# Patient Record
Sex: MALE | Race: OTHER | HISPANIC OR LATINO | Employment: OTHER | URBAN - METROPOLITAN AREA
[De-identification: names, ages, dates, MRNs, and addresses within clinical notes are randomized per-mention and may not be internally consistent; named-entity substitution may affect disease eponyms.]

---

## 2017-01-05 ENCOUNTER — TRANSCRIBE ORDERS (OUTPATIENT)
Dept: LAB | Facility: CLINIC | Age: 67
End: 2017-01-05

## 2017-01-05 ENCOUNTER — APPOINTMENT (OUTPATIENT)
Dept: LAB | Facility: CLINIC | Age: 67
End: 2017-01-05
Payer: COMMERCIAL

## 2017-01-05 ENCOUNTER — GENERIC CONVERSION - ENCOUNTER (OUTPATIENT)
Dept: OTHER | Facility: OTHER | Age: 67
End: 2017-01-05

## 2017-01-05 DIAGNOSIS — E11.9 TYPE 2 DIABETES MELLITUS WITHOUT COMPLICATIONS (HCC): ICD-10-CM

## 2017-01-05 DIAGNOSIS — E66.01 MORBID (SEVERE) OBESITY DUE TO EXCESS CALORIES (HCC): ICD-10-CM

## 2017-01-05 DIAGNOSIS — Z98.84 BARIATRIC SURGERY STATUS: ICD-10-CM

## 2017-01-05 LAB
25(OH)D3 SERPL-MCNC: 28.7 NG/ML (ref 30–100)
ALBUMIN SERPL BCP-MCNC: 3.5 G/DL (ref 3.5–5)
ALP SERPL-CCNC: 73 U/L (ref 46–116)
ALT SERPL W P-5'-P-CCNC: 35 U/L (ref 12–78)
ANION GAP SERPL CALCULATED.3IONS-SCNC: 9 MMOL/L (ref 4–13)
AST SERPL W P-5'-P-CCNC: 20 U/L (ref 5–45)
BILIRUB SERPL-MCNC: 0.4 MG/DL (ref 0.2–1)
BUN SERPL-MCNC: 19 MG/DL (ref 5–25)
CALCIUM SERPL-MCNC: 8.9 MG/DL (ref 8.3–10.1)
CHLORIDE SERPL-SCNC: 107 MMOL/L (ref 100–108)
CHOLEST SERPL-MCNC: 190 MG/DL (ref 50–200)
CO2 SERPL-SCNC: 27 MMOL/L (ref 21–32)
CREAT SERPL-MCNC: 0.89 MG/DL (ref 0.6–1.3)
ERYTHROCYTE [DISTWIDTH] IN BLOOD BY AUTOMATED COUNT: 14.3 % (ref 11.6–15.1)
EST. AVERAGE GLUCOSE BLD GHB EST-MCNC: 146 MG/DL
GFR SERPL CREATININE-BSD FRML MDRD: >60 ML/MIN/1.73SQ M
GLUCOSE SERPL-MCNC: 121 MG/DL (ref 65–140)
HBA1C MFR BLD: 6.7 % (ref 4.2–6.3)
HCT VFR BLD AUTO: 44 % (ref 36.5–49.3)
HDLC SERPL-MCNC: 35 MG/DL (ref 40–60)
HGB BLD-MCNC: 14.2 G/DL (ref 12–17)
LDLC SERPL CALC-MCNC: 126 MG/DL (ref 0–100)
MCH RBC QN AUTO: 28.6 PG (ref 26.8–34.3)
MCHC RBC AUTO-ENTMCNC: 32.3 G/DL (ref 31.4–37.4)
MCV RBC AUTO: 89 FL (ref 82–98)
PLATELET # BLD AUTO: 154 THOUSANDS/UL (ref 149–390)
PMV BLD AUTO: 13.2 FL (ref 8.9–12.7)
POTASSIUM SERPL-SCNC: 3.9 MMOL/L (ref 3.5–5.3)
PROT SERPL-MCNC: 7.5 G/DL (ref 6.4–8.2)
RBC # BLD AUTO: 4.96 MILLION/UL (ref 3.88–5.62)
SODIUM SERPL-SCNC: 143 MMOL/L (ref 136–145)
TRIGL SERPL-MCNC: 146 MG/DL
VIT B12 SERPL-MCNC: 922 PG/ML (ref 100–900)
WBC # BLD AUTO: 6.48 THOUSAND/UL (ref 4.31–10.16)

## 2017-01-05 PROCEDURE — 80061 LIPID PANEL: CPT

## 2017-01-05 PROCEDURE — 36415 COLL VENOUS BLD VENIPUNCTURE: CPT

## 2017-01-05 PROCEDURE — 82306 VITAMIN D 25 HYDROXY: CPT

## 2017-01-05 PROCEDURE — 82607 VITAMIN B-12: CPT

## 2017-01-05 PROCEDURE — 83036 HEMOGLOBIN GLYCOSYLATED A1C: CPT

## 2017-01-05 PROCEDURE — 80053 COMPREHEN METABOLIC PANEL: CPT

## 2017-01-05 PROCEDURE — 85027 COMPLETE CBC AUTOMATED: CPT

## 2017-01-06 ENCOUNTER — ALLSCRIPTS OFFICE VISIT (OUTPATIENT)
Dept: OTHER | Facility: OTHER | Age: 67
End: 2017-01-06

## 2017-02-01 ENCOUNTER — ALLSCRIPTS OFFICE VISIT (OUTPATIENT)
Dept: OTHER | Facility: OTHER | Age: 67
End: 2017-02-01

## 2017-02-06 ENCOUNTER — ALLSCRIPTS OFFICE VISIT (OUTPATIENT)
Dept: OTHER | Facility: OTHER | Age: 67
End: 2017-02-06

## 2017-03-01 DIAGNOSIS — Z01.812 ENCOUNTER FOR PREPROCEDURAL LABORATORY EXAMINATION: ICD-10-CM

## 2017-03-01 DIAGNOSIS — G47.33 OBSTRUCTIVE SLEEP APNEA: ICD-10-CM

## 2017-03-01 DIAGNOSIS — I10 ESSENTIAL (PRIMARY) HYPERTENSION: ICD-10-CM

## 2017-03-01 DIAGNOSIS — R10.13 EPIGASTRIC PAIN: ICD-10-CM

## 2017-03-01 DIAGNOSIS — Z00.00 ENCOUNTER FOR GENERAL ADULT MEDICAL EXAMINATION WITHOUT ABNORMAL FINDINGS: ICD-10-CM

## 2017-03-01 DIAGNOSIS — E11.9 TYPE 2 DIABETES MELLITUS WITHOUT COMPLICATIONS (HCC): ICD-10-CM

## 2017-03-01 DIAGNOSIS — K91.2 POSTSURGICAL MALABSORPTION, NOT ELSEWHERE CLASSIFIED: ICD-10-CM

## 2017-03-01 DIAGNOSIS — E78.00 PURE HYPERCHOLESTEROLEMIA: ICD-10-CM

## 2017-03-01 DIAGNOSIS — Z98.84 BARIATRIC SURGERY STATUS: ICD-10-CM

## 2017-03-03 ENCOUNTER — HOSPITAL ENCOUNTER (OUTPATIENT)
Facility: HOSPITAL | Age: 67
Setting detail: OBSERVATION
Discharge: HOME | End: 2017-03-04
Attending: EMERGENCY MEDICINE | Admitting: INTERNAL MEDICINE
Payer: COMMERCIAL

## 2017-03-03 ENCOUNTER — APPOINTMENT (EMERGENCY)
Dept: RADIOLOGY | Facility: HOSPITAL | Age: 67
End: 2017-03-03
Payer: COMMERCIAL

## 2017-03-03 ENCOUNTER — APPOINTMENT (EMERGENCY)
Dept: CT IMAGING | Facility: HOSPITAL | Age: 67
End: 2017-03-03
Payer: COMMERCIAL

## 2017-03-03 DIAGNOSIS — R00.1 SINUS BRADYCARDIA: ICD-10-CM

## 2017-03-03 DIAGNOSIS — R55 SYNCOPE: Primary | ICD-10-CM

## 2017-03-03 DIAGNOSIS — S40.012A CONTUSION OF LEFT SHOULDER, INITIAL ENCOUNTER: ICD-10-CM

## 2017-03-03 LAB
ANION GAP SERPL CALCULATED.3IONS-SCNC: 13 MMOL/L (ref 4–13)
APAP SERPL-MCNC: <2 UG/ML (ref 10–30)
APTT PPP: 27 SECONDS (ref 24–36)
BASOPHILS # BLD AUTO: 0.02 THOUSANDS/ΜL (ref 0–0.1)
BASOPHILS NFR BLD AUTO: 0 % (ref 0–1)
BUN SERPL-MCNC: 19 MG/DL (ref 5–25)
CALCIUM SERPL-MCNC: 8.4 MG/DL (ref 8.3–10.1)
CHLORIDE SERPL-SCNC: 106 MMOL/L (ref 100–108)
CO2 SERPL-SCNC: 22 MMOL/L (ref 21–32)
CREAT SERPL-MCNC: 1.29 MG/DL (ref 0.6–1.3)
DEPRECATED D DIMER PPP: 727 NG/ML (FEU) (ref 0–424)
EOSINOPHIL # BLD AUTO: 0.07 THOUSAND/ΜL (ref 0–0.61)
EOSINOPHIL NFR BLD AUTO: 1 % (ref 0–6)
ERYTHROCYTE [DISTWIDTH] IN BLOOD BY AUTOMATED COUNT: 14.3 % (ref 11.6–15.1)
ETHANOL SERPL-MCNC: <3 MG/DL (ref 0–3)
GFR SERPL CREATININE-BSD FRML MDRD: 55.7 ML/MIN/1.73SQ M
GLUCOSE SERPL-MCNC: 171 MG/DL (ref 65–140)
GLUCOSE SERPL-MCNC: 185 MG/DL (ref 65–140)
HCT VFR BLD AUTO: 44 % (ref 36.5–49.3)
HGB BLD-MCNC: 14.5 G/DL (ref 12–17)
INR PPP: 1.23 (ref 0.86–1.16)
LYMPHOCYTES # BLD AUTO: 1.41 THOUSANDS/ΜL (ref 0.6–4.47)
LYMPHOCYTES NFR BLD AUTO: 16 % (ref 14–44)
MAGNESIUM SERPL-MCNC: 2 MG/DL (ref 1.6–2.6)
MCH RBC QN AUTO: 28.8 PG (ref 26.8–34.3)
MCHC RBC AUTO-ENTMCNC: 33 G/DL (ref 31.4–37.4)
MCV RBC AUTO: 87 FL (ref 82–98)
MONOCYTES # BLD AUTO: 0.7 THOUSAND/ΜL (ref 0.17–1.22)
MONOCYTES NFR BLD AUTO: 8 % (ref 4–12)
NEUTROPHILS # BLD AUTO: 6.47 THOUSANDS/ΜL (ref 1.85–7.62)
NEUTS SEG NFR BLD AUTO: 75 % (ref 43–75)
PLATELET # BLD AUTO: 153 THOUSANDS/UL (ref 149–390)
PMV BLD AUTO: 12.9 FL (ref 8.9–12.7)
POTASSIUM SERPL-SCNC: 4.7 MMOL/L (ref 3.5–5.3)
PROTHROMBIN TIME: 15.2 SECONDS (ref 12–14.3)
RBC # BLD AUTO: 5.04 MILLION/UL (ref 3.88–5.62)
SALICYLATES SERPL-MCNC: <3 MG/DL (ref 3–20)
SODIUM SERPL-SCNC: 141 MMOL/L (ref 136–145)
TROPONIN I SERPL-MCNC: <0.02 NG/ML
WBC # BLD AUTO: 8.67 THOUSAND/UL (ref 4.31–10.16)

## 2017-03-03 PROCEDURE — 85025 COMPLETE CBC W/AUTO DIFF WBC: CPT | Performed by: EMERGENCY MEDICINE

## 2017-03-03 PROCEDURE — 96374 THER/PROPH/DIAG INJ IV PUSH: CPT

## 2017-03-03 PROCEDURE — 82948 REAGENT STRIP/BLOOD GLUCOSE: CPT

## 2017-03-03 PROCEDURE — 73030 X-RAY EXAM OF SHOULDER: CPT

## 2017-03-03 PROCEDURE — 84484 ASSAY OF TROPONIN QUANT: CPT | Performed by: EMERGENCY MEDICINE

## 2017-03-03 PROCEDURE — 83735 ASSAY OF MAGNESIUM: CPT | Performed by: EMERGENCY MEDICINE

## 2017-03-03 PROCEDURE — 70450 CT HEAD/BRAIN W/O DYE: CPT

## 2017-03-03 PROCEDURE — 85379 FIBRIN DEGRADATION QUANT: CPT | Performed by: EMERGENCY MEDICINE

## 2017-03-03 PROCEDURE — 80048 BASIC METABOLIC PNL TOTAL CA: CPT | Performed by: EMERGENCY MEDICINE

## 2017-03-03 PROCEDURE — 93005 ELECTROCARDIOGRAM TRACING: CPT | Performed by: EMERGENCY MEDICINE

## 2017-03-03 PROCEDURE — 85730 THROMBOPLASTIN TIME PARTIAL: CPT | Performed by: EMERGENCY MEDICINE

## 2017-03-03 PROCEDURE — 80320 DRUG SCREEN QUANTALCOHOLS: CPT | Performed by: EMERGENCY MEDICINE

## 2017-03-03 PROCEDURE — 85610 PROTHROMBIN TIME: CPT | Performed by: EMERGENCY MEDICINE

## 2017-03-03 PROCEDURE — 71275 CT ANGIOGRAPHY CHEST: CPT

## 2017-03-03 PROCEDURE — 72125 CT NECK SPINE W/O DYE: CPT

## 2017-03-03 PROCEDURE — 36415 COLL VENOUS BLD VENIPUNCTURE: CPT | Performed by: EMERGENCY MEDICINE

## 2017-03-03 RX ORDER — KETOROLAC TROMETHAMINE 30 MG/ML
15 INJECTION, SOLUTION INTRAMUSCULAR; INTRAVENOUS ONCE
Status: COMPLETED | OUTPATIENT
Start: 2017-03-03 | End: 2017-03-03

## 2017-03-03 RX ADMIN — KETOROLAC TROMETHAMINE 15 MG: 30 INJECTION, SOLUTION INTRAMUSCULAR at 23:30

## 2017-03-04 VITALS
HEIGHT: 68 IN | TEMPERATURE: 97.4 F | RESPIRATION RATE: 20 BRPM | OXYGEN SATURATION: 93 % | HEART RATE: 52 BPM | BODY MASS INDEX: 38.77 KG/M2 | DIASTOLIC BLOOD PRESSURE: 58 MMHG | WEIGHT: 255.8 LBS | SYSTOLIC BLOOD PRESSURE: 121 MMHG

## 2017-03-04 PROBLEM — R00.1 SINUS BRADYCARDIA: Status: ACTIVE | Noted: 2017-03-04

## 2017-03-04 PROBLEM — R55 SYNCOPE: Status: ACTIVE | Noted: 2017-03-04

## 2017-03-04 LAB
ANION GAP SERPL CALCULATED.3IONS-SCNC: 8 MMOL/L (ref 4–13)
ATRIAL RATE: 66 BPM
BUN SERPL-MCNC: 20 MG/DL (ref 5–25)
CALCIUM SERPL-MCNC: 8.4 MG/DL (ref 8.3–10.1)
CHLORIDE SERPL-SCNC: 107 MMOL/L (ref 100–108)
CO2 SERPL-SCNC: 22 MMOL/L (ref 21–32)
CREAT SERPL-MCNC: 0.91 MG/DL (ref 0.6–1.3)
ERYTHROCYTE [DISTWIDTH] IN BLOOD BY AUTOMATED COUNT: 14.1 % (ref 11.6–15.1)
GFR SERPL CREATININE-BSD FRML MDRD: >60 ML/MIN/1.73SQ M
GLUCOSE SERPL-MCNC: 111 MG/DL (ref 65–140)
GLUCOSE SERPL-MCNC: 134 MG/DL (ref 65–140)
GLUCOSE SERPL-MCNC: 166 MG/DL (ref 65–140)
HCT VFR BLD AUTO: 39.7 % (ref 36.5–49.3)
HGB BLD-MCNC: 12.7 G/DL (ref 12–17)
MAGNESIUM SERPL-MCNC: 2 MG/DL (ref 1.6–2.6)
MCH RBC QN AUTO: 28 PG (ref 26.8–34.3)
MCHC RBC AUTO-ENTMCNC: 32 G/DL (ref 31.4–37.4)
MCV RBC AUTO: 87 FL (ref 82–98)
P AXIS: 58 DEGREES
PLATELET # BLD AUTO: 103 THOUSANDS/UL (ref 149–390)
PMV BLD AUTO: 12.7 FL (ref 8.9–12.7)
POTASSIUM SERPL-SCNC: 4.1 MMOL/L (ref 3.5–5.3)
PR INTERVAL: 156 MS
QRS AXIS: 57 DEGREES
QRSD INTERVAL: 92 MS
QT INTERVAL: 400 MS
QTC INTERVAL: 419 MS
RBC # BLD AUTO: 4.54 MILLION/UL (ref 3.88–5.62)
SODIUM SERPL-SCNC: 137 MMOL/L (ref 136–145)
T WAVE AXIS: 60 DEGREES
TROPONIN I SERPL-MCNC: <0.02 NG/ML
TROPONIN I SERPL-MCNC: <0.02 NG/ML
TSH SERPL DL<=0.05 MIU/L-ACNC: 1.35 UIU/ML (ref 0.36–3.74)
VENTRICULAR RATE: 66 BPM
WBC # BLD AUTO: 9.36 THOUSAND/UL (ref 4.31–10.16)

## 2017-03-04 PROCEDURE — 85027 COMPLETE CBC AUTOMATED: CPT | Performed by: PHYSICIAN ASSISTANT

## 2017-03-04 PROCEDURE — 80048 BASIC METABOLIC PNL TOTAL CA: CPT | Performed by: PHYSICIAN ASSISTANT

## 2017-03-04 PROCEDURE — 99285 EMERGENCY DEPT VISIT HI MDM: CPT

## 2017-03-04 PROCEDURE — 84484 ASSAY OF TROPONIN QUANT: CPT | Performed by: PHYSICIAN ASSISTANT

## 2017-03-04 PROCEDURE — 82948 REAGENT STRIP/BLOOD GLUCOSE: CPT

## 2017-03-04 PROCEDURE — 83735 ASSAY OF MAGNESIUM: CPT | Performed by: PHYSICIAN ASSISTANT

## 2017-03-04 PROCEDURE — 84443 ASSAY THYROID STIM HORMONE: CPT | Performed by: PHYSICIAN ASSISTANT

## 2017-03-04 RX ORDER — ONDANSETRON 2 MG/ML
4 INJECTION INTRAMUSCULAR; INTRAVENOUS EVERY 6 HOURS PRN
Status: DISCONTINUED | OUTPATIENT
Start: 2017-03-04 | End: 2017-03-04 | Stop reason: HOSPADM

## 2017-03-04 RX ORDER — CHOLECALCIFEROL (VITAMIN D3) 125 MCG
1000 CAPSULE ORAL EVERY OTHER DAY
Status: DISCONTINUED | OUTPATIENT
Start: 2017-03-04 | End: 2017-03-04 | Stop reason: HOSPADM

## 2017-03-04 RX ORDER — LOSARTAN POTASSIUM 25 MG/1
25 TABLET ORAL DAILY
Status: DISCONTINUED | OUTPATIENT
Start: 2017-03-04 | End: 2017-03-04 | Stop reason: HOSPADM

## 2017-03-04 RX ORDER — GLIPIZIDE AND METFORMIN HCL 2.5; 25 MG/1; MG/1
1 TABLET, FILM COATED ORAL
COMMUNITY
End: 2018-11-26

## 2017-03-04 RX ORDER — INSULIN ASPART 100 [IU]/ML
10 INJECTION, SUSPENSION SUBCUTANEOUS
Status: DISCONTINUED | OUTPATIENT
Start: 2017-03-04 | End: 2017-03-04 | Stop reason: HOSPADM

## 2017-03-04 RX ORDER — TAMSULOSIN HYDROCHLORIDE 0.4 MG/1
0.4 CAPSULE ORAL
Status: DISCONTINUED | OUTPATIENT
Start: 2017-03-04 | End: 2017-03-04 | Stop reason: HOSPADM

## 2017-03-04 RX ORDER — SODIUM CHLORIDE 9 MG/ML
75 INJECTION, SOLUTION INTRAVENOUS CONTINUOUS
Status: DISCONTINUED | OUTPATIENT
Start: 2017-03-04 | End: 2017-03-04

## 2017-03-04 RX ORDER — PANTOPRAZOLE SODIUM 40 MG/1
40 TABLET, DELAYED RELEASE ORAL
Status: DISCONTINUED | OUTPATIENT
Start: 2017-03-04 | End: 2017-03-04 | Stop reason: HOSPADM

## 2017-03-04 RX ORDER — SODIUM CHLORIDE 9 MG/ML
75 INJECTION, SOLUTION INTRAVENOUS ONCE
Status: COMPLETED | OUTPATIENT
Start: 2017-03-04 | End: 2017-03-04

## 2017-03-04 RX ORDER — ACETAMINOPHEN 325 MG/1
650 TABLET ORAL EVERY 4 HOURS PRN
Status: DISCONTINUED | OUTPATIENT
Start: 2017-03-04 | End: 2017-03-04 | Stop reason: HOSPADM

## 2017-03-04 RX ORDER — PHENOL 1.4 %
600 AEROSOL, SPRAY (ML) MUCOUS MEMBRANE
COMMUNITY

## 2017-03-04 RX ADMIN — SODIUM CHLORIDE 75 ML/HR: 0.9 INJECTION, SOLUTION INTRAVENOUS at 09:58

## 2017-03-04 RX ADMIN — CYANOCOBALAMIN TAB 500 MCG 1000 MCG: 500 TAB at 09:57

## 2017-03-04 RX ADMIN — Medication 1 TABLET: at 09:57

## 2017-03-04 RX ADMIN — INSULIN ASPART 10 UNITS: 100 INJECTION, SUSPENSION SUBCUTANEOUS at 09:56

## 2017-03-04 RX ADMIN — ACETAMINOPHEN 650 MG: 325 TABLET ORAL at 13:51

## 2017-03-04 RX ADMIN — INSULIN LISPRO 2 UNITS: 100 INJECTION, SOLUTION INTRAVENOUS; SUBCUTANEOUS at 11:45

## 2017-03-04 RX ADMIN — ENOXAPARIN SODIUM 40 MG: 40 INJECTION SUBCUTANEOUS at 09:57

## 2017-03-04 RX ADMIN — IOHEXOL 85 ML: 350 INJECTION, SOLUTION INTRAVENOUS at 00:39

## 2017-03-04 RX ADMIN — PANTOPRAZOLE SODIUM 40 MG: 40 TABLET, DELAYED RELEASE ORAL at 06:00

## 2017-03-04 RX ADMIN — LOSARTAN POTASSIUM 25 MG: 25 TABLET, FILM COATED ORAL at 09:57

## 2017-03-07 ENCOUNTER — ALLSCRIPTS OFFICE VISIT (OUTPATIENT)
Dept: OTHER | Facility: OTHER | Age: 67
End: 2017-03-07

## 2017-04-01 DIAGNOSIS — E11.9 TYPE 2 DIABETES MELLITUS WITHOUT COMPLICATIONS (HCC): ICD-10-CM

## 2017-04-04 ENCOUNTER — TRANSCRIBE ORDERS (OUTPATIENT)
Dept: LAB | Facility: CLINIC | Age: 67
End: 2017-04-04

## 2017-04-04 ENCOUNTER — APPOINTMENT (OUTPATIENT)
Dept: LAB | Facility: CLINIC | Age: 67
End: 2017-04-04
Payer: COMMERCIAL

## 2017-04-04 DIAGNOSIS — G47.33 OBSTRUCTIVE SLEEP APNEA: ICD-10-CM

## 2017-04-04 DIAGNOSIS — E11.9 TYPE 2 DIABETES MELLITUS WITHOUT COMPLICATIONS (HCC): ICD-10-CM

## 2017-04-04 DIAGNOSIS — Z00.00 ENCOUNTER FOR GENERAL ADULT MEDICAL EXAMINATION WITHOUT ABNORMAL FINDINGS: ICD-10-CM

## 2017-04-04 DIAGNOSIS — Z98.84 BARIATRIC SURGERY STATUS: ICD-10-CM

## 2017-04-04 DIAGNOSIS — K91.2 POSTSURGICAL MALABSORPTION, NOT ELSEWHERE CLASSIFIED: ICD-10-CM

## 2017-04-04 DIAGNOSIS — E78.00 PURE HYPERCHOLESTEROLEMIA: ICD-10-CM

## 2017-04-04 DIAGNOSIS — Z01.812 ENCOUNTER FOR PREPROCEDURAL LABORATORY EXAMINATION: ICD-10-CM

## 2017-04-04 DIAGNOSIS — I10 ESSENTIAL (PRIMARY) HYPERTENSION: ICD-10-CM

## 2017-04-04 DIAGNOSIS — R10.13 EPIGASTRIC PAIN: ICD-10-CM

## 2017-04-04 LAB
FERRITIN SERPL-MCNC: 50 NG/ML (ref 8–388)
FOLATE SERPL-MCNC: >20 NG/ML (ref 3.1–17.5)
PTH-INTACT SERPL-MCNC: 34.9 PG/ML (ref 14–72)
VIT B12 SERPL-MCNC: 891 PG/ML (ref 100–900)

## 2017-04-04 PROCEDURE — 82728 ASSAY OF FERRITIN: CPT

## 2017-04-04 PROCEDURE — 84590 ASSAY OF VITAMIN A: CPT

## 2017-04-04 PROCEDURE — 82746 ASSAY OF FOLIC ACID SERUM: CPT

## 2017-04-04 PROCEDURE — 84425 ASSAY OF VITAMIN B-1: CPT

## 2017-04-04 PROCEDURE — 82607 VITAMIN B-12: CPT

## 2017-04-04 PROCEDURE — 36415 COLL VENOUS BLD VENIPUNCTURE: CPT

## 2017-04-04 PROCEDURE — 83970 ASSAY OF PARATHORMONE: CPT

## 2017-04-05 ENCOUNTER — GENERIC CONVERSION - ENCOUNTER (OUTPATIENT)
Dept: OTHER | Facility: OTHER | Age: 67
End: 2017-04-05

## 2017-04-06 ENCOUNTER — ALLSCRIPTS OFFICE VISIT (OUTPATIENT)
Dept: OTHER | Facility: OTHER | Age: 67
End: 2017-04-06

## 2017-04-07 ENCOUNTER — GENERIC CONVERSION - ENCOUNTER (OUTPATIENT)
Dept: OTHER | Facility: OTHER | Age: 67
End: 2017-04-07

## 2017-04-07 ENCOUNTER — ALLSCRIPTS OFFICE VISIT (OUTPATIENT)
Dept: OTHER | Facility: OTHER | Age: 67
End: 2017-04-07

## 2017-04-07 LAB
VIT A SERPL-MCNC: 42 UG/DL (ref 24–85)
VIT B1 BLD-SCNC: 183.9 NMOL/L (ref 66.5–200)

## 2017-05-10 ENCOUNTER — ALLSCRIPTS OFFICE VISIT (OUTPATIENT)
Dept: OTHER | Facility: OTHER | Age: 67
End: 2017-05-10

## 2017-06-19 ENCOUNTER — TRANSCRIBE ORDERS (OUTPATIENT)
Dept: LAB | Facility: CLINIC | Age: 67
End: 2017-06-19

## 2017-06-19 ENCOUNTER — GENERIC CONVERSION - ENCOUNTER (OUTPATIENT)
Dept: OTHER | Facility: OTHER | Age: 67
End: 2017-06-19

## 2017-06-19 ENCOUNTER — APPOINTMENT (OUTPATIENT)
Dept: LAB | Facility: CLINIC | Age: 67
End: 2017-06-19
Payer: COMMERCIAL

## 2017-06-19 DIAGNOSIS — I10 ESSENTIAL (PRIMARY) HYPERTENSION: ICD-10-CM

## 2017-06-19 DIAGNOSIS — E78.00 PURE HYPERCHOLESTEROLEMIA: ICD-10-CM

## 2017-06-19 DIAGNOSIS — E11.9 TYPE 2 DIABETES MELLITUS WITHOUT COMPLICATIONS (HCC): ICD-10-CM

## 2017-06-19 LAB
CREAT UR-MCNC: 219 MG/DL
EST. AVERAGE GLUCOSE BLD GHB EST-MCNC: 140 MG/DL
HBA1C MFR BLD: 6.5 % (ref 4.2–6.3)
MICROALBUMIN UR-MCNC: 18.1 MG/L (ref 0–20)
MICROALBUMIN/CREAT 24H UR: 8 MG/G CREATININE (ref 0–30)

## 2017-06-19 PROCEDURE — 82043 UR ALBUMIN QUANTITATIVE: CPT

## 2017-06-19 PROCEDURE — 82570 ASSAY OF URINE CREATININE: CPT

## 2017-06-19 PROCEDURE — 36415 COLL VENOUS BLD VENIPUNCTURE: CPT

## 2017-06-19 PROCEDURE — 83036 HEMOGLOBIN GLYCOSYLATED A1C: CPT

## 2017-06-27 ENCOUNTER — ALLSCRIPTS OFFICE VISIT (OUTPATIENT)
Dept: OTHER | Facility: OTHER | Age: 67
End: 2017-06-27

## 2017-07-01 DIAGNOSIS — E11.9 TYPE 2 DIABETES MELLITUS WITHOUT COMPLICATIONS (HCC): ICD-10-CM

## 2017-07-01 DIAGNOSIS — I10 ESSENTIAL (PRIMARY) HYPERTENSION: ICD-10-CM

## 2017-07-01 DIAGNOSIS — E78.00 PURE HYPERCHOLESTEROLEMIA: ICD-10-CM

## 2017-08-01 ENCOUNTER — ALLSCRIPTS OFFICE VISIT (OUTPATIENT)
Dept: OTHER | Facility: OTHER | Age: 67
End: 2017-08-01

## 2017-08-09 ENCOUNTER — APPOINTMENT (EMERGENCY)
Dept: CT IMAGING | Facility: HOSPITAL | Age: 67
End: 2017-08-09
Payer: COMMERCIAL

## 2017-08-09 ENCOUNTER — HOSPITAL ENCOUNTER (EMERGENCY)
Facility: HOSPITAL | Age: 67
End: 2017-08-09
Attending: EMERGENCY MEDICINE
Payer: COMMERCIAL

## 2017-08-09 ENCOUNTER — HOSPITAL ENCOUNTER (OUTPATIENT)
Facility: HOSPITAL | Age: 67
Setting detail: OBSERVATION
Discharge: HOME | End: 2017-08-10
Attending: SURGERY | Admitting: SURGERY
Payer: COMMERCIAL

## 2017-08-09 VITALS
DIASTOLIC BLOOD PRESSURE: 85 MMHG | HEART RATE: 61 BPM | TEMPERATURE: 97.7 F | SYSTOLIC BLOOD PRESSURE: 157 MMHG | WEIGHT: 233.69 LBS | OXYGEN SATURATION: 92 % | BODY MASS INDEX: 35.53 KG/M2 | RESPIRATION RATE: 16 BRPM

## 2017-08-09 DIAGNOSIS — K56.609 BOWEL OBSTRUCTION (HCC): Primary | ICD-10-CM

## 2017-08-09 LAB
ALBUMIN SERPL BCP-MCNC: 3.9 G/DL (ref 3.5–5)
ALP SERPL-CCNC: 104 U/L (ref 46–116)
ALT SERPL W P-5'-P-CCNC: 24 U/L (ref 12–78)
ANION GAP SERPL CALCULATED.3IONS-SCNC: 7 MMOL/L (ref 4–13)
APTT PPP: 34 SECONDS (ref 23–35)
AST SERPL W P-5'-P-CCNC: 20 U/L (ref 5–45)
BASOPHILS # BLD AUTO: 0.04 THOUSANDS/ΜL (ref 0–0.1)
BASOPHILS NFR BLD AUTO: 0 % (ref 0–1)
BILIRUB SERPL-MCNC: 0.6 MG/DL (ref 0.2–1)
BUN SERPL-MCNC: 19 MG/DL (ref 5–25)
CALCIUM SERPL-MCNC: 9.5 MG/DL (ref 8.3–10.1)
CHLORIDE SERPL-SCNC: 103 MMOL/L (ref 100–108)
CO2 SERPL-SCNC: 31 MMOL/L (ref 21–32)
CREAT SERPL-MCNC: 0.98 MG/DL (ref 0.6–1.3)
EOSINOPHIL # BLD AUTO: 0.1 THOUSAND/ΜL (ref 0–0.61)
EOSINOPHIL NFR BLD AUTO: 1 % (ref 0–6)
ERYTHROCYTE [DISTWIDTH] IN BLOOD BY AUTOMATED COUNT: 14 % (ref 11.6–15.1)
GFR SERPL CREATININE-BSD FRML MDRD: 79 ML/MIN/1.73SQ M
GLUCOSE SERPL-MCNC: 137 MG/DL (ref 65–140)
GLUCOSE SERPL-MCNC: 72 MG/DL (ref 65–140)
GLUCOSE SERPL-MCNC: 76 MG/DL (ref 65–140)
GLUCOSE SERPL-MCNC: 76 MG/DL (ref 65–140)
HCT VFR BLD AUTO: 48.4 % (ref 36.5–49.3)
HGB BLD-MCNC: 15.8 G/DL (ref 12–17)
INR PPP: 0.94 (ref 0.86–1.16)
LIPASE SERPL-CCNC: 154 U/L (ref 73–393)
LYMPHOCYTES # BLD AUTO: 0.98 THOUSANDS/ΜL (ref 0.6–4.47)
LYMPHOCYTES NFR BLD AUTO: 9 % (ref 14–44)
MCH RBC QN AUTO: 28.8 PG (ref 26.8–34.3)
MCHC RBC AUTO-ENTMCNC: 32.6 G/DL (ref 31.4–37.4)
MCV RBC AUTO: 88 FL (ref 82–98)
MONOCYTES # BLD AUTO: 0.83 THOUSAND/ΜL (ref 0.17–1.22)
MONOCYTES NFR BLD AUTO: 8 % (ref 4–12)
NEUTROPHILS # BLD AUTO: 8.47 THOUSANDS/ΜL (ref 1.85–7.62)
NEUTS SEG NFR BLD AUTO: 82 % (ref 43–75)
PLATELET # BLD AUTO: 188 THOUSANDS/UL (ref 149–390)
PMV BLD AUTO: 12.6 FL (ref 8.9–12.7)
POTASSIUM SERPL-SCNC: 4.1 MMOL/L (ref 3.5–5.3)
PROT SERPL-MCNC: 8.6 G/DL (ref 6.4–8.2)
PROTHROMBIN TIME: 12.8 SECONDS (ref 12.1–14.4)
RBC # BLD AUTO: 5.48 MILLION/UL (ref 3.88–5.62)
SODIUM SERPL-SCNC: 141 MMOL/L (ref 136–145)
WBC # BLD AUTO: 10.42 THOUSAND/UL (ref 4.31–10.16)

## 2017-08-09 PROCEDURE — 99285 EMERGENCY DEPT VISIT HI MDM: CPT

## 2017-08-09 PROCEDURE — 80053 COMPREHEN METABOLIC PANEL: CPT | Performed by: EMERGENCY MEDICINE

## 2017-08-09 PROCEDURE — C9113 INJ PANTOPRAZOLE SODIUM, VIA: HCPCS | Performed by: EMERGENCY MEDICINE

## 2017-08-09 PROCEDURE — 85730 THROMBOPLASTIN TIME PARTIAL: CPT | Performed by: EMERGENCY MEDICINE

## 2017-08-09 PROCEDURE — 96374 THER/PROPH/DIAG INJ IV PUSH: CPT

## 2017-08-09 PROCEDURE — 82948 REAGENT STRIP/BLOOD GLUCOSE: CPT

## 2017-08-09 PROCEDURE — 74177 CT ABD & PELVIS W/CONTRAST: CPT

## 2017-08-09 PROCEDURE — 96375 TX/PRO/DX INJ NEW DRUG ADDON: CPT

## 2017-08-09 PROCEDURE — 85025 COMPLETE CBC W/AUTO DIFF WBC: CPT | Performed by: EMERGENCY MEDICINE

## 2017-08-09 PROCEDURE — 36415 COLL VENOUS BLD VENIPUNCTURE: CPT | Performed by: EMERGENCY MEDICINE

## 2017-08-09 PROCEDURE — 83690 ASSAY OF LIPASE: CPT | Performed by: EMERGENCY MEDICINE

## 2017-08-09 PROCEDURE — 85610 PROTHROMBIN TIME: CPT | Performed by: EMERGENCY MEDICINE

## 2017-08-09 RX ORDER — SODIUM CHLORIDE, SODIUM LACTATE, POTASSIUM CHLORIDE, CALCIUM CHLORIDE 600; 310; 30; 20 MG/100ML; MG/100ML; MG/100ML; MG/100ML
50 INJECTION, SOLUTION INTRAVENOUS CONTINUOUS
Status: DISCONTINUED | OUTPATIENT
Start: 2017-08-09 | End: 2017-08-10

## 2017-08-09 RX ORDER — ONDANSETRON 2 MG/ML
4 INJECTION INTRAMUSCULAR; INTRAVENOUS ONCE
Status: COMPLETED | OUTPATIENT
Start: 2017-08-09 | End: 2017-08-09

## 2017-08-09 RX ORDER — ONDANSETRON 2 MG/ML
4 INJECTION INTRAMUSCULAR; INTRAVENOUS EVERY 6 HOURS PRN
Status: DISCONTINUED | OUTPATIENT
Start: 2017-08-09 | End: 2017-08-10 | Stop reason: HOSPADM

## 2017-08-09 RX ORDER — PANTOPRAZOLE SODIUM 40 MG/1
40 INJECTION, POWDER, FOR SOLUTION INTRAVENOUS ONCE
Status: COMPLETED | OUTPATIENT
Start: 2017-08-09 | End: 2017-08-09

## 2017-08-09 RX ORDER — ACETAMINOPHEN 160 MG/5ML
320 SUSPENSION, ORAL (FINAL DOSE FORM) ORAL EVERY 4 HOURS PRN
Status: DISCONTINUED | OUTPATIENT
Start: 2017-08-09 | End: 2017-08-10 | Stop reason: HOSPADM

## 2017-08-09 RX ORDER — PANTOPRAZOLE SODIUM 40 MG/1
40 INJECTION, POWDER, FOR SOLUTION INTRAVENOUS
Status: DISCONTINUED | OUTPATIENT
Start: 2017-08-10 | End: 2017-08-10 | Stop reason: HOSPADM

## 2017-08-09 RX ORDER — OMEPRAZOLE 20 MG/1
20 CAPSULE, DELAYED RELEASE ORAL DAILY
COMMUNITY
End: 2019-03-04

## 2017-08-09 RX ADMIN — IOHEXOL 100 ML: 350 INJECTION, SOLUTION INTRAVENOUS at 15:27

## 2017-08-09 RX ADMIN — ONDANSETRON 4 MG: 2 INJECTION INTRAMUSCULAR; INTRAVENOUS at 14:31

## 2017-08-09 RX ADMIN — HYDROMORPHONE HYDROCHLORIDE 1 MG: 1 INJECTION, SOLUTION INTRAMUSCULAR; INTRAVENOUS; SUBCUTANEOUS at 14:31

## 2017-08-09 RX ADMIN — IOHEXOL 50 ML: 240 INJECTION, SOLUTION INTRATHECAL; INTRAVASCULAR; INTRAVENOUS; ORAL at 14:09

## 2017-08-09 RX ADMIN — PANTOPRAZOLE SODIUM 40 MG: 40 INJECTION, POWDER, FOR SOLUTION INTRAVENOUS at 14:31

## 2017-08-09 RX ADMIN — SODIUM CHLORIDE, SODIUM LACTATE, POTASSIUM CHLORIDE, AND CALCIUM CHLORIDE 125 ML/HR: .6; .31; .03; .02 INJECTION, SOLUTION INTRAVENOUS at 19:10

## 2017-08-10 ENCOUNTER — APPOINTMENT (OUTPATIENT)
Dept: RADIOLOGY | Facility: HOSPITAL | Age: 67
End: 2017-08-10
Payer: COMMERCIAL

## 2017-08-10 VITALS
HEART RATE: 64 BPM | TEMPERATURE: 96.4 F | RESPIRATION RATE: 18 BRPM | SYSTOLIC BLOOD PRESSURE: 130 MMHG | OXYGEN SATURATION: 93 % | DIASTOLIC BLOOD PRESSURE: 56 MMHG

## 2017-08-10 PROBLEM — K56.600 PARTIAL SMALL BOWEL OBSTRUCTION (HCC): Status: ACTIVE | Noted: 2017-08-10

## 2017-08-10 PROBLEM — K59.00 CONSTIPATION: Status: ACTIVE | Noted: 2017-08-10

## 2017-08-10 LAB
ANION GAP SERPL CALCULATED.3IONS-SCNC: 8 MMOL/L (ref 4–13)
BUN SERPL-MCNC: 15 MG/DL (ref 5–25)
CALCIUM SERPL-MCNC: 8.4 MG/DL (ref 8.3–10.1)
CHLORIDE SERPL-SCNC: 104 MMOL/L (ref 100–108)
CO2 SERPL-SCNC: 29 MMOL/L (ref 21–32)
CREAT SERPL-MCNC: 0.88 MG/DL (ref 0.6–1.3)
GFR SERPL CREATININE-BSD FRML MDRD: 89 ML/MIN/1.73SQ M
GLUCOSE SERPL-MCNC: 117 MG/DL (ref 65–140)
GLUCOSE SERPL-MCNC: 91 MG/DL (ref 65–140)
GLUCOSE SERPL-MCNC: 96 MG/DL (ref 65–140)
POTASSIUM SERPL-SCNC: 3.9 MMOL/L (ref 3.5–5.3)
SODIUM SERPL-SCNC: 141 MMOL/L (ref 136–145)

## 2017-08-10 PROCEDURE — 80048 BASIC METABOLIC PNL TOTAL CA: CPT | Performed by: SURGERY

## 2017-08-10 PROCEDURE — 74000 HB X-RAY EXAM OF ABDOMEN (SINGLE ANTEROPOSTERIOR VIEW): CPT

## 2017-08-10 PROCEDURE — C9113 INJ PANTOPRAZOLE SODIUM, VIA: HCPCS | Performed by: SURGERY

## 2017-08-10 PROCEDURE — 82948 REAGENT STRIP/BLOOD GLUCOSE: CPT

## 2017-08-10 RX ORDER — POLYETHYLENE GLYCOL 3350 17 G/17G
17 POWDER, FOR SOLUTION ORAL DAILY
Qty: 14 EACH | Refills: 0
Start: 2017-08-10 | End: 2018-11-26

## 2017-08-10 RX ORDER — BISACODYL 10 MG
10 SUPPOSITORY, RECTAL RECTAL DAILY PRN
Status: COMPLETED | OUTPATIENT
Start: 2017-08-10 | End: 2017-08-10

## 2017-08-10 RX ORDER — POLYETHYLENE GLYCOL 3350 17 G/17G
17 POWDER, FOR SOLUTION ORAL DAILY
Status: DISCONTINUED | OUTPATIENT
Start: 2017-08-10 | End: 2017-08-10 | Stop reason: HOSPADM

## 2017-08-10 RX ORDER — TEMAZEPAM 7.5 MG/1
7.5 CAPSULE ORAL ONCE
Status: COMPLETED | OUTPATIENT
Start: 2017-08-10 | End: 2017-08-10

## 2017-08-10 RX ADMIN — PANTOPRAZOLE SODIUM 40 MG: 40 INJECTION, POWDER, FOR SOLUTION INTRAVENOUS at 10:46

## 2017-08-10 RX ADMIN — SODIUM CHLORIDE, SODIUM LACTATE, POTASSIUM CHLORIDE, AND CALCIUM CHLORIDE 125 ML/HR: .6; .31; .03; .02 INJECTION, SOLUTION INTRAVENOUS at 04:12

## 2017-08-10 RX ADMIN — TEMAZEPAM 7.5 MG: 7.5 CAPSULE ORAL at 01:09

## 2017-08-10 RX ADMIN — BISACODYL 10 MG: 10 SUPPOSITORY RECTAL at 10:46

## 2017-08-10 RX ADMIN — POLYETHYLENE GLYCOL 3350 17 G: 17 POWDER, FOR SOLUTION ORAL at 10:46

## 2017-08-12 ENCOUNTER — GENERIC CONVERSION - ENCOUNTER (OUTPATIENT)
Dept: OTHER | Facility: OTHER | Age: 67
End: 2017-08-12

## 2017-09-20 DIAGNOSIS — I10 ESSENTIAL (PRIMARY) HYPERTENSION: ICD-10-CM

## 2017-09-20 DIAGNOSIS — E78.00 PURE HYPERCHOLESTEROLEMIA: ICD-10-CM

## 2017-09-20 DIAGNOSIS — E11.9 TYPE 2 DIABETES MELLITUS WITHOUT COMPLICATIONS (HCC): ICD-10-CM

## 2017-10-20 ENCOUNTER — GENERIC CONVERSION - ENCOUNTER (OUTPATIENT)
Dept: OTHER | Facility: OTHER | Age: 67
End: 2017-10-20

## 2017-10-27 ENCOUNTER — APPOINTMENT (OUTPATIENT)
Dept: LAB | Facility: CLINIC | Age: 67
End: 2017-10-27
Payer: COMMERCIAL

## 2017-10-27 ENCOUNTER — TRANSCRIBE ORDERS (OUTPATIENT)
Dept: LAB | Facility: CLINIC | Age: 67
End: 2017-10-27

## 2017-10-27 DIAGNOSIS — E11.9 TYPE 2 DIABETES MELLITUS WITHOUT COMPLICATIONS (HCC): ICD-10-CM

## 2017-10-27 DIAGNOSIS — E78.00 PURE HYPERCHOLESTEROLEMIA: ICD-10-CM

## 2017-10-27 DIAGNOSIS — I10 ESSENTIAL (PRIMARY) HYPERTENSION: ICD-10-CM

## 2017-10-27 LAB
ALBUMIN SERPL BCP-MCNC: 3.4 G/DL (ref 3.5–5)
ALP SERPL-CCNC: 92 U/L (ref 46–116)
ALT SERPL W P-5'-P-CCNC: 26 U/L (ref 12–78)
ANION GAP SERPL CALCULATED.3IONS-SCNC: 9 MMOL/L (ref 4–13)
AST SERPL W P-5'-P-CCNC: 18 U/L (ref 5–45)
BILIRUB SERPL-MCNC: 0.4 MG/DL (ref 0.2–1)
BUN SERPL-MCNC: 20 MG/DL (ref 5–25)
CALCIUM SERPL-MCNC: 9.2 MG/DL (ref 8.3–10.1)
CHLORIDE SERPL-SCNC: 104 MMOL/L (ref 100–108)
CHOLEST SERPL-MCNC: 155 MG/DL (ref 50–200)
CO2 SERPL-SCNC: 27 MMOL/L (ref 21–32)
CREAT SERPL-MCNC: 0.97 MG/DL (ref 0.6–1.3)
EST. AVERAGE GLUCOSE BLD GHB EST-MCNC: 140 MG/DL
GFR SERPL CREATININE-BSD FRML MDRD: 80 ML/MIN/1.73SQ M
GLUCOSE P FAST SERPL-MCNC: 121 MG/DL (ref 65–99)
HBA1C MFR BLD: 6.5 % (ref 4.2–6.3)
HDLC SERPL-MCNC: 40 MG/DL (ref 40–60)
LDLC SERPL CALC-MCNC: 92 MG/DL (ref 0–100)
POTASSIUM SERPL-SCNC: 4.3 MMOL/L (ref 3.5–5.3)
PROT SERPL-MCNC: 7.4 G/DL (ref 6.4–8.2)
SODIUM SERPL-SCNC: 140 MMOL/L (ref 136–145)
TRIGL SERPL-MCNC: 113 MG/DL

## 2017-10-27 PROCEDURE — 83036 HEMOGLOBIN GLYCOSYLATED A1C: CPT

## 2017-10-27 PROCEDURE — 36415 COLL VENOUS BLD VENIPUNCTURE: CPT

## 2017-10-27 PROCEDURE — 80061 LIPID PANEL: CPT

## 2017-10-27 PROCEDURE — 80053 COMPREHEN METABOLIC PANEL: CPT

## 2017-10-29 ENCOUNTER — GENERIC CONVERSION - ENCOUNTER (OUTPATIENT)
Dept: OTHER | Facility: OTHER | Age: 67
End: 2017-10-29

## 2017-11-10 ENCOUNTER — ALLSCRIPTS OFFICE VISIT (OUTPATIENT)
Dept: OTHER | Facility: OTHER | Age: 67
End: 2017-11-10

## 2017-11-13 ENCOUNTER — ALLSCRIPTS OFFICE VISIT (OUTPATIENT)
Dept: OTHER | Facility: OTHER | Age: 67
End: 2017-11-13

## 2017-11-14 NOTE — PROCEDURES
Chief Complaint  PT is being seen today for a left shoulder injection  time injected for Parisa Meraz was by myself in 02/2017 (got 2 - 3 months of relief afterwards) - prior to that, it was another 8 months earlier with Dr La [place] of [facility]  NAD; VSS; pleasant  Pt with TTP over the left subacromial bursa region; no erythema  Current Meds   1  Biotin TABS; Therapy: (Recorded:17Ikh7972) to Recorded   2  Centrum Oral Tablet Chewable; Therapy: (Recorded:16Zeh8680) to Recorded   3  GlipiZIDE 5 MG Oral Tablet; TAKE 1 TABLET DAILY AS DIRECTED; Therapy: 97QIA9078 to (Evaluate:20Dfv3941)  Requested for: 81FSC2948; Last Rx:30Mar2017 Ordered   4  Glucocard Vital Monitor w/Device Kit; USE AS DIRECTED; Therapy: 19PIR1560 to (Last Rx:31Oct2016)  Requested for: 31Oct2016 Ordered   5  Lifescan One Touch Ultramini Meter; use as directed; Therapy: 60LBR4704 to (Last Rx:31Oct2016)  Requested for: 31Oct2016 Ordered   6  MetFORMIN HCl  MG Oral Tablet Extended Release 24 Hour; take 1 tablet by mouth once daily; Therapy: 42HQO8445 to (Evaluate:25Mar2018)  Requested for: 23JAK2850; Last Rx:30Mar2017 Ordered   7  NovoLOG Mix 70/30 FlexPen (70-30) 100 UNIT/ML Subcutaneous Suspension Pen-injector; INJECT 15 UNIT Every twelve hours; Therapy: 00WKB2502 to (Evaluate:53Oop4217)  Requested for: 46JJP7560 Recorded   8  Tamsulosin HCl - 0 4 MG Oral Capsule; take 1 capsule by mouth once daily  Requested for: 27Jun2017; Last Rx:05Jan2017 Ordered   9  TraMADol HCl - 50 MG Oral Tablet; TAKE 1 TABLET Bedtime; Therapy: 95TTI0355 to (Evaluate:86Pdk5792); Last Rx:10Nov2017 Ordered   10  Victoza 18 MG/3ML Subcutaneous Solution Pen-injector; INJECT 1 8 MG Daily; Therapy: 55UNQ2588 to (Evaluate:10Mar2018)  Requested for: 47XLZ2120; Last  Rx:10Nov2017 Ordered    Allergies  1  enalapril  2  No Known Environmental Allergies   3   No Known Food Allergies    Vitals  Signs     Heart Rate: 72  Respiration: 12  Systolic: 290  Diastolic: 70  Height: 5 ft 8 in  Weight: 228 lb 6 oz  BMI Calculated: 34 72  BSA Calculated: 2 16    Procedure    Procedure: Injection of the left subacromial bursa  Indication:  joint pain  Potential complications include bleeding,-- infection-- and-- allergic reaction  Risk, benefits and alternatives were discussed with the patient-- and-- Pt is also aware, that this ciould raise his serum glucose levels for a couple days  Verbal consent was obtained prior to the procedure  Betadine was used to prep the area  no local anesthesia was used  Using sterile technique, the aspiration/injection needle was then directed from a lateral aspect  A 25-gauge was used to inject 2 mL of 1% Lidocaine-- and-- 1 mL 40mg/mL methylprednisolone  A bandage was applied  the patient tolerated the procedure well  Complications: none  Patient instructed to avoid strenuous activity for 7 day(s)  Follow-up in the office PRN  Assessment    1  Left shoulder pain (719 41) (M25 512)    Plan  Left shoulder pain    · Joint Bursa Aspiration &/or Injection Major - POC; Status:Complete;   Done: 86VCJ3277   · Follow-up PRN Evaluation and Treatment  Follow-up  Status: Complete  Done:75Dbt5453    Discussion/Summary    Pt tolerated the procedure well today - Left subacromial bursitis  Possible side effects of new medications were reviewed with the patient/guardian today  The treatment plan was reviewed with the patient/guardian   The patient/guardian understands and agrees with the treatment plan      Future Appointments    Date/Time Provider Specialty Site   02/12/2018 02:15 PM Oscar Duran MD [address] [facility]   02/01/2018 02:00 PM Fatou Humphreys, [address] [place] [address] WE[facility]NAGEMENT CENTER       Signatures   Electronically signed by : Jennifer Edmond DO; Nov 13 2017  3:48PM EST                       (Author)

## 2017-12-01 DIAGNOSIS — I10 ESSENTIAL (PRIMARY) HYPERTENSION: ICD-10-CM

## 2017-12-01 DIAGNOSIS — E11.9 TYPE 2 DIABETES MELLITUS WITHOUT COMPLICATIONS (HCC): ICD-10-CM

## 2017-12-01 DIAGNOSIS — K91.2 POSTSURGICAL MALABSORPTION, NOT ELSEWHERE CLASSIFIED: ICD-10-CM

## 2017-12-01 DIAGNOSIS — Z00.00 ENCOUNTER FOR GENERAL ADULT MEDICAL EXAMINATION WITHOUT ABNORMAL FINDINGS: ICD-10-CM

## 2017-12-01 DIAGNOSIS — Z98.84 BARIATRIC SURGERY STATUS: ICD-10-CM

## 2017-12-01 DIAGNOSIS — G47.33 OBSTRUCTIVE SLEEP APNEA: ICD-10-CM

## 2017-12-01 DIAGNOSIS — E55.9 VITAMIN D DEFICIENCY: ICD-10-CM

## 2018-01-10 NOTE — PROGRESS NOTES
Active Problems    1  Abdominal pain (789 00) (R10 9)   2  Adhesive capsulitis of left shoulder (726 0) (M75 02)   3  Benign essential hypertension (401 1) (I10)   4  Blood tests prior to treatment or procedure (V72 63) (Z01 812)   5  BPH (benign prostatic hypertrophy) (600 00) (N40 0)   6  Calcific tendinitis of left shoulder (726 11) (M75 32)   7  Chest pain (786 50) (R07 9)   8  Decreased libido (799 81) (R68 82)   9  Diabetes (250 00) (E11 9)   10  Disorder of tendon of left biceps (726 12) (M75 22)   11  Dyspepsia (536 8) (R10 13)   12  Encounter for screening colonoscopy (V76 51) (Z12 11)   13  Erectile dysfunction (607 84) (N52 9)   14  Hypercholesteremia (272 0) (E78 0)   15  Impingement syndrome of left shoulder (726 2) (M75 42)   16  Left shoulder pain (719 41) (M25 512)   17  Low back pain (724 2) (M54 5)   18  Low testosterone (257 2) (E29 1)   19  Medicare annual wellness visit, initial (V70 0) (Z00 00)   20  Morbid obesity due to excess calories (278 01) (E66 01)   21  Need for vaccination with 13-polyvalent pneumococcal conjugate vaccine (V03 82) (Z23)   22  Obstructive sleep apnea (327 23) (G47 33)   23  Pre-operative cardiovascular examination (V72 81) (Z01 810)   24  Screening for genitourinary condition (V81 6) (Z13 89)   25  Screening for neurological condition (V80 09) (Z13 89)   26  Sleep apnea (780 57) (G47 30)   27  Tinea pedis of left foot (110 4) (B35 3)   28  Type 2 diabetes mellitus with insulin therapy (250 00,V58 67) (E11 9,Z79  4)    Past Medical History    1  History of diabetes mellitus (V12 29) (Z86 39)   2  History of heartburn (V12 79) (Z87 898)   3  History of sleep apnea (V13 89) (Z87 09)    Surgical History    1  History of Ankle Surgery   2  History of Cholecystectomy   3  History of Colostomy   4  History of Exploratory Laparoscopy   5  History of Hernia Repair   6  History of Tonsillectomy    Family History  Mother    1  Denied: Family history of Colon cancer   2   Family history of cardiac disorder (V17 49) (Z82 49)   3  Denied: Family history of liver disease  Father    4  Denied: Family history of Colon cancer   5  Family history of diabetes mellitus (V18 0) (Z83 3)   6  Denied: Family history of liver disease    Social History    · Former smoker (V15 82) (B37 303)   · No alcohol use   · No drug use    Current Meds   1  AndroGel Pump 20 25 MG/ACT (1 62%) Transdermal Gel; APPLY TWO PUMP PRESSES   ONE TIME DAILY AS DIRECTED; Therapy: 35Brq5792 to (Evaluate:11Aug2016); Last Rx:71Zlv9987 Ordered   2  Aspirin 81 MG TABS; Therapy: (Recorded:76Hql8551) to Recorded   3  Biotin TABS; Therapy: (Recorded:20Apr2016) to Recorded   4  Cialis 20 MG Oral Tablet; TAKE 1 TABLET 1 HOUR BEFORE ACTIVITY AS NEEDED; Therapy: 01Lhu0914 to (Last Rx:05Apr2016) Ordered   5  Clotrimazole 1 % External Cream; APPLY SPARINGLY TO AFFECTED AREA(S) 2 TO 3   TIMES DAILY; Therapy: 07YFL0714 to (Kaleb Ochoa)  Requested for: 59EJR1059; Last   Rx:05Jan2016 Ordered   6  Dulcolax 5 MG Oral Tablet Delayed Release; TAKE 2 TABLET ONCE; Therapy: 78Zhn9612 to (Evaluate:28Apr2016)  Requested for: 27Apr2016; Last   Rx:72Jtx4574 Ordered   7  GlipiZIDE 10 MG Oral Tablet; take 1 tablet every twelve hours  Requested for: 44DIE8238;   Last Rx:05Jan2016 Ordered   8  Golytely 227 1 GM Oral Solution Reconstituted; TAKE AS DIRECTED; Therapy: 19Leg0563 to (Last Rx:96Lis3343)  Requested for: 27Apr2016 Ordered   9  Hydrochlorothiazide 12 5 MG Oral Tablet; Take 1 tablet daily  Requested for: 12Jan2016;   Last Rx:05Jan2016 Ordered   10  LORazepam 0 5 MG Oral Tablet (Ativan); Take one tablet one hour prior to MRI  Repeat    prior to exam if necessary; Therapy: 16Whw6476 to (Last Rx:29Apr2016) Ordered   11  Losartan Potassium 25 MG Oral Tablet; take 1 tablet by mouth every day  Requested for:    12Jan2016; Last Rx:05Jan2016 Ordered   12   Lovastatin 10 MG Oral Tablet; TAKE 1 TABLET AT BEDTIME  Requested for: 44CFF3442;    Last GV:05FOS9012 Ordered   13  MetFORMIN HCl - 1000 MG Oral Tablet; TAKE 1 TABLET TWICE DAILY  Requested for:    69HPN7199; Last Rx:05Jan2016 Ordered   14  Metoprolol Tartrate 50 MG Oral Tablet; TAKE 1 TABLET TWICE DAILY  Requested for:    08FAN1861; Last Rx:07Mar2016 Ordered   15  Naproxen 500 MG Oral Tablet; TAKE 1 TABLET TWICE DAILY AS NEEDED; Therapy: 21FFD8983 to (Evaluate:90Kvy1118)  Requested for: 12Jan2016; Last    Rx:12Jan2016 Ordered   16  NovoFine 30G X 8 MM Miscellaneous; USE AS DIRECTED; Therapy: 76PHW2242 to (Last Rx:06Jan2016)  Requested for: 96EXW7564 Ordered   17  NovoLOG Mix 70/30 FlexPen (70-30) 100 UNIT/ML Subcutaneous Suspension    Pen-injector; Inject 45units in the morning and 35 units in the    evening; Therapy: 14VRX6854 to (Evaluate:23Cbq1784)  Requested for: 07LUG3507; Last    Rx:15Jun2016 Ordered   18  Tamsulosin HCl - 0 4 MG Oral Capsule; take 1 capsule by mouth once daily  Requested    for: 12Jan2016; Last Rx:05Jan2016 Ordered   19  Tanzeum 30 MG Subcutaneous Pen-injector; 1 pen subQ weekly; Therapy: 09XPS0943 to (Evaluate:31Oct2016)  Requested for: 28MOJ1330; Last    IH:34HIJ8963 Ordered   20  Viagra 50 MG Oral Tablet; TAKE 1 TABLET DAILY 1 HOUR BEFORE NEEDED; Therapy: 13Apr2016 to (Evaluate:22Jun2016)  Requested for: 63Mrd0028; Last    Rx:13Apr2016 Ordered    Allergies    1  enalapril    2  No Known Environmental Allergies   3  No Known Food Allergies     Note   Note:   Patient dropped off script from provider in [address] in which it states patient was treated for obesity from [phone]  I consulted with Kamari Maddox who said she will forward to insurance with package to see if it will be accepted  [place]      Future Appointments    Date/Time Provider Specialty Site   07/05/2016 10:00 AM Jimbo Duran MD [address] [facility]   07/06/2016 09:00 AM YASEMIN Davison   Gastroenterology Adult [facility] OUTPATIENT   08/24/2016 11:00 AM YASEMIN Burns  Bariatric Medicine [facility] WE[facility]NAGEMENT C[facility]   07/21/2016 11:15 AM Josefina Jiménez, [facility] Urology [facility] CNTR FOR UROLOGY [facility]   Electronically signed by : Jamison Cummings MSWLCSW; Jun 23 2016  9:16AM EST                       (Author)    Electronically signed by :  Gerhardt Ely, M D ; Jun 29 2016  1:41PM EST

## 2018-01-10 NOTE — PROGRESS NOTES
Discussion/Summary  Discussion Summary:   Assess: Pt here for bimonthly weigh in  Pt lost 1 pound Pt does not have any significant changes in diagnosis or medication  Pt reports he is eating 2 meals , a snack and one lean shake for lunch  He is still feeling fatigued and has limited energy to do activity  Nutrition Diagnosis: Obesity related to sedentary lifestyle and excess energy intake as evidenced by BMI Intervention: Reviewed work flow  All tasks complete except for weight loss  Educated patient on LCD diet  Explained the importance of discussing with his doctor to reduce his insulin as the diet only provides 60 grams of protein  Pt was receptive and verbalized understanding  Pt will work on the following goals: 3 Lean shakes per day = 75 grams of protein and 510 calories, 18grams of carb One meal of 500 calories, 24 grams protein, and 30 to 45 grams of carb Hydrate well - 64 to 80 ounces per day Monitoring/evaluation: Pt will lose 8 to 10 pounds by next appointment Follow up scheduled for : 2 weeks with RD  Active Problems    1  Adhesive capsulitis of left shoulder (726 0) (M75 02)   2  Benign enlargement of prostate (600 00) (N40 0)   3  Benign essential hypertension (401 1) (I10)   4  Blood tests prior to treatment or procedure (V72 63) (Z01 812)   5  Calcific tendinitis of left shoulder (726 11) (M75 32)   6  Decreased libido (799 81) (R68 82)   7  Diabetes (250 00) (E11 9)   8  Disorder of tendon of left biceps (726 12) (M75 22)   9  Dyspepsia (536 8) (R10 13)   10  Encounter for screening colonoscopy (V76 51) (Z12 11)   11  Erectile dysfunction (607 84) (N52 9)   12  Flu vaccine need (V04 81) (Z23)   13  Hypercholesteremia (272 0) (E78 00)   14  Impingement syndrome of left shoulder (726 2) (M75 42)   15  Left shoulder pain (719 41) (M25 512)   16  Low back pain (724 2) (M54 5)   17  Low testosterone (257 2) (E29 1)   18  Medicare annual wellness visit, initial (V70 0) (Z00 00)   19   Morbid obesity due to excess calories (278 01) (E66 01)   20  Need for vaccination with 13-polyvalent pneumococcal conjugate vaccine (V03 82) (Z23)   21  Obstructive sleep apnea (327 23) (G47 33)   22  Pre-operative cardiovascular examination (V72 81) (Z01 810)   23  Screening for genitourinary condition (V81 6) (Z13 89)   24  Screening for neurological condition (V80 09) (Z13 89)   25  Sleep apnea (780 57) (G47 30)   26  Tinea pedis of left foot (110 4) (B35 3)   27  Type 2 diabetes mellitus with insulin therapy (250 00,V58 67) (E11 9,Z79  4)    Past Medical History    1  History of diabetes mellitus (V12 29) (Z86 39)   2  History of heartburn (V12 79) (Z87 898)   3  History of sleep apnea (V13 89) (Z87 09)    Surgical History    1  History of Ankle Surgery   2  History of Cholecystectomy   3  History of Colostomy   4  History of Exploratory Laparoscopy   5  History of Hernia Repair   6  History of Tonsillectomy    Family History  Mother    1  Denied: Family history of Colon cancer   2  Family history of cardiac disorder (V17 49) (Z82 49)   3  Denied: Family history of liver disease  Father    4  Denied: Family history of Colon cancer   5  Family history of diabetes mellitus (V18 0) (Z83 3)   6  Denied: Family history of liver disease    Social History    · Former smoker (V15 82) (D72 343)   · No alcohol use   · No drug use    Current Meds   1  AndroGel Pump 20 25 MG/ACT (1 62%) Transdermal Gel; APPLY TWO PUMP PRESSES   ONE TIME DAILY AS DIRECTED; Therapy: 13Apr2016 to (Evaluate:11Aug2016); Last Rx:13Apr2016 Ordered   2  Aspirin 81 MG TABS; Therapy: (Recorded:20Apr2016) to Recorded   3  Biotin TABS; Therapy: (Recorded:20Apr2016) to Recorded   4  Cialis 20 MG Oral Tablet; TAKE 1 TABLET 1 HOUR BEFORE ACTIVITY AS NEEDED; Therapy: 05Apr2016 to (Last Rx:05Apr2016) Ordered   5  Clotrimazole 1 % External Cream; APPLY SPARINGLY TO AFFECTED AREA(S) 2 TO 3   TIMES DAILY;    Therapy: 91RAS0685 to (Sam Ba)  Requested for: 03HBW3745; Last   LX:06ELS8636 Ordered   6  GlipiZIDE 10 MG Oral Tablet; take 1 tablet every twelve hours  Requested for: 04WOI0004;   Last Rx:05Jan2016 Ordered   7  HydroCHLOROthiazide 12 5 MG Oral Tablet; Take 1 tablet daily  Requested for:   46ZKI8010; Last Rx:05Jan2016 Ordered   8  LORazepam 0 5 MG Oral Tablet (Ativan); Take one tablet one hour prior to MRI  Repeat   prior to exam if necessary; Therapy: 85Gjw3527 to (Last Rx:38Fno4019) Ordered   9  Losartan Potassium 25 MG Oral Tablet; take 1 tablet by mouth every day  Requested for:   97BRQ0073; Last Rx:05Oct2016 Ordered   10  Lovastatin 10 MG Oral Tablet; TAKE 1 TABLET AT BEDTIME  Requested for: 80VHQ7138;    Last Rx:05Jan2016 Ordered   11  MetFORMIN HCl - 1000 MG Oral Tablet; TAKE 1 TABLET TWICE DAILY  Requested for:    97PMO5230; Last Rx:68Abf5309 Ordered   12  Metoprolol Tartrate 50 MG Oral Tablet; TAKE 1 TABLET TWICE DAILY  Requested for:    13CNG0863; Last Rx:07Mar2016 Ordered   13  Naproxen 500 MG Oral Tablet; TAKE 1 TABLET TWICE DAILY AS NEEDED; Therapy: 91KOR6704 to (Evaluate:15Oct2016)  Requested for: 29Ezb3862; Last    Rx:36Vsd6503 Ordered   14  NovoFine 30G X 8 MM Miscellaneous; USE AS DIRECTED; Therapy: 49FPP0781 to (Last Rx:06Jan2016)  Requested for: 05OSK1085 Ordered   15  NovoLOG Mix 70/30 FlexPen (70-30) 100 UNIT/ML Subcutaneous Suspension    Pen-injector; Inject 45units in the morning and 35 units in the    evening; Therapy: 08BEF7890 to (Evaluate:04Ppy0491)  Requested for: 44XTZ3622; Last    Rx:24Gzf5365 Ordered   16  Tamsulosin HCl - 0 4 MG Oral Capsule; take 1 capsule by mouth once daily  Requested    for: 43MPL8228; Last Rx:05Jan2016 Ordered   17  Tanzeum 50 MG Subcutaneous Pen-injector; 1 injection once a week; Therapy: 06DUC9626 to (Evaluate:50Jnp4324); Last Rx:92Bjc6730 Ordered   18  Viagra 50 MG Oral Tablet; TAKE 1 TABLET DAILY 1 HOUR BEFORE NEEDED;     Therapy: 13Apr2016 to (Evaluate:22Jun2016)  Requested for: 13Apr2016; Last    Rx:19Txl6081 Ordered    Allergies    1  enalapril    2  No Known Environmental Allergies   3   No Known Food Allergies    Vitals  Signs   Recorded: 29SJS1080 04:59PM   Height: 5 ft 8 in  Weight: 300 lb   BMI Calculated: 45 62  BSA Calculated: 2 43    Future Appointments    Date/Time Provider Specialty Site   01/06/2017 11:00 AM Calista Duran MD [address]   Electronically signed by : JOHN Joshi; Oct 17 2016  5:00PM EST                       (Author)    Electronically signed by : YASEMIN Clayton ; Oct 20 2016 12:33PM EST                       (Validation)

## 2018-01-10 NOTE — RESULT NOTES
Results  (1) FERRITIN 35QTU0436 09:26AM [place] [facility] Order Number: [id number]     Test Name Result Flag Reference   FERRITIN 50 ng/mL  8-388     (1) FOLATE 37WUH6162 09:26AM [place] [facility] Order Number: [id number]     Test Name Result Flag Reference   FOLATE >20 0 ng/mL H 3 1-17 5     (1) PTH N-TERMINAL (INTACT) 35Gui9567 09:26AM [place] [facility] Order Number: [id number]     Test Name Result Flag Reference   PARATHYROID HORMONE INTACT 34 9 pg/mL  14 0-72 0     (1) VITAMIN B12 15IBY0653 09:26AM [place] [facility] Order Number: [id number]     Test Name Result Flag Reference   VITAMIN B12 891 pg/mL  100-900       Discussion/Summary  4/4/17 vitamin/mineral levels ordered are within acceptable range  Keep up the [address] taking your supplements  Please keep your routine office visit  If you do not have a scheduled routine appointment, please call the office to schedule it  Our office number is [phone]  If you have questions about your results, this will be discussed with you at your upcoming  visit  If you have gotten your most recent labs after your recent visit and have questions, please call the office  I look forward to seeing you at your next visit  Signatures   Electronically signed by : JUAN Chilel;  Apr 5 2017  4:00PM EST                       (Author)

## 2018-01-11 NOTE — RESULT NOTES
Verified Results  (1) COMPREHENSIVE METABOLIC PANEL 00JAC1687 12:77LK Serena Duran Order Number: [id number]  TW Order Number: [id number]     Test Name Result Flag Reference   GLUCOSE,RANDM 175 mg/dL H    If the patient is fasting, the ADA then defines impaired fasting glucose as > 100 mg/dL and diabetes as > or equal to 123 mg/dL  SODIUM 137 mmol/L  136-145   POTASSIUM 4 3 mmol/L  3 5-5 3   CHLORIDE 103 mmol/L  100-108   CARBON DIOXIDE 28 mmol/L  21-32   ANION GAP (CALC) 6 mmol/L  4-13   BLOOD UREA NITROGEN 22 mg/dL  5-25   CREATININE 1 10 mg/dL  0 60-1 30   Standardized to IDMS reference method   CALCIUM 8 4 mg/dL  8 3-10 1   BILI, TOTAL 0 40 mg/dL  0 20-1 00   ALK PHOSPHATAS 83 U/L     ALT (SGPT) 29 U/L  12-78   AST(SGOT) 16 U/L  5-45   ALBUMIN 3 4 g/dL L 3 5-5 0   TOTAL PROTEIN 7 5 g/dL  6 4-8 2   eGFR Non-African American      >60 0 ml/min/1 [address] [facility] Energy Disease Education Program recommendations are as follows:  GFR calculation is accurate only with a steady state creatinine  Chronic Kidney disease less than 60 ml/min/1 73 sq  meters  Kidney failure less than 15 ml/min/1 73 sq  meters  (1) HEMOGLOBIN A1C 23Jun2016 09:32AM Serena Duran Order Number: [id number]  TW Order Number: [id number]     Test Name Result Flag Reference   HEMOGLOBIN A1C 7 5 % H 4 2-6 3   EST  AVG   GLUCOSE 169 mg/dl       (1) MICROALBUMIN CREATININE RATIO, RANDOM URINE 02BQO6004 09:32AM Serena Duran Order Number: [id number]  TW Order Number: [id number]     Test Name Result Flag Reference   MICROALBUMIN/ CREAT R 22 mg/g creatinine  0-30   MICROALBUMIN,URINE 31 4 mg/L H 0 0-20 0   CREATININE URINE 142 0 mg/dL         Discussion/Summary   diabetes test about the same as last time    watch sweets and fatty food   will discuss in details next visit   - Dr Giuseppe Carbajal   Electronically signed by : Nedra Machado MD; Jun 24 2016  8:05AM EST                       (Author)

## 2018-01-12 VITALS
RESPIRATION RATE: 18 BRPM | WEIGHT: 241.38 LBS | BODY MASS INDEX: 36.7 KG/M2 | DIASTOLIC BLOOD PRESSURE: 72 MMHG | SYSTOLIC BLOOD PRESSURE: 112 MMHG | HEART RATE: 72 BPM

## 2018-01-12 NOTE — MISCELLANEOUS
Message  Post discharge phone call  Patient states he is doing well since discharge and he is back to normal  he has been taking the Miralax as prescribed to avoid constipation  Noted that his insulin has changed and he is compliant with this - his sugars have been higher in the morning so that is why they made the insulin 2 times per day  Instructed to monitor his sugars and follow up with PCP on his HgbA1c  No further questions  Active Problems    1  Adhesive capsulitis of left shoulder (726 0) (M75 02)   2  Benign enlargement of prostate (600 00) (N40 0)   3  Benign essential hypertension (401 1) (I10)   4  Decreased libido (799 81) (R68 82)   5  Disorder of tendon of left biceps (726 12) (M67 922)   6  Dyspepsia (536 8) (R10 13)   7  Encounter for screening colonoscopy (V76 51) (Z12 11)   8  Erectile dysfunction (607 84) (N52 9)   9  Hospital discharge follow-up (V67 59) (Z09)   10  Hypercholesteremia (272 0) (E78 00)   11  Impingement syndrome of left shoulder (726 2) (M75 42)   12  Left shoulder pain (719 41) (M25 512)   13  Low testosterone (257 2) (E29 1)   14  Medicare annual wellness visit, subsequent (V70 0) (Z00 00)   15  Morbid obesity due to excess calories (278 01) (E66 01)   16  Obstructive sleep apnea (327 23) (G47 33)   17  Postgastrectomy malabsorption (579 3) (K91 2,Z90 3)   18  Pre-operative cardiovascular examination (V72 81) (Z01 810)   19  S/P laparoscopic sleeve gastrectomy (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 86) (Z98 84)   20  Screening for genitourinary condition (V81 6) (Z13 89)   21  Screening for neurological condition (V80 09) (Z13 89)   22  Type 2 diabetes mellitus with insulin therapy (250 00,V58 67) (E11 9,Z79 4)   23  Vitamin D insufficiency (268 9) (E55 9)    Current Meds   1  Biotin TABS; Therapy: (Recorded:55Kku4041) to Recorded   2  Centrum Oral Tablet Chewable; Therapy: (Recorded:43Hts0476) to Recorded   3  GlipiZIDE 5 MG Oral Tablet; TAKE 1 TABLET DAILY AS DIRECTED;    Therapy: 78KTL3220 to (Evaluate:42Gnc3948)  Requested for: 36IUL8714; Last   Rx:30Mar2017 Ordered   4  Glucocard Vital Monitor w/Device Kit; USE AS DIRECTED; Therapy: 25CJC4102 to (Last Rx:31Oct2016)  Requested for: 31Oct2016 Ordered   5  Lifescan One Touch Ultramini Meter; use as directed; Therapy: 88WHF4731 to (Last Rx:31Oct2016)  Requested for: 31Oct2016 Ordered   6  MetFORMIN HCl  MG Oral Tablet Extended Release 24 Hour; take 1 tablet by   mouth once daily; Therapy: 37CLI7104 to (Evaluate:25Mar2018)  Requested for: 68TJV0760; Last   Rx:30Mar2017 Ordered   7  NovoLOG Mix 70/30 FlexPen (70-30) 100 UNIT/ML Subcutaneous Suspension   Pen-injector; INJECT 15 UNIT Every twelve hours; Therapy: 27KSD3354 to (Evaluate:17Pxa3603)  Requested for: 88YYK6558 Recorded   8  Omeprazole 20 MG Oral Capsule Delayed Release; take 1 capsule daily; Therapy: 73FMI1826 to (Evaluate:10Mar2017)  Requested for: 61MBG6516; Last   Rx:10Nov2016 Ordered   9  Tamsulosin HCl - 0 4 MG Oral Capsule; take 1 capsule by mouth once daily  Requested   for: 27Jun2017; Last Rx:05Jan2017 Ordered   10  TraMADol HCl - 50 MG Oral Tablet; TAKE 1 TABLET Bedtime; Therapy: 51GWH3884 to (Evaluate:33Vig1638); Last VK:68JAH0832 Ordered   11  Victoza 18 MG/3ML Subcutaneous Solution Pen-injector; INJECT 1 8 MCG Daily; Therapy: 07Apr2017 to [phone])  Requested for: 70DAE0082; Last    Rx:27Jun2017 Ordered    Allergies    1  enalapril    2  No Known Environmental Allergies   3   No Known Food Allergies    Signatures   Electronically signed by : Niko Key, ; Aug 12 2017 10:59AM EST                       (Author)

## 2018-01-12 NOTE — RESULT NOTES
Verified Results  (1) CBC/ PLT (NO DIFF) 70QNC7365 11:10AM Andrew Duran Order Number: [id number]     Test Name Result Flag Reference   HEMATOCRIT 44 0 %  36 5-49 3   HEMOGLOBIN 14 2 g/dL  12 0-17 0   MCHC 32 3 g/dL  31 4-37 4   MCH 28 6 pg  26 8-34 3   MCV 89 fL  82-98   PLATELET COUNT 837 Thousands/uL  149-390   RBC COUNT 4 96 Million/uL  3 88-5 62   RDW 14 3 %  11 6-15 1   WBC COUNT 6 48 Thousand/uL  4 31-10 16   MPV 13 2 fL H 8 9-12 7     (1) LIPID PANEL, FASTING 47OTO5281 11:10AM Melida Duran Order Number: [id number]     Test Name Result Flag Reference   CHOLESTEROL 190 mg/dL     HDL,DIRECT 35 mg/dL L 40-60   Specimen collection should occur prior to Metamizole administration due to the potential for falsely depressed results  LDL CHOLESTEROL CALCULATED 126 mg/dL H 0-100   - Patient Instructions: This is a fasting blood test  Water,black tea or black  coffee only after 9:00pm the night before test   Drink 2 glasses of water the morning of test     - Patient Instructions: This is a fasting blood test  Water,black tea or black  coffee only after 9:00pm the night before test Drink 2 glasses of water the morning of test   Triglyceride:         Normal              <150 mg/dl       Borderline High    150-199 mg/dl       High               200-499 mg/dl       Very High          >499 mg/dl  Cholesterol:         Desirable        <200 mg/dl      Borderline High  200-239 mg/dl      High             >239 mg/dl  HDL Cholesterol:        High    >59 mg/dL      Low     <41 mg/dL  LDL CALCULATED:    This screening LDL is a calculated result  It does not have the accuracy of the Direct Measured LDL in the monitoring of patients with hyperlipidemia and/or statin therapy  Direct Measure LDL (RCV559) must be ordered separately in these patients     TRIGLYCERIDES 146 mg/dL  <=150   Specimen collection should occur prior to N-Acetylcysteine or Metamizole administration due to the potential for falsely depressed results  (1) VITAMIN B12 55BTE3231 11:10AM [facility] Order Number: [id number]     Test Name Result Flag Reference   VITAMIN B12 922 pg/mL H 100-900     (1) VITAMIN D 25-HYDROXY 80NAQ0745 11:10AM [facility] Order Number: [id number]     Test Name Result Flag Reference   VIT D 25-HYDROX 28 7 ng/mL L 30 0-100 0   This assay is a certified procedure of the CDC Vitamin D Standardization Certification Program (VDSCP)     Deficiency <20ng/ml   Insufficiency 20-30ng/ml   Sufficient  ng/ml     *Patients undergoing fluorescein dye angiography may retain small amounts of fluorescein in the body for 48-72 hours post procedure  Samples containing fluorescein can produce falsely elevated Vitamin D values  If the patient had this procedure, a specimen should be resubmitted post fluorescein clearance  (1) HEMOGLOBIN A1C 83DSZ1105 11:10AM [facility] Order Number: [id number]     Test Name Result Flag Reference   HEMOGLOBIN A1C 6 7 % H 4 2-6 3   EST  AVG  GLUCOSE 146 mg/dl       (1) COMPREHENSIVE METABOLIC PANEL 64OTH8429 86:12FH [facility] Order Number: [id number]     Test Name Result Flag Reference   GLUCOSE,RANDM 121 mg/dL     If the patient is fasting, the ADA then defines impaired fasting glucose as > 100 mg/dL and diabetes as > or equal to 123 mg/dL  SODIUM 143 mmol/L  136-145   POTASSIUM 3 9 mmol/L  3 5-5 3   CHLORIDE 107 mmol/L  100-108   CARBON DIOXIDE 27 mmol/L  21-32   ANION GAP (CALC) 9 mmol/L  4-13   BLOOD UREA NITROGEN 19 mg/dL  5-25   CREATININE 0 89 mg/dL  0 60-1 30   Standardized to IDMS reference method   CALCIUM 8 9 mg/dL  8 3-10 1   BILI, TOTAL 0 40 mg/dL  0 20-1 00   ALK PHOSPHATAS 73 U/L     ALT (SGPT) 35 U/L  12-78   AST(SGOT) 20 U/L  5-45   ALBUMIN 3 5 g/dL  3 5-5 0   TOTAL PROTEIN 7 5 g/dL  6 4-8 2   eGFR Non-African American      >60 0 ml/min/1 73sq m   - Patient Instructions:  This is a fasting blood test  Water,black tea or black  coffee only after 9:00pm the night before test Drink 2 glasses of water the morning of test        Discussion/Summary   Diabetes is better controlled now   normal liver and kidney function   low vitamin D - take vitamin D 2000 IU once a day   overall looks very good!   - Dr Tasia Baez   Electronically signed by : Derian Winchester MD; Jan 5 2017  5:17PM EST                       (Author)

## 2018-01-12 NOTE — MISCELLANEOUS
Assessment   1  Hospital discharge follow-up (V67 59) (Z09)  2  Morbid obesity due to excess calories (278 01) (E66 01)  3  S/P laparoscopic sleeve gastrectomy (V45 86) (Z98 84)  4  Type 2 diabetes mellitus with insulin therapy (250 00,V58 67) (E11 9,Z79  4)    Plan  Morbid obesity due to excess calories, S/P laparoscopic sleeve gastrectomy, Type 2  diabetes mellitus with insulin therapy    · (1) CBC/ PLT (NO DIFF); Status:Active; Requested for:76Zzn1597;   Perform:[facility]; WJX:24HAA0327;XFNXHLF; For:Morbid obesity due to   excess calories, S/P laparoscopic sleeve gastrectomy, Type 2 diabetes mellitus with   insulin therapy; Ordered By:Melida Duran;   · (1) COMPREHENSIVE METABOLIC PANEL; Status:Active; Requested for:49Kyi2909;   Perform:[facility]; YDD:20JXB7060;ZZCRPYB; For:Morbid obesity due to   excess calories, S/P laparoscopic sleeve gastrectomy, Type 2 diabetes mellitus with   insulin therapy; Ordered By:Melida Duran;   · (1) HEMOGLOBIN A1C; Status:Active; Requested for:10Zds1576;   Perform:[facility]; RWW:86SYL8619;XOAGIQX; For:Morbid obesity due to   excess calories, S/P laparoscopic sleeve gastrectomy, Type 2 diabetes mellitus with   insulin therapy; Ordered By:Melida Duran;   · (1) LIPID PANEL, FASTING; Status:Active; Requested for:63Ipr1755;   Perform:[facility]; TXN:50FKY4426;PUIOWDX; For:Morbid obesity due to   excess calories, S/P laparoscopic sleeve gastrectomy, Type 2 diabetes mellitus with   insulin therapy; Ordered By:Melida Duran;   · (1) VITAMIN B12; Status:Active; Requested for:60Haa4924;   Perform:[facility]; BXA:88JMI5447;JJRORXR; For:Morbid obesity due to   excess calories, S/P laparoscopic sleeve gastrectomy, Type 2 diabetes mellitus with   insulin therapy; Ordered By:Melida Duran;   · (1) VITAMIN D 25-HYDROXY; Status:Active; Requested for:65Mxf5080;   Perform:[facility]; XZE:55PVW3461;SRMNDUD;  For:Morbid obesity due to   excess calories, S/P laparoscopic sleeve gastrectomy, Type 2 diabetes mellitus with   insulin therapy; Ordered By:Carrie Duran; Discussion/Summary  Medication SE Review and Pt Understands Tx: Possible side effects of new medications were reviewed with the patient/guardian today  The treatment plan was reviewed with the patient/guardian  The patient/guardian understands and agrees with the treatment plan      Chief Complaint  Chief Complaint Free Text Note Form: pt had surgery last monday for bariatric  History of Present Illness  TCM Communication [facility]: The patient is being contacted for follow-up after hospitalization  Hospital records were reviewed  He was hospitalized at [facility]  The dates of hospitalization: 11/28/2016, date of admission: 11/28/2016, date of discharge: 11/30/2016  Diagnosis: LAPROSCOPIC GASTRECTOMY SLEEVE  He was discharged to home  Medications reviewed and updated today  He scheduled a follow up appointment  The patient is currently asymptomatic  Counseling was provided to the patient  Communication performed and completed by Medardo Maria   HPI: currently on liquid diet   lost 10 pounds since the surgery  off of most diabetes and hypertension medications       Review of Systems  Complete-Male:   Constitutional: No fever or chills, feels well, no tiredness, no recent weight gain or weight loss  Eyes: No complaints of eye pain, no red eyes, no discharge from eyes, no itchy eyes  ENT: no complaints of earache, no hearing loss, no nosebleeds, no nasal discharge, no sore throat, no hoarseness  Cardiovascular: No complaints of slow heart rate, no fast heart rate, no chest pain, no palpitations, no leg claudication, no lower extremity  Respiratory: No complaints of shortness of breath, no wheezing, no cough, no SOB on exertion, no orthopnea or PND     Gastrointestinal: No complaints of abdominal pain, no constipation, no nausea or vomiting, no diarrhea or bloody stools  Genitourinary: No complaints of dysuria, no incontinence, no hesitancy, no nocturia, no genital lesion, no testicular pain  Musculoskeletal: No complaints of arthralgia, no myalgias, no joint swelling or stiffness, no limb pain or swelling  Integumentary: No complaints of skin rash or skin lesions, no itching, no skin wound, no dry skin  Neurological: No compliants of headache, no confusion, no convulsions, no numbness or tingling, no dizziness or fainting, no limb weakness, no difficulty walking  Psychiatric: Is not suicidal, no sleep disturbances, no anxiety or depression, no change in personality, no emotional problems  Endocrine: No complaints of proptosis, no hot flashes, no muscle weakness, no erectile dysfunction, no deepening of the voice, no feelings of weakness  Active Problems   1  Adhesive capsulitis of left shoulder (726 0) (M75 02)  2  Benign enlargement of prostate (600 00) (N40 0)  3  Benign essential hypertension (401 1) (I10)  4  Blood tests prior to treatment or procedure (V72 63) (Z01 812)  5  Calcific tendinitis of left shoulder (726 11) (M75 32)  6  Decreased libido (799 81) (R68 82)  7  Diabetes (250 00) (E11 9)  8  Disorder of tendon of left biceps (726 12) (M75 22)  9  Dyspepsia (536 8) (R10 13)  10  Encounter for screening colonoscopy (V76 51) (Z12 11)  11  Erectile dysfunction (607 84) (N52 9)  12  Flu vaccine need (V04 81) (Z23)  13  Hypercholesteremia (272 0) (E78 00)  14  Impingement syndrome of left shoulder (726 2) (M75 42)  15  Left shoulder pain (719 41) (M25 512)  16  Low back pain (724 2) (M54 5)  17  Low testosterone (257 2) (E29 1)  18  Medicare annual wellness visit, initial (V70 0) (Z00 00)  19  Morbid obesity due to excess calories (278 01) (E66 01)  20  Need for vaccination with 13-polyvalent pneumococcal conjugate vaccine (V03 82) (Z23)  21  Obstructive sleep apnea (327 23) (G47 33)  22   Pre-operative cardiovascular examination (V72 81) (Z01 810)  23  Screening for genitourinary condition (V81 6) (Z13 89)  24  Screening for neurological condition (V80 09) (Z13 89)  25  Sleep apnea (780 57) (G47 30)  26  Tinea pedis of left foot (110 4) (B35 3)  27  Type 2 diabetes mellitus with insulin therapy (250 00,V58 67) (E11 9,Z79 4)  28  Wheezing (786 07) (R06 2)    Past Medical History   1  History of diabetes mellitus (V12 29) (Z86 39)  2  History of heartburn (V12 79) (Z87 898)  3  History of sleep apnea (V13 89) (Z87 09)    Surgical History   1  History of Ankle Surgery  2  History of Cholecystectomy  3  History of Colostomy  4  History of Exploratory Laparoscopy  5  History of Hernia Repair  6  History of Tonsillectomy  Surgical History Reviewed: The surgical history was reviewed and updated today  Family History  Mother   1  Denied: Family history of Colon cancer  2  Family history of cardiac disorder (V17 49) (Z82 49)  3  Denied: Family history of liver disease  Father   4  Denied: Family history of Colon cancer  5  Family history of diabetes mellitus (V18 0) (Z83 3)  6  Denied: Family history of liver disease  Family History Reviewed: The family history was reviewed and updated today  Social History    · Former smoker (G26 26) (K53 508)   · No alcohol use   · No drug use  Social History Reviewed: The social history was reviewed and updated today  The social history was reviewed and is unchanged  Current Meds  1  AndroGel Pump 20 25 MG/ACT (1 62%) Transdermal Gel; APPLY TWO PUMP PRESSES   ONE TIME DAILY AS DIRECTED; Therapy: 13Apr2016 to (Evaluate:11Aug2016); Last Rx:13Apr2016 Ordered  2  Biotin TABS; Therapy: (Recorded:20Apr2016) to Recorded  3  Clotrimazole 1 % External Cream; APPLY SPARINGLY TO AFFECTED AREA(S) 2 TO 3   TIMES DAILY; Therapy: 72UCX2559 to (Yvonne Story)  Requested for: 35IXS9065; Last   Rx:05Jan2016 Ordered  4  Glucocard Vital Monitor w/Device Kit; USE AS DIRECTED;    Therapy: 44KOP3307 to (Last Rx:31Oct2016)  Requested for: 27Jpd6546 Ordered  5  Sports Challenge Networkcan One Touch Ultramini Meter; use as directed; Therapy: 28FJH9989 to (Last Rx:31Oct2016)  Requested for: 31Oct2016 Ordered  6  LORazepam 0 5 MG Oral Tablet; Take one tablet one hour prior to MRI  Repeat prior to   exam if necessary; Therapy: 97Sto8153 to (Last Rx:18Fpj4522) Ordered  7  Losartan Potassium 25 MG Oral Tablet; take 1 tablet by mouth every day  Requested for:   26ZXB1825; Last Rx:26Nsz7637 Ordered  8  Naproxen 500 MG Oral Tablet; TAKE 1 TABLET TWICE DAILY AS NEEDED; Therapy: 45YTS3877 to (Evaluate:15Oct2016)  Requested for: 33Veu0879; Last   Rx:89Leh0329 Ordered  9  NovoFine 30G X 8 MM Miscellaneous; USE AS DIRECTED; Therapy: 30DFO2766 to (Last Rx:06Jan2016)  Requested for: 82ADM4224 Ordered  10  NovoLOG Mix 70/30 FlexPen (70-30) 100 UNIT/ML Subcutaneous Suspension    Pen-injector; INJECT 10 UNIT Every twelve hours; Therapy: 45RKM1383 to (Evaluate:03Jun2017)  Requested for: 03ZGC8497 Recorded  11  Omeprazole 20 MG Oral Capsule Delayed Release; take 1 capsule daily; Therapy: 82YCB9208 to (Evaluate:10Mar2017)  Requested for: 79BVL0176; Last    Rx:10Nov2016 Ordered  12  ProAir  (90 Base) MCG/ACT Inhalation Aerosol Solution; INHALE 2 PUFFS    EVERY 4 HOURS AS NEEDED FOR COUGH AND WHEEZE;    Therapy: 48YWY7127 to (Last Rx:25Oct2016)  Requested for: 25Oct2016 Ordered  13  Tamsulosin HCl - 0 4 MG Oral Capsule; take 1 capsule by mouth once daily  Requested    for: 09QPW0273; Last Rx:05Jan2016 Ordered  Medication List Reviewed: The medication list was reviewed and updated today  Allergies   1  enalapril   2  No Known Environmental Allergies  3  No Known Food Allergies    Physical Exam    Constitutional   General appearance: No acute distress, well appearing and well nourished  Eyes   Conjunctiva and lids: No swelling, erythema, or discharge  Pupils and irises: Equal, round and reactive to light      Pulmonary Respiratory effort: No increased work of breathing or signs of respiratory distress  Auscultation of lungs: Clear to auscultation, equal breath sounds bilaterally, no wheezes, no rales, no rhonci  Cardiovascular   Palpation of heart: Normal PMI, no thrills  Auscultation of heart: Normal rate and rhythm, normal S1 and S2, without murmurs  Abdomen   Abdomen: Non-tender, no masses  Liver and spleen: No hepatomegaly or splenomegaly  Skin   Skin and subcutaneous tissue: Normal without rashes or lesions  Health Management  Encounter for screening colonoscopy   COLONOSCOPY; every 3 years; Last 14SOK4028; Next Due: 42BVB2032; Active  History of abdominal pain   COLONOSCOPY; every 3 years; Last 61ZOG2416; Next Due: 25JKL2927; Active    Future Appointments    Date/Time Provider Specialty Site   01/06/2017 11:00 AM Delia Duran MD [facility] [address]   12/09/2016 11:15 YASEMIN Strong   General Surgery [facility]     Signatures   Electronically signed by : Jennifer Washington MD; Dec  5 2016 12:11PM EST                       (Author)    Electronically signed by : Jennifer Washington MD; Dec  5 2016 12:12PM EST                       (Author)

## 2018-01-12 NOTE — RESULT NOTES
Verified Results  (1) TESTOSTERONE, FREE (DIRECT) AND TOTAL 47Mev9889 11:56AM Sinyd Duran Order Number: [id number]    Performed at:  [address]  [id number]  : Lucille Castillo MD, Phone:  [phone]     Test Name Result Flag Reference   FREE TESTOSTERONE, DIRECT 4 0 pg/mL L 6 6 - 18 1   COMMENT Comment     Adult male reference interval is based on a population of lean malesup to 36years old  TESTOSTERONE (TOTAL) 89 ng/dL L 348 - 1197       Discussion/Summary   low testosterone level   need to see Urologist as directed during last visit     - Dr Emilia Leon   Electronically signed by : Derian Winchester MD; Apr 13 2016  7:57AM EST                       (Author)

## 2018-01-12 NOTE — PROGRESS NOTES
Assessment    1  Type 2 diabetes mellitus with insulin therapy (250 00,V58 67) (E11 9,Z79  4)    Plan  Diabetes    · Invokana 300 MG Oral Tablet   · GlipiZIDE 10 MG Oral Tablet; take 1 tablet every twelve hours   · MetFORMIN HCl - 1000 MG Oral Tablet; TAKE 1 TABLET TWICE DAILY   · NovoLOG Mix 70/30 FlexPen (70-30) 100 UNIT/ML Subcutaneous Suspension  Pen-injector; Inject 45units in the morning and 35 units in the  evening  Type 2 diabetes mellitus with insulin therapy    · Tanzeum 30 MG Subcutaneous Pen-injector; 1 pen subQ weekly    Discussion/Summary      added Tanzeum q weekly  spent 25 minutes showing him how to use the pen  will set him up with Diabetic educator   f/u in April as scheduled   total time of encounter was 30 minutes and 25 minutes was spent counseling  Chief Complaint  PT is here today to discuss DM medication         History of Present Illness  fasting blood sugar 130-145's  lunch 175's  dinner 170-180's   added Invokana 2 weeks ago and couldn't tolerate it due to frequent urination       The patient states he has been stable with his Type II Diabetes control since the last visit  Comorbid Illnesses: hypertension and obesity  He has no significant interval events  Symptoms: The patient is currently asymptomatic  Associated symptoms include no polyuria  Home monitoring: The patient checks his blood sugars regularly  Glycemic control has been poor  Medications: the patient is adherent with his medication regimen  He denies medication side effects  The patient is not doing well with his diabetes goals  Review of Systems    Constitutional: No fever or chills, feels well, no tiredness, no recent weight gain or weight loss  Eyes: No complaints of eye pain, no red eyes, no discharge from eyes, no itchy eyes  ENT: no complaints of earache, no hearing loss, no nosebleeds, no nasal discharge, no sore throat, no hoarseness     Cardiovascular: No complaints of slow heart rate, no fast heart rate, no chest pain, no palpitations, no leg claudication, no lower extremity  Respiratory: No complaints of shortness of breath, no wheezing, no cough, no SOB on exertion, no orthopnea or PND  Active Problems    1  Benign essential hypertension (401 1) (I10)   2  BPH (benign prostatic hypertrophy) (600 00) (N40 0)   3  Diabetes (250 00) (E11 9)   4  Hypercholesteremia (272 0) (E78 0)   5  Low back pain (724 2) (M54 5)   6  Need for vaccination with 13-polyvalent pneumococcal conjugate vaccine (V03 82) (Z23)   7  Sleep apnea (780 57) (G47 30)   8  Tinea pedis of left foot (110 4) (B35 3)    Past Medical History    The active problems and past medical history were reviewed and updated today  Surgical History    1  History of Ankle Surgery   2  History of Cholecystectomy   3  History of Colostomy   4  History of Hernia Repair   5  History of Tonsillectomy    The surgical history was reviewed and updated today  Family History    1  Family history of cardiac disorder (V17 49) (Z82 49)    2  Family history of diabetes mellitus (V18 0) (Z83 3)    The family history was reviewed and updated today  Social History    · Former smoker (X94 23) (E82 652)   · No alcohol use   · No drug use  The social history was reviewed and updated today  The social history was reviewed and is unchanged  Current Meds   1  Clotrimazole 1 % External Cream; APPLY SPARINGLY TO AFFECTED AREA(S) 2 TO 3   TIMES DAILY; Therapy: 96CPB1103 to (Satish Rios)  Requested for: 94HHS7466; Last   Rx:05Jan2016 Ordered   2  GlipiZIDE 10 MG Oral Tablet; take 1 tablet every twelve hours  Requested for: 12Jan2016;   Last Rx:05Jan2016 Ordered   3  Hydrochlorothiazide 12 5 MG Oral Tablet; Take 1 tablet daily  Requested for: 12Jan2016;   Last Rx:05Jan2016 Ordered   4  Invokana 300 MG Oral Tablet; Take 1 tablet daily; Therapy: 30FZX0266 to (Evaluate:06Jan2017); Last Rx:12Jan2016 Ordered   5   Losartan Potassium 25 MG Oral Tablet; take 1 tablet by mouth every day  Requested for:   12Jan2016; Last Rx:05Jan2016 Ordered   6  Lovastatin 10 MG Oral Tablet; TAKE 1 TABLET AT BEDTIME  Requested for: 12Jan2016;   Last Rx:05Jan2016 Ordered   7  MetFORMIN HCl - 1000 MG Oral Tablet; TAKE 1 TABLET TWICE DAILY  Requested for:   97PAV5899; Last Rx:05Jan2016 Ordered   8  Metoprolol Tartrate 50 MG Oral Tablet; TAKE 1 TABLET TWICE DAILY  Requested for:   12Jan2016; Last Rx:05Jan2016 Ordered   9  Naproxen 500 MG Oral Tablet; TAKE 1 TABLET TWICE DAILY AS NEEDED; Therapy: 53WJD1739 to (Evaluate:27Qnj5897)  Requested for: 12Jan2016; Last   Rx:12Jan2016 Ordered   10  NovoFine 30G X 8 MM Miscellaneous; USE AS DIRECTED; Therapy: 88ZBU7455 to (Last Rx:06Jan2016)  Requested for: 36FCJ4994 Ordered   11  NovoLOG Mix 70/30 FlexPen (70-30) 100 UNIT/ML Subcutaneous Suspension    Pen-injector; Inject 45units in the morning and 35 units in the    evening; Therapy: 04ZQO6114 to (Last Rx:06Jan2016)  Requested for: 12Jan2016 Ordered   12  Tamsulosin HCl - 0 4 MG Oral Capsule; take 1 capsule by mouth once daily  Requested    for: 12Jan2016; Last Rx:05Jan2016 Ordered    The medication list was reviewed and updated today  Allergies    1  enalapril    2  No Known Environmental Allergies   3  No Known Food Allergies    Vitals  Vital Signs [Data Includes: Current Encounter]    Recorded: [id number] 10:39AM   Heart Rate 64   Respiration 16   Systolic 923   Diastolic 80   Height 5 ft 7 24 in   Weight 301 lb    BMI Calculated 46 81   BSA Calculated 2 41     Physical Exam    Constitutional   General appearance: Abnormal   morbidly obese  Eyes   Conjunctiva and lids: No swelling, erythema, or discharge  Pupils and irises: Equal, round and reactive to light  Ears, Nose, Mouth, and Throat   External inspection of ears and nose: Normal     Otoscopic examination: Tympanic membrance translucent with normal light reflex  Canals patent without erythema      Nasal mucosa, septum, and turbinates: Normal without edema or erythema  Oropharynx: Normal with no erythema, edema, exudate or lesions  Pulmonary   Respiratory effort: No increased work of breathing or signs of respiratory distress  Auscultation of lungs: Clear to auscultation, equal breath sounds bilaterally, no wheezes, no rales, no rhonci  Cardiovascular   Palpation of heart: Normal PMI, no thrills  Auscultation of heart: Normal rate and rhythm, normal S1 and S2, without murmurs           Future Appointments    Date/Time Provider Specialty Site   04/05/2016 11:15 AM Michela Duran MD Family Medicine [address] [facility]     Signatures   Electronically signed by : Ammy Lee MD; Feb 2 2016 11:50AM EST                       (Author)

## 2018-01-12 NOTE — RESULT NOTES
Message    Colon polyps removed came back as tubular adenomas  Repeat colonoscopy in 3 years        LMOM for pt to call the office for results/reminder set  CF1       1 Amended ByAbdiaziz Valverde; Jul 15 2016 10:55 AM EST    Verified Results  (1) TISSUE EXAM [id number] 10:10AM Rachel Canseco     Test Name Result Flag Reference   LAB AP CASE REPORT (Report)     Surgical Pathology Report             Case: [id number]                   Authorizing Provider: Caitlin Galvez MD     Collected:      07/06/2016 1010        Ordering Location:   [facility]    Received:      07/06/2016 1530 [facility]                               Pathologist:      Angie Galeano MD                           Specimens:  A) - Stomach, Gastric body r/o H pylori                                B) - Large Intestine, Right/Ascending Colon, Ascending colon c                     C) - Polyp, Colorectal, Hepatic flexure x 2 cs                             D) - Rectum, Rectal polyp   LAB AP FINAL DIAGNOSIS (Report)     A  Stomach, body:    - Inactive mild chronic oxyntic gastritis  - No H  pylori organisms are identified on H&E and immunohistochemical   stains     - Negative for intestinal metaplasia, dysplasia and malignancy  Comment: Appropriate results obtained with controls for ancillary H    pylori staining  B  Right/Ascending Colon polyp:    - Tubular adenoma  - Negative for high grade dysplasia and malignancy  C  Colorectal, Hepatic flexure polyps x 2:    - Fragments of tubular adenoma(s)  - Negative for high grade dysplasia and malignancy  D  Rectum, Rectal polyp:   - Hyperplastic polyp        Electronically signed by Angie Galeano MD on 7/8/2016 at 3:58 PM   LAB AP NOTE      Interpretation performed at [facility], [address] [place] [address]   LAB AP SURGICAL ADDITIONAL INFORMATION (Report)     These tests were developed and their performance characteristics determined by [facility] or [facility]  They may not be cleared or approved by the [place]  Food and   Drug Administration  The FDA has determined that such clearance or   approval is not necessary  These tests are used for clinical purposes  They should not be regarded as investigational or for research  This   laboratory has been approved by [place] 88, designated as a high-complexity   laboratory and is qualified to perform these tests  LAB AP GROSS DESCRIPTION (Report)     A  The specimen is received in formalin, labeled with the patient's name   and hospital number, and is designated gastric body rule out H  pylori  The specimen consists of 2 tan soft tissue fragments measuring 0 3 and 0 5   cm  Entirely submitted  One cassette  B  The specimen is received in formalin, labeled with the patient's name   and hospital number, and is designated ascending colon polyp  The   specimen consists of a single tan soft tissue fragment measuring 0 3 cm  Entirely submitted  One cassette  Note: The estimated total formalin fixation time based upon information   provided by the submitting clinician and the standard processing schedule   is 18 25 hours  C  The specimen is received in formalin, labeled with the patient's name   and hospital number, and is designated hepatic flexure polyp ? ?2  The   specimen consists of multiple tan soft tissue fragments measuring in   aggregate 0 5 x 0 5 x 0 1 cm  Entirely submitted  One cassette  D  The specimen is received in formalin, labeled with the patient's name   and hospital number, and is designated rectal polyp  The specimen   consists of 2 tan soft tissue fragments measuring 0 2 and 0 3 cm  Entirely   submitted  One cassette  Note: The estimated total formalin fixation time based upon information   provided by the submitting clinician and the standard processing schedule   is 18 0 hours      MAC

## 2018-01-12 NOTE — RESULT NOTES
Verified Results  (1) HEMOGLOBIN A1C 04Oct2016 11:11AM Melida Duran     Test Name Result Flag Reference   HEMOGLOBIN A1C 7 7 % H 4 2-6 3   EST  AVG  GLUCOSE 174 mg/dl         Discussion/Summary   DIABETES TEST WAS 7 7% NOT IN A CONTROLLED RANGE  GO UP 1 U ON INSULIN EVERY DAY UNTIL FASTING BLOOD SUGAR    - DR DURAN      Signatures   Electronically signed by : Holly Tse MD; Oct  7 2016 10:47AM EST                       (Author)

## 2018-01-13 VITALS
BODY MASS INDEX: 34.17 KG/M2 | DIASTOLIC BLOOD PRESSURE: 78 MMHG | WEIGHT: 225.5 LBS | HEART RATE: 68 BPM | SYSTOLIC BLOOD PRESSURE: 124 MMHG | HEIGHT: 68 IN | RESPIRATION RATE: 16 BRPM

## 2018-01-13 VITALS
SYSTOLIC BLOOD PRESSURE: 130 MMHG | RESPIRATION RATE: 16 BRPM | HEART RATE: 72 BPM | DIASTOLIC BLOOD PRESSURE: 68 MMHG | BODY MASS INDEX: 40.8 KG/M2 | WEIGHT: 260.5 LBS

## 2018-01-13 VITALS
HEIGHT: 68 IN | HEART RATE: 68 BPM | TEMPERATURE: 97 F | BODY MASS INDEX: 37.89 KG/M2 | SYSTOLIC BLOOD PRESSURE: 132 MMHG | RESPIRATION RATE: 18 BRPM | WEIGHT: 250 LBS | DIASTOLIC BLOOD PRESSURE: 70 MMHG

## 2018-01-13 VITALS
DIASTOLIC BLOOD PRESSURE: 74 MMHG | BODY MASS INDEX: 35.9 KG/M2 | WEIGHT: 236.13 LBS | RESPIRATION RATE: 16 BRPM | SYSTOLIC BLOOD PRESSURE: 118 MMHG | HEART RATE: 68 BPM

## 2018-01-13 NOTE — PROGRESS NOTES
Assessment    1  Diabetes (250 00) (E11 9)   2  Hypercholesteremia (272 0) (E78 0)   3  Benign essential hypertension (401 1) (I10)   4  Low back pain (724 2) (M54 5)    Plan  Benign essential hypertension    · Hydrochlorothiazide 12 5 MG Oral Tablet; Take 1 tablet daily   · Losartan Potassium 25 MG Oral Tablet; take 1 tablet by mouth every day   · Metoprolol Tartrate 50 MG Oral Tablet; TAKE 1 TABLET TWICE DAILY  BPH (benign prostatic hypertrophy)    · Tamsulosin HCl - 0 4 MG Oral Capsule; take 1 capsule by mouth once daily  Diabetes    · Invokana 300 MG Oral Tablet; Take 1 tablet daily   · GlipiZIDE 10 MG Oral Tablet; take 1 tablet every twelve hours   · NovoLOG Mix 70/30 FlexPen (70-30) 100 UNIT/ML Subcutaneous Suspension  Pen-injector; Inject 45units in the morning and 35 units in the  evening  Hypercholesteremia    · Lovastatin 10 MG Oral Tablet; TAKE 1 TABLET AT BEDTIME  Low back pain    · Naproxen 500 MG Oral Tablet; TAKE 1 TABLET TWICE DAILY AS NEEDED    Discussion/Summary   added Invokana today  f/u in 3 months as scheduled  call with any side effects        Chief Complaint  Patient here to discuss lab results      History of Present Illness  here to go over blood work     The patient is being seen for a routine clinic follow-up of back pain  Symptoms:  back pain, but no back stiffness, no decreased spine range of motion, no decreased flexion, no decreased extension, no decreased lateral bending, no decreased rotation, no lower extremity numbness, no lower extremity tingling and no lower extremity weakness  The patient is currently experiencing symptoms  The patient states he has been stable with his Type II Diabetes control since the last visit  Comorbid Illnesses: hypertension, hyperlipidemia and obesity  He has no known diabetic complications  Symptoms: The patient is currently asymptomatic  Medications: the patient is adherent with his medication regimen  He denies medication side effects   The patient is not doing well with his diabetes goals  The patient states his hyperlipidemia has been under good control since the last visit  Comorbid Illnesses: diabetes mellitus and hypertension  He has no significant interval events  Symptoms: The patient is currently asymptomatic  Associated symptoms include no focal neurologic deficits and no memory loss  Medications: the patient is adherent with his medication regimen  He denies medication side effects  The patient is doing well with his hyperlipidemia goals  Review of Systems    Constitutional: No fever or chills, feels well, no tiredness, no recent weight gain or weight loss  Eyes: No complaints of eye pain, no red eyes, no discharge from eyes, no itchy eyes  ENT: no complaints of earache, no hearing loss, no nosebleeds, no nasal discharge, no sore throat, no hoarseness  Cardiovascular: No complaints of slow heart rate, no fast heart rate, no chest pain, no palpitations, no leg claudication, no lower extremity  Respiratory: No complaints of shortness of breath, no wheezing, no cough, no SOB on exertion, no orthopnea or PND  Gastrointestinal: No complaints of abdominal pain, no constipation, no nausea or vomiting, no diarrhea or bloody stools  Genitourinary: No complaints of dysuria, no incontinence, no hesitancy, no nocturia, no genital lesion, no testicular pain  Musculoskeletal: arthralgias and myalgias, but as noted in HPI  Integumentary: No complaints of skin rash or skin lesions, no itching, no skin wound, no dry skin  Active Problems    1  Benign essential hypertension (401 1) (I10)   2  BPH (benign prostatic hypertrophy) (600 00) (N40 0)   3  Diabetes (250 00) (E11 9)   4  Hypercholesteremia (272 0) (E78 0)   5  Need for vaccination with 13-polyvalent pneumococcal conjugate vaccine (V03 82) (Z23)   6  Sleep apnea (780 57) (G47 30)   7   Tinea pedis of left foot (110 4) (B35 3)    Past Medical History    The active problems and past medical history were reviewed and updated today  Surgical History    1  History of Ankle Surgery   2  History of Cholecystectomy   3  History of Colostomy   4  History of Hernia Repair   5  History of Tonsillectomy    The surgical history was reviewed and updated today  Family History    1  Family history of cardiac disorder (V17 49) (Z82 49)    2  Family history of diabetes mellitus (V18 0) (Z83 3)    The family history was reviewed and updated today  Social History    · Former smoker (S07 05) (K48 963)   · No alcohol use   · No drug use  The social history was reviewed and updated today  The social history was reviewed and is unchanged  Current Meds   1  Clotrimazole 1 % External Cream; APPLY SPARINGLY TO AFFECTED AREA(S) 2 TO 3   TIMES DAILY; Therapy: 32RKQ7150 to (Richa Whiteside)  Requested for: 97IRB9526; Last   Rx:05Jan2016 Ordered   2  GlipiZIDE 10 MG Oral Tablet; take 1 tablet every twelve hours  Requested for: 53TBJ6808;   Last Rx:05Jan2016 Ordered   3  Hydrochlorothiazide 12 5 MG Oral Tablet; Take 1 tablet daily  Requested for: 82TDT8405;   Last Rx:05Jan2016 Ordered   4  Losartan Potassium 25 MG Oral Tablet; take 1 tablet by mouth every day  Requested for:   81JVO6232; Last Rx:05Jan2016 Ordered   5  Lovastatin 10 MG Oral Tablet; TAKE 1 TABLET AT BEDTIME  Requested for: 70IOH3380;   Last Rx:05Jan2016 Ordered   6  MetFORMIN HCl - 1000 MG Oral Tablet; TAKE 1 TABLET TWICE DAILY  Requested for:   52KET9458; Last Rx:05Jan2016 Ordered   7  Metoprolol Tartrate 50 MG Oral Tablet; TAKE 1 TABLET TWICE DAILY  Requested for:   12EPY3698; Last Rx:05Jan2016 Ordered   8  NovoFine 30G X 8 MM Miscellaneous; USE AS DIRECTED; Therapy: 05QXY1805 to (Last Rx:06Jan2016)  Requested for: 21FZW1841 Ordered   9  NovoLOG Mix 70/30 FlexPen (70-30) 100 UNIT/ML Subcutaneous Suspension   Pen-injector; Inject 45units in the morning and 35 units in the   evening;    Therapy: 80RVW7357 to (Last RI:66GUY2955)  Requested for: 54MJP9327 Ordered   10  Tamsulosin HCl - 0 4 MG Oral Capsule; take 1 capsule by mouth once daily  Requested    for: 30KYJ8076; Last Rx:05Jan2016 Ordered    The medication list was reviewed and updated today  Allergies    1  enalapril    2  No Known Environmental Allergies   3  No Known Food Allergies    Vitals  Vital Signs [Data Includes: Current Encounter]    Recorded: 12Jan2016 01:20PM   Heart Rate 76   Respiration 22   Systolic 159   Diastolic 68   Height 5 ft 7 24 in   Weight 301 lb 2 oz   BMI Calculated 46 83   BSA Calculated 2 41     Physical Exam    Constitutional   General appearance: Abnormal   obese  Pulmonary   Respiratory effort: No increased work of breathing or signs of respiratory distress  Auscultation of lungs: Clear to auscultation, equal breath sounds bilaterally, no wheezes, no rales, no rhonci  Cardiovascular   Palpation of heart: Normal PMI, no thrills  Auscultation of heart: Normal rate and rhythm, normal S1 and S2, without murmurs  Examination of extremities for edema and/or varicosities: Normal     Abdomen   Abdomen: Non-tender, no masses  Liver and spleen: No hepatomegaly or splenomegaly  Lymphatic   Palpation of lymph nodes in neck: No lymphadenopathy  Musculoskeletal   Gait and station: Normal     Inspection/palpation of joints, bones, and muscles: Normal     Neurologic   Cranial nerves: Cranial nerves 2-12 intact  Reflexes: 2+ and symmetric  Sensation: No sensory loss  Psychiatric   Orientation to person, place and time: Normal     Mood and affect: Normal          Results/Data  Results   (1) COMPREHENSIVE METABOLIC PANEL 05GCG3935 28:94UG Melida Duran   Has the patient been fasting for 10-12 hours? -YES     Test Name Result Flag Reference   SODIUM 137 mmol/L  136-145   POTASSIUM 4 3 mmol/L  3 5-5 3   CHLORIDE 103 mmol/L     CARBON DIOXIDE 24 7 mmol/L  21 0-32 0   ANION GAP (CALC) 9 mmol/L  4-13   BILI, TOTAL 0 36 mg/dL  0 20-1 00   TOTAL PROTEIN 7 2 g/dL  6 4-8 2   ALK PHOSPHATAS 90 U/L     ALT (SGPT) 22 U/L  12-78   AST(SGOT) 19 U/L  5-45   GLUCOSE,RANDM 215 mg/dL H    If patient is fasting, the ADA then defines impaired fasting glucose as  >100 mg/dl and diabetes as  >or equal to 126 mg/dl  ALBUMIN 3 3 g/dL L 3 5-5 0   BLOOD UREA NITROGEN 24 mg/dL  5-25   CALCIUM 8 3 mg/dL  8 3-10 1   CREATININE 0 98 mg/dL  0 60-1 30   Standardized to IDMS reference method   eGFR African American >60 ml/min/1 73sq m     [facility] Disease Education Program recommendations are as  follows:  GFR calculation is accurate only with a steady state creatinine  Chronic Kidney disease less than 60 ml/min/1 73 sq  meters  Kidney failure less than 15 ml/min/1 73 sq  meters  eGFR Non-African American >60 ml/min/1 73sq m       (1) LIPID PANEL, FASTING [id number] 11:48AM Melida Duran   Has the patient been fasting for 10-12 hours? -YES     Test Name Result Flag Reference   TRIGLYCERIDES 105 mg/dL     TRIGLYCERIDE:  Normal              <150 mg/dl  Borderline High    150-199 mg/dl  High               200-499 mg/dl  Very High          >499 mg/dl  _______________________________________   CHOLESTEROL 127 mg/dL     CHOLESTEROL:  Desirable        <200 mg/dl  Borderline High  200-239 mg/dl  High             >239 mg/dl  ____________________________________   HDL,DIRECT 35 mg/dL     HDL:  High       >59 mg/dl  Low        <41 mg/dl  ______________________________   LDL CHOLESTEROL CALCULATED 71 mg/dL  0-100   LDL, CALCULATED:  This screening LDL is a calculated result  It does not have the accuracy of the Direct Measured LDL in the  monitoring of patients with hyperlipidemia and/or statin therapy    Direct Measured LDL (Test Code 3126) must be ordered separately in these  patients   ______________________________     (1) PSA (SCREEN) (Dx V76 44 Screen for Prostate Cancer) [id number] 11:48AM Melida Duran     Test Name Result Flag Reference PSA 1 0 ng/mL   0-4 0   PSA values from one laboratory may not be comparable to those from  another because of the various testing technologies employed  Additionally, PSA results can not be interpreted as absolute evidence of  the presence or absence of disease  This PSA value was obtained by [employer] Chemiluminometric Immunoassay  (1) CBC/PLT/DIFF 45HNR0874 11:48AM Roger Melida     Test Name Result Flag Reference   DIFFERENTIAL METHOD Automated     WBC COUNT 7 67 Thousand/uL  4 31-10 16   RBC COUNT 4 93 Million/uL  3 88-5 62   HEMOGLOBIN 13 6 g/dL  12 0-17 0   HEMATOCRIT 42 7 %  36 5-49 3   MCV 87 fL  82-98   MCH 27 6 pg  26 8-34 3   MCHC 31 9 g/dL  31 4-37 4   RDW 14 3 %  11 6-15 1   MPV 12 3 fL  8 9-12 7   PLATELET COUNT 763 Thousand/uL  149-390   NEUTROPHILS RELATIVE PERCENT 73 %  43-75   LYMPHOCYTES RELATIVE PERCENT 13 % L 14-44   MONOCYTES RELATIVE PERCENT 11 %  4-12   EOSINOPHILS RELATIVE PERCENT 3 %  0-6   NEUTROPHILS ABSOLUTE COUNT 5 60 Thousand/uL  1 85-7 62   LYMPHOCYTES ABSOLUTE COUNT 1 00 Thousand/uL  0 60-4 47   MONOCYTES ABSOLUTE COUNT 0 84 Thousand/uL  0 17-1 22   EOSINOPHILS ABSOLUTE COUNT 0 23 Thousand/uL  0 00-0 61   BASOPHILS ABSOLUTE COUNT 0 03 Thousand/uL  0 00-0 10     (1) TSH WITH FT4 REFLEX [id number] 11:48AM Roger Melida     Test Name Result Flag Reference   TSH, 3RD GEN W/REFLEX TO FT4 3 560 uIU/ML  0 358-3 740   *Patients undergoing fluorescein dye angiography may retain small  amounts of fluorescein in the body for 48-72 hours post procedure  Samples containing fluorescein can produce falsely depressed TSH   values  If the patient had this procedure, a specimen should be  resubmitted post fluorescein clearance  (1) HEMOGLOBIN A1C [id number] 11:48AM Melida Duran     Test Name Result Flag Reference   HEMOGLOBIN A1C 8 6 % H 4 0-5 6   5 7-6 4% impaired fasting glucose  >=6 5% diagnosis of diabetes  Falsely low levels are seen in conditions linked to short RBC life span  ? hemolytic anemia, and splenomegaly  Falsely elevated levels are seen in situations where there is an  increased production of RBC ? receipt of erythropoietin or blood  transfusions  Adopted from ADA-Clinical Practice Recommendations   EST  AVG   GLUCOSE 200 mg/dL       *VB-Foot Exam [id number] 02:14PM Melida Duran     Test Name Result Flag Reference   FOOT Adriane Orr 38QXG2124       Future Appointments    Date/Time Provider Specialty Site   04/05/2016 11:15 AM Sophia Duran MD [place] [address]   Electronically signed by : Jasiel Childs MD; Jan 12 2016  1:49PM EST                       (Author)

## 2018-01-13 NOTE — PROGRESS NOTES
Discussion/Summary  Discussion Summary:   Reviewed post-operative pureed and soft foods diet with pt  Discussed gradual diet advancement and portion size progression  Discussed adequate hydration, signs and symptoms of dehydration  Discussed recommended post-operative vitamin supplementation and protein supplements  Discussed importance of regular physical activity  Provided pt with contact information for future questions/concerns  Active Problems    1  Adhesive capsulitis of left shoulder (726 0) (M75 02)   2  Benign enlargement of prostate (600 00) (N40 0)   3  Benign essential hypertension (401 1) (I10)   4  Blood tests prior to treatment or procedure (V72 63) (Z01 812)   5  Calcific tendinitis of left shoulder (726 11) (M75 32)   6  Decreased libido (799 81) (R68 82)   7  Diabetes (250 00) (E11 9)   8  Disorder of tendon of left biceps (726 12) (M75 22)   9  Dyspepsia (536 8) (R10 13)   10  Encounter for screening colonoscopy (V76 51) (Z12 11)   11  Erectile dysfunction (607 84) (N52 9)   12  Flu vaccine need (V04 81) (Z23)   13  Hospital discharge follow-up (V67 59) (Z09)   14  Hypercholesteremia (272 0) (E78 00)   15  Impingement syndrome of left shoulder (726 2) (M75 42)   16  Left shoulder pain (719 41) (M25 512)   17  Low back pain (724 2) (M54 5)   18  Low testosterone (257 2) (E29 1)   19  Medicare annual wellness visit, initial (V70 0) (Z00 00)   20  Morbid obesity due to excess calories (278 01) (E66 01)   21  Need for vaccination with 13-polyvalent pneumococcal conjugate vaccine (V03 82) (Z23)   22  Obstructive sleep apnea (327 23) (G47 33)   23  Postgastrectomy malabsorption (579 3) (K91 2,Z90 3)   24  Pre-operative cardiovascular examination (V72 81) (Z01 810)   25  S/P laparoscopic sleeve gastrectomy (V45 86) (Z98 84)   26  Screening for genitourinary condition (V81 6) (Z13 89)   27  Screening for neurological condition (V80 09) (Z13 89)   28  Sleep apnea (780 57) (G47 30)   29   Tinea pedis of left foot (110 4) (B35 3)   30  Type 2 diabetes mellitus with insulin therapy (250 00,V58 67) (E11 9,Z79 4)   31  Wheezing (786 07) (R06 2)    Past Medical History    1  History of diabetes mellitus (V12 29) (Z86 39)   2  History of heartburn (V12 79) (Z87 898)   3  History of sleep apnea (V13 89) (Z87 09)    Surgical History    1  History of Ankle Surgery   2  History of Cholecystectomy   3  History of Colostomy   4  History of Exploratory Laparoscopy   5  History of Gastrectomy Sleeve   6  History of Hernia Repair   7  History of Tonsillectomy    Family History  Mother    1  Denied: Family history of Colon cancer   2  Family history of cardiac disorder (V17 49) (Z82 49)   3  Denied: Family history of liver disease  Father    4  Denied: Family history of Colon cancer   5  Family history of diabetes mellitus (V18 0) (Z83 3)   6  Denied: Family history of liver disease    Social History    · Former smoker (V15 82) (N73 174)   · No alcohol use   · No drug use    Current Meds   1  AndroGel Pump 20 25 MG/ACT (1 62%) Transdermal Gel; APPLY TWO PUMP PRESSES   ONE TIME DAILY AS DIRECTED; Therapy: 76Flz0190 to (Evaluate:46Bmy8563); Last Rx:13Apr2016 Ordered   2  Biotin TABS; Therapy: (Recorded:20Apr2016) to Recorded   3  Centrum Oral Tablet Chewable; Therapy: (Recorded:29Gzi1455) to Recorded   4  Clotrimazole 1 % External Cream; APPLY SPARINGLY TO AFFECTED AREA(S) 2 TO 3   TIMES DAILY; Therapy: 23OXA3171 to (Susan Dickerson)  Requested for: 71UZU5837; Last   Rx:05Jan2016 Ordered   5  Glucocard Vital Monitor w/Device Kit; USE AS DIRECTED; Therapy: 44KPZ1705 to (Last Rx:31Oct2016)  Requested for: 31Oct2016 Ordered   6  [employer] One Touch Ultramini Meter; use as directed; Therapy: 00PEI8405 to (Last Rx:31Oct2016)  Requested for: 31Oct2016 Ordered   7  LORazepam 0 5 MG Oral Tablet (Ativan); Take one tablet one hour prior to MRI  Repeat   prior to exam if necessary;    Therapy: 35Xtb1768 to (Last Rx:29Apr2016) Ordered   8  Losartan Potassium 25 MG Oral Tablet; take 1 tablet by mouth every day  Requested for:   92UFZ0821; Last Rx:05Oct2016 Ordered   9  NovoLOG Mix 70/30 FlexPen (70-30) 100 UNIT/ML Subcutaneous Suspension   Pen-injector; INJECT 10 UNIT Every twelve hours; Therapy: 76UWQ1006 to (Evaluate:03Jun2017)  Requested for: 15NUT5785 Recorded   10  Omeprazole 20 MG Oral Capsule Delayed Release; take 1 capsule daily; Therapy: 68HXJ9603 to (Evaluate:10Mar2017)  Requested for: 40EIZ6359; Last    Rx:10Nov2016 Ordered   11  ProAir  (90 Base) MCG/ACT Inhalation Aerosol Solution; INHALE 2 PUFFS    EVERY 4 HOURS AS NEEDED FOR COUGH AND WHEEZE;    Therapy: 28QSV0335 to (Last Rx:25Oct2016)  Requested for: 25Oct2016 Ordered   12  Tamsulosin HCl - 0 4 MG Oral Capsule; take 1 capsule by mouth once daily  Requested    for: 25TKC5046; Last Rx:05Jan2016 Ordered    Allergies    1  enalapril    2  No Known Environmental Allergies   3  No Known Food Allergies    Future Appointments    Date/Time Provider Specialty Site   01/06/2017 11:00 AM Yarelis Duran MD [facility]   01/27/2017 11:45 AM Giovanni Del Castillo, [facility] General [facility]     Signatures   Electronically signed by : JOHN Neumann; Dec  9 2016 12:45PM EST                       (Author)    Electronically signed by :  YASEMIN Jain ; Jan 12 2017  1:28PM EST

## 2018-01-13 NOTE — PROGRESS NOTES
Discussion/Summary  Discussion Summary:   Assess: Pt here for monthly weigh in  Pt lost 6 pounds  Pt does not have any significant changes in diagnosis or medication  Pt reports that he has decreased the amount of food he is eating at each meal and has decreased the frequency that he was eating  ( previously grazed) He has eliminated bread , rice, pasta and ice cream  He has increased his intake of non starchy vegetables  His wife no longer fries any of their entre meats  Patient has been tracking his steps, about 2000 per day   Drinking 64 ounces of fluid per day  Nutrition Diagnosis: Obesity related to sedentary lifestyle and excess energy intake as evidenced by BMI Intervention: Reviewed work flow  Patient needs to complete August and September weigh ins with bariatrician  He will increase his steps to 4000 per day and also restart food journaling  Encouraged completion of lesson plan number 3 & 4 in bariatric booklet  Pt was receptive and verbalized understanding  Pt will work on the following goals: Food journal Increase steps to 4000 per day Monitoring/evaluation: Pt will lose 4 to 8 pounds by next appointment Follow up scheduled for :8/24/2016 with program bariatrician  Active Problems    1  Abdominal pain (789 00) (R10 9)   2  Adhesive capsulitis of left shoulder (726 0) (M75 02)   3  Benign essential hypertension (401 1) (I10)   4  Benign hypertrophy of prostate (600 00) (N40 0)   5  Blood tests prior to treatment or procedure (V72 63) (Z01 812)   6  Calcific tendinitis of left shoulder (726 11) (M75 32)   7  Chest pain (786 50) (R07 9)   8  Decreased libido (799 81) (R68 82)   9  Diabetes (250 00) (E11 9)   10  Disorder of tendon of left biceps (726 12) (M75 22)   11  Dyspepsia (536 8) (R10 13)   12  Encounter for screening colonoscopy (V76 51) (Z12 11)   13  Erectile dysfunction (607 84) (N52 9)   14  Hypercholesteremia (272 0) (E78 0)   15   Impingement syndrome of left shoulder (726 2) (M75 42) 16  Left shoulder pain (719 41) (M25 512)   17  Low back pain (724 2) (M54 5)   18  Low testosterone (257 2) (E29 1)   19  Medicare annual wellness visit, initial (V70 0) (Z00 00)   20  Morbid obesity due to excess calories (278 01) (E66 01)   21  Need for vaccination with 13-polyvalent pneumococcal conjugate vaccine (V03 82) (Z23)   22  Obstructive sleep apnea (327 23) (G47 33)   23  Pre-operative cardiovascular examination (V72 81) (Z01 810)   24  Screening for genitourinary condition (V81 6) (Z13 89)   25  Screening for neurological condition (V80 09) (Z13 89)   26  Sleep apnea (780 57) (G47 30)   27  Tinea pedis of left foot (110 4) (B35 3)   28  Type 2 diabetes mellitus with insulin therapy (250 00,V58 67) (E11 9,Z79  4)    Past Medical History    1  History of diabetes mellitus (V12 29) (Z86 39)   2  History of heartburn (V12 79) (Z87 898)   3  History of sleep apnea (V13 89) (Z87 09)    Surgical History    1  History of Ankle Surgery   2  History of Cholecystectomy   3  History of Colostomy   4  History of Exploratory Laparoscopy   5  History of Hernia Repair   6  History of Tonsillectomy    Family History  Mother    1  Denied: Family history of Colon cancer   2  Family history of cardiac disorder (V17 49) (Z82 49)   3  Denied: Family history of liver disease  Father    4  Denied: Family history of Colon cancer   5  Family history of diabetes mellitus (V18 0) (Z83 3)   6  Denied: Family history of liver disease    Social History    · Former smoker (V15 82) (O60 110)   · No alcohol use   · No drug use    Current Meds   1  AndroGel Pump 20 25 MG/ACT (1 62%) Transdermal Gel; APPLY TWO PUMP PRESSES   ONE TIME DAILY AS DIRECTED; Therapy: 13Apr2016 to (Evaluate:11Aug2016); Last Rx:13Apr2016 Ordered   2  Aspirin 81 MG TABS; Therapy: (Recorded:20Apr2016) to Recorded   3  Biotin TABS; Therapy: (Recorded:20Apr2016) to Recorded   4   Cialis 20 MG Oral Tablet; TAKE 1 TABLET 1 HOUR BEFORE ACTIVITY AS NEEDED; Therapy: 05Apr2016 to (Last Rx:05Apr2016) Ordered   5  Clotrimazole 1 % External Cream; APPLY SPARINGLY TO AFFECTED AREA(S) 2 TO 3   TIMES DAILY; Therapy: 93QPG8366 to (Mariajose Maguire)  Requested for: 58KFN8599; Last   Rx:05Jan2016 Ordered   6  Dulcolax 5 MG Oral Tablet Delayed Release; TAKE 2 TABLET ONCE; Therapy: 27Apr2016 to (Evaluate:28Apr2016)  Requested for: 27Apr2016; Last   Rx:27Apr2016 Ordered   7  GlipiZIDE 10 MG Oral Tablet; take 1 tablet every twelve hours  Requested for: 99UMI5258;   Last Rx:05Jan2016 Ordered   8  Hydrochlorothiazide 12 5 MG Oral Tablet; Take 1 tablet daily  Requested for: 83JLN8924;   Last Rx:05Jan2016 Ordered   9  LORazepam 0 5 MG Oral Tablet (Ativan); Take one tablet one hour prior to MRI  Repeat   prior to exam if necessary; Therapy: 29Apr2016 to (Last Rx:29Apr2016) Ordered   10  Losartan Potassium 25 MG Oral Tablet; take 1 tablet by mouth every day  Requested for:    14HSH7001; Last Rx:05Jan2016 Ordered   11  Lovastatin 10 MG Oral Tablet; TAKE 1 TABLET AT BEDTIME  Requested for: 75TRP3681;    Last Rx:05Jan2016 Ordered   12  MetFORMIN HCl - 1000 MG Oral Tablet; TAKE 1 TABLET TWICE DAILY  Requested for:    20XBN6393; Last Rx:05Jan2016 Ordered   13  Metoprolol Tartrate 50 MG Oral Tablet; TAKE 1 TABLET TWICE DAILY  Requested for:    76SGX4905; Last Rx:07Mar2016 Ordered   14  Naproxen 500 MG Oral Tablet; TAKE 1 TABLET TWICE DAILY AS NEEDED; Therapy: 41NQD3267 to (Evaluate:38Kza7217)  Requested for: 12Jan2016; Last    Rx:12Jan2016 Ordered   15  NovoFine 30G X 8 MM Miscellaneous; USE AS DIRECTED; Therapy: 04MIQ0398 to (Last Rx:06Jan2016)  Requested for: 38TCC9579 Ordered   16  NovoLOG Mix 70/30 FlexPen (70-30) 100 UNIT/ML Subcutaneous Suspension    Pen-injector; Inject 45units in the morning and 35 units in the    evening; Therapy: 54JKC7035 to (Evaluate:74Vum8688)  Requested for: 89NHJ0453; Last    Rx:15Jun2016 Ordered   17   Tamsulosin HCl - 0 4 MG Oral Capsule; take 1 capsule by mouth once daily  Requested    for: 47KKP6022; Last Rx:05Jan2016 Ordered   18  Tanzeum 50 MG Subcutaneous Pen-injector; 1 injection once a week; Therapy: 34WYW5187 to (Evaluate:68Ovx7652); Last Rx:05Jul2016 Ordered   19  Viagra 50 MG Oral Tablet; TAKE 1 TABLET DAILY 1 HOUR BEFORE NEEDED; Therapy: 13Apr2016 to (Evaluate:22Jun2016)  Requested for: 13Apr2016; Last    Rx:13Apr2016 Ordered    Allergies    1  enalapril    2  No Known Environmental Allergies   3  No Known Food Allergies    Vitals  Signs   Recorded: 22Jul2016 02:38PM   Height: 5 ft 8 in  Weight: 297 lb 6 4 oz  BMI Calculated: 45 22  BSA Calculated: 2 42    Future Appointments    Date/Time Provider Specialty Site   10/05/2016 10:00 AM Karen Duran MD [address] [facility]   08/24/2016 11:00 AM YASEMIN Mora  Bariatric Medicine [facility]     Signatures   Electronically signed by : JOHN Lindsay; Jul 22 2016  2:37PM EST                       (Author)    Electronically signed by :  YASEMIN Freitas ; Aug  4 2016 10:15AM EST

## 2018-01-14 VITALS
RESPIRATION RATE: 12 BRPM | BODY MASS INDEX: 34.61 KG/M2 | WEIGHT: 228.38 LBS | HEART RATE: 72 BPM | DIASTOLIC BLOOD PRESSURE: 70 MMHG | HEIGHT: 68 IN | SYSTOLIC BLOOD PRESSURE: 112 MMHG

## 2018-01-14 VITALS
HEIGHT: 68 IN | DIASTOLIC BLOOD PRESSURE: 80 MMHG | WEIGHT: 235 LBS | RESPIRATION RATE: 18 BRPM | HEART RATE: 70 BPM | SYSTOLIC BLOOD PRESSURE: 126 MMHG | BODY MASS INDEX: 35.61 KG/M2 | TEMPERATURE: 97.2 F

## 2018-01-14 VITALS
DIASTOLIC BLOOD PRESSURE: 72 MMHG | TEMPERATURE: 97.5 F | HEIGHT: 68 IN | HEART RATE: 74 BPM | WEIGHT: 262.5 LBS | SYSTOLIC BLOOD PRESSURE: 110 MMHG | BODY MASS INDEX: 39.78 KG/M2 | RESPIRATION RATE: 18 BRPM

## 2018-01-14 VITALS
HEART RATE: 72 BPM | OXYGEN SATURATION: 95 % | BODY MASS INDEX: 39.1 KG/M2 | DIASTOLIC BLOOD PRESSURE: 62 MMHG | SYSTOLIC BLOOD PRESSURE: 108 MMHG | WEIGHT: 257.13 LBS | RESPIRATION RATE: 20 BRPM

## 2018-01-14 NOTE — MISCELLANEOUS
Message  12/1/2016 @ 1310 - post op follow up phone call completed  Pt is sipping liquids, using IS as instructed, reinforced importance of using IS to help prevent pneumonia  Ambulating about home without difficulty  Pain controlled with analgesia  Reaffirmed examples of liquid diet over the next week  Pt stated understanding about discharge instructions and medication adjustments  Tolerating lovenox injections without difficulty  Pt educated on [address]  Follow up appt with surgeon scheduled for next week  Instructed to call with any additional questions or concerns  Active Problems    1  Adhesive capsulitis of left shoulder (726 0) (M75 02)   2  Benign enlargement of prostate (600 00) (N40 0)   3  Benign essential hypertension (401 1) (I10)   4  Blood tests prior to treatment or procedure (V72 63) (Z01 812)   5  Calcific tendinitis of left shoulder (726 11) (M75 32)   6  Decreased libido (799 81) (R68 82)   7  Diabetes (250 00) (E11 9)   8  Disorder of tendon of left biceps (726 12) (M75 22)   9  Dyspepsia (536 8) (R10 13)   10  Encounter for screening colonoscopy (V76 51) (Z12 11)   11  Erectile dysfunction (607 84) (N52 9)   12  Flu vaccine need (V04 81) (Z23)   13  Hypercholesteremia (272 0) (E78 00)   14  Impingement syndrome of left shoulder (726 2) (M75 42)   15  Left shoulder pain (719 41) (M25 512)   16  Low back pain (724 2) (M54 5)   17  Low testosterone (257 2) (E29 1)   18  Medicare annual wellness visit, initial (V70 0) (Z00 00)   19  Morbid obesity due to excess calories (278 01) (E66 01)   20  Need for vaccination with 13-polyvalent pneumococcal conjugate vaccine (V03 82) (Z23)   21  Obstructive sleep apnea (327 23) (G47 33)   22  Pre-operative cardiovascular examination (V72 81) (Z01 810)   23  Screening for genitourinary condition (V81 6) (Z13 89)   24  Screening for neurological condition (V80 09) (Z13 89)   25  Sleep apnea (780 57) (G47 30)   26   Tinea pedis of left foot (110 4) (B35 3)   27  Type 2 diabetes mellitus with insulin therapy (250 00,V58 67) (E11 9,Z79 4)   28  Wheezing (786 07) (R06 2)    Current Meds   1  AndroGel Pump 20 25 MG/ACT (1 62%) Transdermal Gel; APPLY TWO PUMP PRESSES   ONE TIME DAILY AS DIRECTED; Therapy: 29Phv2625 to (Evaluate:95Kkq7211); Last Rx:13Apr2016 Ordered   2  Biotin TABS; Therapy: (Recorded:20Apr2016) to Recorded   3  Clotrimazole 1 % External Cream; APPLY SPARINGLY TO AFFECTED AREA(S) 2 TO 3   TIMES DAILY; Therapy: 92YJI0917 to (Equilla Pier)  Requested for: 92JBJ3056; Last   Rx:05Jan2016 Ordered   4  Enoxaparin Sodium 40 MG/0 4ML Subcutaneous Solution (Lovenox); inject once daily for   2 weeks post surgery; Therapy: 25SVI6946 to (Last Rx:18Nov2016)  Requested for: 74WQI2293; Status:   ACTIVE - Retrospective By Protocol Authorization Ordered   5  GlipiZIDE 10 MG Oral Tablet; take 1 tablet every twelve hours  Requested for:   71DDW0716; Last Rx:05Jan2016 Ordered   6  Glucocard Vital Monitor w/Device Kit; USE AS DIRECTED; Therapy: 72SVT9396 to (Last Rx:31Oct2016)  Requested for: 31Oct2016 Ordered   7  HydroCHLOROthiazide 12 5 MG Oral Tablet; Take 1 tablet daily  Requested for:   87OQH3098; Last Rx:05Jan2016 Ordered   8  Lifescan One Touch Ultramini Meter; use as directed; Therapy: 82UUN9639 to (Last Rx:31Oct2016)  Requested for: 31Oct2016 Ordered   9  LORazepam 0 5 MG Oral Tablet (Ativan); Take one tablet one hour prior to MRI  Repeat   prior to exam if necessary; Therapy: 90Dqx2896 to (Last Rx:29Apr2016) Ordered   10  Losartan Potassium 25 MG Oral Tablet; take 1 tablet by mouth every day  Requested for:    88MTR6000; Last Rx:05Oct2016 Ordered   11  Lovastatin 10 MG Oral Tablet; TAKE 1 TABLET AT BEDTIME  Requested for: 46WLM2033;    Last Rx:05Jan2016 Ordered   12  MetFORMIN HCl - 1000 MG Oral Tablet; TAKE 1 TABLET TWICE DAILY  Requested for:    30DIP5783; Last Rx:10Uww7696 Ordered   13   Metoprolol Tartrate 50 MG Oral Tablet; TAKE 1 TABLET TWICE DAILY  Requested for:    29LMB5494; Last Rx:07Mar2016 Ordered   14  Naproxen 500 MG Oral Tablet; TAKE 1 TABLET TWICE DAILY AS NEEDED; Therapy: 50AYF8431 to (Evaluate:15Oct2016)  Requested for: 65Rda1734; Last    Rx:26Uhh6932 Ordered   15  NovoFine 30G X 8 MM Miscellaneous; USE AS DIRECTED; Therapy: 87BGK0609 to (Last Rx:06Jan2016)  Requested for: 38BDI6945 Ordered   16  NovoLOG Mix 70/30 FlexPen (70-30) 100 UNIT/ML Subcutaneous Suspension    Pen-injector; Inject 45units in the morning and 35 units in the    evening; Therapy: 49UDK6950 to (Evaluate:12Apr2017)  Requested for: 71VXJ9588; Last    Rx:14Oct2016 Ordered   17  Omeprazole 20 MG Oral Capsule Delayed Release; take 1 capsule daily; Therapy: 11JBA9059 to (Evaluate:10Mar2017)  Requested for: 42EVN6304; Last    Rx:10Nov2016 Ordered   18  ProAir  (90 Base) MCG/ACT Inhalation Aerosol Solution; INHALE 2 PUFFS    EVERY 4 HOURS AS NEEDED FOR COUGH AND WHEEZE;    Therapy: 00CII9884 to (Last Rx:25Oct2016)  Requested for: 25Oct2016 Ordered   19  Tamsulosin HCl - 0 4 MG Oral Capsule; take 1 capsule by mouth once daily  Requested    for: 91LNP7621; Last Rx:05Jan2016 Ordered   20  Tanzeum 50 MG Subcutaneous Pen-injector; 1 injection once a week; Therapy: 66VEF2355 to (Evaluate:66Gak0905); Last Rx:97Uca6921 Ordered    Allergies    1  enalapril    2  No Known Environmental Allergies   3   No Known Food Allergies    Signatures   Electronically signed by : Marin Mac, ; Dec  1 2016  2:11PM EST                       (Author)

## 2018-01-14 NOTE — RESULT NOTES
Message   mail if patient does not come to ov  CR     Verified Results  (1) FERRITIN 64VJS3721 09:26AM Trevor Achilles Order Number: [id number]     Test Name Result Flag Reference   FERRITIN 50 ng/mL  8-388     (1) FOLATE 86IYZ8974 09:26AM Trevor Achilles Order Number: [id number]     Test Name Result Flag Reference   FOLATE >20 0 ng/mL H 3 1-17 5     (1) PTH N-TERMINAL (INTACT) 30YXQ6894 09:26AM Trevor Achilles Order Number: [id number]     Test Name Result Flag Reference   PARATHYROID HORMONE INTACT 34 9 pg/mL  14 0-72 0     (1) VITAMIN B12 09TZW7222 09:26AM Trevor Achilles Order Number: [id number]     Test Name Result Flag Reference   VITAMIN B12 891 pg/mL  100-900       Discussion/Summary  4/4/17 vitamin/mineral levels ordered are within acceptable range  Keep up the [address] taking your supplements  Please keep your routine office visit  If you do not have a scheduled routine appointment, please call the office to schedule it  Our office number is [phone]  If you have questions about your results, this will be discussed with you at your upcoming  visit  If you have gotten your most recent labs after your recent visit and have questions, please call the office  I look forward to seeing you at your next visit  Signatures   Electronically signed by : JUAN Hung;  Apr 5 2017  4:00PM EST                       (Author)

## 2018-01-15 VITALS
WEIGHT: 267 LBS | HEART RATE: 68 BPM | SYSTOLIC BLOOD PRESSURE: 130 MMHG | RESPIRATION RATE: 18 BRPM | BODY MASS INDEX: 40.6 KG/M2 | DIASTOLIC BLOOD PRESSURE: 70 MMHG

## 2018-01-15 NOTE — PROGRESS NOTES
TABS;   Therapy: (Recorded:18Eek0991) to Recorded   3  Biotin TABS; Therapy: (Recorded:20Apr2016) to Recorded   4  Cialis 20 MG Oral Tablet; TAKE 1 TABLET 1 HOUR BEFORE ACTIVITY AS NEEDED; Therapy: 05Apr2016 to (Last Rx:05Apr2016) Ordered   5  Clotrimazole 1 % External Cream; APPLY SPARINGLY TO AFFECTED AREA(S) 2 TO 3   TIMES DAILY; Therapy: 73QDF5726 to (Deborra Mins)  Requested for: 18KHE0589; Last   Rx:05Jan2016 Ordered   6  Dulcolax 5 MG Oral Tablet Delayed Release; TAKE 2 TABLET ONCE; Therapy: 28Zta1574 to (Evaluate:28Apr2016)  Requested for: 27Apr2016; Last   Rx:27Apr2016 Ordered   7  GlipiZIDE 10 MG Oral Tablet; take 1 tablet every twelve hours  Requested for:   44PWC4286; Last Rx:05Jan2016 Ordered   8  Golytely 227 1 GM Oral Solution Reconstituted; TAKE AS DIRECTED; Therapy: 27Apr2016 to (Last Rx:27Apr2016)  Requested for: 27Apr2016 Ordered   9  Hydrochlorothiazide 12 5 MG Oral Tablet; Take 1 tablet daily  Requested for: 12Jan2016;   Last Rx:05Jan2016 Ordered   10  LORazepam 0 5 MG Oral Tablet (Ativan); Take one tablet one hour prior to MRI  Repeat    prior to exam if necessary; Therapy: 29Apr2016 to (Last Rx:46Bjk5811) Ordered   11  Losartan Potassium 25 MG Oral Tablet; take 1 tablet by mouth every day  Requested for:    12Jan2016; Last Rx:05Jan2016 Ordered   12  Lovastatin 10 MG Oral Tablet; TAKE 1 TABLET AT BEDTIME  Requested for: 12Jan2016;    Last Rx:05Jan2016 Ordered   13  MetFORMIN HCl - 1000 MG Oral Tablet; TAKE 1 TABLET TWICE DAILY  Requested for:    17GDU9060; Last Rx:05Jan2016 Ordered   14  Metoprolol Tartrate 50 MG Oral Tablet; TAKE 1 TABLET TWICE DAILY  Requested for:    11NUP6569; Last Rx:07Mar2016 Ordered   15  Naproxen 500 MG Oral Tablet; TAKE 1 TABLET TWICE DAILY AS NEEDED; Therapy: 18AZG9316 to (Evaluate:75Jkk6167)  Requested for: 12Jan2016; Last    Rx:12Jan2016 Ordered   16  NovoFine 30G X 8 MM Miscellaneous; USE AS DIRECTED;     Therapy: 10RUZ7517 to (Last WB:66YLP6821)  Requested for: 29VOS5903 Ordered   17  NovoLOG Mix 70/30 FlexPen (70-30) 100 UNIT/ML Subcutaneous Suspension    Pen-injector; Inject 45units in the morning and 35 units in the    evening; Therapy: 09QTG6329 to (Evaluate:09Icg1786)  Requested for: 75EHL1900; Last    Rx:15Jun2016 Ordered   18  Tamsulosin HCl - 0 4 MG Oral Capsule; take 1 capsule by mouth once daily  Requested    for: 12Jan2016; Last Rx:05Jan2016 Ordered   19  Tanzeum 30 MG Subcutaneous Pen-injector; 1 pen subQ weekly; Therapy: 02EPJ2905 to (Evaluate:26Rmv7040)  Requested for: 92XWM2203; Last    XN:73QIP1977 Ordered   20  Viagra 50 MG Oral Tablet; TAKE 1 TABLET DAILY 1 HOUR BEFORE NEEDED; Therapy: 29Hxb2377 to (Evaluate:22Jun2016)  Requested for: 34Zgw9113; Last    Rx:35Ibc2467 Ordered    Allergies    1  enalapril    2  No Known Environmental Allergies   3   No Known Food Allergies    Signatures   Electronically signed by : JOHN Patrick; Jun 24 2016 11:46AM EST                       (Author)

## 2018-01-15 NOTE — RESULT NOTES
Message   Please tell patient by his B12 level is slightly low and to take an additional 500 micrograms daily     Verified Results  (1) VITAMIN B12 18ZGE5284 09:32AM Zen Planner   TW Order Number: [id number]  TW Order Number: [id number] Order Number: [id number] Order Number: [id number] Order Number: [id number]     Test Name Result Flag Reference   VITAMIN B12 364 pg/mL  100-900     (1) FERRITIN 20THX9259 09:32AM Zen Planner   TW Order Number: [id number]  TW Order Number: [id number] Order Number: [id number] Order Number: [id number] Order Number: [id number]     Test Name Result Flag Reference   FERRITIN 45 ng/mL  8-388     (1) FOLATE 10NTP6173 09:32AM Zen Planner   TW Order Number: [id number]  TW Order Number: [id number] Order Number: [id number] Order Number: [id number] Order Number: [id number]     Test Name Result Flag Reference   FOLATE 13 3 ng/mL  3 1-17 5     (1) IRON SATURATION %, TIBC 05WCH1744 09:32AM Zen Planner   TW Order Number: [id number]  TW Order Number: [id number] Order Number: [id number] Order Number: [id number] Order Number: [id number]     Test Name Result Flag Reference   IRON SATURATION 21 %     TOTAL IRON BINDING CAPACITY 358 ug/dL  250-450   IRON 74 ug/dL       (1) VITAMIN B1, WHOLE BLOOD 77EEY1051 09:32AM Almita [place] Order Number: [id number]     Test Name Result Flag Reference   VITAMIN B1, WHOLE BLOOD 192 9 nmol/L  66 5 - 200 0   Performed at:  [address]  [id number]  : Aster Bragg MD, Phone:  [phone]

## 2018-01-15 NOTE — RESULT NOTES
Verified Results  (1) COMPREHENSIVE METABOLIC PANEL 96ZGW9226 01:72PA Melida Duran   Has the patient been fasting for 10-12 hours? -YES     Test Name Result Flag Reference   SODIUM 137 mmol/L  136-145   POTASSIUM 4 3 mmol/L  3 5-5 3   CHLORIDE 103 mmol/L     CARBON DIOXIDE 24 7 mmol/L  21 0-32 0   ANION GAP (CALC) 9 mmol/L  4-13   BILI, TOTAL 0 36 mg/dL  0 20-1 00   TOTAL PROTEIN 7 2 g/dL  6 4-8 2   ALK PHOSPHATAS 90 U/L     ALT (SGPT) 22 U/L  12-78   AST(SGOT) 19 U/L  5-45   GLUCOSE,RANDM 215 mg/dL H    If patient is fasting, the ADA then defines impaired fasting glucose as  >100 mg/dl and diabetes as  >or equal to 126 mg/dl  ALBUMIN 3 3 g/dL L 3 5-5 0   BLOOD UREA NITROGEN 24 mg/dL  5-25   CALCIUM 8 3 mg/dL  8 3-10 1   CREATININE 0 98 mg/dL  0 60-1 30   Standardized to IDMS reference method   eGFR African American >60 ml/min/1 73sq m     [facility] Disease Education Program recommendations are as  follows:  GFR calculation is accurate only with a steady state creatinine  Chronic Kidney disease less than 60 ml/min/1 73 sq  meters  Kidney failure less than 15 ml/min/1 73 sq  meters  eGFR Non-African American >60 ml/min/1 73sq m       (1) LIPID PANEL, FASTING 30CNP2573 11:48AM Melida Duran   Has the patient been fasting for 10-12 hours? -YES     Test Name Result Flag Reference   TRIGLYCERIDES 105 mg/dL     TRIGLYCERIDE:  Normal              <150 mg/dl  Borderline High    150-199 mg/dl  High               200-499 mg/dl  Very High          >499 mg/dl  _______________________________________   CHOLESTEROL 127 mg/dL     CHOLESTEROL:  Desirable        <200 mg/dl  Borderline High  200-239 mg/dl  High             >239 mg/dl  ____________________________________   HDL,DIRECT 35 mg/dL     HDL:  High       >59 mg/dl  Low        <41 mg/dl  ______________________________   LDL CHOLESTEROL CALCULATED 71 mg/dL  0-100   LDL, CALCULATED:  This screening LDL is a calculated result    It does not have the accuracy of the Direct Measured LDL in the  monitoring of patients with hyperlipidemia and/or statin therapy  Direct Measured LDL (Test Code 3126) must be ordered separately in these  patients   ______________________________     (1) PSA (SCREEN) (Dx V76 44 Screen for Prostate Cancer) 46SMN3581 11:48AM Melida Duran     Test Name Result Flag Reference   PSA 1 0 ng/mL   0-4 0   PSA values from one laboratory may not be comparable to those from  another because of the various testing technologies employed  Additionally, PSA results can not be interpreted as absolute evidence of  the presence or absence of disease  This PSA value was obtained by [employer] Chemiluminometric Immunoassay  (1) CBC/PLT/DIFF 87LSE0091 11:48AM Melida Duran     Test Name Result Flag Reference   DIFFERENTIAL METHOD Automated     WBC COUNT 7 67 Thousand/uL  4 31-10 16   RBC COUNT 4 93 Million/uL  3 88-5 62   HEMOGLOBIN 13 6 g/dL  12 0-17 0   HEMATOCRIT 42 7 %  36 5-49 3   MCV 87 fL  82-98   MCH 27 6 pg  26 8-34 3   MCHC 31 9 g/dL  31 4-37 4   RDW 14 3 %  11 6-15 1   MPV 12 3 fL  8 9-12 7   PLATELET COUNT 643 Thousand/uL  149-390   NEUTROPHILS RELATIVE PERCENT 73 %  43-75   LYMPHOCYTES RELATIVE PERCENT 13 % L 14-44   MONOCYTES RELATIVE PERCENT 11 %  4-12   EOSINOPHILS RELATIVE PERCENT 3 %  0-6   NEUTROPHILS ABSOLUTE COUNT 5 60 Thousand/uL  1 85-7 62   LYMPHOCYTES ABSOLUTE COUNT 1 00 Thousand/uL  0 60-4 47   MONOCYTES ABSOLUTE COUNT 0 84 Thousand/uL  0 17-1 22   EOSINOPHILS ABSOLUTE COUNT 0 23 Thousand/uL  0 00-0 61   BASOPHILS ABSOLUTE COUNT 0 03 Thousand/uL  0 00-0 10     (1) TSH WITH FT4 REFLEX 93UEM8752 11:48AM Melida Duran     Test Name Result Flag Reference   TSH, 3RD GEN W/REFLEX TO FT4 3 560 uIU/ML  0 358-3 740   *Patients undergoing fluorescein dye angiography may retain small  amounts of fluorescein in the body for 48-72 hours post procedure  Samples containing fluorescein can produce falsely depressed TSH   values   If the patient had this procedure, a specimen should be  resubmitted post fluorescein clearance  (1) HEMOGLOBIN A1C 97YTB7896 11:48AM Melida Duran     Test Name Result Flag Reference   HEMOGLOBIN A1C 8 6 % H 4 0-5 6   5 7-6 4% impaired fasting glucose  >=6 5% diagnosis of diabetes  Falsely low levels are seen in conditions linked to short RBC life span  ? hemolytic anemia, and splenomegaly  Falsely elevated levels are seen in situations where there is an  increased production of RBC ? receipt of erythropoietin or blood  transfusions  Adopted from ADA-Clinical Practice Recommendations   EST  AVG  GLUCOSE 200 mg/dL         Discussion/Summary   I want to see him to go over his blood work this week   please call to set up an appointment   - Dr Phong Padilla   Electronically signed by : Milind Self MD; Jan 11 2016  8:37AM EST                       (Author)

## 2018-01-16 NOTE — RESULT NOTES
Message   let patient know vitamin A and B1 came back and are normal -continue supplements as advised     Verified Results  (1) VITAMIN A 45SCG9792 09:26AM Jacqualin Fling Order Number: [id number]     Test Name Result Flag Reference   VITAMIN A 42 ug/dL  24 - 85   Performed at:  [address]  [id number]  : Kike Melchor MD, Phone:  [phone]     (1) VITAMIN B1, WHOLE BLOOD 02XFE7853 09:26AM Jacqualin Fling Order Number: [id number]     Test Name Result Flag Reference   VITAMIN B1, WHOLE BLOOD 183 9 nmol/L  66 5 - 200 0   Performed at:  [address]  [id number]  : Kike Melchor MD, Phone:  [phone]

## 2018-01-16 NOTE — PROGRESS NOTES
Message  Outreach phone call; no response  How is patient coming along with liquid protein diet ? He said fine  He had no questions or concerns  Reminded patient he will be weighed the morning of surgery and encouraged patient reach out to staff if in need of support  NV       Active Problems    1  Adhesive capsulitis of left shoulder (726 0) (M75 02)   2  Benign enlargement of prostate (600 00) (N40 0)   3  Benign essential hypertension (401 1) (I10)   4  Blood tests prior to treatment or procedure (V72 63) (Z01 812)   5  Calcific tendinitis of left shoulder (726 11) (M75 32)   6  Decreased libido (799 81) (R68 82)   7  Diabetes (250 00) (E11 9)   8  Disorder of tendon of left biceps (726 12) (M75 22)   9  Dyspepsia (536 8) (R10 13)   10  Encounter for screening colonoscopy (V76 51) (Z12 11)   11  Erectile dysfunction (607 84) (N52 9)   12  Flu vaccine need (V04 81) (Z23)   13  Hypercholesteremia (272 0) (E78 00)   14  Impingement syndrome of left shoulder (726 2) (M75 42)   15  Left shoulder pain (719 41) (M25 512)   16  Low back pain (724 2) (M54 5)   17  Low testosterone (257 2) (E29 1)   18  Medicare annual wellness visit, initial (V70 0) (Z00 00)   19  Morbid obesity due to excess calories (278 01) (E66 01)   20  Need for vaccination with 13-polyvalent pneumococcal conjugate vaccine (V03 82) (Z23)   21  Obstructive sleep apnea (327 23) (G47 33)   22  Pre-operative cardiovascular examination (V72 81) (Z01 810)   23  Screening for genitourinary condition (V81 6) (Z13 89)   24  Screening for neurological condition (V80 09) (Z13 89)   25  Sleep apnea (780 57) (G47 30)   26  Tinea pedis of left foot (110 4) (B35 3)   27  Type 2 diabetes mellitus with insulin therapy (250 00,V58 67) (E11 9,Z79 4)   28  Wheezing (786 07) (R06 2)    Current Meds   1  AndroGel Pump 20 25 MG/ACT (1 62%) Transdermal Gel; APPLY TWO PUMP PRESSES   ONE TIME DAILY AS DIRECTED; Therapy: 13Apr2016 to (Evaluate:11Aug2016);  Last Rx:13Apr2016 Ordered   2  Biotin TABS; Therapy: (Recorded:32Zdl0335) to Recorded   3  Clotrimazole 1 % External Cream; APPLY SPARINGLY TO AFFECTED AREA(S) 2 TO 3   TIMES DAILY; Therapy: 41FPF0402 to (Sonia Lyon)  Requested for: 92AJV2206; Last   Rx:05Jan2016 Ordered   4  Enoxaparin Sodium 40 MG/0 4ML Subcutaneous Solution (Lovenox); inject once daily for   2 weeks post surgery; Therapy: 33XZU9097 to (Last Rx:18Nov2016)  Requested for: 28YAC5526; Status:   ACTIVE - Retrospective By Protocol Authorization Ordered   5  GlipiZIDE 10 MG Oral Tablet; take 1 tablet every twelve hours  Requested for:   29EOC8078; Last Rx:05Jan2016 Ordered   6  Glucocard Vital Monitor w/Device Kit; USE AS DIRECTED; Therapy: 03KOJ1495 to (Last Rx:31Oct2016)  Requested for: 31Oct2016 Ordered   7  HydroCHLOROthiazide 12 5 MG Oral Tablet; Take 1 tablet daily  Requested for:   59HEZ7051; Last Rx:05Jan2016 Ordered   8  [employer] One Touch Ultramini Meter; use as directed; Therapy: 16DKI2038 to (Last Rx:31Oct2016)  Requested for: 46Wxu3723 Ordered   9  LORazepam 0 5 MG Oral Tablet (Ativan); Take one tablet one hour prior to MRI  Repeat   prior to exam if necessary; Therapy: 52Puf8015 to (Last Rx:85Ead9934) Ordered   10  Losartan Potassium 25 MG Oral Tablet; take 1 tablet by mouth every day  Requested for:    03GLW2446; Last Rx:05Oct2016 Ordered   11  Lovastatin 10 MG Oral Tablet; TAKE 1 TABLET AT BEDTIME  Requested for: 16GTF8536;    Last Rx:05Jan2016 Ordered   12  MetFORMIN HCl - 1000 MG Oral Tablet; TAKE 1 TABLET TWICE DAILY  Requested for:    58DMS6352; Last Rx:05Oct2016 Ordered   13  Metoprolol Tartrate 50 MG Oral Tablet; TAKE 1 TABLET TWICE DAILY  Requested for:    45YRP0987; Last Rx:07Mar2016 Ordered   14  Naproxen 500 MG Oral Tablet; TAKE 1 TABLET TWICE DAILY AS NEEDED; Therapy: 98WYK9112 to (Evaluate:12Fwr8178)  Requested for: 17Azo4398; Last    Rx:43Nec3355 Ordered   15   NovoFine 30G X 8 MM Miscellaneous; USE AS DIRECTED; Therapy: 01XAH1504 to (Last Rx:06Jan2016)  Requested for: 51DRW1540 Ordered   16  NovoLOG Mix 70/30 FlexPen (70-30) 100 UNIT/ML Subcutaneous Suspension    Pen-injector; Inject 45units in the morning and 35 units in the    evening; Therapy: 07UIW4691 to (Evaluate:12Apr2017)  Requested for: 59FNA5071; Last    Rx:14Oct2016 Ordered   17  Omeprazole 20 MG Oral Capsule Delayed Release; take 1 capsule daily; Therapy: 83DHO2990 to (Evaluate:10Mar2017)  Requested for: 46JSK1375; Last    Rx:10Nov2016 Ordered   18  ProAir  (90 Base) MCG/ACT Inhalation Aerosol Solution; INHALE 2 PUFFS    EVERY 4 HOURS AS NEEDED FOR COUGH AND WHEEZE;    Therapy: 20ODW2863 to (Last Rx:25Oct2016)  Requested for: 25Oct2016 Ordered   19  Tamsulosin HCl - 0 4 MG Oral Capsule; take 1 capsule by mouth once daily  Requested    for: 09ICG6247; Last Rx:05Jan2016 Ordered   20  Tanzeum 50 MG Subcutaneous Pen-injector; 1 injection once a week; Therapy: 30JYF1290 to (Evaluate:52Tml0289); Last Rx:54Wjy8046 Ordered    Allergies    1  enalapril    2  No Known Environmental Allergies   3   No Known Food Allergies    Signatures   Electronically signed by : DANIELLE Varela; Nov 23 2016  1:42PM EST                       (Author)

## 2018-01-16 NOTE — RESULT NOTES
Discussion/Summary   DIABETES TEST HAS BEEN IMPROVING   WILL DISCUSS IN DETAIL NEXT VISIT   - DR DURAN      Verified Results  (1) HEMOGLOBIN A1C 43PXW2947 08:45AM Gavi Duran Order Number: [id number]     Test Name Result Flag Reference   HEMOGLOBIN A1C 6 5 % H 4 2-6 3   EST  AVG   GLUCOSE 140 mg/dl       (1) MICROALBUMIN CREATININE RATIO, RANDOM URINE 19Jun2017 08:45AM Gavi Duran Order Number: [id number]     Test Name Result Flag Reference   MICROALBUMIN/ CREAT R 8 mg/g creatinine  0-30   MICROALBUMIN,URINE 18 1 mg/L  0 0-20 0   CREATININE URINE 219 0 mg/dL         Signatures   Electronically signed by : Ofelia Mcdaniels MD; Jun 19 2017 12:25PM EST                       (Author)

## 2018-01-16 NOTE — RESULT NOTES
Discussion/Summary   cholesterol looks good   diabetes is in a controlled range    normal liver and kidney function   - Dr Jake Estevez      Verified Results  (1) HEMOGLOBIN A1C 27Oct2017 10:09AM RogerMelida Order Number: [id number]     Test Name Result Flag Reference   HEMOGLOBIN A1C 6 5 % H 4 2-6 3   EST  AVG  GLUCOSE 140 mg/dl       (1) COMPREHENSIVE METABOLIC PANEL 67FTQ6661 81:19NI Ely Duran Order Number: [id number]     Test Name Result Flag Reference   SODIUM 140 mmol/L  136-145   POTASSIUM 4 3 mmol/L  3 5-5 3   CHLORIDE 104 mmol/L  100-108   CARBON DIOXIDE 27 mmol/L  21-32   ANION GAP (CALC) 9 mmol/L  4-13   BLOOD UREA NITROGEN 20 mg/dL  5-25   CREATININE 0 97 mg/dL  0 60-1 30   Standardized to IDMS reference method   CALCIUM 9 2 mg/dL  8 3-10 1   BILI, TOTAL 0 40 mg/dL  0 20-1 00   ALK PHOSPHATAS 92 U/L     ALT (SGPT) 26 U/L  12-78   Specimen collection should occur prior to Sulfasalazine administration due to the potential for falsely depressed results  AST(SGOT) 18 U/L  5-45   Specimen collection should occur prior to Sulfasalazine administration due to the potential for falsely depressed results  ALBUMIN 3 4 g/dL L 3 5-5 0   TOTAL PROTEIN 7 4 g/dL  6 4-8 2   eGFR 80 ml/min/1 73sq m     [facility] Disease Education Program recommendations are as follows:  GFR calculation is accurate only with a steady state creatinine  Chronic Kidney disease less than 60 ml/min/1 73 sq  meters  Kidney failure less than 15 ml/min/1 73 sq  meters  GLUCOSE FASTING 121 mg/dL H 65-99   Specimen collection should occur prior to Sulfasalazine administration due to the potential for falsely depressed results  Specimen collection should occur prior to Sulfapyridine administration due to the potential for falsely elevated results       (1) LIPID PANEL, FASTING 27Oct2017 10:09AM Ely Duran Order Number: [id number]     Test Name Result Flag Reference   CHOLESTEROL 155 mg/dL     HDL,DIRECT 40 mg/dL  40-60   Specimen collection should occur prior to Metamizole administration due to the potential for falsley depressed results  LDL CHOLESTEROL CALCULATED 92 mg/dL  0-100   Triglyceride:        Normal <150 mg/dl   Borderline High 150-199 mg/dl   High 200-499 mg/dl   Very High >499 mg/dl      Cholesterol:       Desirable <200 mg/dl    Borderline High 200-239 mg/dl    High >239 mg/dl      HDL Cholesterol:       High>59 mg/dL    Low <41 mg/dL      This screening LDL is a calculated result  It does not have the accuracy of the Direct Measured LDL in the monitoring of patients with hyperlipidemia and/or statin therapy  Direct Measure LDL (CWA249) must be ordered separately in these patients  TRIGLYCERIDES 113 mg/dL  <=150   Specimen collection should occur prior to N-Acetylcysteine or Metamizole administration due to the potential for falsely depressed results         Signatures   Electronically signed by : Enedina Lopez MD; Oct 29 2017  3:16PM EST                       (Author)

## 2018-01-16 NOTE — PROGRESS NOTES
History of Present Illness  Bariatric MNT [facility] Surgery Screening Preop [facility]:     He was on time  the appointment lasted: 60 minutes  His surgeon is Dr Liz Anderson  The patient was present at the session and his spouse attended the session  The diagnosis/reason for the appointment is: He has Grade III Obesity with a BMI of 45 8  Diet Order: Nutritional Assesment for Bariatric Surgery  His goal weight is 200 to 210  He has the following comorbidities: diabetes type 2, mixed hyperlipidemia, hypertension and AUNG  Labs: Onelia Cutting He was reminded to have his labs drawn   He self monitors his blood glucose  His casual blood glucose is 114 mg/dl was last read Last A1c around 7   Exercise Frequency:  He exercises uses fit bit  Relationship to food: He eats large portions  He knows the difference between being comfortably full and uncomfortably full and knows the difference between being stuffed and comfortably full  He felt/thought they felt his best at 200 pounds he last weighed this in his early 25s  He completed a food journal He drinks 4 cups of water daily He drinks 2-4 caffienated beverages daily His motivators are:wants to improve his health, wants resolution of diabetes  His obesity/being overweight is related to excess energy intake and sedentary lifestyle and is evidenced by: BMI 45 8  Knowledge Deficit Prior to Education  He previous education when his wife went through the process in [place], previous education prior to original surgery that was aborted  Barriers to education:  He has no barriers to education  Medical Nutrition Therapy Intervention: Individualized Meal Plan, Daily Food /Exercise Diary, Lifestyle /Behavior Modification Techniques, Basic Pathophysiology of Obesity, Label Reading, Checklist of the Overview of Lesson Plans and Surgical changes to Stomach/GI  Area's Reviewed: Post - surgery goal weight, Web forum, [employer] in [place] and Pre- surgery goals Triston Fay     Brief Review of Vitamins, diet progression for post-op and Pathophysiology of Obesity  His comprehension of the presented material was good  His receptivity to the presented material was good  His motivation was good  Provided: Nutrition Guidelines for Gastric Surgery, Bariatric Program Pre- Surgery Nutrition and Goals  Goals: His set goal for physical activity is [address] , for 60 minutes, 3 days a week  Review [employer] in [place]   Post Surgery: He will adhere to the diet progression: remain hydrated, consume adequate protein; and take vitamins as outlined in guidelines  Patient is a 47year old who is here for nutritional evaluation for weight loss surgery  He tried to get the procedure 8 years ago but the it was aborted due to extensive adhesions  He is now pursuing LSG due to his history of adhesions  I reviewed co-morbidities and medications prior to session  He first recalls having problems with weight gain at the age of 22  He has dieted in the past with variable success but would regain the weight back  (refer to diet history for details)  For his personal pre-op goals he will: measure his portions, and food journal  He will complete all 6 lesson plans in his bariatric booklet  He was instructed on the importance of consistent vitamin and mineral intake after his surgery to prevent deficiencies  He currently does not take any vitamins or minerals   Reviewed importance of support after surgery and discussed the web forum, pep rally and support group  Patient states adequate knowledge of nutrition, exercise and behavior modification required for long term success  Pt  is recommended for surgery and is aware to lose 15 pounds prior to surgery for a final goal weight of 286 5 pounds prior to surgery  Patient is aware that he has 6 months of weigh ins required by his insurance  Wife present and supportive   She recently had the [address] in [place]   Recommendations: He was provided contact information for any questions  He is recommended for surgery  ~He/She is aware he needs to lose 15 prior to surgery  He states adequate knowledge of nutrition, exercise, and behavior modification  He will attend a Team Meeting approximately 2-3 weeks prior to surgery  Bariatric Behavioral Health Evaluation [facility]:   He is here today because: Increase health, increase mobility and prevent family health issues  He is seeking a Bariatric surgery evaluation  He researched this option for: [place]  patient attempted bariatric surgery but surgeon was unable to perform procedure  He realizes the post-op requirements Yes, patient has researched procedure; patient has girlfriend with bariatric surgery  His Psychiatric/Psychological diagnosis: He does not have an outpatient counselor  He does not have the counselor's number  He does not have a Psychiatrist  He does not have the Psychiatrist's number  He has not had Inpatient Treatment  (None reported)  Family Constellation: Family Constellation: Mother: 80years old  Father:  @ 61years old; complications related to diabetes  Children: 4 children  Other: 2 grandchildren; girlfriend of 9 years; supports bariatric surgery  He lives withhis Girlfriend   Domestic Violence: has not happened  Abuse History: (None reported )   Physical/psychological assessment Appearance: appropriate   Sociability: average  Affect: appropriate  Mood: calm  Thought Process: coherent  Speech: normal  Content: no impairment  The patient was oriented to person, oriented to place, oriented to time and normal memory   normal attention span  no decreased concentration ability  normal judgment  His emotional insight was: good  His intellectual insight was: good     Risk assessment: Symptoms:  no suicidal ideation, no suicide plan, no suicide attempt, no homicidal thoughts, no hopelessness, no helplessness, no feelings of despair, no anxiety, no depressed mood, no loss of interests, no anhedonia and no sense of isolation  The patient is currently asymptomatic  Associated symptoms:  no aggressive behavior, no high risk behavior, no racing thoughts, no periods of excess energy, no periods of euphoria, no delusions, no command hallucinations, no auditory hallucinations and no visual hallucinations  No associated symptoms are reported  The patient is not currently being treated for this problem  Pertinent medical history:  no depression, no seasonal affective disorder, no premenstrual dysphoric disorder, no postpartum depression, no anxiety disorder, no bipolar disorder, no post traumatic stress disorder, no eating disorder, no personality disorder, no schizophrenia, no chronic pain, no chronic headaches, no dementia, no malignancy and no HIV infection  Risk factors:  no bereavement, no financial stress, no relationship problems, no job loss, no social isolation, no access to lethal means, no alcohol abuse, no substance abuse, no physical abuse, no sexual abuse, no emotional abuse, no bullying, no previous suicide attempt, no recent psychiatric hospitalization, no recent family suicide and no recent friend suicide  No risk factors have been identified  Family history:  substance abuse, but no suicide, no depression and no bipolar disorder  He was previously evaluated by me  Sexual history: He is not pregnant  He does not have a history of   He does not have a history of miscarriage  He does not have a history of hypersexuality  He does not have a history of STD's  Recommendations: He is recommended for surgery  He states adequate knowledge of nutrition, exercise, and behavior modification  The Following Ratings are based on my: Obsevation of this individual over the last  Risk of Harm to Self or Others: The following are demographic risk factors associated with harm to self: , [place], or   The following are demographic risk factors associated with harm to others: male  ( )  (None reported )  Recent Specific Risk Factors: The patient is currently asymptomatic  No associated symptoms are reported  Summary and Recommendations:   Low: No thoughts or occasional thoughts of suicide, but no intent or actions  Self inflicted scratches, abrasions, or other self- injurious of behavior where medical attention is typically not warranted  No elements of homicidality or occasional thoughts but no plan, intent, or actions  Active Problems    1  Benign essential hypertension (401 1) (I10)   2  BPH (benign prostatic hypertrophy) (600 00) (N40 0)   3  Calcific tendinitis of left shoulder (726 11) (M75 32)   4  Diabetes (250 00) (E11 9)   5  Hypercholesteremia (272 0) (E78 0)   6  Left shoulder pain (719 41) (M25 512)   7  Low back pain (724 2) (M54 5)   8  Need for vaccination with 13-polyvalent pneumococcal conjugate vaccine (V03 82) (Z23)   9  Obstructive sleep apnea (327 23) (G47 33)   10  Sleep apnea (780 57) (G47 30)   11  Tinea pedis of left foot (110 4) (B35 3)   12  Type 2 diabetes mellitus with insulin therapy (250 00,V58 67) (E11 9,Z79  4)    Surgical History    1  History of Ankle Surgery   2  History of Cholecystectomy   3  History of Colostomy   4  History of Exploratory Laparoscopy   5  History of Hernia Repair   6  History of Tonsillectomy    Family History    1  Family history of cardiac disorder (V17 49) (Z82 49)    2  Family history of diabetes mellitus (V18 0) (Z83 3)    Social History    · Former smoker (V15 82) (K87 015)   · No alcohol use   · No drug use    Current Meds   1  Clotrimazole 1 % External Cream; APPLY SPARINGLY TO AFFECTED AREA(S) 2 TO 3   TIMES DAILY; Therapy: 26IQV1330 to (Bulmaro [place])  Requested for: 70CQP3255; Last   Rx:05Jan2016 Ordered   2  GlipiZIDE 10 MG Oral Tablet; take 1 tablet every twelve hours  Requested for: 33RBW9513;   Last Rx:05Jan2016 Ordered   3  Hydrochlorothiazide 12 5 MG Oral Tablet;  Take 1 tablet daily  Requested for: 50VGP9771;   Last BX:13YXO9253 Ordered   4  Losartan Potassium 25 MG Oral Tablet; take 1 tablet by mouth every day  Requested for:   12Jan2016; Last Rx:05Jan2016 Ordered   5  Lovastatin 10 MG Oral Tablet; TAKE 1 TABLET AT BEDTIME  Requested for: 12Jan2016;   Last Rx:05Jan2016 Ordered   6  MetFORMIN HCl - 1000 MG Oral Tablet; TAKE 1 TABLET TWICE DAILY  Requested for:   28BZD7846; Last Rx:05Jan2016 Ordered   7  Metoprolol Tartrate 50 MG Oral Tablet; TAKE 1 TABLET TWICE DAILY  Requested for:   09OGG8105; Last Rx:07Mar2016 Ordered   8  Naproxen 500 MG Oral Tablet; TAKE 1 TABLET TWICE DAILY AS NEEDED; Therapy: 40MAQ2922 to (Evaluate:33Zgg1620)  Requested for: 12Jan2016; Last   Rx:12Jan2016 Ordered   9  NovoFine 30G X 8 MM Miscellaneous; USE AS DIRECTED; Therapy: 30WCZ2626 to (Last Rx:06Jan2016)  Requested for: 65RPT0972 Ordered   10  NovoLOG Mix 70/30 FlexPen (70-30) 100 UNIT/ML Subcutaneous Suspension    Pen-injector; Inject 45units in the morning and 35 units in the    evening; Therapy: 84PJW4166 to (Last Rx:06Jan2016)  Requested for: 04Zpw5010 Ordered   11  Tamsulosin HCl - 0 4 MG Oral Capsule; take 1 capsule by mouth once daily  Requested    for: 12Jan2016; Last Rx:05Jan2016 Ordered   12  Tanzeum 30 MG Subcutaneous Pen-injector; 1 pen subQ weekly; Therapy: 47WSW1266 to (Last Rx:23Feb2016)  Requested for: 23Feb2016 Ordered    Allergies    1  enalapril    2  No Known Environmental Allergies   3  No Known Food Allergies     Note   Note:   Completed Behavioral Health Assessment  Provided patient, education as needed  Patient denies to [place] I diagnosis  Patient has a positive family history of diabetes  Patient will work on the following goals; eliminate carbonation and bread  Patient is knowledgeable of pre and post op requirements and meets criteria for [facility]'s' bariatric [facility] program  Patient is referred to physician        Future Appointments    Date/Time Provider Specialty Site   04/05/2016 11:15 AM Peterson Duran MD [address] 1   04/19/2016 09:10 AM YASEMIN Peng  Orthopedic Surgery [facility] ORTHO SPECIALIST [place]   04/20/2016 11:00 AM YASEMIN Kirkpatrick  Bariatric Medicine [facility]     Signatures   Electronically signed by : LILA DentLCSW; Mar 16 2016 11:06AM EST                       (Author)    Electronically signed by : JOHN Riggins; Mar 16 2016 12:08PM EST                       (Author)    Electronically signed by :  YASEMIN Flores ; Mar 17 2016 12:05PM EST

## 2018-01-18 NOTE — PROGRESS NOTES
Discussion/Summary  Discussion Summary:   Assess: Pt here for monthly weigh in  Pt lost 6 5 pounds  Pt does not have any significant changes in diagnosis or medication  Pt has accomplished the following goals: He is drinking 2 protein shakes per day, one meal and one planned snack  His sister had surgery in [address] [place] [address] so he did not go to the gym this week but has been going to the gym 3x per week  He is hydrating well Nutrition Diagnosis: Obesity related to sedentary lifestyle and excess energy intake as evidenced by BMI Intervention: Reviewed work flow  All tasks completed and ready to be submitted to insurance  Patient will continue with LCD diet  Pt was receptive and verbalized understanding  Monitoring/evaluation: Pt continue to focus on weight loss   Follow up scheduled for : final h&p prior to surgery  Patient understands he will need to follow the total liquid/liver shrinking diet prior to surgery  Active Problems    1  Adhesive capsulitis of left shoulder (726 0) (M75 02)   2  Benign enlargement of prostate (600 00) (N40 0)   3  Benign essential hypertension (401 1) (I10)   4  Blood tests prior to treatment or procedure (V72 63) (Z01 812)   5  Calcific tendinitis of left shoulder (726 11) (M75 32)   6  Decreased libido (799 81) (R68 82)   7  Diabetes (250 00) (E11 9)   8  Disorder of tendon of left biceps (726 12) (M75 22)   9  Dyspepsia (536 8) (R10 13)   10  Encounter for screening colonoscopy (V76 51) (Z12 11)   11  Erectile dysfunction (607 84) (N52 9)   12  Flu vaccine need (V04 81) (Z23)   13  Hypercholesteremia (272 0) (E78 00)   14  Impingement syndrome of left shoulder (726 2) (M75 42)   15  Left shoulder pain (719 41) (M25 512)   16  Low back pain (724 2) (M54 5)   17  Low testosterone (257 2) (E29 1)   18  Medicare annual wellness visit, initial (V70 0) (Z00 00)   19  Morbid obesity due to excess calories (278 01) (E66 01)   20   Need for vaccination with 13-polyvalent pneumococcal conjugate vaccine (V03 82) (Z23)   21  Obstructive sleep apnea (327 23) (G47 33)   22  Pre-operative cardiovascular examination (V72 81) (Z01 810)   23  Screening for genitourinary condition (V81 6) (Z13 89)   24  Screening for neurological condition (V80 09) (Z13 89)   25  Sleep apnea (780 57) (G47 30)   26  Tinea pedis of left foot (110 4) (B35 3)   27  Type 2 diabetes mellitus with insulin therapy (250 00,V58 67) (E11 9,Z79 4)   28  Wheezing (786 07) (R06 2)    Past Medical History    1  History of diabetes mellitus (V12 29) (Z86 39)   2  History of heartburn (V12 79) (Z87 898)   3  History of sleep apnea (V13 89) (Z87 09)    Surgical History    1  History of Ankle Surgery   2  History of Cholecystectomy   3  History of Colostomy   4  History of Exploratory Laparoscopy   5  History of Hernia Repair   6  History of Tonsillectomy    Family History  Mother    1  Denied: Family history of Colon cancer   2  Family history of cardiac disorder (V17 49) (Z82 49)   3  Denied: Family history of liver disease  Father    4  Denied: Family history of Colon cancer   5  Family history of diabetes mellitus (V18 0) (Z83 3)   6  Denied: Family history of liver disease    Social History    · Former smoker (V15 82) (H99 842)   · No alcohol use   · No drug use    Current Meds   1  AndroGel Pump 20 25 MG/ACT (1 62%) Transdermal Gel; APPLY TWO PUMP PRESSES   ONE TIME DAILY AS DIRECTED; Therapy: 13Apr2016 to (Evaluate:11Aug2016); Last Rx:13Apr2016 Ordered   2  Aspirin 81 MG TABS; Therapy: (Recorded:20Apr2016) to Recorded   3  Biotin TABS; Therapy: (Recorded:20Apr2016) to Recorded   4  Cialis 20 MG Oral Tablet; TAKE 1 TABLET 1 HOUR BEFORE ACTIVITY AS NEEDED; Therapy: 05Apr2016 to (Last Rx:05Apr2016) Ordered   5  Clotrimazole 1 % External Cream; APPLY SPARINGLY TO AFFECTED AREA(S) 2 TO 3   TIMES DAILY; Therapy: 71ROF2442 to (Nolberto Richards)  Requested for: 48DNR7896; Last   Rx:05Jan2016 Ordered   6   GlipiZIDE 10 MG Oral Tablet; take 1 tablet every twelve hours  Requested for: 10LQA5849;   Last Rx:05Jan2016 Ordered   7  Glucocard Vital Monitor w/Device Kit; USE AS DIRECTED; Therapy: 48LBJ3833 to (Last Rx:31Oct2016)  Requested for: 31Oct2016 Ordered   8  HydroCHLOROthiazide 12 5 MG Oral Tablet; Take 1 tablet daily  Requested for:   31DWT9722; Last Rx:05Jan2016 Ordered   9  Lifescan One Touch Ultramini Meter; use as directed; Therapy: 27AGG2047 to (Last Rx:31Oct2016)  Requested for: 31Oct2016 Ordered   10  LORazepam 0 5 MG Oral Tablet; Take one tablet one hour prior to MRI  Repeat prior to    exam if necessary; Therapy: 71Yhk3893 to (Last Rx:29Apr2016) Ordered   11  Losartan Potassium 25 MG Oral Tablet; take 1 tablet by mouth every day  Requested for:    94MRN0096; Last Rx:05Oct2016 Ordered   12  Lovastatin 10 MG Oral Tablet; TAKE 1 TABLET AT BEDTIME  Requested for: 15NPV3351;    Last Rx:05Jan2016 Ordered   13  MetFORMIN HCl - 1000 MG Oral Tablet; TAKE 1 TABLET TWICE DAILY  Requested for:    30YVP5250; Last Rx:05Oct2016 Ordered   14  Metoprolol Tartrate 50 MG Oral Tablet; TAKE 1 TABLET TWICE DAILY  Requested for:    79UCJ7078; Last Rx:07Mar2016 Ordered   15  Naproxen 500 MG Oral Tablet; TAKE 1 TABLET TWICE DAILY AS NEEDED; Therapy: 22CNA5950 to (Evaluate:15Oct2016)  Requested for: 10Pmo7710; Last    Rx:21Rcj9579 Ordered   16  NovoFine 30G X 8 MM Miscellaneous; USE AS DIRECTED; Therapy: 03OYU7109 to (Last Rx:06Jan2016)  Requested for: 64DWR9558 Ordered   17  NovoLOG Mix 70/30 FlexPen (70-30) 100 UNIT/ML Subcutaneous Suspension    Pen-injector; Inject 45units in the morning and 35 units in the    evening; Therapy: 12HCQ8019 to (Evaluate:12Apr2017)  Requested for: 84BLV0474; Last    Rx:14Oct2016 Ordered   18  ProAir  (90 Base) MCG/ACT Inhalation Aerosol Solution; INHALE 2 PUFFS    EVERY 4 HOURS AS NEEDED FOR COUGH AND WHEEZE;    Therapy: 85IVH1075 to (Last Rx:25Oct2016)  Requested for: 25Oct2016 Ordered   19   Tamsulosin HCl - 0 4 MG Oral Capsule; take 1 capsule by mouth once daily  Requested    for: 12XKU6390; Last Rx:05Jan2016 Ordered   20  Tanzeum 50 MG Subcutaneous Pen-injector; 1 injection once a week; Therapy: 46CKB0283 to (Evaluate:86Qjh8981); Last Rx:41Hin9347 Ordered   21  Viagra 50 MG Oral Tablet; TAKE 1 TABLET DAILY 1 HOUR BEFORE NEEDED; Therapy: 13Klk3193 to (Evaluate:22Jun2016)  Requested for: 89Yta5938; Last    Rx:54Ges0992 Ordered    Allergies    1  enalapril    2  No Known Environmental Allergies   3  No Known Food Allergies    Vitals  Signs   Recorded: 93ZCA7480 03:44PM   Height: 5 ft 8 in  Weight: 293 lb 8 0 oz  BMI Calculated: 44 63  BSA Calculated: 2 41    Future Appointments    Date/Time Provider Specialty Site   01/06/2017 11:00 AM Panfilo Duran MD [facility]   Electronically signed by : JOHN Mclaughlin; Nov 2 2016  3:44PM EST                       (Author)    Electronically signed by : JOHN Mclaughlin; Nov 2 2016  3:45PM EST                       (Author)    Electronically signed by :  YASEMIN Mcgarry ; Nov 12 2016 11:18AM EST

## 2018-01-18 NOTE — RESULT NOTES
Verified Results  (1) HEMOGLOBIN A1C 05Apr2016 08:44AM RogerAyushMelida   5 7-6 4% impaired fasting glucose  >=6 5% diagnosis of diabetes    Falsely low levels are seen in conditions linked to short RBC life span-  hemolytic anemia, and splenomegaly  Falsely elevated levels are seen in situations where there is an increased production of RBC- receipt of erythropoietin or blood transfusions  Adopted from ADA-Clinical Practice Recommendations     Test Name Result Flag Reference   HEMOGLOBIN A1C 7 3 % H 4 0-5 6   EST  AVG   GLUCOSE 163 mg/dl         Discussion/Summary   spoke with patient    continue current medication for now   - Dr Phong Padilla   Electronically signed by : Milind Self MD; Apr 6 2016  1:01PM EST                       (Author)

## 2018-01-18 NOTE — PROGRESS NOTES
Message  Outreach phone call; reviewed workflow  We have not received obesity history  He said he did at his last visit with Dr Vikki Mackay  I requested he bring at next appointment and provide to coordinator Abdon BOBO      Active Problems    1  Abdominal pain (789 00) (R10 9)   2  Adhesive capsulitis of left shoulder (726 0) (M75 02)   3  Benign essential hypertension (401 1) (I10)   4  Blood tests prior to treatment or procedure (V72 63) (Z01 812)   5  BPH (benign prostatic hypertrophy) (600 00) (N40 0)   6  Calcific tendinitis of left shoulder (726 11) (M75 32)   7  Chest pain (786 50) (R07 9)   8  Decreased libido (799 81) (R68 82)   9  Diabetes (250 00) (E11 9)   10  Disorder of tendon of left biceps (726 12) (M75 22)   11  Dyspepsia (536 8) (R10 13)   12  Encounter for screening colonoscopy (V76 51) (Z12 11)   13  Erectile dysfunction (607 84) (N52 9)   14  Hypercholesteremia (272 0) (E78 0)   15  Impingement syndrome of left shoulder (726 2) (M75 42)   16  Left shoulder pain (719 41) (M25 512)   17  Low back pain (724 2) (M54 5)   18  Low testosterone (257 2) (E29 1)   19  Medicare annual wellness visit, initial (V70 0) (Z00 00)   20  Morbid obesity due to excess calories (278 01) (E66 01)   21  Need for vaccination with 13-polyvalent pneumococcal conjugate vaccine (V03 82) (Z23)   22  Obstructive sleep apnea (327 23) (G47 33)   23  Pre-operative cardiovascular examination (V72 81) (Z01 810)   24  Screening for genitourinary condition (V81 6) (Z13 89)   25  Screening for neurological condition (V80 09) (Z13 89)   26  Sleep apnea (780 57) (G47 30)   27  Tinea pedis of left foot (110 4) (B35 3)   28  Type 2 diabetes mellitus with insulin therapy (250 00,V58 67) (E11 9,Z79  4)    Current Meds   1  AndroGel Pump 20 25 MG/ACT (1 62%) Transdermal Gel; APPLY TWO PUMP PRESSES   ONE TIME DAILY AS DIRECTED; Therapy: 13Apr2016 to (Evaluate:11Aug2016); Last Rx:13Apr2016 Ordered   2  Aspirin 81 MG TABS;    Therapy: (Recorded:20Apr2016) to Recorded   3  Biotin TABS; Therapy: (Recorded:20Apr2016) to Recorded   4  Cialis 20 MG Oral Tablet; TAKE 1 TABLET 1 HOUR BEFORE ACTIVITY AS NEEDED; Therapy: 05Apr2016 to (Last Rx:05Apr2016) Ordered   5  Clotrimazole 1 % External Cream; APPLY SPARINGLY TO AFFECTED AREA(S) 2 TO 3   TIMES DAILY; Therapy: 32TNG4730 to (Anna Mosley)  Requested for: 36ZON7232; Last   Rx:05Jan2016 Ordered   6  Dulcolax 5 MG Oral Tablet Delayed Release; TAKE 2 TABLET ONCE; Therapy: 17Amq7903 to (Evaluate:28Apr2016)  Requested for: 27Apr2016; Last   Rx:27Apr2016 Ordered   7  GlipiZIDE 10 MG Oral Tablet; take 1 tablet every twelve hours  Requested for:   76VSI8072; Last Rx:05Jan2016 Ordered   8  Golytely 227 1 GM Oral Solution Reconstituted; TAKE AS DIRECTED; Therapy: 27Apr2016 to (Last Rx:27Apr2016)  Requested for: 27Apr2016 Ordered   9  Hydrochlorothiazide 12 5 MG Oral Tablet; Take 1 tablet daily  Requested for: 12Jan2016;   Last Rx:05Jan2016 Ordered   10  LORazepam 0 5 MG Oral Tablet (Ativan); Take one tablet one hour prior to MRI  Repeat    prior to exam if necessary; Therapy: 29Apr2016 to (Last Rx:29Apr2016) Ordered   11  Losartan Potassium 25 MG Oral Tablet; take 1 tablet by mouth every day  Requested for:    12Jan2016; Last Rx:05Jan2016 Ordered   12  Lovastatin 10 MG Oral Tablet; TAKE 1 TABLET AT BEDTIME  Requested for: 12Jan2016;    Last Rx:05Jan2016 Ordered   13  MetFORMIN HCl - 1000 MG Oral Tablet; TAKE 1 TABLET TWICE DAILY  Requested for:    55KXM7786; Last Rx:05Jan2016 Ordered   14  Metoprolol Tartrate 50 MG Oral Tablet; TAKE 1 TABLET TWICE DAILY  Requested for:    64RYS6846; Last Rx:07Mar2016 Ordered   15  Naproxen 500 MG Oral Tablet; TAKE 1 TABLET TWICE DAILY AS NEEDED; Therapy: 40RSG3308 to (Evaluate:11Aig5517)  Requested for: 12Jan2016; Last    Rx:12Jan2016 Ordered   16  NovoFine 30G X 8 MM Miscellaneous; USE AS DIRECTED;     Therapy: 61SHD7565 to (Last Rx:06Jan2016)  Requested for: 04WFV4151 Ordered   17  NovoLOG Mix 70/30 FlexPen (70-30) 100 UNIT/ML Subcutaneous Suspension    Pen-injector; Inject 45units in the morning and 35 units in the    evening; Therapy: 31YJH7145 to (Evaluate:97Qzb5445)  Requested for: 55XAX6329; Last    Rx:15Jun2016 Ordered   18  Tamsulosin HCl - 0 4 MG Oral Capsule; take 1 capsule by mouth once daily  Requested    for: 12Jan2016; Last Rx:05Jan2016 Ordered   19  Tanzeum 30 MG Subcutaneous Pen-injector; 1 pen subQ weekly; Therapy: 62GGX4364 to (Evaluate:31Oct2016)  Requested for: 79QTC8883; Last    NP:97VRP7399 Ordered   20  Viagra 50 MG Oral Tablet; TAKE 1 TABLET DAILY 1 HOUR BEFORE NEEDED; Therapy: 13Apr2016 to (Evaluate:22Jun2016)  Requested for: 25Yws6152; Last    Rx:90Kmq1541 Ordered    Allergies    1  enalapril    2  No Known Environmental Allergies   3   No Known Food Allergies    Signatures   Electronically signed by : DANIELLE Medina; Jun 21 2016 12:53PM EST                       (Author)

## 2018-01-22 VITALS
RESPIRATION RATE: 18 BRPM | WEIGHT: 252.5 LBS | DIASTOLIC BLOOD PRESSURE: 72 MMHG | BODY MASS INDEX: 39.63 KG/M2 | HEIGHT: 67 IN | HEART RATE: 55 BPM | SYSTOLIC BLOOD PRESSURE: 138 MMHG

## 2018-01-23 NOTE — PROGRESS NOTES
Assessment    1  Medicare annual wellness visit, initial (V70 0) (Z00 00)   2  Decreased libido (799 81) (R68 82)   3  Erectile dysfunction (607 84) (N52 9)   4  Diabetes (250 00) (E11 9)   5  Type 2 diabetes mellitus with insulin therapy (250 00,V58 67) (E11 9,Z79  4)    Plan  Decreased libido, Erectile dysfunction    · Cialis 20 MG Oral Tablet; TAKE 1 TABLET 1 HOUR BEFORE ACTIVITY AS  NEEDED   · (1) TESTOSTERONE, FREE (DIRECT) AND TOTAL; Status:Active; Requested  for:05Apr2016;    · *1 - [facility] Physician Referral  Consult  Status: Active  Requested  for: 05Apr2016  Care Summary provided  : Yes  Diabetes, Type 2 diabetes mellitus with insulin therapy    · (1) COMPREHENSIVE METABOLIC PANEL; Status:Active; Requested for:01Jul2016;    · (1) HEMOGLOBIN A1C; Status:Active; Requested for:01Jul2016;    · (1) MICROALBUMIN CREATININE RATIO, RANDOM URINE; Status:Active; Requested  for:01Jul2016;   Encounter for screening colonoscopy    · 1 - Bam Pham MD, Yasmin Dos Santos (Gastroenterology) Physician Referral  Consult  Status: Active   Requested for: 05Apr2016  Care Summary provided  : Yes   · COLONOSCOPY; Status:Active; Requested for:05Apr2016;   Screening for genitourinary condition    · *VB Glaucoma Screen; Status:Complete;   Done: [id number] 11:36AM  Screening for neurological condition    · *VB - Fall Risk Assessment  (Dx V80 09 Screen for Neurologic Disorder);  Status:Complete;   Done: 07DCS8128 11:36AM    Discussion/Summary  Impression: Initial Annual Wellness Visit, with preventive exam as well as age and risk appropriate counseling completed  Cardiovascular screening and counseling: the risks and benefits of screening were discussed and screening is current  Diabetes screening and counseling: the risks and benefits of screening were discussed and screening is current  Colorectal cancer screening and counseling: the risks and benefits of screening were discussed and due for a colonoscopy (low risk)  Prostate cancer screening and counseling: the risks and benefits of screening were discussed and REFERRAL TO UROLOGY  Osteoporosis screening and counseling: the risks and benefits of screening were discussed, counseling was given on obtaining adequate amounts of calcium and vitamin D on a daily basis and counseling was given on the importance of regular weightbearing exercise  Abdominal aortic aneurysm screening and counseling: the risks and benefits of screening were discussed and screening not indicated  Glaucoma screening and counseling: the risks and benefits of screening were discussed and ophthalmologist referral    HIV screening and counseling: the risks and benefits of screening were discussed and screening not indicated  Chief Complaint  Medicare Wellness Exam      History of Present Illness  Welcome to Medicare and Wellness Visits: The patient is being seen for the initial annual wellness visit  Medicare Screening and Risk Factors   Hospitalizations: he has been previously hospitalizied  Once per lifetime medicare screening tests: ECG  Medicare Screening Tests Risk Questions   Abdominal aortic aneurysm risk assessment: none indicated  Osteoporosis risk assessment: over 48years of age  HIV risk assessment: none indicated  Drug and Alcohol Use: The patient is a former cigarette smoker  He has 8 pack year(s) of cigarette use  The patient reports never drinking alcohol  Alcohol concern:   The patient has no concerns about alcohol abuse  He has never used illicit drugs  Diet and Physical Activity: Current diet includes limited junk food  He exercises daily  Exercise: walking  Mood Disorder and Cognitive Impairment Screening:   Depression screening  no significant symptoms  He denies feeling down, depressed, or hopeless over the past two weeks  He denies feeling little interest or pleasure in doing things over the past two weeks     Cognitive impairment screening: denies difficulty learning/retaining new information, denies difficulty handling complex tasks, denies difficulty with reasoning, denies difficulty with spatial ability and orientation, denies difficulty with language and denies difficulty with behavior  Functional Ability/Level of Safety: Hearing is normal bilaterally and a hearing aid is not used  The patient is currently able to do activities of daily living without limitations, able to do instrumental activities of daily living without limitations, able to participate in social activities without limitations and able to drive without limitations  Activities of daily living details: does not need help using the phone, no transportation help needed, does not need help shopping, no meal preparation help needed, does not need help doing housework, does not need help doing laundry, does not need help managing medications and does not need help managing money  Fall risk factors: The patient fell 0 times in the past 12 months , no polypharmacy, no alcohol use, no mobility impairment, no antidepressant use, no deconditioning, no postural hypotension, no sedative use, no visual impairment, no urinary incontinence, no antihypertensive use, no cognitive impairment, up and go test was normal and no previous fall  Home safety risk factors:  no unfamiliar surroundings, no loose rugs, no poor household lighting, no uneven floors, no household clutter, grab bars in the bathroom and handrails on the stairs  Co-Managers and Medical Equipment/Suppliers: See Patient Care Team   Preventive Quality Program 65 and Older: Falls Risk: The patient fell 0 times in the past 12 months  The patient is currently asymptomatic Symptoms Include:  Associated symptoms:  No associated symptoms are reported  The patient currently has no urinary incontinence symptoms         Patient Care Team    Care Team Member Role Specialty Office Number   Agnieszka Fernandes MD  Orthopedic Surgery [phone]   Rosario SIMPSON  Bariatric Medicine [phone]   Sharyle Grieves M D  General Surgery [phone]     Review of Systems    Constitutional: negative  Head and Face: negative  Eyes: negative  ENT: negative  Cardiovascular: negative  Respiratory: negative  Gastrointestinal: negative  Genitourinary: negative  Musculoskeletal: negative  Integumentary and Breasts: negative  Neurological: negative  Psychiatric: negative  Endocrine: negative  Hematologic and Lymphatic: negative  Active Problems    1  Benign essential hypertension (401 1) (I10)   2  BPH (benign prostatic hypertrophy) (600 00) (N40 0)   3  Calcific tendinitis of left shoulder (726 11) (M75 32)   4  Diabetes (250 00) (E11 9)   5  Hypercholesteremia (272 0) (E78 0)   6  Left shoulder pain (719 41) (M25 512)   7  Low back pain (724 2) (M54 5)   8  Morbid obesity due to excess calories (278 01) (E66 01)   9  Need for vaccination with 13-polyvalent pneumococcal conjugate vaccine (V03 82) (Z23)   10  Obstructive sleep apnea (327 23) (G47 33)   11  Sleep apnea (780 57) (G47 30)   12  Tinea pedis of left foot (110 4) (B35 3)   13  Type 2 diabetes mellitus with insulin therapy (250 00,V58 67) (E11 9,Z79  4)    Past Medical History    The active problems and past medical history were reviewed and updated today  Surgical History    · History of Ankle Surgery   · History of Cholecystectomy   · History of Colostomy   · History of Exploratory Laparoscopy   · History of Hernia Repair   · History of Tonsillectomy    The surgical history was reviewed and updated today  Family History    · Family history of cardiac disorder (V17 49) (Z82 49)    · Family history of diabetes mellitus (V18 0) (Z83 3)    The family history was reviewed and updated today  Social History    · Former smoker (S93 85) (A74 248)   · No alcohol use   · No drug use  The social history was reviewed and updated today  The social history was reviewed and is unchanged  Current Meds   1  Clotrimazole 1 % External Cream; APPLY SPARINGLY TO AFFECTED AREA(S) 2 TO 3   TIMES DAILY; Therapy: 33ITX6354 to (Selma Epps)  Requested for: 83UJT1871; Last   Rx:05Jan2016 Ordered   2  GlipiZIDE 10 MG Oral Tablet; take 1 tablet every twelve hours  Requested for:   79DFN3142; Last Rx:05Jan2016 Ordered   3  Hydrochlorothiazide 12 5 MG Oral Tablet; Take 1 tablet daily  Requested for: 12Jan2016;   Last Rx:05Jan2016 Ordered   4  Losartan Potassium 25 MG Oral Tablet; take 1 tablet by mouth every day  Requested for:   12Jan2016; Last Rx:05Jan2016 Ordered   5  Lovastatin 10 MG Oral Tablet; TAKE 1 TABLET AT BEDTIME  Requested for: 12Jan2016;   Last Rx:05Jan2016 Ordered   6  MetFORMIN HCl - 1000 MG Oral Tablet; TAKE 1 TABLET TWICE DAILY  Requested for:   70SBQ0280; Last Rx:05Jan2016 Ordered   7  Metoprolol Tartrate 50 MG Oral Tablet; TAKE 1 TABLET TWICE DAILY  Requested for:   13ZUP1658; Last Rx:07Mar2016 Ordered   8  Naproxen 500 MG Oral Tablet; TAKE 1 TABLET TWICE DAILY AS NEEDED; Therapy: 19XTV0619 to (Evaluate:91Poc9687)  Requested for: 12Jan2016; Last   Rx:12Jan2016 Ordered   9  NovoFine 30G X 8 MM Miscellaneous; USE AS DIRECTED; Therapy: 93QWI8723 to (Last Rx:06Jan2016)  Requested for: 49TLW7969 Ordered   10  NovoLOG Mix 70/30 FlexPen (70-30) 100 UNIT/ML Subcutaneous Suspension    Pen-injector; Inject 45units in the morning and 35 units in the    evening; Therapy: 94KKY7177 to (Last Rx:06Jan2016)  Requested for: 38Sct8773 Ordered   11  Tamsulosin HCl - 0 4 MG Oral Capsule; take 1 capsule by mouth once daily  Requested    for: 12Jan2016; Last Rx:05Jan2016 Ordered   12  Tanzeum 30 MG Subcutaneous Pen-injector; 1 pen subQ weekly; Therapy: 24VAG6271 to (Last Rx:81Upv4998)  Requested for: 01Bwf2912 Ordered    The medication list was reviewed and updated today  Allergies    1  enalapril    2  No Known Environmental Allergies   3   No Known Food Allergies    Immunizations   1 Influenza  [id number]    Pneumococcal  65CIU3074     Vitals  Signs [Data Includes: Current Encounter]    Heart Rate: 63  Systolic: 695  Diastolic: 70  Height: 5 ft 7 99 in  Weight: 298 lb   BMI Calculated: 45 33  BSA Calculated: 2 42   Heart Rate: 63  Systolic: 829  Diastolic: 70  Height: 5 ft 7 99 in  Weight: 298 lb   BMI Calculated: 45 32  BSA Calculated: 2 42    Physical Exam    Constitutional   General appearance: Abnormal   obese  Head and Face   Head and face: Normal     Palpation of the face and sinuses: No sinus tenderness  Eyes   Conjunctiva and lids: No erythema, swelling or discharge  Pupils and irises: Equal, round, reactive to light  Ophthalmoscopic examination: Normal fundi and optic discs  Ears, Nose, Mouth, and Throat   External inspection of ears and nose: Normal     Otoscopic examination: Tympanic membranes translucent with normal light reflex  Canals patent without erythema  Hearing: Normal     Nasal mucosa, septum, and turbinates: Normal without edema or erythema  Lips, teeth, and gums: Normal, good dentition  Oropharynx: Normal with no erythema, edema, exudate or lesions  Neck   Neck: Supple, symmetric, trachea midline, no masses  Thyroid: Normal, no thyromegaly  Pulmonary   Respiratory effort: No increased work of breathing or signs of respiratory distress  Percussion of chest: Normal     Auscultation of lungs: Clear to auscultation  Cardiovascular   Palpation of heart: Normal PMI, no thrills  Auscultation of heart: Normal rate and rhythm, normal S1 and S2, no murmurs  Examination of extremities for edema and/or varicosities: Normal     Chest   Chest: Normal     Abdomen   Abdomen: Non-tender, no masses  Liver and spleen: No hepatomegaly or splenomegaly  Examination for hernias: No hernias appreciated  Lymphatic   Palpation of lymph nodes in neck: No lymphadenopathy  Palpation of lymph nodes in axillae: No lymphadenopathy      Musculoskeletal Gait and station: Normal     Inspection/palpation of digits and nails: Normal without clubbing or cyanosis  Inspection/palpation of joints, bones, and muscles: Normal     Range of motion: Normal     Stability: Normal     Muscle strength/tone: Normal     Skin   Skin and subcutaneous tissue: Normal without rashes or lesions  Palpation of skin and subcutaneous tissue: Normal turgor  Neurologic   Cranial nerves: Cranial nerves 2-12 intact  Cortical function: Normal mental status  Reflexes: 2+ and symmetric  Sensation: No sensory loss  Coordination: Normal finger to nose and heel to shin  Psychiatric   Judgment and insight: Normal     Orientation to person, place and time: Normal     Recent and remote memory: Intact  Mood and affect: Normal        Results/Data  PHQ-2 Adult Depression Screening 05Apr2016 11:38AM User, Ahs     Test Name Result Flag Reference   PHQ-2 Adult Depression Score 0     Q1: 0, Q2: 0   PHQ-2 Adult Depression Screening Negative       *VB - Fall Risk Assessment  (Dx V80 09 Screen for Neurologic Disorder) 05Apr2016 11:36AM Roger Melida     Test Name Result Flag Reference   Fall Risk Assessment [id number]       *VB Glaucoma Screen 05Apr2016 11:36AM Melida Duran     Test Name Result Flag Reference   Glaucoma Screen 04/05/2016         Future Appointments    Date/Time Provider Specialty Site   07/05/2016 10:00 AM Alyx Duran MD [address] [facility]   04/19/2016 09:10 AM YASEMIN Brady  Orthopedic Surgery [facility] SPECIALIST [place]   04/20/2016 11:00 AM YASEMIN Soria   Bariatric Medicine [facility]     Signatures   Electronically signed by : Joslyn Huber MD; Apr 5 2016 12:07PM EST                       (Author)

## 2018-01-24 NOTE — PROGRESS NOTES
Assessment    1  Medicare annual wellness visit, subsequent (V70 0) (Z00 00)   2  Hypercholesteremia (272 0) (E78 00)   3  Benign essential hypertension (401 1) (I10)   4  Type 2 diabetes mellitus with insulin therapy (250 00,V58 67) (E11 9,Z79 4)   5  Need for pneumococcal vaccine (V03 82) (Z23)    Plan  Benign essential hypertension, Hypercholesteremia, Type 2 diabetes mellitus with  insulin therapy    · (1) HEMOGLOBIN A1C; Status:Active; Requested for:26Ipl6255;    · (1) MICROALBUMIN CREATININE RATIO, RANDOM URINE; Status:Active; Requested  for:67Rmx3738;   Need for pneumococcal vaccine    · Pneumovax 23 25 MCG/0 5ML Injection Injectable  Type 2 diabetes mellitus with insulin therapy    · Victoza 18 MG/3ML Subcutaneous Solution Pen-injector; INJECT 0 6 MG DAILY  FOR 1 WEEK, THEN 1 2 MG DAILY    sample of Victoza given  call next week with sugars readings     Discussion/Summary  Impression: Subsequent Annual Wellness Visit, with preventive exam as well as age and risk appropriate counseling completed  Chief Complaint  pt here for medicare wellness      History of Present Illness  Welcome to Medicare and Wellness Visits: The patient is being seen for the subsequent annual wellness visit  Medicare Screening and Risk Factors   Hospitalizations: he has been previously hospitalizied  Once per lifetime medicare screening tests: ECG  Medicare Screening Tests Risk Questions   Abdominal aortic aneurysm risk assessment: none indicated  Osteoporosis risk assessment: over 48years of age  HIV risk assessment: none indicated  Drug and Alcohol Use: The patient has never smoked cigarettes  The patient reports never drinking alcohol  Diet and Physical Activity: Current diet includes well balanced meals and unhealthy food choices  He exercises daily  Exercise: walking  Mood Disorder and Cognitive Impairment Screening:   Depression screening  no significant symptoms   He denies feeling down, depressed, or hopeless over the past two weeks  He denies feeling little interest or pleasure in doing things over the past two weeks  Cognitive impairment screening: denies difficulty learning/retaining new information, denies difficulty handling complex tasks, denies difficulty with reasoning, denies difficulty with spatial ability and orientation, denies difficulty with language and denies difficulty with behavior  Functional Ability/Level of Safety: Hearing is normal bilaterally and a hearing aid is not used  The patient is currently able to do activities of daily living without limitations, able to do instrumental activities of daily living without limitations, able to participate in social activities without limitations and able to drive without limitations  Activities of daily living details: does not need help using the phone, no transportation help needed, does not need help shopping, no meal preparation help needed, does not need help doing housework, does not need help doing laundry, does not need help managing medications and does not need help managing money  Fall risk factors: The patient fell 0 times in the past 12 months , no polypharmacy, no alcohol use, no mobility impairment, no antidepressant use, no deconditioning, no postural hypotension, no sedative use, no visual impairment, no urinary incontinence, no antihypertensive use, no cognitive impairment, up and go test was normal and no previous fall  Home safety risk factors:  no unfamiliar surroundings, no loose rugs, no poor household lighting, no uneven floors, no household clutter, grab bars in the bathroom and handrails on the stairs  Advance Directives: Advance directives: living will  Co-Managers and Medical Equipment/Suppliers: See Patient Care Team      Patient Care Team    Care Team Member Role Specialty Office Number   Carie Nguyen MD Specialist Orthopedic Surgery [phone]   Teresa SIMPSON   Specialist Bariatric Medicine [phone]) Mei SIMPSON  Specialist General Surgery [phone]   Michelle Jacome MD  Urology [phone]   Sunitha SIMPSON  Gastroenterology Adult [phone]   Cassie Haynes MD  Cardiology [phone]   [facility] Specialist Physician Assistant [phone]     Review of Systems    Constitutional: negative  Head and Face: negative  Eyes: negative  ENT: negative  Cardiovascular: negative  Respiratory: negative  Gastrointestinal: negative  Genitourinary: negative  Musculoskeletal: negative  Integumentary and Breasts: negative  Neurological: negative  Psychiatric: negative  Endocrine: negative  Hematologic and Lymphatic: negative  Active Problems    1  Adhesive capsulitis of left shoulder (726 0) (M75 02)   2  Benign enlargement of prostate (600 00) (N40 0)   3  Benign essential hypertension (401 1) (I10)   4  Blood tests prior to treatment or procedure (V72 63) (Z01 812)   5  Calcific tendinitis of left shoulder (726 11) (M75 32)   6  Decreased libido (799 81) (R68 82)   7  Disorder of tendon of left biceps (726 12) (M75 22)   8  Dyspepsia (536 8) (R10 13)   9  Encounter for screening colonoscopy (V76 51) (Z12 11)   10  Erectile dysfunction (607 84) (N52 9)   11  Flu vaccine need (V04 81) (Z23)   12  Hospital discharge follow-up (V67 59) (Z09)   13  Hypercholesteremia (272 0) (E78 00)   14  Impingement syndrome of left shoulder (726 2) (M75 42)   15  Left shoulder pain (719 41) (M25 512)   16  Low back pain (724 2) (M54 5)   17  Low testosterone (257 2) (E29 1)   18  Medicare annual wellness visit, initial (V70 0) (Z00 00)   19  Morbid obesity due to excess calories (278 01) (E66 01)   20  Need for vaccination with 13-polyvalent pneumococcal conjugate vaccine (V03 82) (Z23)   21  Obstructive sleep apnea (327 23) (G47 33)   22  Postgastrectomy malabsorption (579 3) (K91 2,Z90 3)   23  Pre-operative cardiovascular examination (V72 81) (Z01 810)   24  S/P laparoscopic sleeve gastrectomy (V45 86) (Z98 84)   25  Screening for genitourinary condition (V81 6) (Z13 89)   26  Screening for neurological condition (V80 09) (Z13 89)   27  Tinea pedis of left foot (110 4) (B35 3)   28  Tooth pain (525 9) (K08 89)   29  Type 2 diabetes mellitus with insulin therapy (250 00,V58 67) (E11 9,Z79 4)   30  Vitamin D insufficiency (268 9) (E55 9)   31  Wheezing (786 07) (R06 2)    Past Medical History    · History of diabetes mellitus (V12 29) (Z86 39)   · History of heartburn (V12 79) (E37 673)   · History of sleep apnea (V13 89) (Z86 69)   · History of Hospital discharge follow-up (V67 59) (Z09)    The active problems and past medical history were reviewed and updated today  Surgical History    · History of Ankle Surgery   · History of Cholecystectomy   · History of Colostomy   · History of Exploratory Laparoscopy   · History of Gastrectomy Sleeve   · History of Hernia Repair   · History of Tonsillectomy    The surgical history was reviewed and updated today  Family History  Mother    · Denied: Family history of Colon cancer   · Family history of cardiac disorder (V17 49) (Z82 49)   · Denied: Family history of liver disease  Father    · Denied: Family history of Colon cancer   · Family history of diabetes mellitus (V18 0) (Z83 3)   · Denied: Family history of liver disease    The family history was reviewed and updated today  Social History    · Former smoker (V92 79) (J42 513)   · No alcohol use   · No drug use  The social history was reviewed and updated today  The social history was reviewed and is unchanged  Current Meds   1  AndroGel Pump 20 25 MG/ACT (1 62%) Transdermal Gel; APPLY TWO PUMP PRESSES   ONE TIME DAILY AS DIRECTED; Therapy: 13Sei6003 to (Evaluate:38Xds2639); Last Rx:13Apr2016 Ordered   2  Biotin TABS; Therapy: (Recorded:20Apr2016) to Recorded   3  Centrum Oral Tablet Chewable; Therapy: (Recorded:16Omh2321) to Recorded   4  Clotrimazole 1 % External Cream; APPLY SPARINGLY TO AFFECTED AREA(S) 2 TO 3   TIMES DAILY; Therapy: 19RQG1412 to ([facility])  Requested for: 26ZVI5707; Last   Rx:05Jan2016 Ordered   5  GlipiZIDE 5 MG Oral Tablet; TAKE 1 TABLET DAILY AS DIRECTED; Therapy: 57PQX2729 to (Evaluate:26Sep2017)  Requested for: 30LJO4848; Last   Rx:30Mar2017 Ordered   6  Glucocard Vital Monitor w/Device Kit; USE AS DIRECTED; Therapy: 37USF6118 to (Last Rx:31Oct2016)  Requested for: 31Oct2016 Ordered   7  Lifescan One Touch Ultramini Meter; use as directed; Therapy: 06SSC6500 to (Last Rx:31Oct2016)  Requested for: 31Oct2016 Ordered   8  MetFORMIN HCl  MG Oral Tablet Extended Release 24 Hour; take 1 tablet by   mouth once daily; Therapy: 99JJL3138 to (Evaluate:25Mar2018)  Requested for: 82WLA1161; Last   Rx:30Mar2017 Ordered   9  NovoLOG Mix 70/30 FlexPen (70-30) 100 UNIT/ML Subcutaneous Suspension   Pen-injector; INJECT 20 UNIT Every twelve hours; Therapy: 51PUH9266 to 23 060052)  Requested for: 07Apr2017 Recorded   10  Omeprazole 20 MG Oral Capsule Delayed Release; take 1 capsule daily; Therapy: 71JFE0753 to (Evaluate:10Mar2017)  Requested for: 53PTV2701; Last    Rx:10Nov2016 Ordered   11  Tamsulosin HCl - 0 4 MG Oral Capsule; take 1 capsule by mouth once daily  Requested    for: 57POH7785; Last Rx:05Jan2017 Ordered    The medication list was reviewed and updated today  Allergies    1  enalapril    2  No Known Environmental Allergies   3  No Known Food Allergies    Immunizations   1 2    Influenza  31-Oct-2015 05-Oct-2016    PCV  05-Jan-2016      Vitals  Signs    Heart Rate: 55  Respiration: 18  Systolic: 942  Diastolic: 72  Height: 5 ft 7 in  Weight: 252 lb 8 oz  BMI Calculated: 39 55  BSA Calculated: 2 23    Physical Exam    Constitutional   General appearance: No acute distress, well appearing and well nourished      Head and Face   Head and face: Normal     Palpation of the face and sinuses: No sinus tenderness  Eyes   Conjunctiva and lids: No erythema, swelling or discharge  Pupils and irises: Equal, round, reactive to light  Ophthalmoscopic examination: Normal fundi and optic discs  Ears, Nose, Mouth, and Throat   External inspection of ears and nose: Normal     Otoscopic examination: Tympanic membranes translucent with normal light reflex  Canals patent without erythema  Hearing: Normal     Nasal mucosa, septum, and turbinates: Normal without edema or erythema  Lips, teeth, and gums: Normal, good dentition  Oropharynx: Normal with no erythema, edema, exudate or lesions  Neck   Neck: Supple, symmetric, trachea midline, no masses  Thyroid: Normal, no thyromegaly  Pulmonary   Respiratory effort: No increased work of breathing or signs of respiratory distress  Percussion of chest: Normal     Auscultation of lungs: Clear to auscultation  Cardiovascular   Palpation of heart: Normal PMI, no thrills  Auscultation of heart: Normal rate and rhythm, normal S1 and S2, no murmurs  Carotid pulses: 2+ bilaterally  Examination of extremities for edema and/or varicosities: Normal     Chest   Chest: Normal     Abdomen   Abdomen: Non-tender, no masses  Liver and spleen: No hepatomegaly or splenomegaly  Examination for hernias: No hernias appreciated  Lymphatic   Palpation of lymph nodes in neck: No lymphadenopathy  Palpation of lymph nodes in axillae: No lymphadenopathy  Musculoskeletal   Gait and station: Normal     Inspection/palpation of digits and nails: Normal without clubbing or cyanosis  Inspection/palpation of joints, bones, and muscles: Normal     Range of motion: Normal     Stability: Normal     Muscle strength/tone: Normal     Skin   Skin and subcutaneous tissue: Normal without rashes or lesions  Palpation of skin and subcutaneous tissue: Normal turgor  Neurologic   Cranial nerves: Cranial nerves 2-12 intact      Cortical function: Normal mental status  Reflexes: 2+ and symmetric  Sensation: No sensory loss  Coordination: Normal finger to nose and heel to shin  Psychiatric   Judgment and insight: Normal     Orientation to person, place and time: Normal     Recent and remote memory: Intact  Mood and affect: Normal        Health Management  Encounter for screening colonoscopy   COLONOSCOPY; every 3 years; Last 71TWR4278; Next Due: 03QZZ0448; Active  History of abdominal pain   COLONOSCOPY; every 3 years; Last 47KGG0496; Next Due: 64LVN0830;  Active    Future Appointments    Date/Time Provider Specialty Site   06/27/2017 10:00 AM Aislinn Duran MD [facility]   06/29/2017 10:00 AM Maycol Del Castillo, [facility]     Signatures   Electronically signed by : Edmond Upton MD; Apr 7 2017 10:55AM EST                       (Author)

## 2018-02-02 DIAGNOSIS — E11.9 TYPE 2 DIABETES MELLITUS WITHOUT COMPLICATIONS (HCC): ICD-10-CM

## 2018-02-02 DIAGNOSIS — I10 ESSENTIAL (PRIMARY) HYPERTENSION: ICD-10-CM

## 2018-04-04 DIAGNOSIS — Z79.4 OTHER SPECIFIED DIABETES MELLITUS WITH COMPLICATION, WITH LONG-TERM CURRENT USE OF INSULIN: Primary | ICD-10-CM

## 2018-04-04 DIAGNOSIS — E13.8 OTHER SPECIFIED DIABETES MELLITUS WITH COMPLICATION, WITH LONG-TERM CURRENT USE OF INSULIN: Primary | ICD-10-CM

## 2018-04-05 ENCOUNTER — TELEPHONE (OUTPATIENT)
Dept: FAMILY MEDICINE CLINIC | Facility: CLINIC | Age: 68
End: 2018-04-05

## 2018-04-09 ENCOUNTER — APPOINTMENT (OUTPATIENT)
Dept: LAB | Facility: CLINIC | Age: 68
End: 2018-04-09
Payer: COMMERCIAL

## 2018-04-09 DIAGNOSIS — E55.9 VITAMIN D DEFICIENCY: ICD-10-CM

## 2018-04-09 DIAGNOSIS — G47.33 OBSTRUCTIVE SLEEP APNEA: ICD-10-CM

## 2018-04-09 DIAGNOSIS — Z00.00 ENCOUNTER FOR GENERAL ADULT MEDICAL EXAMINATION WITHOUT ABNORMAL FINDINGS: ICD-10-CM

## 2018-04-09 DIAGNOSIS — I10 ESSENTIAL (PRIMARY) HYPERTENSION: ICD-10-CM

## 2018-04-09 DIAGNOSIS — Z98.84 BARIATRIC SURGERY STATUS: ICD-10-CM

## 2018-04-09 DIAGNOSIS — E11.9 TYPE 2 DIABETES MELLITUS WITHOUT COMPLICATIONS (HCC): ICD-10-CM

## 2018-04-09 DIAGNOSIS — K91.2 POSTSURGICAL MALABSORPTION, NOT ELSEWHERE CLASSIFIED: ICD-10-CM

## 2018-04-09 LAB
25(OH)D3 SERPL-MCNC: 38.2 NG/ML (ref 30–100)
ALBUMIN SERPL BCP-MCNC: 3.6 G/DL (ref 3.5–5)
ALP SERPL-CCNC: 99 U/L (ref 46–116)
ALT SERPL W P-5'-P-CCNC: 34 U/L (ref 12–78)
ANION GAP SERPL CALCULATED.3IONS-SCNC: 9 MMOL/L (ref 4–13)
AST SERPL W P-5'-P-CCNC: 19 U/L (ref 5–45)
BILIRUB SERPL-MCNC: 0.4 MG/DL (ref 0.2–1)
BUN SERPL-MCNC: 22 MG/DL (ref 5–25)
CALCIUM SERPL-MCNC: 8.5 MG/DL
CHLORIDE SERPL-SCNC: 105 MMOL/L (ref 100–108)
CHOLEST SERPL-MCNC: 154 MG/DL (ref 50–200)
CO2 SERPL-SCNC: 28 MMOL/L (ref 21–32)
CREAT SERPL-MCNC: 0.93 MG/DL (ref 0.6–1.3)
ERYTHROCYTE [DISTWIDTH] IN BLOOD BY AUTOMATED COUNT: 13.1 % (ref 11.6–15.1)
EST. AVERAGE GLUCOSE BLD GHB EST-MCNC: 154 MG/DL
FERRITIN SERPL-MCNC: 49 NG/ML (ref 8–388)
FOLATE SERPL-MCNC: >20 NG/ML (ref 3.1–17.5)
GFR SERPL CREATININE-BSD FRML MDRD: 85 ML/MIN/1.73SQ M
GLUCOSE P FAST SERPL-MCNC: 125 MG/DL (ref 65–99)
HBA1C MFR BLD: 7 % (ref 4.2–6.3)
HCT VFR BLD AUTO: 41.3 % (ref 36.5–49.3)
HDLC SERPL-MCNC: 44 MG/DL (ref 40–60)
HGB BLD-MCNC: 13.7 G/DL (ref 12–17)
LDLC SERPL CALC-MCNC: 96 MG/DL (ref 0–100)
MCH RBC QN AUTO: 29 PG (ref 26.8–34.3)
MCHC RBC AUTO-ENTMCNC: 33.2 G/DL (ref 31.4–37.4)
MCV RBC AUTO: 88 FL (ref 82–98)
NONHDLC SERPL-MCNC: 110 MG/DL
PLATELET # BLD AUTO: 150 THOUSANDS/UL (ref 149–390)
PMV BLD AUTO: 12.1 FL (ref 8.9–12.7)
POTASSIUM SERPL-SCNC: 3.9 MMOL/L (ref 3.5–5.3)
PROT SERPL-MCNC: 7.2 G/DL (ref 6.4–8.2)
PTH-INTACT SERPL-MCNC: 41.7 PG/ML (ref 18.4–80.1)
RBC # BLD AUTO: 4.72 MILLION/UL (ref 3.88–5.62)
SODIUM SERPL-SCNC: 142 MMOL/L (ref 136–145)
TRIGL SERPL-MCNC: 70 MG/DL
VIT B12 SERPL-MCNC: 925 PG/ML (ref 100–900)
WBC # BLD AUTO: 7.24 THOUSAND/UL (ref 4.31–10.16)

## 2018-04-09 PROCEDURE — 82728 ASSAY OF FERRITIN: CPT

## 2018-04-09 PROCEDURE — 82746 ASSAY OF FOLIC ACID SERUM: CPT

## 2018-04-09 PROCEDURE — 83036 HEMOGLOBIN GLYCOSYLATED A1C: CPT

## 2018-04-09 PROCEDURE — 85027 COMPLETE CBC AUTOMATED: CPT

## 2018-04-09 PROCEDURE — 82306 VITAMIN D 25 HYDROXY: CPT

## 2018-04-09 PROCEDURE — 84425 ASSAY OF VITAMIN B-1: CPT

## 2018-04-09 PROCEDURE — 82607 VITAMIN B-12: CPT

## 2018-04-09 PROCEDURE — 36415 COLL VENOUS BLD VENIPUNCTURE: CPT

## 2018-04-09 PROCEDURE — 84590 ASSAY OF VITAMIN A: CPT

## 2018-04-09 PROCEDURE — 80053 COMPREHEN METABOLIC PANEL: CPT

## 2018-04-09 PROCEDURE — 83970 ASSAY OF PARATHORMONE: CPT

## 2018-04-09 PROCEDURE — 80061 LIPID PANEL: CPT

## 2018-04-10 ENCOUNTER — OFFICE VISIT (OUTPATIENT)
Dept: FAMILY MEDICINE CLINIC | Facility: CLINIC | Age: 68
End: 2018-04-10
Payer: COMMERCIAL

## 2018-04-10 VITALS
WEIGHT: 235.2 LBS | DIASTOLIC BLOOD PRESSURE: 56 MMHG | BODY MASS INDEX: 35.64 KG/M2 | SYSTOLIC BLOOD PRESSURE: 128 MMHG | HEART RATE: 68 BPM | HEIGHT: 68 IN | RESPIRATION RATE: 16 BRPM

## 2018-04-10 DIAGNOSIS — E66.01 CLASS 2 SEVERE OBESITY DUE TO EXCESS CALORIES WITH SERIOUS COMORBIDITY AND BODY MASS INDEX (BMI) OF 35.0 TO 35.9 IN ADULT (HCC): ICD-10-CM

## 2018-04-10 DIAGNOSIS — Z00.00 MEDICARE ANNUAL WELLNESS VISIT, SUBSEQUENT: Primary | ICD-10-CM

## 2018-04-10 DIAGNOSIS — Z79.4 TYPE 2 DIABETES MELLITUS WITH INSULIN THERAPY (HCC): ICD-10-CM

## 2018-04-10 DIAGNOSIS — E11.9 TYPE 2 DIABETES MELLITUS WITH INSULIN THERAPY (HCC): ICD-10-CM

## 2018-04-10 PROCEDURE — 3725F SCREEN DEPRESSION PERFORMED: CPT | Performed by: FAMILY MEDICINE

## 2018-04-10 PROCEDURE — 1101F PT FALLS ASSESS-DOCD LE1/YR: CPT | Performed by: FAMILY MEDICINE

## 2018-04-10 PROCEDURE — G0439 PPPS, SUBSEQ VISIT: HCPCS | Performed by: FAMILY MEDICINE

## 2018-04-10 NOTE — PATIENT INSTRUCTIONS
Obesity   AMBULATORY CARE:   Obesity  is when your body mass index (BMI) is greater than 30  Your healthcare provider will use your height and weight to measure your BMI  The risks of obesity include  many health problems, such as injuries or physical disability  You may need tests to check for the following:  · Diabetes     · High blood pressure or high cholesterol     · Heart disease     · Gallbladder or liver disease     · Cancer of the colon, breast, prostate, liver, or kidney     · Sleep apnea     · Arthritis or gout  Seek care immediately if:   · You have a severe headache, confusion, or difficulty speaking  · You have weakness on one side of your body  · You have chest pain, sweating, or shortness of breath  Contact your healthcare provider if:   · You have symptoms of gallbladder or liver disease, such as pain in your upper abdomen  · You have knee or hip pain and discomfort while walking  · You have symptoms of diabetes, such as intense hunger and thirst, and frequent urination  · You have symptoms of sleep apnea, such as snoring or daytime sleepiness  · You have questions or concerns about your condition or care  Treatment for obesity  focuses on helping you lose weight to improve your health  Even a small decrease in BMI can reduce the risk for many health problems  Your healthcare provider will help you set a weight-loss goal   · Lifestyle changes  are the first step in treating obesity  These include making healthy food choices and getting regular physical activity  Your healthcare provider may suggest a weight-loss program that involves coaching, education, and therapy  · Medicine  may help you lose weight when it is used with a healthy diet and physical activity  · Surgery  can help you lose weight if you are very obese and have other health problems  There are several types of weight-loss surgery  Ask your healthcare provider for more information    Be successful losing weight:   · Set small, realistic goals  An example of a small goal is to walk for 20 minutes 5 days a week  Anther goal is to lose 5% of your body weight  · Tell friends, family members, and coworkers about your goals  and ask for their support  Ask a friend to lose weight with you, or join a weight-loss support group  · Identify foods or triggers that may cause you to overeat , and find ways to avoid them  Remove tempting high-calorie foods from your home and workplace  Place a bowl of fresh fruit on your kitchen counter  If stress causes you to eat, then find other ways to cope with stress  · Keep a diary to track what you eat and drink  Also write down how many minutes of physical activity you do each day  Weigh yourself once a week and record it in your diary  Eating changes: You will need to eat 500 to 1,000 fewer calories each day than you currently eat to lose 1 to 2 pounds a week  The following changes will help you cut calories:  · Eat smaller portions  Use small plates, no larger than 9 inches in diameter  Fill your plate half full of fruits and vegetables  Measure your food using measuring cups until you know what a serving size looks like  · Eat 3 meals and 1 or 2 snacks each day  Plan your meals in advance  Laura Stalls and eat at home most of the time  Eat slowly  · Eat fruits and vegetables at every meal   They are low in calories and high in fiber, which makes you feel full  Do not add butter, margarine, or cream sauce to vegetables  Use herbs to season steamed vegetables  · Eat less fat and fewer fried foods  Eat more baked or grilled chicken and fish  These protein sources are lower in calories and fat than red meat  Limit fast food  Dress your salads with olive oil and vinegar instead of bottled dressing  · Limit the amount of sugar you eat  Do not drink sugary beverages  Limit alcohol  Activity changes:  Physical activity is good for your body in many ways   It helps you burn calories and build strong muscles  It decreases stress and depression, and improves your mood  It can also help you sleep better  Talk to your healthcare provider before you begin an exercise program   · Exercise for at least 30 minutes 5 days a week  Start slowly  Set aside time each day for physical activity that you enjoy and that is convenient for you  It is best to do both weight training and an activity that increases your heart rate, such as walking, bicycling, or swimming  · Find ways to be more active  Do yard work and housecleaning  Walk up the stairs instead of using elevators  Spend your leisure time going to events that require walking, such as outdoor festivals or fairs  This extra physical activity can help you lose weight and keep it off  Follow up with your healthcare provider as directed: You may need to meet with a dietitian  Write down your questions so you remember to ask them during your visits  © 2017 [address] [employer] Information is for End User's use only and may not be sold, redistributed or otherwise used for commercial purposes  All illustrations and images included in CareNotes® are the copyrighted property of [employer]  or M[employer] Dudley  The above information is an  only  It is not intended as medical advice for individual conditions or treatments  Talk to your doctor, nurse or pharmacist before following any medical regimen to see if it is safe and effective for you  Urinary Incontinence   WHAT YOU NEED TO KNOW:   What is urinary incontinence? Urinary incontinence (UI) is when you lose control of your bladder  What causes UI? UI occurs because your bladder cannot store or empty urine properly  The following are the most common types of UI:  · Stress incontinence  is when you leak urine due to increased bladder pressure  This may happen when you cough, sneeze, or exercise       · Urge incontinence  is when you feel the need to urinate right away and leak urine accidentally  · Mixed incontinence  is when you have both stress and urge UI  What are the signs and symptoms of UI?   · You feel like your bladder does not empty completely when you urinate  · You urinate often and need to urinate immediately  · You leak urine when you sleep, or you wake up with the urge to urinate  · You leak urine when you cough, sneeze, exercise, or laugh  How is UI diagnosed? Your healthcare provider will ask how often you leak urine and whether you have stress or urge symptoms  Tell him which medicines you take, how often you urinate, and how much liquid you drink each day  You may need any of the following tests:  · Urine tests  may show infection or kidney function  · A pelvic exam  may be done to check for blockages  A pelvic exam will also show if your bladder, uterus, or other organs have moved out of place  · An x-ray, ultrasound, or CT  may show problems with parts of your urinary system  You may be given contrast liquid to help your organs show up better in the pictures  Tell the healthcare provider if you have ever had an allergic reaction to contrast liquid  Do not enter the MRI room with anything metal  Metal can cause serious injury  Tell the healthcare provider if you have any metal in or on your body  · A bladder scan  will show how much urine is left in your bladder after you urinate  You will be asked to urinate and then healthcare providers will use a small ultrasound machine to check the urine left in your bladder  · Cystometry  is used to check the function of your urinary system  Your healthcare provider checks the pressure in your bladder while filling it with fluid  Your bladder pressure may also be tested when your bladder is full and while you urinate  How is UI treated? · Medicines  can help strengthen your bladder control      · Electrical stimulation  is used to send a small amount of electrical energy to your pelvic floor muscles  This helps control your bladder function  Electrodes may be placed outside your body or in your rectum  For women, the electrodes may be placed in the vagina  · A bulking agent  may be injected into the wall of your urethra to make it thicker  This helps keep your urethra closed and decreases urine leakage  · Devices  such as a clamp, pessary, or tampon may help stop urine leaks  Ask your healthcare provider for more information about these and other devices  · Surgery  may be needed if other treatments do not work  Several types of surgery can help improve your bladder control  Ask your healthcare provider for more information about the surgery you may need  How can I manage my symptoms? · Do pelvic muscle exercises often  Your pelvic muscles help you stop urinating  Squeeze these muscles tight for 5 seconds, then relax for 5 seconds  Gradually work up to squeezing for 10 seconds  Do 3 sets of 15 repetitions a day, or as directed  This will help strengthen your pelvic muscles and improve bladder control  · A catheter  may be used to help empty your bladder  A catheter is a tiny, plastic tube that is put into your bladder to drain your urine  Your healthcare provider may tell you to use a catheter to prevent your bladder from getting too full and leaking urine  · Keep a UI record  Write down how often you leak urine and how much you leak  Make a note of what you were doing when you leaked urine  · Train your bladder  Go to the bathroom at set times, such as every 2 hours, even if you do not feel the urge to go  You can also try to hold your urine when you feel the urge to go  For example, hold your urine for 5 minutes when you feel the urge to go  As that becomes easier, hold your urine for 10 minutes  · Drink liquids as directed  Ask your healthcare provider how much liquid to drink each day and which liquids are best for you   You may need to limit the amount of liquid you drink to help control your urine leakage  Limit or do not have drinks that contain caffeine or alcohol  Do not drink any liquid right before you go to bed  · Prevent constipation  Eat a variety of high-fiber foods  Good examples are high-fiber cereals, beans, vegetables, and whole-grain breads  Prune juice may help make your bowel movement softer  Walking is the best way to trigger your intestines to have a bowel movement  · Exercise regularly and maintain a healthy weight  Ask your healthcare provider how much you should weigh and about the best exercise plan for you  Weight loss and exercise will decrease pressure on your bladder and help you control your leakage  Ask him to help you create a weight loss plan if you are overweight  When should I seek immediate care? · You have severe pain  · You are confused or cannot think clearly  When should I contact my healthcare provider? · You have a fever  · You see blood in your urine  · You have pain when you urinate  · You have new or worse pain, even after treatment  · Your mouth feels dry or you have vision changes  · Your urine is cloudy or smells bad  · You have questions or concerns about your condition or care  CARE AGREEMENT:   You have the right to help plan your care  Learn about your health condition and how it may be treated  Discuss treatment options with your caregivers to decide what care you want to receive  You always have the right to refuse treatment  The above information is an  only  It is not intended as medical advice for individual conditions or treatments  Talk to your doctor, nurse or pharmacist before following any medical regimen to see if it is safe and effective for you  © 2017 [address] [employer] Information is for End User's use only and may not be sold, redistributed or otherwise used for commercial purposes   All illustrations and images included in CareNotes® are the copyrighted property of [employer] M [employer]  or Mendez Dudley  Cigarette Smoking and Your Health   AMBULATORY CARE:   Risks to your health if you smoke:  Nicotine and other chemicals found in tobacco damage every cell in your body  Even if you are a light smoker, you have an increased risk for cancer, heart disease, and lung disease  If you are pregnant or have diabetes, smoking increases your risk for complications  Benefits to your health if you stop smoking:   · You decrease respiratory symptoms such as coughing, wheezing, and shortness of breath  · You reduce your risk for cancers of the lung, mouth, throat, kidney, bladder, pancreas, stomach, and cervix  If you already have cancer, you increase the benefits of chemotherapy  You also reduce your risk for cancer returning or a second cancer from developing  · You reduce your risk for heart disease, blood clots, heart attack, and stroke  · You reduce your risk for lung infections, and diseases such as pneumonia, asthma, chronic bronchitis, and emphysema  · Your circulation improves  More oxygen can be delivered to your body  If you have diabetes, you lower your risk for complications, such as kidney, artery, and eye diseases  You also lower your risk for nerve damage  Nerve damage can lead to amputations, poor vision, and blindness  · You improve your body's ability to heal and to fight infections  Benefits to the health of others if you stop smoking:  Tobacco is harmful to nonsmokers who breathe in your secondhand smoke  The following are ways the health of others around you may improve when you stop smoking:  · You lower the risks for lung cancer and heart disease in nonsmoking adults  · If you are pregnant, you lower the risk for miscarriage, early delivery, low birth weight, and stillbirth  You also lower your baby's risk for SIDS, obesity, developmental delay, and neurobehavioral problems, such as ADHD  · If you have children, you lower their risk for ear infections, colds, pneumonia, bronchitis, and asthma  For more information and support to stop smoking:   · Smokefree  gov  Phone: 0- 948 - 891-8624  Web Address: www smokefrBioCatch  Follow up with your healthcare provider as directed:  Write down your questions so you remember to ask them during your visits  © 2017 [address] [employer] Information is for End User's use only and may not be sold, redistributed or otherwise used for commercial purposes  All illustrations and images included in CareNotes® are the copyrighted property of [employer]  or Mendez Dudley  The above information is an  only  It is not intended as medical advice for individual conditions or treatments  Talk to your doctor, nurse or pharmacist before following any medical regimen to see if it is safe and effective for you  Fall Prevention   WHAT YOU NEED TO KNOW:   What is fall prevention? Fall prevention includes ways to make your home and other areas safer  It also includes ways you can move more carefully to prevent a fall  What increases my risk for falls? · Lack of vitamin D    · Not getting enough sleep each night    · Trouble walking or keeping your balance, or foot problems    · Health conditions that cause changes in your blood pressure, vision, or muscle strength and coordination    · Medicines that make you dizzy, weak, or sleepy    · Problems seeing clearly    · Shoes that have high heels or are not supportive    · Tripping hazards, such as items left on the floor, no handrails on the stairs, or broken steps  How can I help protect myself from falls? · Stand or sit up slowly  This may help you keep your balance and prevent falls  If you need to get up during the night, sit up first  Be sure you are fully awake before you stand  Turn on the light before you start walking  Go slowly in case you are still sleepy   Make sure you will not trip over any pets sleeping in the bedroom  · Use assistive devices as directed  Your healthcare provider may suggest that you use a cane or walker to help you keep your balance  You may need to have grab bars put in your bathroom near the toilet or in the shower  · Wear shoes that fit well and have soles that   Wear shoes both inside and outside  Use slippers with good   Do not wear shoes with high heels  · Wear a personal alarm  This is a device that allows you to call 911 if you fall and need help  Ask your healthcare provider for more information  · Stay active  Exercise can help strengthen your muscles and improve your balance  Your healthcare provider may recommend water aerobics or walking  He or she may also recommend physical therapy to improve your coordination  Never start an exercise program without talking to your healthcare provider first      · Manage medical conditions  Keep all appointments with your healthcare providers  Visit your eye doctor as directed  How can I make my home safer? · Add items to prevent falls in the bathroom  Put nonslip strips on your bath or shower floor to prevent you from slipping  Use a bath mat if you do not have carpet in the bathroom  This will prevent you from falling when you step out of the bath or shower  Use a shower seat so you do not need to stand while you shower  Sit on the toilet or a chair in your bathroom to dry yourself and put on clothing  This will prevent you from losing your balance from drying or dressing yourself while you are standing  · Keep paths clear  Remove books, shoes, and other objects from walkways and stairs  Place cords for telephones and lamps out of the way so that you do not need to walk over them  Tape them down if you cannot move them  Remove small rugs  If you cannot remove a rug, secure it with double-sided tape  This will prevent you from tripping  · Install bright lights in your home  Use night lights to help light paths to the bathroom or kitchen  Always turn on the light before you start walking  · Keep items you use often on shelves within reach  Do not use a step stool to help you reach an item  · Paint or place reflective tape on the edges of your stairs  This will help you see the stairs better  Call 911 or have someone else call if:   · You have fallen and are unconscious  · You have fallen and cannot move part of your body  Contact your healthcare provider if:   · You have fallen and have pain or a headache  · You have questions or concerns about your condition or care  CARE AGREEMENT:   You have the right to help plan your care  Learn about your health condition and how it may be treated  Discuss treatment options with your caregivers to decide what care you want to receive  You always have the right to refuse treatment  The above information is an  only  It is not intended as medical advice for individual conditions or treatments  Talk to your doctor, nurse or pharmacist before following any medical regimen to see if it is safe and effective for you  © 2017 [address] [facility] Information is for End User's use only and may not be sold, redistributed or otherwise used for commercial purposes  All illustrations and images included in CareNotes® are the copyrighted property of [employer]  or Mendez Dudley  Advance Directives   WHAT YOU NEED TO KNOW:   What are advance directives? Advance directives are legal documents that state your wishes and plans for medical care  These plans are made ahead of time in case you lose your ability to make decisions for yourself  Advance directives can apply to any medical decision, such as the treatments you want, and if you want to donate organs  What are the types of advance directives? There are many types of advance directives, and each state has rules about how to use them   You may choose a combination of any of the following:  · Living will: This is a written record of the treatment you want  You can also choose which treatments you do not want, which to limit, and which to stop at a certain time  This includes surgery, medicine, IV fluid, and tube feedings  · Durable power of  for healthcare [place] [facility]): This is a written record that states who you want to make healthcare choices for you when you are unable to make them for yourself  This person, called a proxy, is usually a family member or a friend  You may choose more than 1 proxy  · Do not resuscitate (DNR) order:  A DNR order is used in case your heart stops beating or you stop breathing  It is a request not to have certain forms of treatment, such as CPR  A DNR order may be included in other types of advance directives  · Medical directive: This covers the care that you want if you are in a coma, near death, or unable to make decisions for yourself  You can list the treatments you want for each condition  Treatment may include pain medicine, surgery, blood transfusions, dialysis, IV or tube feedings, and a ventilator (breathing machine)  · Values history: This document has questions about your views, beliefs, and how you feel and think about life  This information can help others choose the care that you would choose  Why are advance directives important? An advance directive helps you control your care  Although spoken wishes may be used, it is better to have your wishes written down  Spoken wishes can be misunderstood, or not followed  Treatments may be given even if you do not want them  An advance directive may make it easier for your family to make difficult choices about your care  How do I decide what to put in my advance directives? · Make informed decisions:  Make sure you fully understand treatments or care you may receive   Think about the benefits and problems your decisions could cause for you or your family  Talk to healthcare providers if you have concerns or questions before you write down your wishes  You may also want to talk with your Church or , or a   Check your state laws to make sure that what you put in your advance directive is legal      · Sign all forms:  Sign and date your advance directive when you have finished  You may also need 2 witnesses to sign the forms  Witnesses cannot be your doctor or his staff, your spouse, heirs or beneficiaries, people you owe money to, or your chosen proxy  Talk to your family, proxy, and healthcare providers about your advance directive  Give each person a copy, and keep one for yourself in a place you can get to easily  Do not keep it hidden or locked away  · Review and revise your plans: You can revise your advance directive at any time, as long as you are able to make decisions  Review your plan every year, and when there are changes in your life, or your health  When you make changes, let your family, proxy, and healthcare providers know  Give each a new copy  Where can I find more information? · [employer]  [address] , [address]   Phone: 4- 406 - 126-4686  Phone: 5- 177 - [phone]  Web Address: http://www  aafp org  · [address] [facility])  [address] [facility], [address]  Phone: 0- [phone]  Phone: [phone]  Web Address: Ricki islas  [facility] AGREEMENT:   You have the right to help plan your care  To help with this plan, you must learn about your health condition and treatment options  You must also learn about advance directives and how they are used  Work with your healthcare providers to decide what care will be used to treat you  You always have the right to refuse treatment  The above information is an  only   It is not intended as medical advice for individual conditions or treatments  Talk to your doctor, nurse or pharmacist before following any medical regimen to see if it is safe and effective for you  © 2017 [address] [employer] Information is for End User's use only and may not be sold, redistributed or otherwise used for commercial purposes  All illustrations and images included in CareNotes® are the copyrighted property of A [employer] A M , [employer]  or Mendez Dudley

## 2018-04-10 NOTE — PROGRESS NOTES
HPI:  Jeremiah Farias is a 79 y o  male here for his Subsequent Wellness Visit  Patient Active Problem List   Diagnosis    Morbid obesity due to excess calories (HCC)    Syncope    Sinus bradycardia    Hypertension    GERD (gastroesophageal reflux disease)    Obesity    Partial small bowel obstruction (HCC)    Constipation    Hypercholesteremia    Obstructive sleep apnea    Postgastrectomy malabsorption    Type 2 diabetes mellitus with insulin therapy ([facility] )    Benign essential hypertension    Benign enlargement of prostate     Past Medical History:   Diagnosis Date    Arthritis     BPH (benign prostatic hypertrophy)     CPAP (continuous positive airway pressure) dependence     [facility]     Diverticulosis     Enlarged prostate     Erectile dysfunction     GERD (gastroesophageal reflux disease)     Hiatal hernia     Hypercholesteremia     Hypertension     Obesity     Sleep apnea     Wears partial dentures     partial upper and lower     Past Surgical History:   Procedure Laterality Date    ABDOMINAL ADHESION SURGERY N/A 11/28/2016    Procedure: EXTENSIVE LYSIS ADHESIONS;  Surgeon: Shankar Cervantes MD;  [facility];  Service:    [place] FRACTURE SURGERY Left     CHOLECYSTECTOMY      COLON SURGERY      colon resection    COLOSTOMY      COLOSTOMY CLOSURE      DIAGNOSTIC LAPAROSCOPY      GASTRIC BYPASS      failed due to adhesions    HERNIA REPAIR      abdominal    DE ESOPHAGOGASTRODUODENOSCOPY TRANSORAL DIAGNOSTIC N/A 7/6/2016    Procedure: EGD AND COLONOSCOPY;  Surgeon: Caitlin Galvez MD;  [facility];   Service: Gastroenterology    DE LAP, ANEESH RESTRICT PROC, LONGITUDINAL GASTRECTOMY N/A 11/28/2016    Procedure: GASTRECTOMY SLEEVE LAPAROSCOPIC;  Surgeon: Shankar Cervantes MD;  [facility];  Service: Bariatrics    TONSILLECTOMY       Family History   Problem Relation Age of Onset    Heart disease Mother     Diabetes Father     Colon cancer Neg Hx     Liver disease Neg Hx      History   Smoking Status    Former Smoker    Quit date: 11/10/2001   Smokeless Tobacco    Never Used     History   Alcohol Use No      History   Drug Use No     /56   Pulse 68   Resp 16   Ht 5' 7 84" (1 723 m)   Wt 107 kg (235 lb 3 2 oz)   BMI 35 94 kg/m²       Current Outpatient Prescriptions   Medication Sig Dispense Refill    glipiZIDE-metFORMIN (METAGLIP) 2 5-250 MG per tablet Take 1 tablet by mouth 2 (two) times a day before meals      Lancets (LIFESCAN UNISTIK 2) MISC [facility] One Touch Ultramini Meter; use as directed; 1; 0; 31-Oct-2016; 31-Oct-2016; Melida Duran; Active      tamsulosin (FLOMAX) 0 4 mg Take 0 4 mg by mouth daily with dinner   BIOTIN PO Take 1 tablet by mouth daily 30547 units as per pt       Blood Glucose Monitoring Suppl (GLUCOCARD VITAL MONITOR) w/Device KIT by Does not apply route      calcium carbonate (OS-GILDA) 600 MG tablet Take 600 mg by mouth 2 (two) times a day with meals      cyanocobalamin (VITAMIN B-12) 1,000 mcg tablet Take 1,000 mcg by mouth every other day        insulin aspart protamine-insulin aspart (NovoLOG 70/30) 100 units/mL injection Inject 10 Units under the skin 2 (two) times a day before meals for 30 days Before breakfast and dinner (Patient taking differently: Inject 10 Units under the skin 2 (two) times a day before meals Before breakfast and dinner )  0    Liraglutide 18 MG/3ML SOPN Inject 0 3 mL under the skin daily 5 pen 0    metFORMIN (GLUCOPHAGE-XR) 500 mg 24 hr tablet Take 1 tablet by mouth daily      Multiple Vitamin (MULTIVITAMIN) capsule Take 1 capsule by mouth daily      Naltrexone-Bupropion HCl ER (CONTRAVE) 8-90 MG TB12 1 tab q am x 1 week, then 1 tab PO bid x 1 week , then 2 tabs q am and 1 tab PO q pm 60 tablet 3    omeprazole (PriLOSEC) 20 mg delayed release capsule Take 20 mg by mouth daily      polyethylene glycol (MIRALAX) 17 g packet Take 17 g by mouth daily 14 each 0    traMADol (ULTRAM) 50 mg tablet Take 1 tablet by mouth       No current facility-administered medications for this visit  Allergies   Allergen Reactions    Enalapril Anaphylaxis and Throat Swelling     Immunization History   Administered Date(s) Administered    Influenza 10/31/2015, 10/05/2016, 11/10/2017    Influenza Split High Dose Preservative Free IM 10/05/2016, 11/10/2017    Influenza TIV (IM) 10/31/2015    Pneumococcal Conjugate 13-Valent 01/05/2016    Pneumococcal Polysaccharide PPV23 04/07/2017       Patient Care Team:  Felicitas Armendariz MD as PCP - General  Karma Brown, MD Addison Lara PAMD Rachel Alexis MD Gareth Shells, MD Lavenia Greig, MD    Medicare Screening Tests and Risk Assessments:  AWV Clinical     ISAR:   Previous hospitalizations?:  Yes   Additional Comments:  none       Once in a Lifetime Medicare Screening:   EKG performed?:  Yes    AAA screening performed? (if performed, please add date to Health Maintenance):  No       Medicare Screening Tests and Risk Assessment:   AAA Risk Assessment     Tobacco use (males only):   Yes   Age over 72 (males only):  Yes    Osteoporosis Risk Assessment     Female:  No   :  No :  No   Age over 48:  Yes Low body weight (<127lbs):  No   Tobacco use:  No Alcohol use:  No   Low calcium diet:  No PMHX of fractures:  No   FHX of fractures:  No    HIV Risk Assessment    None indicated:  Yes        Drug and Alcohol Use:   Tobacco use    Cigarettes:  former smoker    Tobacco use duration    Tobacco Cessation Readiness    Alcohol use    Alcohol use:  never    Alcohol Treatment Readiness   Illicit Drug Use    Drug use:  never    Drug type:  no sedative use       Diet & Exercise:   Diet   What is your diet?:  Diabetic   How many servings a day of the following:   Fruits and Vegetables:  1-2 Meat:  1-2   Exercise    Do you currently exercise?:  yes    Frequency:  occasional   Times per week:  3 Type of exercise:  walking       Cognitive Impairment Screening:   Anxiety screenings preformed:  Yes    Depression screening preformed:  Yes    Depression screening results:  no significant symptoms   Cognitive Impairment Screening    Do you have difficulty learning or retaining new information?:  No Do you have difficulty handling new tasks?:  No   Do you have difficulty with reasoning?:  No Do you have difficulty with spatial ability and orientation?:  No   Do you have difficulty with language?:  No Do you have difficulty with behavior?:  No       Functional Ability/Level of Safety:   Hearing    Hearing difficulties:  No Bilateral:  normal   Left:  normal Right:  normal   Hearing aid:  No    Hearing Impairment Assessment    Hearing status:  No impairment   Do your family members ever complain that you turn on the radio or Sharelook  too loudly?:  No Do you find that other people have to repeat themselves when talking to you?:  No   Do you have difficulty hearing while talking on the phone?:  No Has anyone ever told you that you are speaking too loudly when talking with them?:  No   Do you have trouble hearing the doorbell or phone ringing?:  No Do you have difficulty hearing such that you feel frustrated talking to people?:  No   Do you feel sad because you cannot hear well?:  No Do you feel inconvienced due to your hearing problem?:  No   Do you think you would be a happier person if you could hear better?:  No Would you be willing to go for a hearing aid fitting if suggested?:  No   Current Activities    Status:  unlimited ADL's, unlimited IADL's, unlimited social activities   Help needed with the folllowing:    Using the phone:  No Transportation:  No   Shopping:  No Preparing Meals:  No   Doing Housework:  No Doing Laundry:  No    Managing Money:  No   ADL    Feeding:  Independant   Oral hygiene and Facial grooming:  Independant   Bathing:  Independant   Upper Body Dressing:  Independant   Lower Body Dressing: Independant   Toileting:  Independant   Bed Mobility:  Independant   Fall Risk   Have you fallen in the last 12 months?:  No Are you unsteady on your feet?:  No    Are you taking any medications that may cause fatigue or dizziness?:  No   Do you have any chronic conditions that may contribute to a fall?:  Diabetes Do you rush to the bathroom potentially risking a fall?:  No   Injury History   Polypharmacy:  No Antidepressant Use:  No   Sedative Use:  No Antihypertensive Use:  No   Previous Fall:  No Alcohol Use:  No   Deconditioning:  No Visual Impairment:  No   Cogitive Impairment:  No Mmobility Impairment:  No   Postural Hypotension:  No Urinary Incontinence:  No       Home Safety:   Are there hazards in your environment?:  No   If you fell, would you need help to get back up from the ground?:  No Do you have problems or concerns getting in/out of a bed, chair, tub, or toilet?:  No   Do you feel unsteady when walking?:  No Is your activity limited by pain?:  No   Do you have handrails and grab-bars in the home?:  No Are emergency numbers kept by the phone and regularly updated?:  Yes   Are you and/or family members aware of the dangers of smoking in bed?:  No Are firearms stored securely?:  Yes   Do you have working smoke alarms and fire extinguisher?:  Yes Do all household members know how to use them?:  Yes   Have you left the stove on unsupervised?:  No    Home Safety Risk Factors   Unfamilar with surroundings:  No Uneven floors:  No   Stairs or handrail saftey risk:  No Loose rugs:  No   Household clutter:  No Poor household lighting:  No   No grab bars in bathroom:  No Further evaluation needed:  No       Advanced Directives:   Advanced Directives    Living Will:  Yes Durable POA for healthcare:   Yes   Advanced directive:  Yes    Patient's End of Life Decisions    Reviewed with patient:  Yes Provider agrees with end of life decisions:  No       Urinary Incontinence:   Do you have urinary incontinence?:  No Do you have incomplete emptying?:  No   Do you urinate frequently?:  No Do you have urinary urgency?:  No   Do you have urinary hesitancy?:  No Do you have dysuria (painful and/or difficult urination)?:  No   Do you have nocturia (waking up to urinate)?:  No Do you strain when urinating (have to push to urinate)?:  No   Do you have a weak stream when urinating?:  No Do you have intermittent streaming when urinating?:  No   Do you dribble urine after finishing?:  No        Glaucoma:            Provider Screening     Preventative Screening/Counseling:   Cardiovascular Screening/Counseling:   (Labs Q5 years, EKG optional one-time)   General:  Risks and Benefits Discussed, Screening Current           Diabetes Screening/Counseling:   (2 tests/year if Pre-Diabetes or 1 test/year if no Diabetes)   General:  Risks and Benefits Discussed, Screening Current           Colorectal Cancer Screening/Counseling:   (FOBT Q1 yr; Flex Sig Q4 yrs or Q10 yrs after Screening Colonoscopy; Screening Colonoscpy Q2 yrs High Risk or Q10 yrs Low Risk; Barium Enema Q2 yrs High Risk or Q4 yrs Low Risk)   General:  Risks and Benefits Discussed, Screening Current           Prostate Cancer Screening/Counseling:   (Annual)    General:  Risks and Benefits Discussed, Screening Current          Breast Cancer Screening/Counseling:   (Baseline Age 28 - 43; Annual Age 36+)         Cervical Cancer Screening/Counseling:   (Annual for High Risk or Childbearing Age with Abnormal Pap in Last 3 yrs;  Every 2 all others)         Osteoporosis Screening/Counseling:   (Every 2 Yrs if at risk or more if medically necessary)   General:  Risks and Benefits Discussed Counseling:  Calcium and Vitamin D Intake, Regular Weightbearing Exercise          AAA Screening/Counseling:   (Once per Lifetime with risk factors)     Age over 72 (males only):  Yes Tobacco use (males only):  Yes     [place] US:  Screening US         Glaucoma Screening/Counseling:   (Annual)   General: Risks and Benefits Discussed, Screening Current          HIV Screening/Counseling:   (Voluntary; Once annually for high risk OR 3 times for Pregnancy at diagnosis of IUP; 3rd trimester; and at Labor   General:  Risks and Benefits Discussed, Screening Not Indicated           Hepatitis C Screening:   Hepatitis C Counseling Provided:  Yes Hepatitis C Screening Accepted: Yes              Immunizations: Other Preventative Couseling (Non-Medicare Wellness Visit Required):       Referrals (Non-Medicare Wellness Visit Required):       Medical Equipment/Suppliers:               Physical Exam :  Physical Exam   Constitutional: He is oriented to person, place, and time  He appears well-developed and well-nourished  HENT:   Head: Normocephalic and atraumatic  Eyes: Pupils are equal, round, and reactive to light  Neck: Normal range of motion  Neck supple  Cardiovascular: Normal rate and regular rhythm  Pulmonary/Chest: Effort normal and breath sounds normal    Abdominal: Soft  Musculoskeletal: Normal range of motion  Neurological: He is alert and oriented to person, place, and time  Skin: Skin is warm  Capillary refill takes less than 2 seconds  Psychiatric: He has a normal mood and affect  His behavior is normal        Reviewed Updated [facility] Prior Wellness Visits:   Last Medicare wellness visit information was reviewed, patient interviewed , no change since last AWVno  Last Medicare wellness visit information was reviewed, patient interviewed and updates made to the record today no    Assessment and Plan:  1  Medicare annual wellness visit, subsequent  US abdominal aorta screening aaa    Hepatitis C Qualitative by PCR   2   Type 2 diabetes mellitus with insulin therapy (HCC)  HEMOGLOBIN A1C W/ EAG ESTIMATION   3  Class 2 severe obesity due to excess calories with serious comorbidity and body mass index (BMI) of 35 0 to 35 9 in adult (HCC)  Naltrexone-Bupropion HCl ER (CONTRAVE) 8-90 MG TB12 Health Maintenance Due   Topic Date Due    Hepatitis C Screening  1950    DTaP,Tdap,and Td Vaccines (1 - Tdap) 05/23/1971    ABDOMINAL AORTIC ANEURYSM (AAA) SCREEN  05/23/2015    GLAUCOMA SCREENING 67+ YR  05/23/2017

## 2018-04-12 LAB
VIT A SERPL-MCNC: 47.7 UG/DL (ref 36.4–108)
VIT B1 BLD-SCNC: 155.3 NMOL/L (ref 66.5–200)

## 2018-05-03 DIAGNOSIS — E13.8 OTHER SPECIFIED DIABETES MELLITUS WITH COMPLICATION, WITH LONG-TERM CURRENT USE OF INSULIN: ICD-10-CM

## 2018-05-03 DIAGNOSIS — Z79.4 OTHER SPECIFIED DIABETES MELLITUS WITH COMPLICATION, WITH LONG-TERM CURRENT USE OF INSULIN: ICD-10-CM

## 2018-05-03 RX ORDER — LIRAGLUTIDE 6 MG/ML
INJECTION SUBCUTANEOUS
Qty: 9 ML | Refills: 0 | Status: SHIPPED | OUTPATIENT
Start: 2018-05-03 | End: 2018-06-05 | Stop reason: SDUPTHER

## 2018-06-05 DIAGNOSIS — E13.8 OTHER SPECIFIED DIABETES MELLITUS WITH COMPLICATION, WITH LONG-TERM CURRENT USE OF INSULIN: ICD-10-CM

## 2018-06-05 DIAGNOSIS — Z79.4 OTHER SPECIFIED DIABETES MELLITUS WITH COMPLICATION, WITH LONG-TERM CURRENT USE OF INSULIN: ICD-10-CM

## 2018-06-05 RX ORDER — LIRAGLUTIDE 6 MG/ML
INJECTION SUBCUTANEOUS
Qty: 9 ML | Refills: 0 | Status: SHIPPED | OUTPATIENT
Start: 2018-06-05 | End: 2018-07-02 | Stop reason: SDUPTHER

## 2018-06-15 DIAGNOSIS — E11.9 TYPE 2 DIABETES MELLITUS WITHOUT COMPLICATION, WITHOUT LONG-TERM CURRENT USE OF INSULIN (HCC): Primary | ICD-10-CM

## 2018-06-15 RX ORDER — INSULIN ASPART 100 [IU]/ML
INJECTION, SUSPENSION SUBCUTANEOUS
Qty: 15 ML | Refills: 5 | Status: SHIPPED | OUTPATIENT
Start: 2018-06-15 | End: 2018-10-24 | Stop reason: ALTCHOICE

## 2018-07-02 DIAGNOSIS — Z79.4 OTHER SPECIFIED DIABETES MELLITUS WITH COMPLICATION, WITH LONG-TERM CURRENT USE OF INSULIN: Primary | ICD-10-CM

## 2018-07-02 DIAGNOSIS — E13.8 OTHER SPECIFIED DIABETES MELLITUS WITH COMPLICATION, WITH LONG-TERM CURRENT USE OF INSULIN: Primary | ICD-10-CM

## 2018-07-17 ENCOUNTER — OFFICE VISIT (OUTPATIENT)
Dept: FAMILY MEDICINE CLINIC | Facility: CLINIC | Age: 68
End: 2018-07-17
Payer: COMMERCIAL

## 2018-07-17 ENCOUNTER — HOSPITAL ENCOUNTER (OUTPATIENT)
Dept: ULTRASOUND IMAGING | Facility: HOSPITAL | Age: 68
Discharge: HOME | End: 2018-07-17
Payer: COMMERCIAL

## 2018-07-17 ENCOUNTER — APPOINTMENT (OUTPATIENT)
Dept: LAB | Facility: CLINIC | Age: 68
End: 2018-07-17
Payer: COMMERCIAL

## 2018-07-17 VITALS
SYSTOLIC BLOOD PRESSURE: 116 MMHG | HEIGHT: 68 IN | HEART RATE: 76 BPM | BODY MASS INDEX: 35.46 KG/M2 | WEIGHT: 234 LBS | DIASTOLIC BLOOD PRESSURE: 72 MMHG | RESPIRATION RATE: 18 BRPM

## 2018-07-17 DIAGNOSIS — E78.00 HYPERCHOLESTEREMIA: ICD-10-CM

## 2018-07-17 DIAGNOSIS — E66.01 MORBID OBESITY DUE TO EXCESS CALORIES (HCC): Chronic | ICD-10-CM

## 2018-07-17 DIAGNOSIS — E11.9 TYPE 2 DIABETES MELLITUS WITH INSULIN THERAPY (HCC): ICD-10-CM

## 2018-07-17 DIAGNOSIS — I10 BENIGN ESSENTIAL HYPERTENSION: Primary | ICD-10-CM

## 2018-07-17 DIAGNOSIS — Z00.00 MEDICARE ANNUAL WELLNESS VISIT, SUBSEQUENT: ICD-10-CM

## 2018-07-17 DIAGNOSIS — E66.01 CLASS 2 SEVERE OBESITY DUE TO EXCESS CALORIES WITH SERIOUS COMORBIDITY AND BODY MASS INDEX (BMI) OF 35.0 TO 35.9 IN ADULT (HCC): ICD-10-CM

## 2018-07-17 DIAGNOSIS — Z79.4 TYPE 2 DIABETES MELLITUS WITH INSULIN THERAPY (HCC): ICD-10-CM

## 2018-07-17 DIAGNOSIS — G47.33 OBSTRUCTIVE SLEEP APNEA: ICD-10-CM

## 2018-07-17 PROBLEM — K56.600 PARTIAL SMALL BOWEL OBSTRUCTION (HCC): Status: RESOLVED | Noted: 2017-08-10 | Resolved: 2018-07-17

## 2018-07-17 LAB
EST. AVERAGE GLUCOSE BLD GHB EST-MCNC: 146 MG/DL
HBA1C MFR BLD: 6.7 % (ref 4.2–6.3)

## 2018-07-17 PROCEDURE — 87521 HEPATITIS C PROBE&RVRS TRNSC: CPT | Performed by: FAMILY MEDICINE

## 2018-07-17 PROCEDURE — 83036 HEMOGLOBIN GLYCOSYLATED A1C: CPT

## 2018-07-17 PROCEDURE — 76706 US ABDL AORTA SCREEN AAA: CPT

## 2018-07-17 PROCEDURE — 99215 OFFICE O/P EST HI 40 MIN: CPT | Performed by: FAMILY MEDICINE

## 2018-07-17 PROCEDURE — 36415 COLL VENOUS BLD VENIPUNCTURE: CPT | Performed by: FAMILY MEDICINE

## 2018-07-17 RX ORDER — PHENTERMINE HYDROCHLORIDE 37.5 MG/1
37.5 TABLET ORAL DAILY
Qty: 30 TABLET | Refills: 0 | Status: SHIPPED | OUTPATIENT
Start: 2018-07-17 | End: 2018-08-17 | Stop reason: SDUPTHER

## 2018-07-17 RX ORDER — AMOXICILLIN 500 MG/1
CAPSULE ORAL
Refills: 0 | COMMUNITY
Start: 2018-07-10 | End: 2018-07-17 | Stop reason: ALTCHOICE

## 2018-07-17 RX ORDER — ACETAMINOPHEN AND CODEINE PHOSPHATE 300; 30 MG/1; MG/1
TABLET ORAL
Refills: 0 | COMMUNITY
Start: 2018-07-10 | End: 2018-09-06 | Stop reason: SDUPTHER

## 2018-07-17 NOTE — PROGRESS NOTES
Assessment/Plan:         Diagnoses and all orders for this visit:    Benign essential hypertension    Type 2 diabetes mellitus with insulin therapy ([facility] )  -     liraglutide (VICTOZA) injection; Inject 0 3 mL (1 8 mg total) under the skin daily    Class 2 severe obesity due to excess calories with serious comorbidity and body mass index (BMI) of 35 0 to 35 9 in adult ([facility])  -     phentermine (ADIPEX-P) 37 5 MG tablet; Take 1 tablet (37 5 mg total) by mouth daily    Morbid obesity due to excess calories ([facility])    Hypercholesteremia    Obstructive sleep apnea    Other orders  -     Discontinue: amoxicillin (AMOXIL) 500 mg capsule;   -     acetaminophen-codeine (TYLENOL #3) 300-30 mg per tablet;   -     Discontinue: liraglutide (VICTOZA) injection; Inject 1 8 mg under the skin daily     1) diabetes: worsening; to start novolog 10 U twice a day   To take glipize twice a day  To continue victoza  2) Obesity: trial of Phentermine  Discussed side effects with the patient   3) Hypertension: stable  Not on meds  4) Hyperlipidemia: stable  Watch diet  5) AUNG: CPAP as directed    F/u in 1 month   Subjective:      Patient ID: Ashley Mancuso is a 76 y o  male  Only doing novolog 10 U once a day   Victoza is not working for him anymore   Not taking glipizide twice a day either  Watching his diet but he is feeling more hungry now as before  Hasn't lost any weight since 4 months ago     Diabetes   He presents for his follow-up diabetic visit  He has type 2 diabetes mellitus  His disease course has been stable  There are no hypoglycemic associated symptoms  Pertinent negatives for hypoglycemia include no headaches or sweats  There are no diabetic associated symptoms  Pertinent negatives for diabetes include no blurred vision and no chest pain  There are no hypoglycemic complications  Symptoms are stable   Pertinent negatives for diabetic complications include no autonomic neuropathy, CVA, heart disease, impotence, nephropathy, peripheral neuropathy, PVD or retinopathy  Risk factors for coronary artery disease include diabetes mellitus, male sex and obesity  Current diabetic treatment includes intensive insulin program  He is compliant with treatment all of the time  His weight is stable  He is following a diabetic diet  He has not had a previous visit with a dietitian  His home blood glucose trend is increasing steadily  His breakfast blood glucose range is generally 130-140 mg/dl  His dinner blood glucose range is generally 140-180 mg/dl  His overall blood glucose range is 140-180 mg/dl  An ACE inhibitor/angiotensin II receptor blocker is being taken  He does not see a podiatrist Eye exam is current  Hypertension   This is a chronic problem  The current episode started more than 1 year ago  The problem is controlled  Pertinent negatives include no anxiety, blurred vision, chest pain, headaches, neck pain, orthopnea, palpitations, peripheral edema, PND, shortness of breath or sweats  There are no associated agents to hypertension  Risk factors for coronary artery disease include diabetes mellitus and obesity  Past treatments include ACE inhibitors  The current treatment provides moderate improvement  Compliance problems include diet  There is no history of angina, CVA, PVD or retinopathy  There is no history of chronic renal disease, coarctation of the aorta, a hypertension causing med or pheochromocytoma  Hyperlipidemia   This is a chronic problem  The current episode started more than 1 year ago  The problem is controlled  Recent lipid tests were reviewed and are normal  Exacerbating diseases include diabetes and obesity  He has no history of chronic renal disease or hypothyroidism  There are no known factors aggravating his hyperlipidemia  Pertinent negatives include no chest pain, focal sensory loss or shortness of breath  Current antihyperlipidemic treatment includes diet change   The current treatment provides moderate improvement of lipids  There are no compliance problems  The following portions of the patient's history were reviewed and updated as appropriate: allergies, current medications, past family history, past medical history, past social history, past surgical history and problem list     Review of Systems   Eyes: Negative for blurred vision  Respiratory: Negative for shortness of breath  Cardiovascular: Negative for chest pain, palpitations, orthopnea and PND  Genitourinary: Negative for impotence  Musculoskeletal: Negative for neck pain  Neurological: Negative for headaches  Past Medical History:   Diagnosis Date    Arthritis     BPH (benign prostatic hypertrophy)     CPAP (continuous positive airway pressure) dependence     Diabetes mellitus (HCC)     Diverticulosis     Enlarged prostate     Erectile dysfunction     GERD (gastroesophageal reflux disease)     Hiatal hernia     Hypercholesteremia     Hypertension     Obesity     Sleep apnea     Wears partial dentures     partial upper and lower     Past Surgical History:   Procedure Laterality Date    ABDOMINAL ADHESION SURGERY N/A 11/28/2016    Procedure: EXTENSIVE LYSIS ADHESIONS;  Surgeon: Jo North MD;  [facility];  [facility]    [facility]     CHOLECYSTECTOMY      COLON SURGERY      colon resection    COLOSTOMY      COLOSTOMY CLOSURE      DIAGNOSTIC LAPAROSCOPY      GASTRIC BYPASS      failed due to adhesions    HERNIA REPAIR      abdominal    KS ESOPHAGOGASTRODUODENOSCOPY TRANSORAL DIAGNOSTIC N/A 7/6/2016    Procedure: EGD AND COLONOSCOPY;  Surgeon: Tacos Piper MD;  [facility];   Service: Gastroenterology    KS LAP, ANEESH RESTRICT PROC, LONGITUDINAL GASTRECTOMY N/A 11/28/2016    Procedure: GASTRECTOMY SLEEVE LAPAROSCOPIC;  Surgeon: Jo North MD;  [facility];  Service: Bariatrics    TONSILLECTOMY       Social History     Social History    Marital status: Legally      Spouse name: N/A    Number of children: N/A    Years of education: N/A     Occupational History    Not on file  Social History Main Topics    Smoking status: Former Smoker     Quit date: 11/10/2001    Smokeless tobacco: Never Used    Alcohol use No    Drug use: No    Sexual activity: Yes     Other Topics Concern    Not on file     Social History Narrative    No narrative on file     Allergies   Allergen Reactions    Enalapril Anaphylaxis and Throat Swelling         Family History   Problem Relation Age of Onset    Heart disease Mother     Diabetes Father     Colon cancer Neg Hx     Liver disease Neg Hx            Current Outpatient Prescriptions:     BIOTIN PO, Take 1 tablet by mouth daily 88920 units as per pt , Disp: , Rfl:     Blood Glucose Monitoring Suppl (GLUCOCARD VITAL MONITOR) w/Device KIT, by Does not apply route, Disp: , Rfl:     calcium carbonate (OS-GILDA) 600 MG tablet, Take 600 mg by mouth 2 (two) times a day with meals, Disp: , Rfl:     cyanocobalamin (VITAMIN B-12) 1,000 mcg tablet, Take 1,000 mcg by mouth every other day , Disp: , Rfl:     glipiZIDE-metFORMIN (METAGLIP) 2 5-250 MG per tablet, Take 1 tablet by mouth 2 (two) times a day before meals, Disp: , Rfl:     insulin aspart protamine-insulin aspart (NovoLOG 70/30) 100 units/mL injection, Inject 10 Units under the skin 2 (two) times a day before meals for 30 days Before breakfast and dinner (Patient taking differently: Inject 10 Units under the skin 2 (two) times a day before meals Before breakfast and dinner ), Disp: , Rfl: 0    Lancets (LIFESCAN UNISTIK 2) MISC, [employer] One Touch Ultramini Meter; use as directed; 1; 0; 31-Oct-2016; 31-Oct-2016; Melida Duran;  Active, Disp: , Rfl:     liraglutide (VICTOZA) injection, Inject 0 3 mL (1 8 mg total) under the skin daily, Disp: 5 pen, Rfl: 3    Multiple Vitamin (MULTIVITAMIN) capsule, Take 1 capsule by mouth daily, Disp: , Rfl:     NOVOLOG MIX 70/30 FLEXPEN (70-30) 100 units/mL injection pen, INJECT 20 UNITS SUBCUTANEOUSLY EVERY 12 HOURS, Disp: 15 mL, Rfl: 5    tamsulosin (FLOMAX) 0 4 mg, Take 0 4 mg by mouth daily with dinner , Disp: , Rfl:     acetaminophen-codeine (TYLENOL #3) 300-30 mg per tablet, , Disp: , Rfl: 0    omeprazole (PriLOSEC) 20 mg delayed release capsule, Take 20 mg by mouth daily, Disp: , Rfl:     phentermine (ADIPEX-P) 37 5 MG tablet, Take 1 tablet (37 5 mg total) by mouth daily, Disp: 30 tablet, Rfl: 0    polyethylene glycol (MIRALAX) 17 g packet, Take 17 g by mouth daily, Disp: 14 each, Rfl: 0    Objective:      /72 (BP Location: Left arm, Patient Position: Sitting, Cuff Size: Standard)   Pulse 76   Resp 18   Ht 5' 7 85" (1 723 m)   Wt 106 kg (234 lb)   BMI 35 74 kg/m²          Physical Exam   Constitutional: He is oriented to person, place, and time  Vital signs are normal  He appears well-developed and well-nourished  HENT:   Head: Normocephalic and atraumatic  Nose: Nose normal    Mouth/Throat: Oropharynx is clear and moist    Eyes: Pupils are equal, round, and reactive to light  Neck: Normal range of motion  Neck supple  No thyromegaly present  Cardiovascular: Normal rate and regular rhythm  No murmur heard  Pulmonary/Chest: Effort normal and breath sounds normal    Abdominal: Soft  Bowel sounds are normal    Musculoskeletal: Normal range of motion  He exhibits no edema or deformity  Neurological: He is alert and oriented to person, place, and time  He has normal reflexes  Skin: Skin is warm  No rash noted  No erythema  Psychiatric: He has a normal mood and affect   His behavior is normal

## 2018-07-19 LAB — HCV RNA SERPL QL NAA+PROBE: NEGATIVE

## 2018-08-17 ENCOUNTER — OFFICE VISIT (OUTPATIENT)
Dept: FAMILY MEDICINE CLINIC | Facility: CLINIC | Age: 68
End: 2018-08-17
Payer: COMMERCIAL

## 2018-08-17 VITALS
WEIGHT: 230 LBS | HEIGHT: 68 IN | TEMPERATURE: 98 F | HEART RATE: 88 BPM | BODY MASS INDEX: 34.86 KG/M2 | OXYGEN SATURATION: 98 % | RESPIRATION RATE: 20 BRPM | DIASTOLIC BLOOD PRESSURE: 68 MMHG | SYSTOLIC BLOOD PRESSURE: 114 MMHG

## 2018-08-17 DIAGNOSIS — E78.00 HYPERCHOLESTEREMIA: ICD-10-CM

## 2018-08-17 DIAGNOSIS — Z79.4 TYPE 2 DIABETES MELLITUS WITH INSULIN THERAPY (HCC): Primary | ICD-10-CM

## 2018-08-17 DIAGNOSIS — E11.9 TYPE 2 DIABETES MELLITUS WITH INSULIN THERAPY (HCC): Primary | ICD-10-CM

## 2018-08-17 DIAGNOSIS — G47.33 OBSTRUCTIVE SLEEP APNEA: ICD-10-CM

## 2018-08-17 DIAGNOSIS — I10 ESSENTIAL HYPERTENSION: ICD-10-CM

## 2018-08-17 DIAGNOSIS — E66.01 CLASS 2 SEVERE OBESITY DUE TO EXCESS CALORIES WITH SERIOUS COMORBIDITY AND BODY MASS INDEX (BMI) OF 35.0 TO 35.9 IN ADULT (HCC): ICD-10-CM

## 2018-08-17 PROCEDURE — 99214 OFFICE O/P EST MOD 30 MIN: CPT | Performed by: FAMILY MEDICINE

## 2018-08-17 PROCEDURE — 3074F SYST BP LT 130 MM HG: CPT | Performed by: FAMILY MEDICINE

## 2018-08-17 PROCEDURE — 3078F DIAST BP <80 MM HG: CPT | Performed by: FAMILY MEDICINE

## 2018-08-17 RX ORDER — PHENTERMINE HYDROCHLORIDE 37.5 MG/1
37.5 TABLET ORAL DAILY
Qty: 30 TABLET | Refills: 0 | Status: SHIPPED | OUTPATIENT
Start: 2018-08-17 | End: 2018-09-20 | Stop reason: SDUPTHER

## 2018-08-17 NOTE — PROGRESS NOTES
Assessment/Plan:         Diagnoses and all orders for this visit:    Type 2 diabetes mellitus with insulin therapy ([facility] [address] )    Obstructive sleep apnea    Class 2 severe obesity due to excess calories with serious comorbidity and body mass index (BMI) of 35 0 to 35 9 in adult ([facility])  -     phentermine (ADIPEX-P) 37 5 MG tablet; Take 1 tablet (37 5 mg total) by mouth daily    Hypercholesteremia    Essential hypertension          1) diabetes: worsening; to start novolog 10 U twice a day   To take glipize twice a day  To continue victoza  2) Obesity: continue Phentermine  Discussed side effects with the patient   3) Hypertension: stable  Not on meds  4) Hyperlipidemia: stable  Watching his diet  5) AUNG: CPAP as directed 2-3 times a week     F/u in 1 month   Subjective:      Patient ID: Jeremiah Farias is a 76 y o  male  Only doing novolog 15 U in the morning   Not taking glipizide twice a day either  Phentermine suppressed his appetite  taking every other day   Lost 4 pounds In 1 month   Diabetes   He presents for his follow-up diabetic visit  He has type 2 diabetes mellitus  His disease course has been stable  There are no hypoglycemic associated symptoms  Pertinent negatives for hypoglycemia include no headaches or sweats  There are no diabetic associated symptoms  Pertinent negatives for diabetes include no blurred vision and no chest pain  There are no hypoglycemic complications  Symptoms are stable  Pertinent negatives for diabetic complications include no autonomic neuropathy, CVA, heart disease, impotence, nephropathy, peripheral neuropathy, PVD or retinopathy  Risk factors for coronary artery disease include diabetes mellitus, male sex and obesity  Current diabetic treatment includes intensive insulin program  He is compliant with treatment all of the time  His weight is stable  He is following a diabetic diet  He has not had a previous visit with a dietitian  His home blood glucose trend is increasing steadily   His breakfast blood glucose range is generally 130-140 mg/dl  His dinner blood glucose range is generally 140-180 mg/dl  His overall blood glucose range is 130-140 mg/dl  An ACE inhibitor/angiotensin II receptor blocker is being taken  He does not see a podiatrist Eye exam is current  Hypertension   This is a chronic problem  The current episode started more than 1 year ago  The problem is controlled  Pertinent negatives include no anxiety, blurred vision, chest pain, headaches, neck pain, orthopnea, palpitations, peripheral edema, PND, shortness of breath or sweats  There are no associated agents to hypertension  Risk factors for coronary artery disease include diabetes mellitus and obesity  Past treatments include ACE inhibitors  The current treatment provides moderate improvement  Compliance problems include diet  There is no history of angina, CVA, PVD or retinopathy  There is no history of chronic renal disease, coarctation of the aorta, a hypertension causing med or pheochromocytoma  Hyperlipidemia   This is a chronic problem  The current episode started more than 1 year ago  The problem is controlled  Recent lipid tests were reviewed and are normal  Exacerbating diseases include diabetes and obesity  He has no history of chronic renal disease or hypothyroidism  There are no known factors aggravating his hyperlipidemia  Pertinent negatives include no chest pain, focal sensory loss or shortness of breath  Current antihyperlipidemic treatment includes diet change  The current treatment provides moderate improvement of lipids  There are no compliance problems  The following portions of the patient's history were reviewed and updated as appropriate: allergies, current medications, past family history, past medical history, past social history, past surgical history and problem list     Review of Systems   Eyes: Negative for blurred vision  Respiratory: Negative for shortness of breath      Cardiovascular: Negative for chest pain, palpitations, orthopnea and PND  Genitourinary: Negative for impotence  Musculoskeletal: Negative for neck pain  Neurological: Negative for headaches  Past Medical History:   Diagnosis Date    Arthritis     BPH (benign prostatic hypertrophy)     CPAP (continuous positive airway pressure) dependence     Diabetes mellitus (HCC)     Diverticulosis     Enlarged prostate     Erectile dysfunction     GERD (gastroesophageal reflux disease)     Hiatal hernia     Hypercholesteremia     Hypertension     Obesity     Sleep apnea     Wears partial dentures     partial upper and lower     Past Surgical History:   Procedure Laterality Date    ABDOMINAL ADHESION SURGERY N/A 11/28/2016    Procedure: EXTENSIVE LYSIS ADHESIONS;  Surgeon: Laura Pretty MD;  [facility];  Service:    Lyle Boyer FRACTURE SURGERY Left     CHOLECYSTECTOMY      COLON SURGERY      colon resection    COLOSTOMY      COLOSTOMY CLOSURE      DIAGNOSTIC LAPAROSCOPY      GASTRIC BYPASS      failed due to adhesions    HERNIA REPAIR      abdominal    [place] ESOPHAGOGASTRODUODENOSCOPY TRANSORAL DIAGNOSTIC N/A 7/6/2016    Procedure: EGD AND COLONOSCOPY;  Surgeon: Tata Judge MD;  [facility]; Service: Gastroenterology    MN LAP, ANEESH RESTRICT PROC, LONGITUDINAL GASTRECTOMY N/A 11/28/2016    Procedure: GASTRECTOMY SLEEVE LAPAROSCOPIC;  Surgeon: Laura Pretty MD;  [facility];  Service: Bariatrics    TONSILLECTOMY       Social History     Social History    Marital status: Legally      Spouse name: N/A    Number of children: N/A    Years of education: N/A     Occupational History    Not on file       Social History Main Topics    Smoking status: Former Smoker     Quit date: 11/10/2001    Smokeless tobacco: Never Used    Alcohol use No    Drug use: No    Sexual activity: Yes     Other Topics Concern    Not on file     Social History Narrative    No narrative on file     Allergies   Allergen Reactions    Enalapril Anaphylaxis and Throat Swelling         Family History   Problem Relation Age of Onset    Heart disease Mother     Diabetes Father     Colon cancer Neg Hx     Liver disease Neg Hx            Current Outpatient Prescriptions:     acetaminophen-codeine (TYLENOL #3) 300-30 mg per tablet, , Disp: , Rfl: 0    BIOTIN PO, Take 1 tablet by mouth daily 15666 units as per pt , Disp: , Rfl:     Blood Glucose Monitoring Suppl (GLUCOCARD VITAL MONITOR) w/Device KIT, by Does not apply route, Disp: , Rfl:     calcium carbonate (OS-GILDA) 600 MG tablet, Take 600 mg by mouth 2 (two) times a day with meals, Disp: , Rfl:     cyanocobalamin (VITAMIN B-12) 1,000 mcg tablet, Take 1,000 mcg by mouth every other day , Disp: , Rfl:     glipiZIDE-metFORMIN (METAGLIP) 2 5-250 MG per tablet, Take 1 tablet by mouth 2 (two) times a day before meals, Disp: , Rfl:     insulin aspart protamine-insulin aspart (NovoLOG 70/30) 100 units/mL injection, Inject 10 Units under the skin 2 (two) times a day before meals for 30 days Before breakfast and dinner (Patient taking differently: Inject 10 Units under the skin 2 (two) times a day before meals Before breakfast and dinner ), Disp: , Rfl: 0    Lancets (LIFESCAN UNISTIK 2) MISC, [employer] One Touch Ultramini Meter; use as directed; 1; 0; 31-Oct-2016; 31-Oct-2016; Melida Duran;  Active, Disp: , Rfl:     liraglutide (VICTOZA) injection, Inject 0 3 mL (1 8 mg total) under the skin daily, Disp: 5 pen, Rfl: 3    Multiple Vitamin (MULTIVITAMIN) capsule, Take 1 capsule by mouth daily, Disp: , Rfl:     NOVOLOG MIX 70/30 FLEXPEN (70-30) 100 units/mL injection pen, INJECT 20 UNITS SUBCUTANEOUSLY EVERY 12 HOURS, Disp: 15 mL, Rfl: 5    omeprazole (PriLOSEC) 20 mg delayed release capsule, Take 20 mg by mouth daily, Disp: , Rfl:     phentermine (ADIPEX-P) 37 5 MG tablet, Take 1 tablet (37 5 mg total) by mouth daily, Disp: 30 tablet, Rfl: 0   polyethylene glycol (MIRALAX) 17 g packet, Take 17 g by mouth daily, Disp: 14 each, Rfl: 0    tamsulosin (FLOMAX) 0 4 mg, Take 0 4 mg by mouth daily with dinner , Disp: , Rfl:     Objective:      /68 (BP Location: Left arm, Patient Position: Sitting, Cuff Size: Standard)   Pulse 88   Temp 98 °F (36 7 °C)   Resp 20   Ht 5' 7 85" (1 723 m)   Wt 104 kg (230 lb)   SpO2 98%   BMI 35 13 kg/m²          Physical Exam   Constitutional: He is oriented to person, place, and time  Vital signs are normal  He appears well-developed and well-nourished  HENT:   Head: Normocephalic and atraumatic  Nose: Nose normal    Mouth/Throat: Oropharynx is clear and moist    Eyes: Pupils are equal, round, and reactive to light  Neck: Normal range of motion  Neck supple  No thyromegaly present  Cardiovascular: Normal rate and regular rhythm  No murmur heard  Pulmonary/Chest: Effort normal and breath sounds normal    Abdominal: Soft  Bowel sounds are normal    Musculoskeletal: Normal range of motion  He exhibits no edema or deformity  Neurological: He is alert and oriented to person, place, and time  He has normal reflexes  Skin: Skin is warm  No rash noted  No erythema  Psychiatric: He has a normal mood and affect   His behavior is normal

## 2018-08-23 ENCOUNTER — HOSPITAL ENCOUNTER (EMERGENCY)
Facility: HOSPITAL | Age: 68
Discharge: HOME | End: 2018-08-23
Attending: EMERGENCY MEDICINE | Admitting: EMERGENCY MEDICINE
Payer: COMMERCIAL

## 2018-08-23 ENCOUNTER — APPOINTMENT (EMERGENCY)
Dept: CT IMAGING | Facility: HOSPITAL | Age: 68
End: 2018-08-23
Payer: COMMERCIAL

## 2018-08-23 VITALS
TEMPERATURE: 98.3 F | RESPIRATION RATE: 16 BRPM | DIASTOLIC BLOOD PRESSURE: 75 MMHG | HEART RATE: 66 BPM | OXYGEN SATURATION: 96 % | SYSTOLIC BLOOD PRESSURE: 158 MMHG

## 2018-08-23 DIAGNOSIS — K57.30 DIVERTICULA OF COLON: ICD-10-CM

## 2018-08-23 DIAGNOSIS — M54.41 RIGHT-SIDED LOW BACK PAIN WITH RIGHT-SIDED SCIATICA: Primary | ICD-10-CM

## 2018-08-23 DIAGNOSIS — K86.2 PANCREATIC CYST: ICD-10-CM

## 2018-08-23 DIAGNOSIS — N28.1 RENAL CYST: ICD-10-CM

## 2018-08-23 DIAGNOSIS — N40.0 ENLARGED PROSTATE: ICD-10-CM

## 2018-08-23 LAB
ALBUMIN SERPL BCP-MCNC: 3.3 G/DL (ref 3.5–5)
ALP SERPL-CCNC: 70 U/L (ref 46–116)
ALT SERPL W P-5'-P-CCNC: 25 U/L (ref 12–78)
ANION GAP SERPL CALCULATED.3IONS-SCNC: 10 MMOL/L (ref 4–13)
AST SERPL W P-5'-P-CCNC: 48 U/L (ref 5–45)
BASOPHILS # BLD AUTO: 0.05 THOUSANDS/ΜL (ref 0–0.1)
BASOPHILS NFR BLD AUTO: 1 % (ref 0–1)
BILIRUB SERPL-MCNC: 0.4 MG/DL (ref 0.2–1)
BILIRUB UR QL STRIP: NEGATIVE
BUN SERPL-MCNC: 23 MG/DL (ref 5–25)
CALCIUM SERPL-MCNC: 8.8 MG/DL (ref 8.3–10.1)
CHLORIDE SERPL-SCNC: 104 MMOL/L (ref 100–108)
CLARITY UR: CLEAR
CO2 SERPL-SCNC: 24 MMOL/L (ref 21–32)
COLOR UR: YELLOW
CREAT SERPL-MCNC: 0.88 MG/DL (ref 0.6–1.3)
EOSINOPHIL # BLD AUTO: 0.15 THOUSAND/ΜL (ref 0–0.61)
EOSINOPHIL NFR BLD AUTO: 2 % (ref 0–6)
ERYTHROCYTE [DISTWIDTH] IN BLOOD BY AUTOMATED COUNT: 12.6 % (ref 11.6–15.1)
GFR SERPL CREATININE-BSD FRML MDRD: 88 ML/MIN/1.73SQ M
GLUCOSE SERPL-MCNC: 78 MG/DL (ref 65–140)
GLUCOSE UR STRIP-MCNC: NEGATIVE MG/DL
HCT VFR BLD AUTO: 42.6 % (ref 36.5–49.3)
HGB BLD-MCNC: 14.1 G/DL (ref 12–17)
HGB UR QL STRIP.AUTO: NEGATIVE
IMM GRANULOCYTES # BLD AUTO: 0.03 THOUSAND/UL (ref 0–0.2)
IMM GRANULOCYTES NFR BLD AUTO: 0 % (ref 0–2)
KETONES UR STRIP-MCNC: NEGATIVE MG/DL
LEUKOCYTE ESTERASE UR QL STRIP: NEGATIVE
LYMPHOCYTES # BLD AUTO: 1.4 THOUSANDS/ΜL (ref 0.6–4.47)
LYMPHOCYTES NFR BLD AUTO: 15 % (ref 14–44)
MCH RBC QN AUTO: 30 PG (ref 26.8–34.3)
MCHC RBC AUTO-ENTMCNC: 33.1 G/DL (ref 31.4–37.4)
MCV RBC AUTO: 91 FL (ref 82–98)
MONOCYTES # BLD AUTO: 0.83 THOUSAND/ΜL (ref 0.17–1.22)
MONOCYTES NFR BLD AUTO: 9 % (ref 4–12)
NEUTROPHILS # BLD AUTO: 6.95 THOUSANDS/ΜL (ref 1.85–7.62)
NEUTS SEG NFR BLD AUTO: 73 % (ref 43–75)
NITRITE UR QL STRIP: NEGATIVE
NRBC BLD AUTO-RTO: 0 /100 WBCS
PH UR STRIP.AUTO: 6 [PH] (ref 4.5–8)
PLATELET # BLD AUTO: 171 THOUSANDS/UL (ref 149–390)
PMV BLD AUTO: 11.7 FL (ref 8.9–12.7)
POTASSIUM SERPL-SCNC: 4.7 MMOL/L (ref 3.5–5.3)
PROT SERPL-MCNC: 7.5 G/DL (ref 6.4–8.2)
PROT UR STRIP-MCNC: NEGATIVE MG/DL
RBC # BLD AUTO: 4.7 MILLION/UL (ref 3.88–5.62)
SODIUM SERPL-SCNC: 138 MMOL/L (ref 136–145)
SP GR UR STRIP.AUTO: >=1.03 (ref 1–1.03)
UROBILINOGEN UR QL STRIP.AUTO: 0.2 E.U./DL
WBC # BLD AUTO: 9.41 THOUSAND/UL (ref 4.31–10.16)

## 2018-08-23 PROCEDURE — 74176 CT ABD & PELVIS W/O CONTRAST: CPT

## 2018-08-23 PROCEDURE — 81003 URINALYSIS AUTO W/O SCOPE: CPT | Performed by: EMERGENCY MEDICINE

## 2018-08-23 PROCEDURE — 96374 THER/PROPH/DIAG INJ IV PUSH: CPT

## 2018-08-23 PROCEDURE — 80053 COMPREHEN METABOLIC PANEL: CPT | Performed by: EMERGENCY MEDICINE

## 2018-08-23 PROCEDURE — 99284 EMERGENCY DEPT VISIT MOD MDM: CPT

## 2018-08-23 PROCEDURE — 36415 COLL VENOUS BLD VENIPUNCTURE: CPT | Performed by: EMERGENCY MEDICINE

## 2018-08-23 PROCEDURE — 85025 COMPLETE CBC W/AUTO DIFF WBC: CPT | Performed by: EMERGENCY MEDICINE

## 2018-08-23 PROCEDURE — 96361 HYDRATE IV INFUSION ADD-ON: CPT

## 2018-08-23 RX ORDER — CYCLOBENZAPRINE HCL 10 MG
10 TABLET ORAL 3 TIMES DAILY PRN
Qty: 30 TABLET | Refills: 0 | Status: SHIPPED | OUTPATIENT
Start: 2018-08-23 | End: 2018-11-26

## 2018-08-23 RX ORDER — PREDNISONE 20 MG/1
60 TABLET ORAL ONCE
Status: COMPLETED | OUTPATIENT
Start: 2018-08-23 | End: 2018-08-23

## 2018-08-23 RX ORDER — PREDNISONE 10 MG/1
TABLET ORAL
Qty: 30 TABLET | Refills: 0 | Status: SHIPPED | OUTPATIENT
Start: 2018-08-23 | End: 2018-10-24 | Stop reason: ALTCHOICE

## 2018-08-23 RX ORDER — ACETAMINOPHEN 160 MG
1000 TABLET,DISINTEGRATING ORAL
COMMUNITY

## 2018-08-23 RX ORDER — HYDROCODONE BITARTRATE AND ACETAMINOPHEN 5; 325 MG/1; MG/1
1 TABLET ORAL EVERY 6 HOURS PRN
Qty: 15 TABLET | Refills: 0 | Status: SHIPPED | OUTPATIENT
Start: 2018-08-23 | End: 2018-09-02

## 2018-08-23 RX ADMIN — SODIUM CHLORIDE 1000 ML: 0.9 INJECTION, SOLUTION INTRAVENOUS at 18:42

## 2018-08-23 RX ADMIN — PREDNISONE 60 MG: 20 TABLET ORAL at 19:18

## 2018-08-23 RX ADMIN — HYDROMORPHONE HYDROCHLORIDE 1 MG: 1 INJECTION, SOLUTION INTRAMUSCULAR; INTRAVENOUS; SUBCUTANEOUS at 18:41

## 2018-08-23 NOTE — ED PROVIDER NOTES
History  Chief Complaint   Patient presents with    Flank Pain     pt c/o right sided flank pain starting x2 days ago  taking tylenol without relief  denies any urinary symptoms     70-year-old male presents emergency department for evaluation of right-sided flank and low back pain  Patient states the pain has been present for the past 2 days  It started after he was pulling weeds in his yd  The pain is sharp and localized to the right flank and it radiates into the right buttock region and right posterior thigh  It also radiates into the right groin at times  He denies dysuria or hematuria  He does have a remote history of kidney stones  No prior back surgery  No fevers or chills  No nausea or vomiting  Pain is aggravated with movement and improved when lying still and resting  He denies associated numbness or weakness of the lower extremities  No bowel or bladder incontinence  Patient took Tylenol with codeine this morning with some improvement in the pain  History provided by:  Patient, medical records and spouse   used: No    Back Pain   Location:  Lumbar spine  Quality:  Stabbing and shooting  Radiates to:  R posterior upper leg (right groin)  Pain severity:  Severe  Pain is:  Unable to specify  Onset quality:  Sudden  Duration:  2 days  Timing:  Intermittent  Progression:  Worsening  Chronicity:  New  Context: physical stress and twisting    Context: not falling and not lifting heavy objects    Relieved by: rest   Worsened by:  Palpation and movement  Associated symptoms: leg pain    Associated symptoms: no abdominal pain, no bladder incontinence, no bowel incontinence, no chest pain, no dysuria, no fever, no numbness, no paresthesias, no perianal numbness, no tingling and no weakness    Risk factors: no recent surgery        Prior to Admission Medications   Prescriptions Last Dose Informant Patient Reported? Taking?    BIOTIN PO   Yes Yes   Sig: Take 1 tablet by mouth daily 38470 units as per pt    Blood Glucose Monitoring Suppl (GLUCOCARD VITAL MONITOR) w/Device KIT   Yes No   Sig: by Does not apply route   Lancets (LIFESCAN UNISTIK 2) MISC   Yes No   Sig: [employer] One Touch Ultramini Meter; use as directed; 1; 0; 31-Oct-2016; 31-Oct-2016; Melida Duran; Active   Multiple Vitamin (MULTIVITAMIN) capsule  Self Yes Yes   Sig: Take 1 capsule by mouth daily   NOVOLOG MIX 70/30 FLEXPEN (70-30) 100 units/mL injection pen   No Yes   Sig: INJECT 20 UNITS SUBCUTANEOUSLY EVERY 12 HOURS   Patient taking differently: INJECT 15 UNITS SUBCUTANEOUSLY QD   acetaminophen-codeine (TYLENOL #3) 300-30 mg per tablet   Yes No   calcium carbonate (OS-GILDA) 600 MG tablet   Yes Yes   Sig: Take 600 mg by mouth 2 (two) times a day with meals   cholecalciferol (VITAMIN D3) 1,000 units tablet   Yes Yes   Sig: Take 1,000 Units by mouth daily   cyanocobalamin (VITAMIN B-12) 1,000 mcg tablet  Self Yes No   Sig: Take 1,000 mcg by mouth every other day  glipiZIDE-metFORMIN (METAGLIP) 2 5-250 MG per tablet   Yes Yes   Sig: Take 1 tablet by mouth 2 (two) times a day before meals   insulin aspart protamine-insulin aspart (NovoLOG 70/30) 100 units/mL injection   No No   Sig: Inject 10 Units under the skin 2 (two) times a day before meals for 30 days Before breakfast and dinner   Patient taking differently: Inject 10 Units under the skin 2 (two) times a day before meals Before breakfast and dinner    liraglutide (VICTOZA) injection   No Yes   Sig: Inject 0 3 mL (1 8 mg total) under the skin daily   omeprazole (PriLOSEC) 20 mg delayed release capsule   Yes No   Sig: Take 20 mg by mouth daily   phentermine (ADIPEX-P) 37 5 MG tablet   No Yes   Sig: Take 1 tablet (37 5 mg total) by mouth daily   polyethylene glycol (MIRALAX) 17 g packet   No No   Sig: Take 17 g by mouth daily   tamsulosin (FLOMAX) 0 4 mg  Self Yes Yes   Sig: Take 0 4 mg by mouth daily with dinner        Facility-Administered Medications: None       Past Medical History:   Diagnosis Date    Arthritis     BPH (benign prostatic hypertrophy)     CPAP (continuous positive airway pressure) dependence     Diabetes mellitus (HCC)     Diverticulosis     Enlarged prostate     Erectile dysfunction     GERD (gastroesophageal reflux disease)     Hiatal hernia     Hypercholesteremia     Hypertension     Obesity     Sleep apnea     Wears partial dentures     partial upper and lower       Past Surgical History:   Procedure Laterality Date    ABDOMINAL ADHESION SURGERY N/A 11/28/2016    Procedure: EXTENSIVE LYSIS ADHESIONS;  Surgeon: Paula Sheldon MD;  [facility];  Service:    [place] FRACTURE SURGERY Left     CHOLECYSTECTOMY      COLON SURGERY      colon resection    COLOSTOMY      COLOSTOMY CLOSURE      DIAGNOSTIC LAPAROSCOPY      GASTRIC BYPASS      failed due to adhesions    HERNIA REPAIR      abdominal    [place] ESOPHAGOGASTRODUODENOSCOPY TRANSORAL DIAGNOSTIC N/A 7/6/2016    Procedure: EGD AND COLONOSCOPY;  Surgeon: Chanell Medley MD;  [facility]; Service: Gastroenterology    [place] LAP, ANEESH RESTRICT PROC, LONGITUDINAL GASTRECTOMY N/A 11/28/2016    Procedure: GASTRECTOMY SLEEVE LAPAROSCOPIC;  Surgeon: Paula Sheldon MD;  [facility];  Service: Bariatrics    TONSILLECTOMY         Family History   Problem Relation Age of Onset    Heart disease Mother     Diabetes Father     Colon cancer Neg Hx     Liver disease Neg Hx      I have reviewed and agree with the history as documented  Social History   Substance Use Topics    Smoking status: Former Smoker     Quit date: 11/10/2001    Smokeless tobacco: Never Used    Alcohol use No        Review of Systems   Constitutional: Negative for appetite change and fever  Respiratory: Negative for shortness of breath  Cardiovascular: Negative for chest pain  Gastrointestinal: Negative for abdominal pain and bowel incontinence  Genitourinary: Positive for flank pain   Negative for bladder incontinence, dysuria, frequency, hematuria and urgency  Musculoskeletal: Positive for back pain  Negative for gait problem, neck pain and neck stiffness  Neurological: Negative for tingling, weakness, numbness and paresthesias  All other systems reviewed and are negative  Physical Exam  Physical Exam   Constitutional: He is oriented to person, place, and time  Vital signs are normal  He appears well-developed and well-nourished  He appears distressed  HENT:   Head: Normocephalic and atraumatic  Eyes: Conjunctivae and EOM are normal  Pupils are equal, round, and reactive to light  Neck: Normal range of motion  Neck supple  Cardiovascular: Normal rate, regular rhythm, normal heart sounds and intact distal pulses  Pulmonary/Chest: Effort normal and breath sounds normal  No accessory muscle usage  No respiratory distress  He exhibits no tenderness  Abdominal: Soft  Normal appearance and bowel sounds are normal  He exhibits no distension  There is no tenderness  There is no rebound and no guarding  Musculoskeletal: Normal range of motion  He exhibits no edema or deformity  Lumbar back: He exhibits tenderness and spasm  He exhibits normal range of motion, no bony tenderness and no swelling  Back:    Negative straight leg raise right  Normal strength and sensation in both lower extremities  Normal equal reflexes  Lymphadenopathy:     He has no cervical adenopathy  Neurological: He is alert and oriented to person, place, and time  He has normal strength and normal reflexes  Coordination normal    Skin: Skin is warm, dry and intact  No rash noted  Psychiatric: He has a normal mood and affect  His behavior is normal  Judgment and thought content normal    Vitals reviewed        Vital Signs  ED Triage Vitals   Temperature Pulse Respirations Blood Pressure SpO2   08/23/18 1856 08/23/18 1845 08/23/18 1845 08/23/18 1845 08/23/18 1845   98 3 °F (36 8 °C) 60 16 140/72 97 % Temp Source Heart Rate Source Patient Position - Orthostatic VS BP Location FiO2 (%)   08/23/18 1856 -- -- -- --   Oral          Pain Score       --                  Vitals:    08/23/18 1845 08/23/18 1900   BP: 140/72 158/75   Pulse: 60 66       Visual Acuity      ED Medications  Medications   sodium chloride 0 9 % bolus 1,000 mL (1,000 mL Intravenous New Bag 8/23/18 1842)   predniSONE tablet 60 mg (not administered)   HYDROmorphone (DILAUDID) injection 1 mg (1 mg Intravenous Given 8/23/18 1841)       Diagnostic Studies  Results Reviewed     Procedure Component Value Units Date/Time    Comprehensive metabolic panel [[id number]]  (Abnormal) Collected:  08/23/18 1802    Lab Status:  Final result Specimen:  Blood from Arm, Left Updated:  08/23/18 1850     Sodium 138 mmol/L      Potassium 4 7 mmol/L      Chloride 104 mmol/L      CO2 24 mmol/L      Anion Gap 10 mmol/L      BUN 23 mg/dL      Creatinine 0 88 mg/dL      Glucose 78 mg/dL      Calcium 8 8 mg/dL      AST 48 (H) U/L      ALT 25 U/L      Alkaline Phosphatase 70 U/L      Total Protein 7 5 g/dL      Albumin 3 3 (L) g/dL      Total Bilirubin 0 40 mg/dL      eGFR 88 ml/min/1 73sq m     Narrative:         National Kidney Disease Education Program recommendations are as follows:  GFR calculation is accurate only with a steady state creatinine  Chronic Kidney disease less than 60 ml/min/1 73 sq  meters  Kidney failure less than 15 ml/min/1 73 sq  meters      CBC and differential [[id number]] Collected:  08/23/18 1840    Lab Status:  Final result Specimen:  Blood from Arm, Left Updated:  08/23/18 1849     WBC 9 41 Thousand/uL      RBC 4 70 Million/uL      Hemoglobin 14 1 g/dL      Hematocrit 42 6 %      MCV 91 fL      MCH 30 0 pg      MCHC 33 1 g/dL      RDW 12 6 %      MPV 11 7 fL      Platelets 952 Thousands/uL      nRBC 0 /100 WBCs      Neutrophils Relative 73 %      Immat GRANS % 0 %      Lymphocytes Relative 15 %      Monocytes Relative 9 %      Eosinophils Relative 2 %      Basophils Relative 1 %      Neutrophils Absolute 6 95 Thousands/µL      Immature Grans Absolute 0 03 Thousand/uL      Lymphocytes Absolute 1 40 Thousands/µL      Monocytes Absolute 0 83 Thousand/µL      Eosinophils Absolute 0 15 Thousand/µL      Basophils Absolute 0 05 Thousands/µL     UA w Reflex to Microscopic w Reflex to Culture [[id number]] Collected:  08/23/18 1838    Lab Status:  Final result Specimen:  Urine from Urine, Clean Catch Updated:  08/23/18 1845     Color, UA Yellow     Clarity, UA Clear     Specific Gravity, UA >=1 030     pH, UA 6 0     Leukocytes, UA Negative     Nitrite, UA Negative     Protein, UA Negative mg/dl      Glucose, UA Negative mg/dl      Ketones, UA Negative mg/dl      Urobilinogen, UA 0 2 E U /dl      Bilirubin, UA Negative     Blood, UA Negative                 CT renal stone study abdomen pelvis without contrast   Final Result by Sepideh Valverde MD (08/23 1840)      1  No obstructive uropathy  2   Uncinate process cyst, present in retrospect on the prior study though difficult to compare given lack of contrast   A follow-up outpatient MRI/MRCP is recommended for further evaluation  3   Stable 2 cm lobulated left renal cyst       4   Moderate prostatomegaly  5   Diverticulosis coli              Workstation performed: [id number]                    Procedures  Procedures       Phone Contacts  ED Phone Contact    ED Course                               MDM  Number of Diagnoses or Management Options  Diverticula of colon: new and requires workup  Enlarged prostate: new and requires workup  Pancreatic cyst: new and requires workup  Renal cyst: new and requires workup  Right-sided low back pain with right-sided sciatica: new and requires workup     Amount and/or Complexity of Data Reviewed  Clinical lab tests: ordered and reviewed  Tests in the radiology section of CPT®: ordered and reviewed  Decide to obtain previous medical records or to obtain history from someone other than the patient: yes  Independent visualization of images, tracings, or specimens: yes    Risk of Complications, Morbidity, and/or Mortality  General comments: 60-year-old male with acute right-sided low back pain with radiation to the flank and groin as well as the right buttock  I suspect he has lumbar radiculopathy with an element of sciatica  Patient will be treated with oral cortical steroid taper, muscle x-rays and pain medication  Discussed stretching exercises at home  The patient referred to [facility]  He was informed of incidental findings of renal cyst which she was aware of  Patient was notified that there is a small cyst on the pancreas that will need outpatient follow-up with an MRI  Patient was also found to have enlarged prostate will follow up with PCP for further evaluation of enlarged prostate  No PSA testing was found in our system  Discussed signs symptoms return to the emergency department  Patient Progress  Patient progress: improved    CritCare Time    Disposition  Final diagnoses:   Right-sided low back pain with right-sided sciatica   Pancreatic cyst   Diverticula of colon   Renal cyst   Enlarged prostate     Time reflects when diagnosis was documented in both MDM as applicable and the Disposition within this note     Time User Action Codes Description Comment    8/23/2018  7:06 PM Kerry Harden Add [M54 41] Right-sided low back pain with right-sided sciatica     8/23/2018  7:06 PM Shaq Alexander Add [K86 2] Pancreatic cyst     8/23/2018  7:07 PM Kerry Harden Add [K57 30] Diverticula of colon     8/23/2018  7:07 PM Kerry Harden Add [N28 1] Renal cyst     8/23/2018  7:07 PM Kerry Harden Add [N40 0] Enlarged prostate       ED Disposition     ED Disposition Condition Comment    Discharge  Jolene Best discharge to home/self care      Condition at discharge: Stable        Follow-up Information     Follow up With Specialties Details Why [id number] [facility] Physical Therapy Schedule an appointment as soon as possible for a visit in 1 day For recheck of current symptoms [id number] [address] [phone]          Patient's Medications   Discharge Prescriptions    CYCLOBENZAPRINE (FLEXERIL) 10 MG TABLET    Take 1 tablet (10 mg total) by mouth 3 (three) times a day as needed for muscle spasms       Start Date: 8/23/2018 End Date: --       Order Dose: 10 mg       Quantity: 30 tablet    Refills: 0    HYDROCODONE-ACETAMINOPHEN (NORCO) 5-325 MG PER TABLET    Take 1 tablet by mouth every 6 (six) hours as needed for pain for up to 10 days Max Daily Amount: 4 tablets       Start Date: 8/23/2018 End Date: 9/2/2018       Order Dose: 1 tablet       Quantity: 15 tablet    Refills: 0    PREDNISONE 10 MG TABLET    50 mg PO daily x 2 days then decrease by 10 mg every 2 days until finished       Start Date: 8/23/2018 End Date: --       Order Dose: --       Quantity: 30 tablet    Refills: 0       Outpatient Discharge Orders  Ambulatory Referral to [facility]   Standing Status: Future  Standing Exp   Date: 02/23/19         ED Provider  Electronically Signed by           Mary Ann Beltre DO  08/23/18 9240

## 2018-08-23 NOTE — DISCHARGE INSTRUCTIONS
Follow up with your family physician to arrange non emergent MRI of the pancreas to further characterize cyst noted on CT scan  CT scan also demonstrated a moderately enlarged prostate, follow up with family physician for further evaluation of enlarged prostate  Diverticulosis   WHAT YOU NEED TO KNOW:   Diverticulosis is a condition that causes small pockets called diverticula to form in your intestine  These pockets make it difficult for bowel movements to pass through your digestive system  DISCHARGE INSTRUCTIONS:   Return to the emergency department if:   · You have severe pain on the left side of your lower abdomen  · Your bowel movements are bright or dark red  Contact your healthcare provider if:   · You have a fever and chills  · You feel dizzy or lightheaded  · You have nausea, or you are vomiting  · You have a change in your bowel movements  · You have questions or concerns about your condition or care  Medicines:   · Medicines  to soften your bowel movements may be given  You may also need medicines to treat symptoms such as bloating and pain  · Take your medicine as directed  Contact your healthcare provider if you think your medicine is not helping or if you have side effects  Tell him or her if you are allergic to any medicine  Keep a list of the medicines, vitamins, and herbs you take  Include the amounts, and when and why you take them  Bring the list or the pill bottles to follow-up visits  Carry your medicine list with you in case of an emergency  Self-care: The goal of treatment is to manage any symptoms you have and prevent other problems such as diverticulitis  Diverticulitis is swelling or infection of the diverticula  Your healthcare provider may recommend any of the following:  · Eat a variety of high-fiber foods  High-fiber foods help you have regular bowel movements  High-fiber foods include cooked beans, fruits, vegetables, and some cereals   Most adults need 25 to 35 grams of fiber each day  Your healthcare provider may recommend that you have more  Ask your healthcare provider how much fiber you need  Increase fiber slowly  You may have abdominal discomfort, bloating, and gas if you add fiber to your diet too quickly  You may need to take a fiber supplement if you are not getting enough fiber from food  · Drink liquids as directed  You may need to drink 2 to 3 liters (8 to 12 cups) of liquids every day  Ask your healthcare provider how much liquid to drink each day and which liquids are best for you  · Apply heat  on your abdomen for 20 to 30 minutes every 2 hours for as many days as directed  Heat helps decrease pain and muscle spasms  Help prevent diverticulitis or other symptoms: The following may help decrease your risk for diverticulitis or symptoms, such as bleeding  Talk to your provider about these or other things you can do to prevent problems that may occur with diverticulosis  · Exercise regularly  Ask your healthcare provider about the best exercise plan for you  Exercise can help you have regular bowel movements  Get 30 minutes of exercise on most days of the week  · Maintain a healthy weight  Ask your healthcare provider how much you should weigh  Ask him or her to help you create a weight loss plan if you are overweight  · Do not smoke  Nicotine and other chemicals in cigarettes increase your risk for diverticulitis  Ask your healthcare provider for information if you currently smoke and need help to quit  E-cigarettes or smokeless tobacco still contain nicotine  Talk to your healthcare provider before you use these products  · Ask your healthcare provider if it is safe to take NSAIDs  NSAIDs may increase your risk of diverticulitis  Follow up with your healthcare provider as directed:  Write down your questions so you remember to ask them during your visits     © 2017 [employer] is for End User's use only and may not be sold, redistributed or otherwise used for commercial purposes  All illustrations and images included in CareNotes® are the copyrighted property of [employer]  or [employer]  The above information is an  only  It is not intended as medical advice for individual conditions or treatments  Talk to your doctor, nurse or pharmacist before following any medical regimen to see if it is safe and effective for you  Kidney Cyst   AMBULATORY CARE:   A kidney cyst  is a fluid-filled sac that grows in your kidney  A simple cyst usually does not contain cancer  A complex cyst contains calcium deposits and needs to be checked over time for cancer  You may have one or more cysts  Both kidneys may form cysts at the same time  Common signs and symptoms include the following:  Kidney cysts usually do not cause symptoms  A cyst that grows large may cause pain or discomfort in your side or abdomen  You may have blood in your urine or dark urine, or develop high blood pressure  Tenderness along with pain and a fever may be a sign of an infected cyst   Seek care immediately if:   · You have blood in your urine or your urine is dark  · You have trouble urinating, or you are urinating more often than usual     · You have a fever along with pain or tenderness below your ribcage and above your hip bones  Contact your healthcare provider if:   · You have questions or concerns about your condition or care  Treatment:  Your cyst may need no treatment  Your healthcare provider may want you to have tests to check the size of the cyst over time  You may also need tests if you have hard deposits in the cyst  The deposits will be checked for cancer or other material that can cause health problems  Fluid may need to be drained from a cyst that becomes infected, bursts, or blocks blood or urine flow in the kidney  Surgery may be needed if the cyst is large  Manage kidney cysts:   · Drink liquids as directed  Liquids help your kidneys work correctly  They can also help prevent a urinary tract infection  Ask your healthcare provider how much liquid to have each day and which liquids are best for you  Ask if you need to limit or not drink alcohol  Alcohol may damage your kidneys  · Manage health conditions  Over time, conditions such as diabetes and high blood pressure that are not controlled may damage your kidneys  · Do not smoke  Smoking may narrow blood vessels in your kidneys and raise your blood pressure  Smoking can also damage your kidneys  Ask your healthcare provider for information if you currently smoke and need help quitting  E-cigarettes or smokeless tobacco still contain nicotine  Talk to your healthcare provider before you use these products  Follow up with your healthcare provider as directed: You may need to have ultrasounds to check the size, shape, and contents of the cyst over time  Write down your questions so you remember to ask them during your visits  © 2017 [employer] Information is for End User's use only and may not be sold, redistributed or otherwise used for commercial purposes  All illustrations and images included in CareNotes® are the copyrighted property of [employer]  or [employer]  The above information is an  only  It is not intended as medical advice for individual conditions or treatments  Talk to your doctor, nurse or pharmacist before following any medical regimen to see if it is safe and effective for you  Lumbar Radiculopathy   WHAT YOU NEED TO KNOW:   What is lumbar radiculopathy? Lumbar radiculopathy is a painful condition that happens when a nerve in your lumbar spine (lower back) is pinched or irritated  Nerves control feeling and movement in your body  What causes lumbar radiculopathy?   You may get a pinched nerve in your lumbar spine if you have disc damage  Discs are natural, spongy cushions between your vertebrae (back bones) that allow your spine to move  Your discs may move out of place and pinch the nerve in your spine  Your risk for a pinched nerve and lumbar radiculopathy increases if:  · You smoke  · You have diabetes, a spinal infection, or a growth in your spine  · You are overweight  · You are male  · You are elderly  What are the signs and symptoms of lumbar radiculopathy? You may have any of the following:  · Pain that moves from your lower back to your buttocks, groin, and the back of your leg  The pain is often felt below your knee  Your pain may worsen when you cough, sneeze, stand, or sit  · Numbness, weakness, or tingling in your back or legs  How is lumbar radiculopathy diagnosed? Your healthcare provider will examine you and ask about your family history of back and leg pain  He may also test you for weakness, numbness, or tingling in your back, buttocks, and legs  Your healthcare provider may ask you to lie on your back and lift your leg to locate your pain  You may have any of the following:  · Blood tests: You may need blood taken to give caregivers information about how your body is working  The blood may be taken from your hand, arm, or IV  · Magnetic resonance imaging (MRI): An MRI machine is used to take a picture of your lower back  Your healthcare provider will use this picture to check for problems and changes in your back bones, nerves, and discs  You will need to lie still during this test  The MRI machine contains a very powerful magnet  Never enter the MRI room with any metal objects  This can cause serious injury  Tell your healthcare provider if you have any metal implants in your body  · X-ray: An x-ray is a picture of your lower back  A lumbar x-ray may show signs of infection or other problems with your spine      · An electromyography (EMG)  test measures the electrical activity of your muscles at rest and with movement  · Computed tomography (CT) scan: A special x-ray machine uses a computer to take pictures of your lower back  It may be used to look at your bones, discs, and nerves  You may be given dye in your IV to help improve the pictures  Tell your healthcare provider if you are allergic to shellfish, or have other allergies or medical conditions  People who are allergic to iodine or shellfish (lobster, crab, or shrimp) may be allergic to some dyes  How is lumbar radiculopathy treated? Treatment of lumbar radiculopathy may reduce pain and swelling, and improve your ability to walk and do your normal activities  Ask your healthcare provider for more information about these and other treatments for lumbar radiculopathy:  · Medicines:     ¨ NSAIDs , such as ibuprofen, help decrease swelling, pain, and fever  This medicine is available with or without a doctor's order  NSAIDs can cause stomach bleeding or kidney problems in certain people  If you take blood thinner medicine, always ask your healthcare provider if NSAIDs are safe for you  Always read the medicine label and follow directions  ¨ Muscle relaxers  help decrease pain and muscle spasms  ¨ Opioids: This is a strong medicine given to reduce severe pain  It is also called narcotic pain medicine  Take this medicine exactly as directed by your healthcare provider  ¨ Oral steroids: Steroids may be given to reduce swelling and pain  ¨ Steroid injections: Healthcare providers may give you steroid medicine through a needle (shot) into your lumbar spine  This may help decrease your nerve pain and swelling  You may need more than 1 injection if your symptoms do not improve after the first treatment  · Physical therapy: Your healthcare provider may suggest physical therapy  Your physical therapist may teach you certain exercises to improve posture (the way you stand and sit), flexibility, and strength in your lower back   He may also teach you how to remain safely active and avoid further injury  Follow the exercise plan given to you by your physical therapist     · Transcutaneous electrical nerve stimulation: This treatment, called TENS, stimulates your nerves and may decrease your pain  Wires are attached to pads  The pads are attached to your skin  The wires send a mild current through your nerves  · Surgery: You may need surgery to relieve your pinched nerve if your condition has not improved within 4 to 6 weeks  You may also need it if you have lumbar radiculopathy more than once  What are the risks of treatment for lumbar radiculopathy? · Without treatment, your pain may worsen  The pinched and swollen nerve may lead to problems when you walk or move  In severe cases, you may lose control of your urine or bowel movements  Bedrest can make your symptoms worse  · Pain medicines can cause vomiting, upset stomach, constipation (large, hard bowel movements that are difficult to pass), or kidney or liver problems  Opioid medicine may be addictive (hard to quit taking once you start)  Oral steroids and steroid injections may have side effects, such as facial redness, fluid retention (water weight gain), and mood changes  Steroid injections may be painful and can cause severe headaches, infections, allergic reactions, or nerve damage  Surgery risks include delayed problems with healing, spinal or bladder infection, damage to the spinal cord or other nerves, and ongoing pain  In rare cases, you could become paralyzed (not able to move your arms or legs)  How can I care for myself when I have lumbar radiculopathy? · Stay active: It is best to be active when you have lumbar radiculopathy  Your healthcare provider may tell you to take walks to ease yourself back into your daily routine  Avoid long periods of bed rest  Bed rest could worsen your symptoms  Do not move in ways that increase your pain   Ask your healthcare provider for more information about the best ways to stay active  · Use ice or heat packs:  Use ice or heat packs on the sore area of your body to decrease the pain and swelling  Put ice in a plastic bag covered with a towel on your low back  Cover heated items with a towel to avoid burns  Use ice and heat as directed  · Avoid heavy lifting: Your condition may worsen if you lift heavy things  Avoid lifting if possible  · Maintain a healthy weight:  Excess body weight may strain your back  Talk with your healthcare provider about ways to lose excess weight if you are overweight  When should I contact my healthcare provider? · Your pain does not improve within 1 to 3 weeks after treatment  · Your pain and weakness keep you from your normal activities at work, home, or school  · You lose more than 10 pounds in 6 months without trying  · You become depressed or sad because of the pain  · You have questions or concerns about your condition or care  When should I seek immediate care or call 911? · You have a fever greater than 100 4°F for longer than 2 days  · You have new, severe back or leg pain, or your pain spreads to both legs  · You have any new signs of numbness or weakness, especially in your lower back, legs, arms, or genital area  · You have new trouble controlling your urine and bowel movements  · You do not feel like your bladder empties when you urinate  CARE AGREEMENT:   You have the right to help plan your care  Learn about your health condition and how it may be treated  Discuss treatment options with your caregivers to decide what care you want to receive  You always have the right to refuse treatment  The above information is an  only  It is not intended as medical advice for individual conditions or treatments  Talk to your doctor, nurse or pharmacist before following any medical regimen to see if it is safe and effective for you    © 2017 [employer] Information is for End User's use only and may not be sold, redistributed or otherwise used for commercial purposes  All illustrations and images included in CareNotes® are the copyrighted property of [employer]  or [employer]

## 2018-08-24 ENCOUNTER — NURSE TRIAGE (OUTPATIENT)
Dept: PHYSICAL THERAPY | Facility: OTHER | Age: 68
End: 2018-08-24

## 2018-08-24 DIAGNOSIS — M54.41 ACUTE RIGHT-SIDED LOW BACK PAIN WITH RIGHT-SIDED SCIATICA: ICD-10-CM

## 2018-08-24 DIAGNOSIS — M25.551 PAIN OF RIGHT HIP JOINT: Primary | ICD-10-CM

## 2018-08-24 NOTE — TELEPHONE ENCOUNTER
Background - Initial Assessment  Clinical complaint: right hip pain that radiates into low back  Date of onset: 8/22/18  Mechanism of injury: yard work -- cutting wood    Previous Treatment - Previous Treatment  Previous evaluation: ED visit 8/23/18  Current provider: *No Answer*  Diagnostics: Cat Scan  Previous treatment: none    Protocols used: [facility] PROTOCOL    Pt reports that if he makes a certain movement - like standing still too long aggravate it  If he goes to turn it becomes painful  First feels the pain, like a stabbing jabbing in the hip that radiates to the low back -- right side only

## 2018-08-27 ENCOUNTER — TELEPHONE (OUTPATIENT)
Dept: FAMILY MEDICINE CLINIC | Facility: CLINIC | Age: 68
End: 2018-08-27

## 2018-09-04 ENCOUNTER — TELEPHONE (OUTPATIENT)
Dept: FAMILY MEDICINE CLINIC | Facility: CLINIC | Age: 68
End: 2018-09-04

## 2018-09-04 ENCOUNTER — OFFICE VISIT (OUTPATIENT)
Dept: FAMILY MEDICINE CLINIC | Facility: CLINIC | Age: 68
End: 2018-09-04
Payer: COMMERCIAL

## 2018-09-04 VITALS
SYSTOLIC BLOOD PRESSURE: 112 MMHG | HEART RATE: 74 BPM | WEIGHT: 226.8 LBS | DIASTOLIC BLOOD PRESSURE: 58 MMHG | BODY MASS INDEX: 34.64 KG/M2 | RESPIRATION RATE: 16 BRPM | OXYGEN SATURATION: 96 %

## 2018-09-04 DIAGNOSIS — M70.61 TROCHANTERIC BURSITIS OF RIGHT HIP: Primary | ICD-10-CM

## 2018-09-04 DIAGNOSIS — K86.2 PANCREATIC CYST: ICD-10-CM

## 2018-09-04 PROCEDURE — 20610 DRAIN/INJ JOINT/BURSA W/O US: CPT | Performed by: FAMILY MEDICINE

## 2018-09-04 PROCEDURE — 99213 OFFICE O/P EST LOW 20 MIN: CPT | Performed by: FAMILY MEDICINE

## 2018-09-04 PROCEDURE — 1101F PT FALLS ASSESS-DOCD LE1/YR: CPT | Performed by: FAMILY MEDICINE

## 2018-09-04 RX ORDER — HYDROCODONE BITARTRATE AND ACETAMINOPHEN 5; 325 MG/1; MG/1
1 TABLET ORAL EVERY 6 HOURS PRN
Qty: 15 TABLET | Refills: 0 | Status: SHIPPED | OUTPATIENT
Start: 2018-09-04 | End: 2018-09-06 | Stop reason: ALTCHOICE

## 2018-09-04 RX ORDER — LIDOCAINE HYDROCHLORIDE 10 MG/ML
2 INJECTION, SOLUTION EPIDURAL; INFILTRATION; INTRACAUDAL; PERINEURAL
Status: COMPLETED | OUTPATIENT
Start: 2018-09-04 | End: 2018-09-04

## 2018-09-04 RX ORDER — TRIAMCINOLONE ACETONIDE 40 MG/ML
40 INJECTION, SUSPENSION INTRA-ARTICULAR; INTRAMUSCULAR
Status: COMPLETED | OUTPATIENT
Start: 2018-09-04 | End: 2018-09-04

## 2018-09-04 RX ADMIN — LIDOCAINE HYDROCHLORIDE 2 ML: 10 INJECTION, SOLUTION EPIDURAL; INFILTRATION; INTRACAUDAL; PERINEURAL at 13:49

## 2018-09-04 RX ADMIN — TRIAMCINOLONE ACETONIDE 40 MG: 40 INJECTION, SUSPENSION INTRA-ARTICULAR; INTRAMUSCULAR at 13:49

## 2018-09-04 NOTE — TELEPHONE ENCOUNTER
I ordered a small prescription for PRN Vicodin for the pt - do not drive after taking, and do not mix with ETOH; can cause some potential constipation  Thanks ([place] PDMP was checked)    Angeline

## 2018-09-04 NOTE — PROGRESS NOTES
Assessment/Plan:    No problem-specific Assessment & Plan notes found for this encounter  Diagnoses and all orders for this visit:    Trochanteric bursitis of right hip  Comments:  Pt tolerated steroid injection well today  He is to watch for elevated glucose readings  Pt to use cold therapy tonight to the region, then switch to heat  Pancreatic cyst  Comments:  Likely simple cyst of the pancreas (pt with known cyst of the left kidney) - further study indicated though  MRI w/wo contrast ordered  Orders:  -     MRI abdomen w wo contrast; Future    Other orders  -     Large joint arthrocentesis          Subjective:      Patient ID: Kamran Alcaraz is a 76 y o  male  Seen in the ER recently for his back and right hip  He took Medrol Dosepak for his back  Most pain remaining is the right hip at this time  Pt has no abd pain -> found on CT in the ER to have a cyst in the uncinate process of the pancreas (records reviewed)          The following portions of the patient's history were reviewed and updated as appropriate: allergies, past family history, past medical history, past social history, past surgical history and problem list     Past Medical History:   Diagnosis Date    Arthritis     BPH (benign prostatic hypertrophy)     CPAP (continuous positive airway pressure) dependence     Diabetes mellitus ([facility] [address] )     Diverticulosis     Enlarged prostate     Erectile dysfunction     GERD (gastroesophageal reflux disease)     Hiatal hernia     Hypercholesteremia     Hypertension     Obesity     Sleep apnea     Wears partial dentures     partial upper and lower       Current Outpatient Prescriptions:     acetaminophen-codeine (TYLENOL #3) 300-30 mg per tablet, , Disp: , Rfl: 0    BIOTIN PO, Take 1 tablet by mouth daily 63719 units as per pt , Disp: , Rfl:     Blood Glucose Monitoring Suppl (GLUCOCARD VITAL MONITOR) w/Device KIT, by Does not apply route, Disp: , Rfl:     calcium carbonate (OS-GILDA) 600 MG tablet, Take 600 mg by mouth 2 (two) times a day with meals, Disp: , Rfl:     cholecalciferol (VITAMIN D3) 1,000 units tablet, Take 1,000 Units by mouth daily, Disp: , Rfl:     cyanocobalamin (VITAMIN B-12) 1,000 mcg tablet, Take 1,000 mcg by mouth every other day , Disp: , Rfl:     cyclobenzaprine (FLEXERIL) 10 mg tablet, Take 1 tablet (10 mg total) by mouth 3 (three) times a day as needed for muscle spasms, Disp: 30 tablet, Rfl: 0    glipiZIDE-metFORMIN (METAGLIP) 2 5-250 MG per tablet, Take 1 tablet by mouth 2 (two) times a day before meals, Disp: , Rfl:     insulin aspart protamine-insulin aspart (NovoLOG 70/30) 100 units/mL injection, Inject 10 Units under the skin 2 (two) times a day before meals for 30 days Before breakfast and dinner (Patient taking differently: Inject 10 Units under the skin 2 (two) times a day before meals Before breakfast and dinner ), Disp: , Rfl: 0    Lancets (LIFESCAN UNISTIK 2) MISC, [employer] One Touch Ultramini Meter; use as directed; 1; 0; 31-Oct-2016; 31-Oct-2016; Melida Duran;  Active, Disp: , Rfl:     liraglutide (VICTOZA) injection, Inject 0 3 mL (1 8 mg total) under the skin daily, Disp: 5 pen, Rfl: 3    Multiple Vitamin (MULTIVITAMIN) capsule, Take 1 capsule by mouth daily, Disp: , Rfl:     NOVOLOG MIX 70/30 FLEXPEN (70-30) 100 units/mL injection pen, INJECT 20 UNITS SUBCUTANEOUSLY EVERY 12 HOURS (Patient taking differently: INJECT 15 UNITS SUBCUTANEOUSLY QD), Disp: 15 mL, Rfl: 5    omeprazole (PriLOSEC) 20 mg delayed release capsule, Take 20 mg by mouth daily, Disp: , Rfl:     phentermine (ADIPEX-P) 37 5 MG tablet, Take 1 tablet (37 5 mg total) by mouth daily, Disp: 30 tablet, Rfl: 0    polyethylene glycol (MIRALAX) 17 g packet, Take 17 g by mouth daily, Disp: 14 each, Rfl: 0    predniSONE 10 mg tablet, 50 mg PO daily x 2 days then decrease by 10 mg every 2 days until finished, Disp: 30 tablet, Rfl: 0    tamsulosin (FLOMAX) 0 4 mg, Take 0 4 mg by mouth daily with dinner , Disp: , Rfl:     Allergies   Allergen Reactions    Enalapril Anaphylaxis and Throat Swelling           Review of Systems   Constitutional: Negative for activity change and fever  Gastrointestinal: Negative for abdominal pain  Musculoskeletal: Positive for arthralgias  Negative for back pain  Objective:      /58 (BP Location: Right arm, Patient Position: Sitting, Cuff Size: Standard)   Pulse 74   Resp 16   Wt 103 kg (226 lb 12 8 oz)   SpO2 96%   BMI 34 64 kg/m²            Physical Exam   Constitutional: He is oriented to person, place, and time  He appears well-developed and well-nourished  No distress  HENT:   Head: Normocephalic and atraumatic  Musculoskeletal:        Legs:  Neurological: He is alert and oriented to person, place, and time  Skin: He is not diaphoretic  Psychiatric: He has a normal mood and affect  His behavior is normal  Judgment and thought content normal    Nursing note and vitals reviewed        Large joint arthrocentesis  Date/Time: 9/4/2018 1:49 PM  Consent given by: patient  Site marked: site marked  Timeout: Immediately prior to procedure a time out was called to verify the correct patient, procedure, equipment, support staff and site/side marked as required   Supporting Documentation  Indications: pain   Procedure Details  Location: hip - R greater trochanteric bursa  Preparation: Patient was prepped and draped in the usual sterile fashion  Needle size: 25 G  Ultrasound guidance: no  Approach: lateral  Medications administered: 40 mg triamcinolone acetonide 40 mg/mL; 2 mL lidocaine (PF) 1 %    Patient tolerance: patient tolerated the procedure well with no immediate complications  Dressing:  Sterile dressing applied

## 2018-09-05 NOTE — TELEPHONE ENCOUNTER
Pt said he cannot take vicodin because it upsets his stomach   Is he asking for tylenol with codeine

## 2018-09-06 DIAGNOSIS — M25.551 PAIN OF RIGHT HIP JOINT: Primary | ICD-10-CM

## 2018-09-06 RX ORDER — ACETAMINOPHEN AND CODEINE PHOSPHATE 300; 30 MG/1; MG/1
1 TABLET ORAL EVERY 6 HOURS PRN
Qty: 25 TABLET | Refills: 0 | Status: SHIPPED | OUTPATIENT
Start: 2018-09-06 | End: 2018-09-20 | Stop reason: SDUPTHER

## 2018-09-13 DIAGNOSIS — E11.9 TYPE 2 DIABETES MELLITUS WITHOUT COMPLICATION, WITHOUT LONG-TERM CURRENT USE OF INSULIN (HCC): Primary | ICD-10-CM

## 2018-09-13 RX ORDER — GLIPIZIDE AND METFORMIN HCL 5; 500 MG/1; MG/1
1 TABLET, FILM COATED ORAL DAILY
Qty: 90 TABLET | Refills: 3 | Status: SHIPPED | OUTPATIENT
Start: 2018-09-13 | End: 2019-06-05 | Stop reason: SINTOL

## 2018-09-20 ENCOUNTER — OFFICE VISIT (OUTPATIENT)
Dept: FAMILY MEDICINE CLINIC | Facility: CLINIC | Age: 68
End: 2018-09-20
Payer: COMMERCIAL

## 2018-09-20 VITALS
BODY MASS INDEX: 34.1 KG/M2 | WEIGHT: 225 LBS | SYSTOLIC BLOOD PRESSURE: 118 MMHG | RESPIRATION RATE: 18 BRPM | OXYGEN SATURATION: 99 % | HEART RATE: 82 BPM | HEIGHT: 68 IN | TEMPERATURE: 98 F | DIASTOLIC BLOOD PRESSURE: 74 MMHG

## 2018-09-20 DIAGNOSIS — Z23 INFLUENZA VACCINE NEEDED: ICD-10-CM

## 2018-09-20 DIAGNOSIS — E66.01 CLASS 2 SEVERE OBESITY DUE TO EXCESS CALORIES WITH SERIOUS COMORBIDITY AND BODY MASS INDEX (BMI) OF 35.0 TO 35.9 IN ADULT (HCC): ICD-10-CM

## 2018-09-20 DIAGNOSIS — N40.1 BENIGN PROSTATIC HYPERPLASIA WITH URINARY FREQUENCY: Primary | ICD-10-CM

## 2018-09-20 DIAGNOSIS — Z79.4 TYPE 2 DIABETES MELLITUS WITH INSULIN THERAPY (HCC): ICD-10-CM

## 2018-09-20 DIAGNOSIS — R35.0 BENIGN PROSTATIC HYPERPLASIA WITH URINARY FREQUENCY: Primary | ICD-10-CM

## 2018-09-20 DIAGNOSIS — M25.551 PAIN OF RIGHT HIP JOINT: ICD-10-CM

## 2018-09-20 DIAGNOSIS — E66.01 MORBID OBESITY DUE TO EXCESS CALORIES (HCC): Chronic | ICD-10-CM

## 2018-09-20 DIAGNOSIS — E11.9 TYPE 2 DIABETES MELLITUS WITH INSULIN THERAPY (HCC): ICD-10-CM

## 2018-09-20 PROBLEM — R55 SYNCOPE: Status: RESOLVED | Noted: 2017-03-04 | Resolved: 2018-09-20

## 2018-09-20 PROCEDURE — 99214 OFFICE O/P EST MOD 30 MIN: CPT | Performed by: FAMILY MEDICINE

## 2018-09-20 PROCEDURE — 90662 IIV NO PRSV INCREASED AG IM: CPT

## 2018-09-20 PROCEDURE — G0008 ADMIN INFLUENZA VIRUS VAC: HCPCS

## 2018-09-20 PROCEDURE — 3008F BODY MASS INDEX DOCD: CPT | Performed by: FAMILY MEDICINE

## 2018-09-20 RX ORDER — ACETAMINOPHEN AND CODEINE PHOSPHATE 300; 30 MG/1; MG/1
1 TABLET ORAL EVERY 6 HOURS PRN
Qty: 25 TABLET | Refills: 0 | Status: SHIPPED | OUTPATIENT
Start: 2018-09-20 | End: 2018-11-26

## 2018-09-20 RX ORDER — PHENTERMINE HYDROCHLORIDE 37.5 MG/1
37.5 TABLET ORAL DAILY
Qty: 30 TABLET | Refills: 0 | Status: SHIPPED | OUTPATIENT
Start: 2018-09-20 | End: 2018-10-24 | Stop reason: SDUPTHER

## 2018-09-20 NOTE — PROGRESS NOTES
Assessment/Plan:         Diagnoses and all orders for this visit:    Benign prostatic hyperplasia with urinary frequency  -     PSA, total and free; Future    Morbid obesity due to excess calories (HCC)    Type 2 diabetes mellitus with insulin therapy ([facility] )  -     Hemoglobin A1C; Future  -     Lipid Panel with Direct LDL reflex; Future  -     Comprehensive metabolic panel  -     Microalbumin / creatinine urine ratio    Class 2 severe obesity due to excess calories with serious comorbidity and body mass index (BMI) of 35 0 to 35 9 in adult (HCC)  -     phentermine (ADIPEX-P) 37 5 MG tablet; Take 1 tablet (37 5 mg total) by mouth daily    Pain of right hip joint  -     acetaminophen-codeine (TYLENOL #3) 300-30 mg per tablet; Take 1 tablet by mouth every 6 (six) hours as needed for moderate pain    Influenza vaccine needed  -     influenza vaccine, 2990-7081, high-dose, PF 0 5 mL, for patients 65 yr+ (FLUZONE HIGH-DOSE)  -     Ambulatory referral to Urology      doing well with weight loss goal   To continue portion control - to cut back to 2000 claribel /day  Exercise 2-3 times a week up to 150 minutes a week   Drink 60-80 oz of water a day         Subjective:      Patient ID: Sowmya Martinez is a 76 y o  male  Right hip pain has been better with injection last 2 weeks ago  Taking tylenol #3 as needed which helps with the pain when its really bad  He is eatin 2600 claribel/day  Obesity  Sowmya Martinez is a 76 y o  male who presents for follow of obesity  He has noted a weight loss of approximately 5 pounds over the last 1 month  He feels ideal weight is 170 pounds  History of eating disorders: none  Previous treatments for obesity include: none  Associated medical conditions: none  Associated medications: none  Cardiovascular risk factors besides obesity: advanced age (older than 54 for men, 72 for women), diabetes mellitus and dyslipidemia  Diabetes   He presents for his follow-up diabetic visit   He has type 2 diabetes mellitus  His disease course has been stable  There are no hypoglycemic associated symptoms  There are no diabetic associated symptoms  There are no hypoglycemic complications  Symptoms are stable  There are no diabetic complications  There are no known risk factors for coronary artery disease  Current diabetic treatment includes diet  He is compliant with treatment all of the time  His weight is stable  He has not had a previous visit with a dietitian  There is no change in his home blood glucose trend  An ACE inhibitor/angiotensin II receptor blocker is being taken  Eye exam is not current  The following portions of the patient's history were reviewed and updated as appropriate: allergies, current medications, past family history, past medical history, past social history, past surgical history and problem list     Review of Systems   Constitutional: Negative  HENT: Negative  Eyes: Negative  Respiratory: Negative  Cardiovascular: Negative  Gastrointestinal: Negative  Endocrine: Negative  Genitourinary: Negative  Musculoskeletal: Positive for arthralgias and back pain  Allergic/Immunologic: Negative  Neurological: Negative  Hematological: Negative  Psychiatric/Behavioral: Negative  Objective:      /74 (BP Location: Left arm, Patient Position: Sitting, Cuff Size: Standard)   Pulse 82   Temp 98 °F (36 7 °C)   Resp 18   Ht 5' 7 84" (1 723 m)   Wt 102 kg (225 lb)   SpO2 99%   BMI 34 37 kg/m²          Physical Exam   Constitutional: He is oriented to person, place, and time  He appears well-developed and well-nourished  HENT:   Head: Normocephalic and atraumatic  Eyes: EOM are normal  Pupils are equal, round, and reactive to light  Neck: Normal range of motion  Neck supple  Cardiovascular: Normal rate and regular rhythm  Pulmonary/Chest: Effort normal and breath sounds normal    Musculoskeletal: Normal range of motion   He exhibits no edema or tenderness  Neurological: He is alert and oriented to person, place, and time  Psychiatric: He has a normal mood and affect   His behavior is normal

## 2018-09-24 ENCOUNTER — HOSPITAL ENCOUNTER (OUTPATIENT)
Dept: MRI IMAGING | Facility: HOSPITAL | Age: 68
Discharge: HOME | End: 2018-09-24
Attending: FAMILY MEDICINE
Payer: COMMERCIAL

## 2018-09-24 DIAGNOSIS — K86.2 PANCREATIC CYST: ICD-10-CM

## 2018-09-24 PROCEDURE — 74183 MRI ABD W/O CNTR FLWD CNTR: CPT

## 2018-09-24 PROCEDURE — A9585 GADOBUTROL INJECTION: HCPCS | Performed by: RADIOLOGY

## 2018-09-24 RX ADMIN — GADOBUTROL 10 ML: 604.72 INJECTION INTRAVENOUS at 15:30

## 2018-09-25 ENCOUNTER — TELEPHONE (OUTPATIENT)
Dept: FAMILY MEDICINE CLINIC | Facility: CLINIC | Age: 68
End: 2018-09-25

## 2018-09-25 NOTE — TELEPHONE ENCOUNTER
EMETERIO OTTO  [facility] called to notify the patient has MRI abdomen w wo contrast has significant findings  Please advise

## 2018-09-26 DIAGNOSIS — K86.2 PANCREATIC CYST: Primary | Chronic | ICD-10-CM

## 2018-09-27 ENCOUNTER — TELEPHONE (OUTPATIENT)
Dept: GASTROENTEROLOGY | Facility: AMBULARY SURGERY CENTER | Age: 68
End: 2018-09-27

## 2018-10-03 ENCOUNTER — OFFICE VISIT (OUTPATIENT)
Dept: GASTROENTEROLOGY | Facility: CLINIC | Age: 68
End: 2018-10-03
Payer: COMMERCIAL

## 2018-10-03 VITALS
HEIGHT: 67 IN | BODY MASS INDEX: 35.25 KG/M2 | HEART RATE: 75 BPM | SYSTOLIC BLOOD PRESSURE: 126 MMHG | TEMPERATURE: 96.8 F | DIASTOLIC BLOOD PRESSURE: 74 MMHG | WEIGHT: 224.6 LBS

## 2018-10-03 DIAGNOSIS — Z86.010 HISTORY OF COLON POLYPS: ICD-10-CM

## 2018-10-03 DIAGNOSIS — K86.2 PANCREATIC CYST: Primary | ICD-10-CM

## 2018-10-03 PROBLEM — Z86.0100 HISTORY OF COLON POLYPS: Status: ACTIVE | Noted: 2018-10-03

## 2018-10-03 PROCEDURE — 99214 OFFICE O/P EST MOD 30 MIN: CPT | Performed by: INTERNAL MEDICINE

## 2018-10-03 NOTE — ASSESSMENT & PLAN NOTE
He has history of colon polyps on the last colonoscopy was in July 2016     -due for surveillance colonoscopy next year

## 2018-10-03 NOTE — PROGRESS NOTES
Follow-up Note -  [facility]  Candis Chan 1950 male         Reason:  Pancreatic cyst    HPI:  Mr Fadia Jones came in because of incidental finding of pancreatic cyst   He was seen in the emergency room because of right-sided back pain and had a CT scan done which showed pancreatic cyst and went for MRI/MRCP  He had steroid injection in to the right hip with improvement of the pain  Good appetite denies any recent weight loss  He had gastric sleeve surgery couple of years ago after which he lost about 130 lb  Regular bowel movements and denies any blood or mucus in the stool  I did EGD and colonoscopies on him in July 2016 along with polypectomy  REVIEW OF SYSTEMS: Review of Systems   Constitutional: Negative for activity change, appetite change, chills, diaphoresis, fatigue, fever and unexpected weight change  HENT: Negative for ear discharge, ear pain, facial swelling, hearing loss, nosebleeds, sore throat, tinnitus and voice change  Eyes: Negative for pain, discharge, redness, itching and visual disturbance  Respiratory: Negative for apnea, cough, chest tightness, shortness of breath and wheezing  Cardiovascular: Negative for chest pain and palpitations  Gastrointestinal:        As noted in HPI   Endocrine: Negative for cold intolerance, heat intolerance and polyuria  Genitourinary: Negative for difficulty urinating, dysuria, flank pain, hematuria and urgency  Musculoskeletal: Negative for arthralgias, back pain, gait problem, joint swelling and myalgias  Skin: Negative for rash and wound  Neurological: Negative for dizziness, tremors, seizures, speech difficulty, light-headedness, numbness and headaches  Hematological: Negative for adenopathy  Does not bruise/bleed easily  Psychiatric/Behavioral: Negative for agitation, behavioral problems and confusion  The patient is not nervous/anxious           Past Medical History:   Diagnosis Date    Arthritis     BPH (benign prostatic hypertrophy)     CPAP (continuous positive airway pressure) dependence     Diabetes mellitus ([address] )     Diverticulosis     Enlarged prostate     Erectile dysfunction     GERD (gastroesophageal reflux disease)     Hiatal hernia     Hypercholesteremia     Hypertension     Obesity     Sleep apnea     Wears partial dentures     partial upper and lower      Past Surgical History:   Procedure Laterality Date    ABDOMINAL ADHESION SURGERY N/A 11/28/2016    Procedure: EXTENSIVE LYSIS ADHESIONS;  Surgeon: Ruiz Chou MD;  [facility];  Service:    Glenora Grieves FRACTURE SURGERY Left     CHOLECYSTECTOMY      COLON SURGERY      colon resection    COLONOSCOPY      COLOSTOMY      COLOSTOMY CLOSURE      DIAGNOSTIC LAPAROSCOPY      GASTRIC BYPASS      failed due to adhesions    HERNIA REPAIR      abdominal    ME ESOPHAGOGASTRODUODENOSCOPY TRANSORAL DIAGNOSTIC N/A 7/6/2016    Procedure: EGD AND COLONOSCOPY;  Surgeon: Tressa Monson MD;  [facility]; Service: Gastroenterology    ME LAP, ANEESH RESTRICT PROC, LONGITUDINAL GASTRECTOMY N/A 11/28/2016    Procedure: GASTRECTOMY SLEEVE LAPAROSCOPIC;  Surgeon: Ruiz Chou MD;  [facility];  Service: Bariatrics    TONSILLECTOMY       Social History     Social History    Marital status: Single     Spouse name: N/A    Number of children: N/A    Years of education: N/A     Occupational History    Not on file       Social History Main Topics    Smoking status: Former Smoker     Quit date: 11/10/2001    Smokeless tobacco: Never Used    Alcohol use No    Drug use: No    Sexual activity: Yes     Other Topics Concern    Not on file     Social History Narrative    No narrative on file     Family History   Problem Relation Age of Onset    Heart disease Mother     Diabetes Father     Colon cancer Neg Hx     Liver disease Neg Hx      Enalapril  Current Outpatient Prescriptions   Medication Sig Dispense Refill    acetaminophen-codeine (TYLENOL #3) 300-30 mg per tablet Take 1 tablet by mouth every 6 (six) hours as needed for moderate pain 25 tablet 0    BIOTIN PO Take 1 tablet by mouth daily 89082 units as per pt       Blood Glucose Monitoring Suppl (GLUCOCARD VITAL MONITOR) w/Device KIT by Does not apply route      calcium carbonate (OS-GILDA) 600 MG tablet Take 600 mg by mouth 2 (two) times a day with meals      cholecalciferol (VITAMIN D3) 1,000 units tablet Take 1,000 Units by mouth daily      cyanocobalamin (VITAMIN B-12) 1,000 mcg tablet Take 1,000 mcg by mouth every other day   cyclobenzaprine (FLEXERIL) 10 mg tablet Take 1 tablet (10 mg total) by mouth 3 (three) times a day as needed for muscle spasms 30 tablet 0    glipiZIDE-metFORMIN (METAGLIP) 5-500 MG per tablet Take 1 tablet by mouth daily 90 tablet 3    Lancets (LIFESCAN UNISTIK 2) MISC Lifescan One Touch Ultramini Meter; use as directed; 1; 0; 31-Oct-2016; 31-Oct-2016; Melida Duran; Active      liraglutide (VICTOZA) injection Inject 0 3 mL (1 8 mg total) under the skin daily 5 pen 3    Multiple Vitamin (MULTIVITAMIN) capsule Take 1 capsule by mouth daily      NOVOLOG MIX 70/30 FLEXPEN (70-30) 100 units/mL injection pen INJECT 20 UNITS SUBCUTANEOUSLY EVERY 12 HOURS (Patient taking differently: INJECT 15 UNITS SUBCUTANEOUSLY QD) 15 mL 5    phentermine (ADIPEX-P) 37 5 MG tablet Take 1 tablet (37 5 mg total) by mouth daily 30 tablet 0    polyethylene glycol (MIRALAX) 17 g packet Take 17 g by mouth daily 14 each 0    tamsulosin (FLOMAX) 0 4 mg Take 0 4 mg by mouth daily with dinner        glipiZIDE-metFORMIN (METAGLIP) 2 5-250 MG per tablet Take 1 tablet by mouth 2 (two) times a day before meals      insulin aspart protamine-insulin aspart (NovoLOG 70/30) 100 units/mL injection Inject 10 Units under the skin 2 (two) times a day before meals for 30 days Before breakfast and dinner (Patient not taking: Reported on 10/3/2018 )  0    omeprazole (PriLOSEC) 20 mg delayed release capsule Take 20 mg by mouth daily      predniSONE 10 mg tablet 50 mg PO daily x 2 days then decrease by 10 mg every 2 days until finished (Patient not taking: Reported on 10/3/2018 ) 30 tablet 0     No current facility-administered medications for this visit  Blood pressure 126/74, pulse 75, temperature (!) 96 8 °F (36 °C), temperature source Tympanic, height 5' 7" (1 702 m), weight 102 kg (224 lb 9 6 oz)  PHYSICAL EXAM: Physical Exam     Lab Results   Component Value Date    WBC 9 41 08/23/2018    HGB 14 1 08/23/2018    HCT 42 6 08/23/2018    MCV 91 08/23/2018     08/23/2018     Lab Results   Component Value Date    GLUCOSE 215 (H) 01/07/2016    CALCIUM 8 8 08/23/2018     08/23/2018    K 4 7 08/23/2018    CO2 24 08/23/2018     08/23/2018    BUN 23 08/23/2018    CREATININE 0 88 08/23/2018     Lab Results   Component Value Date    ALT 25 08/23/2018    AST 48 (H) 08/23/2018    ALKPHOS 70 08/23/2018    BILITOT 0 36 01/07/2016     Lab Results   Component Value Date    INR 0 94 08/09/2017    INR 1 23 (H) 03/03/2017    PROTIME 12 8 08/09/2017    PROTIME 15 2 (H) 03/03/2017       Mri Abdomen W Wo Contrast    Result Date: 9/25/2018  Impression: 2 0 x 1 6 x 3 2 cm pancreatic uncinate process cystic lesion has a a microcystic appearance within internal septations and could represent cystic malignancy, benign cyst, branch duct IPMN or serous cystic neoplasm  A central scar or calcification seen in some serous cystic neoplasms is not apparent  No apparent solid component or suspicious area of enhancement  No change since August 9, 2017  Given the size criteria, our institutional recommendations for cysts more than 2 cm, not definitively characterized as serous cystic neoplasm, is for gastroenterology consultation/possible EUS  Normal MRCP  The study was marked in EPIC for significant notification   Workstation performed: [id number]       ASSESSMENT & PLAN:    Pancreatic cyst  Cyst in the pancreatic uncinate process with internal septations-should rule out cystic malignancy  It has been stable for about a year which is somewhat reassuring     -schedule for EUS    -discussed with patient and his wife in detail about the different possible etiologies and given reassurance      -explained about the risks and benefits of the endoscopy ultrasound with FNA    History of colon polyps  He has history of colon polyps on the last colonoscopy was in July 2016     -due for surveillance colonoscopy next year

## 2018-10-03 NOTE — ASSESSMENT & PLAN NOTE
Cyst in the pancreatic uncinate process with internal septations-should rule out cystic malignancy  It has been stable for about a year which is somewhat reassuring     -schedule for EUS    -discussed with patient and his wife in detail about the different possible etiologies and given reassurance      -explained about the risks and benefits of the endoscopy ultrasound with FNA

## 2018-10-05 ENCOUNTER — TELEPHONE (OUTPATIENT)
Dept: GASTROENTEROLOGY | Facility: CLINIC | Age: 68
End: 2018-10-05

## 2018-10-05 NOTE — TELEPHONE ENCOUNTER
----- Message from Anatoly Goss sent at 10/3/2018 10:11 AM EDT -----  Regarding: EUS   Please schedule Mr Radha Rader for an EUS due to Pancreatic Cyst  October 13-20th the patient and his wife will not be available  Patient is diabetic and is on Metformin and Insulin  Please contact pt to schedule  THANK YOU!

## 2018-10-05 NOTE — TELEPHONE ENCOUNTER
EUS scheduled with Dr Isaacs in [place] on 10/25/2018  I gave pt verbal instructions/mailed  Pt is Diabetic

## 2018-10-23 ENCOUNTER — TRANSCRIBE ORDERS (OUTPATIENT)
Dept: LAB | Facility: CLINIC | Age: 68
End: 2018-10-23

## 2018-10-23 ENCOUNTER — APPOINTMENT (OUTPATIENT)
Dept: LAB | Facility: CLINIC | Age: 68
End: 2018-10-23
Payer: COMMERCIAL

## 2018-10-23 DIAGNOSIS — N40.1 BENIGN PROSTATIC HYPERPLASIA WITH URINARY FREQUENCY: ICD-10-CM

## 2018-10-23 DIAGNOSIS — E11.9 TYPE 2 DIABETES MELLITUS WITH INSULIN THERAPY (HCC): ICD-10-CM

## 2018-10-23 DIAGNOSIS — Z79.4 TYPE 2 DIABETES MELLITUS WITH INSULIN THERAPY (HCC): ICD-10-CM

## 2018-10-23 DIAGNOSIS — R35.0 BENIGN PROSTATIC HYPERPLASIA WITH URINARY FREQUENCY: ICD-10-CM

## 2018-10-23 LAB
ALBUMIN SERPL BCP-MCNC: 3.4 G/DL (ref 3.5–5)
ALP SERPL-CCNC: 87 U/L (ref 46–116)
ALT SERPL W P-5'-P-CCNC: 34 U/L (ref 12–78)
ANION GAP SERPL CALCULATED.3IONS-SCNC: 8 MMOL/L (ref 4–13)
AST SERPL W P-5'-P-CCNC: 20 U/L (ref 5–45)
BILIRUB SERPL-MCNC: 0.3 MG/DL (ref 0.2–1)
BUN SERPL-MCNC: 19 MG/DL (ref 5–25)
CALCIUM SERPL-MCNC: 9.1 MG/DL (ref 8.3–10.1)
CHLORIDE SERPL-SCNC: 102 MMOL/L (ref 100–108)
CHOLEST SERPL-MCNC: 176 MG/DL (ref 50–200)
CO2 SERPL-SCNC: 31 MMOL/L (ref 21–32)
CREAT SERPL-MCNC: 0.96 MG/DL (ref 0.6–1.3)
CREAT UR-MCNC: 149 MG/DL
EST. AVERAGE GLUCOSE BLD GHB EST-MCNC: 146 MG/DL
GFR SERPL CREATININE-BSD FRML MDRD: 81 ML/MIN/1.73SQ M
GLUCOSE P FAST SERPL-MCNC: 152 MG/DL (ref 65–99)
HBA1C MFR BLD: 6.7 % (ref 4.2–6.3)
HDLC SERPL-MCNC: 42 MG/DL (ref 40–60)
LDLC SERPL CALC-MCNC: 106 MG/DL (ref 0–100)
MICROALBUMIN UR-MCNC: 21.2 MG/L (ref 0–20)
MICROALBUMIN/CREAT 24H UR: 14 MG/G CREATININE (ref 0–30)
POTASSIUM SERPL-SCNC: 4.4 MMOL/L (ref 3.5–5.3)
PROT SERPL-MCNC: 7.4 G/DL (ref 6.4–8.2)
SODIUM SERPL-SCNC: 141 MMOL/L (ref 136–145)
TRIGL SERPL-MCNC: 141 MG/DL

## 2018-10-23 PROCEDURE — 84153 ASSAY OF PSA TOTAL: CPT

## 2018-10-23 PROCEDURE — 84154 ASSAY OF PSA FREE: CPT

## 2018-10-23 PROCEDURE — 36415 COLL VENOUS BLD VENIPUNCTURE: CPT | Performed by: FAMILY MEDICINE

## 2018-10-23 PROCEDURE — 83036 HEMOGLOBIN GLYCOSYLATED A1C: CPT

## 2018-10-23 PROCEDURE — 80061 LIPID PANEL: CPT

## 2018-10-23 PROCEDURE — 82570 ASSAY OF URINE CREATININE: CPT | Performed by: FAMILY MEDICINE

## 2018-10-23 PROCEDURE — 80053 COMPREHEN METABOLIC PANEL: CPT | Performed by: FAMILY MEDICINE

## 2018-10-23 PROCEDURE — 3061F NEG MICROALBUMINURIA REV: CPT | Performed by: FAMILY MEDICINE

## 2018-10-23 PROCEDURE — 82043 UR ALBUMIN QUANTITATIVE: CPT | Performed by: FAMILY MEDICINE

## 2018-10-24 ENCOUNTER — OFFICE VISIT (OUTPATIENT)
Dept: FAMILY MEDICINE CLINIC | Facility: CLINIC | Age: 68
End: 2018-10-24
Payer: COMMERCIAL

## 2018-10-24 VITALS
SYSTOLIC BLOOD PRESSURE: 116 MMHG | HEART RATE: 78 BPM | RESPIRATION RATE: 18 BRPM | HEIGHT: 67 IN | DIASTOLIC BLOOD PRESSURE: 66 MMHG | TEMPERATURE: 98.5 F | WEIGHT: 226 LBS | OXYGEN SATURATION: 99 % | BODY MASS INDEX: 35.47 KG/M2

## 2018-10-24 DIAGNOSIS — K86.2 PANCREATIC CYST: ICD-10-CM

## 2018-10-24 DIAGNOSIS — I10 BENIGN ESSENTIAL HYPERTENSION: ICD-10-CM

## 2018-10-24 DIAGNOSIS — Z79.4 TYPE 2 DIABETES MELLITUS WITH INSULIN THERAPY (HCC): Primary | ICD-10-CM

## 2018-10-24 DIAGNOSIS — K91.2 POSTGASTRECTOMY MALABSORPTION: ICD-10-CM

## 2018-10-24 DIAGNOSIS — Z90.3 POSTGASTRECTOMY MALABSORPTION: ICD-10-CM

## 2018-10-24 DIAGNOSIS — E66.01 CLASS 2 SEVERE OBESITY DUE TO EXCESS CALORIES WITH SERIOUS COMORBIDITY AND BODY MASS INDEX (BMI) OF 35.0 TO 35.9 IN ADULT (HCC): ICD-10-CM

## 2018-10-24 DIAGNOSIS — G47.33 OBSTRUCTIVE SLEEP APNEA: ICD-10-CM

## 2018-10-24 DIAGNOSIS — K21.9 GASTROESOPHAGEAL REFLUX DISEASE, ESOPHAGITIS PRESENCE NOT SPECIFIED: ICD-10-CM

## 2018-10-24 DIAGNOSIS — E11.9 TYPE 2 DIABETES MELLITUS WITH INSULIN THERAPY (HCC): Primary | ICD-10-CM

## 2018-10-24 PROBLEM — R00.1 SINUS BRADYCARDIA: Status: RESOLVED | Noted: 2017-03-04 | Resolved: 2018-10-24

## 2018-10-24 PROBLEM — M70.61 TROCHANTERIC BURSITIS OF RIGHT HIP: Status: RESOLVED | Noted: 2018-09-04 | Resolved: 2018-10-24

## 2018-10-24 LAB
PSA FREE MFR SERPL: 29.6 %
PSA FREE SERPL-MCNC: 0.77 NG/ML
PSA SERPL-MCNC: 2.6 NG/ML (ref 0–4)

## 2018-10-24 PROCEDURE — 99214 OFFICE O/P EST MOD 30 MIN: CPT | Performed by: FAMILY MEDICINE

## 2018-10-24 PROCEDURE — 3074F SYST BP LT 130 MM HG: CPT | Performed by: FAMILY MEDICINE

## 2018-10-24 PROCEDURE — 3078F DIAST BP <80 MM HG: CPT | Performed by: FAMILY MEDICINE

## 2018-10-24 RX ORDER — PHENTERMINE HYDROCHLORIDE 37.5 MG/1
37.5 TABLET ORAL DAILY
Qty: 30 TABLET | Refills: 0 | Status: SHIPPED | OUTPATIENT
Start: 2018-10-24 | End: 2018-12-07 | Stop reason: SDUPTHER

## 2018-10-24 NOTE — PROGRESS NOTES
Assessment/Plan:         Diagnoses and all orders for this visit:    Type 2 diabetes mellitus with insulin therapy ([facility] )  -     Hemoglobin A1C; Future    Postgastrectomy malabsorption    Gastroesophageal reflux disease, esophagitis presence not specified    Obstructive sleep apnea  -     Ambulatory referral to Sleep Medicine    Benign essential hypertension    Class 2 severe obesity due to excess calories with serious comorbidity and body mass index (BMI) of 35 0 to 35 9 in adult ([facility])  -     phentermine (ADIPEX-P) 37 5 MG tablet; Take 1 tablet (37 5 mg total) by mouth daily    Pancreatic cyst        1) diabetes: doing better  Continue current meds   2) Obesity: continue Phentermine  Discussed side effects with the patient   3) Hypertension: stable  Not on meds  4) Hyperlipidemia: stable  Watching his diet  5) AUNG: not using CPAP machine since the weight loss   6) pancreatic cyst: getting endoscopy with GI     To continue portion control - to cut back to 1400 claribel /day  Exercise 2-3 times a week up to 150 minutes a week   Drink 60-80 oz of water a day         Subjective:      Patient ID: Milena Friedman is a 76 y o  male  Right hip pain has been better with injection last 4 weeks ago  He is eating less than 2000 claribel /day but not tracking his calories   Getting 10 k steps a day  Obesity  iMlena Friedman is a 76 y o  male who presents for follow of obesity  He has noted a weight loss of approximately 0 pounds over the last 1 month  He feels ideal weight is 170 pounds  History of eating disorders: none  Previous treatments for obesity include: none  Associated medical conditions: none  Associated medications: none  Cardiovascular risk factors besides obesity: advanced age (older than 54 for men, 72 for women), diabetes mellitus and dyslipidemia  Diabetes   He presents for his follow-up diabetic visit  He has type 2 diabetes mellitus  His disease course has been stable   There are no hypoglycemic associated symptoms  There are no diabetic associated symptoms  There are no hypoglycemic complications  Symptoms are stable  There are no diabetic complications  There are no known risk factors for coronary artery disease  Current diabetic treatment includes diet  He is compliant with treatment all of the time  His weight is stable  He is following a diabetic diet  He has not had a previous visit with a dietitian  There is no change in his home blood glucose trend  His overall blood glucose range is 110-130 mg/dl  An ACE inhibitor/angiotensin II receptor blocker is being taken  He does not see a podiatrist Eye exam is current  Hypertension   This is a chronic problem  The current episode started more than 1 year ago  The problem has been gradually improving since onset  The problem is controlled  Pertinent negatives include no anxiety, malaise/fatigue, peripheral edema or PND  The following portions of the patient's history were reviewed and updated as appropriate: allergies, current medications, past family history, past medical history, past social history, past surgical history and problem list     Review of Systems   Constitutional: Negative  Negative for malaise/fatigue  HENT: Negative  Eyes: Negative  Respiratory: Negative  Cardiovascular: Negative  Negative for PND  Gastrointestinal: Negative  Endocrine: Negative  Genitourinary: Negative  Musculoskeletal: Positive for arthralgias and back pain  Allergic/Immunologic: Negative  Neurological: Negative  Hematological: Negative  Psychiatric/Behavioral: Negative  Objective:      /66 (BP Location: Left arm, Patient Position: Sitting, Cuff Size: Standard)   Pulse 78   Temp 98 5 °F (36 9 °C)   Resp 18   Ht 5' 7" (1 702 m)   Wt 103 kg (226 lb)   SpO2 99%   BMI 35 40 kg/m²          Physical Exam   Constitutional: He is oriented to person, place, and time  He appears well-developed and well-nourished     HENT:   Head: Normocephalic and atraumatic  Eyes: Pupils are equal, round, and reactive to light  EOM are normal    Neck: Normal range of motion  Neck supple  Cardiovascular: Normal rate and regular rhythm  Pulmonary/Chest: Effort normal and breath sounds normal    Musculoskeletal: Normal range of motion  He exhibits no edema or tenderness  Neurological: He is alert and oriented to person, place, and time  Psychiatric: He has a normal mood and affect   His behavior is normal

## 2018-10-25 ENCOUNTER — ANESTHESIA (OUTPATIENT)
Dept: GASTROENTEROLOGY | Facility: HOSPITAL | Age: 68
End: 2018-10-25
Payer: COMMERCIAL

## 2018-10-25 ENCOUNTER — ANESTHESIA EVENT (OUTPATIENT)
Dept: GASTROENTEROLOGY | Facility: HOSPITAL | Age: 68
End: 2018-10-25
Payer: COMMERCIAL

## 2018-10-25 ENCOUNTER — TELEPHONE (OUTPATIENT)
Dept: GASTROENTEROLOGY | Facility: CLINIC | Age: 68
End: 2018-10-25

## 2018-10-25 ENCOUNTER — HOSPITAL ENCOUNTER (OUTPATIENT)
Facility: HOSPITAL | Age: 68
Setting detail: OUTPATIENT SURGERY
Discharge: HOME | End: 2018-10-25
Attending: INTERNAL MEDICINE | Admitting: INTERNAL MEDICINE
Payer: COMMERCIAL

## 2018-10-25 VITALS
WEIGHT: 220 LBS | TEMPERATURE: 97.1 F | DIASTOLIC BLOOD PRESSURE: 70 MMHG | OXYGEN SATURATION: 100 % | RESPIRATION RATE: 16 BRPM | SYSTOLIC BLOOD PRESSURE: 122 MMHG | HEART RATE: 68 BPM | HEIGHT: 68 IN | BODY MASS INDEX: 33.34 KG/M2

## 2018-10-25 DIAGNOSIS — K86.2 PANCREATIC CYST: ICD-10-CM

## 2018-10-25 PROCEDURE — 88112 CYTOPATH CELL ENHANCE TECH: CPT | Performed by: PATHOLOGY

## 2018-10-25 PROCEDURE — 43238 EGD US FINE NEEDLE BX/ASPIR: CPT | Performed by: INTERNAL MEDICINE

## 2018-10-25 RX ORDER — CIPROFLOXACIN 2 MG/ML
400 INJECTION, SOLUTION INTRAVENOUS ONCE
Status: COMPLETED | OUTPATIENT
Start: 2018-10-25 | End: 2018-10-25

## 2018-10-25 RX ORDER — PROPOFOL 10 MG/ML
INJECTION, EMULSION INTRAVENOUS CONTINUOUS PRN
Status: DISCONTINUED | OUTPATIENT
Start: 2018-10-25 | End: 2018-10-25 | Stop reason: SURG

## 2018-10-25 RX ORDER — CIPROFLOXACIN 500 MG/1
500 TABLET, FILM COATED ORAL EVERY 12 HOURS SCHEDULED
Qty: 6 TABLET | Refills: 0 | Status: SHIPPED | OUTPATIENT
Start: 2018-10-25 | End: 2018-10-28

## 2018-10-25 RX ORDER — PROPOFOL 10 MG/ML
INJECTION, EMULSION INTRAVENOUS AS NEEDED
Status: DISCONTINUED | OUTPATIENT
Start: 2018-10-25 | End: 2018-10-25 | Stop reason: SURG

## 2018-10-25 RX ORDER — SODIUM CHLORIDE 9 MG/ML
125 INJECTION, SOLUTION INTRAVENOUS CONTINUOUS
Status: DISCONTINUED | OUTPATIENT
Start: 2018-10-25 | End: 2018-10-25 | Stop reason: HOSPADM

## 2018-10-25 RX ADMIN — PROPOFOL 120 MCG/KG/MIN: 10 INJECTION, EMULSION INTRAVENOUS at 13:19

## 2018-10-25 RX ADMIN — PROPOFOL 100 MG: 10 INJECTION, EMULSION INTRAVENOUS at 13:18

## 2018-10-25 RX ADMIN — CIPROFLOXACIN 400 MG: 2 INJECTION INTRAVENOUS at 13:16

## 2018-10-25 RX ADMIN — LIDOCAINE HYDROCHLORIDE 100 MG: 20 INJECTION, SOLUTION INTRAVENOUS at 13:17

## 2018-10-25 RX ADMIN — SODIUM CHLORIDE 125 ML/HR: 0.9 INJECTION, SOLUTION INTRAVENOUS at 12:34

## 2018-10-25 NOTE — H&P
History and Physical - [facility]  Arcadio Contreras 76 y o  male MRN: [id number]    HPI: Arcadio Contreras is a 76y o  year old male who presents with pancreatic cyst : 2 0 x 1 6 x 3 2 cm pancreatic uncinate process cystic lesion has a a microcystic appearance within internal septations and could represent cystic malignancy, benign cyst, branch duct IPMN or serous cystic neoplasm  Review of Systems    Historical Information   Past Medical History:   Diagnosis Date    Arthritis     BPH (benign prostatic hypertrophy)     CPAP (continuous positive airway pressure) dependence     Diabetes mellitus ([facility]     Diverticulosis     Enlarged prostate     Erectile dysfunction     GERD (gastroesophageal reflux disease)     Hiatal hernia     Hypercholesteremia     Hypertension     Obesity     Sleep apnea     Wears partial dentures     partial upper and lower     Past Surgical History:   Procedure Laterality Date    ABDOMINAL ADHESION SURGERY N/A 11/28/2016    Procedure: EXTENSIVE LYSIS ADHESIONS;  Surgeon: Karthik Skinner MD;  [facility];  Service:    [place] FRACTURE SURGERY Left     CHOLECYSTECTOMY      COLON SURGERY      colon resection    COLONOSCOPY      COLOSTOMY      COLOSTOMY CLOSURE      DIAGNOSTIC LAPAROSCOPY      GASTRIC BYPASS      failed due to adhesions    HERNIA REPAIR      abdominal    MT ESOPHAGOGASTRODUODENOSCOPY TRANSORAL DIAGNOSTIC N/A 7/6/2016    Procedure: EGD AND COLONOSCOPY;  Surgeon: Marielos Gomez MD;  [facility];   Service: Gastroenterology    MT LAP, ANEESH RESTRICT PROC, LONGITUDINAL GASTRECTOMY N/A 11/28/2016    Procedure: GASTRECTOMY SLEEVE LAPAROSCOPIC;  Surgeon: Karthik Skinner MD;  [facility];  Service: Bariatrics    TONSILLECTOMY       Social History   History   Alcohol Use No     History   Drug Use No     History   Smoking Status    Former Smoker    Quit date: 11/10/2001   Smokeless Tobacco    Never Used     Family History Problem Relation Age of Onset    Heart disease Mother     Diabetes Father     Colon cancer Neg Hx     Liver disease Neg Hx        Meds/Allergies     No prescriptions prior to admission  Allergies   Allergen Reactions    Enalapril Anaphylaxis and Throat Swelling       Objective     There were no vitals taken for this visit  PHYSICAL EXAM    Gen: NAD  CV: RRR  CHEST: Clear  ABD: soft, NT/ND  EXT: no edema  Neuro: AAO      ASSESSMENT/PLAN:  This is a 76y o  year old male here for eus for pancreatic cyst seen on imaging       PLAN:   Procedure: eus

## 2018-10-25 NOTE — ANESTHESIA PREPROCEDURE EVALUATION
Review of Systems/Medical History  Patient summary reviewed  Chart reviewed      Cardiovascular  Hyperlipidemia, Hypertension controlled,    Pulmonary       GI/Hepatic    GERD ,  Hiatal hernia,             Endo/Other  Diabetes well controlled type 2 Oral agent,      GYN       Hematology   Musculoskeletal    Arthritis     Neurology   Psychology           Physical Exam    Airway    Mallampati score: II  TM Distance: >3 FB  Neck ROM: full     Dental   No notable dental hx     Cardiovascular  Rhythm: regular, Rate: normal, Cardiovascular exam normal    Pulmonary  Pulmonary exam normal Breath sounds clear to auscultation,     Other Findings        Anesthesia Plan  ASA Score- 2     Anesthesia Type- IV sedation with anesthesia with ASA Monitors  Additional Monitors:   Airway Plan:         Plan Factors-    Induction- intravenous  Postoperative Plan- Plan for postoperative opioid use  Informed Consent- Anesthetic plan and risks discussed with patient  I personally reviewed this patient with the CRNA  Discussed and agreed on the Anesthesia Plan with the CRNA  Norris Lawson

## 2018-10-25 NOTE — OP NOTE
OPERATIVE REPORT  PATIENT NAME: Joyce Tamayo    :  1950  MRN: [id number]  Pt Location: [facility]    SURGERY DATE: 10/25/2018    Surgeon(s) and Role:     Eloy Moore MD - Primary    Preop Diagnosis:  Pancreatic cyst [K86 2]    Post-Op Diagnosis Codes:     * Pancreatic cyst [K86 2]    Procedure(s) (LRB):  LINEAR ENDOSCOPIC U/S (N/A)    Specimen(s):  ID Type Source Tests Collected by Time Destination   1 : pancreatic cyst FNA Pancreas NON-GYNECOLOGIC CYTOLOGY, FINE NEEDLE ASPIRATION Abby Bravo MD 10/25/2018  1:44 PM        ENDOSCOPIC ULTRASOUND    SEDATION: Monitored anesthesia care, check anesthesia records    INDICATIONS:  Pancreatic cyst    CONSENT:  Informed consent was obtained for the procedure, including sedation after explaining the risks and benefits of the procedure  Risks including but not limited to bleeding, perforation, infection, and missed lesion  PREPARATION:   Telemetry, pulse oximetry, blood pressure were monitored throughout the procedure  Patient was identified by myself both verbally and by visual inspection of ID band  DESCRIPTION:   Patient was placed in the left lateral decubitus position and was sedated with the above medication  The gastroscope was introduced in to the oropharynx and the esophagus was intubated under direct visualization  Scope was passed down the esophagus up to 2nd part of the duodenum  A careful inspection was made as the gastroscope was withdrawn, including a retroflexed view of the stomach; findings and interventions are described below  FINDINGS:    EGD FINDINGS:  Limited endoscopic view with oblique viewing echoendoscope was unremarkable  #1  Esophagus- unremarkable    #2  Stomach- unremarkable    #3  Duodenum- unremarkable    EUS FINDINGS:  The linear echo endoscope was used to visualize the pancreas  Multiple cystic lesions were seen    There was a 7 mm cyst in the tail of the pancreas which suggested communication with the pancreatic duct   There was a 4 mm cyst in the neck of the pancreas which also showed connection with the pancreatic duct  The pancreatic duct itself was not dilated  It measured approximately 2 mm in the body of the pancreas  A larger septated cyst was seen in the uncinate process of the pancreas  This measured 3 cm x 1 5 cm with the largest cystic component measuring 1 5 cm  No mural nodules were seen  The wall was not hyperechoic  This seem to communicate with the accessory duct  FNA was done using a 22 gauge needle  6 cc of clear fluid was aspirated  This was sent for CEA, amylase, cytology and molecular analysis  The cyst did recur after the needle was removed  Common bile duct was normal caliber  The visualized areas of the liver appeared to be unremarkable  The celiac axis was normal without any lymphadenopathy  IMPRESSIONS:      1  Multiple pancreatic cystic lesions with communication with the pancreatic duct suggestive of side branch IPMN  The largest measured 3 cm in the uncinate process of the pancreas  This was multi septated however no high risk features were seen  FNA was done and 6 cc of fluid was removed  The fluid was sent for CEA, amylase, cytology and genetic analysis  2  Normal caliber pancreatic duct and common bile duct  RECOMMENDATIONS:     1  Follow up with the results of the biopsies with Dr Katie Potts in 2 weeks  2  Ciprofloxacin 500 mg twice daily for 3 days  3  Follow up in the office with Dr Taj Nguyen  COMPLICATIONS:  None; patient tolerated the procedure well            DISPOSITION: PACU           CONDITION: Stable

## 2018-10-25 NOTE — DISCHARGE INSTR - AVS FIRST PAGE
OPERATIVE REPORT  PATIENT NAME: Joby Aaron    :  1950  MRN: [id number]  Pt Location: [facility]    SURGERY DATE: 10/25/2018    Surgeon(s) and Role:     Vijay Richmond MD - Primary    Preop Diagnosis:  Pancreatic cyst [K86 2]    Post-Op Diagnosis Codes:     * Pancreatic cyst [K86 2]    Procedure(s) (LRB):  LINEAR ENDOSCOPIC U/S (N/A)    Specimen(s):  ID Type Source Tests Collected by Time Destination   1 : pancreatic cyst FNA Pancreas NON-GYNECOLOGIC CYTOLOGY, FINE NEEDLE ASPIRATION Connor Garcia MD 10/25/2018  1:44 PM        ENDOSCOPIC ULTRASOUND    SEDATION: Monitored anesthesia care, check anesthesia records    INDICATIONS:  Pancreatic cyst    CONSENT:  Informed consent was obtained for the procedure, including sedation after explaining the risks and benefits of the procedure  Risks including but not limited to bleeding, perforation, infection, and missed lesion  PREPARATION:   Telemetry, pulse oximetry, blood pressure were monitored throughout the procedure  Patient was identified by myself both verbally and by visual inspection of ID band  DESCRIPTION:   Patient was placed in the left lateral decubitus position and was sedated with the above medication  The gastroscope was introduced in to the oropharynx and the esophagus was intubated under direct visualization  Scope was passed down the esophagus up to 2nd part of the duodenum  A careful inspection was made as the gastroscope was withdrawn, including a retroflexed view of the stomach; findings and interventions are described below  FINDINGS:    EGD FINDINGS:  Limited endoscopic view with oblique viewing echoendoscope was unremarkable  #1  Esophagus- unremarkable    #2  Stomach- unremarkable    #3  Duodenum- unremarkable    EUS FINDINGS:  The linear echo endoscope was used to visualize the pancreas  Multiple cystic lesions were seen    There was a 7 mm cyst in the tail of the pancreas which suggested communication with the pancreatic duct   There was a 4 mm cyst in the neck of the pancreas which also showed connection with the pancreatic duct  The pancreatic duct itself was not dilated  It measured approximately 2 mm in the body of the pancreas  A larger septated cyst was seen in the uncinate process of the pancreas  This measured 3 cm x 1 5 cm with the largest cystic component measuring 1 5 cm  No mural nodules were seen  The wall was not hyperechoic  This seem to communicate with the accessory duct  FNA was done using a 22 gauge needle  6 cc of clear fluid was aspirated  This was sent for CEA, amylase, cytology and molecular analysis  The cyst did recur after the needle was removed  Common bile duct was normal caliber  The visualized areas of the liver appeared to be unremarkable  The celiac axis was normal without any lymphadenopathy  IMPRESSIONS:      1  Multiple pancreatic cystic lesions with communication with the pancreatic duct suggestive of side branch IPMN  The largest measured 3 cm in the uncinate process of the pancreas  This was multi septated however no high risk features were seen  FNA was done and 6 cc of fluid was removed  The fluid was sent for CEA, amylase, cytology and genetic analysis  2  Normal caliber pancreatic duct and common bile duct  RECOMMENDATIONS:     1  Follow up with the results of the biopsies with Dr Erika Cha in 2 weeks  2  Ciprofloxacin 500 mg twice daily for 3 days  3  Follow up in the office with Dr Soham He  COMPLICATIONS:  None; patient tolerated the procedure well            DISPOSITION: PACU           CONDITION: Stable

## 2018-10-25 NOTE — TELEPHONE ENCOUNTER
I would prefer the ciprofloxacin  Is there a significant interaction? If so can he stop the glipizide for 3 days?

## 2018-10-25 NOTE — ANESTHESIA POSTPROCEDURE EVALUATION
Post-Op Assessment Note      CV Status:  Stable    Hydration Status:  Stable    PONV Controlled:  None    Airway Patency:  Patent    Post Op Vitals Reviewed: Yes          Staff: CRNA           /61 (10/25/18 1348)    Temp     Pulse 70 (10/25/18 1348)   Resp 16 (10/25/18 1348)    SpO2 100 % (10/25/18 1348)    Post procedure VS noted above, SV non obstructed on 6LFM

## 2018-10-25 NOTE — TELEPHONE ENCOUNTER
Hi Dr Alejandra Rojas,    Is there an alternative to the cipro I could prescribe for this pt? The interaction is possible hypoglycemia and his hgbA1C is 6 7  Thanks!

## 2018-10-25 NOTE — TELEPHONE ENCOUNTER
Dr Tamar Nuñez pt    [facility] pharmacy called to advise of the drug interaction between Cipro and Glipizide-Metformin   Please advise if the medication Cipro should be prescribed to the pt  [phone]

## 2018-10-25 NOTE — TELEPHONE ENCOUNTER
The pharmacy called in again and we let them know that Dr Makayla Haas would prefer the cipro and requested he hold the glipizide if it is a significant interaction

## 2018-11-08 ENCOUNTER — TELEPHONE (OUTPATIENT)
Dept: GASTROENTEROLOGY | Facility: AMBULARY SURGERY CENTER | Age: 68
End: 2018-11-08

## 2018-11-09 ENCOUNTER — TRANSCRIBE ORDERS (OUTPATIENT)
Dept: GASTROENTEROLOGY | Facility: CLINIC | Age: 68
End: 2018-11-09

## 2018-11-09 ENCOUNTER — TELEPHONE (OUTPATIENT)
Dept: GASTROENTEROLOGY | Facility: CLINIC | Age: 68
End: 2018-11-09

## 2018-11-09 DIAGNOSIS — K86.2 PANCREATIC CYST: Primary | ICD-10-CM

## 2018-11-09 NOTE — TELEPHONE ENCOUNTER
----- Message from Radha Zendejas MD sent at 11/9/2018  8:44 AM EST -----  Please help patient may be appointments for Dr Moraima Pino and Dr Daniela Herrera

## 2018-11-13 ENCOUNTER — DOCUMENTATION (OUTPATIENT)
Dept: GASTROENTEROLOGY | Facility: MEDICAL CENTER | Age: 68
End: 2018-11-13

## 2018-11-13 ENCOUNTER — OFFICE VISIT (OUTPATIENT)
Dept: GASTROENTEROLOGY | Facility: AMBULARY SURGERY CENTER | Age: 68
End: 2018-11-13
Payer: COMMERCIAL

## 2018-11-13 VITALS
HEIGHT: 68 IN | BODY MASS INDEX: 34.25 KG/M2 | SYSTOLIC BLOOD PRESSURE: 120 MMHG | HEART RATE: 86 BPM | WEIGHT: 226 LBS | TEMPERATURE: 97.6 F | RESPIRATION RATE: 18 BRPM | DIASTOLIC BLOOD PRESSURE: 64 MMHG

## 2018-11-13 DIAGNOSIS — K86.2 PANCREATIC CYST: Primary | ICD-10-CM

## 2018-11-13 PROCEDURE — 99213 OFFICE O/P EST LOW 20 MIN: CPT | Performed by: INTERNAL MEDICINE

## 2018-11-13 NOTE — PROGRESS NOTES
Follow-up Note -  [facility]  Felicitas Levy 1950 male         Reason:  Follow-up    HPI:  Mr  Gilma Kirk was found to have 2 0x1 6x3 2cm pancreatic uncinate process cystic lesion with internal septations on MRI and had endoscopic ultrasound by Dr the Karena Mcdowell couple of weeks ago which showed multiple pancreatic cystic lesions with communication with the pancreatic duct suggestive of side branch IPMN and the largest one measuring about 3 cm with internal septations however no high-risk features  FNA was done and the fluid has high amylase and CEA  Dr Chelsie Mcdowell spoke to pt in detail and he was referred to Dr Scott Johnson and patient has an appointment next week  Denies any abdominal pain, nausea or vomiting  Good appetite, no recent weight loss  He had gastric sleeve surgery couple of years ago and remembered there were a lot of adhesions that he was told about  REVIEW OF SYSTEMS: Review of Systems   Constitutional: Negative for activity change, appetite change, chills, diaphoresis, fatigue, fever and unexpected weight change  HENT: Negative for ear discharge, ear pain, facial swelling, hearing loss, nosebleeds, sore throat, tinnitus and voice change  Eyes: Negative for pain, discharge, redness, itching and visual disturbance  Respiratory: Negative for apnea, cough, chest tightness, shortness of breath and wheezing  Cardiovascular: Negative for chest pain and palpitations  Gastrointestinal:        As noted in HPI   Endocrine: Negative for cold intolerance, heat intolerance and polyuria  Genitourinary: Negative for difficulty urinating, dysuria, flank pain, hematuria and urgency  Musculoskeletal: Negative for arthralgias, back pain, gait problem, joint swelling and myalgias  Skin: Negative for rash and wound  Neurological: Negative for dizziness, tremors, seizures, speech difficulty, light-headedness, numbness and headaches  Hematological: Negative for adenopathy   Does not bruise/bleed easily  Psychiatric/Behavioral: Negative for agitation, behavioral problems and confusion  The patient is not nervous/anxious  Past Medical History:   Diagnosis Date    Arthritis     BPH (benign prostatic hypertrophy)     CPAP (continuous positive airway pressure) dependence     Diabetes mellitus (HCC)     Diverticulosis     Enlarged prostate     Erectile dysfunction     GERD (gastroesophageal reflux disease)     Hiatal hernia     Hypercholesteremia     Hypertension     Obesity     Sleep apnea     Wears partial dentures     partial upper and lower      Past Surgical History:   Procedure Laterality Date    ABDOMINAL ADHESION SURGERY N/A 11/28/2016    Procedure: EXTENSIVE LYSIS ADHESIONS;  Surgeon: Annalise Marrero MD;  [facility];  Service:    [place] FRACTURE SURGERY Left     CHOLECYSTECTOMY      COLON SURGERY      colon resection    COLONOSCOPY      COLOSTOMY      COLOSTOMY CLOSURE      DIAGNOSTIC LAPAROSCOPY      GASTRIC BYPASS      failed due to adhesions    HERNIA REPAIR      abdominal    CA EDG US EXAM SURGICAL ALTER STOM DUODENUM/JEJUNUM N/A 10/25/2018    Procedure: LINEAR ENDOSCOPIC U/S;  Surgeon: Pamela Jones MD;  Location: [facility]; Service: Gastroenterology    CA ESOPHAGOGASTRODUODENOSCOPY TRANSORAL DIAGNOSTIC N/A 7/6/2016    Procedure: EGD AND COLONOSCOPY;  Surgeon: Paula Connor MD;  [facility]; Service: Gastroenterology    CA LAP, ANEESH RESTRICT PROC, LONGITUDINAL GASTRECTOMY N/A 11/28/2016    Procedure: GASTRECTOMY SLEEVE LAPAROSCOPIC;  Surgeon: Annalise Marrero MD;  [facility];  Service: Bariatrics    TONSILLECTOMY       Social History     Social History    Marital status: Single     Spouse name: N/A    Number of children: N/A    Years of education: N/A     Occupational History    Not on file       Social History Main Topics    Smoking status: Former Smoker     Quit date: 11/10/2001    Smokeless tobacco: Never Used   [facility] Alcohol use No    Drug use: No    Sexual activity: Yes     Other Topics Concern    Not on file     Social History Narrative    No narrative on file     Family History   Problem Relation Age of Onset    Heart disease Mother     Diabetes Father     Colon cancer Neg Hx     Liver disease Neg Hx      Enalapril  Current Outpatient Prescriptions   Medication Sig Dispense Refill    Blood Glucose Monitoring Suppl (GLUCOCARD VITAL MONITOR) w/Device KIT by Does not apply route      calcium carbonate (OS-GILDA) 600 MG tablet Take 600 mg by mouth 2 (two) times a day with meals      cholecalciferol (VITAMIN D3) 1,000 units tablet Take 1,000 Units by mouth daily      cyanocobalamin (VITAMIN B-12) 1,000 mcg tablet Take 1,000 mcg by mouth every other day   glipiZIDE-metFORMIN (METAGLIP) 5-500 MG per tablet Take 1 tablet by mouth daily 90 tablet 3    Lancets (LIFESCAN UNISTIK 2) MISC [employer]can One Touch Ultramini Meter; use as directed; 1; 0; 31-Oct-2016; 31-Oct-2016; Melida Duran; Active      liraglutide (VICTOZA) injection Inject 0 3 mL (1 8 mg total) under the skin daily 5 pen 3    Multiple Vitamin (MULTIVITAMIN) capsule Take 1 capsule by mouth daily      phentermine (ADIPEX-P) 37 5 MG tablet Take 1 tablet (37 5 mg total) by mouth daily 30 tablet 0    tamsulosin (FLOMAX) 0 4 mg Take 0 4 mg by mouth daily with dinner        acetaminophen-codeine (TYLENOL #3) 300-30 mg per tablet Take 1 tablet by mouth every 6 (six) hours as needed for moderate pain (Patient not taking: Reported on 11/13/2018 ) 25 tablet 0    BIOTIN PO Take 1 tablet by mouth daily 68614 units as per pt       cyclobenzaprine (FLEXERIL) 10 mg tablet Take 1 tablet (10 mg total) by mouth 3 (three) times a day as needed for muscle spasms (Patient not taking: Reported on 11/13/2018 ) 30 tablet 0    glipiZIDE-metFORMIN (METAGLIP) 2 5-250 MG per tablet Take 1 tablet by mouth 2 (two) times a day before meals      insulin aspart protamine-insulin aspart (NovoLOG 70/30) 100 units/mL injection Inject 10 Units under the skin 2 (two) times a day before meals for 30 days Before breakfast and dinner  0    omeprazole (PriLOSEC) 20 mg delayed release capsule Take 20 mg by mouth daily      polyethylene glycol (MIRALAX) 17 g packet Take 17 g by mouth daily (Patient not taking: Reported on 11/13/2018 ) 14 each 0     No current facility-administered medications for this visit  Blood pressure 120/64, pulse 86, temperature 97 6 °F (36 4 °C), temperature source Tympanic, resp  rate 18, height 5' 8" (1 727 m), weight 103 kg (226 lb)  PHYSICAL EXAM: Physical Exam   Constitutional: He is oriented to person, place, and time  He appears well-developed  HENT:   Head: Normocephalic and atraumatic  Mouth/Throat: Oropharynx is clear and moist    Eyes: Pupils are equal, round, and reactive to light  Conjunctivae are normal  Right eye exhibits no discharge  Left eye exhibits no discharge  No scleral icterus  Neck: Neck supple  No JVD present  No tracheal deviation present  No thyromegaly present  Cardiovascular: Normal rate, regular rhythm, normal heart sounds and intact distal pulses  Exam reveals no gallop and no friction rub  No murmur heard  Pulmonary/Chest: Effort normal and breath sounds normal  No respiratory distress  He has no wheezes  He has no rales  He exhibits no tenderness  Abdominal: Soft  Bowel sounds are normal  He exhibits no distension and no mass  There is no tenderness  There is no rebound and no guarding  No hernia  Musculoskeletal: He exhibits no edema  Lymphadenopathy:     He has no cervical adenopathy  Neurological: He is alert and oriented to person, place, and time  Skin: Skin is warm and dry  No rash noted  No erythema  Psychiatric: He has a normal mood and affect   His behavior is normal  Thought content normal         Lab Results   Component Value Date    WBC 9 41 08/23/2018    HGB 14 1 08/23/2018    HCT 42 6 08/23/2018    MCV 91 08/23/2018     08/23/2018     Lab Results   Component Value Date    GLUCOSE 215 (H) 01/07/2016    CALCIUM 9 1 10/23/2018     01/07/2016    K 4 4 10/23/2018    CO2 31 10/23/2018     10/23/2018    BUN 19 10/23/2018    CREATININE 0 96 10/23/2018     Lab Results   Component Value Date    ALT 34 10/23/2018    AST 20 10/23/2018    ALKPHOS 87 10/23/2018    BILITOT 0 36 01/07/2016     Lab Results   Component Value Date    INR 0 94 08/09/2017    INR 1 23 (H) 03/03/2017    PROTIME 12 8 08/09/2017    PROTIME 15 2 (H) 03/03/2017       No results found  ASSESSMENT & PLAN:    Pancreatic cyst  Pancreatic cysts with communication with pancreatic duct appeared to be side branch IPMN  The largest one is in the uncinate process with internal septations but no high-risk features  -Due to the size of 3 cm patient was referred to Surgical Oncology and he has an appointment next week  Patient is concerned about the malignant potential and would like to discuss in detail with Dr Comfort Waldron        -repeat MRI in 6 months

## 2018-11-13 NOTE — ASSESSMENT & PLAN NOTE
Pancreatic cysts with communication with pancreatic duct appeared to be side branch IPMN  The largest one is in the uncinate process with internal septations but no high-risk features  -Due to the size of 3 cm patient was referred to Surgical Oncology and he has an appointment next week  Patient is concerned about the malignant potential and would like to discuss in detail with Dr Dory Bradshaw        -repeat MRI in 6 months

## 2018-11-13 NOTE — TELEPHONE ENCOUNTER
Patient is scheduled with Dr Terrell YEBOAH for patient to call office to assist with scheduling appointment with Dr Joleen Tejada

## 2018-11-13 NOTE — PROGRESS NOTES
EUS performed on October 25, 2018  Multiple pancreatic cystic lesions with communication with the pancreatic duct suggestive of side branch IPMN  The largest measured 3 cm in the uncinate process of the pancreas  This was multi septated however no high risk features were seen  FNA was done and 6 cc of fluid was removed  The fluid was sent for CEA, amylase, cytology and genetic analysis  CEA and amylase were both elevated in the cyst fluid  Cytology was a sellar and no extra cell a mucin was seen  This is consistent with side-branch IPMN  Due to the size of 3 cm I would suggest evaluation by Surgical Oncology as well for an opinion  Certainly no surgical intervention would be needed at this time but I would recommend following up with repeat imaging in 6 months  Discussed with the patient

## 2018-11-14 ENCOUNTER — TELEPHONE (OUTPATIENT)
Dept: GASTROENTEROLOGY | Facility: CLINIC | Age: 68
End: 2018-11-14

## 2018-11-14 NOTE — TELEPHONE ENCOUNTER
----- Message from Maude Carranza sent at 11/14/2018 11:28 AM EST -----  Hi can you please call [facility] and see if they can still run this test  Thank you  ----- Message -----  From: Edel Jones MD  Sent: 11/13/2018   2:43 PM  To: Michael Gonzalez MA    Do think we can still send his fluid for molecular analysis through [facility]

## 2018-11-26 ENCOUNTER — OFFICE VISIT (OUTPATIENT)
Dept: SURGICAL ONCOLOGY | Facility: CLINIC | Age: 68
End: 2018-11-26
Payer: COMMERCIAL

## 2018-11-26 VITALS
RESPIRATION RATE: 16 BRPM | SYSTOLIC BLOOD PRESSURE: 130 MMHG | HEIGHT: 68 IN | BODY MASS INDEX: 34.56 KG/M2 | TEMPERATURE: 97.8 F | DIASTOLIC BLOOD PRESSURE: 78 MMHG | WEIGHT: 228 LBS | HEART RATE: 71 BPM

## 2018-11-26 DIAGNOSIS — D49.0 IPMN (INTRADUCTAL PAPILLARY MUCINOUS NEOPLASM): Primary | ICD-10-CM

## 2018-11-26 PROCEDURE — 4040F PNEUMOC VAC/ADMIN/RCVD: CPT | Performed by: SURGERY

## 2018-11-26 PROCEDURE — 99204 OFFICE O/P NEW MOD 45 MIN: CPT | Performed by: SURGERY

## 2018-11-26 NOTE — PROGRESS NOTES
Surgical Oncology Consult       [address] [facility] [place]  [address] [facility] [address] [facility] [address] [phone]    Deuce Raygoza  1950  [id number]  [address] [facility] [place]  [address] [facility] [address]  [phone]    Chief Complaint   Patient presents with   [address] [place] [address] patient referral for pancreatic cyst  Pt does report some occasional nausea and bloating  Assessment/Plan:    No problem-specific Assessment & Plan notes found for this encounter  Diagnoses and all orders for this visit:    IPMN (intraductal papillary mucinous neoplasm)  -     MRI abdomen w wo contrast and mrcp; Future  -     Basic metabolic panel; Future        Advance Care Planning/Advance Directives:  Discussed disease status, cancer treatment plans and/or cancer treatment goals with the patient  No history exists  History of Present Illness:   Patient is a 27-year-old man who underwent a CT scan for lower back and abdominal pain  He was instantly found to have pancreatic cysts of undetermined etiology  This led further workup including MRI and ultimately EUS with biopsy  Findings were suggestive of IPMN, likely side branch  He was referred for evaluation and treatment  He is asymptomatic from a GI/pancreatic standpoint  Were not for the scans he would not have even been aware of these findings  He has longstanding history of diabetes  However, he has had no bowel changes, unintended weight loss  Overall he feels pretty good  Review of Systems   Constitutional: Negative  HENT: Negative  Eyes: Negative  Respiratory: Negative  Cardiovascular: Negative  Gastrointestinal: Negative  Endocrine: Negative  Genitourinary: Negative  Musculoskeletal: Negative  Skin: Negative  Allergic/Immunologic: Negative      Neurological: Negative  Hematological: Negative  Psychiatric/Behavioral: Negative  Patient Active Problem List   Diagnosis    Morbid obesity due to excess calories (HCC)    GERD (gastroesophageal reflux disease)    Obesity    Constipation    Hypercholesteremia    Obstructive sleep apnea    Postgastrectomy malabsorption    Type 2 diabetes mellitus with insulin therapy ([facility] [address] )    Benign essential hypertension    Benign enlargement of prostate    Pancreatic cyst    History of colon polyps    IPMN (intraductal papillary mucinous neoplasm)     Past Medical History:   Diagnosis Date    Arthritis     CPAP (continuous positive airway pressure) dependence     Diverticulosis     Enlarged prostate     Erectile dysfunction     GERD (gastroesophageal reflux disease)     Hiatal hernia     Hypercholesteremia     Hypertension     Obesity     Wears partial dentures     partial upper and lower     Past Surgical History:   Procedure Laterality Date    ABDOMINAL ADHESION SURGERY N/A 11/28/2016    Procedure: EXTENSIVE LYSIS ADHESIONS;  Surgeon: Scooter Treviño MD;  [facility];  Service:    Bing Halter FRACTURE SURGERY Left     CHOLECYSTECTOMY      COLON SURGERY      colon resection    COLONOSCOPY      COLOSTOMY      COLOSTOMY CLOSURE      DIAGNOSTIC LAPAROSCOPY      GASTRIC BYPASS      failed due to adhesions    HERNIA REPAIR      abdominal    IA EDG US EXAM SURGICAL ALTER STOM DUODENUM/JEJUNUM N/A 10/25/2018    Procedure: LINEAR ENDOSCOPIC U/S;  Surgeon: Kerwin Florentino MD;  [facility]; Service: Gastroenterology    IA ESOPHAGOGASTRODUODENOSCOPY TRANSORAL DIAGNOSTIC N/A 7/6/2016    Procedure: EGD AND COLONOSCOPY;  Surgeon: Russel Jenkins MD;  [facility];   Service: Gastroenterology    IA LAP, ANEESH RESTRICT PROC, LONGITUDINAL GASTRECTOMY N/A 11/28/2016    Procedure: GASTRECTOMY SLEEVE LAPAROSCOPIC;  Surgeon: Scooter Treviño MD;  [facility];  Service: Senait Vargas TONSILLECTOMY       Family History   Problem Relation Age of Onset    Heart disease Mother     Breast cancer Mother 80    Diabetes Father     Colon cancer Neg Hx     Liver disease Neg Hx      Social History     Social History    Marital status: Single     Spouse name: N/A    Number of children: N/A    Years of education: N/A     Occupational History    Not on file  Social History Main Topics    Smoking status: Former Smoker     Quit date: 11/10/2001    Smokeless tobacco: Never Used    Alcohol use No    Drug use: No    Sexual activity: Yes     Other Topics Concern    Not on file     Social History Narrative    No narrative on file       Current Outpatient Prescriptions:     BIOTIN PO, Take 1 tablet by mouth daily 54571 units as per pt , Disp: , Rfl:     Blood Glucose Monitoring Suppl (GLUCOCARD VITAL MONITOR) w/Device KIT, by Does not apply route, Disp: , Rfl:     calcium carbonate (OS-GILDA) 600 MG tablet, Take 600 mg by mouth 2 (two) times a day with meals, Disp: , Rfl:     cholecalciferol (VITAMIN D3) 1,000 units tablet, Take 1,000 Units by mouth daily, Disp: , Rfl:     cyanocobalamin (VITAMIN B-12) 1,000 mcg tablet, Take 1,000 mcg by mouth every other day , Disp: , Rfl:     glipiZIDE-metFORMIN (METAGLIP) 5-500 MG per tablet, Take 1 tablet by mouth daily, Disp: 90 tablet, Rfl: 3    Lancets (LIFESCAN UNISTIK 2) MISC, [employer] One Touch Ultramini Meter; use as directed; 1; 0; 31-Oct-2016; 31-Oct-2016; Melida Duran;  Active, Disp: , Rfl:     liraglutide (VICTOZA) injection, Inject 0 3 mL (1 8 mg total) under the skin daily, Disp: 5 pen, Rfl: 3    Multiple Vitamin (MULTIVITAMIN) capsule, Take 1 capsule by mouth daily, Disp: , Rfl:     phentermine (ADIPEX-P) 37 5 MG tablet, Take 1 tablet (37 5 mg total) by mouth daily, Disp: 30 tablet, Rfl: 0    tamsulosin (FLOMAX) 0 4 mg, Take 0 4 mg by mouth daily with dinner , Disp: , Rfl:     insulin aspart protamine-insulin aspart (NovoLOG 70/30) 100 units/mL injection, Inject 10 Units under the skin 2 (two) times a day before meals for 30 days Before breakfast and dinner, Disp: , Rfl: 0    omeprazole (PriLOSEC) 20 mg delayed release capsule, Take 20 mg by mouth daily, Disp: , Rfl:   Allergies   Allergen Reactions    Enalapril Anaphylaxis and Throat Swelling     Vitals:    11/26/18 0809   BP: 130/78   Pulse: 71   Resp: 16   Temp: 97 8 °F (36 6 °C)       Physical Exam   Constitutional: He is oriented to person, place, and time  He appears well-developed and well-nourished  HENT:   Head: Normocephalic and atraumatic  Eyes: Pupils are equal, round, and reactive to light  Conjunctivae are normal    Neck: Normal range of motion  Neck supple  Cardiovascular: Normal rate, regular rhythm and normal heart sounds  Pulmonary/Chest: Effort normal and breath sounds normal    Abdominal: Soft  Bowel sounds are normal    Musculoskeletal: Normal range of motion  Neurological: He is alert and oriented to person, place, and time  Skin: Skin is warm and dry  Psychiatric: He has a normal mood and affect  His behavior is normal  Judgment and thought content normal        Pathology:  Case Report   Non-gynecologic Cytology                          Case: [id number]                                   Authorizing Provider: Erick Alexis MD             Collected:           10/25/2018 1344               Ordering Location:     [address] [place] [address]      Received:            10/25/2018 [address] [place] [address] Endoscopy                                                            Pathologist:           Carine Haddad DO                                                       Specimen:    Pancreas, cyst                                                                             Final Diagnosis   A  Pancreas, Cyst, Fine Needle Aspiration:  - Nondiagnostic (PSC Category I)  See Note    - Groups of markedly degenerated glandular epithelium present   - No extracellular mucin is present  Electronically signed by Estephania Delgado DO on 10/30/2018 at  8:50 AM         Labs:  Fluid testing of pancreatic head cyst fluid 1  And 2  Are PSC 1  However, CEA and amylase level of cyst 1 were 380 and 39,004 respectively  Pancreatic head cyst fluid 2  Had CEA and amylase levels of 429 and 94,660 respectively  Imaging  MRI OF THE ABDOMEN (PANCREAS) WITH AND WITHOUT CONTRAST WITH MRCP     INDICATION:  Follow-up of pancreatic uncinate process cyst      COMPARISON: CT abdomen pelvis 4/9/2017 and 4/23/2018      TECHNIQUE:  The following pulse sequences were obtained on a 1 5 T scanner:  Coronal and axial T2 with TE of 90 and 180 respectively, axial T2 with fat saturation, axial FIESTA fat-sat, axial  T1-weighted in-and-out-of phase, axial DWI/ADC, pre-contrast   axial T1 with fat saturation, post-contrast dynamic axial T1 with fat saturation at 20, 70, and 180 seconds, followed by coronal T1 with fat saturation and 7-minute delayed axial T1 with fat saturation  3D MRCP images were obtained with radial thick   slabs and projections  3D rendering was performed from the acquisition scanner      IV Contrast:  10 mL of gadobutrol injection (MULTI-DOSE)      FINDINGS:     PANCREAS:    General: Normal in morphology and signal intensity  Lesions: 2 0 x 1 6 x 3 2 cm pancreatic uncinate process cystic lesion has a microcystic appearance within internal septations and could represent cystic malignancy, benign cyst, branch duct IPMN or serous cystic neoplasm  No apparent solid component or   suspicious area of enhancement  No change since August 9, 2017  Duct: Main pancreatic duct and side-branches are normal in appearance       BILARY DUCTS:    No intra-hepatic biliary ductal dilatation  Common bile duct is normal in caliber  No evidence of bile duct stricture or choledocholithiasis    No mass identified      LIVER:  Normal        GALLBLADDER:  Absent     ADRENAL GLANDS: Benign 11 mm right adrenal adenoma      SPLEEN: Normal      KIDNEYS:  Benign subcentimeter cysts  No hydronephrosis      ABDOMINAL CAVITY:  No lymphadenopathy or mass  No Ascites      BOWEL:  Unremarkable MRI appearance      OSSEOUS STRUCTURES:  No osseous destruction      VASCULAR STRUCTURES:  Visualized vasculature is normal      ABDOMINAL WALL:  Normal      LOWER CHEST:  Unremarkable MRI appearance      IMPRESSION:     2 0 x 1 6 x 3 2 cm pancreatic uncinate process cystic lesion has a a microcystic appearance within internal septations and could represent cystic malignancy, benign cyst, branch duct IPMN or serous cystic neoplasm  A central scar or calcification seen   in some serous cystic neoplasms is not apparent  No apparent solid component or suspicious area of enhancement  No change since August 9, 2017  Given the size criteria, our institutional recommendations for cysts more than 2 cm, not definitively   characterized as serous cystic neoplasm, is for gastroenterology consultation/possible EUS      Normal MRCP      The study was marked in EPIC for significant notification         Workstation performed: [id number]  I reviewed the above laboratory and imaging data  Discussion/Summary:  51-year-old man with what appears to be a side branch IPMN  No malignancy seen on testing  Options include observation with serial imaging versus surgical resection  Will discuss with Dr Elias Hinson EUS findings  Otherwise, will plan on 1 year follow-up for now with surveillance MRI provided that he continues to remain asymptomatic

## 2018-11-26 NOTE — LETTER
November 26, 2018     Kelly oRsales MD  [address] [facility] [place]ana [place]  [address] [facility] [address]    Patient: Joby Aaron   YOB: 1950   Date of Visit: 11/26/2018       Dear Dr Yanira Mcgrath:    Thank you for referring Joby Aaron to me for evaluation  Below are my notes for this consultation  If you have questions, please do not hesitate to call me  I look forward to following your patient along with you  Sincerely,        Ja Woodard MD        CC: Earl Doughty, MD Eleanore Oka, MD Tobie Pih, PA-C Ransom Lanes, MD Savannah Ore, MD Alene Gavia, MD Renetta Manila, MD Tanda Ming, MD Marthenia Carpenter, MD  11/26/2018  9:00 AM  Sign at close encounter               [facility] [place]  [address]  [address] [address] [phone]    Joby Aaron  1950  [id number]  [address] [facility] CAN[facility] [place]  [address]  [address] [address] [phone]    Chief Complaint   Patient presents with   [address] [place] [address] patient referral for pancreatic cyst  Pt does report some occasional nausea and bloating  Assessment/Plan:    No problem-specific Assessment & Plan notes found for this encounter  Diagnoses and all orders for this visit:    IPMN (intraductal papillary mucinous neoplasm)  -     MRI abdomen w wo contrast and mrcp; Future  -     Basic metabolic panel; Future        Advance Care Planning/Advance Directives:  Discussed disease status, cancer treatment plans and/or cancer treatment goals with the patient  No history exists  History of Present Illness:   Patient is a 60-year-old man who underwent a CT scan for lower back and abdominal pain  He was instantly found to have pancreatic cysts of undetermined etiology  This led further workup including MRI and ultimately EUS with biopsy  Findings were suggestive of IPMN, likely side branch  He was referred for evaluation and treatment  He is asymptomatic from a GI/pancreatic standpoint  Were not for the scans he would not have even been aware of these findings  He has longstanding history of diabetes  However, he has had no bowel changes, unintended weight loss  Overall he feels pretty good  Review of Systems   Constitutional: Negative  HENT: Negative  Eyes: Negative  Respiratory: Negative  Cardiovascular: Negative  Gastrointestinal: Negative  Endocrine: Negative  Genitourinary: Negative  Musculoskeletal: Negative  Skin: Negative  Allergic/Immunologic: Negative  Neurological: Negative  Hematological: Negative  Psychiatric/Behavioral: Negative            Patient Active Problem List   Diagnosis    Morbid obesity due to excess calories (HCC)    GERD (gastroesophageal reflux disease)    Obesity    Constipation    Hypercholesteremia    Obstructive sleep apnea    Postgastrectomy malabsorption    [facility]    Benign essential hypertension    Benign enlargement of prostate    Pancreatic cyst    History of colon polyps    IPMN (intraductal papillary mucinous neoplasm)     Past Medical History:   Diagnosis Date    Arthritis     CPAP (continuous positive airway pressure) dependence     Diverticulosis     Enlarged prostate     Erectile dysfunction     GERD (gastroesophageal reflux disease)     Hiatal hernia     Hypercholesteremia     Hypertension     Obesity     Wears partial dentures     partial upper and lower     Past Surgical History:   Procedure Laterality Date    ABDOMINAL ADHESION SURGERY N/A 11/28/2016    Procedure: EXTENSIVE LYSIS ADHESIONS;  Surgeon: Sonia Robles MD;  [facility];  Service:    [facility] FRACTURE SURGERY Left     CHOLECYSTECTOMY      COLON SURGERY      colon resection    COLONOSCOPY      COLOSTOMY      COLOSTOMY CLOSURE      DIAGNOSTIC LAPAROSCOPY      GASTRIC BYPASS      failed due to adhesions    HERNIA REPAIR      abdominal    KS EDG US EXAM SURGICAL ALTER STOM DUODENUM/JEJUNUM N/A 10/25/2018    Procedure: LINEAR ENDOSCOPIC U/S;  Surgeon: Remi Trejo MD;  [facility]; Service: Gastroenterology    KS ESOPHAGOGASTRODUODENOSCOPY TRANSORAL DIAGNOSTIC N/A 7/6/2016    Procedure: EGD AND COLONOSCOPY;  Surgeon: Flavio Wellington MD;  [facility]; Service: Gastroenterology    KS LAP, ANEESH RESTRICT PROC, LONGITUDINAL GASTRECTOMY N/A 11/28/2016    Procedure: GASTRECTOMY SLEEVE LAPAROSCOPIC;  Surgeon: Lisa Holcomb MD;  [facility];  Service: Bariatrics    TONSILLECTOMY       Family History   Problem Relation Age of Onset    Heart disease Mother     Breast cancer Mother 80    Diabetes Father     Colon cancer Neg Hx     Liver disease Neg Hx      Social History     Social History    Marital status: Single     Spouse name: N/A    Number of children: N/A    Years of education: N/A     Occupational History    Not on file       Social History Main Topics    Smoking status: Former Smoker     Quit date: 11/10/2001    Smokeless tobacco: Never Used    Alcohol use No    Drug use: No    Sexual activity: Yes     Other Topics Concern    Not on file     Social History Narrative    No narrative on file       Current Outpatient Prescriptions:     BIOTIN PO, Take 1 tablet by mouth daily 17690 units as per pt , Disp: , Rfl:     Blood Glucose Monitoring Suppl (GLUCOCARD VITAL MONITOR) w/Device KIT, by Does not apply route, Disp: , Rfl:     calcium carbonate (OS-GILDA) 600 MG tablet, Take 600 mg by mouth 2 (two) times a day with meals, Disp: , Rfl:     cholecalciferol (VITAMIN D3) 1,000 units tablet, Take 1,000 Units by mouth daily, Disp: , Rfl:     cyanocobalamin (VITAMIN B-12) 1,000 mcg tablet, Take 1,000 mcg by mouth every other day , Disp: , Rfl:     glipiZIDE-metFORMIN (METAGLIP) 5-500 MG per tablet, Take 1 tablet by mouth daily, Disp: 90 tablet, Rfl: 3    Lancets (LIFESCAN UNISTIK 2) MISC, [employer] One Touch Ultramini Meter; use as directed; 1; 0; 31-Oct-2016; 31-Oct-2016; Melida Duran; Active, Disp: , Rfl:     liraglutide (VICTOZA) injection, Inject 0 3 mL (1 8 mg total) under the skin daily, Disp: 5 pen, Rfl: 3    Multiple Vitamin (MULTIVITAMIN) capsule, Take 1 capsule by mouth daily, Disp: , Rfl:     phentermine (ADIPEX-P) 37 5 MG tablet, Take 1 tablet (37 5 mg total) by mouth daily, Disp: 30 tablet, Rfl: 0    tamsulosin (FLOMAX) 0 4 mg, Take 0 4 mg by mouth daily with dinner , Disp: , Rfl:     insulin aspart protamine-insulin aspart (NovoLOG 70/30) 100 units/mL injection, Inject 10 Units under the skin 2 (two) times a day before meals for 30 days Before breakfast and dinner, Disp: , Rfl: 0    omeprazole (PriLOSEC) 20 mg delayed release capsule, Take 20 mg by mouth daily, Disp: , Rfl:   Allergies   Allergen Reactions    Enalapril Anaphylaxis and Throat Swelling     Vitals:    11/26/18 0809   BP: 130/78   Pulse: 71   Resp: 16   Temp: 97 8 °F (36 6 °C)       Physical Exam   Constitutional: He is oriented to person, place, and time  He appears well-developed and well-nourished  HENT:   Head: Normocephalic and atraumatic  Eyes: Pupils are equal, round, and reactive to light  Conjunctivae are normal    Neck: Normal range of motion  Neck supple  Cardiovascular: Normal rate, regular rhythm and normal heart sounds  Pulmonary/Chest: Effort normal and breath sounds normal    Abdominal: Soft  Bowel sounds are normal    Musculoskeletal: Normal range of motion  Neurological: He is alert and oriented to person, place, and time  Skin: Skin is warm and dry  Psychiatric: He has a normal mood and affect   His behavior is normal  Judgment and thought content normal        Pathology:  Case Report   Non-gynecologic Cytology                          Case: [id number]                                   Authorizing Provider: Reginald Nam MD             Collected:           10/25/2018 1344               Ordering Location:     [address]      Received:            10/25/2018 1354                                      [facility]                                                            Pathologist:           Terri Gr DO                                                       Specimen:    Pancreas, cyst                                                                             Final Diagnosis   A  Pancreas, Cyst, Fine Needle Aspiration:  - Nondiagnostic (PSC Category I)  See Note  - Groups of markedly degenerated glandular epithelium present   - No extracellular mucin is present  Electronically signed by Terri Gr DO on 10/30/2018 at  8:50 AM         Labs:  Fluid testing of pancreatic head cyst fluid 1  And 2  Are PSC 1  However, CEA and amylase level of cyst 1 were 380 and 39,004 respectively  Pancreatic head cyst fluid 2  Had CEA and amylase levels of 429 and 94,660 respectively  Imaging  MRI OF THE ABDOMEN (PANCREAS) WITH AND WITHOUT CONTRAST WITH MRCP     INDICATION:  Follow-up of pancreatic uncinate process cyst      COMPARISON: CT abdomen pelvis 4/9/2017 and 4/23/2018      TECHNIQUE:  The following pulse sequences were obtained on a 1 5 T scanner:  Coronal and axial T2 with TE of 90 and 180 respectively, axial T2 with fat saturation, axial FIESTA fat-sat, axial  T1-weighted in-and-out-of phase, axial DWI/ADC, pre-contrast   axial T1 with fat saturation, post-contrast dynamic axial T1 with fat saturation at 20, 70, and 180 seconds, followed by coronal T1 with fat saturation and 7-minute delayed axial T1 with fat saturation  3D MRCP images were obtained with radial thick   slabs and projections    3D rendering was performed from the acquisition scanner      IV Contrast:  10 mL of gadobutrol injection (MULTI-DOSE)      FINDINGS:     PANCREAS:    General: Normal in morphology and signal intensity  Lesions: 2 0 x 1 6 x 3 2 cm pancreatic uncinate process cystic lesion has a microcystic appearance within internal septations and could represent cystic malignancy, benign cyst, branch duct IPMN or serous cystic neoplasm  No apparent solid component or   suspicious area of enhancement  No change since August 9, 2017  Duct: Main pancreatic duct and side-branches are normal in appearance       BILARY DUCTS:    No intra-hepatic biliary ductal dilatation  Common bile duct is normal in caliber  No evidence of bile duct stricture or choledocholithiasis  No mass identified      LIVER:  Normal        GALLBLADDER:  Absent     ADRENAL GLANDS:  Benign 11 mm right adrenal adenoma      SPLEEN: Normal      KIDNEYS:  Benign subcentimeter cysts  No hydronephrosis      ABDOMINAL CAVITY:  No lymphadenopathy or mass  No Ascites      BOWEL:  Unremarkable MRI appearance      OSSEOUS STRUCTURES:  No osseous destruction      VASCULAR STRUCTURES:  Visualized vasculature is normal      ABDOMINAL WALL:  Normal      LOWER CHEST:  Unremarkable MRI appearance      IMPRESSION:     2 0 x 1 6 x 3 2 cm pancreatic uncinate process cystic lesion has a a microcystic appearance within internal septations and could represent cystic malignancy, benign cyst, branch duct IPMN or serous cystic neoplasm  A central scar or calcification seen   in some serous cystic neoplasms is not apparent  No apparent solid component or suspicious area of enhancement  No change since August 9, 2017  Given the size criteria, our institutional recommendations for cysts more than 2 cm, not definitively   characterized as serous cystic neoplasm, is for gastroenterology consultation/possible EUS      Normal MRCP      The study was marked in EPIC for significant notification         Workstation performed: [id number]  I reviewed the above laboratory and imaging data      Discussion/Summary:  22-year-old man with what appears to be a side branch IPMN  No malignancy seen on testing  Options include observation with serial imaging versus surgical resection  Will discuss with Dr Makayla Haas EUS findings  Otherwise, will plan on 1 year follow-up for now with surveillance MRI provided that he continues to remain asymptomatic

## 2018-12-07 DIAGNOSIS — E66.01 CLASS 2 SEVERE OBESITY DUE TO EXCESS CALORIES WITH SERIOUS COMORBIDITY AND BODY MASS INDEX (BMI) OF 35.0 TO 35.9 IN ADULT (HCC): ICD-10-CM

## 2018-12-07 RX ORDER — PHENTERMINE HYDROCHLORIDE 37.5 MG/1
TABLET ORAL
Qty: 30 TABLET | Refills: 0 | Status: SHIPPED | OUTPATIENT
Start: 2018-12-07 | End: 2019-02-25 | Stop reason: SDUPTHER

## 2018-12-10 ENCOUNTER — TELEPHONE (OUTPATIENT)
Dept: FAMILY MEDICINE CLINIC | Facility: CLINIC | Age: 68
End: 2018-12-10

## 2018-12-10 LAB
LEFT EYE DIABETIC RETINOPATHY: NORMAL
RIGHT EYE DIABETIC RETINOPATHY: NORMAL
SEVERITY (EYE EXAM): NORMAL

## 2018-12-10 NOTE — TELEPHONE ENCOUNTER
Patient needs an appointment after the 12th of January for his Pre-Op Clearance for Cataract Surgery 1/22/19 and 2/19/19  Patient needs EKG but not pre-admission testing    Please advise where I may schedule patient

## 2019-01-03 DIAGNOSIS — N40.0 BENIGN PROSTATIC HYPERPLASIA, UNSPECIFIED WHETHER LOWER URINARY TRACT SYMPTOMS PRESENT: Primary | ICD-10-CM

## 2019-01-03 RX ORDER — TAMSULOSIN HYDROCHLORIDE 0.4 MG/1
CAPSULE ORAL
Qty: 90 CAPSULE | Refills: 3 | Status: SHIPPED | OUTPATIENT
Start: 2019-01-03 | End: 2019-04-25 | Stop reason: ALTCHOICE

## 2019-01-19 DIAGNOSIS — Z79.4 OTHER SPECIFIED DIABETES MELLITUS WITH COMPLICATION, WITH LONG-TERM CURRENT USE OF INSULIN: ICD-10-CM

## 2019-01-19 DIAGNOSIS — E13.8 OTHER SPECIFIED DIABETES MELLITUS WITH COMPLICATION, WITH LONG-TERM CURRENT USE OF INSULIN: ICD-10-CM

## 2019-01-19 RX ORDER — LIRAGLUTIDE 6 MG/ML
INJECTION SUBCUTANEOUS
Qty: 9 ML | Refills: 5 | Status: SHIPPED | OUTPATIENT
Start: 2019-01-19 | End: 2019-03-04

## 2019-02-13 ENCOUNTER — OFFICE VISIT (OUTPATIENT)
Dept: UROLOGY | Facility: CLINIC | Age: 69
End: 2019-02-13
Payer: COMMERCIAL

## 2019-02-13 VITALS
DIASTOLIC BLOOD PRESSURE: 70 MMHG | HEART RATE: 65 BPM | SYSTOLIC BLOOD PRESSURE: 144 MMHG | BODY MASS INDEX: 35.01 KG/M2 | HEIGHT: 68 IN | WEIGHT: 231 LBS

## 2019-02-13 DIAGNOSIS — N40.1 BENIGN PROSTATIC HYPERPLASIA WITH WEAK URINARY STREAM: Primary | ICD-10-CM

## 2019-02-13 DIAGNOSIS — R39.12 BENIGN PROSTATIC HYPERPLASIA WITH WEAK URINARY STREAM: Primary | ICD-10-CM

## 2019-02-13 DIAGNOSIS — N52.9 ED (ERECTILE DYSFUNCTION) OF ORGANIC ORIGIN: ICD-10-CM

## 2019-02-13 PROCEDURE — 99214 OFFICE O/P EST MOD 30 MIN: CPT | Performed by: UROLOGY

## 2019-02-13 NOTE — PROGRESS NOTES
Referring Physician: Angel Luis Bolaños MD  A copy of this note was sent to the referring physician  Diagnoses and all orders for this visit:    Benign prostatic hyperplasia with weak urinary stream    ED (erectile dysfunction) of organic origin            Assessment and plan:       1  Medically refractory obstructive lower urinary tract symptoms  -tamsulosin monotherapy    2  History of hypogonadism  -he elected to terminate testosterone placement therapy due to concern for adverse effects, specifically cardiovascular    Evon Ponce is overall doing well  He has lost greater than 140 lb following his gastric bypass  Overall I applauded his incredible improvement to his health  Unfortunately his urinary tract symptoms have not been well controlled despite appropriate medical management  I recommended flexible cystoscopy for further evaluation  Discussed the risks benefits and alternatives to this diagnostic procedure  Risks include but are not limited to hematuria, pain, urinary tract infection, stricture formation, possible need for hospitalization  Patient verbalized understanding and has elected to proceed  Cystoscopy and transrectal ultrasound will be scheduled in the near future  Chun Hall MD      Chief Complaint     Follow-up men's Health      History of Present Illness     Nolberto Figueroa is a 76 y o  male returns in follow-up after some time  His PC previously seen in the past for BPH, erectile dysfunction, and hypogonadism  He was previously maintained on testosterone placement therapy  He discontinued this due to concerns about potential cardiovascular adverse effects  He in the interim has undergone gastric bypass surgery  He has done very well  He has lost 140 lb  He has had unfortunately worsening of his lower urinary tract symptoms, specifically weak stream   He has been initiated on tamsulosin this has improved things only to a minor degree      He denies any hematuria or urinary tract infections    Detailed Urologic History     - please refer to HPI    Review of Systems     Review of Systems   Constitutional: Negative for activity change and fatigue  HENT: Negative for congestion  Eyes: Negative for visual disturbance  Respiratory: Negative for shortness of breath and wheezing  Cardiovascular: Negative for chest pain and leg swelling  Gastrointestinal: Negative for abdominal pain  Endocrine: Positive for polyuria  Genitourinary: Positive for dysuria  Negative for flank pain, hematuria and urgency  Musculoskeletal: Negative for back pain  Allergic/Immunologic: Negative for immunocompromised state  Neurological: Negative for dizziness and numbness  Psychiatric/Behavioral: Negative for dysphoric mood  All other systems reviewed and are negative  AUA SYMPTOM SCORE      Most Recent Value   AUA SYMPTOM SCORE   How often have you had a sensation of not emptying your bladder completely after you finished urinating? 3   How often have you had to urinate again less than two hours after you finished urinating? 1   How often have you found you stopped and started again several times when you urinate? 1   How often have you found it difficult to postpone urination? 2   How often have you had a weak urinary stream?  0   How often have you had to push or strain to begin urination? 1   How many times did you most typically get up to urinate from the time you went to bed at night until the time you got up in the morning? 1   Quality of Life: If you were to spend the rest of your life with your urinary condition just the way it is now, how would you feel about that?  3   AUA SYMPTOM SCORE  9            Allergies     Allergies   Allergen Reactions    Enalapril Anaphylaxis and Throat Swelling       Physical Exam     Physical Exam   Constitutional: He is oriented to person, place, and time  He appears well-developed and well-nourished  No distress     HENT:   Head: Normocephalic and atraumatic  Eyes: EOM are normal    Neck: Normal range of motion  Cardiovascular:   Negative lower extremity edema   Pulmonary/Chest: Effort normal and breath sounds normal    Abdominal: Soft  Genitourinary:   Genitourinary Comments: Negative suprapubic tenderness and CVA tenderness   Musculoskeletal: Normal range of motion  Neurological: He is alert and oriented to person, place, and time  Skin: Skin is warm  Psychiatric: He has a normal mood and affect  His behavior is normal            Vital Signs  Vitals:    02/13/19 1113   BP: 144/70   BP Location: Left arm   Patient Position: Sitting   Cuff Size: Standard   Pulse: 65   Weight: 105 kg (231 lb)   Height: 5' 8" (1 727 m)         Current Medications       Current Outpatient Medications:     BIOTIN PO, Take 1 tablet by mouth daily 50083 units as per pt , Disp: , Rfl:     Blood Glucose Monitoring Suppl (GLUCOCARD VITAL MONITOR) w/Device KIT, by Does not apply route, Disp: , Rfl:     calcium carbonate (OS-GILDA) 600 MG tablet, Take 600 mg by mouth 2 (two) times a day with meals, Disp: , Rfl:     cholecalciferol (VITAMIN D3) 1,000 units tablet, Take 1,000 Units by mouth daily, Disp: , Rfl:     cyanocobalamin (VITAMIN B-12) 1,000 mcg tablet, Take 1,000 mcg by mouth every other day , Disp: , Rfl:     glipiZIDE-metFORMIN (METAGLIP) 5-500 MG per tablet, Take 1 tablet by mouth daily, Disp: 90 tablet, Rfl: 3    insulin aspart protamine-insulin aspart (NovoLOG 70/30) 100 units/mL injection, Inject 10 Units under the skin 2 (two) times a day before meals for 30 days Before breakfast and dinner, Disp: , Rfl: 0    Lancets (LIFESCAN UNISTIK 2) MISC, [employer]can One Touch Ultramini Meter; use as directed; 1; 0; 31-Oct-2016; 31-Oct-2016; Melida Duran;  Active, Disp: , Rfl:     liraglutide (VICTOZA) injection, Inject 0 3 mL (1 8 mg total) under the skin daily, Disp: 5 pen, Rfl: 3    Multiple Vitamin (MULTIVITAMIN) capsule, Take 1 capsule by mouth daily, Disp: , Rfl:     omeprazole (PriLOSEC) 20 mg delayed release capsule, Take 20 mg by mouth daily, Disp: , Rfl:     phentermine (ADIPEX-P) 37 5 MG tablet, TAKE ONE TABLET BY MOUTH EVERY DAY, Disp: 30 tablet, Rfl: 0    tamsulosin (FLOMAX) 0 4 mg, TAKE ONE CAPSULE BY MOUTH EVERY DAY, Disp: 90 capsule, Rfl: 3    VICTOZA injection, INJECT 1 8 MG UNDER THE SKIN ONCE DAILY, Disp: 9 mL, Rfl: 5      Active Problems     Patient Active Problem List   Diagnosis    Morbid obesity due to excess calories (HCC)    GERD (gastroesophageal reflux disease)    Obesity    Constipation    Hypercholesteremia    Obstructive sleep apnea    Postgastrectomy malabsorption    Type 2 diabetes mellitus with insulin therapy (HCC)    Benign essential hypertension    Benign enlargement of prostate    Pancreatic cyst    History of colon polyps    IPMN (intraductal papillary mucinous neoplasm)    ED (erectile dysfunction) of organic origin         Past Medical History     Past Medical History:   Diagnosis Date    Arthritis     CPAP (continuous positive airway pressure) dependence     Diverticulosis     Enlarged prostate     Erectile dysfunction     GERD (gastroesophageal reflux disease)     Hiatal hernia     Hypercholesteremia     Hypertension     Obesity     Wears partial dentures     partial upper and lower         Surgical History     Past Surgical History:   Procedure Laterality Date    ABDOMINAL ADHESION SURGERY N/A 11/28/2016    Procedure: EXTENSIVE LYSIS ADHESIONS;  Surgeon: Amanda Ordoñez MD;  [facility];  Service:    Yovany Bunk FRACTURE SURGERY Left     CHOLECYSTECTOMY      COLON SURGERY      colon resection    COLONOSCOPY      COLOSTOMY      COLOSTOMY CLOSURE      DIAGNOSTIC LAPAROSCOPY      GASTRIC BYPASS      failed due to adhesions    HERNIA REPAIR      abdominal    GA EDG US EXAM SURGICAL ALTER STOM DUODENUM/JEJUNUM N/A 10/25/2018    Procedure: LINEAR ENDOSCOPIC U/S;  Surgeon: Yulissa Hood Laxmi Santillan MD;  [facility]; Service: Gastroenterology    DC ESOPHAGOGASTRODUODENOSCOPY TRANSORAL DIAGNOSTIC N/A 2016    Procedure: EGD AND COLONOSCOPY;  Surgeon: Brittani Ferrer MD;  [facility]; Service: Gastroenterology    DC LAP, ANEESH RESTRICT PROC, LONGITUDINAL GASTRECTOMY N/A 2016    Procedure: GASTRECTOMY SLEEVE LAPAROSCOPIC;  Surgeon: Naya Thorpe MD;  [facility];  Service: Bariatrics    TONSILLECTOMY           Family History     Family History   Problem Relation Age of Onset    Heart disease Mother     Breast cancer Mother 80    Diabetes Father     Colon cancer Neg Hx     Liver disease Neg Hx          Social History     Social History     Social History     Tobacco Use   Smoking Status Former Smoker    Last attempt to quit: 11/10/2001    Years since quittin 2   Smokeless Tobacco Never Used         Pertinent Lab Values     Lab Results   Component Value Date    CREATININE 0 96 10/23/2018       Lab Results   Component Value Date    PSA 2 6 10/23/2018    PSA 1 0 2016       @RESULTRCNT(1H])@      Pertinent Imaging      - n/a    Portions of the record may have been created with voice recognition software   Occasional wrong word or "sound a like" substitutions may have occurred due to the inherent limitations of voice recognition software   Read the chart carefully and recognize, using context, where substitutions have occurred

## 2019-02-25 DIAGNOSIS — E66.01 CLASS 2 SEVERE OBESITY DUE TO EXCESS CALORIES WITH SERIOUS COMORBIDITY AND BODY MASS INDEX (BMI) OF 35.0 TO 35.9 IN ADULT (HCC): ICD-10-CM

## 2019-02-25 RX ORDER — PHENTERMINE HYDROCHLORIDE 37.5 MG/1
TABLET ORAL
Qty: 30 TABLET | Refills: 0 | Status: SHIPPED | OUTPATIENT
Start: 2019-02-25 | End: 2019-06-05

## 2019-02-27 ENCOUNTER — PROCEDURE VISIT (OUTPATIENT)
Dept: UROLOGY | Facility: CLINIC | Age: 69
End: 2019-02-27
Payer: COMMERCIAL

## 2019-02-27 VITALS
BODY MASS INDEX: 35.31 KG/M2 | WEIGHT: 233 LBS | SYSTOLIC BLOOD PRESSURE: 132 MMHG | HEIGHT: 68 IN | DIASTOLIC BLOOD PRESSURE: 62 MMHG | HEART RATE: 68 BPM

## 2019-02-27 DIAGNOSIS — N40.1 BENIGN PROSTATIC HYPERPLASIA WITH URINARY FREQUENCY: Primary | ICD-10-CM

## 2019-02-27 DIAGNOSIS — N52.9 ED (ERECTILE DYSFUNCTION) OF ORGANIC ORIGIN: ICD-10-CM

## 2019-02-27 DIAGNOSIS — R35.0 BENIGN PROSTATIC HYPERPLASIA WITH URINARY FREQUENCY: Primary | ICD-10-CM

## 2019-02-27 PROCEDURE — 76872 US TRANSRECTAL: CPT | Performed by: UROLOGY

## 2019-02-27 PROCEDURE — 51798 US URINE CAPACITY MEASURE: CPT | Performed by: UROLOGY

## 2019-02-27 PROCEDURE — 51741 ELECTRO-UROFLOWMETRY FIRST: CPT | Performed by: UROLOGY

## 2019-02-27 PROCEDURE — 99214 OFFICE O/P EST MOD 30 MIN: CPT | Performed by: UROLOGY

## 2019-02-27 PROCEDURE — 52000 CYSTOURETHROSCOPY: CPT | Performed by: UROLOGY

## 2019-02-27 NOTE — PROGRESS NOTES
Office Cystoscopy Procedure Note    Indication:     medically refractory lower urinary tract symptoms     Informed consent   The risks, benefits, complications, treatment options, and expected outcomes were discussed with the patient  The patient concurred with the proposed plan and provided informed consent  Anesthesia  Lidocaine jelly 2%    Antibiotic prophylaxis   None    Procedure  The patient was placed in the supineposition, was prepped and draped in the usual manner using sterile technique, and 2% lidocaine jelly instilled into the urethra  A 17 F flexible cystoscope was then inserted into the urethra and the urethra and bladder carefully examined  The following findings were noted:    Findings:  Urethra:  Mild bulbar urethral strictures in tandem, nonobstructing, able to advance the endoscope without significant dilation  Prostate:  Extensive lateral lobe hyperplasia, 100% kissing lateral lobes in the midline, prostatic fossa is high  No obstructing median lobe  No significant intravesical intrusion  Bladder:  Small diverticulum is noted right lateral wall  Ureteral orifices:  Normal  Other findings:  None     Specimens: None                 Complications:    None; patient tolerated the procedure well           Disposition: To home after 30 minute observation             Condition: Stable

## 2019-02-27 NOTE — H&P (VIEW-ONLY)
Referring Physician: Michelle Dutton MD  A copy of this note was sent to the referring physician  Diagnoses and all orders for this visit:    Benign prostatic hyperplasia with urinary frequency  -     Case request operating room: [facility] ; Standing  -     Case request operating room: [facility]    ED (erectile dysfunction) of organic origin    Other orders  -     Diet NPO; Sips with meds; Standing  -     Place sequential compression device; Standing  -     ceFAZolin (ANCEF) 2,000 mg in dextrose 5 % 100 mL IVPB            Assessment and plan:       1  Medically refractory obstructive lower urinary tract symptoms  -tamsulosin monotherapy    2  History of hypogonadism  -he elected to terminate testosterone placement therapy due to concern for adverse effects, specifically cardiovascular      We reviewed the options for treating BPH/LUTS which include but are not limited to expectant management, medical therapy, transurethral resection of prostate (TURP)  We also discussed minimally invasive options  At this point, the patient wishes to proceed with Urolift  This is a great option for the patient based on the following criteria:    - cystoscopy revealed extensive lateral lobe hyperplasia  - gland volume 77 g as measured on transrectal ultrasound  - uroflow with 7 0  - PVR with 0  - normal renal function  - no active urinary tract infection  - PSA: 2 6  - no documented allergy to nickel    - He has failed the following treatment options: tamsulosin   The additional morbidity of standard surgical options (ie, TURP) is not necessary given the above criteria  The risks of Urolift include but are not limited to bleeding, infection, reaction to anesthesia such as heart attack, stroke, DVT/PE, hyponatremia, bladder neck contracture, urethral stricture, injury to surrounding structures (ureters, rectum, etc)    We discussed that additional risks of trans urethral resection procedures such as incontinence, retrograde ejaculation, and erectile dysfunction have been only rarely reported with the Uro lift procedure  We did discuss that this is a new were procedure and a permanent implant  We discussed that there may be some long-term implications that her on for seen with this newer technology, such as perhaps complicating treatment for prostate cancer if indicated down the line  Finally, I told him that he may require additional procedures secondary to some of these complications  Informed consent was obtained for the Uro lift procedure  This will be scheduled in the near future to be performed under IV sedation  Bouchra Blake MD      Chief Complaint     Follow-up men's Health      History of Present Illness     Rickie Davis is a 76 y o  male returns in follow-up after some time  His PC previously seen in the past for BPH, erectile dysfunction, and hypogonadism  He was previously maintained on testosterone placement therapy  He discontinued this due to concerns about potential cardiovascular adverse effects  He in the interim has undergone gastric bypass surgery  He has done very well  He has lost 140 lb  He has had unfortunately worsening of his lower urinary tract symptoms, specifically weak stream   He has been initiated on tamsulosin this has improved things only to a minor degree  He denies any hematuria or urinary tract infections    Detailed Urologic History     - please refer to HPI    Review of Systems     Review of Systems   Constitutional: Negative for activity change and fatigue  HENT: Negative for congestion  Eyes: Negative for visual disturbance  Respiratory: Negative for shortness of breath and wheezing  Cardiovascular: Negative for chest pain and leg swelling  Gastrointestinal: Negative for abdominal pain  Endocrine: Positive for polyuria  Genitourinary: Positive for urgency   Negative for dysuria, flank pain and hematuria  Musculoskeletal: Negative for back pain  Allergic/Immunologic: Negative for immunocompromised state  Neurological: Negative for dizziness and numbness  Psychiatric/Behavioral: Negative for dysphoric mood  All other systems reviewed and are negative  AUA SYMPTOM SCORE      Most Recent Value   AUA SYMPTOM SCORE   How often have you had a sensation of not emptying your bladder completely after you finished urinating? 3   How often have you had to urinate again less than two hours after you finished urinating? 1   How often have you found you stopped and started again several times when you urinate? 1   How often have you found it difficult to postpone urination? 2   How often have you had a weak urinary stream?  0   How often have you had to push or strain to begin urination? 1   How many times did you most typically get up to urinate from the time you went to bed at night until the time you got up in the morning? 1   Quality of Life: If you were to spend the rest of your life with your urinary condition just the way it is now, how would you feel about that?  3   AUA SYMPTOM SCORE  9              Allergies     Allergies   Allergen Reactions    Enalapril Anaphylaxis and Throat Swelling       Physical Exam     Physical Exam   Constitutional: He is oriented to person, place, and time  He appears well-developed and well-nourished  No distress  HENT:   Head: Normocephalic and atraumatic  Eyes: EOM are normal    Neck: Normal range of motion  Cardiovascular:   Negative lower extremity edema   Pulmonary/Chest: Effort normal and breath sounds normal    Abdominal: Soft  Genitourinary: Penis normal    Genitourinary Comments: Negative suprapubic tenderness and CVA tenderness   Musculoskeletal: Normal range of motion  Neurological: He is alert and oriented to person, place, and time  Skin: Skin is warm  Psychiatric: He has a normal mood and affect   His behavior is normal  Vital Signs  Vitals:    02/27/19 0826   BP: 132/62   BP Location: Left arm   Patient Position: Sitting   Cuff Size: Standard   Pulse: 68   Weight: 106 kg (233 lb)   Height: 5' 8" (1 727 m)         Current Medications       Current Outpatient Medications:     BIOTIN PO, Take 1 tablet by mouth daily 93777 units as per pt , Disp: , Rfl:     Blood Glucose Monitoring Suppl (GLUCOCARD VITAL MONITOR) w/Device KIT, by Does not apply route, Disp: , Rfl:     calcium carbonate (OS-GILDA) 600 MG tablet, Take 600 mg by mouth 2 (two) times a day with meals, Disp: , Rfl:     cholecalciferol (VITAMIN D3) 1,000 units tablet, Take 1,000 Units by mouth daily, Disp: , Rfl:     cyanocobalamin (VITAMIN B-12) 1,000 mcg tablet, Take 1,000 mcg by mouth every other day , Disp: , Rfl:     glipiZIDE-metFORMIN (METAGLIP) 5-500 MG per tablet, Take 1 tablet by mouth daily, Disp: 90 tablet, Rfl: 3    insulin aspart protamine-insulin aspart (NovoLOG 70/30) 100 units/mL injection, Inject 10 Units under the skin 2 (two) times a day before meals for 30 days Before breakfast and dinner, Disp: , Rfl: 0    Lancets (LIFESCAN UNISTIK 2) MISC, [employer]can One Touch Ultramini Meter; use as directed; 1; 0; 31-Oct-2016; 31-Oct-2016; Melida Duran;  Active, Disp: , Rfl:     liraglutide (VICTOZA) injection, Inject 0 3 mL (1 8 mg total) under the skin daily, Disp: 5 pen, Rfl: 3    Multiple Vitamin (MULTIVITAMIN) capsule, Take 1 capsule by mouth daily, Disp: , Rfl:     omeprazole (PriLOSEC) 20 mg delayed release capsule, Take 20 mg by mouth daily, Disp: , Rfl:     phentermine (ADIPEX-P) 37 5 MG tablet, TAKE ONE TABLET BY MOUTH EVERY DAY, Disp: 30 tablet, Rfl: 0    tamsulosin (FLOMAX) 0 4 mg, TAKE ONE CAPSULE BY MOUTH EVERY DAY, Disp: 90 capsule, Rfl: 3    VICTOZA injection, INJECT 1 8 MG UNDER THE SKIN ONCE DAILY, Disp: 9 mL, Rfl: 5      Active Problems     Patient Active Problem List   Diagnosis    Morbid obesity due to excess calories (HCC)  GERD (gastroesophageal reflux disease)    Obesity    Constipation    Hypercholesteremia    Obstructive sleep apnea    Postgastrectomy malabsorption    Type 2 diabetes mellitus with insulin therapy (HCC)    Benign essential hypertension    Benign enlargement of prostate    Pancreatic cyst    History of colon polyps    IPMN (intraductal papillary mucinous neoplasm)    ED (erectile dysfunction) of organic origin         Past Medical History     Past Medical History:   Diagnosis Date    Arthritis     CPAP (continuous positive airway pressure) dependence     Diverticulosis     Enlarged prostate     Erectile dysfunction     GERD (gastroesophageal reflux disease)     Hiatal hernia     Hypercholesteremia     Hypertension     Obesity     Wears partial dentures     partial upper and lower         Surgical History     Past Surgical History:   Procedure Laterality Date    ABDOMINAL ADHESION SURGERY N/A 11/28/2016    Procedure: EXTENSIVE LYSIS ADHESIONS;  Surgeon: Edwin Fuentes MD;  [facility];  Service:    Lizayn American FRACTURE SURGERY Left     CHOLECYSTECTOMY      COLON SURGERY      colon resection    COLONOSCOPY      COLOSTOMY      COLOSTOMY CLOSURE      DIAGNOSTIC LAPAROSCOPY      GASTRIC BYPASS      failed due to adhesions    HERNIA REPAIR      abdominal    SD EDG US EXAM SURGICAL ALTER STOM DUODENUM/JEJUNUM N/A 10/25/2018    Procedure: LINEAR ENDOSCOPIC U/S;  Surgeon: Sahara Pandey MD;  [facility]; Service: Gastroenterology    SD ESOPHAGOGASTRODUODENOSCOPY TRANSORAL DIAGNOSTIC N/A 7/6/2016    Procedure: EGD AND COLONOSCOPY;  Surgeon: Patricia Perez MD;  [facility];   Service: Gastroenterology    SD LAP, ANEESH RESTRICT PROC, LONGITUDINAL GASTRECTOMY N/A 11/28/2016    Procedure: GASTRECTOMY SLEEVE LAPAROSCOPIC;  Surgeon: Edwin Fuentes MD;  [facility];  Service: Bariatrics    TONSILLECTOMY           Family History     Family History   Problem Relation Age of Onset    Heart disease Mother     Breast cancer Mother 80    Diabetes Father     Colon cancer Neg Hx     Liver disease Neg Hx          Social History     Social History     Social History     Tobacco Use   Smoking Status Former Smoker    Last attempt to quit: 11/10/2001    Years since quittin 3   Smokeless Tobacco Never Used         Pertinent Lab Values     Lab Results   Component Value Date    CREATININE 0 96 10/23/2018       Lab Results   Component Value Date    PSA 2 6 10/23/2018    PSA 1 0 2016       @RESULTRCNT(1H])@      Pertinent Imaging      - n/a    Portions of the record may have been created with voice recognition software   Occasional wrong word or "sound a like" substitutions may have occurred due to the inherent limitations of voice recognition software   Read the chart carefully and recognize, using context, where substitutions have occurred

## 2019-02-27 NOTE — PROGRESS NOTES
Office TRUS    Indication    Prostate volumetrics for surgical planning    Transrectal ultrasonography  The patient was placed in the left lateral decubitus position  After an attentive digital rectal examination, a 7 5 mHz sidefire ultrasound probe was gently inserted into the rectum and biplanar imaging of the prostate was done with the findings noted below  Images were taken of any abnormal findings and also to document prostate size      Bladder  The bladder base appeared normal     Prostate      Ultrasound size measurements:  -Volume:  77 cm3  - Width 6 0 cm  - Height: 4 3 cm  - Length: 5 7 cm    Ultrasound findings:  -Cysts: None  -Masses: None  -Median lobe: absent

## 2019-02-27 NOTE — PROGRESS NOTES
Referring Physician: Dexter Zelaya MD  A copy of this note was sent to the referring physician  Diagnoses and all orders for this visit:    Benign prostatic hyperplasia with urinary frequency  -     Case request operating room: [address] ; Standing  -     Case request operating room: [address]    ED (erectile dysfunction) of organic origin    Other orders  -     Diet NPO; Sips with meds; Standing  -     Place sequential compression device; Standing  -     ceFAZolin (ANCEF) 2,000 mg in dextrose 5 % 100 mL IVPB            Assessment and plan:       1  Medically refractory obstructive lower urinary tract symptoms  -tamsulosin monotherapy    2  History of hypogonadism  -he elected to terminate testosterone placement therapy due to concern for adverse effects, specifically cardiovascular      We reviewed the options for treating BPH/LUTS which include but are not limited to expectant management, medical therapy, transurethral resection of prostate (TURP)  We also discussed minimally invasive options  At this point, the patient wishes to proceed with Urolift  This is a great option for the patient based on the following criteria:    - cystoscopy revealed extensive lateral lobe hyperplasia  - gland volume 77 g as measured on transrectal ultrasound  - uroflow with 7 0  - PVR with 0  - normal renal function  - no active urinary tract infection  - PSA: 2 6  - no documented allergy to nickel    - He has failed the following treatment options: tamsulosin   The additional morbidity of standard surgical options (ie, TURP) is not necessary given the above criteria  The risks of Urolift include but are not limited to bleeding, infection, reaction to anesthesia such as heart attack, stroke, DVT/PE, hyponatremia, bladder neck contracture, urethral stricture, injury to surrounding structures (ureters, rectum, etc)    We discussed that additional risks of trans urethral resection procedures such as incontinence, retrograde ejaculation, and erectile dysfunction have been only rarely reported with the Uro lift procedure  We did discuss that this is a new were procedure and a permanent implant  We discussed that there may be some long-term implications that her on for seen with this newer technology, such as perhaps complicating treatment for prostate cancer if indicated down the line  Finally, I told him that he may require additional procedures secondary to some of these complications  Informed consent was obtained for the Uro lift procedure  This will be scheduled in the near future to be performed under IV sedation  Fer Yost MD      Chief Complaint     Follow-up men's Health      History of Present Illness     Aminah Rodriguez is a 76 y o  male returns in follow-up after some time  His PC previously seen in the past for BPH, erectile dysfunction, and hypogonadism  He was previously maintained on testosterone placement therapy  He discontinued this due to concerns about potential cardiovascular adverse effects  He in the interim has undergone gastric bypass surgery  He has done very well  He has lost 140 lb  He has had unfortunately worsening of his lower urinary tract symptoms, specifically weak stream   He has been initiated on tamsulosin this has improved things only to a minor degree  He denies any hematuria or urinary tract infections    Detailed Urologic History     - please refer to HPI    Review of Systems     Review of Systems   Constitutional: Negative for activity change and fatigue  HENT: Negative for congestion  Eyes: Negative for visual disturbance  Respiratory: Negative for shortness of breath and wheezing  Cardiovascular: Negative for chest pain and leg swelling  Gastrointestinal: Negative for abdominal pain  Endocrine: Positive for polyuria  Genitourinary: Positive for urgency   Negative for dysuria, flank pain and hematuria  Musculoskeletal: Negative for back pain  Allergic/Immunologic: Negative for immunocompromised state  Neurological: Negative for dizziness and numbness  Psychiatric/Behavioral: Negative for dysphoric mood  All other systems reviewed and are negative  AUA SYMPTOM SCORE      Most Recent Value   AUA SYMPTOM SCORE   How often have you had a sensation of not emptying your bladder completely after you finished urinating? 3   How often have you had to urinate again less than two hours after you finished urinating? 1   How often have you found you stopped and started again several times when you urinate? 1   How often have you found it difficult to postpone urination? 2   How often have you had a weak urinary stream?  0   How often have you had to push or strain to begin urination? 1   How many times did you most typically get up to urinate from the time you went to bed at night until the time you got up in the morning? 1   Quality of Life: If you were to spend the rest of your life with your urinary condition just the way it is now, how would you feel about that?  3   AUA SYMPTOM SCORE  9              Allergies     Allergies   Allergen Reactions    Enalapril Anaphylaxis and Throat Swelling       Physical Exam     Physical Exam   Constitutional: He is oriented to person, place, and time  He appears well-developed and well-nourished  No distress  HENT:   Head: Normocephalic and atraumatic  Eyes: EOM are normal    Neck: Normal range of motion  Cardiovascular:   Negative lower extremity edema   Pulmonary/Chest: Effort normal and breath sounds normal    Abdominal: Soft  Genitourinary: Penis normal    Genitourinary Comments: Negative suprapubic tenderness and CVA tenderness   Musculoskeletal: Normal range of motion  Neurological: He is alert and oriented to person, place, and time  Skin: Skin is warm  Psychiatric: He has a normal mood and affect   His behavior is normal  Vital Signs  Vitals:    02/27/19 0826   BP: 132/62   BP Location: Left arm   Patient Position: Sitting   Cuff Size: Standard   Pulse: 68   Weight: 106 kg (233 lb)   Height: 5' 8" (1 727 m)         Current Medications       Current Outpatient Medications:     BIOTIN PO, Take 1 tablet by mouth daily 45464 units as per pt , Disp: , Rfl:     Blood Glucose Monitoring Suppl (GLUCOCARD VITAL MONITOR) w/Device KIT, by Does not apply route, Disp: , Rfl:     calcium carbonate (OS-GILDA) 600 MG tablet, Take 600 mg by mouth 2 (two) times a day with meals, Disp: , Rfl:     cholecalciferol (VITAMIN D3) 1,000 units tablet, Take 1,000 Units by mouth daily, Disp: , Rfl:     cyanocobalamin (VITAMIN B-12) 1,000 mcg tablet, Take 1,000 mcg by mouth every other day , Disp: , Rfl:     glipiZIDE-metFORMIN (METAGLIP) 5-500 MG per tablet, Take 1 tablet by mouth daily, Disp: 90 tablet, Rfl: 3    insulin aspart protamine-insulin aspart (NovoLOG 70/30) 100 units/mL injection, Inject 10 Units under the skin 2 (two) times a day before meals for 30 days Before breakfast and dinner, Disp: , Rfl: 0    Lancets (LIFESCAN UNISTIK 2) MISC, Pivit Labscan One Touch Ultramini Meter; use as directed; 1; 0; 31-Oct-2016; 31-Oct-2016; Melida Duran;  Active, Disp: , Rfl:     liraglutide (VICTOZA) injection, Inject 0 3 mL (1 8 mg total) under the skin daily, Disp: 5 pen, Rfl: 3    Multiple Vitamin (MULTIVITAMIN) capsule, Take 1 capsule by mouth daily, Disp: , Rfl:     omeprazole (PriLOSEC) 20 mg delayed release capsule, Take 20 mg by mouth daily, Disp: , Rfl:     phentermine (ADIPEX-P) 37 5 MG tablet, TAKE ONE TABLET BY MOUTH EVERY DAY, Disp: 30 tablet, Rfl: 0    tamsulosin (FLOMAX) 0 4 mg, TAKE ONE CAPSULE BY MOUTH EVERY DAY, Disp: 90 capsule, Rfl: 3    VICTOZA injection, INJECT 1 8 MG UNDER THE SKIN ONCE DAILY, Disp: 9 mL, Rfl: 5      Active Problems     Patient Active Problem List   Diagnosis    Morbid obesity due to excess calories (HCC)  GERD (gastroesophageal reflux disease)    Obesity    Constipation    Hypercholesteremia    Obstructive sleep apnea    Postgastrectomy malabsorption    Type 2 diabetes mellitus with insulin therapy (HCC)    Benign essential hypertension    Benign enlargement of prostate    Pancreatic cyst    History of colon polyps    IPMN (intraductal papillary mucinous neoplasm)    ED (erectile dysfunction) of organic origin         Past Medical History     Past Medical History:   Diagnosis Date    Arthritis     CPAP (continuous positive airway pressure) dependence     Diverticulosis     Enlarged prostate     Erectile dysfunction     GERD (gastroesophageal reflux disease)     Hiatal hernia     Hypercholesteremia     Hypertension     Obesity     Wears partial dentures     partial upper and lower         Surgical History     Past Surgical History:   Procedure Laterality Date    ABDOMINAL ADHESION SURGERY N/A 11/28/2016    Procedure: EXTENSIVE LYSIS ADHESIONS;  Surgeon: Renee Carr MD;  [facility];  Service:    Aleck Jimena FRACTURE SURGERY Left     CHOLECYSTECTOMY      COLON SURGERY      colon resection    COLONOSCOPY      COLOSTOMY      COLOSTOMY CLOSURE      DIAGNOSTIC LAPAROSCOPY      GASTRIC BYPASS      failed due to adhesions    HERNIA REPAIR      abdominal    [place] EDG US EXAM SURGICAL ALTER STOM DUODENUM/JEJUNUM N/A 10/25/2018    Procedure: LINEAR ENDOSCOPIC U/S;  Surgeon: Florencia Segura MD;  [facility]; Service: Gastroenterology    [place] ESOPHAGOGASTRODUODENOSCOPY TRANSORAL DIAGNOSTIC N/A 7/6/2016    Procedure: EGD AND COLONOSCOPY;  Surgeon: Georgie Choudhury MD;  [facility];   Service: Gastroenterology    [place] LAP, ANEESH RESTRICT PROC, LONGITUDINAL GASTRECTOMY N/A 11/28/2016    Procedure: GASTRECTOMY SLEEVE LAPAROSCOPIC;  Surgeon: Renee Carr MD;  [facility];  Service: Bariatrics    TONSILLECTOMY           Family History     Family History   Problem Relation Age of Onset    Heart disease Mother     Breast cancer Mother 80    Diabetes Father     Colon cancer Neg Hx     Liver disease Neg Hx          Social History     Social History     Social History     Tobacco Use   Smoking Status Former Smoker    Last attempt to quit: 11/10/2001    Years since quittin 3   Smokeless Tobacco Never Used         Pertinent Lab Values     Lab Results   Component Value Date    CREATININE 0 96 10/23/2018       Lab Results   Component Value Date    PSA 2 6 10/23/2018    PSA 1 0 2016       @RESULTRCNT(1H])@      Pertinent Imaging      - n/a    Portions of the record may have been created with voice recognition software   Occasional wrong word or "sound a like" substitutions may have occurred due to the inherent limitations of voice recognition software   Read the chart carefully and recognize, using context, where substitutions have occurred

## 2019-02-28 ENCOUNTER — OFFICE VISIT (OUTPATIENT)
Dept: LAB | Facility: CLINIC | Age: 69
End: 2019-02-28
Payer: COMMERCIAL

## 2019-02-28 ENCOUNTER — APPOINTMENT (OUTPATIENT)
Dept: LAB | Facility: CLINIC | Age: 69
End: 2019-02-28
Payer: COMMERCIAL

## 2019-02-28 DIAGNOSIS — E11.9 TYPE 2 DIABETES MELLITUS WITH INSULIN THERAPY (HCC): ICD-10-CM

## 2019-02-28 DIAGNOSIS — Z79.4 TYPE 2 DIABETES MELLITUS WITH INSULIN THERAPY (HCC): ICD-10-CM

## 2019-02-28 DIAGNOSIS — R35.0 BENIGN PROSTATIC HYPERPLASIA WITH URINARY FREQUENCY: ICD-10-CM

## 2019-02-28 DIAGNOSIS — N40.1 BENIGN PROSTATIC HYPERPLASIA WITH URINARY FREQUENCY: ICD-10-CM

## 2019-02-28 LAB
EST. AVERAGE GLUCOSE BLD GHB EST-MCNC: 154 MG/DL
HBA1C MFR BLD: 7 % (ref 4.2–6.3)

## 2019-02-28 PROCEDURE — 36415 COLL VENOUS BLD VENIPUNCTURE: CPT

## 2019-02-28 PROCEDURE — 93005 ELECTROCARDIOGRAM TRACING: CPT

## 2019-02-28 PROCEDURE — 83036 HEMOGLOBIN GLYCOSYLATED A1C: CPT

## 2019-03-02 ENCOUNTER — ANESTHESIA EVENT (OUTPATIENT)
Dept: PERIOP | Facility: AMBULARY SURGERY CENTER | Age: 69
End: 2019-03-02
Payer: COMMERCIAL

## 2019-03-03 LAB
ATRIAL RATE: 65 BPM
P AXIS: 57 DEGREES
PR INTERVAL: 148 MS
QRS AXIS: 43 DEGREES
QRSD INTERVAL: 84 MS
QT INTERVAL: 398 MS
QTC INTERVAL: 413 MS
T WAVE AXIS: 41 DEGREES
VENTRICULAR RATE: 65 BPM

## 2019-03-03 PROCEDURE — 93010 ELECTROCARDIOGRAM REPORT: CPT | Performed by: INTERNAL MEDICINE

## 2019-03-04 NOTE — PRE-PROCEDURE INSTRUCTIONS
Pre-Surgery Instructions:   Medication Instructions    Biotin 1000 MCG tablet Instructed patient per Anesthesia Guidelines   calcium carbonate (OS-GILDA) 600 MG tablet Instructed patient per Anesthesia Guidelines   Cholecalciferol (VITAMIN D3) 2000 units capsule Instructed patient per Anesthesia Guidelines   Cyanocobalamin (VITAMIN B-12) 5000 MCG TBDP Instructed patient per Anesthesia Guidelines   glipiZIDE-metFORMIN (METAGLIP) 5-500 MG per tablet Instructed patient per Anesthesia Guidelines   insulin aspart protamine-insulin aspart (NovoLOG 70/30) 100 units/mL injection Instructed patient per Anesthesia Guidelines   liraglutide (VICTOZA) injection Instructed patient per Anesthesia Guidelines   Multiple Vitamin (MULTIVITAMIN) capsule Instructed patient per Anesthesia Guidelines   phentermine (ADIPEX-P) 37 5 MG tablet Instructed patient per Anesthesia Guidelines   tamsulosin (FLOMAX) 0 4 mg Instructed patient per Anesthesia Guidelines      Pre op and showering instructions using an antibacterial soap reviewed

## 2019-03-08 ENCOUNTER — ANESTHESIA (OUTPATIENT)
Dept: PERIOP | Facility: AMBULARY SURGERY CENTER | Age: 69
End: 2019-03-08
Payer: COMMERCIAL

## 2019-03-08 ENCOUNTER — HOSPITAL ENCOUNTER (OUTPATIENT)
Facility: AMBULARY SURGERY CENTER | Age: 69
Setting detail: OUTPATIENT SURGERY
Discharge: HOME | End: 2019-03-08
Attending: UROLOGY | Admitting: UROLOGY
Payer: COMMERCIAL

## 2019-03-08 ENCOUNTER — TELEPHONE (OUTPATIENT)
Dept: UROLOGY | Facility: CLINIC | Age: 69
End: 2019-03-08

## 2019-03-08 VITALS
HEART RATE: 52 BPM | DIASTOLIC BLOOD PRESSURE: 66 MMHG | RESPIRATION RATE: 18 BRPM | WEIGHT: 227 LBS | SYSTOLIC BLOOD PRESSURE: 148 MMHG | OXYGEN SATURATION: 96 % | TEMPERATURE: 98 F | BODY MASS INDEX: 34.4 KG/M2 | HEIGHT: 68 IN

## 2019-03-08 DIAGNOSIS — R35.0 BENIGN PROSTATIC HYPERPLASIA WITH URINARY FREQUENCY: Primary | ICD-10-CM

## 2019-03-08 DIAGNOSIS — N40.1 BENIGN PROSTATIC HYPERPLASIA WITH URINARY FREQUENCY: Primary | ICD-10-CM

## 2019-03-08 LAB — GLUCOSE SERPL-MCNC: 180 MG/DL (ref 65–140)

## 2019-03-08 PROCEDURE — 52442 CYSTO INS TRNSPRSTC IMPLT EA: CPT | Performed by: UROLOGY

## 2019-03-08 PROCEDURE — 82948 REAGENT STRIP/BLOOD GLUCOSE: CPT

## 2019-03-08 PROCEDURE — 52441 CYSTO INSJ TRNSPRSTC 1 IMPLT: CPT | Performed by: UROLOGY

## 2019-03-08 PROCEDURE — L8699 PROSTHETIC IMPLANT NOS: HCPCS | Performed by: UROLOGY

## 2019-03-08 DEVICE — IMPLANT URO PROSTATE UROLIFT: Type: IMPLANTABLE DEVICE | Site: URETHRA | Status: FUNCTIONAL

## 2019-03-08 RX ORDER — HYDROCODONE BITARTRATE AND ACETAMINOPHEN 5; 325 MG/1; MG/1
1 TABLET ORAL EVERY 6 HOURS PRN
Qty: 5 TABLET | Refills: 0 | Status: SHIPPED | OUTPATIENT
Start: 2019-03-08 | End: 2019-03-18

## 2019-03-08 RX ORDER — PROPOFOL 10 MG/ML
INJECTION, EMULSION INTRAVENOUS AS NEEDED
Status: DISCONTINUED | OUTPATIENT
Start: 2019-03-08 | End: 2019-03-08 | Stop reason: SURG

## 2019-03-08 RX ORDER — PROPOFOL 10 MG/ML
INJECTION, EMULSION INTRAVENOUS CONTINUOUS PRN
Status: DISCONTINUED | OUTPATIENT
Start: 2019-03-08 | End: 2019-03-08 | Stop reason: SURG

## 2019-03-08 RX ORDER — SODIUM CHLORIDE 9 MG/ML
125 INJECTION, SOLUTION INTRAVENOUS CONTINUOUS
Status: DISCONTINUED | OUTPATIENT
Start: 2019-03-08 | End: 2019-03-08 | Stop reason: HOSPADM

## 2019-03-08 RX ORDER — PHENAZOPYRIDINE HYDROCHLORIDE 200 MG/1
200 TABLET, FILM COATED ORAL 3 TIMES DAILY PRN
Qty: 10 TABLET | Refills: 0 | Status: SHIPPED | OUTPATIENT
Start: 2019-03-08 | End: 2019-03-11

## 2019-03-08 RX ORDER — ONDANSETRON 2 MG/ML
INJECTION INTRAMUSCULAR; INTRAVENOUS AS NEEDED
Status: DISCONTINUED | OUTPATIENT
Start: 2019-03-08 | End: 2019-03-08 | Stop reason: SURG

## 2019-03-08 RX ORDER — LIDOCAINE HYDROCHLORIDE 10 MG/ML
INJECTION, SOLUTION INFILTRATION; PERINEURAL AS NEEDED
Status: DISCONTINUED | OUTPATIENT
Start: 2019-03-08 | End: 2019-03-08 | Stop reason: SURG

## 2019-03-08 RX ORDER — FUROSEMIDE 10 MG/ML
INJECTION INTRAMUSCULAR; INTRAVENOUS AS NEEDED
Status: DISCONTINUED | OUTPATIENT
Start: 2019-03-08 | End: 2019-03-08 | Stop reason: SURG

## 2019-03-08 RX ORDER — FENTANYL CITRATE 50 UG/ML
INJECTION, SOLUTION INTRAMUSCULAR; INTRAVENOUS AS NEEDED
Status: DISCONTINUED | OUTPATIENT
Start: 2019-03-08 | End: 2019-03-08 | Stop reason: SURG

## 2019-03-08 RX ORDER — SODIUM CHLORIDE 9 MG/ML
INJECTION, SOLUTION INTRAVENOUS CONTINUOUS PRN
Status: DISCONTINUED | OUTPATIENT
Start: 2019-03-08 | End: 2019-03-08 | Stop reason: SURG

## 2019-03-08 RX ORDER — CEFAZOLIN SODIUM 2 G/50ML
2000 SOLUTION INTRAVENOUS ONCE
Status: COMPLETED | OUTPATIENT
Start: 2019-03-08 | End: 2019-03-08

## 2019-03-08 RX ORDER — DOCUSATE SODIUM 100 MG/1
100 CAPSULE, LIQUID FILLED ORAL 2 TIMES DAILY
Qty: 30 CAPSULE | Refills: 0 | Status: SHIPPED | OUTPATIENT
Start: 2019-03-08 | End: 2019-09-10 | Stop reason: SDUPTHER

## 2019-03-08 RX ORDER — NAPROXEN 500 MG/1
500 TABLET ORAL 2 TIMES DAILY WITH MEALS
Qty: 10 TABLET | Refills: 0 | Status: SHIPPED | OUTPATIENT
Start: 2019-03-08 | End: 2020-04-10

## 2019-03-08 RX ADMIN — CEFAZOLIN SODIUM 2000 MG: 2 SOLUTION INTRAVENOUS at 09:09

## 2019-03-08 RX ADMIN — FENTANYL CITRATE 50 MCG: 50 INJECTION, SOLUTION INTRAMUSCULAR; INTRAVENOUS at 09:12

## 2019-03-08 RX ADMIN — ONDANSETRON 4 MG: 2 INJECTION INTRAMUSCULAR; INTRAVENOUS at 09:12

## 2019-03-08 RX ADMIN — FENTANYL CITRATE 25 MCG: 50 INJECTION, SOLUTION INTRAMUSCULAR; INTRAVENOUS at 09:22

## 2019-03-08 RX ADMIN — SODIUM CHLORIDE: 0.9 INJECTION, SOLUTION INTRAVENOUS at 08:02

## 2019-03-08 RX ADMIN — FUROSEMIDE 20 MG: 10 INJECTION, SOLUTION INTRAMUSCULAR; INTRAVENOUS at 09:20

## 2019-03-08 RX ADMIN — PROPOFOL 90 MCG/KG/MIN: 10 INJECTION, EMULSION INTRAVENOUS at 09:15

## 2019-03-08 RX ADMIN — LIDOCAINE HYDROCHLORIDE ANHYDROUS 50 MG: 10 INJECTION, SOLUTION INFILTRATION at 09:12

## 2019-03-08 RX ADMIN — PROPOFOL 30 MG: 10 INJECTION, EMULSION INTRAVENOUS at 09:26

## 2019-03-08 RX ADMIN — PROPOFOL 80 MG: 10 INJECTION, EMULSION INTRAVENOUS at 09:12

## 2019-03-08 RX ADMIN — FENTANYL CITRATE 25 MCG: 50 INJECTION, SOLUTION INTRAMUSCULAR; INTRAVENOUS at 09:18

## 2019-03-08 NOTE — ANESTHESIA POSTPROCEDURE EVALUATION
Post-Op Assessment Note    CV Status:  Stable  Pain Score: 0    Pain management: adequate     Mental Status:  Alert and awake   Hydration Status:  Euvolemic   PONV Controlled:  Controlled   Airway Patency:  Patent   Post Op Vitals Reviewed:  Yes              BP   150/73   Temp  97 6   Pulse  73   Resp   14   SpO2   96

## 2019-03-08 NOTE — ANESTHESIA PREPROCEDURE EVALUATION
Review of Systems/Medical History  Patient summary reviewed  Chart reviewed      Cardiovascular  Hyperlipidemia, Hypertension controlled,    Pulmonary  Sleep apnea ,        GI/Hepatic    GERD ,  Hiatal hernia, Bariatric surgery,   Comment: S/p gastric sleeve          Endo/Other  Diabetes well controlled type 2 Oral agent,      GYN  Negative gynecology ROS          Hematology  Negative hematology ROS      Musculoskeletal    Arthritis     Neurology  Negative neurology ROS      Psychology   Negative psychology ROS              Physical Exam    Airway    Mallampati score: II  TM Distance: >3 FB  Neck ROM: full     Dental   No notable dental hx     Cardiovascular  Rhythm: regular, Rate: normal, Cardiovascular exam normal    Pulmonary  Pulmonary exam normal Breath sounds clear to auscultation,     Other Findings        Anesthesia Plan  ASA Score- 2     Anesthesia Type- IV sedation with anesthesia with ASA Monitors  Additional Monitors:   Airway Plan:         Plan Factors-    Induction- intravenous  Postoperative Plan- Plan for postoperative opioid use  Informed Consent- Anesthetic plan and risks discussed with patient  I personally reviewed this patient with the CRNA  Discussed and agreed on the Anesthesia Plan with the CRNA  Lo Laws

## 2019-03-08 NOTE — OP NOTE
Operative Note     PATIENT:  Marge Childs (MRN [id number])    DATE OF PROCEDURE:   3/8/2019    PRE-OP DIAGNOSES:   1) BPH with obstruction     POST-OP DIAGNOSES AND OPERATIVE FINDINGS:   1) BPH with obstruction    PROCEDURES:  1) UroLift implantation    SURGEON:   Rupesh Deras MD    No qualified teaching residents available to assist    ANESTHESIA TYPE:  General anesthesia    ESTIMATED BLOOD LOSS:   Minimal    COMPLICATIONS:   None    ANTIBIOTICS:  Cefazolin    INTRAOPERATIVE THROMBOEMBOLISM PROPHYLAXIS:  Pneumatic compression stockings      PROCEDURE SUMMARY:    The patient was identified, brought to the operating room, and placed on the table in supine position  After induction of general anesthesia, the patient was placed in dorsal lithotomy position and prepped and draped in the usual sterile fashion  A complete formal timeout was performed  A 20F cystoscope was inserted into the bladder  The cystoscopy bridge was replaced with a UroLift delivery device  The first treatment site was the patient's left side approximately 1 5 cm distal to the bladder neck  The distal tip of the delivery device was then angled laterally approximately 20 degrees at this position to compress the lateral lobe  The trigger was pulled, thereby deploying a needle containing the implant through the prostate  The needle was then retracted, allowing one end of the implant to be delivered to the capsular surface of the prostate  The implant was then tensioned to assure capsular seating and removal of slack monofilament  The device was then angled back toward midline and slowly advanced proximally until cystoscopic verification of the monofilament being centered in the delivery bay  The urethral end piece was then affixed to the monofilament thereby tailoring the size of the implant  Excess filament was then severed  The delivery device was then re-advanced into the bladder   The delivery device was then replaced with cystoscope and bridge and the implant location and opening effect was confirmed cystoscopically  The same procedure was then repeated on the right side, and two additional implants were delivered just proximal to the veru montanum, again one on left and one on right side of the prostate, following the same technique  A total of 6  implants were deployed to create a nice anterior channel  Implants were deployed in the following locations:    1  Right bladder neck  2  Left bladder neck  3  Right apex  4  Left apex  5  Right mid gland  6  Left mid gland      A final cystoscopy was conducted first to inspect the location and state of each implant and second, to confirm the presence of a continuous anterior channel was present through the prostatic urethra with irrigation flow turned off  A alatorre catheter was then placed  The patient tolerated the procedure well and was transferred to the recovery room awake alert and in stable condition  IMPLANTS:   Implant Name Type Inv  Item Serial No   Lot No  LRB No  Used   IMPLANT URO PROSTATE UROLIFT - [id number]  IMPLANT URO PROSTATE UROLIFT  [employer] [id number] N/A 6      PLAN:  Patient will undergo a trial of void recovery room  He will follow up in 4-6 weeks for uroflow PVR  We will continue the tamsulosin until that visit    If he is doing well at that time we will be discontinued

## 2019-03-08 NOTE — DISCHARGE INSTRUCTIONS
UROLIFT      WHAT YOU NEED TO KNOW:   A UroLift is procedure that is done to open the natural channel within your prostate gland  DISCHARGE INSTRUCTIONS:   Medicines:   · Pain medicine: You may be given a prescription medicine to decrease pain  Do not wait until the pain is severe before you take these medicines:  · Naproxen (prescription-strength NSAID/Ibuprofen type medicine) - for moderate pain  This can be substituted with over the counter Ibuprofen if more convenient, or it this is less expensive  · Pyridium - to minimize pain and discomfort when passing your urine  Will turn your urine orange  This can be substituted with over the counter "AZO" if more convenient, or it this is less expensive  · Norco/Percocet - for severe pain  Can be sedating and constipating  · Colace - to prevent constipation  · Antibiotics:  You may be provided with antibiotics at discharge, particularly if you are being sent home with a alatorre catheter     This medicine is given to fight or prevent an infection caused by bacteria  Always take your antibiotics exactly as ordered by your healthcare provider  Do not stop taking your medicine unless directed by your healthcare provider  Never save antibiotics or take leftover antibiotics that were given to you for another illness  · Take your medicine as directed  Contact your healthcare provider if you think your medicine is not helping or if you have side effects  Tell him or her if you are allergic to any medicine  Keep a list of the medicines, vitamins, and herbs you take  Include the amounts, and when and why you take them  Bring the list or the pill bottles to follow-up visits  Carry your medicine list with you in case of an emergency  Follow up with your healthcare provider or urologist as directed: You may need to return to make sure you do not have an infection, or to have your Alatorre catheter removed   Write down your questions so you remember to ask them during your visits  Bhardwaj catheter care: You may be sent home with a Bhardwaj catheter  If so you will be provided with instructions to remove it    Bladder control:  After surgery, you may leak urine and have trouble controlling when you urinate  Ask for more information about the following ways to help decrease urine leakage:  · Avoid caffeine:  Caffeine can cause problems with bladder control and increase your need to urinate  · Do pelvic floor muscle exercises:  Pelvic floor muscle exercises may help improve your bladder control, if you leak urine  These exercises are done by tightening and relaxing your pelvic muscles  Ask how to do pelvic floor muscle exercises, and how often to do them  · Limit your liquids:  Drink smaller amounts of liquid throughout the day  Do not drink before bedtime  Ask if you should decrease the amount of liquid you drink each day  This may help you control your bladder  · Wear a pad or adult diapers: These may help to absorb leaking urine and decrease the odor  Activity guidelines:  Ask when it is okay for you to return to work and activities, or to have sex  Contact your healthcare provider or urologist if:   · You have a fever  · You have new or more blood in your urine  · You have trouble starting to urinate, or have a weak stream of urine when you urinate  · You feel like you have a full bladder, even after you urinate  You may also leak urine  · You often wake up during the night to urinate  You may also feel the need to urinate right away  · You feel pain and burning when you urinate  · You feel pain or pressure in your lower abdomen  · Your urine looks cloudy, and smells bad  · You have trouble getting an erection or ejaculating  · You have questions or concerns about your condition or care  Seek care immediately or call 911 if:   · You urinate little or not at all  · You have severe abdominal or back pain      · You are dizzy or confused  · You have abdominal pain, nausea, and vomiting  · Your heartbeat is slower than usual   © 2017 [employer] Information is for End User's use only and may not be sold, redistributed or otherwise used for commercial purposes  All illustrations and images included in CareNotes® are the copyrighted property of [employer]  or [employer]  The above information is an  only  It is not intended as medical advice for individual conditions or treatments  Talk to your doctor, nurse or pharmacist before following any medical regimen to see if it is safe and effective for you

## 2019-03-08 NOTE — TELEPHONE ENCOUNTER
Post Op Note    Rj Simental is a 76 y o  male s/p Urolift performed on 3/8/19    Rj Simental is a patient of Dr Abby Stinson and is seen at the [facility] office  LM for patient to call office on Monday with update on how he is feeling post operatively

## 2019-03-11 NOTE — TELEPHONE ENCOUNTER
Post Op Note    Called and spoke patient  How would you rate your pain on a scale from 1 to 10, 10 being the worst pain ever? No pain    Have you had a fever? No fever  Have your bowel movements been regular? Small hard bowel movement yesterday  Patient has Dulcolax at home  Advised ok to use Dulcolax as needed and to add Colace 1 tablet twice a day  Increase fluids  Do you have any difficulty urinating? NO     Do you have any other questions or concerns that I can address at this time? Patient will follow up as scheduled for post op and he will call if he continues to have constipation

## 2019-03-13 ENCOUNTER — OFFICE VISIT (OUTPATIENT)
Dept: SLEEP CENTER | Facility: CLINIC | Age: 69
End: 2019-03-13
Payer: COMMERCIAL

## 2019-03-13 VITALS
HEART RATE: 74 BPM | HEIGHT: 68 IN | SYSTOLIC BLOOD PRESSURE: 120 MMHG | BODY MASS INDEX: 35.16 KG/M2 | WEIGHT: 232 LBS | DIASTOLIC BLOOD PRESSURE: 70 MMHG

## 2019-03-13 DIAGNOSIS — E66.9 OBESITY (BMI 30-39.9): ICD-10-CM

## 2019-03-13 DIAGNOSIS — K91.2 POSTGASTRECTOMY MALABSORPTION: ICD-10-CM

## 2019-03-13 DIAGNOSIS — E11.9 TYPE 2 DIABETES MELLITUS WITH INSULIN THERAPY (HCC): ICD-10-CM

## 2019-03-13 DIAGNOSIS — G47.33 OBSTRUCTIVE SLEEP APNEA: Primary | ICD-10-CM

## 2019-03-13 DIAGNOSIS — Z90.3 POSTGASTRECTOMY MALABSORPTION: ICD-10-CM

## 2019-03-13 DIAGNOSIS — K21.9 GASTROESOPHAGEAL REFLUX DISEASE, ESOPHAGITIS PRESENCE NOT SPECIFIED: ICD-10-CM

## 2019-03-13 DIAGNOSIS — F45.8 BRUXISM: ICD-10-CM

## 2019-03-13 DIAGNOSIS — F40.240 CLAUSTROPHOBIA: ICD-10-CM

## 2019-03-13 DIAGNOSIS — Z79.4 TYPE 2 DIABETES MELLITUS WITH INSULIN THERAPY (HCC): ICD-10-CM

## 2019-03-13 DIAGNOSIS — G47.10 HYPERSOMNIA: ICD-10-CM

## 2019-03-13 DIAGNOSIS — Z72.821 INADEQUATE SLEEP HYGIENE: ICD-10-CM

## 2019-03-13 PROCEDURE — 99204 OFFICE O/P NEW MOD 45 MIN: CPT | Performed by: INTERNAL MEDICINE

## 2019-03-13 NOTE — PROGRESS NOTES
Review of Systems      Genitourinary none   Cardiology none   Gastrointestinal none   Neurology none   Constitutional claustrophobia and weight change   Integumentary none   Psychiatry none   Musculoskeletal sciatica   Pulmonary snoring   ENT throat clearing and ringing in ears   Endocrine none   Hematological none

## 2019-03-13 NOTE — PROGRESS NOTES
Consultation - [facility]  76 y o  male  VFR:4/52/0724  [facility]:[id number]    Physician Requesting Consult: Ken Carpenter MD     Reason for Consult : At your kind request I saw this patient for initial sleep evaluation today  He was diagnosed with obstructive sleep apnea in the past and used CPAP until around 2 years ago  He is now here for re-evaluation  Prior studies were undertaken over 8 years ago in [place] and results were not available  PFSH, Problem List, Medications & Allergies were reviewed in EMR  He  has a past medical history of Anemia, Arthritis, Diabetes mellitus ([address] ), Diverticulosis, Enlarged prostate, Erectile dysfunction, GERD (gastroesophageal reflux disease), Hiatal hernia, Hypercholesteremia, Hypertension, Obesity, Sleep apnea, and Wears partial dentures  He has a current medication list which includes the following prescription(s): biotin, glucocard vital monitor, calcium carbonate, vitamin d3, vitamin b-12, docusate sodium, glipizide-metformin, hydrocodone-acetaminophen, insulin aspart protamine-insulin aspart, lifescan unistik 2, liraglutide, multivitamin, naproxen, phentermine, and tamsulosin  HPI:  He had bariatric surgery 2 years ago and lost around 120 lb weight  However, snoring persists and is loud enough to disturb his wife  At times he awakens himself with snoring  He is not aware of breathing difficulties during sleep or modifying factors  Other Complaints: none  Restless Leg Syndrome: reports no suggestive symptoms   Parasomnia: no features reported   Sleep Routine:   Typical Bedtime:  10:00 p m  Gets OOB:  9:00 a m  TIB:11 hrs  Sleep latency: upto 60 minutes while watching TV Sleep Interruptions:infrequent x/night   Awakens: spontaneously  Upon awakening:feels refreshed He estimates getting 6-7 hrs sleep  He denied Excessive Daytime Sleepiness but naps occasionally out of boredom  [place] Sleepiness Scale rated at Total score: 14 /24  Habits: reports that he quit smoking about 17 years ago  He has never used smokeless tobacco , reports that he does not drink alcohol ,  reports that he does not use drugs  ,Caffeine use:limited , Exercise routine: regular    Family History: Negative for sleep disturbance  ROS: reviewed & as attached  Significant for throat clearing but no specific nasal symptoms  He reports no respiratory or cardiac symptoms  Acid reflux is controlled  He has episodic radicular symptoms  He is claustrophobic but can tolerate wearing a mask  He states his diabetes is controlled on current medication and last hemoglobin A1c was less than 6 5 mg percent    EXAM:    Vitals /70   Pulse 74   Ht 5' 8" (1 727 m)   Wt 105 kg (232 lb)   BMI 35 28 kg/m²     General  Well groomed male; appears stated age; no apparent distress  Psychiatric   Speech:clear and coherent; Cooperative  Normal mood, affect & thought    Head   Craniofacial anatomy:obvious overjet Sinuses: non- tender  TMJ: Normal     Eyes   EOM's intact;  conjunctiva/corneas clear         Nasal Airway  is patent Septum:  Deviated, Mucous membranes:appear normal     Turbinates: normal ;  No Rhinorrhea; No PND  Oral   Airway  crowded Tongue:Modified Mallampati class IV (only hard palate visible)  Hard Palate:normal ;Soft Palate:  redundant soft palate Tonsils: no hypertrophy  Teeth:  ground down  Neck  appears thick; Neck Circumference: 43 5cm; Supple; no abnormal masses; Thyroid:normal  Trachea:central      Lymph    No Cervical or Submandibular Lymhadenopathy   Heart:   S1,S2 normal;RRR; no gallop; nomurmurs     Lungs   Respiratory Effort:normal  Air entry good bilaterally  No wheezes  No rales   Abdomen   Obese, Soft & non-tender     Extremities   No pedal edema  No clubbing or cyanosis  Skin   Skin is warm and dry; Color& Hydration good; no facial rashes or lesions    Neurologic  Alert and orientated; CNII-XII intact;  Motor normal; Sensation normal    Muscskeltl    Muscle bulk, tone and power WNL Gait:normal   Rombergs Negative  IMPRESSION: Primary Sleep/Secondary(to Medical or Psych conditions) & comorbidities   1  Obstructive sleep apnea  Split Study   2  Hypersomnia     3  Inadequate sleep hygiene     4  Bruxism     5  Obesity (BMI 30-39 9)     6  Claustrophobia     7  Type 2 diabetes mellitus with insulin therapy ([facility] [address] )     8  Postgastrectomy malabsorption     9  Gastroesophageal reflux disease, esophagitis presence not specified          PLAN:   1  Comprehensive counseling was provided on pathophysiology, diagnostic strategies & treatment options; effects on symptoms and comorbidities; risks of inadequate therapy; costs and insurance aspects  2  I advised on weight reduction, avoiding sleeping supine, using alcohol or sedating medications close to bed time and on safe driving practices  3  Cognitive behavioral therapy was initiated with advise on Sleep Hygiene and behavioral techniques to manage Insomnia  Specifically, limiting time in bed to 8 hours, avoiding watching TV in bed and on relaxation techniques  4  Repeat Nocturnal polysomnography is indicated and since he is willing to re-initiate positive airway pressure therapy, a split study will be scheduled  Treatment to be initiated for AHI of greater than 5 per hour  5  Follow-up will be scheduled after the studies to review results, further details of treatment options and to initiate/adjust therapy  Thank you for allowing me to participate in the care of this patient  I will keep you apprised of developments          Sincerely,     Authenticated electronically by Urbano Kline MD   on 07/78/16   Board Certified Specialist

## 2019-03-13 NOTE — PATIENT INSTRUCTIONS
What is AUNG? Obstructive sleep apnea is a common and serious sleep disorder that causes you to stop breathing during sleep  The airway repeatedly becomes blocked, limiting the amount of air that reaches your lungs  When this happens, you may snore loudly or making choking noises as you try to breathe  Your brain and body becomes oxygen deprived and you may wake up  This may happen a few times a night, or in more severe cases, several hundred times a night  Sleep apnea can make you wake up in the morning feeling tired or unrefreshed even though you have had a full night of sleep  During the day, you may feel fatigued, have difficulty concentrating or you may even unintentionally fall asleep  This is because your body is waking up numerous times throughout the night, even though you might not be conscious of each awakening  The lack of oxygen your body receives can have negative long-term consequences for your health  This includes:  High blood pressure  Heart disease  Irregular heart rhythms  Stroke  Pre-diabetes and diabetes  Depression    Testing  An objective evaluation of your sleep may be needed before your board certified sleep physician can make a diagnosis  Options include:   In-lab overnight sleep study  This type of sleep study requires you to stay overnight at a sleep center, in a bed that may resemble a hotel room  You will sleep with sensors hooked up to various parts of your body  These sensors record your brain waves, heartbeat, breathing and movement  An overnight sleep study also provides your doctor with the most complete information about your sleep  Learn more about an overnight sleep study      Home sleep apnea test  Some patients with high risk factors for obstructive sleep apnea and no other medical disorders may be candidates for a home sleep apnea test  The testing equipment differs in that it is less complicated than what is used in an overnight sleep study   As such, does not give all the data an in-lab will and if negative, may not mean you do not have the problem  Treatment for sleep apnea  includes using a continuous positive airway pressure (CPAP) machine to keep your airway open during sleep  A mask is placed over your nose and mouth, or just your nose  The mask is hooked to the CPAP machine that blows a gentle stream of air into the mask when you breathe  This helps keep your airway open so you can breathe more regularly  Extra oxygen may be given to you through the machine  You may be given a mouth device  It looks like a mouth guard or dental retainer and stops your tongue and mouth tissues from blocking your throat while you sleep  Surgery may be needed to remove extra tissues that block your mouth, throat, or nose  Manage sleep apnea:   Do not smoke  Nicotine and other chemicals in cigarettes and cigars can cause lung damage  Ask your healthcare provider for information if you currently smoke and need help to quit  E-cigarettes or smokeless tobacco still contain nicotine  Talk to your healthcare provider before you use these products  Do not drink alcohol or take sedative medicine before you go to sleep  Alcohol and sedatives can relax the muscles and tissues around your throat  This can block the airflow to your lungs  Maintain a healthy weight  Excess tissue around your throat may restrict your breathing  Ask your healthcare provider for information if you need to lose weight  Sleep on your side or use pillows designed to prevent sleep apnea  This prevents your tongue or other tissues from blocking your throat  You can also raise the head of your bed  Driving Safety  Refrain from driving when drowsy  Follow up with your healthcare provider as directed:  Write down your questions so you remember to ask them during your visits  Go to AASM website for more information: Sleepeducation  org     What is AUNG?    Obstructive sleep apnea is a common and serious sleep disorder that causes you to stop breathing during sleep  The airway repeatedly becomes blocked, limiting the amount of air that reaches your lungs  When this happens, you may snore loudly or making choking noises as you try to breathe  Your brain and body becomes oxygen deprived and you may wake up  This may happen a few times a night, or in more severe cases, several hundred times a night  Sleep apnea can make you wake up in the morning feeling tired or unrefreshed even though you have had a full night of sleep  During the day, you may feel fatigued, have difficulty concentrating or you may even unintentionally fall asleep  This is because your body is waking up numerous times throughout the night, even though you might not be conscious of each awakening  The lack of oxygen your body receives can have negative long-term consequences for your health  This includes:  High blood pressure  Heart disease  Irregular heart rhythms  Stroke  Pre-diabetes and diabetes  Depression    Testing  An objective evaluation of your sleep may be needed before your board certified sleep physician can make a diagnosis  Options include:   In-lab overnight sleep study  This type of sleep study requires you to stay overnight at a sleep center, in a bed that may resemble a hotel room  You will sleep with sensors hooked up to various parts of your body  These sensors record your brain waves, heartbeat, breathing and movement  An overnight sleep study also provides your doctor with the most complete information about your sleep  Learn more about an overnight sleep study      Home sleep apnea test  Some patients with high risk factors for obstructive sleep apnea and no other medical disorders may be candidates for a home sleep apnea test  The testing equipment differs in that it is less complicated than what is used in an overnight sleep study   As such, does not give all the data an in-lab will and if negative, may not mean you do not have the problem  Treatment for sleep apnea  includes using a continuous positive airway pressure (CPAP) machine to keep your airway open during sleep  A mask is placed over your nose and mouth, or just your nose  The mask is hooked to the CPAP machine that blows a gentle stream of air into the mask when you breathe  This helps keep your airway open so you can breathe more regularly  Extra oxygen may be given to you through the machine  You may be given a mouth device  It looks like a mouth guard or dental retainer and stops your tongue and mouth tissues from blocking your throat while you sleep  Surgery may be needed to remove extra tissues that block your mouth, throat, or nose  Manage sleep apnea:   Do not smoke  Nicotine and other chemicals in cigarettes and cigars can cause lung damage  Ask your healthcare provider for information if you currently smoke and need help to quit  E-cigarettes or smokeless tobacco still contain nicotine  Talk to your healthcare provider before you use these products  Do not drink alcohol or take sedative medicine before you go to sleep  Alcohol and sedatives can relax the muscles and tissues around your throat  This can block the airflow to your lungs  Maintain a healthy weight  Excess tissue around your throat may restrict your breathing  Ask your healthcare provider for information if you need to lose weight  Sleep on your side or use pillows designed to prevent sleep apnea  This prevents your tongue or other tissues from blocking your throat  You can also raise the head of your bed  Driving Safety  Refrain from driving when drowsy  Follow up with your healthcare provider as directed:  Write down your questions so you remember to ask them during your visits  Go to AAS website for more information: Sleepeducation  org

## 2019-03-14 ENCOUNTER — TELEPHONE (OUTPATIENT)
Dept: UROLOGY | Facility: MEDICAL CENTER | Age: 69
End: 2019-03-14

## 2019-03-14 DIAGNOSIS — R35.0 BENIGN PROSTATIC HYPERPLASIA WITH URINARY FREQUENCY: ICD-10-CM

## 2019-03-14 DIAGNOSIS — N40.1 BENIGN PROSTATIC HYPERPLASIA WITH URINARY FREQUENCY: ICD-10-CM

## 2019-03-14 NOTE — TELEPHONE ENCOUNTER
I would guess that he is talking about pyridium  I do not see any history of in his chart and I do not know which symptoms he is wanting to control with this medication  This can will be to taken for 2 days  Any additional information would be great

## 2019-03-14 NOTE — TELEPHONE ENCOUNTER
Called and spoke with patient, discussed Saira's recommendations with patient  Patient to increase water intake and avoid any type of bladder irritants  Offered urine testing but patient wishes to wait and try hydrating with water  Informed patient Pyridium is a short term medication so office can not refill   Patient will contact office tomorrow if he decides to proceed with the urine testing  Patient voiced understanding of instructions

## 2019-03-14 NOTE — TELEPHONE ENCOUNTER
Cannot refill pyridium due to its effect on kidney function  It should not be taken for more than a couple of days  If he is having significant burning he should have a urine sample taken to assess for infection  If it is more urinary frequency and urgency that he should increase his hydration as this is expected after Urolift and will improve with time  He should continue to avoid bladder irritants  We can try an anticholinergic if it is more overactive bladder type symptoms

## 2019-03-14 NOTE — TELEPHONE ENCOUNTER
Called patient to clarify medication that he is asking to be refilled and he stated it is the medication that Radha Gave prescribed that makes the urine orange  Patient stated medication was given the day of surgery but unaware of the name  Delfino Schulz, can you please review and refill if possible  Patients pharmacy is updated

## 2019-03-14 NOTE — TELEPHONE ENCOUNTER
Patient is s/p Urolift on 3/8/19 with DR Tex Amador, called and spoke to patient, he was given prescription for Pyridium at discharge 6 tablets  He had been taking the Pyridium twice daily  Last dose 1 day ago  He is reporting that the Pyridium was helping his symptoms  He has been having burning with urination  Denies fever, denies chills  Advised Patient that update will be sent to provider to review  Advised generally we have patients take this medication for 2 to 3 days  Advised patient that clinical will call back with further recommendation  Patient states he would like another prescription for the Pyridium

## 2019-04-09 ENCOUNTER — TELEPHONE (OUTPATIENT)
Dept: BARIATRICS | Facility: CLINIC | Age: 69
End: 2019-04-09

## 2019-04-22 ENCOUNTER — HOSPITAL ENCOUNTER (OUTPATIENT)
Dept: SLEEP CENTER | Facility: CLINIC | Age: 69
Discharge: HOME | End: 2019-04-22
Payer: COMMERCIAL

## 2019-04-22 DIAGNOSIS — Z79.4 TYPE 2 DIABETES MELLITUS WITH INSULIN THERAPY (HCC): ICD-10-CM

## 2019-04-22 DIAGNOSIS — E11.9 TYPE 2 DIABETES MELLITUS WITH INSULIN THERAPY (HCC): ICD-10-CM

## 2019-04-22 DIAGNOSIS — I10 BENIGN ESSENTIAL HYPERTENSION: Primary | ICD-10-CM

## 2019-04-22 DIAGNOSIS — G47.33 OBSTRUCTIVE SLEEP APNEA: ICD-10-CM

## 2019-04-22 PROCEDURE — 95810 POLYSOM 6/> YRS 4/> PARAM: CPT

## 2019-04-24 ENCOUNTER — TELEPHONE (OUTPATIENT)
Dept: SLEEP CENTER | Facility: CLINIC | Age: 69
End: 2019-04-24

## 2019-04-25 ENCOUNTER — TELEPHONE (OUTPATIENT)
Dept: UROLOGY | Facility: AMBULATORY SURGERY CENTER | Age: 69
End: 2019-04-25

## 2019-04-25 ENCOUNTER — OFFICE VISIT (OUTPATIENT)
Dept: UROLOGY | Facility: CLINIC | Age: 69
End: 2019-04-25
Payer: COMMERCIAL

## 2019-04-25 VITALS
WEIGHT: 227 LBS | BODY MASS INDEX: 34.4 KG/M2 | HEIGHT: 68 IN | DIASTOLIC BLOOD PRESSURE: 64 MMHG | SYSTOLIC BLOOD PRESSURE: 124 MMHG

## 2019-04-25 DIAGNOSIS — N52.8 OTHER MALE ERECTILE DYSFUNCTION: ICD-10-CM

## 2019-04-25 DIAGNOSIS — N40.1 BENIGN PROSTATIC HYPERPLASIA WITH URINARY FREQUENCY: ICD-10-CM

## 2019-04-25 DIAGNOSIS — R35.0 BENIGN PROSTATIC HYPERPLASIA WITH URINARY FREQUENCY: ICD-10-CM

## 2019-04-25 DIAGNOSIS — N52.9 VASCULOGENIC ERECTILE DYSFUNCTION, UNSPECIFIED VASCULOGENIC ERECTILE DYSFUNCTION TYPE: Primary | ICD-10-CM

## 2019-04-25 PROCEDURE — 51798 US URINE CAPACITY MEASURE: CPT | Performed by: NURSE PRACTITIONER

## 2019-04-25 PROCEDURE — 99213 OFFICE O/P EST LOW 20 MIN: CPT | Performed by: NURSE PRACTITIONER

## 2019-04-25 RX ORDER — SILDENAFIL CITRATE 20 MG/1
20 TABLET ORAL AS NEEDED
Qty: 90 TABLET | Refills: 3 | Status: SHIPPED | OUTPATIENT
Start: 2019-04-25 | End: 2019-04-25 | Stop reason: SDUPTHER

## 2019-04-25 RX ORDER — SILDENAFIL CITRATE 20 MG/1
20 TABLET ORAL AS NEEDED
Qty: 90 TABLET | Refills: 3 | Status: SHIPPED | OUTPATIENT
Start: 2019-04-25 | End: 2019-06-05

## 2019-05-14 ENCOUNTER — TELEPHONE (OUTPATIENT)
Dept: FAMILY MEDICINE CLINIC | Facility: CLINIC | Age: 69
End: 2019-05-14

## 2019-05-14 DIAGNOSIS — E11.9 TYPE 2 DIABETES MELLITUS WITH INSULIN THERAPY (HCC): Primary | ICD-10-CM

## 2019-05-14 DIAGNOSIS — Z79.4 TYPE 2 DIABETES MELLITUS WITH INSULIN THERAPY (HCC): Primary | ICD-10-CM

## 2019-05-14 RX ORDER — BLOOD-GLUCOSE METER
KIT MISCELLANEOUS 2 TIMES DAILY
Qty: 1 KIT | Refills: 0 | Status: SHIPPED | OUTPATIENT
Start: 2019-05-14

## 2019-06-04 ENCOUNTER — APPOINTMENT (OUTPATIENT)
Dept: LAB | Facility: CLINIC | Age: 69
End: 2019-06-04
Payer: COMMERCIAL

## 2019-06-04 LAB
ALBUMIN SERPL BCP-MCNC: 3.3 G/DL (ref 3.5–5)
ALP SERPL-CCNC: 84 U/L (ref 46–116)
ALT SERPL W P-5'-P-CCNC: 24 U/L (ref 12–78)
ANION GAP SERPL CALCULATED.3IONS-SCNC: 8 MMOL/L (ref 4–13)
AST SERPL W P-5'-P-CCNC: 20 U/L (ref 5–45)
BASOPHILS # BLD AUTO: 0.06 THOUSANDS/ΜL (ref 0–0.1)
BASOPHILS NFR BLD AUTO: 1 % (ref 0–1)
BILIRUB SERPL-MCNC: 0.4 MG/DL (ref 0.2–1)
BUN SERPL-MCNC: 22 MG/DL (ref 5–25)
CALCIUM SERPL-MCNC: 8.6 MG/DL (ref 8.3–10.1)
CHLORIDE SERPL-SCNC: 106 MMOL/L (ref 100–108)
CHOLEST SERPL-MCNC: 137 MG/DL (ref 50–200)
CO2 SERPL-SCNC: 27 MMOL/L (ref 21–32)
CREAT SERPL-MCNC: 0.79 MG/DL (ref 0.6–1.3)
CREAT UR-MCNC: 117 MG/DL
EOSINOPHIL # BLD AUTO: 0.18 THOUSAND/ΜL (ref 0–0.61)
EOSINOPHIL NFR BLD AUTO: 3 % (ref 0–6)
ERYTHROCYTE [DISTWIDTH] IN BLOOD BY AUTOMATED COUNT: 12.9 % (ref 11.6–15.1)
EST. AVERAGE GLUCOSE BLD GHB EST-MCNC: 146 MG/DL
GFR SERPL CREATININE-BSD FRML MDRD: 92 ML/MIN/1.73SQ M
GLUCOSE P FAST SERPL-MCNC: 142 MG/DL (ref 65–99)
HBA1C MFR BLD: 6.7 % (ref 4.2–6.3)
HCT VFR BLD AUTO: 41.5 % (ref 36.5–49.3)
HDLC SERPL-MCNC: 38 MG/DL (ref 40–60)
HGB BLD-MCNC: 13.7 G/DL (ref 12–17)
IMM GRANULOCYTES # BLD AUTO: 0.03 THOUSAND/UL (ref 0–0.2)
IMM GRANULOCYTES NFR BLD AUTO: 0 % (ref 0–2)
LDLC SERPL CALC-MCNC: 83 MG/DL (ref 0–100)
LYMPHOCYTES # BLD AUTO: 1.1 THOUSANDS/ΜL (ref 0.6–4.47)
LYMPHOCYTES NFR BLD AUTO: 16 % (ref 14–44)
MCH RBC QN AUTO: 29.7 PG (ref 26.8–34.3)
MCHC RBC AUTO-ENTMCNC: 33 G/DL (ref 31.4–37.4)
MCV RBC AUTO: 90 FL (ref 82–98)
MICROALBUMIN UR-MCNC: 40 MG/L (ref 0–20)
MICROALBUMIN/CREAT 24H UR: 34 MG/G CREATININE (ref 0–30)
MONOCYTES # BLD AUTO: 0.67 THOUSAND/ΜL (ref 0.17–1.22)
MONOCYTES NFR BLD AUTO: 10 % (ref 4–12)
NEUTROPHILS # BLD AUTO: 4.89 THOUSANDS/ΜL (ref 1.85–7.62)
NEUTS SEG NFR BLD AUTO: 70 % (ref 43–75)
NONHDLC SERPL-MCNC: 99 MG/DL
NRBC BLD AUTO-RTO: 0 /100 WBCS
PLATELET # BLD AUTO: 159 THOUSANDS/UL (ref 149–390)
PMV BLD AUTO: 12.1 FL (ref 8.9–12.7)
POTASSIUM SERPL-SCNC: 4.3 MMOL/L (ref 3.5–5.3)
PROT SERPL-MCNC: 6.8 G/DL (ref 6.4–8.2)
RBC # BLD AUTO: 4.62 MILLION/UL (ref 3.88–5.62)
SODIUM SERPL-SCNC: 141 MMOL/L (ref 136–145)
TRIGL SERPL-MCNC: 82 MG/DL
WBC # BLD AUTO: 6.93 THOUSAND/UL (ref 4.31–10.16)

## 2019-06-04 PROCEDURE — 85025 COMPLETE CBC W/AUTO DIFF WBC: CPT | Performed by: FAMILY MEDICINE

## 2019-06-04 PROCEDURE — 3060F POS MICROALBUMINURIA REV: CPT | Performed by: FAMILY MEDICINE

## 2019-06-04 PROCEDURE — 80053 COMPREHEN METABOLIC PANEL: CPT | Performed by: FAMILY MEDICINE

## 2019-06-04 PROCEDURE — 36415 COLL VENOUS BLD VENIPUNCTURE: CPT | Performed by: FAMILY MEDICINE

## 2019-06-04 PROCEDURE — 80061 LIPID PANEL: CPT | Performed by: FAMILY MEDICINE

## 2019-06-04 PROCEDURE — 82043 UR ALBUMIN QUANTITATIVE: CPT | Performed by: FAMILY MEDICINE

## 2019-06-04 PROCEDURE — 82570 ASSAY OF URINE CREATININE: CPT | Performed by: FAMILY MEDICINE

## 2019-06-04 PROCEDURE — 83036 HEMOGLOBIN GLYCOSYLATED A1C: CPT | Performed by: FAMILY MEDICINE

## 2019-06-05 ENCOUNTER — OFFICE VISIT (OUTPATIENT)
Dept: FAMILY MEDICINE CLINIC | Facility: CLINIC | Age: 69
End: 2019-06-05
Payer: COMMERCIAL

## 2019-06-05 VITALS
HEIGHT: 67 IN | RESPIRATION RATE: 16 BRPM | DIASTOLIC BLOOD PRESSURE: 70 MMHG | WEIGHT: 220.6 LBS | TEMPERATURE: 97.2 F | HEART RATE: 64 BPM | OXYGEN SATURATION: 95 % | SYSTOLIC BLOOD PRESSURE: 110 MMHG | BODY MASS INDEX: 34.62 KG/M2

## 2019-06-05 DIAGNOSIS — E11.9 TYPE 2 DIABETES MELLITUS WITH INSULIN THERAPY (HCC): ICD-10-CM

## 2019-06-05 DIAGNOSIS — Z79.4 TYPE 2 DIABETES MELLITUS WITH INSULIN THERAPY (HCC): ICD-10-CM

## 2019-06-05 DIAGNOSIS — Z98.84 S/P LAPAROSCOPIC SLEEVE GASTRECTOMY: ICD-10-CM

## 2019-06-05 DIAGNOSIS — Z00.00 MEDICARE ANNUAL WELLNESS VISIT, SUBSEQUENT: Primary | ICD-10-CM

## 2019-06-05 DIAGNOSIS — E65 PANNICULUS ADIPOSUS: ICD-10-CM

## 2019-06-05 DIAGNOSIS — B35.1 ONYCHOMYCOSIS: ICD-10-CM

## 2019-06-05 PROCEDURE — 3008F BODY MASS INDEX DOCD: CPT | Performed by: FAMILY MEDICINE

## 2019-06-05 PROCEDURE — 1160F RVW MEDS BY RX/DR IN RCRD: CPT | Performed by: FAMILY MEDICINE

## 2019-06-05 PROCEDURE — 1036F TOBACCO NON-USER: CPT | Performed by: FAMILY MEDICINE

## 2019-06-05 PROCEDURE — 3725F SCREEN DEPRESSION PERFORMED: CPT | Performed by: FAMILY MEDICINE

## 2019-06-05 PROCEDURE — 1125F AMNT PAIN NOTED PAIN PRSNT: CPT | Performed by: FAMILY MEDICINE

## 2019-06-05 PROCEDURE — G0439 PPPS, SUBSEQ VISIT: HCPCS | Performed by: FAMILY MEDICINE

## 2019-06-05 PROCEDURE — 1170F FXNL STATUS ASSESSED: CPT | Performed by: FAMILY MEDICINE

## 2019-06-05 RX ORDER — TERBINAFINE HYDROCHLORIDE 250 MG/1
250 TABLET ORAL DAILY
Qty: 30 TABLET | Refills: 0 | Status: SHIPPED | OUTPATIENT
Start: 2019-06-05 | End: 2019-07-05 | Stop reason: SDUPTHER

## 2019-06-05 RX ORDER — GLIPIZIDE 5 MG/1
5 TABLET, FILM COATED, EXTENDED RELEASE ORAL DAILY
Qty: 90 TABLET | Refills: 0 | Status: SHIPPED | OUTPATIENT
Start: 2019-06-05 | End: 2019-09-13 | Stop reason: SDUPTHER

## 2019-06-29 DIAGNOSIS — E11.9 TYPE 2 DIABETES MELLITUS WITHOUT COMPLICATION, WITHOUT LONG-TERM CURRENT USE OF INSULIN (HCC): ICD-10-CM

## 2019-06-30 RX ORDER — INSULIN ASPART 100 [IU]/ML
INJECTION, SUSPENSION SUBCUTANEOUS
Qty: 15 ML | Refills: 5 | Status: SHIPPED | OUTPATIENT
Start: 2019-06-30 | End: 2020-09-04

## 2019-07-05 DIAGNOSIS — B35.1 ONYCHOMYCOSIS: ICD-10-CM

## 2019-07-05 RX ORDER — TERBINAFINE HYDROCHLORIDE 250 MG/1
TABLET ORAL
Qty: 30 TABLET | Refills: 0 | Status: SHIPPED | OUTPATIENT
Start: 2019-07-05 | End: 2019-08-07 | Stop reason: SDUPTHER

## 2019-07-15 DIAGNOSIS — Z86.010 HISTORY OF ADENOMATOUS POLYP OF COLON: Primary | ICD-10-CM

## 2019-07-22 NOTE — PROGRESS NOTES
There are no diagnoses linked to this encounter  Assessment and plan:       1  Status post Urolift 03/08/2019 - Dr Lata Newell  - Patient continues to have bothersome urgency and frequency resulting in incontinence  - Was provided with a prescription Myrbetriq  Side effect profile was discussed including possibility of dry mouth, dry eye, and constipation  We did discuss the potential of this medication being cost prohibitive and needing to try an anticholinergic in its place  Side effect profile that was reviewed as well including for confusion   - He plans to return in 3 months for symptom reassessment and uroflow with PVR  Yisel Watkins PA-C      Chief Complaint     Chief Complaint   Patient presents with    Benign Prostatic Hypertrophy         History of Present Illness     Lena Martínez is a 71 y o  male patient with a history of BPH and lower urinary tract symptoms status post Urolift (3/8/2019) presenting for 3 month follow-up  Preoperatively the patient's PVR was 0 mL  Maximum flow on uroflow was 7 0 mL/sec  Patient was not able to complete uroflow at his appointment on 04/25  Patient continues to have an AUA symptom score of 17 with an overall bother score of 3  He is significantly bothered by his continued urinary urgency and frequency  He now requires 1 depends per day due to incontinence  He reports he was not incontinent prior to this procedure  He continues to empty well with a PVR of 11 mL  Laboratory     Lab Results   Component Value Date    CREATININE 0 79 06/04/2019       Lab Results   Component Value Date    PSA 2 6 10/23/2018    PSA 1 0 01/07/2016       Recent Results (from the past 1 hour(s))   POCT Measure PVR    Collection Time: 07/26/19 11:14 AM   Result Value Ref Range    POST-VOID RESIDUAL VOLUME, ML POC 11 mL         Review of Systems     Review of Systems   Constitutional: Negative for chills and fever     Respiratory: Negative for shortness of breath  Cardiovascular: Negative for chest pain  Gastrointestinal: Negative for constipation, diarrhea, nausea and vomiting  Genitourinary: Positive for frequency and urgency  Negative for difficulty urinating, discharge, dysuria, enuresis and flank pain  AUA SYMPTOM SCORE      Most Recent Value   AUA SYMPTOM SCORE   How often have you had a sensation of not emptying your bladder completely after you finished urinating? 3   How often have you had to urinate again less than two hours after you finished urinating? 4   How often have you found you stopped and started again several times when you urinate? 3   How often have you found it difficult to postpone urination? 4   How often have you had a weak urinary stream?  2   How often have you had to push or strain to begin urination? 0   How many times did you most typically get up to urinate from the time you went to bed at night until the time you got up in the morning? 1   Quality of Life: If you were to spend the rest of your life with your urinary condition just the way it is now, how would you feel about that?  3   AUA SYMPTOM SCORE  17                Allergies     Allergies   Allergen Reactions    Enalapril Anaphylaxis and Throat Swelling       Physical Exam     Physical Exam   Constitutional: He is oriented to person, place, and time  He appears well-developed and well-nourished  No distress  HENT:   Head: Normocephalic and atraumatic  Right Ear: External ear normal    Left Ear: External ear normal    Nose: Nose normal    Eyes: Right eye exhibits no discharge  Left eye exhibits no discharge  No scleral icterus  Cardiovascular: Normal rate  Pulmonary/Chest: Effort normal    Musculoskeletal:   Ambulates independently  Neurological: He is alert and oriented to person, place, and time  Skin: Skin is warm and dry  He is not diaphoretic  Psychiatric: He has a normal mood and affect   His behavior is normal  Judgment and thought content normal    Nursing note and vitals reviewed  Vital Signs     Vitals:    07/26/19 1109   BP: 128/84   BP Location: Left arm   Patient Position: Sitting   Cuff Size: Adult   Pulse: 65   Weight: 102 kg (224 lb)   Height: 5' 8" (1 727 m)         Current Medications       Current Outpatient Medications:     albuterol (PROAIR HFA) 90 mcg/act inhaler, inhale 2 puff by inhalation route  every 4 - 6 hours as needed, Disp: , Rfl:     Biotin 1000 MCG tablet, Take 1 tablet by mouth daily in the early morning , Disp: , Rfl:     Blood Glucose Monitoring Suppl (GLUCOCARD VITAL MONITOR) w/Device KIT, by Does not apply route 2 (two) times a day, Disp: 1 kit, Rfl: 0    calcium carbonate (OS-GILDA) 600 MG tablet, Take 600 mg by mouth daily in the early morning , Disp: , Rfl:     Cholecalciferol (VITAMIN D3) 2000 units capsule, Take 1,000 Units by mouth daily in the early morning , Disp: , Rfl:     Cyanocobalamin (VITAMIN B-12) 5000 MCG TBDP, Take 5,000 mcg by mouth once a week Takes on Mondays, Disp: , Rfl:     glipiZIDE (GLUCOTROL XL) 5 mg 24 hr tablet, Take 1 tablet (5 mg total) by mouth daily, Disp: 90 tablet, Rfl: 0    hydrochlorothiazide (HYDRODIURIL) 12 5 mg tablet, Take 1 tablet by mouth Daily, Disp: , Rfl:     Lancets (LIFESCAN UNISTIK 2) MISC, Lifescan One Touch Ultramini Meter; use as directed; 1; 0; 31-Oct-2016; 31-Oct-2016; Melida Duran;  Active, Disp: , Rfl:     liraglutide (VICTOZA) injection, Inject 0 3 mL (1 8 mg total) under the skin daily (Patient taking differently: Inject 1 8 mg under the skin daily in the early morning ), Disp: 5 pen, Rfl: 3    losartan (COZAAR) 25 mg tablet, Take 1 tablet by mouth Daily, Disp: , Rfl:     lovastatin (MEVACOR) 10 MG tablet, Take by mouth, Disp: , Rfl:     Multiple Vitamin (MULTIVITAMIN) capsule, Take 1 capsule by mouth daily in the early morning , Disp: , Rfl:     NOVOLOG MIX 70/30 FLEXPEN (70-30) 100 units/mL injection pen, INJECT 20 UNITS UNDER THE SKIN EVERY 12 HOURS, Disp: 15 mL, Rfl: 5    bisacodyl (DULCOLAX) 5 mg EC tablet, Take 2 tablets (10 mg total) by mouth once for 1 dose, Disp: 2 tablet, Rfl: 0    docusate sodium (COLACE) 100 mg capsule, Take 1 capsule (100 mg total) by mouth 2 (two) times a day for 15 days, Disp: 30 capsule, Rfl: 0    insulin aspart protamine-insulin aspart (NovoLOG 70/30) 100 units/mL injection, Inject 10 Units under the skin 2 (two) times a day before meals for 30 days Before breakfast and dinner (Patient taking differently: Inject 20 Units under the skin daily in the early morning ), Disp: , Rfl: 0    metFORMIN (GLUCOPHAGE) 1000 MG tablet, Take 1 tablet by mouth every 12 (twelve) hours, Disp: , Rfl:     metoprolol tartrate (LOPRESSOR) 50 mg tablet, TAKE ONE TABLET BY MOUTH TWICE A DAY WITH MEALS, Disp: , Rfl:     naproxen (EC NAPROSYN) 500 MG EC tablet, Take 1 tablet (500 mg total) by mouth 2 (two) times a day with meals for 5 days, Disp: 10 tablet, Rfl: 0    polyethylene glycol (GOLYTELY) 4000 mL solution, Take 4,000 mL by mouth once for 1 dose, Disp: 4000 mL, Rfl: 0    terbinafine (LamISIL) 250 mg tablet, TAKE ONE TABLET BY MOUTH EVERY DAY (Patient not taking: Reported on 7/26/2019), Disp: 30 tablet, Rfl: 0    Zoster Vaccine Live (ZOSTAVAX) 98993 UNT/0 65ML SUSR, admin one, Disp: , Rfl:       Active Problems     Patient Active Problem List   Diagnosis    Morbid obesity due to excess calories (HCC)    GERD (gastroesophageal reflux disease)    Obesity    Constipation    Hypercholesteremia    Postgastrectomy malabsorption    Type 2 diabetes mellitus with insulin therapy (HCC)    Benign essential hypertension    Benign enlargement of prostate    Pancreatic cyst    History of colon polyps    IPMN (intraductal papillary mucinous neoplasm)    ED (erectile dysfunction) of organic origin    Benign prostatic hyperplasia with urinary frequency    Bruxism         Past Medical History     Past Medical History:   Diagnosis Date    Anemia     Arthritis     Diabetes mellitus ([facility] [address] )     Diverticulosis     Enlarged prostate     Erectile dysfunction     GERD (gastroesophageal reflux disease)     Hiatal hernia     Hypercholesteremia     Resolved post weight loss    Hypertension     Resolved post weight loss    Obesity     Sleep apnea     Resolved post weight loss    Wears partial dentures     partial upper and lower         Surgical History     Past Surgical History:   Procedure Laterality Date    ABDOMINAL ADHESION SURGERY N/A 11/28/2016    Procedure: EXTENSIVE LYSIS ADHESIONS;  Surgeon: Angel Rome MD;  [facility];  Service:    [facility]ain[facility] FRACTURE SURGERY Left     CHOLECYSTECTOMY      COLON SURGERY      colon resection    COLONOSCOPY      COLOSTOMY      COLOSTOMY CLOSURE      DIAGNOSTIC LAPAROSCOPY      GASTRIC BYPASS      failed due to adhesions    HERNIA REPAIR      abdominal    [place] CYSTOURETHRO W/IMPLANT N/A 3/8/2019    Procedure: CYSTOSCOPY WITH INSERTION UROLIFT;  Surgeon: Tammi Lemus MD;  [facility];  Service: Urology     [facility] STOM DUODENUM/JEJUNUM N/A 10/25/2018    Procedure: LINEAR ENDOSCOPIC U/S;  Surgeon: Justice Vu MD;  [facility]; Service: Gastroenterology    [place] ESOPHAGOGASTRODUODENOSCOPY TRANSORAL DIAGNOSTIC N/A 7/6/2016    Procedure: EGD AND COLONOSCOPY;  Surgeon: Precious Boateng MD;  [facility];   Service: Gastroenterology    [place] LAP, ANEESH RESTRICT PROC, LONGITUDINAL GASTRECTOMY N/A 11/28/2016    Procedure: GASTRECTOMY SLEEVE LAPAROSCOPIC;  Surgeon: Angel Rome MD;  [facility];  Service: Bariatrics    TONSILLECTOMY           Family History     Family History   Problem Relation Age of Onset    Heart disease Mother     Breast cancer Mother 80    Diabetes Father     Colon cancer Neg Hx     Liver disease Neg Hx          Social History     Social History       Radiology

## 2019-07-24 ENCOUNTER — OFFICE VISIT (OUTPATIENT)
Dept: SLEEP CENTER | Facility: CLINIC | Age: 69
End: 2019-07-24
Payer: COMMERCIAL

## 2019-07-24 VITALS
WEIGHT: 224 LBS | BODY MASS INDEX: 33.95 KG/M2 | SYSTOLIC BLOOD PRESSURE: 126 MMHG | HEIGHT: 68 IN | DIASTOLIC BLOOD PRESSURE: 60 MMHG

## 2019-07-24 DIAGNOSIS — F45.8 BRUXISM: ICD-10-CM

## 2019-07-24 DIAGNOSIS — I10 BENIGN ESSENTIAL HYPERTENSION: ICD-10-CM

## 2019-07-24 DIAGNOSIS — E66.9 OBESITY (BMI 30-39.9): ICD-10-CM

## 2019-07-24 DIAGNOSIS — G47.61 PLMD (PERIODIC LIMB MOVEMENT DISORDER): ICD-10-CM

## 2019-07-24 DIAGNOSIS — G47.8 UARS (UPPER AIRWAY RESISTANCE SYNDROME): Primary | ICD-10-CM

## 2019-07-24 PROCEDURE — 3074F SYST BP LT 130 MM HG: CPT | Performed by: INTERNAL MEDICINE

## 2019-07-24 PROCEDURE — 99214 OFFICE O/P EST MOD 30 MIN: CPT | Performed by: INTERNAL MEDICINE

## 2019-07-24 NOTE — PROGRESS NOTES
Review of Systems      Genitourinary none   Cardiology none   Gastrointestinal none   Neurology none   Constitutional none   Integumentary none   Psychiatry none   Musculoskeletal none   Pulmonary none   ENT ringing in ears   Endocrine none   Hematological none

## 2019-07-24 NOTE — PROGRESS NOTES
Follow-up Note - Sleep Center   Cece Priest  71 y o  male  RHU:9/79/0054  AIY:[id number]    I saw Gely Dumont in sleep clinic today for his sleep complaints & medical conditions  Patient recently had a diagnostic sleep study and is here to review results / treatment options  The study demonstrated   No evidence for  obstructive sleep apnea: AHI 0 8  /hour     Intermittent snoring of moderate intensity was noted  Minimum oxygen saturation 90 %  Sleep efficiency was 74%  Sleep latency was prolonged at 46 minutes  He had reduced amount of stage REM at 2 6%  There were mild periodic limb movements of sleep for an index of 8 4 per hour causing arousals 4 times an hours  There were frequent spontaneous arousals  PFSH, Problem List, Medications & Allergies were reviewed in EMR  Interval changes: none reported  He  has a past medical history of Anemia, Arthritis, Diabetes mellitus ([address] ), Diverticulosis, Enlarged prostate, Erectile dysfunction, GERD (gastroesophageal reflux disease), Hiatal hernia, Hypercholesteremia, Hypertension, Obesity, Sleep apnea, and Wears partial dentures  He has a current medication list which includes the following prescription(s): biotin, glucocard vital monitor, calcium carbonate, vitamin d3, vitamin b-12, glipizide, lifescan unistik 2, liraglutide, multivitamin, novolog mix 70/30 flexpen, terbinafine, bisacodyl, docusate sodium, insulin aspart protamine-insulin aspart, naproxen, and polyethylene glycol  ROS: reviewed see attached  Significant for  Weight has been stable  HPI: Continues to report snoring that disturbs his wife occasionally  He no longer awakens himself with snoring or choking  He reports no restless leg symptoms and is not aware of jerking movements during sleep  He is not aware of teeth grinding  Sleep Routine: No interval changes:  He is getting 8 hours sleep and reports no difficulty initiating or maintaining sleep    He awakens spontaneously feeling refreshed  Sukhjinder Shows denied excessive drowsiness and  Naps occasionally when he has the opportunity  He rated himself at Total score: 7 /24 on the [place] sleepiness scale ]        Habits:  reports that he quit smoking about 17 years ago  He has quit using smokeless tobacco  He reports that he does not drink alcohol or use drugs  EXAM: /60   Ht 5' 8" (1 727 m)   Wt 102 kg (224 lb)   BMI 34 06 kg/m²     Patient is well groomed; well appearing  Skin/Extrem: warm & dry; col & hydration normal; no edema  Psych: cooperativeand in no distress  Mental state appears normal   CNS: Alert, orientated, clear & coherent speech  H&N: EOMI; NC/AT:no facial pressure marks, no rashes  ENMT Mucus membranes appear normal Nasal airway:patent  Oral airway:  crowded  Resp:effort is normal CVS: RRR ABD:truncal obesity MSK:Gait normal     IMPRESSION: Primary Sleep/Secondary(to Medical or Psych conditions) & comorbidities   1  UARS (upper airway resistance syndrome)     2  PLMD (periodic limb movement disorder)     3  Bruxism     4  Benign essential hypertension     5  Obesity (BMI 30-39  9)       PLAN:    1  I reviewed results of the Sleep studies with the patient  2  With respect to above conditions, I counseled on pathophysiology, diagnosis, treatment options, risks and benefits; inter-relationship and effects on symptoms and comorbidities; risks of no treatment; costs and insurance aspects  3   nasal CPAP is not indicated  Further weight reduction should be sufficient to remediate he is snoring  He has target weight is around 200 lb  4  If snoring persists, a mandibular advancement device may be considered  5  No medication is needed for the periodic limb movements of sleep  6  Follow-up to be scheduled   As needed  Thank you for allowing me to participate in the care of this patient  Please feel free to call me if I can be of any further assistance       Sincerely,    Authenticated electronically by Tejal Roberts Christopher Sena MD on 34/82/60   Board Certified Specialist

## 2019-07-26 ENCOUNTER — OFFICE VISIT (OUTPATIENT)
Dept: UROLOGY | Facility: CLINIC | Age: 69
End: 2019-07-26
Payer: COMMERCIAL

## 2019-07-26 VITALS
WEIGHT: 224 LBS | SYSTOLIC BLOOD PRESSURE: 128 MMHG | HEIGHT: 68 IN | DIASTOLIC BLOOD PRESSURE: 84 MMHG | BODY MASS INDEX: 33.95 KG/M2 | HEART RATE: 65 BPM

## 2019-07-26 DIAGNOSIS — R35.0 BENIGN PROSTATIC HYPERPLASIA WITH URINARY FREQUENCY: Primary | ICD-10-CM

## 2019-07-26 DIAGNOSIS — N40.1 BENIGN PROSTATIC HYPERPLASIA WITH URINARY FREQUENCY: Primary | ICD-10-CM

## 2019-07-26 LAB — POST-VOID RESIDUAL VOLUME, ML POC: 11 ML

## 2019-07-26 PROCEDURE — 51798 US URINE CAPACITY MEASURE: CPT | Performed by: PHYSICIAN ASSISTANT

## 2019-07-26 PROCEDURE — 99213 OFFICE O/P EST LOW 20 MIN: CPT | Performed by: PHYSICIAN ASSISTANT

## 2019-07-26 RX ORDER — METOPROLOL TARTRATE 50 MG/1
TABLET, FILM COATED ORAL
COMMUNITY
Start: 2015-12-08 | End: 2019-09-10 | Stop reason: ALTCHOICE

## 2019-07-26 RX ORDER — LOVASTATIN 10 MG/1
TABLET ORAL
COMMUNITY
Start: 2015-12-21 | End: 2019-09-10 | Stop reason: ALTCHOICE

## 2019-07-26 RX ORDER — ALBUTEROL SULFATE 90 UG/1
AEROSOL, METERED RESPIRATORY (INHALATION)
COMMUNITY
Start: 2015-11-13 | End: 2022-03-14 | Stop reason: SDUPTHER

## 2019-07-26 RX ORDER — HYDROCHLOROTHIAZIDE 12.5 MG/1
1 TABLET ORAL DAILY
COMMUNITY
Start: 2015-12-08 | End: 2019-09-10 | Stop reason: ALTCHOICE

## 2019-07-26 RX ORDER — LOSARTAN POTASSIUM 25 MG/1
1 TABLET ORAL DAILY
COMMUNITY
Start: 2015-12-08 | End: 2019-09-10 | Stop reason: ALTCHOICE

## 2019-08-04 DIAGNOSIS — Z79.4 OTHER SPECIFIED DIABETES MELLITUS WITH COMPLICATION, WITH LONG-TERM CURRENT USE OF INSULIN: ICD-10-CM

## 2019-08-04 DIAGNOSIS — E13.8 OTHER SPECIFIED DIABETES MELLITUS WITH COMPLICATION, WITH LONG-TERM CURRENT USE OF INSULIN: ICD-10-CM

## 2019-08-05 RX ORDER — LIRAGLUTIDE 6 MG/ML
INJECTION SUBCUTANEOUS
Qty: 9 ML | Refills: 5 | Status: SHIPPED | OUTPATIENT
Start: 2019-08-05 | End: 2019-10-23 | Stop reason: ALTCHOICE

## 2019-08-07 ENCOUNTER — ANESTHESIA EVENT (OUTPATIENT)
Dept: GASTROENTEROLOGY | Facility: AMBULARY SURGERY CENTER | Age: 69
End: 2019-08-07

## 2019-08-07 DIAGNOSIS — B35.1 ONYCHOMYCOSIS: ICD-10-CM

## 2019-08-07 RX ORDER — TERBINAFINE HYDROCHLORIDE 250 MG/1
TABLET ORAL
Qty: 30 TABLET | Refills: 0 | Status: SHIPPED | OUTPATIENT
Start: 2019-08-07 | End: 2019-09-07 | Stop reason: SDUPTHER

## 2019-08-19 ENCOUNTER — TELEPHONE (OUTPATIENT)
Dept: GASTROENTEROLOGY | Facility: AMBULARY SURGERY CENTER | Age: 69
End: 2019-08-19

## 2019-08-21 ENCOUNTER — ANESTHESIA (OUTPATIENT)
Dept: GASTROENTEROLOGY | Facility: AMBULARY SURGERY CENTER | Age: 69
End: 2019-08-21

## 2019-09-07 DIAGNOSIS — B35.1 ONYCHOMYCOSIS: ICD-10-CM

## 2019-09-09 ENCOUNTER — APPOINTMENT (OUTPATIENT)
Dept: LAB | Facility: CLINIC | Age: 69
End: 2019-09-09
Payer: COMMERCIAL

## 2019-09-09 ENCOUNTER — TRANSCRIBE ORDERS (OUTPATIENT)
Dept: LAB | Facility: CLINIC | Age: 69
End: 2019-09-09

## 2019-09-09 DIAGNOSIS — Z98.84 S/P LAPAROSCOPIC SLEEVE GASTRECTOMY: ICD-10-CM

## 2019-09-09 DIAGNOSIS — E11.9 TYPE 2 DIABETES MELLITUS WITH INSULIN THERAPY (HCC): ICD-10-CM

## 2019-09-09 DIAGNOSIS — Z79.4 TYPE 2 DIABETES MELLITUS WITH INSULIN THERAPY (HCC): ICD-10-CM

## 2019-09-09 LAB
ALBUMIN SERPL BCP-MCNC: 3.2 G/DL (ref 3.5–5)
ALP SERPL-CCNC: 92 U/L (ref 46–116)
ALT SERPL W P-5'-P-CCNC: 24 U/L (ref 12–78)
ANION GAP SERPL CALCULATED.3IONS-SCNC: 6 MMOL/L (ref 4–13)
AST SERPL W P-5'-P-CCNC: 16 U/L (ref 5–45)
BASOPHILS # BLD AUTO: 0.06 THOUSANDS/ΜL (ref 0–0.1)
BASOPHILS NFR BLD AUTO: 1 % (ref 0–1)
BILIRUB SERPL-MCNC: 0.3 MG/DL (ref 0.2–1)
BUN SERPL-MCNC: 21 MG/DL (ref 5–25)
CALCIUM SERPL-MCNC: 8.6 MG/DL (ref 8.3–10.1)
CHLORIDE SERPL-SCNC: 107 MMOL/L (ref 100–108)
CO2 SERPL-SCNC: 31 MMOL/L (ref 21–32)
CREAT SERPL-MCNC: 0.93 MG/DL (ref 0.6–1.3)
EOSINOPHIL # BLD AUTO: 0.2 THOUSAND/ΜL (ref 0–0.61)
EOSINOPHIL NFR BLD AUTO: 3 % (ref 0–6)
ERYTHROCYTE [DISTWIDTH] IN BLOOD BY AUTOMATED COUNT: 13.3 % (ref 11.6–15.1)
EST. AVERAGE GLUCOSE BLD GHB EST-MCNC: 143 MG/DL
GFR SERPL CREATININE-BSD FRML MDRD: 83 ML/MIN/1.73SQ M
GLUCOSE P FAST SERPL-MCNC: 144 MG/DL (ref 65–99)
HBA1C MFR BLD: 6.6 % (ref 4.2–6.3)
HCT VFR BLD AUTO: 41 % (ref 36.5–49.3)
HGB BLD-MCNC: 13.2 G/DL (ref 12–17)
IMM GRANULOCYTES # BLD AUTO: 0.04 THOUSAND/UL (ref 0–0.2)
IMM GRANULOCYTES NFR BLD AUTO: 1 % (ref 0–2)
IRON SERPL-MCNC: 49 UG/DL (ref 65–175)
LYMPHOCYTES # BLD AUTO: 1.25 THOUSANDS/ΜL (ref 0.6–4.47)
LYMPHOCYTES NFR BLD AUTO: 15 % (ref 14–44)
MCH RBC QN AUTO: 29.5 PG (ref 26.8–34.3)
MCHC RBC AUTO-ENTMCNC: 32.2 G/DL (ref 31.4–37.4)
MCV RBC AUTO: 92 FL (ref 82–98)
MONOCYTES # BLD AUTO: 0.79 THOUSAND/ΜL (ref 0.17–1.22)
MONOCYTES NFR BLD AUTO: 10 % (ref 4–12)
NEUTROPHILS # BLD AUTO: 5.76 THOUSANDS/ΜL (ref 1.85–7.62)
NEUTS SEG NFR BLD AUTO: 70 % (ref 43–75)
NRBC BLD AUTO-RTO: 0 /100 WBCS
PLATELET # BLD AUTO: 145 THOUSANDS/UL (ref 149–390)
PMV BLD AUTO: 12.1 FL (ref 8.9–12.7)
POTASSIUM SERPL-SCNC: 4.3 MMOL/L (ref 3.5–5.3)
PROT SERPL-MCNC: 6.9 G/DL (ref 6.4–8.2)
RBC # BLD AUTO: 4.48 MILLION/UL (ref 3.88–5.62)
SODIUM SERPL-SCNC: 144 MMOL/L (ref 136–145)
TSH SERPL DL<=0.05 MIU/L-ACNC: 2.78 UIU/ML (ref 0.36–3.74)
VIT B12 SERPL-MCNC: 664 PG/ML (ref 100–900)
WBC # BLD AUTO: 8.1 THOUSAND/UL (ref 4.31–10.16)

## 2019-09-09 PROCEDURE — 84425 ASSAY OF VITAMIN B-1: CPT

## 2019-09-09 PROCEDURE — 83036 HEMOGLOBIN GLYCOSYLATED A1C: CPT

## 2019-09-09 PROCEDURE — 85025 COMPLETE CBC W/AUTO DIFF WBC: CPT

## 2019-09-09 PROCEDURE — 83540 ASSAY OF IRON: CPT

## 2019-09-09 PROCEDURE — 80053 COMPREHEN METABOLIC PANEL: CPT

## 2019-09-09 PROCEDURE — 84590 ASSAY OF VITAMIN A: CPT

## 2019-09-09 PROCEDURE — 84443 ASSAY THYROID STIM HORMONE: CPT

## 2019-09-09 PROCEDURE — 36415 COLL VENOUS BLD VENIPUNCTURE: CPT

## 2019-09-09 PROCEDURE — 82607 VITAMIN B-12: CPT

## 2019-09-09 RX ORDER — TERBINAFINE HYDROCHLORIDE 250 MG/1
TABLET ORAL
Qty: 30 TABLET | Refills: 0 | Status: SHIPPED | OUTPATIENT
Start: 2019-09-09 | End: 2019-09-30 | Stop reason: SDUPTHER

## 2019-09-10 ENCOUNTER — OFFICE VISIT (OUTPATIENT)
Dept: FAMILY MEDICINE CLINIC | Facility: CLINIC | Age: 69
End: 2019-09-10
Payer: COMMERCIAL

## 2019-09-10 VITALS
TEMPERATURE: 97.7 F | OXYGEN SATURATION: 97 % | WEIGHT: 232.4 LBS | DIASTOLIC BLOOD PRESSURE: 72 MMHG | SYSTOLIC BLOOD PRESSURE: 130 MMHG | BODY MASS INDEX: 35.22 KG/M2 | RESPIRATION RATE: 16 BRPM | HEIGHT: 68 IN | HEART RATE: 56 BPM

## 2019-09-10 DIAGNOSIS — I10 BENIGN ESSENTIAL HYPERTENSION: ICD-10-CM

## 2019-09-10 DIAGNOSIS — Z23 INFLUENZA VACCINE NEEDED: ICD-10-CM

## 2019-09-10 DIAGNOSIS — K21.9 GASTROESOPHAGEAL REFLUX DISEASE, ESOPHAGITIS PRESENCE NOT SPECIFIED: ICD-10-CM

## 2019-09-10 DIAGNOSIS — E66.01 MORBID OBESITY DUE TO EXCESS CALORIES (HCC): Chronic | ICD-10-CM

## 2019-09-10 DIAGNOSIS — Z12.11 SCREENING FOR COLON CANCER: ICD-10-CM

## 2019-09-10 DIAGNOSIS — E78.00 HYPERCHOLESTEREMIA: ICD-10-CM

## 2019-09-10 DIAGNOSIS — Z79.4 TYPE 2 DIABETES MELLITUS WITH INSULIN THERAPY (HCC): ICD-10-CM

## 2019-09-10 DIAGNOSIS — N40.1 BENIGN PROSTATIC HYPERPLASIA WITH URINARY FREQUENCY: ICD-10-CM

## 2019-09-10 DIAGNOSIS — E11.9 TYPE 2 DIABETES MELLITUS WITH INSULIN THERAPY (HCC): ICD-10-CM

## 2019-09-10 DIAGNOSIS — R35.0 BENIGN PROSTATIC HYPERPLASIA WITH URINARY FREQUENCY: ICD-10-CM

## 2019-09-10 DIAGNOSIS — Z90.3 POSTGASTRECTOMY MALABSORPTION: Primary | ICD-10-CM

## 2019-09-10 DIAGNOSIS — K91.2 POSTGASTRECTOMY MALABSORPTION: Primary | ICD-10-CM

## 2019-09-10 PROCEDURE — 99214 OFFICE O/P EST MOD 30 MIN: CPT | Performed by: FAMILY MEDICINE

## 2019-09-10 PROCEDURE — 3075F SYST BP GE 130 - 139MM HG: CPT | Performed by: FAMILY MEDICINE

## 2019-09-10 PROCEDURE — 90662 IIV NO PRSV INCREASED AG IM: CPT

## 2019-09-10 PROCEDURE — 3008F BODY MASS INDEX DOCD: CPT | Performed by: FAMILY MEDICINE

## 2019-09-10 PROCEDURE — 3078F DIAST BP <80 MM HG: CPT | Performed by: FAMILY MEDICINE

## 2019-09-10 PROCEDURE — G0008 ADMIN INFLUENZA VIRUS VAC: HCPCS

## 2019-09-10 RX ORDER — LOSARTAN POTASSIUM 25 MG/1
25 TABLET ORAL DAILY
Qty: 90 TABLET | Refills: 0 | Status: SHIPPED | OUTPATIENT
Start: 2019-09-10 | End: 2019-12-05 | Stop reason: SDUPTHER

## 2019-09-10 RX ORDER — FERROUS SULFATE TAB EC 324 MG (65 MG FE EQUIVALENT) 324 (65 FE) MG
324 TABLET DELAYED RESPONSE ORAL
Qty: 90 TABLET | Refills: 0 | Status: SHIPPED | OUTPATIENT
Start: 2019-09-10 | End: 2020-11-12 | Stop reason: ALTCHOICE

## 2019-09-10 RX ORDER — DOCUSATE SODIUM 100 MG/1
100 CAPSULE, LIQUID FILLED ORAL 2 TIMES DAILY
Qty: 30 CAPSULE | Refills: 0 | Status: SHIPPED | OUTPATIENT
Start: 2019-09-10 | End: 2019-10-23 | Stop reason: ALTCHOICE

## 2019-09-10 NOTE — PROGRESS NOTES
Assessment/Plan:    No problem-specific Assessment & Plan notes found for this encounter  Diagnoses and all orders for this visit:    Postgastrectomy malabsorption  -     ferrous sulfate 324 (65 Fe) mg; Take 1 tablet (324 mg total) by mouth daily before breakfast    Gastroesophageal reflux disease, esophagitis presence not specified  Comments:  stable   watching his diet     Type 2 diabetes mellitus with insulin therapy ([address] )  -     liraglutide (VICTOZA) injection; Inject 0 3 mL (1 8 mg total) under the skin daily  -     Comprehensive metabolic panel  -     Hemoglobin A1C  -     Microalbumin / creatinine urine ratio    Benign essential hypertension  Comments:  added losartan back today to better control of his sugars   Orders:  -     losartan (COZAAR) 25 mg tablet; Take 1 tablet (25 mg total) by mouth daily    Hypercholesteremia  -     Lipid panel    Morbid obesity due to excess calories (HCC)  Comments:  stable   to count calories again and exercise more     Benign prostatic hyperplasia with urinary frequency  -     docusate sodium (COLACE) 100 mg capsule; Take 1 capsule (100 mg total) by mouth 2 (two) times a day    Influenza vaccine needed  -     influenza vaccine, [id number], high-dose, PF 0 5 mL (FLUZONE HIGH-DOSE)          Subjective:      Patient ID: Lena Martínez is a 71 y o  male  Here to follow up  Staying active and watching his diet    Diabetes   He presents for his follow-up diabetic visit  He has type 2 diabetes mellitus  His disease course has been stable  There are no hypoglycemic associated symptoms  There are no diabetic associated symptoms  Pertinent negatives for diabetes include no chest pain  There are no hypoglycemic complications  Symptoms are stable  There are no diabetic complications  Pertinent negatives for diabetic complications include no autonomic neuropathy, CVA, impotence, nephropathy, peripheral neuropathy or PVD   Risk factors for coronary artery disease include male sex and obesity  His weight is stable  He participates in exercise three times a week  His overall blood glucose range is 110-130 mg/dl  An ACE inhibitor/angiotensin II receptor blocker is being taken  Eye exam is current  Hyperlipidemia   This is a chronic problem  The current episode started more than 1 year ago  The problem is controlled  Exacerbating diseases include obesity  He has no history of chronic renal disease, diabetes, hypothyroidism, liver disease or nephrotic syndrome  Pertinent negatives include no chest pain or focal sensory loss  Current antihyperlipidemic treatment includes statins  The current treatment provides mild improvement of lipids  There are no compliance problems  Risk factors for coronary artery disease include hypertension, male sex and obesity  The following portions of the patient's history were reviewed and updated as appropriate: allergies, current medications, past family history, past medical history, past social history, past surgical history and problem list     Review of Systems   HENT: Negative  Cardiovascular: Negative for chest pain  Endocrine: Negative  Genitourinary: Negative for difficulty urinating, dysuria, enuresis and impotence  Musculoskeletal: Negative  Neurological: Negative  Psychiatric/Behavioral: Negative  Objective:      /72 (BP Location: Left arm, Patient Position: Sitting, Cuff Size: Standard)   Pulse 56   Temp 97 7 °F (36 5 °C) (Tympanic)   Resp 16   Ht 5' 8" (1 727 m)   Wt 105 kg (232 lb 6 4 oz)   SpO2 97%   BMI 35 34 kg/m²          Physical Exam   Constitutional: He appears well-developed and well-nourished  Eyes: Pupils are equal, round, and reactive to light  EOM are normal    Cardiovascular: Normal rate and regular rhythm  Pulmonary/Chest: Effort normal and breath sounds normal    Abdominal: He exhibits no distension  There is no tenderness  Musculoskeletal: He exhibits no edema or deformity     Skin: Capillary refill takes less than 2 seconds

## 2019-09-12 LAB
VIT A SERPL-MCNC: 33.1 UG/DL (ref 22–69.5)
VIT B1 BLD-SCNC: 185 NMOL/L (ref 66.5–200)

## 2019-09-13 DIAGNOSIS — Z79.4 TYPE 2 DIABETES MELLITUS WITH INSULIN THERAPY (HCC): ICD-10-CM

## 2019-09-13 DIAGNOSIS — E11.9 TYPE 2 DIABETES MELLITUS WITH INSULIN THERAPY (HCC): ICD-10-CM

## 2019-09-13 RX ORDER — GLIPIZIDE 5 MG/1
TABLET, FILM COATED, EXTENDED RELEASE ORAL
Qty: 90 TABLET | Refills: 0 | Status: SHIPPED | OUTPATIENT
Start: 2019-09-13 | End: 2019-11-29 | Stop reason: SDUPTHER

## 2019-09-30 DIAGNOSIS — B35.1 ONYCHOMYCOSIS: ICD-10-CM

## 2019-10-01 RX ORDER — TERBINAFINE HYDROCHLORIDE 250 MG/1
TABLET ORAL
Qty: 30 TABLET | Refills: 0 | Status: SHIPPED | OUTPATIENT
Start: 2019-10-01 | End: 2019-11-04 | Stop reason: SDUPTHER

## 2019-10-21 ENCOUNTER — TELEPHONE (OUTPATIENT)
Dept: UROLOGY | Facility: MEDICAL CENTER | Age: 69
End: 2019-10-21

## 2019-10-21 NOTE — TELEPHONE ENCOUNTER
Patient of Dr Sena Gonzalez seen at [facility]  Called to r/s his upcoming appointment on 10/23 [facility]  Stated he was scheduled to see Dr Sena Gonzalez  Next available I could find was 12/11  Kindly take a look as this is for a 3 month f/u      He can be reached at [phone]

## 2019-10-21 NOTE — TELEPHONE ENCOUNTER
Called and spoke with patient at this time  He is frustrated his original appointment with Dr Michelle Fu on 10/23/19 was cancelled  RN to look for sooner appointment than 12/11/19  Appointment found for 11/27 at 10:15am with Dr Michelle Fu  Patient agreeable to this plan and appointment was scheduled

## 2019-10-22 ENCOUNTER — TELEPHONE (OUTPATIENT)
Dept: GASTROENTEROLOGY | Facility: AMBULARY SURGERY CENTER | Age: 69
End: 2019-10-22

## 2019-10-22 NOTE — ANESTHESIA PREPROCEDURE EVALUATION
Review of Systems/Medical History  Patient summary reviewed  Chart reviewed  No history of anesthetic complications     Cardiovascular  EKG reviewed, Exercise tolerance (METS): >4,  Hyperlipidemia, Hypertension ,    Pulmonary  Sleep apnea ,        GI/Hepatic    GERD ,  Hiatal hernia,             Endo/Other  Diabetes type 2 Oral agent,      GYN       Hematology  Anemia ,     Musculoskeletal    Arthritis     Neurology   Psychology           Physical Exam    Airway    Mallampati score: II  TM Distance: >3 FB  Neck ROM: full     Dental   No notable dental hx     Cardiovascular  Cardiovascular exam normal    Pulmonary  Pulmonary exam normal     Other Findings        Anesthesia Plan  ASA Score- 3     Anesthesia Type- IV sedation with anesthesia with ASA Monitors  Additional Monitors:   Airway Plan:         Plan Factors-    Induction-     Postoperative Plan-     Informed Consent- Anesthetic plan and risks discussed with patient  I personally reviewed this patient with the CRNA  Discussed and agreed on the Anesthesia Plan with the CRNA  Darrion Rodgers

## 2019-10-23 ENCOUNTER — HOSPITAL ENCOUNTER (OUTPATIENT)
Dept: GASTROENTEROLOGY | Facility: AMBULARY SURGERY CENTER | Age: 69
Setting detail: OUTPATIENT SURGERY
Discharge: HOME | End: 2019-10-23
Attending: INTERNAL MEDICINE | Admitting: INTERNAL MEDICINE
Payer: COMMERCIAL

## 2019-10-23 VITALS
RESPIRATION RATE: 23 BRPM | BODY MASS INDEX: 35.94 KG/M2 | SYSTOLIC BLOOD PRESSURE: 126 MMHG | TEMPERATURE: 96.9 F | WEIGHT: 229 LBS | HEIGHT: 67 IN | HEART RATE: 58 BPM | OXYGEN SATURATION: 95 % | DIASTOLIC BLOOD PRESSURE: 60 MMHG

## 2019-10-23 DIAGNOSIS — Z86.010 HISTORY OF COLON POLYPS: ICD-10-CM

## 2019-10-23 LAB — GLUCOSE SERPL-MCNC: 100 MG/DL (ref 65–140)

## 2019-10-23 PROCEDURE — 88305 TISSUE EXAM BY PATHOLOGIST: CPT | Performed by: PATHOLOGY

## 2019-10-23 PROCEDURE — 82948 REAGENT STRIP/BLOOD GLUCOSE: CPT

## 2019-10-23 PROCEDURE — 45380 COLONOSCOPY AND BIOPSY: CPT | Performed by: INTERNAL MEDICINE

## 2019-10-23 RX ORDER — SODIUM CHLORIDE, SODIUM LACTATE, POTASSIUM CHLORIDE, CALCIUM CHLORIDE 600; 310; 30; 20 MG/100ML; MG/100ML; MG/100ML; MG/100ML
INJECTION, SOLUTION INTRAVENOUS CONTINUOUS PRN
Status: DISCONTINUED | OUTPATIENT
Start: 2019-10-23 | End: 2019-10-23 | Stop reason: SURG

## 2019-10-23 RX ORDER — LIDOCAINE HYDROCHLORIDE 10 MG/ML
INJECTION, SOLUTION INFILTRATION; PERINEURAL AS NEEDED
Status: DISCONTINUED | OUTPATIENT
Start: 2019-10-23 | End: 2019-10-23 | Stop reason: SURG

## 2019-10-23 RX ORDER — SODIUM CHLORIDE, SODIUM LACTATE, POTASSIUM CHLORIDE, CALCIUM CHLORIDE 600; 310; 30; 20 MG/100ML; MG/100ML; MG/100ML; MG/100ML
125 INJECTION, SOLUTION INTRAVENOUS CONTINUOUS
Status: DISCONTINUED | OUTPATIENT
Start: 2019-10-23 | End: 2019-10-27 | Stop reason: HOSPADM

## 2019-10-23 RX ORDER — PROPOFOL 10 MG/ML
INJECTION, EMULSION INTRAVENOUS AS NEEDED
Status: DISCONTINUED | OUTPATIENT
Start: 2019-10-23 | End: 2019-10-23 | Stop reason: SURG

## 2019-10-23 RX ADMIN — SODIUM CHLORIDE, SODIUM LACTATE, POTASSIUM CHLORIDE, AND CALCIUM CHLORIDE 125 ML/HR: .6; .31; .03; .02 INJECTION, SOLUTION INTRAVENOUS at 07:49

## 2019-10-23 RX ADMIN — PROPOFOL 120 MG: 10 INJECTION, EMULSION INTRAVENOUS at 08:02

## 2019-10-23 RX ADMIN — PROPOFOL 10 MG: 10 INJECTION, EMULSION INTRAVENOUS at 08:07

## 2019-10-23 RX ADMIN — PROPOFOL 10 MG: 10 INJECTION, EMULSION INTRAVENOUS at 08:09

## 2019-10-23 RX ADMIN — PROPOFOL 40 MG: 10 INJECTION, EMULSION INTRAVENOUS at 08:05

## 2019-10-23 RX ADMIN — LIDOCAINE HYDROCHLORIDE 50 MG: 10 INJECTION, SOLUTION INFILTRATION; PERINEURAL at 08:02

## 2019-10-23 RX ADMIN — SODIUM CHLORIDE, SODIUM LACTATE, POTASSIUM CHLORIDE, AND CALCIUM CHLORIDE: .6; .31; .03; .02 INJECTION, SOLUTION INTRAVENOUS at 07:47

## 2019-10-23 NOTE — H&P
History and Physical - [facility]  Bentley Vázquez 71 y o  male MRN: [id number]        HPI:  51-year-old male with history of GERD and bariatric surgery had colon polyps in the past   His last colonoscopy was more than 3 years ago  Good appetite, no recent weight loss  Historical Information   Past Medical History:   Diagnosis Date    Anemia     Arthritis     Diabetes mellitus ([address] )     Diverticulosis     Enlarged prostate     Erectile dysfunction     GERD (gastroesophageal reflux disease)     Hiatal hernia     Hypercholesteremia     Resolved post weight loss    Hypertension     Resolved post weight loss    Obesity     Sleep apnea     Resolved post weight loss    Wears partial dentures     partial upper and lower     Past Surgical History:   Procedure Laterality Date    ABDOMINAL ADHESION SURGERY N/A 11/28/2016    Procedure: EXTENSIVE LYSIS ADHESIONS;  Surgeon: Amber Mckeon MD;  [facility];  Service:    [place] FRACTURE SURGERY Left     CHOLECYSTECTOMY      COLON SURGERY      colon resection    COLONOSCOPY      COLOSTOMY      COLOSTOMY CLOSURE      DIAGNOSTIC LAPAROSCOPY      GASTRIC BYPASS      failed due to adhesions    HERNIA REPAIR      abdominal    [place] CYSTOURETHRO W/IMPLANT N/A 3/8/2019    Procedure: CYSTOSCOPY WITH INSERTION UROLIFT;  Surgeon: Amy Nunes MD;  [facility];  Service: Urology     [facility] STOM DUODENUM/JEJUNUM N/A 10/25/2018    Procedure: LINEAR ENDOSCOPIC U/S;  Surgeon: Manuela House MD;  [facility]; Service: Gastroenterology    [place] ESOPHAGOGASTRODUODENOSCOPY TRANSORAL DIAGNOSTIC N/A 7/6/2016    Procedure: EGD AND COLONOSCOPY;  Surgeon: Jacqueline Ardon MD;  [facility];   Service: Gastroenterology    [place] LAP, ANEESH RESTRICT PROC, LONGITUDINAL GASTRECTOMY N/A 11/28/2016    Procedure: GASTRECTOMY SLEEVE LAPAROSCOPIC;  Surgeon: Amber Mckeon MD;  [facility];  Service: Bariatrics    TONSILLECTOMY       Social History   Social History     Substance and Sexual Activity   Alcohol Use No     Social History     Substance and Sexual Activity   Drug Use No     Social History     Tobacco Use   Smoking Status Former Smoker    Last attempt to quit: 11/10/2001    Years since quittin 9   Smokeless Tobacco Never Used     Family History   Problem Relation Age of Onset    Heart disease Mother     Breast cancer Mother 80    Diabetes Father     Colon cancer Neg Hx     Liver disease Neg Hx        Meds/Allergies       (Not in a hospital admission)    Allergies   Allergen Reactions    Enalapril Anaphylaxis and Throat Swelling       Objective     Blood pressure 128/59, pulse 62, temperature (!) 96 7 °F (35 9 °C), temperature source Temporal, resp  rate 18, height 5' 7" (1 702 m), weight 104 kg (229 lb), SpO2 98 %      PHYSICAL EXAM:    Gen: NAD  CV: S1 & S2 normal, RRR  CHEST: Clear to auscultate  ABD: soft, NT/ND, good bowel sounds  EXT: no edema    ASSESSMENT:     History of colon polyps    PLAN:    Colonoscopy

## 2019-10-25 ENCOUNTER — TELEPHONE (OUTPATIENT)
Dept: GASTROENTEROLOGY | Facility: CLINIC | Age: 69
End: 2019-10-25

## 2019-10-25 NOTE — TELEPHONE ENCOUNTER
----- Message from Latisha Levy MD sent at 10/25/2019  1:44 PM EDT -----  Colon polyp removed came back as benign    Next colonoscopy in 3 years

## 2019-10-25 NOTE — LETTER
October 25, 2019     [address] [address]      Dear   Reyes: We have attempted to reach you regarding your results  We ask that you please contact our office upon receipt of this letter to receive your results  Thank you in advance for your cooperation and assistance          Sincerely,   [facility]  [phone]

## 2019-11-04 DIAGNOSIS — B35.1 ONYCHOMYCOSIS: ICD-10-CM

## 2019-11-04 RX ORDER — TERBINAFINE HYDROCHLORIDE 250 MG/1
TABLET ORAL
Qty: 30 TABLET | Refills: 0 | Status: SHIPPED | OUTPATIENT
Start: 2019-11-04 | End: 2019-11-29 | Stop reason: SDUPTHER

## 2019-11-04 RX ORDER — TERBINAFINE HYDROCHLORIDE 250 MG/1
TABLET ORAL
Qty: 30 TABLET | Refills: 0 | Status: SHIPPED | OUTPATIENT
Start: 2019-11-04 | End: 2019-12-04

## 2019-11-26 ENCOUNTER — APPOINTMENT (OUTPATIENT)
Dept: LAB | Facility: CLINIC | Age: 69
End: 2019-11-26
Payer: COMMERCIAL

## 2019-11-26 ENCOUNTER — HOSPITAL ENCOUNTER (OUTPATIENT)
Dept: MRI IMAGING | Facility: HOSPITAL | Age: 69
Discharge: HOME | End: 2019-11-26
Attending: SURGERY
Payer: COMMERCIAL

## 2019-11-26 DIAGNOSIS — D49.0 IPMN (INTRADUCTAL PAPILLARY MUCINOUS NEOPLASM): ICD-10-CM

## 2019-11-26 DIAGNOSIS — D49.0 IPMN (INTRADUCTAL PAPILLARY MUCINOUS NEOPLASM): Primary | ICD-10-CM

## 2019-11-26 LAB
ANION GAP SERPL CALCULATED.3IONS-SCNC: 9 MMOL/L (ref 4–13)
BUN SERPL-MCNC: 26 MG/DL (ref 5–25)
CALCIUM SERPL-MCNC: 9 MG/DL (ref 8.3–10.1)
CHLORIDE SERPL-SCNC: 106 MMOL/L (ref 100–108)
CO2 SERPL-SCNC: 29 MMOL/L (ref 21–32)
CREAT SERPL-MCNC: 1.05 MG/DL (ref 0.6–1.3)
GFR SERPL CREATININE-BSD FRML MDRD: 72 ML/MIN/1.73SQ M
GLUCOSE SERPL-MCNC: 165 MG/DL (ref 65–140)
POTASSIUM SERPL-SCNC: 4.6 MMOL/L (ref 3.5–5.3)
SODIUM SERPL-SCNC: 144 MMOL/L (ref 136–145)

## 2019-11-26 PROCEDURE — 80048 BASIC METABOLIC PNL TOTAL CA: CPT

## 2019-11-26 PROCEDURE — 36415 COLL VENOUS BLD VENIPUNCTURE: CPT

## 2019-11-26 PROCEDURE — 74183 MRI ABD W/O CNTR FLWD CNTR: CPT

## 2019-11-26 PROCEDURE — A9585 GADOBUTROL INJECTION: HCPCS | Performed by: SURGERY

## 2019-11-26 RX ADMIN — GADOBUTROL 10 ML: 604.72 INJECTION INTRAVENOUS at 17:43

## 2019-11-27 ENCOUNTER — TELEPHONE (OUTPATIENT)
Dept: UROLOGY | Facility: CLINIC | Age: 69
End: 2019-11-27

## 2019-11-27 ENCOUNTER — OFFICE VISIT (OUTPATIENT)
Dept: UROLOGY | Facility: CLINIC | Age: 69
End: 2019-11-27
Payer: COMMERCIAL

## 2019-11-27 ENCOUNTER — OFFICE VISIT (OUTPATIENT)
Dept: SURGICAL ONCOLOGY | Facility: CLINIC | Age: 69
End: 2019-11-27
Payer: COMMERCIAL

## 2019-11-27 VITALS
TEMPERATURE: 98.7 F | WEIGHT: 243 LBS | SYSTOLIC BLOOD PRESSURE: 120 MMHG | RESPIRATION RATE: 16 BRPM | HEIGHT: 67 IN | BODY MASS INDEX: 38.14 KG/M2 | DIASTOLIC BLOOD PRESSURE: 64 MMHG | HEART RATE: 62 BPM

## 2019-11-27 VITALS
DIASTOLIC BLOOD PRESSURE: 64 MMHG | BODY MASS INDEX: 38.45 KG/M2 | HEIGHT: 67 IN | SYSTOLIC BLOOD PRESSURE: 112 MMHG | HEART RATE: 64 BPM | WEIGHT: 245 LBS

## 2019-11-27 DIAGNOSIS — R35.0 BENIGN PROSTATIC HYPERPLASIA WITH URINARY FREQUENCY: Primary | ICD-10-CM

## 2019-11-27 DIAGNOSIS — D49.0 IPMN (INTRADUCTAL PAPILLARY MUCINOUS NEOPLASM): Primary | ICD-10-CM

## 2019-11-27 DIAGNOSIS — N40.1 BENIGN PROSTATIC HYPERPLASIA WITH URINARY FREQUENCY: Primary | ICD-10-CM

## 2019-11-27 LAB
POST-VOID RESIDUAL VOLUME, ML POC: 30 ML
SL AMB  POCT GLUCOSE, UA: NORMAL
SL AMB LEUKOCYTE ESTERASE,UA: NORMAL
SL AMB POCT BILIRUBIN,UA: NORMAL
SL AMB POCT BLOOD,UA: NORMAL
SL AMB POCT CLARITY,UA: CLEAR
SL AMB POCT COLOR,UA: YELLOW
SL AMB POCT KETONES,UA: NORMAL
SL AMB POCT NITRITE,UA: NORMAL
SL AMB POCT PH,UA: 5
SL AMB POCT SPECIFIC GRAVITY,UA: 1.01
SL AMB POCT URINE PROTEIN: NORMAL
SL AMB POCT UROBILINOGEN: 0.2

## 2019-11-27 PROCEDURE — 99214 OFFICE O/P EST MOD 30 MIN: CPT | Performed by: UROLOGY

## 2019-11-27 PROCEDURE — 81002 URINALYSIS NONAUTO W/O SCOPE: CPT | Performed by: UROLOGY

## 2019-11-27 PROCEDURE — 52000 CYSTOURETHROSCOPY: CPT | Performed by: UROLOGY

## 2019-11-27 PROCEDURE — 99213 OFFICE O/P EST LOW 20 MIN: CPT | Performed by: SURGERY

## 2019-11-27 PROCEDURE — 51798 US URINE CAPACITY MEASURE: CPT | Performed by: UROLOGY

## 2019-11-27 NOTE — TELEPHONE ENCOUNTER
Patient managed by Tung Correa at the [facility] office  Called and spoke to patient  Made him aware that per Tung Correa he should have a PSA done within the next week prior to uro lift  Patient verbalized understanding

## 2019-11-27 NOTE — TELEPHONE ENCOUNTER
----- Message from Tim Zelaya MD sent at 11/27/2019 11:10 AM EST -----  Regarding: PSa needed prior to surgery  Please ask clarissa to go for a PSA in the next week or so, before his upcoming urolift

## 2019-11-27 NOTE — PROGRESS NOTES
Surgical Oncology Follow Up       [address]  [facility] [place]  [address] [place] [address]    Evelia Jeffers  1950  [id number]  [address]  [facility] [place]  [address] [place] [address]    Chief Complaint   Patient presents with    Follow-up     1 year follow up  Assessment/Plan:    No problem-specific Assessment & Plan notes found for this encounter  Diagnoses and all orders for this visit:    IPMN (intraductal papillary mucinous neoplasm)  -     Basic metabolic panel; Future        Advance Care Planning/Advance Directives:  Discussed disease status, cancer treatment plans and/or cancer treatment goals with the patient  No history exists  History of Present Illness: Patient is here for yearly follow-up; we follow for instantly found pancreatic cyst   -Interval History:  No GI issues or complaints since his last visit  Review of Systems:  Review of Systems   Constitutional: Negative  HENT: Negative  Eyes: Negative  Respiratory: Negative  Cardiovascular: Negative  Gastrointestinal: Negative  Endocrine: Negative  Genitourinary: Negative  Musculoskeletal: Negative  Skin: Negative  Allergic/Immunologic: Negative  Neurological: Negative  Hematological: Negative  Psychiatric/Behavioral: Negative          Patient Active Problem List   Diagnosis    Morbid obesity due to excess calories (HCC)    GERD (gastroesophageal reflux disease)    Obesity    Constipation    Hypercholesteremia    Postgastrectomy malabsorption    Type 2 diabetes mellitus with insulin therapy ([facility] [address] [facility]    Benign essential hypertension    Benign enlargement of prostate    Pancreatic cyst    History of colon polyps    IPMN (intraductal papillary mucinous neoplasm)    ED (erectile dysfunction) of organic origin    Benign prostatic hyperplasia with urinary frequency    Bruxism     Past Medical History:   Diagnosis Date    Anemia     Arthritis     Diabetes mellitus ([address] )     Diverticulosis     Enlarged prostate     Erectile dysfunction     GERD (gastroesophageal reflux disease)     Hiatal hernia     Hypercholesteremia     Resolved post weight loss    Hypertension     Resolved post weight loss    Obesity     Sleep apnea     Resolved post weight loss    Wears partial dentures     partial upper and lower     Past Surgical History:   Procedure Laterality Date    ABDOMINAL ADHESION SURGERY N/A 11/28/2016    Procedure: EXTENSIVE LYSIS ADHESIONS;  Surgeon: Adalberto Osborne MD;  [facility];  Service:    Lesta Betsy FRACTURE SURGERY Left     CHOLECYSTECTOMY      COLON SURGERY      colon resection    COLONOSCOPY      COLOSTOMY      COLOSTOMY CLOSURE      DIAGNOSTIC LAPAROSCOPY      GASTRIC BYPASS      failed due to adhesions    HERNIA REPAIR      abdominal    TN CYSTOURETHRO W/IMPLANT N/A 3/8/2019    Procedure: CYSTOSCOPY WITH INSERTION Jock Tala;  Surgeon: Rachid Brown MD;  [facility];  Service: Urology    TN [address] N/A 10/25/2018    Procedure: LINEAR ENDOSCOPIC U/S;  Surgeon: Codie Sparks MD;  [facility]; Service: Gastroenterology    TN ESOPHAGOGASTRODUODENOSCOPY TRANSORAL DIAGNOSTIC N/A 7/6/2016    Procedure: EGD AND COLONOSCOPY;  Surgeon: Law Ochoa MD;  [facility];   Service: Gastroenterology    TN LAP, ANEESH RESTRICT PROC, LONGITUDINAL GASTRECTOMY N/A 11/28/2016    Procedure: GASTRECTOMY SLEEVE LAPAROSCOPIC;  Surgeon: Adalberto Osborne MD;  [facility];  Service: Bariatrics    TONSILLECTOMY       Family History   Problem Relation Age of Onset    Heart disease Mother     Breast cancer Mother 80    Diabetes Father     Colon cancer Neg Hx     Liver disease Neg Hx      Social History     Socioeconomic History    Marital status: Single     Spouse name: Not on file  Number of children: Not on file    Years of education: Not on file    Highest education level: Not on file   Occupational History    Not on file   Social Needs    Financial resource strain: Not on file    Food insecurity:     Worry: Not on file     Inability: Not on file    Transportation needs:     Medical: Not on file     Non-medical: Not on file   Tobacco Use    Smoking status: Former Smoker     Last attempt to quit: 11/10/2001     Years since quittin 0    Smokeless tobacco: Never Used   Substance and Sexual Activity    Alcohol use: No    Drug use: No    Sexual activity: Yes   Lifestyle    Physical activity:     Days per week: Not on file     Minutes per session: Not on file    Stress: Not on file   Relationships    Social connections:     Talks on phone: Not on file     Gets together: Not on file     Attends Baptism service: Not on file     Active member of club or organization: Not on file     Attends meetings of clubs or organizations: Not on file     Relationship status: Not on file    Intimate partner violence:     Fear of current or ex partner: Not on file     Emotionally abused: Not on file     Physically abused: Not on file     Forced sexual activity: Not on file   Other Topics Concern    Not on file   Social History Narrative    Not on file       Current Outpatient Medications:     albuterol (PROAIR HFA) 90 mcg/act inhaler, inhale 2 puff by inhalation route  every 4 - 6 hours as needed, Disp: , Rfl:     Biotin 1000 MCG tablet, Take 1 tablet by mouth daily in the early morning , Disp: , Rfl:     Blood Glucose Monitoring Suppl (GLUCOCARD VITAL MONITOR) w/Device KIT, by Does not apply route 2 (two) times a day, Disp: 1 kit, Rfl: 0    calcium carbonate (OS-GILDA) 600 MG tablet, Take 600 mg by mouth daily in the early morning , Disp: , Rfl:     Cholecalciferol (VITAMIN D3) 2000 units capsule, Take 1,000 Units by mouth daily in the early morning , Disp: , Rfl:     Cyanocobalamin (VITAMIN B-12) 5000 MCG TBDP, Take 5,000 mcg by mouth once a week Takes on Mondays, Disp: , Rfl:     ferrous sulfate 324 (65 Fe) mg, Take 1 tablet (324 mg total) by mouth daily before breakfast, Disp: 90 tablet, Rfl: 0    glipiZIDE (GLUCOTROL XL) 5 mg 24 hr tablet, TAKE ONE TABLET BY MOUTH EVERY DAY, Disp: 90 tablet, Rfl: 0    Lancets (LIFESCAN UNISTIK 2) MISC, Footmarkscan One Touch Ultramini Meter; use as directed; 1; 0; 31-Oct-2016; 31-Oct-2016; Melida Duran; Active, Disp: , Rfl:     liraglutide (VICTOZA) injection, Inject 0 3 mL (1 8 mg total) under the skin daily, Disp: 3 pen, Rfl: 3    losartan (COZAAR) 25 mg tablet, Take 1 tablet (25 mg total) by mouth daily, Disp: 90 tablet, Rfl: 0    Mirabegron ER 25 MG TB24, Take 25 mg by mouth daily at bedtime, Disp: 30 tablet, Rfl: 6    Multiple Vitamin (MULTIVITAMIN) capsule, Take 1 capsule by mouth daily in the early morning , Disp: , Rfl:     NOVOLOG MIX 70/30 FLEXPEN (70-30) 100 units/mL injection pen, INJECT 20 UNITS UNDER THE SKIN EVERY 12 HOURS, Disp: 15 mL, Rfl: 5    terbinafine (LamISIL) 250 mg tablet, TAKE ONE TABLET BY MOUTH EVERY DAY, Disp: 30 tablet, Rfl: 0    terbinafine (LamISIL) 250 mg tablet, TAKE ONE TABLET BY MOUTH EVERY DAY, Disp: 30 tablet, Rfl: 0    insulin aspart protamine-insulin aspart (NovoLOG 70/30) 100 units/mL injection, Inject 10 Units under the skin 2 (two) times a day before meals for 30 days Before breakfast and dinner (Patient taking differently: Inject 20 Units under the skin daily in the early morning ), Disp: , Rfl: 0    naproxen (EC NAPROSYN) 500 MG EC tablet, Take 1 tablet (500 mg total) by mouth 2 (two) times a day with meals for 5 days, Disp: 10 tablet, Rfl: 0  No current facility-administered medications for this visit     Allergies   Allergen Reactions    Enalapril Anaphylaxis and Throat Swelling     Vitals:    11/27/19 1125   BP: 120/64   Pulse: 62   Resp: 16   Temp: 98 7 °F (37 1 °C)       Physical Exam Constitutional: He is oriented to person, place, and time  He appears well-developed and well-nourished  HENT:   Head: Normocephalic and atraumatic  Right Ear: External ear normal    Left Ear: External ear normal    Eyes: Pupils are equal, round, and reactive to light  Conjunctivae and EOM are normal    Neck: Normal range of motion  Neck supple  Cardiovascular: Normal rate, regular rhythm and normal heart sounds  Pulmonary/Chest: Effort normal and breath sounds normal    Abdominal: Soft  Bowel sounds are normal    Musculoskeletal: Normal range of motion  Neurological: He is alert and oriented to person, place, and time  Skin: Skin is warm and dry  Psychiatric: He has a normal mood and affect  His behavior is normal  Judgment and thought content normal          Results:  Labs:  none    Imaging  Mri Abdomen W Wo Contrast And Mrcp    Result Date: 11/27/2019  Narrative: MRI OF THE ABDOMEN WITH AND WITHOUT CONTRAST WITH MRCP INDICATION:  Follow-up pancreatic cyst   As per progress note from Dr Shaina Perry on 11/26/2018, patient has had previous EUS with biopsy  COMPARISON: MRI abdomen 9/24/2018, CT abdomen and pelvis 8/9/2017 TECHNIQUE:  The following pulse sequences were obtained:  Coronal and axial T2 with TE of 90 and 180 respectively, axial T2 with fat saturation, axial FIESTA fat-sat, axial T1-weighted in-and-out-of phase, axial DWI/ADC, pre-contrast axial T1 with fat saturation, post-contrast dynamic axial T1 with fat saturation at 20, 70, and 180 seconds, followed by coronal and 7 minute delayed axial T1 with fat saturation  3D MRCP images were obtained with radial thick slabs and projections  3D rendering was performed from the acquisition scanner  Pre- and postcontrast subtraction images were also obtained  IV Contrast:  10 mL of gadobutrol injection (MULTI-DOSE) FINDINGS: LOWER CHEST:   Unremarkable  LIVER: Normal in size and configuration  No suspicious mass   The hepatic veins and portal veins are patent  BILE DUCTS: No intrahepatic or extrahepatic bile duct dilation  Common bile duct is normal in caliber  No choledocholithiasis, biliary stricture or suspicious mass  No evidence of pancreatic divisum  GALLBLADDER:  Surgically absent  PANCREAS: Lobulated, multiloculated uncinate process cyst with thin septations is again seen measuring 2 2 x 1 7 x 3 2 cm AP, transverse and craniocaudal dimensions respectively (series 6 image 26 and series 1300 image 1)  Previously this measured 2 0 x  1 6 x 3 2 cm  No mural nodularity  There is probable communication with the pancreatic duct via a thin ductal structure on the thin section MRCP sequences  This is most suggestive of sidebranch IPMN  Serous cystadenoma felt less likely  The main pancreatic duct is normal caliber  There are extensive additional smaller pancreatic cysts seen within the head and body many of which also likely communicate with the pancreatic duct  These likely represent sidebranch intraductal papillary mucinous neoplasms  These findings are best seen on MRCP sequences and are also similar to the prior study in retrospect (previous study degraded by respiratory motion)  No solid pancreatic masses detected  ADRENAL GLANDS:  Normal  SPLEEN:  Normal  KIDNEYS/PROXIMAL URETERS: Small renal cysts noted  Nonspecific bilateral perinephric edema  BOWEL:   No dilated loops of bowel  PERITONEUM/RETROPERITONEUM: No ascites  LYMPH NODES: No pathologic lymphadenopathy  VASCULAR STRUCTURES:  No aneurysm  ABDOMINAL WALL:  Unremarkable  OSSEOUS STRUCTURES:  No suspicious osseous lesion  Impression: Overall stable appearance of 3 2 cm multiloculated/septated uncinate process cystic lesion with communication with the pancreatic duct, most suggestive of sidebranch IPMN  Less likely consideration would be serous cystadenoma  No suspicious interval changes such as mural nodularity or postcontrast enhancement   Extensive small pancreatic cysts throughout the head and body many of which also likely represent sidebranch IPMNs, stable in retrospect  Follow-up MRI/MRCP in 1 year recommended for assessment of stability  Workstation performed: [id number]     I reviewed the above laboratory and imaging data  Discussion/Summary:  Pancreatic cysts, stable in uncinate process likely side branch IPMN  No worrisome features  He is also asymptomatic  We will therefore plan for follow-up in 1 year with repeat MRI/MRCP at that time to assess for cyst stability

## 2019-11-27 NOTE — PROGRESS NOTES
Referring Physician: Philip Wolf MD  A copy of this note was sent to the referring physician  Diagnoses and all orders for this visit:    Benign prostatic hyperplasia with urinary frequency  -     POCT Measure PVR  -     POCT urine dip  -     PSA, Total Screen; Future  -     Case request operating room: [address] ; Standing  -     Case request operating room: CYSTOSCOPY WITH INSERTION UROLIFT    Other orders  -     Diet NPO; Sips with meds; Standing  -     Place sequential compression device; Standing  -     ceFAZolin (ANCEF) 2,000 mg in dextrose 5 % 100 mL IVPB            Assessment and plan:       1  Medically refractory obstructive lower urinary tract symptoms, both storage and emptying associated with mixed mixed incontinence  -preoperative gland volume 77 g  - status post Uro lift April 2019  - frequency urgency and next incontinence persistent despite the addition of beta 3 agonist  - postop cystoscopy performed today November 27, 2019:  Mild degree of persistent obstruction at the apex and mid gland       Had a long discussion with Andrew Plunkett, and cystoscopy was performed today on the same day as our office evaluation  I believe his urinary frequency urgency and mixed incontinence or multifactorial   Cystoscopy did reveal a mild to moderate degree of persistent bladder outlet obstruction despite his prior Uro lift procedure  His risk factors for this include a preoperative gland volume of 77 g    We also discussed his next incontinence both urge and stress type  His urethral sphincter is intact on cystoscopy, as expected  We discussed this is likely related to his obesity with recent weight gain as well as his underlying diabetes  We discussed a number of options both conservative and surgical   Based upon the cystoscopic findings I believe a repeat Uro lift would be efficacious to target the areas of residual obstruction    I have also encouraged him to continue his exercise and weight loss efforts as I think this will help incontinence  Informed consent was obtained for cystoscopy with Uro lift  This will be scheduled in the near future in the [facility]  I recommended using MiraLax perioperatively due to his constipation and we will continue the Myrbetriq for now    Finally we will check an updated PSA prior to surgery            Chivo Martinez MD      Chief Complaint     BPH follow-up      History of Present Illness     Adonay Frost is a 71 y o  male returns in follow-up for medically refractory BPH  Nine months ago he underwent the Uro lift procedure for medically refractory BPH and a 77 g prostate  Although he notes improvement in his obstructive symptoms, he remains frustrated with bothersome frequency urgency as well as progressive mixed incontinence  He has been appropriately started on a beta 3 agonist but does not feel this has been very efficacious  He has gained 15 lb over the past few months  He has had no interval hematuria or UTIs    Detailed Urologic History     - please refer to HPI    Review of Systems     Review of Systems   Constitutional: Negative for activity change and fatigue  HENT: Negative for congestion  Eyes: Negative for visual disturbance  Respiratory: Negative for shortness of breath and wheezing  Cardiovascular: Negative for chest pain and leg swelling  Gastrointestinal: Negative for abdominal pain  Endocrine: Positive for polyuria  Genitourinary: Positive for frequency and urgency  Negative for dysuria, flank pain and hematuria  Musculoskeletal: Negative for back pain  Allergic/Immunologic: Negative for immunocompromised state  Neurological: Negative for dizziness and numbness  Psychiatric/Behavioral: Negative for dysphoric mood  All other systems reviewed and are negative        AUA SYMPTOM SCORE      Most Recent Value   AUA SYMPTOM SCORE   How often have you had a sensation of not emptying your bladder completely after you finished urinating? 4   How often have you had to urinate again less than two hours after you finished urinating? 4   How often have you found you stopped and started again several times when you urinate? 4   How often have you found it difficult to postpone urination? 1   How often have you had a weak urinary stream?  1   How often have you had to push or strain to begin urination? 4   How many times did you most typically get up to urinate from the time you went to bed at night until the time you got up in the morning? 1   Quality of Life: If you were to spend the rest of your life with your urinary condition just the way it is now, how would you feel about that?  3   AUA SYMPTOM SCORE  19              Allergies     Allergies   Allergen Reactions    Enalapril Anaphylaxis and Throat Swelling       Physical Exam     Physical Exam   Constitutional: He is oriented to person, place, and time  He appears well-developed and well-nourished  No distress  HENT:   Head: Normocephalic and atraumatic  Eyes: EOM are normal    Neck: Normal range of motion  Cardiovascular:   Negative lower extremity edema    Has gained some weight since last visit   Pulmonary/Chest: Effort normal and breath sounds normal    Abdominal: Soft  Genitourinary: Penis normal    Genitourinary Comments: Negative suprapubic tenderness and CVA tenderness   Musculoskeletal: Normal range of motion  Neurological: He is alert and oriented to person, place, and time  Skin: Skin is warm  Psychiatric: He has a normal mood and affect   His behavior is normal            Vital Signs  Vitals:    11/27/19 1026   BP: 112/64   BP Location: Left arm   Patient Position: Sitting   Cuff Size: Adult   Pulse: 64   Weight: 111 kg (245 lb)   Height: 5' 7" (1 702 m)         Current Medications       Current Outpatient Medications:     albuterol (PROAIR HFA) 90 mcg/act inhaler, inhale 2 puff by inhalation route  every 4 - 6 hours as needed, Disp: , Rfl:     Biotin 1000 MCG tablet, Take 1 tablet by mouth daily in the early morning , Disp: , Rfl:     Blood Glucose Monitoring Suppl (GLUCOCARD VITAL MONITOR) w/Device KIT, by Does not apply route 2 (two) times a day, Disp: 1 kit, Rfl: 0    calcium carbonate (OS-GILDA) 600 MG tablet, Take 600 mg by mouth daily in the early morning , Disp: , Rfl:     Cholecalciferol (VITAMIN D3) 2000 units capsule, Take 1,000 Units by mouth daily in the early morning , Disp: , Rfl:     Cyanocobalamin (VITAMIN B-12) 5000 MCG TBDP, Take 5,000 mcg by mouth once a week Takes on Mondays, Disp: , Rfl:     ferrous sulfate 324 (65 Fe) mg, Take 1 tablet (324 mg total) by mouth daily before breakfast, Disp: 90 tablet, Rfl: 0    glipiZIDE (GLUCOTROL XL) 5 mg 24 hr tablet, TAKE ONE TABLET BY MOUTH EVERY DAY, Disp: 90 tablet, Rfl: 0    Lancets (LIFESCAN UNISTIK 2) MISC, Novavax ABcan One Touch Ultramini Meter; use as directed; 1; 0; 31-Oct-2016; 31-Oct-2016; Melida Duran;  Active, Disp: , Rfl:     liraglutide (VICTOZA) injection, Inject 0 3 mL (1 8 mg total) under the skin daily, Disp: 3 pen, Rfl: 3    losartan (COZAAR) 25 mg tablet, Take 1 tablet (25 mg total) by mouth daily, Disp: 90 tablet, Rfl: 0    Mirabegron ER 25 MG TB24, Take 25 mg by mouth daily at bedtime, Disp: 30 tablet, Rfl: 6    Multiple Vitamin (MULTIVITAMIN) capsule, Take 1 capsule by mouth daily in the early morning , Disp: , Rfl:     NOVOLOG MIX 70/30 FLEXPEN (70-30) 100 units/mL injection pen, INJECT 20 UNITS UNDER THE SKIN EVERY 12 HOURS, Disp: 15 mL, Rfl: 5    terbinafine (LamISIL) 250 mg tablet, TAKE ONE TABLET BY MOUTH EVERY DAY, Disp: 30 tablet, Rfl: 0    terbinafine (LamISIL) 250 mg tablet, TAKE ONE TABLET BY MOUTH EVERY DAY, Disp: 30 tablet, Rfl: 0    insulin aspart protamine-insulin aspart (NovoLOG 70/30) 100 units/mL injection, Inject 10 Units under the skin 2 (two) times a day before meals for 30 days Before breakfast and dinner (Patient taking differently: Inject 20 Units under the skin daily in the early morning ), Disp: , Rfl: 0    naproxen (EC NAPROSYN) 500 MG EC tablet, Take 1 tablet (500 mg total) by mouth 2 (two) times a day with meals for 5 days, Disp: 10 tablet, Rfl: 0  No current facility-administered medications for this visit         Active Problems     Patient Active Problem List   Diagnosis    Morbid obesity due to excess calories (HCC)    GERD (gastroesophageal reflux disease)    Obesity    Constipation    Hypercholesteremia    Postgastrectomy malabsorption    Type 2 diabetes mellitus with insulin therapy ([facility] )    Benign essential hypertension    Benign enlargement of prostate    Pancreatic cyst    History of colon polyps    IPMN (intraductal papillary mucinous neoplasm)    ED (erectile dysfunction) of organic origin    Benign prostatic hyperplasia with urinary frequency    Bruxism         Past Medical History     Past Medical History:   Diagnosis Date    Anemia     Arthritis     Diabetes mellitus ([facility]     Diverticulosis     Enlarged prostate     Erectile dysfunction     GERD (gastroesophageal reflux disease)     Hiatal hernia     Hypercholesteremia     Resolved post weight loss    Hypertension     Resolved post weight loss    Obesity     Sleep apnea     Resolved post weight loss    Wears partial dentures     partial upper and lower         Surgical History     Past Surgical History:   Procedure Laterality Date    ABDOMINAL ADHESION SURGERY N/A 11/28/2016    Procedure: EXTENSIVE LYSIS ADHESIONS;  Surgeon: Amanda Almendarez MD;  [facility];  Service:    Dolores Bras FRACTURE SURGERY Left     CHOLECYSTECTOMY      COLON SURGERY      colon resection    COLONOSCOPY      COLOSTOMY      COLOSTOMY CLOSURE      DIAGNOSTIC LAPAROSCOPY      GASTRIC BYPASS      failed due to adhesions    HERNIA REPAIR      abdominal    VT CYSTOURETHRO W/IMPLANT N/A 3/8/2019    Procedure: CYSTOSCOPY WITH INSERTION Jose Alberto Bal;  Surgeon: Patricia Estrada Stacia Hays MD;  [facility];  Service: Urology     [facility] STOM DUODENUM/JEJUNUM N/A 10/25/2018    Procedure: LINEAR ENDOSCOPIC U/S;  Surgeon: Rama Uribe MD;  [facility]; Service: Gastroenterology    AR ESOPHAGOGASTRODUODENOSCOPY TRANSORAL DIAGNOSTIC N/A 2016    Procedure: EGD AND COLONOSCOPY;  Surgeon: Enedina Anderson MD;  [facility]; Service: Gastroenterology    AR LAP, ANEESH RESTRICT PROC, LONGITUDINAL GASTRECTOMY N/A 2016    Procedure: GASTRECTOMY SLEEVE LAPAROSCOPIC;  Surgeon: Angy Douglas MD;  [facility];  Service: Bariatrics    TONSILLECTOMY           Family History     Family History   Problem Relation Age of Onset    Heart disease Mother     Breast cancer Mother 80    Diabetes Father     Colon cancer Neg Hx     Liver disease Neg Hx          Social History     Social History     Social History     Tobacco Use   Smoking Status Former Smoker    Last attempt to quit: 11/10/2001    Years since quittin 0   Smokeless Tobacco Never Used         Pertinent Lab Values     Lab Results   Component Value Date    CREATININE 1 05 2019       Lab Results   Component Value Date    PSA 2 6 10/23/2018    PSA 1 0 2016       @RESULTRCNT(1H])@      Pertinent Imaging      - n/a    Portions of the record may have been created with voice recognition software   Occasional wrong word or "sound a like" substitutions may have occurred due to the inherent limitations of voice recognition software   Read the chart carefully and recognize, using context, where substitutions have occurred

## 2019-11-27 NOTE — LETTER
November 27, 2019     Carolynn Hogue MD  [address] [address]    Patient: Sharifa Rizo   YOB: 1950   Date of Visit: 11/27/2019       Dear Dr Jordon Chilel:    Thank you for referring Sharifa Rizo to me for evaluation  Below are my notes for this consultation  If you have questions, please do not hesitate to call me  I look forward to following your patient along with you  Sincerely,        Juan Gilman MD        CC: Ruthie Bumpers, MD Soila Rover, MD Earline Loveless, PA-C  [facility], MD Star Gould MD Merian Pines, MD Lang Bergeron, MD  11/27/2019 12:00 PM  Sign at close encounter     Surgical Oncology Follow Up       [address] [place] [address]  [address] [facility] [address] [facility]  1950  [id number]  [address] [place] [address]  [address] [facility] [address]    Chief Complaint   Patient presents with    Follow-up     1 year follow up  Assessment/Plan:    No problem-specific Assessment & Plan notes found for this encounter  Diagnoses and all orders for this visit:    IPMN (intraductal papillary mucinous neoplasm)  -     Basic metabolic panel; Future        Advance Care Planning/Advance Directives:  Discussed disease status, cancer treatment plans and/or cancer treatment goals with the patient  No history exists  History of Present Illness: Patient is here for yearly follow-up; we follow for instantly found pancreatic cyst   -Interval History:  No GI issues or complaints since his last visit  Review of Systems:  Review of Systems   Constitutional: Negative  HENT: Negative  Eyes: Negative  Respiratory: Negative  Cardiovascular: Negative  Gastrointestinal: Negative  Endocrine: Negative  Genitourinary: Negative  Musculoskeletal: Negative  Skin: Negative  Allergic/Immunologic: Negative  Neurological: Negative  Hematological: Negative  Psychiatric/Behavioral: Negative  Patient Active Problem List   Diagnosis    Morbid obesity due to excess calories (HCC)    GERD (gastroesophageal reflux disease)    Obesity    Constipation    Hypercholesteremia    Postgastrectomy malabsorption    Type 2 diabetes mellitus with insulin therapy ([facility] )    Benign essential hypertension    Benign enlargement of prostate    Pancreatic cyst    History of colon polyps    IPMN (intraductal papillary mucinous neoplasm)    ED (erectile dysfunction) of organic origin    Benign prostatic hyperplasia with urinary frequency    Bruxism     Past Medical History:   Diagnosis Date    Anemia     Arthritis     Diabetes mellitus ([facility] )     Diverticulosis     Enlarged prostate     Erectile dysfunction     GERD (gastroesophageal reflux disease)     Hiatal hernia     Hypercholesteremia     Resolved post weight loss    Hypertension     Resolved post weight loss    Obesity     Sleep apnea     Resolved post weight loss    Wears partial dentures     partial upper and lower     Past Surgical History:   Procedure Laterality Date    ABDOMINAL ADHESION SURGERY N/A 11/28/2016    Procedure: EXTENSIVE LYSIS ADHESIONS;  Surgeon: Maggie Dolan MD;  [facility];  Service:    Joselyn Gonzalez FRACTURE SURGERY Left     CHOLECYSTECTOMY      COLON SURGERY      colon resection    COLONOSCOPY      COLOSTOMY      COLOSTOMY CLOSURE      DIAGNOSTIC LAPAROSCOPY      GASTRIC BYPASS      failed due to adhesions    HERNIA REPAIR      abdominal    IL CYSTOURETHRO W/IMPLANT N/A 3/8/2019    Procedure: CYSTOSCOPY WITH INSERTION Jeferson Sharma;  Surgeon: Cecille Cooney MD;  [facility];  Service: Urology    IL EDG US EXAM SURGICAL ALTER STOM DUODENUM/JEJUNUM N/A 10/25/2018    Procedure: LINEAR ENDOSCOPIC U/S;  Surgeon: Tamara Smith MD;  [facility];   Service: Gastroenterology    FL ESOPHAGOGASTRODUODENOSCOPY TRANSORAL DIAGNOSTIC N/A 2016    Procedure: EGD AND COLONOSCOPY;  Surgeon: Candis Beckham MD;  [facility];   Service: Gastroenterology    FL LAP, ANEESH RESTRICT PROC, LONGITUDINAL GASTRECTOMY N/A 2016    Procedure: GASTRECTOMY SLEEVE LAPAROSCOPIC;  Surgeon: Melania Sullivan MD;  [facility];  Service: Bariatrics    TONSILLECTOMY       Family History   Problem Relation Age of Onset    Heart disease Mother     Breast cancer Mother 80    Diabetes Father     Colon cancer Neg Hx     Liver disease Neg Hx      Social History     Socioeconomic History    Marital status: Single     Spouse name: Not on file    Number of children: Not on file    Years of education: Not on file    Highest education level: Not on file   Occupational History    Not on file   Social Needs    Financial resource strain: Not on file    Food insecurity:     Worry: Not on file     Inability: Not on file    Transportation needs:     Medical: Not on file     Non-medical: Not on file   Tobacco Use    Smoking status: Former Smoker     Last attempt to quit: 11/10/2001     Years since quittin 0    Smokeless tobacco: Never Used   Substance and Sexual Activity    Alcohol use: No    Drug use: No    Sexual activity: Yes   Lifestyle    Physical activity:     Days per week: Not on file     Minutes per session: Not on file    Stress: Not on file   Relationships    Social connections:     Talks on phone: Not on file     Gets together: Not on file     Attends Hindu service: Not on file     Active member of club or organization: Not on file     Attends meetings of clubs or organizations: Not on file     Relationship status: Not on file    Intimate partner violence:     Fear of current or ex partner: Not on file     Emotionally abused: Not on file     Physically abused: Not on file     Forced sexual activity: Not on file   Other Topics Concern    Not on file Social History Narrative    Not on file       Current Outpatient Medications:     albuterol (PROAIR HFA) 90 mcg/act inhaler, inhale 2 puff by inhalation route  every 4 - 6 hours as needed, Disp: , Rfl:     Biotin 1000 MCG tablet, Take 1 tablet by mouth daily in the early morning , Disp: , Rfl:     Blood Glucose Monitoring Suppl (GLUCOCARD VITAL MONITOR) w/Device KIT, by Does not apply route 2 (two) times a day, Disp: 1 kit, Rfl: 0    calcium carbonate (OS-GILDA) 600 MG tablet, Take 600 mg by mouth daily in the early morning , Disp: , Rfl:     Cholecalciferol (VITAMIN D3) 2000 units capsule, Take 1,000 Units by mouth daily in the early morning , Disp: , Rfl:     Cyanocobalamin (VITAMIN B-12) 5000 MCG TBDP, Take 5,000 mcg by mouth once a week Takes on Mondays, Disp: , Rfl:     ferrous sulfate 324 (65 Fe) mg, Take 1 tablet (324 mg total) by mouth daily before breakfast, Disp: 90 tablet, Rfl: 0    glipiZIDE (GLUCOTROL XL) 5 mg 24 hr tablet, TAKE ONE TABLET BY MOUTH EVERY DAY, Disp: 90 tablet, Rfl: 0    Lancets (LIFESCAN UNISTIK 2) MISC, for; to (do)can One Touch Ultramini Meter; use as directed; 1; 0; 31-Oct-2016; 31-Oct-2016; Melida Duran;  Active, Disp: , Rfl:     liraglutide (VICTOZA) injection, Inject 0 3 mL (1 8 mg total) under the skin daily, Disp: 3 pen, Rfl: 3    losartan (COZAAR) 25 mg tablet, Take 1 tablet (25 mg total) by mouth daily, Disp: 90 tablet, Rfl: 0    Mirabegron ER 25 MG TB24, Take 25 mg by mouth daily at bedtime, Disp: 30 tablet, Rfl: 6    Multiple Vitamin (MULTIVITAMIN) capsule, Take 1 capsule by mouth daily in the early morning , Disp: , Rfl:     NOVOLOG MIX 70/30 FLEXPEN (70-30) 100 units/mL injection pen, INJECT 20 UNITS UNDER THE SKIN EVERY 12 HOURS, Disp: 15 mL, Rfl: 5    terbinafine (LamISIL) 250 mg tablet, TAKE ONE TABLET BY MOUTH EVERY DAY, Disp: 30 tablet, Rfl: 0    terbinafine (LamISIL) 250 mg tablet, TAKE ONE TABLET BY MOUTH EVERY DAY, Disp: 30 tablet, Rfl: 0    insulin aspart protamine-insulin aspart (NovoLOG 70/30) 100 units/mL injection, Inject 10 Units under the skin 2 (two) times a day before meals for 30 days Before breakfast and dinner (Patient taking differently: Inject 20 Units under the skin daily in the early morning ), Disp: , Rfl: 0    naproxen (EC NAPROSYN) 500 MG EC tablet, Take 1 tablet (500 mg total) by mouth 2 (two) times a day with meals for 5 days, Disp: 10 tablet, Rfl: 0  No current facility-administered medications for this visit  Allergies   Allergen Reactions    Enalapril Anaphylaxis and Throat Swelling     Vitals:    11/27/19 1125   BP: 120/64   Pulse: 62   Resp: 16   Temp: 98 7 °F (37 1 °C)       Physical Exam   Constitutional: He is oriented to person, place, and time  He appears well-developed and well-nourished  HENT:   Head: Normocephalic and atraumatic  Right Ear: External ear normal    Left Ear: External ear normal    Eyes: Pupils are equal, round, and reactive to light  Conjunctivae and EOM are normal    Neck: Normal range of motion  Neck supple  Cardiovascular: Normal rate, regular rhythm and normal heart sounds  Pulmonary/Chest: Effort normal and breath sounds normal    Abdominal: Soft  Bowel sounds are normal    Musculoskeletal: Normal range of motion  Neurological: He is alert and oriented to person, place, and time  Skin: Skin is warm and dry  Psychiatric: He has a normal mood and affect  His behavior is normal  Judgment and thought content normal          Results:  Labs:  none    Imaging  Mri Abdomen W Wo Contrast And Mrcp    Result Date: 11/27/2019  Narrative: MRI OF THE ABDOMEN WITH AND WITHOUT CONTRAST WITH MRCP INDICATION:  Follow-up pancreatic cyst   As per progress note from Dr Brooklyn Valadez on 11/26/2018, patient has had previous EUS with biopsy   COMPARISON: MRI abdomen 9/24/2018, CT abdomen and pelvis 8/9/2017 TECHNIQUE:  The following pulse sequences were obtained:  Coronal and axial T2 with TE of 90 and 180 respectively, axial T2 with fat saturation, axial FIESTA fat-sat, axial T1-weighted in-and-out-of phase, axial DWI/ADC, pre-contrast axial T1 with fat saturation, post-contrast dynamic axial T1 with fat saturation at 20, 70, and 180 seconds, followed by coronal and 7 minute delayed axial T1 with fat saturation  3D MRCP images were obtained with radial thick slabs and projections  3D rendering was performed from the acquisition scanner  Pre- and postcontrast subtraction images were also obtained  IV Contrast:  10 mL of gadobutrol injection (MULTI-DOSE) FINDINGS: LOWER CHEST:   Unremarkable  LIVER: Normal in size and configuration  No suspicious mass  The hepatic veins and portal veins are patent  BILE DUCTS: No intrahepatic or extrahepatic bile duct dilation  Common bile duct is normal in caliber  No choledocholithiasis, biliary stricture or suspicious mass  No evidence of pancreatic divisum  GALLBLADDER:  Surgically absent  PANCREAS: Lobulated, multiloculated uncinate process cyst with thin septations is again seen measuring 2 2 x 1 7 x 3 2 cm AP, transverse and craniocaudal dimensions respectively (series 6 image 26 and series 1300 image 1)  Previously this measured 2 0 x  1 6 x 3 2 cm  No mural nodularity  There is probable communication with the pancreatic duct via a thin ductal structure on the thin section MRCP sequences  This is most suggestive of sidebranch IPMN  Serous cystadenoma felt less likely  The main pancreatic duct is normal caliber  There are extensive additional smaller pancreatic cysts seen within the head and body many of which also likely communicate with the pancreatic duct  These likely represent sidebranch intraductal papillary mucinous neoplasms  These findings are best seen on MRCP sequences and are also similar to the prior study in retrospect (previous study degraded by respiratory motion)  No solid pancreatic masses detected   ADRENAL GLANDS:  Normal  SPLEEN:  Normal  KIDNEYS/PROXIMAL URETERS: Small renal cysts noted  Nonspecific bilateral perinephric edema  BOWEL:   No dilated loops of bowel  PERITONEUM/RETROPERITONEUM: No ascites  LYMPH NODES: No pathologic lymphadenopathy  VASCULAR STRUCTURES:  No aneurysm  ABDOMINAL WALL:  Unremarkable  OSSEOUS STRUCTURES:  No suspicious osseous lesion  Impression: Overall stable appearance of 3 2 cm multiloculated/septated uncinate process cystic lesion with communication with the pancreatic duct, most suggestive of sidebranch IPMN  Less likely consideration would be serous cystadenoma  No suspicious interval changes such as mural nodularity or postcontrast enhancement  Extensive small pancreatic cysts throughout the head and body many of which also likely represent sidebranch IPMNs, stable in retrospect  Follow-up MRI/MRCP in 1 year recommended for assessment of stability  Workstation performed: [id number]     I reviewed the above laboratory and imaging data  Discussion/Summary:  Pancreatic cysts, stable in uncinate process likely side branch IPMN  No worrisome features  He is also asymptomatic  We will therefore plan for follow-up in 1 year with repeat MRI/MRCP at that time to assess for cyst stability

## 2019-11-29 ENCOUNTER — TELEPHONE (OUTPATIENT)
Dept: UROLOGY | Facility: AMBULATORY SURGERY CENTER | Age: 69
End: 2019-11-29

## 2019-11-29 DIAGNOSIS — Z79.4 TYPE 2 DIABETES MELLITUS WITH INSULIN THERAPY (HCC): ICD-10-CM

## 2019-11-29 DIAGNOSIS — B35.1 ONYCHOMYCOSIS: ICD-10-CM

## 2019-11-29 DIAGNOSIS — E11.9 TYPE 2 DIABETES MELLITUS WITH INSULIN THERAPY (HCC): ICD-10-CM

## 2019-11-29 RX ORDER — GLIPIZIDE 5 MG/1
TABLET, FILM COATED, EXTENDED RELEASE ORAL
Qty: 90 TABLET | Refills: 0 | Status: SHIPPED | OUTPATIENT
Start: 2019-11-29 | End: 2020-02-23

## 2019-11-29 RX ORDER — TERBINAFINE HYDROCHLORIDE 250 MG/1
TABLET ORAL
Qty: 30 TABLET | Refills: 0 | Status: SHIPPED | OUTPATIENT
Start: 2019-11-29 | End: 2019-12-31

## 2019-12-05 DIAGNOSIS — I10 BENIGN ESSENTIAL HYPERTENSION: ICD-10-CM

## 2019-12-05 PROCEDURE — 4010F ACE/ARB THERAPY RXD/TAKEN: CPT | Performed by: FAMILY MEDICINE

## 2019-12-05 RX ORDER — LOSARTAN POTASSIUM 25 MG/1
TABLET ORAL
Qty: 90 TABLET | Refills: 0 | Status: SHIPPED | OUTPATIENT
Start: 2019-12-05 | End: 2020-02-23

## 2019-12-31 DIAGNOSIS — B35.1 ONYCHOMYCOSIS: ICD-10-CM

## 2019-12-31 RX ORDER — TERBINAFINE HYDROCHLORIDE 250 MG/1
TABLET ORAL
Qty: 30 TABLET | Refills: 0 | Status: SHIPPED | OUTPATIENT
Start: 2019-12-31 | End: 2020-02-02

## 2020-01-13 ENCOUNTER — APPOINTMENT (OUTPATIENT)
Dept: LAB | Facility: CLINIC | Age: 70
End: 2020-01-13
Payer: COMMERCIAL

## 2020-01-13 ENCOUNTER — TRANSCRIBE ORDERS (OUTPATIENT)
Dept: LAB | Facility: CLINIC | Age: 70
End: 2020-01-13

## 2020-01-13 DIAGNOSIS — R35.0 BENIGN PROSTATIC HYPERPLASIA WITH URINARY FREQUENCY: ICD-10-CM

## 2020-01-13 DIAGNOSIS — N40.1 BENIGN PROSTATIC HYPERPLASIA WITH URINARY FREQUENCY: ICD-10-CM

## 2020-01-13 LAB
ALBUMIN SERPL BCP-MCNC: 3.3 G/DL (ref 3.5–5)
ALP SERPL-CCNC: 96 U/L (ref 46–116)
ALT SERPL W P-5'-P-CCNC: 28 U/L (ref 12–78)
ANION GAP SERPL CALCULATED.3IONS-SCNC: 8 MMOL/L (ref 4–13)
AST SERPL W P-5'-P-CCNC: 26 U/L (ref 5–45)
ATRIAL RATE: 58 BPM
BILIRUB SERPL-MCNC: 0.28 MG/DL (ref 0.2–1)
BUN SERPL-MCNC: 21 MG/DL (ref 5–25)
CALCIUM SERPL-MCNC: 8.9 MG/DL (ref 8.3–10.1)
CHLORIDE SERPL-SCNC: 104 MMOL/L (ref 100–108)
CHOLEST SERPL-MCNC: 177 MG/DL (ref 50–200)
CO2 SERPL-SCNC: 27 MMOL/L (ref 21–32)
CREAT SERPL-MCNC: 1 MG/DL (ref 0.6–1.3)
CREAT UR-MCNC: 184 MG/DL
EST. AVERAGE GLUCOSE BLD GHB EST-MCNC: 151 MG/DL
GFR SERPL CREATININE-BSD FRML MDRD: 76 ML/MIN/1.73SQ M
GLUCOSE P FAST SERPL-MCNC: 141 MG/DL (ref 65–99)
HBA1C MFR BLD: 6.9 % (ref 4.2–6.3)
HDLC SERPL-MCNC: 40 MG/DL
LDLC SERPL CALC-MCNC: 115 MG/DL (ref 0–100)
MICROALBUMIN UR-MCNC: 39.4 MG/L (ref 0–20)
MICROALBUMIN/CREAT 24H UR: 21 MG/G CREATININE (ref 0–30)
NONHDLC SERPL-MCNC: 137 MG/DL
P AXIS: 46 DEGREES
POTASSIUM SERPL-SCNC: 4.4 MMOL/L (ref 3.5–5.3)
PR INTERVAL: 162 MS
PROT SERPL-MCNC: 7.1 G/DL (ref 6.4–8.2)
PSA SERPL-MCNC: 5.3 NG/ML (ref 0–4)
QRS AXIS: 41 DEGREES
QRSD INTERVAL: 88 MS
QT INTERVAL: 424 MS
QTC INTERVAL: 416 MS
SODIUM SERPL-SCNC: 139 MMOL/L (ref 136–145)
T WAVE AXIS: 39 DEGREES
TRIGL SERPL-MCNC: 108 MG/DL
VENTRICULAR RATE: 58 BPM

## 2020-01-13 PROCEDURE — G0103 PSA SCREENING: HCPCS

## 2020-01-13 PROCEDURE — 80061 LIPID PANEL: CPT | Performed by: FAMILY MEDICINE

## 2020-01-13 PROCEDURE — 82570 ASSAY OF URINE CREATININE: CPT | Performed by: FAMILY MEDICINE

## 2020-01-13 PROCEDURE — 36415 COLL VENOUS BLD VENIPUNCTURE: CPT | Performed by: FAMILY MEDICINE

## 2020-01-13 PROCEDURE — 80053 COMPREHEN METABOLIC PANEL: CPT | Performed by: FAMILY MEDICINE

## 2020-01-13 PROCEDURE — 93005 ELECTROCARDIOGRAM TRACING: CPT

## 2020-01-13 PROCEDURE — 93010 ELECTROCARDIOGRAM REPORT: CPT | Performed by: INTERNAL MEDICINE

## 2020-01-13 PROCEDURE — 83036 HEMOGLOBIN GLYCOSYLATED A1C: CPT | Performed by: FAMILY MEDICINE

## 2020-01-13 PROCEDURE — 82043 UR ALBUMIN QUANTITATIVE: CPT | Performed by: FAMILY MEDICINE

## 2020-01-14 ENCOUNTER — TELEPHONE (OUTPATIENT)
Dept: UROLOGY | Facility: CLINIC | Age: 70
End: 2020-01-14

## 2020-01-14 DIAGNOSIS — Z79.4 TYPE 2 DIABETES MELLITUS WITH INSULIN THERAPY (HCC): Primary | ICD-10-CM

## 2020-01-14 DIAGNOSIS — E11.9 TYPE 2 DIABETES MELLITUS WITH INSULIN THERAPY (HCC): Primary | ICD-10-CM

## 2020-01-14 NOTE — TELEPHONE ENCOUNTER
Patient called stating that he needed to cancel his surgery due to a family issue in [facility]   He can be reached at [phone]

## 2020-01-14 NOTE — TELEPHONE ENCOUNTER
Patient called advising that he still did not receive a call regarding canceling his surgery  Please advise

## 2020-01-14 NOTE — TELEPHONE ENCOUNTER
I spoke with Andrew Plunkett by phone  His PSA is elevated at 5 3, previously measuring 2 6  I presented a number of options for this but my recommendation was to proceed with a transrectal ultrasound and prostate biopsy at the time of his upcoming redo Uro lift procedure  This [facility]  We discussed that procedure and the risks and he has elected to proceed  Please  update the case request to include transrectal ultrasound and prostate biopsy  Please coordinate with the nurse from the [place] office to bring the ultrasound unit to the operating room for his procedure  He does not need an oral antibiotic but he does need instructions on an enema as well as a   Discussion visit  With me2 weeks  postop to review the pathology

## 2020-01-14 NOTE — TELEPHONE ENCOUNTER
Patient leaving for [place] and needs refills TODAY    Medication: Novafine Plus  Dosage: 30g x8mm misc  How Often: As Directed  Quantity:  unknown  Last Office Visit: 9/10/19  Next Office Visit: Unknown  Last refilled: 2016  How many pills left: 0  Pharmacy:   [id number], [address] [place] [address]  Phone: [phone] Fax: [phone]

## 2020-01-15 ENCOUNTER — TELEPHONE (OUTPATIENT)
Dept: FAMILY MEDICINE CLINIC | Facility: CLINIC | Age: 70
End: 2020-01-15

## 2020-01-15 DIAGNOSIS — E11.9 TYPE 2 DIABETES MELLITUS WITH INSULIN THERAPY (HCC): Primary | ICD-10-CM

## 2020-01-15 DIAGNOSIS — Z79.4 TYPE 2 DIABETES MELLITUS WITH INSULIN THERAPY (HCC): Primary | ICD-10-CM

## 2020-01-15 RX ORDER — ATORVASTATIN CALCIUM 10 MG/1
10 TABLET, FILM COATED ORAL DAILY
Qty: 90 TABLET | Refills: 3 | Status: SHIPPED | OUTPATIENT
Start: 2020-01-15 | End: 2021-01-07

## 2020-01-15 NOTE — TELEPHONE ENCOUNTER
PT FRIEND CALLED AND FOR HIS NEEDLES FOR HIS DIABETES IT WAS ONLY 30 DAYS WORTH AND PT TESTS 3 TIMES A DAY   PT IS LEAVING FOR EMERGENCY TO [place] IF YOU COULD CALL MORE IN TO [facility] IN [place]

## 2020-01-15 NOTE — TELEPHONE ENCOUNTER
Spoke with pharmacy script needed to be changed to pt tests 3 times a day gave verbal order for this and they will refill his pen needles

## 2020-01-16 NOTE — TELEPHONE ENCOUNTER
I spoke with Dell Schwartz and cancelled his procedure  He will call when he gets home to reschedule his Urolift and TRUS prostate bx

## 2020-02-01 DIAGNOSIS — B35.1 ONYCHOMYCOSIS: ICD-10-CM

## 2020-02-02 RX ORDER — TERBINAFINE HYDROCHLORIDE 250 MG/1
TABLET ORAL
Qty: 30 TABLET | Refills: 0 | Status: SHIPPED | OUTPATIENT
Start: 2020-02-02 | End: 2020-02-14

## 2020-02-03 NOTE — TELEPHONE ENCOUNTER
Patient EC called to advise that patient will be back 2/14/20 and that that patient asked her to reschedule everything so when he comes back it is all set up  Please advise

## 2020-02-07 NOTE — TELEPHONE ENCOUNTER
I will call Star Motley after he gets home due to needing to discuss a prostate bx to go with his Urolift, per Dr Aruna Thomas

## 2020-02-11 NOTE — TELEPHONE ENCOUNTER
Sent Post-Care Instructions: I reviewed with the patient in detail post-care instructions. Patient is not to engage in any heavy lifting, exercise, or swimming for the next 14 days. Should the patient develop any fevers, chills, bleeding, severe pain patient will contact the office immediately.

## 2020-02-14 DIAGNOSIS — R35.0 BENIGN PROSTATIC HYPERPLASIA WITH URINARY FREQUENCY: ICD-10-CM

## 2020-02-14 DIAGNOSIS — N40.1 BENIGN PROSTATIC HYPERPLASIA WITH URINARY FREQUENCY: ICD-10-CM

## 2020-02-14 DIAGNOSIS — B35.1 ONYCHOMYCOSIS: ICD-10-CM

## 2020-02-14 RX ORDER — TERBINAFINE HYDROCHLORIDE 250 MG/1
TABLET ORAL
Qty: 30 TABLET | Refills: 0 | Status: SHIPPED | OUTPATIENT
Start: 2020-02-14 | End: 2020-03-15

## 2020-02-18 ENCOUNTER — TELEPHONE (OUTPATIENT)
Dept: UROLOGY | Facility: MEDICAL CENTER | Age: 70
End: 2020-02-18

## 2020-02-18 NOTE — TELEPHONE ENCOUNTER
Patient called back requesting a call as soon as possible  Provided surgery scheduler phone number as well  Please advise

## 2020-02-18 NOTE — TELEPHONE ENCOUNTER
Patient of Dr Michelle Fu at the [place] office  Patient called to report he has returned from [place] and is ready to be scheduled for surgery  Noted it is scheduled 3/27  Please call to discuss at [phone] with any updates    Thank you

## 2020-02-19 ENCOUNTER — TELEPHONE (OUTPATIENT)
Dept: UROLOGY | Facility: AMBULATORY SURGERY CENTER | Age: 70
End: 2020-02-19

## 2020-02-19 RX ORDER — CEFAZOLIN SODIUM 2 G/50ML
2000 SOLUTION INTRAVENOUS
Status: CANCELLED | OUTPATIENT
Start: 2020-03-27 | End: 2020-03-28

## 2020-02-19 NOTE — TELEPHONE ENCOUNTER
I left a message for Hope Haywood to call me  I confirmed date and he will need to do a fleet enema the morning of the procedure  I am sending packet and he should call me when he receives it to go over the information

## 2020-02-19 NOTE — TELEPHONE ENCOUNTER
I left a message for Leonie Covarrubias to call me  I confirmed date and instructed that I am sending packet and he should call me when he receives it to go over the information

## 2020-02-22 DIAGNOSIS — I10 BENIGN ESSENTIAL HYPERTENSION: ICD-10-CM

## 2020-02-22 DIAGNOSIS — E11.9 TYPE 2 DIABETES MELLITUS WITH INSULIN THERAPY (HCC): ICD-10-CM

## 2020-02-22 DIAGNOSIS — Z79.4 TYPE 2 DIABETES MELLITUS WITH INSULIN THERAPY (HCC): ICD-10-CM

## 2020-02-23 RX ORDER — LOSARTAN POTASSIUM 25 MG/1
TABLET ORAL
Qty: 90 TABLET | Refills: 0 | Status: SHIPPED | OUTPATIENT
Start: 2020-02-23 | End: 2020-02-24 | Stop reason: SDUPTHER

## 2020-02-23 RX ORDER — GLIPIZIDE 5 MG/1
5 TABLET, FILM COATED, EXTENDED RELEASE ORAL
Qty: 90 TABLET | Refills: 3 | Status: SHIPPED | OUTPATIENT
Start: 2020-02-23 | End: 2020-02-24 | Stop reason: SDUPTHER

## 2020-02-24 DIAGNOSIS — E11.9 TYPE 2 DIABETES MELLITUS WITH INSULIN THERAPY (HCC): ICD-10-CM

## 2020-02-24 DIAGNOSIS — I10 BENIGN ESSENTIAL HYPERTENSION: ICD-10-CM

## 2020-02-24 DIAGNOSIS — Z79.4 TYPE 2 DIABETES MELLITUS WITH INSULIN THERAPY (HCC): ICD-10-CM

## 2020-02-24 RX ORDER — GLIPIZIDE 5 MG/1
5 TABLET, FILM COATED, EXTENDED RELEASE ORAL
Qty: 90 TABLET | Refills: 3 | Status: SHIPPED | OUTPATIENT
Start: 2020-02-24 | End: 2021-02-05

## 2020-02-24 RX ORDER — LOSARTAN POTASSIUM 25 MG/1
25 TABLET ORAL DAILY
Qty: 90 TABLET | Refills: 3 | Status: SHIPPED | OUTPATIENT
Start: 2020-02-24 | End: 2020-04-28

## 2020-03-02 DIAGNOSIS — Z79.4 TYPE 2 DIABETES MELLITUS WITH INSULIN THERAPY (HCC): ICD-10-CM

## 2020-03-02 DIAGNOSIS — E11.9 TYPE 2 DIABETES MELLITUS WITH INSULIN THERAPY (HCC): ICD-10-CM

## 2020-03-02 RX ORDER — LIRAGLUTIDE 6 MG/ML
INJECTION SUBCUTANEOUS
Qty: 9 ML | Refills: 5 | Status: SHIPPED | OUTPATIENT
Start: 2020-03-02 | End: 2020-03-04

## 2020-03-03 DIAGNOSIS — Z79.4 TYPE 2 DIABETES MELLITUS WITH INSULIN THERAPY (HCC): ICD-10-CM

## 2020-03-03 DIAGNOSIS — E11.9 TYPE 2 DIABETES MELLITUS WITH INSULIN THERAPY (HCC): ICD-10-CM

## 2020-03-03 RX ORDER — LIRAGLUTIDE 6 MG/ML
INJECTION SUBCUTANEOUS
Qty: 9 ML | Refills: 5 | OUTPATIENT
Start: 2020-03-03

## 2020-03-04 DIAGNOSIS — Z79.4 TYPE 2 DIABETES MELLITUS WITH INSULIN THERAPY (HCC): ICD-10-CM

## 2020-03-04 DIAGNOSIS — E11.9 TYPE 2 DIABETES MELLITUS WITH INSULIN THERAPY (HCC): ICD-10-CM

## 2020-03-04 RX ORDER — LIRAGLUTIDE 6 MG/ML
INJECTION SUBCUTANEOUS
Qty: 9 ML | Refills: 5 | Status: SHIPPED | OUTPATIENT
Start: 2020-03-04 | End: 2020-09-18

## 2020-03-17 ENCOUNTER — ANESTHESIA EVENT (OUTPATIENT)
Dept: PERIOP | Facility: HOSPITAL | Age: 70
End: 2020-03-17
Payer: COMMERCIAL

## 2020-03-19 ENCOUNTER — TELEPHONE (OUTPATIENT)
Dept: UROLOGY | Facility: AMBULATORY SURGERY CENTER | Age: 70
End: 2020-03-19

## 2020-03-19 NOTE — TELEPHONE ENCOUNTER
I left a message for Aniya Lo that his Urolift/TRUS bx on 3/27/20 is being moved to 5/8/20 due to the Covid19 protocol

## 2020-03-19 NOTE — TELEPHONE ENCOUNTER
Mario's Urolift/TRUS prostate bx has been moved from 3/27/20 to 5/8/20 due to the Covid19 protocol  Please adjust your schedule  Thank you

## 2020-03-26 NOTE — TELEPHONE ENCOUNTER
I had left a message for Loco Buck on 3/19/20, 7:55AM, that due to the [facility] protocol, his surgery is now scheduled 5/8/20  I left another message for him stating this again and asked him to call the office to confirm

## 2020-03-26 NOTE — TELEPHONE ENCOUNTER
Pt called checking status of 03/27/20 procedure states he has not been contacted by office or hospital

## 2020-03-27 ENCOUNTER — ANESTHESIA (OUTPATIENT)
Dept: PERIOP | Facility: HOSPITAL | Age: 70
End: 2020-03-27
Payer: COMMERCIAL

## 2020-04-02 ENCOUNTER — TELEPHONE (OUTPATIENT)
Dept: UROLOGY | Facility: AMBULATORY SURGERY CENTER | Age: 70
End: 2020-04-02

## 2020-04-10 DIAGNOSIS — Z91.89 AT RISK FOR OBSTRUCTIVE SLEEP APNEA: Primary | ICD-10-CM

## 2020-04-28 DIAGNOSIS — I10 BENIGN ESSENTIAL HYPERTENSION: ICD-10-CM

## 2020-04-28 RX ORDER — LOSARTAN POTASSIUM 25 MG/1
TABLET ORAL
Qty: 30 TABLET | Refills: 0 | Status: SHIPPED | OUTPATIENT
Start: 2020-04-28 | End: 2020-06-09

## 2020-05-08 ENCOUNTER — TELEPHONE (OUTPATIENT)
Dept: UROLOGY | Facility: CLINIC | Age: 70
End: 2020-05-08

## 2020-05-08 ENCOUNTER — HOSPITAL ENCOUNTER (OUTPATIENT)
Facility: HOSPITAL | Age: 70
Setting detail: OUTPATIENT SURGERY
Discharge: HOME | End: 2020-05-08
Attending: UROLOGY | Admitting: UROLOGY
Payer: COMMERCIAL

## 2020-05-08 ENCOUNTER — HOSPITAL ENCOUNTER (OUTPATIENT)
Dept: ULTRASOUND IMAGING | Facility: HOSPITAL | Age: 70
Discharge: HOME | End: 2020-05-08
Attending: UROLOGY
Payer: COMMERCIAL

## 2020-05-08 VITALS
BODY MASS INDEX: 36.1 KG/M2 | HEART RATE: 78 BPM | SYSTOLIC BLOOD PRESSURE: 178 MMHG | TEMPERATURE: 97.6 F | HEIGHT: 67 IN | OXYGEN SATURATION: 96 % | DIASTOLIC BLOOD PRESSURE: 81 MMHG | WEIGHT: 230 LBS | RESPIRATION RATE: 18 BRPM

## 2020-05-08 DIAGNOSIS — R97.20 ELEVATED PROSTATE SPECIFIC ANTIGEN (PSA): ICD-10-CM

## 2020-05-08 DIAGNOSIS — R35.0 BENIGN PROSTATIC HYPERPLASIA WITH URINARY FREQUENCY: ICD-10-CM

## 2020-05-08 DIAGNOSIS — R97.20 ELEVATED PSA: ICD-10-CM

## 2020-05-08 DIAGNOSIS — N40.1 BENIGN PROSTATIC HYPERPLASIA WITH URINARY FREQUENCY: ICD-10-CM

## 2020-05-08 LAB — GLUCOSE SERPL-MCNC: 177 MG/DL (ref 65–140)

## 2020-05-08 PROCEDURE — G0416 PROSTATE BIOPSY, ANY MTHD: HCPCS | Performed by: PATHOLOGY

## 2020-05-08 PROCEDURE — 88344 IMHCHEM/IMCYTCHM EA MLT ANTB: CPT | Performed by: PATHOLOGY

## 2020-05-08 PROCEDURE — 55700 HB BIOPSY OF PROSTATE: CPT

## 2020-05-08 PROCEDURE — 52442 CYSTO INS TRNSPRSTC IMPLT EA: CPT | Performed by: UROLOGY

## 2020-05-08 PROCEDURE — L8699 PROSTHETIC IMPLANT NOS: HCPCS | Performed by: UROLOGY

## 2020-05-08 PROCEDURE — 82948 REAGENT STRIP/BLOOD GLUCOSE: CPT

## 2020-05-08 PROCEDURE — 55700 PR BIOPSY OF PROSTATE,NEEDLE/PUNCH: CPT | Performed by: UROLOGY

## 2020-05-08 PROCEDURE — 52441 CYSTO INSJ TRNSPRSTC 1 IMPLT: CPT | Performed by: UROLOGY

## 2020-05-08 PROCEDURE — NC001 PR NO CHARGE: Performed by: UROLOGY

## 2020-05-08 PROCEDURE — 76942 ECHO GUIDE FOR BIOPSY: CPT

## 2020-05-08 DEVICE — IMPLANT URO PROSTATE UROLIFT: Type: IMPLANTABLE DEVICE | Status: FUNCTIONAL

## 2020-05-08 RX ORDER — PHENAZOPYRIDINE HYDROCHLORIDE 200 MG/1
200 TABLET, FILM COATED ORAL 3 TIMES DAILY PRN
Qty: 10 TABLET | Refills: 0 | Status: SHIPPED | OUTPATIENT
Start: 2020-05-08 | End: 2020-05-11

## 2020-05-08 RX ORDER — PROPOFOL 10 MG/ML
INJECTION, EMULSION INTRAVENOUS AS NEEDED
Status: DISCONTINUED | OUTPATIENT
Start: 2020-05-08 | End: 2020-05-08 | Stop reason: SURG

## 2020-05-08 RX ORDER — MEPERIDINE HYDROCHLORIDE 25 MG/ML
12.5 INJECTION INTRAMUSCULAR; INTRAVENOUS; SUBCUTANEOUS ONCE AS NEEDED
Status: DISCONTINUED | OUTPATIENT
Start: 2020-05-08 | End: 2020-05-08 | Stop reason: HOSPADM

## 2020-05-08 RX ORDER — DOCUSATE SODIUM 100 MG/1
100 CAPSULE, LIQUID FILLED ORAL 2 TIMES DAILY
Qty: 30 CAPSULE | Refills: 0 | Status: SHIPPED | OUTPATIENT
Start: 2020-05-08 | End: 2020-06-15 | Stop reason: ALTCHOICE

## 2020-05-08 RX ORDER — OXYCODONE HYDROCHLORIDE 5 MG/1
5 TABLET ORAL EVERY 4 HOURS PRN
Status: DISCONTINUED | OUTPATIENT
Start: 2020-05-08 | End: 2020-05-08 | Stop reason: HOSPADM

## 2020-05-08 RX ORDER — CEPHALEXIN 500 MG/1
500 CAPSULE ORAL EVERY 6 HOURS SCHEDULED
Qty: 3 CAPSULE | Refills: 0 | Status: SHIPPED | OUTPATIENT
Start: 2020-05-08 | End: 2020-05-09

## 2020-05-08 RX ORDER — CEFAZOLIN SODIUM 2 G/50ML
2000 SOLUTION INTRAVENOUS
Status: DISCONTINUED | OUTPATIENT
Start: 2020-05-08 | End: 2020-05-08

## 2020-05-08 RX ORDER — SODIUM CHLORIDE, SODIUM LACTATE, POTASSIUM CHLORIDE, CALCIUM CHLORIDE 600; 310; 30; 20 MG/100ML; MG/100ML; MG/100ML; MG/100ML
50 INJECTION, SOLUTION INTRAVENOUS CONTINUOUS
Status: DISCONTINUED | OUTPATIENT
Start: 2020-05-08 | End: 2020-05-08 | Stop reason: HOSPADM

## 2020-05-08 RX ORDER — LIDOCAINE HYDROCHLORIDE 10 MG/ML
INJECTION, SOLUTION EPIDURAL; INFILTRATION; INTRACAUDAL; PERINEURAL AS NEEDED
Status: DISCONTINUED | OUTPATIENT
Start: 2020-05-08 | End: 2020-05-08 | Stop reason: SURG

## 2020-05-08 RX ORDER — DEXAMETHASONE SODIUM PHOSPHATE 10 MG/ML
INJECTION, SOLUTION INTRAMUSCULAR; INTRAVENOUS AS NEEDED
Status: DISCONTINUED | OUTPATIENT
Start: 2020-05-08 | End: 2020-05-08 | Stop reason: SURG

## 2020-05-08 RX ORDER — HYDROCODONE BITARTRATE AND ACETAMINOPHEN 5; 325 MG/1; MG/1
1 TABLET ORAL EVERY 6 HOURS PRN
Qty: 5 TABLET | Refills: 0 | Status: SHIPPED | OUTPATIENT
Start: 2020-05-08 | End: 2020-05-18

## 2020-05-08 RX ORDER — TAMSULOSIN HYDROCHLORIDE 0.4 MG/1
0.4 CAPSULE ORAL
Qty: 15 CAPSULE | Refills: 0 | Status: SHIPPED | OUTPATIENT
Start: 2020-05-08 | End: 2020-06-15 | Stop reason: ALTCHOICE

## 2020-05-08 RX ORDER — PROMETHAZINE HYDROCHLORIDE 25 MG/ML
25 INJECTION, SOLUTION INTRAMUSCULAR; INTRAVENOUS ONCE AS NEEDED
Status: DISCONTINUED | OUTPATIENT
Start: 2020-05-08 | End: 2020-05-08 | Stop reason: HOSPADM

## 2020-05-08 RX ORDER — FUROSEMIDE 10 MG/ML
20 INJECTION INTRAMUSCULAR; INTRAVENOUS ONCE
Status: COMPLETED | OUTPATIENT
Start: 2020-05-08 | End: 2020-05-08

## 2020-05-08 RX ORDER — METOCLOPRAMIDE HYDROCHLORIDE 5 MG/ML
10 INJECTION INTRAMUSCULAR; INTRAVENOUS ONCE AS NEEDED
Status: DISCONTINUED | OUTPATIENT
Start: 2020-05-08 | End: 2020-05-08 | Stop reason: HOSPADM

## 2020-05-08 RX ORDER — FENTANYL CITRATE/PF 50 MCG/ML
50 SYRINGE (ML) INJECTION
Status: DISCONTINUED | OUTPATIENT
Start: 2020-05-08 | End: 2020-05-08 | Stop reason: HOSPADM

## 2020-05-08 RX ORDER — SODIUM CHLORIDE, SODIUM LACTATE, POTASSIUM CHLORIDE, CALCIUM CHLORIDE 600; 310; 30; 20 MG/100ML; MG/100ML; MG/100ML; MG/100ML
125 INJECTION, SOLUTION INTRAVENOUS CONTINUOUS
Status: DISCONTINUED | OUTPATIENT
Start: 2020-05-08 | End: 2020-05-08 | Stop reason: HOSPADM

## 2020-05-08 RX ORDER — FENTANYL CITRATE 50 UG/ML
INJECTION, SOLUTION INTRAMUSCULAR; INTRAVENOUS AS NEEDED
Status: DISCONTINUED | OUTPATIENT
Start: 2020-05-08 | End: 2020-05-08 | Stop reason: SURG

## 2020-05-08 RX ORDER — ONDANSETRON 2 MG/ML
INJECTION INTRAMUSCULAR; INTRAVENOUS AS NEEDED
Status: DISCONTINUED | OUTPATIENT
Start: 2020-05-08 | End: 2020-05-08 | Stop reason: SURG

## 2020-05-08 RX ORDER — SUCCINYLCHOLINE/SOD CL,ISO/PF 100 MG/5ML
SYRINGE (ML) INTRAVENOUS AS NEEDED
Status: DISCONTINUED | OUTPATIENT
Start: 2020-05-08 | End: 2020-05-08 | Stop reason: SURG

## 2020-05-08 RX ORDER — DIPHENHYDRAMINE HYDROCHLORIDE 50 MG/ML
12.5 INJECTION INTRAMUSCULAR; INTRAVENOUS ONCE AS NEEDED
Status: DISCONTINUED | OUTPATIENT
Start: 2020-05-08 | End: 2020-05-08 | Stop reason: HOSPADM

## 2020-05-08 RX ADMIN — SODIUM CHLORIDE, SODIUM LACTATE, POTASSIUM CHLORIDE, AND CALCIUM CHLORIDE: .6; .31; .03; .02 INJECTION, SOLUTION INTRAVENOUS at 11:48

## 2020-05-08 RX ADMIN — PROPOFOL 50 MG: 10 INJECTION, EMULSION INTRAVENOUS at 12:08

## 2020-05-08 RX ADMIN — OXYCODONE HYDROCHLORIDE 5 MG: 5 TABLET ORAL at 14:31

## 2020-05-08 RX ADMIN — ONDANSETRON 4 MG: 2 INJECTION INTRAMUSCULAR; INTRAVENOUS at 12:11

## 2020-05-08 RX ADMIN — Medication 100 MG: at 11:55

## 2020-05-08 RX ADMIN — DEXAMETHASONE SODIUM PHOSPHATE 4 MG: 10 INJECTION, SOLUTION INTRAMUSCULAR; INTRAVENOUS at 12:11

## 2020-05-08 RX ADMIN — FENTANYL CITRATE 50 MCG: 50 INJECTION INTRAMUSCULAR; INTRAVENOUS at 11:54

## 2020-05-08 RX ADMIN — FENTANYL CITRATE 50 MCG: 0.05 INJECTION, SOLUTION INTRAMUSCULAR; INTRAVENOUS at 13:24

## 2020-05-08 RX ADMIN — LIDOCAINE HYDROCHLORIDE 50 MG: 10 INJECTION, SOLUTION EPIDURAL; INFILTRATION; INTRACAUDAL; PERINEURAL at 11:54

## 2020-05-08 RX ADMIN — PROPOFOL 150 MG: 10 INJECTION, EMULSION INTRAVENOUS at 11:54

## 2020-05-08 RX ADMIN — CEFTRIAXONE 1000 MG: 1 INJECTION, POWDER, FOR SOLUTION INTRAMUSCULAR; INTRAVENOUS at 11:38

## 2020-05-08 RX ADMIN — FENTANYL CITRATE 50 MCG: 50 INJECTION INTRAMUSCULAR; INTRAVENOUS at 12:08

## 2020-05-08 RX ADMIN — FENTANYL CITRATE 50 MCG: 0.05 INJECTION, SOLUTION INTRAMUSCULAR; INTRAVENOUS at 12:53

## 2020-05-08 RX ADMIN — FUROSEMIDE 20 MG: 10 INJECTION, SOLUTION INTRAMUSCULAR; INTRAVENOUS at 13:05

## 2020-05-10 DIAGNOSIS — I10 BENIGN ESSENTIAL HYPERTENSION: ICD-10-CM

## 2020-05-11 RX ORDER — LOSARTAN POTASSIUM 25 MG/1
TABLET ORAL
Qty: 90 TABLET | Refills: 0 | OUTPATIENT
Start: 2020-05-11

## 2020-05-13 ENCOUNTER — PROCEDURE VISIT (OUTPATIENT)
Dept: UROLOGY | Facility: CLINIC | Age: 70
End: 2020-05-13
Payer: COMMERCIAL

## 2020-05-13 VITALS
SYSTOLIC BLOOD PRESSURE: 158 MMHG | RESPIRATION RATE: 18 BRPM | HEIGHT: 67 IN | DIASTOLIC BLOOD PRESSURE: 72 MMHG | BODY MASS INDEX: 36.26 KG/M2 | WEIGHT: 231 LBS

## 2020-05-13 DIAGNOSIS — N40.1 BENIGN PROSTATIC HYPERPLASIA WITH URINARY FREQUENCY: Primary | ICD-10-CM

## 2020-05-13 DIAGNOSIS — R35.0 BENIGN PROSTATIC HYPERPLASIA WITH URINARY FREQUENCY: Primary | ICD-10-CM

## 2020-05-13 LAB — POST-VOID RESIDUAL VOLUME, ML POC: 0 ML

## 2020-05-13 PROCEDURE — 3078F DIAST BP <80 MM HG: CPT

## 2020-05-13 PROCEDURE — 1160F RVW MEDS BY RX/DR IN RCRD: CPT

## 2020-05-13 PROCEDURE — 3044F HG A1C LEVEL LT 7.0%: CPT

## 2020-05-13 PROCEDURE — 4040F PNEUMOC VAC/ADMIN/RCVD: CPT

## 2020-05-13 PROCEDURE — 3008F BODY MASS INDEX DOCD: CPT

## 2020-05-13 PROCEDURE — 51798 US URINE CAPACITY MEASURE: CPT

## 2020-05-13 PROCEDURE — 3060F POS MICROALBUMINURIA REV: CPT

## 2020-05-13 PROCEDURE — 3077F SYST BP >= 140 MM HG: CPT

## 2020-05-13 PROCEDURE — 99211 OFF/OP EST MAY X REQ PHY/QHP: CPT

## 2020-05-20 DIAGNOSIS — I10 BENIGN ESSENTIAL HYPERTENSION: ICD-10-CM

## 2020-05-26 ENCOUNTER — TELEPHONE (OUTPATIENT)
Dept: UROLOGY | Facility: MEDICAL CENTER | Age: 70
End: 2020-05-26

## 2020-05-26 ENCOUNTER — TELEMEDICINE (OUTPATIENT)
Dept: UROLOGY | Facility: CLINIC | Age: 70
End: 2020-05-26
Payer: COMMERCIAL

## 2020-05-26 ENCOUNTER — TELEPHONE (OUTPATIENT)
Dept: UROLOGY | Facility: CLINIC | Age: 70
End: 2020-05-26

## 2020-05-26 DIAGNOSIS — C61 PROSTATE CANCER (HCC): Primary | ICD-10-CM

## 2020-05-26 PROCEDURE — 99443 PR PHYS/QHP TELEPHONE EVALUATION 21-30 MIN: CPT | Performed by: UROLOGY

## 2020-06-01 ENCOUNTER — APPOINTMENT (OUTPATIENT)
Dept: LAB | Facility: CLINIC | Age: 70
End: 2020-06-01
Payer: COMMERCIAL

## 2020-06-01 DIAGNOSIS — C61 PROSTATE CANCER (HCC): ICD-10-CM

## 2020-06-01 LAB
CREAT SERPL-MCNC: 0.96 MG/DL (ref 0.6–1.3)
GFR SERPL CREATININE-BSD FRML MDRD: 80 ML/MIN/1.73SQ M

## 2020-06-01 PROCEDURE — 82565 ASSAY OF CREATININE: CPT

## 2020-06-01 PROCEDURE — 36415 COLL VENOUS BLD VENIPUNCTURE: CPT

## 2020-06-08 DIAGNOSIS — I10 BENIGN ESSENTIAL HYPERTENSION: ICD-10-CM

## 2020-06-09 RX ORDER — LOSARTAN POTASSIUM 25 MG/1
TABLET ORAL
Qty: 30 TABLET | Refills: 0 | Status: SHIPPED | OUTPATIENT
Start: 2020-06-09 | End: 2020-07-20

## 2020-06-10 ENCOUNTER — HOSPITAL ENCOUNTER (OUTPATIENT)
Dept: NUCLEAR MEDICINE | Facility: HOSPITAL | Age: 70
Discharge: HOME | End: 2020-06-10
Attending: UROLOGY
Payer: COMMERCIAL

## 2020-06-10 ENCOUNTER — HOSPITAL ENCOUNTER (OUTPATIENT)
Dept: CT IMAGING | Facility: HOSPITAL | Age: 70
Discharge: HOME | End: 2020-06-10
Attending: UROLOGY
Payer: COMMERCIAL

## 2020-06-10 DIAGNOSIS — C61 PROSTATE CANCER (HCC): ICD-10-CM

## 2020-06-10 PROCEDURE — 74177 CT ABD & PELVIS W/CONTRAST: CPT

## 2020-06-10 PROCEDURE — 78306 BONE IMAGING WHOLE BODY: CPT

## 2020-06-10 PROCEDURE — A9503 TC99M MEDRONATE: HCPCS

## 2020-06-10 RX ADMIN — IOHEXOL 100 ML: 350 INJECTION, SOLUTION INTRAVENOUS at 08:06

## 2020-06-11 ENCOUNTER — TELEPHONE (OUTPATIENT)
Dept: UROLOGY | Facility: CLINIC | Age: 70
End: 2020-06-11

## 2020-06-15 ENCOUNTER — TELEPHONE (OUTPATIENT)
Dept: RADIATION ONCOLOGY | Facility: HOSPITAL | Age: 70
End: 2020-06-15

## 2020-06-15 ENCOUNTER — CLINICAL SUPPORT (OUTPATIENT)
Dept: RADIATION ONCOLOGY | Facility: HOSPITAL | Age: 70
End: 2020-06-15
Attending: RADIOLOGY
Payer: COMMERCIAL

## 2020-06-15 VITALS
DIASTOLIC BLOOD PRESSURE: 64 MMHG | HEART RATE: 58 BPM | WEIGHT: 232.4 LBS | OXYGEN SATURATION: 97 % | BODY MASS INDEX: 36.47 KG/M2 | TEMPERATURE: 97.6 F | HEIGHT: 67 IN | SYSTOLIC BLOOD PRESSURE: 130 MMHG | RESPIRATION RATE: 18 BRPM

## 2020-06-15 DIAGNOSIS — C61 PROSTATE CANCER (HCC): ICD-10-CM

## 2020-06-15 DIAGNOSIS — C61 PROSTATE CANCER (HCC): Primary | ICD-10-CM

## 2020-06-15 PROCEDURE — 99211 OFF/OP EST MAY X REQ PHY/QHP: CPT | Performed by: RADIOLOGY

## 2020-07-08 ENCOUNTER — OFFICE VISIT (OUTPATIENT)
Dept: UROLOGY | Facility: CLINIC | Age: 70
End: 2020-07-08
Payer: COMMERCIAL

## 2020-07-08 ENCOUNTER — TELEPHONE (OUTPATIENT)
Dept: UROLOGY | Facility: CLINIC | Age: 70
End: 2020-07-08

## 2020-07-08 VITALS
DIASTOLIC BLOOD PRESSURE: 70 MMHG | TEMPERATURE: 97.9 F | HEIGHT: 67 IN | WEIGHT: 230 LBS | HEART RATE: 75 BPM | BODY MASS INDEX: 36.1 KG/M2 | SYSTOLIC BLOOD PRESSURE: 128 MMHG

## 2020-07-08 DIAGNOSIS — C61 PROSTATE CANCER (HCC): Primary | ICD-10-CM

## 2020-07-08 PROCEDURE — 4040F PNEUMOC VAC/ADMIN/RCVD: CPT | Performed by: UROLOGY

## 2020-07-08 PROCEDURE — 3008F BODY MASS INDEX DOCD: CPT | Performed by: UROLOGY

## 2020-07-08 PROCEDURE — 1160F RVW MEDS BY RX/DR IN RCRD: CPT | Performed by: UROLOGY

## 2020-07-08 PROCEDURE — 3044F HG A1C LEVEL LT 7.0%: CPT | Performed by: UROLOGY

## 2020-07-08 PROCEDURE — 96402 CHEMO HORMON ANTINEOPL SQ/IM: CPT

## 2020-07-08 PROCEDURE — 99215 OFFICE O/P EST HI 40 MIN: CPT | Performed by: UROLOGY

## 2020-07-08 PROCEDURE — 3074F SYST BP LT 130 MM HG: CPT | Performed by: UROLOGY

## 2020-07-08 PROCEDURE — 3078F DIAST BP <80 MM HG: CPT | Performed by: UROLOGY

## 2020-07-08 PROCEDURE — 3060F POS MICROALBUMINURIA REV: CPT | Performed by: UROLOGY

## 2020-07-08 PROCEDURE — 1036F TOBACCO NON-USER: CPT | Performed by: UROLOGY

## 2020-07-08 NOTE — LETTER
July 8, 2020     Dayron Matthews MD  [address] [facility] [place]  [address] [address]    Patient: Shante Dong   YOB: 1950   Date of Visit: 7/8/2020       Dear Dr Yessica Rojas:    Thank you for referring Shante Dong to me for evaluation  Below are my notes for this consultation  If you have questions, please do not hesitate to call me  I look forward to following your patient along with you  Sincerely,        Angeles Zazueta MD        CC: MD Angeles Ni MD  7/8/2020 12:17 PM  Sign at close encounter  Referring Physician: Dayron Matthews MD  A copy of this note was sent to the referring physician  Diagnoses and all orders for this visit:    Prostate cancer ([facility] [address] )            Assessment and plan:       1  Newly diagnosed Rafael 9 prostate cancer  -cT1c,  Pretreatment PSA 5 3, Rafael 4+5=9, 1 of 12 cores, 5% total core volume ( pathology reviewed by Dr Guillermo Lorenzana)  - negative staging evaluation (CT and bone scan)  - pending radiation treatment, we will utilize Uro lift as fiducial markers  - Lupron administered today (1 of for planned treatments for a total of 2 years ADT)    2  Medically refractory BPH  -status post Uro lift, and recent redo procedure     3  Urethral stricture  - status post recent dilation    Is good to see Aranza Early doing well  He received his 1st dose of Lupron today  He will follow-up as scheduled with Dr Lauren Daniels team to initiate radiation treatment as scheduled  He will return to see me in 6 months time with a PSA prior to the visit for his next dose of Lupron  Angeles Zazueta MD      Chief Complaint     Prostate cancer follow-up      History of Present Illness     Shante Dong is a 79 y o  returns in follow-up for newly diagnosed prostate cancer  In brief he is a very pleasant 69-year-old male with medically refractory lower urinary tract symptoms    He underwent the Uro lift procedure subsequently had recurrence of his symptoms and ultimately underwent a redo Uro lift procedure  Along the way he was found to have a mild elevation in his PSA up to 5 3  I recommended a prostate biopsy which was performed as a concurrent procedure along with his recent Uro lift  The biopsy did reveal prior to seeing finding of Rafael 4+5=9 disease in 1 total core  Was reviewed by Dr Paulina Cabral at [facility] to confirm the above  I spoke with him and his wife by phone to review the results  I range for staging evaluation  He has since met with Dr Vanna Ramírez and has elected to initiate external beam radiation treatment  We also discussed Lupron initiation at the prior visit  Detailed Urologic History     - please refer to HPI    Review of Systems     Review of Systems   Constitutional: Negative for activity change and fatigue  HENT: Negative for congestion  Eyes: Negative for visual disturbance  Respiratory: Negative for shortness of breath and wheezing  Cardiovascular: Negative for chest pain and leg swelling  Gastrointestinal: Negative for abdominal pain  Genitourinary: Negative for flank pain, hematuria and urgency  Musculoskeletal: Negative for back pain  Allergic/Immunologic: Negative for immunocompromised state  Neurological: Negative for dizziness and numbness  Psychiatric/Behavioral: Negative for dysphoric mood  All other systems reviewed and are negative  Allergies     Allergies   Allergen Reactions    Enalapril Anaphylaxis and Throat Swelling       Physical Exam     Physical Exam   Constitutional: He is oriented to person, place, and time  He appears well-developed and well-nourished  No distress  HENT:   Head: Normocephalic and atraumatic  Eyes: EOM are normal    Neck: Normal range of motion  Cardiovascular:   Negative lower extremity edema   Pulmonary/Chest: Effort normal and breath sounds normal    Abdominal: Soft     Genitourinary:   Genitourinary Comments: Negative CVA tenderness Musculoskeletal: Normal range of motion  Neurological: He is alert and oriented to person, place, and time  Skin: Skin is warm  Psychiatric: He has a normal mood and affect  His behavior is normal            Vital Signs  Vitals:    07/08/20 1107   BP: 128/70   BP Location: Left arm   Patient Position: Sitting   Cuff Size: Adult   Pulse: 75   Temp: 97 9 °F (36 6 °C)   Weight: 104 kg (230 lb)   Height: 5' 7" (1 702 m)         Current Medications       Current Outpatient Medications:     albuterol (PROAIR HFA) 90 mcg/act inhaler, inhale 2 puff by inhalation route  every 4 - 6 hours as needed, Disp: , Rfl:     atorvastatin (LIPITOR) 10 mg tablet, Take 1 tablet (10 mg total) by mouth daily, Disp: 90 tablet, Rfl: 3    Biotin 1000 MCG tablet, Take 1 tablet by mouth daily in the early morning , Disp: , Rfl:     Blood Glucose Monitoring Suppl (GLUCOCARD VITAL MONITOR) w/Device KIT, by Does not apply route 2 (two) times a day, Disp: 1 kit, Rfl: 0    calcium carbonate (OS-GILDA) 600 MG tablet, Take 600 mg by mouth daily in the early morning , Disp: , Rfl:     Cholecalciferol (VITAMIN D3) 2000 units capsule, Take 1,000 Units by mouth daily in the early morning , Disp: , Rfl:     Cyanocobalamin (VITAMIN B-12) 5000 MCG TBDP, Take 5,000 mcg by mouth once a week Takes on Mondays, Disp: , Rfl:     ferrous sulfate 324 (65 Fe) mg, Take 1 tablet (324 mg total) by mouth daily before breakfast, Disp: 90 tablet, Rfl: 0    glipiZIDE (GLUCOTROL XL) 5 mg 24 hr tablet, Take 1 tablet (5 mg total) by mouth daily after breakfast, Disp: 90 tablet, Rfl: 3    Insulin Pen Needle 32G X 4 MM MISC, by Does not apply route daily, Disp: 100 each, Rfl: 5    Lancets (LIFESCAN UNISTIK 2) MISC, Lifescan One Touch Ultramini Meter; use as directed; 1; 0; 31-Oct-2016; 31-Oct-2016; Melida Duran;  Active, Disp: , Rfl:     losartan (COZAAR) 25 mg tablet, TAKE ONE TABLET BY MOUTH EVERY DAY, Disp: 30 tablet, Rfl: 0    Mirabegron ER 25 MG TB24, TAKE ONE TABLET BY MOUTH EVERY DAY AT BEDTIME, Disp: 30 tablet, Rfl: 6    Multiple Vitamin (MULTIVITAMIN) capsule, Take 1 capsule by mouth daily in the early morning , Disp: , Rfl:     NOVOLOG MIX 70/30 FLEXPEN (70-30) 100 units/mL injection pen, INJECT 20 UNITS UNDER THE SKIN EVERY 12 HOURS (Patient taking differently: Inject 10 Units under the skin daily after breakfast ), Disp: 15 mL, Rfl: 5    VICTOZA injection, INJECT 1 8MG UNDER THE SKIN ONCE DAILY (Patient taking differently: Inject 1 8 mg under the skin daily in the early morning ), Disp: 9 mL, Rfl: 5  No current facility-administered medications for this visit         Active Problems     Patient Active Problem List   Diagnosis    Morbid obesity due to excess calories (HCC)    GERD (gastroesophageal reflux disease)    Obesity    Constipation    Hypercholesteremia    Postgastrectomy malabsorption    [facility]    Benign essential hypertension    Benign enlargement of prostate    Pancreatic cyst    History of colon polyps    IPMN (intraductal papillary mucinous neoplasm)    ED (erectile dysfunction) of organic origin    Benign prostatic hyperplasia with urinary frequency    Bruxism    Prostate cancer [facility])         Past Medical History     Past Medical History:   Diagnosis Date    Anemia     Arthritis     Diabetes mellitus ([facility] [address] )     IDDM    Diverticulosis     Enlarged prostate     Erectile dysfunction     Hypercholesteremia     Resolved post weight loss    Hypertension     Resolved post weight loss    Kidney stone     Obesity     Prostate cancer ([facility] [address] )     Wears partial dentures     partial upper and lower         Surgical History     Past Surgical History:   Procedure Laterality Date    ABDOMINAL ADHESION SURGERY N/A 11/28/2016    Procedure: EXTENSIVE LYSIS ADHESIONS;  Surgeon: Areli Alexander MD;  Location: [facility];  Service:    [facility] FRACTURE SURGERY Left     CHOLECYSTECTOMY      COLON SURGERY      colon resection    COLONOSCOPY      COLOSTOMY      COLOSTOMY CLOSURE      DIAGNOSTIC LAPAROSCOPY      GASTRIC BYPASS      failed due to adhesions    HERNIA REPAIR      abdominal    AL BIOPSY OF PROSTATE,NEEDLE/PUNCH N/A 5/8/2020    Procedure: TRANSRECTAL NEEDLE BIOPSY PROSTATE (TRNBP) WITH TRANSRECTAL ULTRASOUND GUIDANCE;  Surgeon: Reema Whalen MD;  [facility];  Service: Urology    AL CYSTOSCOPY,DIR VIS INT URETHROTOMY N/A 5/8/2020    Procedure: DIRECT VISUAL INTERAL URETHROTOMY (DVIU), dilation of urethral stricture;  Surgeon: Reema Whalen MD;  [facility];  Service: Urology    AL CYSTOURETHRO W/IMPLANT N/A 3/8/2019    Procedure: CYSTOSCOPY WITH INSERTION Darnella Tevin;  Surgeon: Reema Whalen MD;  [facility];  Service: Urology    AL CYSTOURETHRO W/IMPLANT N/A 5/8/2020    Procedure: CYSTOSCOPY WITH INSERTION Darnella Tevin;  Surgeon: Reema Whalen MD;  [facility];  Service: Urology    AL [address] DUODENUM/JEJUNUM N/A 10/25/2018    Procedure: LINEAR ENDOSCOPIC U/S;  Surgeon: Carola Kraft MD;  [facility]: [facility]; Service: Gastroenterology    AL ESOPHAGOGASTRODUODENOSCOPY TRANSORAL DIAGNOSTIC N/A 7/6/2016    Procedure: EGD AND COLONOSCOPY;  Surgeon: Rio Clark MD;  [facility];   Service: Gastroenterology    AL LAP, ANEESH RESTRICT PROC, LONGITUDINAL GASTRECTOMY N/A 11/28/2016    Procedure: GASTRECTOMY SLEEVE LAPAROSCOPIC;  Surgeon: Rosalie Fierro MD;  [facility];  Service: [address] [place] [address] BIOPSY  5/8/2020         Family History     Family History   Problem Relation Age of Onset    Heart disease Mother     Breast cancer Mother 80    Diabetes Father     Colon cancer Neg Hx     Liver disease Neg Hx          Social History     Social History     Social History     Tobacco Use   Smoking Status Former Smoker    Last attempt to quit: 11/10/2001    Years since quittin 6   Smokeless Tobacco Never Used         Pertinent Lab Values     Lab Results   Component Value Date    CREATININE 0 96 2020       Lab Results   Component Value Date    PSA 5 3 (H) 2020    PSA 2 6 10/23/2018    PSA 1 0 2016       @RESULTRCNT(1H])@      Pertinent Imaging     FINDINGS:     There is no scintigraphic evidence of osseous metastasis  Scattered changes of arthritis noted involving the ankles and knees  Normal activity noted in the bony pelvis, thoracolumbar spine, ribs and skull      IMPRESSION:     No scintigraphic evidence of osseous metastasis      FINDINGS:     ABDOMEN     LOWER CHEST:  No clinically significant abnormality identified in the visualized lower chest      LIVER/BILIARY TREE:  Unremarkable      GALLBLADDER:  Gallbladder is surgically absent      SPLEEN:  Unremarkable      PANCREAS:  Uncinate process  again demonstrates a lobulated hypodense heterogeneous predominantly cystic focus measuring 2 5 x 1 8 x 2 5 cm  This was suggested to be IPMN on MRI of 2019      ADRENAL GLANDS:  There is low density lobulated thickening of adrenal glands bilaterally statistically most likely to represent adenomatous hyperplasia      KIDNEYS/URETERS:  Exophytic hypodensity from the anterior midpole of the left kidney measuring 2 2 x 1 9 x 2 2 cm suggestive for cyst   Smaller subcentimeter hypodensities in the right kidney  No renal calculus    No hydronephrosis      STOMACH AND BOWEL: Findings of gastric surgery  Sigmoid diverticulosis  No diverticulitis      APPENDIX:  No findings to suggest appendicitis      ABDOMINOPELVIC CAVITY:  No ascites  No pneumoperitoneum    No lymphadenopathy      VESSELS:  Unremarkable for patient's age      PELVIS     REPRODUCTIVE ORGANS:  Prostate measures 5 5 x 5 1 cm with a linear radiopaque seed implants      URINARY BLADDER:  Unremarkable      ABDOMINAL WALL/INGUINAL REGIONS:  Prior anterior pelvic wall hernia repair findings seen  Adherent bowel loops seen      OSSEOUS STRUCTURES:  No acute fracture or destructive osseous lesion  No lytic or blastic lesion seen      IMPRESSION:  1  Complex cystic mass in the uncinate process of pancreas similar to prior studies and suggestive to IPMN  follow-up MRI was recommended on the prior study  2  Bilateral renal cysts as described mostly unchanged  3  Prostate is prominent with hyperdense seed implants        Portions of the record may have been created with voice recognition software   Occasional wrong word or "sound a like" substitutions may have occurred due to the inherent limitations of voice recognition software   Read the chart carefully and recognize, using context, where substitutions have occurred

## 2020-07-08 NOTE — PROGRESS NOTES
Referring Physician: Sandoval Lundy MD  A copy of this note was sent to the referring physician  Diagnoses and all orders for this visit:    Prostate cancer ([facility] [address] )            Assessment and plan:       1  Newly diagnosed Fr[place] 9 prostate cancer  -cT1c,  Pretreatment PSA 5 3, [place] 4+5=9, 1 of 12 cores, 5% total core volume ( pathology reviewed by Dr Nikole Bill)  - negative staging evaluation (CT and bone scan)  - pending radiation treatment, we will utilize Uro lift as fiducial markers  - Lupron administered today (1 of for planned treatments for a total of 2 years ADT)    2  Medically refractory BPH  -status post Uro lift, and recent redo procedure     3  Urethral stricture  - status post recent dilation    Is good to see Gilma Kirk doing well  He received his 1st dose of Lupron today  He will follow-up as scheduled with Dr Jessie Rodriguez team to initiate radiation treatment as scheduled  He will return to see me in 6 months time with a PSA prior to the visit for his next dose of Lupron  Saintclair Lah, MD      Chief Complaint     Prostate cancer follow-up      History of Present Illness     Felicitas Levy is a 79 y o  returns in follow-up for newly diagnosed prostate cancer  In brief he is a very pleasant 59-year-old male with medically refractory lower urinary tract symptoms  He underwent the Uro lift procedure subsequently had recurrence of his symptoms and ultimately underwent a redo Uro lift procedure  Along the way he was found to have a mild elevation in his PSA up to 5 3  I recommended a prostate biopsy which was performed as a concurrent procedure along with his recent Uro lift  The biopsy did reveal prior to seeing finding of [place] 4+5=9 disease in 1 total core  Was reviewed by Dr Robin Choudhury at [facility] to confirm the above  I spoke with him and his wife by phone to review the results  I range for staging evaluation    He has since met with Dr Diana Vu and has elected to initiate external beam radiation treatment  We also discussed Lupron initiation at the prior visit  Detailed Urologic History     - please refer to HPI    Review of Systems     Review of Systems   Constitutional: Negative for activity change and fatigue  HENT: Negative for congestion  Eyes: Negative for visual disturbance  Respiratory: Negative for shortness of breath and wheezing  Cardiovascular: Negative for chest pain and leg swelling  Gastrointestinal: Negative for abdominal pain  Genitourinary: Negative for flank pain, hematuria and urgency  Musculoskeletal: Negative for back pain  Allergic/Immunologic: Negative for immunocompromised state  Neurological: Negative for dizziness and numbness  Psychiatric/Behavioral: Negative for dysphoric mood  All other systems reviewed and are negative  Allergies     Allergies   Allergen Reactions    Enalapril Anaphylaxis and Throat Swelling       Physical Exam     Physical Exam   Constitutional: He is oriented to person, place, and time  He appears well-developed and well-nourished  No distress  HENT:   Head: Normocephalic and atraumatic  Eyes: EOM are normal    Neck: Normal range of motion  Cardiovascular:   Negative lower extremity edema   Pulmonary/Chest: Effort normal and breath sounds normal    Abdominal: Soft  Genitourinary:   Genitourinary Comments: Negative CVA tenderness   Musculoskeletal: Normal range of motion  Neurological: He is alert and oriented to person, place, and time  Skin: Skin is warm  Psychiatric: He has a normal mood and affect   His behavior is normal            Vital Signs  Vitals:    07/08/20 1107   BP: 128/70   BP Location: Left arm   Patient Position: Sitting   Cuff Size: Adult   Pulse: 75   Temp: 97 9 °F (36 6 °C)   Weight: 104 kg (230 lb)   Height: 5' 7" (1 702 m)         Current Medications       Current Outpatient Medications:     albuterol (PROAIR HFA) 90 mcg/act inhaler, inhale 2 puff by inhalation route  every 4 - 6 hours as needed, Disp: , Rfl:     atorvastatin (LIPITOR) 10 mg tablet, Take 1 tablet (10 mg total) by mouth daily, Disp: 90 tablet, Rfl: 3    Biotin 1000 MCG tablet, Take 1 tablet by mouth daily in the early morning , Disp: , Rfl:     Blood Glucose Monitoring Suppl (GLUCOCARD VITAL MONITOR) w/Device KIT, by Does not apply route 2 (two) times a day, Disp: 1 kit, Rfl: 0    calcium carbonate (OS-GILDA) 600 MG tablet, Take 600 mg by mouth daily in the early morning , Disp: , Rfl:     Cholecalciferol (VITAMIN D3) 2000 units capsule, Take 1,000 Units by mouth daily in the early morning , Disp: , Rfl:     Cyanocobalamin (VITAMIN B-12) 5000 MCG TBDP, Take 5,000 mcg by mouth once a week Takes on Mondays, Disp: , Rfl:     ferrous sulfate 324 (65 Fe) mg, Take 1 tablet (324 mg total) by mouth daily before breakfast, Disp: 90 tablet, Rfl: 0    glipiZIDE (GLUCOTROL XL) 5 mg 24 hr tablet, Take 1 tablet (5 mg total) by mouth daily after breakfast, Disp: 90 tablet, Rfl: 3    Insulin Pen Needle 32G X 4 MM MISC, by Does not apply route daily, Disp: 100 each, Rfl: 5    Lancets (LIFESCAN UNISTIK 2) MISC, Lifescan One Touch Ultramini Meter; use as directed; 1; 0; 31-Oct-2016; 31-Oct-2016; Melida Duran;  Active, Disp: , Rfl:     losartan (COZAAR) 25 mg tablet, TAKE ONE TABLET BY MOUTH EVERY DAY, Disp: 30 tablet, Rfl: 0    Mirabegron ER 25 MG TB24, TAKE ONE TABLET BY MOUTH EVERY DAY AT BEDTIME, Disp: 30 tablet, Rfl: 6    Multiple Vitamin (MULTIVITAMIN) capsule, Take 1 capsule by mouth daily in the early morning , Disp: , Rfl:     NOVOLOG MIX 70/30 FLEXPEN (70-30) 100 units/mL injection pen, INJECT 20 UNITS UNDER THE SKIN EVERY 12 HOURS (Patient taking differently: Inject 10 Units under the skin daily after breakfast ), Disp: 15 mL, Rfl: 5    VICTOZA injection, INJECT 1 8MG UNDER THE SKIN ONCE DAILY (Patient taking differently: Inject 1 8 mg under the skin daily in the early morning ), Disp: 9 mL, Rfl: 5  No current facility-administered medications for this visit         Active Problems     Patient Active Problem List   Diagnosis    Morbid obesity due to excess calories (HCC)    GERD (gastroesophageal reflux disease)    Obesity    Constipation    Hypercholesteremia    Postgastrectomy malabsorption    [facility]    Benign essential hypertension    Benign enlargement of prostate    Pancreatic cyst    History of colon polyps    IPMN (intraductal papillary mucinous neoplasm)    ED (erectile dysfunction) of organic origin    Benign prostatic hyperplasia with urinary frequency    Bruxism    Prostate cancer [facility])         Past Medical History     Past Medical History:   Diagnosis Date    Anemia     Arthritis     Diabetes mellitus ([facility] [address] )     IDDM    Diverticulosis     Enlarged prostate     Erectile dysfunction     Hypercholesteremia     Resolved post weight loss    Hypertension     Resolved post weight loss    Kidney stone     Obesity     [facility]     Wears partial dentures     partial upper and lower         Surgical History     Past Surgical History:   Procedure Laterality Date    ABDOMINAL ADHESION SURGERY N/A 11/28/2016    Procedure: EXTENSIVE LYSIS ADHESIONS;  Surgeon: Carrie Valencia MD;  [facility];  Service:    [place] FRACTURE SURGERY Left     CHOLECYSTECTOMY      COLON SURGERY      colon resection    COLONOSCOPY      COLOSTOMY      COLOSTOMY CLOSURE      DIAGNOSTIC LAPAROSCOPY      GASTRIC BYPASS      failed due to adhesions    HERNIA REPAIR      abdominal    OH BIOPSY OF PROSTATE,NEEDLE/PUNCH N/A 5/8/2020    Procedure: TRANSRECTAL NEEDLE BIOPSY PROSTATE (TRNBP) WITH TRANSRECTAL ULTRASOUND GUIDANCE;  Surgeon: Cedric Angeles MD;  [facility];  Service: Urology    OH CYSTOSCOPY,DIR VIS INT URETHROTOMY N/A 5/8/2020    Procedure: DIRECT VISUAL INTERAL URETHROTOMY (DVIU), dilation of urethral stricture;  Surgeon: Saintclair Lah, MD;  [facility];  Service: Urology    MO CYSTOURETHRO W/IMPLANT N/A 3/8/2019    Procedure: CYSTOSCOPY WITH INSERTION Maya Coad;  Surgeon: Saintclair Lah, MD;  [facility];  Service: Urology    MO CYSTOURETHRO W/IMPLANT N/A 2020    Procedure: CYSTOSCOPY WITH INSERTION Maya Coad;  Surgeon: Saintclair Lah, MD;  [facility];  Service: Urology    MO [address] DUODENUM/JEJUNUM N/A 10/25/2018    Procedure: LINEAR ENDOSCOPIC U/S;  Surgeon: Bimal Tao MD;  [facility]: [facility]; Service: Gastroenterology    MO ESOPHAGOGASTRODUODENOSCOPY TRANSORAL DIAGNOSTIC N/A 2016    Procedure: EGD AND COLONOSCOPY;  Surgeon: Paul Yañez MD;  [facility]; Service: Gastroenterology    MO LAP, ANEESH RESTRICT PROC, LONGITUDINAL GASTRECTOMY N/A 2016    Procedure: GASTRECTOMY SLEEVE LAPAROSCOPIC;  Surgeon: Blanca Monterroso MD;  [facility];  Service: [facility] BIOPSY  2020         Family History     Family History   Problem Relation Age of Onset    Heart disease Mother     Breast cancer Mother 80    Diabetes Father     Colon cancer Neg Hx     Liver disease Neg Hx          Social History     Social History     Social History     Tobacco Use   Smoking Status Former Smoker    Last attempt to quit: 11/10/2001    Years since quittin 6   Smokeless Tobacco Never Used         Pertinent Lab Values     Lab Results   Component Value Date    CREATININE 0 96 2020       Lab Results   Component Value Date    PSA 5 3 (H) 2020    PSA 2 6 10/23/2018    PSA 1 0 2016       @RESULTRCNT(1H])@      Pertinent Imaging     FINDINGS:     There is no scintigraphic evidence of osseous metastasis  Scattered changes of arthritis noted involving the ankles and knees    Normal activity noted in the bony pelvis, thoracolumbar spine, ribs and skull      IMPRESSION:     No scintigraphic evidence of osseous metastasis      FINDINGS:     ABDOMEN     LOWER CHEST:  No clinically significant abnormality identified in the visualized lower chest      LIVER/BILIARY TREE:  Unremarkable      GALLBLADDER:  Gallbladder is surgically absent      SPLEEN:  Unremarkable      PANCREAS:  Uncinate process  again demonstrates a lobulated hypodense heterogeneous predominantly cystic focus measuring 2 5 x 1 8 x 2 5 cm  This was suggested to be IPMN on MRI of 11/26/2019      ADRENAL GLANDS:  There is low density lobulated thickening of adrenal glands bilaterally statistically most likely to represent adenomatous hyperplasia      KIDNEYS/URETERS:  Exophytic hypodensity from the anterior midpole of the left kidney measuring 2 2 x 1 9 x 2 2 cm suggestive for cyst   Smaller subcentimeter hypodensities in the right kidney  No renal calculus    No hydronephrosis      STOMACH AND BOWEL: Findings of gastric surgery  Sigmoid diverticulosis  No diverticulitis      APPENDIX:  No findings to suggest appendicitis      ABDOMINOPELVIC CAVITY:  No ascites  No pneumoperitoneum  No lymphadenopathy      VESSELS:  Unremarkable for patient's age      PELVIS     REPRODUCTIVE ORGANS:  Prostate measures 5 5 x 5 1 cm with a linear radiopaque seed implants      URINARY BLADDER:  Unremarkable      ABDOMINAL WALL/INGUINAL REGIONS:  Prior anterior pelvic wall hernia repair findings seen  Adherent bowel loops seen      OSSEOUS STRUCTURES:  No acute fracture or destructive osseous lesion  No lytic or blastic lesion seen      IMPRESSION:  1  Complex cystic mass in the uncinate process of pancreas similar to prior studies and suggestive to IPMN  follow-up MRI was recommended on the prior study  2  Bilateral renal cysts as described mostly unchanged    3  Prostate is prominent with hyperdense seed implants        Portions of the record may have been created with voice recognition software   Occasional wrong word or "sound a like" substitutions may have occurred due to the inherent limitations of voice recognition software   Read the chart carefully and recognize, using context, where substitutions have occurred

## 2020-07-18 DIAGNOSIS — I10 BENIGN ESSENTIAL HYPERTENSION: ICD-10-CM

## 2020-07-20 RX ORDER — LOSARTAN POTASSIUM 25 MG/1
TABLET ORAL
Qty: 30 TABLET | Refills: 0 | Status: SHIPPED | OUTPATIENT
Start: 2020-07-20 | End: 2020-08-18

## 2020-07-23 ENCOUNTER — HOSPITAL ENCOUNTER (EMERGENCY)
Facility: HOSPITAL | Age: 70
Discharge: HOME | DRG: 690 | End: 2020-07-23
Attending: EMERGENCY MEDICINE | Admitting: EMERGENCY MEDICINE
Payer: COMMERCIAL

## 2020-07-23 ENCOUNTER — APPOINTMENT (EMERGENCY)
Dept: CT IMAGING | Facility: HOSPITAL | Age: 70
DRG: 690 | End: 2020-07-23
Payer: COMMERCIAL

## 2020-07-23 VITALS
DIASTOLIC BLOOD PRESSURE: 58 MMHG | SYSTOLIC BLOOD PRESSURE: 120 MMHG | WEIGHT: 227.51 LBS | BODY MASS INDEX: 34.48 KG/M2 | RESPIRATION RATE: 18 BRPM | OXYGEN SATURATION: 95 % | HEIGHT: 68 IN | TEMPERATURE: 97.9 F | HEART RATE: 78 BPM

## 2020-07-23 DIAGNOSIS — N39.0 URINARY TRACT INFECTION: Primary | ICD-10-CM

## 2020-07-23 LAB
ALBUMIN SERPL BCP-MCNC: 3.3 G/DL (ref 3.5–5)
ALP SERPL-CCNC: 83 U/L (ref 46–116)
ALT SERPL W P-5'-P-CCNC: 39 U/L (ref 12–78)
ANION GAP SERPL CALCULATED.3IONS-SCNC: 8 MMOL/L (ref 4–13)
AST SERPL W P-5'-P-CCNC: 24 U/L (ref 5–45)
BACTERIA UR QL AUTO: ABNORMAL /HPF
BASOPHILS # BLD AUTO: 0.09 THOUSANDS/ΜL (ref 0–0.1)
BASOPHILS NFR BLD AUTO: 0 % (ref 0–1)
BILIRUB SERPL-MCNC: 0.99 MG/DL (ref 0.2–1)
BILIRUB UR QL STRIP: ABNORMAL
BUN SERPL-MCNC: 19 MG/DL (ref 5–25)
CALCIUM SERPL-MCNC: 8.7 MG/DL (ref 8.3–10.1)
CHLORIDE SERPL-SCNC: 100 MMOL/L (ref 100–108)
CLARITY UR: CLEAR
CO2 SERPL-SCNC: 27 MMOL/L (ref 21–32)
COLOR UR: YELLOW
CREAT SERPL-MCNC: 1.1 MG/DL (ref 0.6–1.3)
EOSINOPHIL # BLD AUTO: 0.01 THOUSAND/ΜL (ref 0–0.61)
EOSINOPHIL NFR BLD AUTO: 0 % (ref 0–6)
ERYTHROCYTE [DISTWIDTH] IN BLOOD BY AUTOMATED COUNT: 13 % (ref 11.6–15.1)
GFR SERPL CREATININE-BSD FRML MDRD: 68 ML/MIN/1.73SQ M
GLUCOSE SERPL-MCNC: 227 MG/DL (ref 65–140)
GLUCOSE UR STRIP-MCNC: ABNORMAL MG/DL
HCT VFR BLD AUTO: 42.6 % (ref 36.5–49.3)
HGB BLD-MCNC: 14.3 G/DL (ref 12–17)
HGB UR QL STRIP.AUTO: ABNORMAL
IMM GRANULOCYTES # BLD AUTO: 0.22 THOUSAND/UL (ref 0–0.2)
IMM GRANULOCYTES NFR BLD AUTO: 1 % (ref 0–2)
KETONES UR STRIP-MCNC: ABNORMAL MG/DL
LEUKOCYTE ESTERASE UR QL STRIP: ABNORMAL
LIPASE SERPL-CCNC: 73 U/L (ref 73–393)
LYMPHOCYTES # BLD AUTO: 0.44 THOUSANDS/ΜL (ref 0.6–4.47)
LYMPHOCYTES NFR BLD AUTO: 2 % (ref 14–44)
MCH RBC QN AUTO: 30.8 PG (ref 26.8–34.3)
MCHC RBC AUTO-ENTMCNC: 33.6 G/DL (ref 31.4–37.4)
MCV RBC AUTO: 92 FL (ref 82–98)
MONOCYTES # BLD AUTO: 1.63 THOUSAND/ΜL (ref 0.17–1.22)
MONOCYTES NFR BLD AUTO: 7 % (ref 4–12)
NEUTROPHILS # BLD AUTO: 20.5 THOUSANDS/ΜL (ref 1.85–7.62)
NEUTS SEG NFR BLD AUTO: 90 % (ref 43–75)
NITRITE UR QL STRIP: POSITIVE
NON-SQ EPI CELLS URNS QL MICRO: ABNORMAL /HPF
NRBC BLD AUTO-RTO: 0 /100 WBCS
PH UR STRIP.AUTO: 5.5 [PH] (ref 4.5–8)
PLATELET # BLD AUTO: 139 THOUSANDS/UL (ref 149–390)
PMV BLD AUTO: 12.4 FL (ref 8.9–12.7)
POTASSIUM SERPL-SCNC: 3.9 MMOL/L (ref 3.5–5.3)
PROT SERPL-MCNC: 7.3 G/DL (ref 6.4–8.2)
PROT UR STRIP-MCNC: >=300 MG/DL
RBC # BLD AUTO: 4.64 MILLION/UL (ref 3.88–5.62)
RBC #/AREA URNS AUTO: ABNORMAL /HPF
SODIUM SERPL-SCNC: 135 MMOL/L (ref 136–145)
SP GR UR STRIP.AUTO: >=1.03 (ref 1–1.03)
UROBILINOGEN UR QL STRIP.AUTO: 0.2 E.U./DL
WBC # BLD AUTO: 22.89 THOUSAND/UL (ref 4.31–10.16)
WBC #/AREA URNS AUTO: ABNORMAL /HPF

## 2020-07-23 PROCEDURE — 96365 THER/PROPH/DIAG IV INF INIT: CPT

## 2020-07-23 PROCEDURE — 96375 TX/PRO/DX INJ NEW DRUG ADDON: CPT

## 2020-07-23 PROCEDURE — 36415 COLL VENOUS BLD VENIPUNCTURE: CPT | Performed by: EMERGENCY MEDICINE

## 2020-07-23 PROCEDURE — 87040 BLOOD CULTURE FOR BACTERIA: CPT | Performed by: EMERGENCY MEDICINE

## 2020-07-23 PROCEDURE — 87086 URINE CULTURE/COLONY COUNT: CPT

## 2020-07-23 PROCEDURE — 99284 EMERGENCY DEPT VISIT MOD MDM: CPT | Performed by: EMERGENCY MEDICINE

## 2020-07-23 PROCEDURE — 87077 CULTURE AEROBIC IDENTIFY: CPT

## 2020-07-23 PROCEDURE — 81001 URINALYSIS AUTO W/SCOPE: CPT

## 2020-07-23 PROCEDURE — 80053 COMPREHEN METABOLIC PANEL: CPT | Performed by: EMERGENCY MEDICINE

## 2020-07-23 PROCEDURE — 87186 SC STD MICRODIL/AGAR DIL: CPT

## 2020-07-23 PROCEDURE — 74177 CT ABD & PELVIS W/CONTRAST: CPT

## 2020-07-23 PROCEDURE — 99284 EMERGENCY DEPT VISIT MOD MDM: CPT

## 2020-07-23 PROCEDURE — 83690 ASSAY OF LIPASE: CPT | Performed by: EMERGENCY MEDICINE

## 2020-07-23 PROCEDURE — 85025 COMPLETE CBC W/AUTO DIFF WBC: CPT | Performed by: EMERGENCY MEDICINE

## 2020-07-23 RX ORDER — KETOROLAC TROMETHAMINE 30 MG/ML
15 INJECTION, SOLUTION INTRAMUSCULAR; INTRAVENOUS ONCE
Status: COMPLETED | OUTPATIENT
Start: 2020-07-23 | End: 2020-07-23

## 2020-07-23 RX ORDER — ONDANSETRON 4 MG/1
4 TABLET, ORALLY DISINTEGRATING ORAL EVERY 8 HOURS PRN
Qty: 12 TABLET | Refills: 0 | Status: SHIPPED | OUTPATIENT
Start: 2020-07-23 | End: 2020-08-06 | Stop reason: ALTCHOICE

## 2020-07-23 RX ORDER — CEFPODOXIME PROXETIL 200 MG/1
200 TABLET, FILM COATED ORAL 2 TIMES DAILY
Qty: 20 TABLET | Refills: 0 | Status: SHIPPED | OUTPATIENT
Start: 2020-07-23 | End: 2020-07-27 | Stop reason: HOSPADM

## 2020-07-23 RX ADMIN — IOHEXOL 100 ML: 350 INJECTION, SOLUTION INTRAVENOUS at 13:25

## 2020-07-23 RX ADMIN — CEFTRIAXONE SODIUM 1000 MG: 10 INJECTION, POWDER, FOR SOLUTION INTRAVENOUS at 13:35

## 2020-07-23 RX ADMIN — KETOROLAC TROMETHAMINE 15 MG: 30 INJECTION, SOLUTION INTRAMUSCULAR at 12:39

## 2020-07-23 NOTE — ED PROVIDER NOTES
History  Chief Complaint   Patient presents with    Abdominal Pain     pt complains of abdominal pain radiates to back started yesterday pain when urinates last BM was 2 days ago, pt did enema at home last night and a stool softener     HPI     77-year-old male with history of prostate cancer, 1 month status post urolith procedure, history of prior sigmoid abscess status post resection with colostomy and subsequent reversal, multiple prior bariatric surgeries most recent of which was 4 years ago without known complications, history of prior cholecystectomy, who presents for evaluation of bilateral low back pain radiating to the left lower quadrant that has been present for the last 2 days  Pain is described as a pressure sensation, worse with urination  Denies pain or swelling in his testicles  Endorses dysuria, no frequency  Patient suspects that he may have seen a little bit of blood in his urine this morning  He has not had a bowel movement in 3 days which is unusual for him  He took a Fleet enema this morning without a bowel movement  He denies midline back tenderness, or numbness or weakness in his legs or groin  Denies fevers but does endorse chills  No history urinary tract infection  Denies nausea or vomiting  Prior to Admission Medications   Prescriptions Last Dose Informant Patient Reported? Taking?    Biotin 1000 MCG tablet  Self Yes Yes   Sig: Take 1 tablet by mouth daily in the early morning    Blood Glucose Monitoring Suppl (GLUCOCARD VITAL MONITOR) w/Device KIT  Self No Yes   Sig: by Does not apply route 2 (two) times a day   Cholecalciferol (VITAMIN D3) 2000 units capsule  Self Yes Yes   Sig: Take 1,000 Units by mouth daily in the early morning    Cyanocobalamin (VITAMIN B-12) 5000 MCG TBDP  Self Yes Yes   Sig: Take 5,000 mcg by mouth once a week Takes on Mondays   Insulin Pen Needle 32G X 4 MM MISC  Self No Yes   Sig: by Does not apply route daily   Lancets (LIFESCAN UNISTIK 2) MISC Self Yes Yes   Sig: BDAcan One Touch Ultramini Meter; use as directed; 1; 0; 31-Oct-2016; 31-Oct-2016; Melida Duran;  Active   Mirabegron ER 25 MG TB24  Self No Yes   Sig: TAKE ONE TABLET BY MOUTH EVERY DAY AT BEDTIME   Multiple Vitamin (MULTIVITAMIN) capsule  Self Yes Yes   Sig: Take 1 capsule by mouth daily in the early morning    NOVOLOG MIX 70/30 FLEXPEN (70-30) 100 units/mL injection pen  Self No Yes   Sig: INJECT 20 UNITS UNDER THE SKIN EVERY 12 HOURS   Patient taking differently: Inject 10 Units under the skin daily after breakfast    VICTOZA injection  Self No Yes   Sig: INJECT 1 8MG UNDER THE SKIN ONCE DAILY   Patient taking differently: Inject 1 8 mg under the skin daily in the early morning    albuterol (PROAIR HFA) 90 mcg/act inhaler  Self Yes Yes   Sig: inhale 2 puff by inhalation route  every 4 - 6 hours as needed   atorvastatin (LIPITOR) 10 mg tablet  Self No Yes   Sig: Take 1 tablet (10 mg total) by mouth daily   calcium carbonate (OS-GILDA) 600 MG tablet  Self Yes Yes   Sig: Take 600 mg by mouth daily in the early morning    ferrous sulfate 324 (65 Fe) mg  Self No Yes   Sig: Take 1 tablet (324 mg total) by mouth daily before breakfast   glipiZIDE (GLUCOTROL XL) 5 mg 24 hr tablet  Self No Yes   Sig: Take 1 tablet (5 mg total) by mouth daily after breakfast   losartan (COZAAR) 25 mg tablet   No Yes   Sig: TAKE ONE TABLET BY MOUTH EVERY DAY      Facility-Administered Medications: None       Past Medical History:   Diagnosis Date    Anemia     Arthritis     Diabetes mellitus (HCC)     IDDM    Diverticulosis     Enlarged prostate     Erectile dysfunction     Hypercholesteremia     Resolved post weight loss    Hypertension     Resolved post weight loss    Kidney stone     Obesity     Prostate cancer ([address] )     Wears partial dentures     partial upper and lower       Past Surgical History:   Procedure Laterality Date    ABDOMINAL ADHESION SURGERY N/A 11/28/2016    Procedure: EXTENSIVE LYSIS ADHESIONS;  Surgeon: Karthik Skinner MD;  [facility];  Service:     ANKLE FRACTURE SURGERY Left     CHOLECYSTECTOMY      COLON SURGERY      colon resection    COLONOSCOPY      COLOSTOMY      COLOSTOMY CLOSURE      DIAGNOSTIC LAPAROSCOPY      GASTRIC BYPASS      failed due to adhesions    HERNIA REPAIR      abdominal    FL BIOPSY OF PROSTATE,NEEDLE/PUNCH N/A 5/8/2020    Procedure: TRANSRECTAL NEEDLE BIOPSY PROSTATE (TRNBP) WITH TRANSRECTAL ULTRASOUND GUIDANCE;  Surgeon: Wagner Spangler MD;  [facility];  Service: Urology    FL CYSTOSCOPY,DIR VIS INT URETHROTOMY N/A 5/8/2020    Procedure: DIRECT VISUAL INTERAL URETHROTOMY (DVIU), dilation of urethral stricture;  Surgeon: Wagner Spangler MD;  [facility];  Service: Urology    FL CYSTOURETHRO W/IMPLANT N/A 3/8/2019    Procedure: CYSTOSCOPY WITH INSERTION Srinivasa Pitcher;  Surgeon: Wagner Spangler MD;  [facility];  Service: Urology    FL CYSTOURETHRO W/IMPLANT N/A 5/8/2020    Procedure: CYSTOSCOPY WITH INSERTION Srinivasa Pitcher;  Surgeon: Wagner Spangler MD;  [facility];  Service: Urology    FL [address] DUODENUM/JEJUNUM N/A 10/25/2018    Procedure: LINEAR ENDOSCOPIC U/S;  Surgeon: Libia Novak MD;  [facility]; Service: Gastroenterology    FL ESOPHAGOGASTRODUODENOSCOPY TRANSORAL DIAGNOSTIC N/A 7/6/2016    Procedure: EGD AND COLONOSCOPY;  Surgeon: Marielos Gomez MD;  [facility];   Service: Gastroenterology    FL LAP, ANEESH RESTRICT PROC, LONGITUDINAL GASTRECTOMY N/A 11/28/2016    Procedure: GASTRECTOMY SLEEVE LAPAROSCOPIC;  Surgeon: Karthik Skinner MD;  [facility];  Service: [facility] BIOPSY  5/8/2020       Family History   Problem Relation Age of Onset    Heart disease Mother     Breast cancer Mother 80    Diabetes Father     Colon cancer Neg Hx     Liver disease Neg Hx      I have reviewed and agree with the history as documented  E-Cigarette/Vaping    E-Cigarette Use Never User      E-Cigarette/Vaping Substances    Nicotine No     THC No     CBD No     Flavoring No     Other No     Unknown No      Social History     Tobacco Use    Smoking status: Former Smoker     Last attempt to quit: 11/10/2001     Years since quittin 7    Smokeless tobacco: Never Used   Substance Use Topics    Alcohol use: No    Drug use: No       Review of Systems   Constitutional: Positive for chills  Negative for fever  HENT: Negative for congestion  Eyes: Negative for visual disturbance  Respiratory: Negative for cough and shortness of breath  Cardiovascular: Negative for chest pain and leg swelling  Gastrointestinal: Positive for abdominal pain  Negative for diarrhea, nausea and vomiting  Genitourinary: Positive for dysuria  Negative for decreased urine volume, frequency and testicular pain  Musculoskeletal: Positive for back pain  Negative for arthralgias, neck pain and neck stiffness  Skin: Negative for rash  Neurological: Negative for weakness, numbness and headaches  Psychiatric/Behavioral: Negative for agitation, behavioral problems and confusion  Physical Exam  Physical Exam   Constitutional: He is oriented to person, place, and time  He appears well-developed and well-nourished  No distress  HENT:   Head: Normocephalic and atraumatic  Right Ear: External ear normal    Left Ear: External ear normal    Nose: Nose normal    Mouth/Throat: Oropharynx is clear and moist    Eyes: Conjunctivae are normal    Neck: Normal range of motion  Neck supple  Cardiovascular: Normal rate, regular rhythm, normal heart sounds and intact distal pulses  Exam reveals no gallop and no friction rub  No murmur heard  Pulmonary/Chest: Effort normal and breath sounds normal  No respiratory distress  He has no wheezes  He has no rales  Abdominal: Soft  Bowel sounds are normal  He exhibits no distension  There is tenderness (Generalized tenderness to palpation, worse to the left side of the abdomen  No CVA tenderness  )  There is no guarding  Genitourinary:   Genitourinary Comments: Rectum normal without palpable hemorrhoids or masses, prostatomegaly without significant discomfort   Musculoskeletal: Normal range of motion  He exhibits no edema or deformity  No midline tenderness to palpation over the lumbar spine, tenderness to palpation over the bilateral paraspinous muscles of the lower lumbar spine  Neurological: He is alert and oriented to person, place, and time  He exhibits normal muscle tone  5/5 strength in the bilateral lower extremities with intact sensation to light touch, no saddle anesthesia  Skin: Skin is warm and dry  He is not diaphoretic         Vital Signs  ED Triage Vitals   Temperature Pulse Respirations Blood Pressure SpO2   07/23/20 1130 07/23/20 1130 07/23/20 1130 07/23/20 1130 07/23/20 1130   97 9 °F (36 6 °C) 86 16 159/69 95 %      Temp Source Heart Rate Source Patient Position - Orthostatic VS BP Location FiO2 (%)   07/23/20 1130 07/23/20 1337 07/23/20 1337 07/23/20 1337 --   Oral Monitor Lying Right arm       Pain Score       07/23/20 1130       8           Vitals:    07/23/20 1130 07/23/20 1337 07/23/20 1400   BP: 159/69 143/67 120/58   Pulse: 86 77 78   Patient Position - Orthostatic VS:  Lying Lying         Visual Acuity      ED Medications  Medications   ketorolac (TORADOL) injection 15 mg (15 mg Intravenous Given 7/23/20 1239)   ceftriaxone (ROCEPHIN) 1 g/50 mL in dextrose IVPB (0 mg Intravenous Stopped 7/23/20 1405)   iohexol (OMNIPAQUE) 350 MG/ML injection (SINGLE-DOSE) 100 mL (100 mL Intravenous Given 7/23/20 1325)       Diagnostic Studies  Results Reviewed     Procedure Component Value Units Date/Time    Blood culture #1 [[id number]] Collected:  07/23/20 1236    Lab Status:  Preliminary result Specimen:  Blood from Arm, Right Updated:  07/23/20 1701     Blood Culture Received in Microbiology Lab  Culture in Progress  Blood culture #2 [[id number]] Collected:  07/23/20 1215    Lab Status:  Preliminary result Specimen:  Blood from Arm, Right Updated:  07/23/20 1701     Blood Culture Received in Microbiology Lab  Culture in Progress  Urine Microscopic [[id number]]  (Abnormal) Collected:  07/23/20 1301    Lab Status:  Final result Specimen:  Urine, Clean Catch Updated:  07/23/20 1330     RBC, UA 1-2 /hpf      WBC, UA Innumerable /hpf      Epithelial Cells Occasional /hpf      Bacteria, UA Moderate /hpf     Urine culture [[id number]] Collected:  07/23/20 1301    Lab Status: In process Specimen:  Urine, Clean Catch Updated:  07/23/20 1330    CBC and differential [[id number]]  (Abnormal) Collected:  07/23/20 1236    Lab Status:  Final result Specimen:  Blood from Arm, Right Updated:  07/23/20 1328     WBC 22 89 Thousand/uL      RBC 4 64 Million/uL      Hemoglobin 14 3 g/dL      Hematocrit 42 6 %      MCV 92 fL      MCH 30 8 pg      MCHC 33 6 g/dL      RDW 13 0 %      MPV 12 4 fL      Platelets 246 Thousands/uL      nRBC 0 /100 WBCs      Neutrophils Relative 90 %      Immat GRANS % 1 %      Lymphocytes Relative 2 %      Monocytes Relative 7 %      Eosinophils Relative 0 %      Basophils Relative 0 %      Neutrophils Absolute 20 50 Thousands/µL      Immature Grans Absolute 0 22 Thousand/uL      Lymphocytes Absolute 0 44 Thousands/µL      Monocytes Absolute 1 63 Thousand/µL      Eosinophils Absolute 0 01 Thousand/µL      Basophils Absolute 0 09 Thousands/µL     Narrative: This is an appended report  These results have been appended to a previously verified report      Lipase [[id number]]  (Normal) Collected:  07/23/20 1236    Lab Status:  Final result Specimen:  Blood from Arm, Right Updated:  07/23/20 1305     Lipase 73 u/L     Comprehensive metabolic panel [[id number]]  (Abnormal) Collected:  07/23/20 1236    Lab Status:  Final result Specimen:  Blood from Arm, Right Updated: 07/23/20 1305     Sodium 135 mmol/L      Potassium 3 9 mmol/L      Chloride 100 mmol/L      CO2 27 mmol/L      ANION GAP 8 mmol/L      BUN 19 mg/dL      Creatinine 1 10 mg/dL      Glucose 227 mg/dL      Calcium 8 7 mg/dL      AST 24 U/L      ALT 39 U/L      Alkaline Phosphatase 83 U/L      Total Protein 7 3 g/dL      Albumin 3 3 g/dL      Total Bilirubin 0 99 mg/dL      eGFR 68 ml/min/1 73sq m     Narrative:       [place] guidelines for Chronic Kidney Disease (CKD):     Stage 1 with normal or high GFR (GFR > 90 mL/min/1 73 square meters)    Stage 2 Mild CKD (GFR = 60-89 mL/min/1 73 square meters)    Stage 3A Moderate CKD (GFR = 45-59 mL/min/1 73 square meters)    Stage 3B Moderate CKD (GFR = 30-44 mL/min/1 73 square meters)    Stage 4 Severe CKD (GFR = 15-29 mL/min/1 73 square meters)    Stage 5 End Stage CKD (GFR <15 mL/min/1 73 square meters)  Note: GFR calculation is accurate only with a steady state creatinine    Urine Macroscopic, POC [[id number]]  (Abnormal) Collected:  07/23/20 1301    Lab Status:  Final result Specimen:  Urine Updated:  07/23/20 1245     Color, UA Yellow     Clarity, UA Clear     pH, UA 5 5     Leukocytes, UA Small     Nitrite, UA Positive     Protein, UA >=300 mg/dl      Glucose,  (1/10%) mg/dl      Ketones, UA 40 (2+) mg/dl      Urobilinogen, UA 0 2 E U /dl      Bilirubin, UA Interference- unable to analyze     Blood, UA Large     Specific Gravity, UA >=1 030    Narrative:       [facility] RESULT                 CT abdomen pelvis with contrast   Final Result by Ting Amezcua MD (07/23 1402)      Mild inflammatory changes surrounding the urinary bladder; correlate with urinalysis for cystitis  Enlarged prostate gland  Stable 2 5 x 2 0 cm cystic lesion in the uncinate process of the pancreas              Workstation performed: [id number]                    Procedures  Procedures         ED Course       US AUDIT      Most Recent Value   Initial Alcohol Screen: US AUDIT-C    1  How often do you have a drink containing alcohol?  0 Filed at: 07/23/2020 1220   2  How many drinks containing alcohol do you have on a typical day you are drinking? 0 Filed at: 07/23/2020 1220   3a  Male UNDER 65: How often do you have five or more drinks on one occasion? 0 Filed at: 07/23/2020 1220   3b  FEMALE Any Age, or MALE 65+: How often do you have 4 or more drinks on one occassion? 0 Filed at: 07/23/2020 1220   Audit-C Score  0 Filed at: 07/23/2020 1220                  LORRAINE/DAST-10      Most Recent Value   How many times in the past year have you    Used an illegal drug or used a prescription medication for non-medical reasons? Never Filed at: 07/23/2020 1220                                MDM  Number of Diagnoses or Management Options  Urinary tract infection: new and requires workup  Diagnosis management comments: Generally well appearing  Afebrile and hemodynamically stable  Abdomen with mild diffuse tenderness to deep palpation worse on the left side  Patient has tenderness to palpation to the bilateral lower back without midline tenderness and has no red flag symptoms to suggest cauda equina, conus medullaris, or spinal cord mass lesion  Patient has a leukocytosis to 22 8, he does not meet other SIRS criteria and does not meet criteria for sepsis  CMP remarkable for hyperglycemia to 227 without evidence of DKA  UA is concerning for UTI with positive nitrites, small leukocytes, and large blood  CT scan obtained of the abdomen pelvis which shows inflammatory changes surrounding the urinary bladder, and enlarged prostate gland, but no other acute abnormalities  I have a low suspicion for prostatitis as his prostate is not tender on rectal exam     Will treat for urinary tract infection with 10 day course of Vantin  Dose of IV Rocephin given in the emergency department prior to discharge      Return precautions discussed, and patient discharged in good condition  Amount and/or Complexity of Data Reviewed  Clinical lab tests: ordered and reviewed  Tests in the radiology section of CPT®: ordered and reviewed  Review and summarize past medical records: yes  Independent visualization of images, tracings, or specimens: yes    Patient Progress  Patient progress: stable        Disposition  Final diagnoses:   Urinary tract infection     Time reflects when diagnosis was documented in both MDM as applicable and the Disposition within this note     Time User Action Codes Description Comment    7/23/2020  2:38 PM Rita Saenz Add [N39 0] Urinary tract infection       ED Disposition     ED Disposition Condition Date/Time Comment    Discharge Stable Thu Jul 23, 2020  2:38 PM Aminah Rodriguez discharge to home/self care  Follow-up Information     Follow up With Specialties Details Why Contact Info Additional [address] Emergency Department Emergency Medicine  As we discussed, return to the Emergency Department immediately for fever, worsening pain, vomiting with inability to tolerate oral intake, or new or concerning symptoms   [address] [id number] [phone] [facility],  [address] [facility], [place], [place], [address]          Discharge Medication List as of 7/23/2020  2:40 PM      START taking these medications    Details   cefpodoxime (VANTIN) 200 mg tablet Take 1 tablet (200 mg total) by mouth 2 (two) times a day for 10 days, Starting Thu 7/23/2020, Until Sun 8/2/2020, Normal         CONTINUE these medications which have NOT CHANGED    Details   albuterol (PROAIR HFA) 90 mcg/act inhaler inhale 2 puff by inhalation route  every 4 - 6 hours as needed, Historical Med      atorvastatin (LIPITOR) 10 mg tablet Take 1 tablet (10 mg total) by mouth daily, Starting Wed 1/15/2020, Normal      Biotin 1000 MCG tablet Take 1 tablet by mouth daily in the early morning , Historical Med      Blood Glucose Monitoring Suppl (GLUCOCARD VITAL MONITOR) w/Device KIT by Does not apply route 2 (two) times a day, Starting Tue 5/14/2019, Normal      calcium carbonate (OS-GILDA) 600 MG tablet Take 600 mg by mouth daily in the early morning , Historical Med      Cholecalciferol (VITAMIN D3) 2000 units capsule Take 1,000 Units by mouth daily in the early morning , Historical Med      Cyanocobalamin (VITAMIN B-12) 5000 MCG TBDP Take 5,000 mcg by mouth once a week Takes on Mondays, Historical Med      ferrous sulfate 324 (65 Fe) mg Take 1 tablet (324 mg total) by mouth daily before breakfast, Starting Tue 9/10/2019, Normal      glipiZIDE (GLUCOTROL XL) 5 mg 24 hr tablet Take 1 tablet (5 mg total) by mouth daily after breakfast, Starting Mon 2/24/2020, Normal      Insulin Pen Needle 32G X 4 MM MISC by Does not apply route daily, Starting Tue 1/14/2020, Normal      Lancets (LIFESCAN UNISTIK 2) MISC Lifescan One Touch Ultramini Meter; use as directed; 1; 0; 31-Oct-2016; 31-Oct-2016; Melida Duran; Active, Historical Med      losartan (COZAAR) 25 mg tablet TAKE ONE TABLET BY MOUTH EVERY DAY, Normal      Mirabegron ER 25 MG TB24 TAKE ONE TABLET BY MOUTH EVERY DAY AT BEDTIME, Normal      Multiple Vitamin (MULTIVITAMIN) capsule Take 1 capsule by mouth daily in the early morning , Historical Med      NOVOLOG MIX 70/30 FLEXPEN (70-30) 100 units/mL injection pen INJECT 20 UNITS UNDER THE SKIN EVERY 12 HOURS, Normal      VICTOZA injection INJECT 1 8MG UNDER THE SKIN ONCE DAILY, Normal           No discharge procedures on file      PDMP Review     None          ED Provider  Electronically Signed by           Tata Garcia MD  07/23/20 4553

## 2020-07-24 ENCOUNTER — HOSPITAL ENCOUNTER (INPATIENT)
Facility: HOSPITAL | Age: 70
LOS: 3 days | Discharge: HOME | DRG: 690 | End: 2020-07-27
Attending: EMERGENCY MEDICINE | Admitting: INTERNAL MEDICINE
Payer: COMMERCIAL

## 2020-07-24 DIAGNOSIS — R35.0 BENIGN PROSTATIC HYPERPLASIA WITH URINARY FREQUENCY: ICD-10-CM

## 2020-07-24 DIAGNOSIS — N39.0 UTI (URINARY TRACT INFECTION): Primary | ICD-10-CM

## 2020-07-24 DIAGNOSIS — R11.2 NAUSEA AND VOMITING, INTRACTABILITY OF VOMITING NOT SPECIFIED, UNSPECIFIED VOMITING TYPE: ICD-10-CM

## 2020-07-24 DIAGNOSIS — N40.1 BENIGN PROSTATIC HYPERPLASIA WITH URINARY FREQUENCY: ICD-10-CM

## 2020-07-24 PROBLEM — K92.1 BLACK STOOL: Status: ACTIVE | Noted: 2020-07-24

## 2020-07-24 PROBLEM — N30.00 ACUTE CYSTITIS: Status: ACTIVE | Noted: 2020-07-24

## 2020-07-24 LAB
ABO GROUP BLD: NORMAL
ABO GROUP BLD: NORMAL
ALBUMIN SERPL BCP-MCNC: 3 G/DL (ref 3.5–5)
ALP SERPL-CCNC: 92 U/L (ref 46–116)
ALT SERPL W P-5'-P-CCNC: 32 U/L (ref 12–78)
ANION GAP SERPL CALCULATED.3IONS-SCNC: 10 MMOL/L (ref 4–13)
APTT PPP: 35 SECONDS (ref 23–37)
AST SERPL W P-5'-P-CCNC: 19 U/L (ref 5–45)
BACTERIA UR QL AUTO: ABNORMAL /HPF
BASOPHILS # BLD AUTO: 0.04 THOUSANDS/ΜL (ref 0–0.1)
BASOPHILS NFR BLD AUTO: 0 % (ref 0–1)
BILIRUB SERPL-MCNC: 0.79 MG/DL (ref 0.2–1)
BILIRUB UR QL STRIP: ABNORMAL
BLD GP AB SCN SERPL QL: NEGATIVE
BUN SERPL-MCNC: 18 MG/DL (ref 5–25)
CALCIUM SERPL-MCNC: 8.6 MG/DL (ref 8.3–10.1)
CHLORIDE SERPL-SCNC: 99 MMOL/L (ref 100–108)
CLARITY UR: ABNORMAL
CO2 SERPL-SCNC: 24 MMOL/L (ref 21–32)
COLOR UR: ABNORMAL
CREAT SERPL-MCNC: 1.04 MG/DL (ref 0.6–1.3)
EOSINOPHIL # BLD AUTO: 0 THOUSAND/ΜL (ref 0–0.61)
EOSINOPHIL NFR BLD AUTO: 0 % (ref 0–6)
ERYTHROCYTE [DISTWIDTH] IN BLOOD BY AUTOMATED COUNT: 12.9 % (ref 11.6–15.1)
EST. AVERAGE GLUCOSE BLD GHB EST-MCNC: 163 MG/DL
GFR SERPL CREATININE-BSD FRML MDRD: 72 ML/MIN/1.73SQ M
GLUCOSE SERPL-MCNC: 132 MG/DL (ref 65–140)
GLUCOSE SERPL-MCNC: 135 MG/DL (ref 65–140)
GLUCOSE SERPL-MCNC: 209 MG/DL (ref 65–140)
GLUCOSE UR STRIP-MCNC: ABNORMAL MG/DL
HBA1C MFR BLD: 7.3 %
HCT VFR BLD AUTO: 37.4 % (ref 36.5–49.3)
HCT VFR BLD AUTO: 40.5 % (ref 36.5–49.3)
HGB BLD-MCNC: 12.5 G/DL (ref 12–17)
HGB BLD-MCNC: 13.6 G/DL (ref 12–17)
HGB UR QL STRIP.AUTO: ABNORMAL
IMM GRANULOCYTES # BLD AUTO: 0.1 THOUSAND/UL (ref 0–0.2)
IMM GRANULOCYTES NFR BLD AUTO: 1 % (ref 0–2)
INR PPP: 1.44 (ref 0.84–1.19)
KETONES UR STRIP-MCNC: ABNORMAL MG/DL
LACTATE SERPL-SCNC: 1.4 MMOL/L (ref 0.5–2)
LEUKOCYTE ESTERASE UR QL STRIP: ABNORMAL
LYMPHOCYTES # BLD AUTO: 0.28 THOUSANDS/ΜL (ref 0.6–4.47)
LYMPHOCYTES NFR BLD AUTO: 2 % (ref 14–44)
MCH RBC QN AUTO: 31.4 PG (ref 26.8–34.3)
MCHC RBC AUTO-ENTMCNC: 33.6 G/DL (ref 31.4–37.4)
MCV RBC AUTO: 94 FL (ref 82–98)
MONOCYTES # BLD AUTO: 0.92 THOUSAND/ΜL (ref 0.17–1.22)
MONOCYTES NFR BLD AUTO: 5 % (ref 4–12)
MUCOUS THREADS UR QL AUTO: ABNORMAL
NEUTROPHILS # BLD AUTO: 16.01 THOUSANDS/ΜL (ref 1.85–7.62)
NEUTS SEG NFR BLD AUTO: 92 % (ref 43–75)
NITRITE UR QL STRIP: ABNORMAL
NON-SQ EPI CELLS URNS QL MICRO: ABNORMAL /HPF
NRBC BLD AUTO-RTO: 0 /100 WBCS
PLATELET # BLD AUTO: 118 THOUSANDS/UL (ref 149–390)
PMV BLD AUTO: 12.5 FL (ref 8.9–12.7)
POTASSIUM SERPL-SCNC: 3.9 MMOL/L (ref 3.5–5.3)
PROT SERPL-MCNC: 7.2 G/DL (ref 6.4–8.2)
PROT UR STRIP-MCNC: ABNORMAL MG/DL
PROTHROMBIN TIME: 16.9 SECONDS (ref 11.6–14.5)
RBC # BLD AUTO: 4.33 MILLION/UL (ref 3.88–5.62)
RBC #/AREA URNS AUTO: ABNORMAL /HPF
RH BLD: POSITIVE
RH BLD: POSITIVE
SODIUM SERPL-SCNC: 133 MMOL/L (ref 136–145)
SPECIMEN EXPIRATION DATE: NORMAL
UROBILINOGEN UR QL STRIP.AUTO: ABNORMAL E.U./DL
WBC # BLD AUTO: 17.35 THOUSAND/UL (ref 4.31–10.16)
WBC #/AREA URNS AUTO: ABNORMAL /HPF

## 2020-07-24 PROCEDURE — 99284 EMERGENCY DEPT VISIT MOD MDM: CPT

## 2020-07-24 PROCEDURE — 81001 URINALYSIS AUTO W/SCOPE: CPT | Performed by: EMERGENCY MEDICINE

## 2020-07-24 PROCEDURE — 99222 1ST HOSP IP/OBS MODERATE 55: CPT | Performed by: INTERNAL MEDICINE

## 2020-07-24 PROCEDURE — 86901 BLOOD TYPING SEROLOGIC RH(D): CPT | Performed by: EMERGENCY MEDICINE

## 2020-07-24 PROCEDURE — 83605 ASSAY OF LACTIC ACID: CPT | Performed by: EMERGENCY MEDICINE

## 2020-07-24 PROCEDURE — 86850 RBC ANTIBODY SCREEN: CPT | Performed by: EMERGENCY MEDICINE

## 2020-07-24 PROCEDURE — 99284 EMERGENCY DEPT VISIT MOD MDM: CPT | Performed by: EMERGENCY MEDICINE

## 2020-07-24 PROCEDURE — 85014 HEMATOCRIT: CPT | Performed by: PHYSICIAN ASSISTANT

## 2020-07-24 PROCEDURE — 80053 COMPREHEN METABOLIC PANEL: CPT | Performed by: EMERGENCY MEDICINE

## 2020-07-24 PROCEDURE — 85025 COMPLETE CBC W/AUTO DIFF WBC: CPT | Performed by: EMERGENCY MEDICINE

## 2020-07-24 PROCEDURE — 85610 PROTHROMBIN TIME: CPT | Performed by: EMERGENCY MEDICINE

## 2020-07-24 PROCEDURE — 96375 TX/PRO/DX INJ NEW DRUG ADDON: CPT

## 2020-07-24 PROCEDURE — 85018 HEMOGLOBIN: CPT | Performed by: PHYSICIAN ASSISTANT

## 2020-07-24 PROCEDURE — 87086 URINE CULTURE/COLONY COUNT: CPT | Performed by: EMERGENCY MEDICINE

## 2020-07-24 PROCEDURE — 87040 BLOOD CULTURE FOR BACTERIA: CPT | Performed by: EMERGENCY MEDICINE

## 2020-07-24 PROCEDURE — 85730 THROMBOPLASTIN TIME PARTIAL: CPT | Performed by: EMERGENCY MEDICINE

## 2020-07-24 PROCEDURE — 83036 HEMOGLOBIN GLYCOSYLATED A1C: CPT | Performed by: PHYSICIAN ASSISTANT

## 2020-07-24 PROCEDURE — 96365 THER/PROPH/DIAG IV INF INIT: CPT

## 2020-07-24 PROCEDURE — 86900 BLOOD TYPING SEROLOGIC ABO: CPT | Performed by: EMERGENCY MEDICINE

## 2020-07-24 PROCEDURE — 36415 COLL VENOUS BLD VENIPUNCTURE: CPT | Performed by: EMERGENCY MEDICINE

## 2020-07-24 PROCEDURE — 82948 REAGENT STRIP/BLOOD GLUCOSE: CPT

## 2020-07-24 RX ORDER — ATORVASTATIN CALCIUM 10 MG/1
10 TABLET, FILM COATED ORAL DAILY
Status: DISCONTINUED | OUTPATIENT
Start: 2020-07-24 | End: 2020-07-27 | Stop reason: HOSPADM

## 2020-07-24 RX ORDER — ONDANSETRON 2 MG/ML
4 INJECTION INTRAMUSCULAR; INTRAVENOUS EVERY 4 HOURS PRN
Status: DISCONTINUED | OUTPATIENT
Start: 2020-07-24 | End: 2020-07-27 | Stop reason: HOSPADM

## 2020-07-24 RX ORDER — OXYCODONE HYDROCHLORIDE 5 MG/1
5 TABLET ORAL EVERY 4 HOURS PRN
Status: DISCONTINUED | OUTPATIENT
Start: 2020-07-24 | End: 2020-07-27 | Stop reason: HOSPADM

## 2020-07-24 RX ORDER — LOSARTAN POTASSIUM 25 MG/1
25 TABLET ORAL DAILY
Status: DISCONTINUED | OUTPATIENT
Start: 2020-07-24 | End: 2020-07-27 | Stop reason: HOSPADM

## 2020-07-24 RX ORDER — SODIUM CHLORIDE 9 MG/ML
100 INJECTION, SOLUTION INTRAVENOUS CONTINUOUS
Status: DISCONTINUED | OUTPATIENT
Start: 2020-07-24 | End: 2020-07-27 | Stop reason: HOSPADM

## 2020-07-24 RX ORDER — LANOLIN ALCOHOL/MO/W.PET/CERES
6 CREAM (GRAM) TOPICAL
Status: DISCONTINUED | OUTPATIENT
Start: 2020-07-24 | End: 2020-07-27 | Stop reason: HOSPADM

## 2020-07-24 RX ORDER — ACETAMINOPHEN 325 MG/1
975 TABLET ORAL EVERY 8 HOURS PRN
Status: DISCONTINUED | OUTPATIENT
Start: 2020-07-24 | End: 2020-07-27 | Stop reason: HOSPADM

## 2020-07-24 RX ORDER — ONDANSETRON 2 MG/ML
4 INJECTION INTRAMUSCULAR; INTRAVENOUS ONCE
Status: COMPLETED | OUTPATIENT
Start: 2020-07-24 | End: 2020-07-24

## 2020-07-24 RX ORDER — FERROUS SULFATE 325(65) MG
325 TABLET ORAL
Status: DISCONTINUED | OUTPATIENT
Start: 2020-07-25 | End: 2020-07-27 | Stop reason: HOSPADM

## 2020-07-24 RX ORDER — MELATONIN
1000
Status: DISCONTINUED | OUTPATIENT
Start: 2020-07-25 | End: 2020-07-27 | Stop reason: HOSPADM

## 2020-07-24 RX ORDER — ACETAMINOPHEN 325 MG/1
975 TABLET ORAL EVERY 8 HOURS PRN
Status: DISCONTINUED | OUTPATIENT
Start: 2020-07-24 | End: 2020-07-24

## 2020-07-24 RX ORDER — DOCUSATE SODIUM 100 MG/1
100 CAPSULE, LIQUID FILLED ORAL DAILY PRN
Status: DISCONTINUED | OUTPATIENT
Start: 2020-07-24 | End: 2020-07-27 | Stop reason: HOSPADM

## 2020-07-24 RX ORDER — OXYCODONE HYDROCHLORIDE 10 MG/1
10 TABLET ORAL EVERY 4 HOURS PRN
Status: DISCONTINUED | OUTPATIENT
Start: 2020-07-24 | End: 2020-07-27 | Stop reason: HOSPADM

## 2020-07-24 RX ORDER — HYDROMORPHONE HCL/PF 1 MG/ML
0.5 SYRINGE (ML) INJECTION EVERY 4 HOURS PRN
Status: DISCONTINUED | OUTPATIENT
Start: 2020-07-24 | End: 2020-07-27 | Stop reason: HOSPADM

## 2020-07-24 RX ORDER — INSULIN GLARGINE 100 [IU]/ML
8 INJECTION, SOLUTION SUBCUTANEOUS
Status: DISCONTINUED | OUTPATIENT
Start: 2020-07-24 | End: 2020-07-27 | Stop reason: HOSPADM

## 2020-07-24 RX ORDER — MORPHINE SULFATE 4 MG/ML
4 INJECTION, SOLUTION INTRAMUSCULAR; INTRAVENOUS ONCE
Status: COMPLETED | OUTPATIENT
Start: 2020-07-24 | End: 2020-07-24

## 2020-07-24 RX ADMIN — OXYCODONE HYDROCHLORIDE 10 MG: 10 TABLET ORAL at 18:10

## 2020-07-24 RX ADMIN — ONDANSETRON 4 MG: 2 INJECTION INTRAMUSCULAR; INTRAVENOUS at 11:11

## 2020-07-24 RX ADMIN — SODIUM CHLORIDE 75 ML/HR: 0.9 INJECTION, SOLUTION INTRAVENOUS at 15:11

## 2020-07-24 RX ADMIN — OXYCODONE HYDROCHLORIDE 10 MG: 10 TABLET ORAL at 22:00

## 2020-07-24 RX ADMIN — INSULIN GLARGINE 8 UNITS: 100 INJECTION, SOLUTION SUBCUTANEOUS at 21:58

## 2020-07-24 RX ADMIN — LOSARTAN POTASSIUM 25 MG: 25 TABLET, FILM COATED ORAL at 17:15

## 2020-07-24 RX ADMIN — CEFTRIAXONE SODIUM 1000 MG: 10 INJECTION, POWDER, FOR SOLUTION INTRAVENOUS at 10:50

## 2020-07-24 RX ADMIN — ACETAMINOPHEN 975 MG: 325 TABLET, FILM COATED ORAL at 22:46

## 2020-07-24 RX ADMIN — SODIUM CHLORIDE 1000 ML: 0.9 INJECTION, SOLUTION INTRAVENOUS at 10:50

## 2020-07-24 RX ADMIN — MORPHINE SULFATE 4 MG: 4 INJECTION INTRAVENOUS at 11:07

## 2020-07-24 RX ADMIN — ATORVASTATIN CALCIUM 10 MG: 10 TABLET, FILM COATED ORAL at 15:44

## 2020-07-24 NOTE — ASSESSMENT & PLAN NOTE
· Patient presented with nausea and vomiting, therefore unable to tolerate PO antibiotics  · Consider possible pyelonephritis  · Advanced diet as tolerated  · Continue supportive care with IVF and Zofran

## 2020-07-24 NOTE — ASSESSMENT & PLAN NOTE
· Patient with 1 episode dark brown/ black diarrhea which occurred after patient took laxatives last night  Of note he is chronically on iron for a long time  · Hemoglobin stable at 13    Will continue to trend hemoglobin overnight and heme check stool

## 2020-07-24 NOTE — ASSESSMENT & PLAN NOTE
· Abnormal UA/micro   Urine culture >100k GNR with final culture/sensitivity pending  · Continue IV ceftriaxone  · Noted leukocytosis (22 89 in ER on 7/23, down to 17 35K today on admission)  · No other sepsis criteria and CT scan negative for pyelo/stones; however, patient did clinically complain of left-sided back and flank pain and did present with chills and vomiting which may suggest early pyelonephritis

## 2020-07-24 NOTE — ED PROVIDER NOTES
History  Chief Complaint   Patient presents with    Vomiting     pt reports being placed on abx for UTI yesterday in ED  now has been vomiting overnight  told to come back to ER for admission and IV abx       History provided by:  Patient and spouse  Abdominal Pain   Pain location:  L flank and suprapubic  Pain quality: aching    Pain radiates to:  Does not radiate  Pain severity:  Moderate  Onset quality:  Gradual  Duration:  4 days  Timing:  Constant  Progression:  Worsening  Chronicity:  New  Context comment:  Diagnosed UTI here, discussed admission patient chose not to, overnight patient developed vomiting, return to ER, now agreeable to admission  Relieved by:  Nothing  Worsened by:  Nothing  Ineffective treatments: Oral Vantin  Associated symptoms: anorexia, nausea and vomiting    Associated symptoms: no chest pain, no chills, no constipation, no cough, no diarrhea, no dysuria, no fever, no shortness of breath and no sore throat    Associated symptoms comment:  One loose dark/black bowel movement this morning      Prior to Admission Medications   Prescriptions Last Dose Informant Patient Reported? Taking?    Biotin 1000 MCG tablet 7/23/2020 at Unknown time Self Yes Yes   Sig: Take 1 tablet by mouth daily in the early morning    Blood Glucose Monitoring Suppl (GLUCOCARD VITAL MONITOR) w/Device KIT 7/23/2020 at Unknown time Self No Yes   Sig: by Does not apply route 2 (two) times a day   Cholecalciferol (VITAMIN D3) 2000 units capsule 7/23/2020 at Unknown time Self Yes Yes   Sig: Take 1,000 Units by mouth daily in the early morning    Cyanocobalamin (VITAMIN B-12) 5000 MCG TBDP 7/23/2020 at Unknown time Self Yes Yes   Sig: Take 5,000 mcg by mouth once a week Takes on Mondays   Insulin Pen Needle 32G X 4 MM MISC 7/23/2020 at Unknown time Self No Yes   Sig: by Does not apply route daily   Lanc[facility] Stallion 2[facility] 7/23/2020 at Unknown time Self Yes Yes   Sig: [employer] One Touch Ultramini Meter; use as directed; 1; 0; 31-Oct-2016; 31-Oct-2016; Linda Sour;  Active   Mirabegron ER 25 MG TB24 7/23/2020 at Unknown time Self No Yes   Sig: TAKE ONE TABLET BY MOUTH EVERY DAY AT BEDTIME   Multiple Vitamin (MULTIVITAMIN) capsule 7/23/2020 at Unknown time Self Yes Yes   Sig: Take 1 capsule by mouth daily in the early morning    NOVOLOG MIX 70/30 FLEXPEN (70-30) 100 units/mL injection pen 7/23/2020 at Unknown time Self No Yes   Sig: INJECT 20 UNITS UNDER THE SKIN EVERY 12 HOURS   Patient taking differently: Inject 10 Units under the skin daily after breakfast    VICTOZA injection 7/23/2020 at Unknown time Self No Yes   Sig: INJECT 1 8MG UNDER THE SKIN ONCE DAILY   Patient taking differently: Inject 1 8 mg under the skin daily in the early morning    albuterol (PROAIR HFA) 90 mcg/act inhaler  Self Yes Yes   Sig: inhale 2 puff by inhalation route  every 4 - 6 hours as needed   atorvastatin (LIPITOR) 10 mg tablet 7/23/2020 at Unknown time Self No Yes   Sig: Take 1 tablet (10 mg total) by mouth daily   calcium carbonate (OS-GILDA) 600 MG tablet 7/23/2020 at Unknown time Self Yes Yes   Sig: Take 600 mg by mouth daily in the early morning    cefpodoxime (VANTIN) 200 mg tablet 7/23/2020 at Unknown time  No Yes   Sig: Take 1 tablet (200 mg total) by mouth 2 (two) times a day for 10 days   ferrous sulfate 324 (65 Fe) mg 7/23/2020 at Unknown time Self No Yes   Sig: Take 1 tablet (324 mg total) by mouth daily before breakfast   glipiZIDE (GLUCOTROL XL) 5 mg 24 hr tablet 7/23/2020 at Unknown time Self No Yes   Sig: Take 1 tablet (5 mg total) by mouth daily after breakfast   losartan (COZAAR) 25 mg tablet 7/23/2020 at Unknown time  No Yes   Sig: TAKE ONE TABLET BY MOUTH EVERY DAY   ondansetron (ZOFRAN-ODT) 4 mg disintegrating tablet 7/23/2020 at Unknown time  No Yes   Sig: Take 1 tablet (4 mg total) by mouth every 8 (eight) hours as needed for nausea or vomiting for up to 5 days      Facility-Administered Medications: None       Past Medical History:   Diagnosis Date    Anemia     Arthritis     Diabetes mellitus ([facility] [place] [address] )     IDDM    Diverticulosis     Enlarged prostate     Erectile dysfunction     Hypercholesteremia     Resolved post weight loss    Hypertension     Resolved post weight loss    Kidney stone     Obesity     Prostate cancer ([facility] [place] [address] [facility]     Wears partial dentures     partial upper and lower       Past Surgical History:   Procedure Laterality Date    ABDOMINAL ADHESION SURGERY N/A 11/28/2016    Procedure: EXTENSIVE LYSIS ADHESIONS;  Surgeon: Ariel Blair MD;  [facility];  Service:    Shashi Hem FRACTURE SURGERY Left     CHOLECYSTECTOMY      COLON SURGERY      colon resection    COLONOSCOPY      COLOSTOMY      COLOSTOMY CLOSURE      DIAGNOSTIC LAPAROSCOPY      GASTRIC BYPASS      failed due to adhesions    HERNIA REPAIR      abdominal    TX BIOPSY OF PROSTATE,NEEDLE/PUNCH N/A 5/8/2020    Procedure: TRANSRECTAL NEEDLE BIOPSY PROSTATE (TRNBP) WITH TRANSRECTAL ULTRASOUND GUIDANCE;  Surgeon: Bouchra Blake MD;  [facility];  Service: Urology    TX CYSTOSCOPY,DIR VIS INT URETHROTOMY N/A 5/8/2020    Procedure: DIRECT VISUAL INTERAL URETHROTOMY (DVIU), dilation of urethral stricture;  Surgeon: Bouchra Blake MD;  [facility];  Service: Urology    TX CYSTOURETHRO W/IMPLANT N/A 3/8/2019    Procedure: CYSTOSCOPY WITH INSERTION Kaiden Mendota;  Surgeon: Bouchra Blake MD;  [facility];  Service: Urology    TX CYSTOURETHRO W/IMPLANT N/A 5/8/2020    Procedure: CYSTOSCOPY WITH INSERTION Kaiden Mendota;  Surgeon: Bouchra Blake MD;  [facility];  Service: Urology    TX [address] N/A 10/25/2018    Procedure: LINEAR ENDOSCOPIC U/S;  Surgeon: Nino Guillermo MD;  [facility];   Service: Gastroenterology    TX ESOPHAGOGASTRODUODENOSCOPY TRANSORAL DIAGNOSTIC N/A 7/6/2016    Procedure: EGD AND COLONOSCOPY;  Surgeon: Teressa Ward MD; [facility]; Service: Gastroenterology    MA LAP, ANEESH RESTRICT PROC, LONGITUDINAL GASTRECTOMY N/A 2016    Procedure: GASTRECTOMY SLEEVE LAPAROSCOPIC;  Surgeon: Amanda Ordoñez MD;  Location: [facility];  Service: [address] [place] [address] BIOPSY  2020       Family History   Problem Relation Age of Onset    Heart disease Mother     Breast cancer Mother 80    Diabetes Father     Colon cancer Neg Hx     Liver disease Neg Hx      I have reviewed and agree with the history as documented  E-Cigarette/Vaping    E-Cigarette Use Never User      E-Cigarette/Vaping Substances    Nicotine No     THC No     CBD No     Flavoring No     Other No     Unknown No      Social History     Tobacco Use    Smoking status: Former Smoker     Last attempt to quit: 11/10/2001     Years since quittin 7    Smokeless tobacco: Never Used   Substance Use Topics    Alcohol use: No    Drug use: No       Review of Systems   Constitutional: Negative for activity change, chills, diaphoresis and fever  HENT: Negative for congestion, sinus pressure and sore throat  Eyes: Negative for pain and visual disturbance  Respiratory: Negative for cough, chest tightness, shortness of breath, wheezing and stridor  Cardiovascular: Negative for chest pain and palpitations  Gastrointestinal: Positive for abdominal pain, anorexia, nausea and vomiting  Negative for abdominal distention, constipation and diarrhea  Genitourinary: Negative for dysuria and frequency  Musculoskeletal: Negative for neck pain and neck stiffness  Skin: Negative for rash  Neurological: Negative for dizziness, speech difficulty, light-headedness, numbness and headaches  Physical Exam  Physical Exam   Constitutional: He is oriented to person, place, and time  He appears well-developed  No distress  HENT:   Head: Normocephalic and atraumatic  Eyes: Pupils are equal, round, and reactive to light  Neck: Normal range of motion  Neck supple  No tracheal deviation present  Cardiovascular: Normal rate, regular rhythm, normal heart sounds and intact distal pulses  No murmur heard  Pulmonary/Chest: Effort normal and breath sounds normal  No stridor  No respiratory distress  Abdominal: Soft  He exhibits no distension  There is tenderness (Suprapubic and bilateral flank/CVA)  There is no rebound and no guarding  Genitourinary:   Genitourinary Comments: Rectal vault mostly empty, trace dark stool, guaiac negative   Musculoskeletal: Normal range of motion  Neurological: He is alert and oriented to person, place, and time  Skin: Skin is warm and dry  He is not diaphoretic  No erythema  No pallor  Psychiatric: He has a normal mood and affect  Vitals reviewed        Vital Signs  ED Triage Vitals   Temperature Pulse Respirations Blood Pressure SpO2   07/24/20 1017 07/24/20 1017 07/24/20 1017 07/24/20 1017 07/24/20 1017   98 5 °F (36 9 °C) 87 18 135/66 97 %      Temp Source Heart Rate Source Patient Position - Orthostatic VS BP Location FiO2 (%)   07/24/20 1017 07/24/20 1017 07/24/20 1017 07/24/20 1017 --   Oral Monitor Lying Right arm       Pain Score       07/24/20 1107       Worst Possible Pain           Vitals:    07/24/20 1017   BP: 135/66   Pulse: 87   Patient Position - Orthostatic VS: Lying         Visual Acuity      ED Medications  Medications   ceftriaxone (ROCEPHIN) 1 g/50 mL in dextrose IVPB (1,000 mg Intravenous New Bag 7/24/20 1050)   sodium chloride 0 9 % bolus 1,000 mL (1,000 mL Intravenous New Bag 7/24/20 1050)   ondansetron (ZOFRAN) injection 4 mg (4 mg Intravenous Given 7/24/20 1111)   morphine (PF) 4 mg/mL injection 4 mg (4 mg Intravenous Given 7/24/20 1107)       Diagnostic Studies  Results Reviewed     Procedure Component Value Units Date/Time    CBC and differential [[id number]]  (Abnormal) Collected:  07/24/20 1057    Lab Status:  Final result Specimen:  Blood from Arm, Right Updated:  07/24/20 1144     WBC 17 35 Thousand/uL      RBC 4 33 Million/uL      Hemoglobin 13 6 g/dL      Hematocrit 40 5 %      MCV 94 fL      MCH 31 4 pg      MCHC 33 6 g/dL      RDW 12 9 %      MPV 12 5 fL      Platelets 606 Thousands/uL      nRBC 0 /100 WBCs      Neutrophils Relative 92 %      Immat GRANS % 1 %      Lymphocytes Relative 2 %      Monocytes Relative 5 %      Eosinophils Relative 0 %      Basophils Relative 0 %      Neutrophils Absolute 16 01 Thousands/µL      Immature Grans Absolute 0 10 Thousand/uL      Lymphocytes Absolute 0 28 Thousands/µL      Monocytes Absolute 0 92 Thousand/µL      Eosinophils Absolute 0 00 Thousand/µL      Basophils Absolute 0 04 Thousands/µL     Narrative: This is an appended report  These results have been appended to a previously verified report  UA w Reflex to Microscopic w Reflex to Culture [[id number]]  (Abnormal) Collected:  07/24/20 1126    Lab Status:  Final result Specimen:  Urine, Clean Catch Updated:  07/24/20 1139     Color, UA Orange     Clarity, UA Slightly Cloudy     Specific Gravity, UA --     pH, UA --     Leukocytes, UA Interference- unable to analyze     Nitrite, UA Interference- unable to analyze     Protein, UA       Interference- unable to analyze     mg/dl     Glucose, UA       Interference- unable to analyze     mg/dl     Ketones, UA       Interference- unable to analyze     mg/dl     Urobilinogen, UA       Interference-unable to analyze     E U /dl     Bilirubin, UA Interference- unable to analyze     Blood, UA Interference- unable to analyze    Urine Microscopic [[id number]] Collected:  07/24/20 1126    Lab Status:   In process Specimen:  Urine, Clean Catch Updated:  07/24/20 1138    Comprehensive metabolic panel [[id number]]  (Abnormal) Collected:  07/24/20 1057    Lab Status:  Final result Specimen:  Blood from Arm, Right Updated:  07/24/20 1127     Sodium 133 mmol/L      Potassium 3 9 mmol/L      Chloride 99 mmol/L      CO2 24 mmol/L ANION GAP 10 mmol/L      BUN 18 mg/dL      Creatinine 1 04 mg/dL      Glucose 209 mg/dL      Calcium 8 6 mg/dL      AST 19 U/L      ALT 32 U/L      Alkaline Phosphatase 92 U/L      Total Protein 7 2 g/dL      Albumin 3 0 g/dL      Total Bilirubin 0 79 mg/dL      eGFR 72 ml/min/1 73sq m     Narrative:       [place] guidelines for Chronic Kidney Disease (CKD):     Stage 1 with normal or high GFR (GFR > 90 mL/min/1 73 square meters)    Stage 2 Mild CKD (GFR = 60-89 mL/min/1 73 square meters)    Stage 3A Moderate CKD (GFR = 45-59 mL/min/1 73 square meters)    Stage 3B Moderate CKD (GFR = 30-44 mL/min/1 73 square meters)    Stage 4 Severe CKD (GFR = 15-29 mL/min/1 73 square meters)    Stage 5 End Stage CKD (GFR <15 mL/min/1 73 square meters)  Note: GFR calculation is accurate only with a steady state creatinine    Lactic acid [[id number]]  (Normal) Collected:  07/24/20 1057    Lab Status:  Final result Specimen:  Blood from Arm, Right Updated:  07/24/20 1127     LACTIC ACID 1 4 mmol/L     Narrative:       Result may be elevated if tourniquet was used during collection  Protime-INR [[id number]]  (Abnormal) Collected:  07/24/20 1057    Lab Status:  Final result Specimen:  Blood from Arm, Right Updated:  07/24/20 1121     Protime 16 9 seconds      INR 1 44    APTT [[id number]]  (Normal) Collected:  07/24/20 1057    Lab Status:  Final result Specimen:  Blood from Arm, Right Updated:  07/24/20 1121     PTT 35 seconds     Blood culture #1 [[id number]] Collected:  07/24/20 1059    Lab Status: In process Specimen:  Blood from Hand, Right Updated:  07/24/20 1109    Blood culture #2 [[id number]] Collected:  07/24/20 1057    Lab Status:   In process Specimen:  Blood from Arm, Right Updated:  07/24/20 1109                 No orders to display              Procedures  Procedures         ED Course                           Initial Sepsis Screening     [address] Name 07/24/20 1211                Is the patient's history suggestive of a new or worsening infection? (!) Yes (Proceed)  -DA        Suspected source of infection  urinary tract infection  -DA        Are two or more of the following signs & symptoms of infection both present and new to the patient? No  -DA        Indicate SIRS criteria  Leukocytosis (WBC > 347[address] IJL)  -DA        If the answer is yes to both questions, suspicion of sepsis is present          If severe sepsis is present AND tissue hypoperfusion perists in the hour after fluid resuscitation or lactate > 4, the patient meets criteria for SEPTIC SHOCK          Are any of the following organ dysfunction criteria present within 6 hours of suspected infection and SIRS criteria that are NOT considered to be chronic conditions?         Organ dysfunction          Date of presentation of severe sepsis          Time of presentation of severe sepsis          Tissue hypoperfusion persists in the hour after crystalloid fluid administration, evidenced, by either:          Was hypotension present within one hour of the conclusion of crystalloid fluid administration?           Date of presentation of septic shock          Time of presentation of septic shock            User Key  (r) = Recorded By, (t) = Taken By, (c) = Cosigned By    Initials Name Provider Type    WENDY Hoang DO Physician                        MDM  Number of Diagnoses or Management Options  Nausea and vomiting, intractability of vomiting not specified, unspecified vomiting type: new and requires workup  UTI (urinary tract infection): new and requires workup  Diagnosis management comments:       Initial ED assessment:  66-year-old male, returns due to vomiting was discharged on Vantin for a UTI    Initial DDx includes but is not limited to:   UTI, possibly pyelonephritis as he has CVA tenderness    Initial ED plan:   As patient's vomiting and failed outpatient antibiotics and cannot tolerate outpatient antibiotics, will admit to hospital for IV antibiotics for UTI        Final ED summary/disposition:   After evaluation and workup in the emergency department, admission to hospital, started on Ceftriaxone          Amount and/or Complexity of Data Reviewed  Clinical lab tests: ordered and reviewed  Decide to obtain previous medical records or to obtain history from someone other than the patient: yes  Obtain history from someone other than the patient: yes  Review and summarize past medical records: yes  Discuss the patient with other providers: yes  Independent visualization of images, tracings, or specimens: yes          Disposition  Final diagnoses:   UTI (urinary tract infection)   Nausea and vomiting, intractability of vomiting not specified, unspecified vomiting type     Time reflects when diagnosis was documented in both MDM as applicable and the Disposition within this note     Time User Action Codes Description Comment    7/24/2020 12:11 PM Iveth Tate [N39 0] UTI (urinary tract infection)     7/24/2020 12:11 PM Tammie Gil, [facility] [R11 2] Nausea and vomiting, intractability of vomiting not specified, unspecified vomiting type       ED Disposition     ED Disposition Condition Date/Time Comment    Admit Stable Fri Jul 24, 2020 12:10 PM Case was discussed with Dr Lamonte Prader and the patient's admission status was agreed to be Admission Status: inpatient status to the service of Dr Justice Desai   Follow-up Information    None         Patient's Medications   Discharge Prescriptions    No medications on file     No discharge procedures on file      PDMP Review     None          ED Provider  Electronically Signed by           Hans Bartholomew DO  07/24/20 7221

## 2020-07-24 NOTE — H&P
H&P- Itzel Corona 1950, 79 y o  male MRN: [id number]    Unit/Bed#: S -01 Encounter: [id number]    Primary Care Provider: Alana Berman MD   Date and time admitted to hospital: 7/24/2020 10:12 AM  * Acute cystitis  Assessment & Plan  · Abnormal UA/micro  Urine culture >100k GNR with final culture/sensitivity pending  · Continue IV ceftriaxone  · Noted leukocytosis (22 89 in ER on 7/23, down to 17 35K today on admission)  · No other sepsis criteria and CT scan negative for pyelo/stones; however, patient did clinically complain of left-sided back and flank pain and did present with chills and vomiting which may suggest early pyelonephritis    Nausea and vomiting  Assessment & Plan  · Patient presented with nausea and vomiting, therefore unable to tolerate PO antibiotics  · Consider possible pyelonephritis  · Advanced diet as tolerated  · Continue supportive care with IVF and Zofran    Type 2 diabetes mellitus with insulin therapy [facility])  Assessment & Plan  Lab Results   Component Value Date    HGBA1C 6 9 (H) 01/13/2020       No results for input(s): POCGLU in the last 72 hours  Blood Sugar Average: Last 72 hrs:  overdue to repeat A1c, will check in a m  Hold home regimen which is glipizide 5 mg daily, Victoza, and NovoLog 70/30 with sliding scale up to 15 units once per day  Patient reporting decreased oral intake  For now would only utilize Lantus and sliding scale coverage during hospital stay    Prostate cancer [facility])  Assessment & Plan  · Recently diagnosed prostate ca denise 9  Last seen by urology 7/8/20  · Recently started Lupron    Benign essential hypertension  Assessment & Plan  · Cozaar with hold parameters    Black stool  Assessment & Plan  · Patient with 1 episode dark brown/ black diarrhea which occurred after patient took laxatives last night  Of note he is chronically on iron for a long time  · Hemoglobin stable at 13    Will continue to trend hemoglobin overnight and heme check stool      VTE Prophylaxis:  Hold Lovenox due to black stool  Will order SCDs  Code Status: full code per discussion with the patient  POLST: There is no POLST form on file for this patient (pre-hospital)  Discussion with family:     Anticipated Length of Stay:  Patient will be admitted on an Inpatient basis with an anticipated length of stay of  greater than 2 midnights  Justification for Hospital Stay:  Failed outpatient antibiotics, possible pyelonephritis, possible melena  Patient may require an additional 48 hours of hospitalization  However, if he clinically improves, consider discharge tomorrow afternoon    Total Time for Visit, including Counseling / Coordination of Care: 45 minutes  Greater than 50% of this total time spent on direct patient counseling and coordination of care  Chief Complaint:   Nausea and vomiting    History of Present Illness:    Curtis Smart is a 79 y o  male who presents with nausea and vomiting  Patient was seen in the ED yesterday for complaints of left lower abdomen and flank pain and was sent home on Vantin for UTI with obviously abnormal UA and micro noted in the ER  Due to new nausea and vomiting which began overnight, he came back into the ER for evaluation  He was unable to take his oral antibiotics or tolerate anything by mouth due to this  Patient also tells me he was constipated and took 2 Dulcolax which then resulted in watery diarrhea at 5:00 a m  which was very dark colored in nature, almost black  Patient did not notice any red blood and denies any previous history of GI bleeding  He does not take any NSAIDs or other blood thinners  He has history of gastric bypass and reports last EGD probably was approximately 2 years ago  He reports his last episode of vomiting was approximately 12 hours ago but he still does not have any appetite  He reported chills overnight but no documented fever    He is not otherwise reporting any epigastric pain, but continues to have some mild suprapubic pain and also admitted to hematuria  Review of Systems:    Review of Systems   Constitutional: Positive for appetite change and chills  Negative for diaphoresis, fatigue, fever and unexpected weight change  HENT: Negative for trouble swallowing  Respiratory: Negative for shortness of breath  Cardiovascular: Negative for chest pain, palpitations and leg swelling  Gastrointestinal: Positive for abdominal pain, constipation, diarrhea, nausea and vomiting  Negative for abdominal distention, anal bleeding and blood in stool  Genitourinary: Positive for flank pain and hematuria  Musculoskeletal: Positive for back pain (Left flank)  Negative for arthralgias, gait problem and joint swelling  Skin: Negative for color change, pallor, rash and wound  Hematological: Bruises/bleeds easily  Psychiatric/Behavioral: Negative for confusion         Past Medical and Surgical History:     Past Medical History:   Diagnosis Date    Anemia     Arthritis     Diabetes mellitus ([facility] )     IDDM    Diverticulosis     Enlarged prostate     Erectile dysfunction     Hypercholesteremia     Resolved post weight loss    Hypertension     Resolved post weight loss    Kidney stone     Obesity     Prostate cancer ([facility] )     Wears partial dentures     partial upper and lower       Past Surgical History:   Procedure Laterality Date    ABDOMINAL ADHESION SURGERY N/A 11/28/2016    Procedure: EXTENSIVE LYSIS ADHESIONS;  Surgeon: Scooter Treviño MD;  [facility];  Service:    Bing Halter FRACTURE SURGERY Left     CHOLECYSTECTOMY      COLON SURGERY      colon resection    COLONOSCOPY      COLOSTOMY      COLOSTOMY CLOSURE      DIAGNOSTIC LAPAROSCOPY      GASTRIC BYPASS      failed due to adhesions    HERNIA REPAIR      abdominal    CO BIOPSY OF PROSTATE,NEEDLE/PUNCH N/A 5/8/2020    Procedure: TRANSRECTAL NEEDLE BIOPSY PROSTATE (TRNBP) WITH TRANSRECTAL ULTRASOUND GUIDANCE;  Surgeon: Brayan Cintron MD;  [facility];  Service: Urology    MN CYSTOSCOPY,DIR VIS INT URETHROTOMY N/A 5/8/2020    Procedure: DIRECT VISUAL INTERAL URETHROTOMY (DVIU), dilation of urethral stricture;  Surgeon: Brayan Cintron MD;  [facility];  Service: Urology    MN CYSTOURETHRO W/IMPLANT N/A 3/8/2019    Procedure: CYSTOSCOPY WITH INSERTION Anne Daunt;  Surgeon: Brayan Cintron MD;  [facility];  Service: Urology    MN CYSTOURETHRO W/IMPLANT N/A 5/8/2020    Procedure: CYSTOSCOPY WITH INSERTION Anne Daunt;  Surgeon: Brayan Cintron MD;  [facility];  Service: Urology    MN [address] DUODENUM/JEJUNUM N/A 10/25/2018    Procedure: LINEAR ENDOSCOPIC U/S;  Surgeon: Patric Fountain MD;  [facility]; Service: Gastroenterology    MN ESOPHAGOGASTRODUODENOSCOPY TRANSORAL DIAGNOSTIC N/A 7/6/2016    Procedure: EGD AND COLONOSCOPY;  Surgeon: Maggi Belcher MD;  [facility]; Service: Gastroenterology    MN LAP, ANEESH RESTRICT PROC, LONGITUDINAL GASTRECTOMY N/A 11/28/2016    Procedure: GASTRECTOMY SLEEVE LAPAROSCOPIC;  Surgeon: Atif Turk MD;  [facility];  Service: Bariatrics    TONSILLECTOMY      US GUIDED PROSTATE BIOPSY  5/8/2020       Meds/Allergies:    Prior to Admission medications    Medication Sig Start Date End Date Taking?  Authorizing Provider   albuterol (PROAIR HFA) 90 mcg/act inhaler inhale 2 puff by inhalation route  every 4 - 6 hours as needed 11/13/15  Yes Historical Provider, MD   atorvastatin (LIPITOR) 10 mg tablet Take 1 tablet (10 mg total) by mouth daily 1/15/20  Yes Rutherford Goodpasture, MD   Biotin 1000 MCG tablet Take 1 tablet by mouth daily in the early morning    Yes Historical Provider, MD   Blood Glucose Monitoring Suppl ([address] [place] [address]) w/Device KIT by Does not apply route 2 (two) times a day 5/14/19  Yes Rutherford Goodpasture, MD   calcium carbonate (OS-GILDA) 600 MG tablet Take 600 mg by mouth daily in the early morning    Yes Historical Provider, MD   cefpodoxime (VANTIN) 200 mg tablet Take 1 tablet (200 mg total) by mouth 2 (two) times a day for 10 days 7/23/20 8/2/20 Yes Flaco Asencio MD   Cholecalciferol (VITAMIN D3) 2000 units capsule Take 1,000 Units by mouth daily in the early morning    Yes Historical Provider, MD   Cyanocobalamin (VITAMIN B-12) 5000 MCG TBDP Take 5,000 mcg by mouth once a week Takes on Mondays   Yes Historical Provider, MD   ferrous sulfate 324 (65 Fe) mg Take 1 tablet (324 mg total) by mouth daily before breakfast 9/10/19  Yes Melida Duran MD   glipiZIDE (GLUCOTROL XL) 5 mg 24 hr tablet Take 1 tablet (5 mg total) by mouth daily after breakfast 2/24/20  Yes Melida Duran MD   Insulin Pen Needle 32G X 4 MM MISC by Does not apply route daily 1/14/20  Yes Amy Duke MD   Lancets (LIFESCAN UNISTIK 2) MISC Lifescan One Touch Ultramini Meter; use as directed; 1; 0; 31-Oct-2016; 31-Oct-2016; Colleen Jose;  Active 10/31/16  Yes Historical Provider, MD   losartan (COZAAR) 25 mg tablet TAKE ONE TABLET BY MOUTH EVERY DAY 7/20/20  Yes Melida Duran MD   Mirabegron ER 25 MG TB24 TAKE ONE TABLET BY MOUTH EVERY DAY AT BEDTIME 2/14/20  Yes Saira Saldaña PA-C   Multiple Vitamin (MULTIVITAMIN) capsule Take 1 capsule by mouth daily in the early morning    Yes Historical Provider, MD   NOVOLOG MIX 70/30 FLEXPEN (70-30) 100 units/mL injection pen INJECT 20 UNITS UNDER THE SKIN EVERY 12 HOURS  Patient taking differently: Inject 10 Units under the skin daily after breakfast  6/30/19 Up to 15 units once a day Yes Melida Duran MD   ondansetron (ZOFRAN-ODT) 4 mg disintegrating tablet Take 1 tablet (4 mg total) by mouth every 8 (eight) hours as needed for nausea or vomiting for up to 5 days 7/23/20 7/28/20 Yes Flaco Asencio MD   VICTOZA injection INJECT 1 8MG UNDER THE SKIN ONCE DAILY  Patient taking differently: Inject 1 8 mg under the skin daily in the early morning  3/4/20  Yes Amy Duke MD I have reviewed home medications with patient personally  Allergies: Allergies   Allergen Reactions    Enalapril Anaphylaxis and Throat Swelling       Social History:     Marital Status: Single   Occupation:   Patient Pre-hospital Living Situation:  Lives with girlfriend  Patient Pre-hospital Level of Mobility:  No restrictions  Patient Pre-hospital Diet Restrictions:  No restrictions  Substance Use History:   Social History     Substance and Sexual Activity   Alcohol Use No   No alcohol use  Social History     Tobacco Use   Smoking Status Former Smoker    Last attempt to quit: 11/10/2001    Years since quittin 7   Smokeless Tobacco Never Used   No tobacco use  Social History     Substance and Sexual Activity   Drug Use No       Family History:    non-contributory    Physical Exam:     Vitals:   Blood Pressure: 114/60 (20 1320)  Pulse: 85 (20 1320)  Temperature: 98 4 °F (36 9 °C) (20 1320)  Temp Source: Oral (20 1320)  Respirations: 18 (20 1320)  Weight - Scale: 103 kg (226 lb 3 1 oz) (20 1017)  SpO2: 96 % (20 1320)    Physical Exam   Constitutional: He appears well-developed and well-nourished  Non-toxic appearance  No distress  HENT:   Head: Normocephalic and atraumatic  Mouth/Throat: Oropharynx is clear and moist  No oropharyngeal exudate  Eyes: Conjunctivae are normal  Right eye exhibits no discharge  Left eye exhibits no discharge  No scleral icterus  Cardiovascular: Normal rate, regular rhythm and normal heart sounds  Exam reveals no gallop and no friction rub  No murmur heard  Pulmonary/Chest: Effort normal and breath sounds normal  No stridor  No respiratory distress  He has no wheezes  He has no rales  Abdominal: Soft  He exhibits no distension and no mass  There is no tenderness  There is no guarding  Soft obese abdomen    Describes mild soreness to palpation but no focal tenderness   Musculoskeletal: He exhibits no edema, tenderness or deformity  Neurological: He is alert  Awake alert interactive, pleasant and cooperative, no confusion   Skin: Skin is warm and dry  He is not diaphoretic  No erythema  No pallor  Patient with some areas of ecchymosis on his arm noted   Psychiatric: He has a normal mood and affect  His behavior is normal  Judgment and thought content normal    Vitals reviewed  Additional Data:     Lab Results: I have personally reviewed pertinent reports  Results from last 7 days   Lab Units 07/24/20  1057   WBC Thousand/uL 17 35*   HEMOGLOBIN g/dL 13 6   HEMATOCRIT % 40 5   PLATELETS Thousands/uL 118*   NEUTROS PCT % 92*   LYMPHS PCT % 2*   MONOS PCT % 5   EOS PCT % 0     Results from last 7 days   Lab Units 07/24/20  1057   SODIUM mmol/L 133*   POTASSIUM mmol/L 3 9   CHLORIDE mmol/L 99*   CO2 mmol/L 24   BUN mg/dL 18   CREATININE mg/dL 1 04   ANION GAP mmol/L 10   CALCIUM mg/dL 8 6   ALBUMIN g/dL 3 0*   TOTAL BILIRUBIN mg/dL 0 79   ALK PHOS U/L 92   ALT U/L 32   AST U/L 19   GLUCOSE RANDOM mg/dL 209*     Results from last 7 days   Lab Units 07/24/20  1057   INR  1 44*             Results from last 7 days   Lab Units 07/24/20  1057   LACTIC ACID mmol/L 1 4       Imaging: I have personally reviewed pertinent reports  No orders to display       EKG, Pathology, and Other Studies Reviewed on Admission:   · EKG:     Allscripts / Epic Records Reviewed: Yes     ** Please Note: This note has been constructed using a voice recognition system   **

## 2020-07-24 NOTE — PLAN OF CARE
Problem: GASTROINTESTINAL - ADULT  Goal: Minimal or absence of nausea and/or vomiting  Description  INTERVENTIONS:  - Administer IV fluids if ordered to ensure adequate hydration  - Maintain NPO status until nausea and vomiting are resolved  - Nasogastric tube if ordered  - Administer ordered antiemetic medications as needed  - Provide nonpharmacologic comfort measures as appropriate  - Advance diet as tolerated, if ordered  - Consider nutrition services referral to assist patient with adequate nutrition and appropriate food choices  Outcome: Progressing  Goal: Maintains or returns to baseline bowel function  Description  INTERVENTIONS:  - Assess bowel function  - Encourage oral fluids to ensure adequate hydration  - Administer IV fluids if ordered to ensure adequate hydration  - Administer ordered medications as needed  - Encourage mobilization and activity  - Consider nutritional services referral to assist patient with adequate nutrition and appropriate food choices  Outcome: Progressing  Goal: Maintains adequate nutritional intake  Description  INTERVENTIONS:  - Monitor percentage of each meal consumed  - Identify factors contributing to decreased intake, treat as appropriate  - Assist with meals as needed  - Monitor I&O, weight, and lab values if indicated  - Obtain nutrition services referral as needed  Outcome: Progressing     Problem: GENITOURINARY - ADULT  Goal: Maintains or returns to baseline urinary function  Description  INTERVENTIONS:  - Assess urinary function  - Encourage oral fluids to ensure adequate hydration if ordered  - Administer IV fluids as ordered to ensure adequate hydration  - Administer ordered medications as needed  - Offer frequent toileting  - Follow urinary retention protocol if ordered  Outcome: Progressing  Goal: Absence of urinary retention  Description  INTERVENTIONS:  - Assess patients ability to void and empty bladder  - Monitor I/O  - Bladder scan as needed  - Discuss with physician/AP medications to alleviate retention as needed  - Discuss catheterization for long term situations as appropriate  Outcome: Progressing     Problem: METABOLIC, FLUID AND ELECTROLYTES - ADULT  Goal: Electrolytes maintained within normal limits  Description  INTERVENTIONS:  - Monitor labs and assess patient for signs and symptoms of electrolyte imbalances  - Administer electrolyte replacement as ordered  - Monitor response to electrolyte replacements, including repeat lab results as appropriate  - Instruct patient on fluid and nutrition as appropriate  Outcome: Progressing  Goal: Glucose maintained within target range  Description  INTERVENTIONS:  - Monitor Blood Glucose as ordered  - Assess for signs and symptoms of hyperglycemia and hypoglycemia  - Administer ordered medications to maintain glucose within target range  - Assess nutritional intake and initiate nutrition service referral as needed  Outcome: Progressing

## 2020-07-24 NOTE — ASSESSMENT & PLAN NOTE
Lab Results   Component Value Date    HGBA1C 6 9 (H) 01/13/2020       No results for input(s): POCGLU in the last 72 hours  Blood Sugar Average: Last 72 hrs:  overdue to repeat A1c, will check in a m  Hold home regimen which is glipizide 5 mg daily, Victoza, and NovoLog 70/30 with sliding scale up to 15 units once per day  Patient reporting decreased oral intake    For now would only utilize Lantus and sliding scale coverage during hospital stay

## 2020-07-24 NOTE — ASSESSMENT & PLAN NOTE
Pt called we scheduled her for 8/14 to go over echo results.   She would like to speak with someone before because she is nervous about the results please call thanks · Cozaar with hold parameters

## 2020-07-25 PROBLEM — E87.1 HYPONATREMIA: Status: ACTIVE | Noted: 2020-07-25

## 2020-07-25 LAB
ANION GAP SERPL CALCULATED.3IONS-SCNC: 9 MMOL/L (ref 4–13)
BACTERIA UR CULT: ABNORMAL
BACTERIA UR CULT: ABNORMAL
BACTERIA UR CULT: NORMAL
BASOPHILS # BLD AUTO: 0.05 THOUSANDS/ΜL (ref 0–0.1)
BASOPHILS NFR BLD AUTO: 1 % (ref 0–1)
BUN SERPL-MCNC: 19 MG/DL (ref 5–25)
CALCIUM SERPL-MCNC: 8.2 MG/DL (ref 8.3–10.1)
CHLORIDE SERPL-SCNC: 101 MMOL/L (ref 100–108)
CO2 SERPL-SCNC: 22 MMOL/L (ref 21–32)
CREAT SERPL-MCNC: 1.06 MG/DL (ref 0.6–1.3)
EOSINOPHIL # BLD AUTO: 0.01 THOUSAND/ΜL (ref 0–0.61)
EOSINOPHIL NFR BLD AUTO: 0 % (ref 0–6)
ERYTHROCYTE [DISTWIDTH] IN BLOOD BY AUTOMATED COUNT: 12.9 % (ref 11.6–15.1)
GFR SERPL CREATININE-BSD FRML MDRD: 71 ML/MIN/1.73SQ M
GLUCOSE SERPL-MCNC: 135 MG/DL (ref 65–140)
GLUCOSE SERPL-MCNC: 208 MG/DL (ref 65–140)
GLUCOSE SERPL-MCNC: 237 MG/DL (ref 65–140)
GLUCOSE SERPL-MCNC: 275 MG/DL (ref 65–140)
GLUCOSE SERPL-MCNC: 276 MG/DL (ref 65–140)
HCT VFR BLD AUTO: 43.9 % (ref 36.5–49.3)
HEMOCCULT SP1 STL QL: NEGATIVE
HGB BLD-MCNC: 13.7 G/DL (ref 12–17)
IMM GRANULOCYTES # BLD AUTO: 0.07 THOUSAND/UL (ref 0–0.2)
IMM GRANULOCYTES NFR BLD AUTO: 1 % (ref 0–2)
LYMPHOCYTES # BLD AUTO: 0.48 THOUSANDS/ΜL (ref 0.6–4.47)
LYMPHOCYTES NFR BLD AUTO: 8 % (ref 14–44)
MCH RBC QN AUTO: 30.9 PG (ref 26.8–34.3)
MCHC RBC AUTO-ENTMCNC: 31.2 G/DL (ref 31.4–37.4)
MCV RBC AUTO: 99 FL (ref 82–98)
MONOCYTES # BLD AUTO: 0.69 THOUSAND/ΜL (ref 0.17–1.22)
MONOCYTES NFR BLD AUTO: 12 % (ref 4–12)
NEUTROPHILS # BLD AUTO: 4.63 THOUSANDS/ΜL (ref 1.85–7.62)
NEUTS SEG NFR BLD AUTO: 78 % (ref 43–75)
NRBC BLD AUTO-RTO: 0 /100 WBCS
PLATELET # BLD AUTO: 98 THOUSANDS/UL (ref 149–390)
PMV BLD AUTO: 12.3 FL (ref 8.9–12.7)
POTASSIUM SERPL-SCNC: 3.9 MMOL/L (ref 3.5–5.3)
RBC # BLD AUTO: 4.44 MILLION/UL (ref 3.88–5.62)
SODIUM SERPL-SCNC: 132 MMOL/L (ref 136–145)
WBC # BLD AUTO: 5.93 THOUSAND/UL (ref 4.31–10.16)

## 2020-07-25 PROCEDURE — 85025 COMPLETE CBC W/AUTO DIFF WBC: CPT | Performed by: PHYSICIAN ASSISTANT

## 2020-07-25 PROCEDURE — 82270 OCCULT BLOOD FECES: CPT | Performed by: PHYSICIAN ASSISTANT

## 2020-07-25 PROCEDURE — 99232 SBSQ HOSP IP/OBS MODERATE 35: CPT | Performed by: INTERNAL MEDICINE

## 2020-07-25 PROCEDURE — 82948 REAGENT STRIP/BLOOD GLUCOSE: CPT

## 2020-07-25 PROCEDURE — 80048 BASIC METABOLIC PNL TOTAL CA: CPT | Performed by: PHYSICIAN ASSISTANT

## 2020-07-25 RX ORDER — LACTULOSE 20 G/30ML
20 SOLUTION ORAL ONCE
Status: COMPLETED | OUTPATIENT
Start: 2020-07-25 | End: 2020-07-25

## 2020-07-25 RX ORDER — POLYETHYLENE GLYCOL 3350 17 G/17G
17 POWDER, FOR SOLUTION ORAL DAILY
Status: DISCONTINUED | OUTPATIENT
Start: 2020-07-25 | End: 2020-07-27 | Stop reason: HOSPADM

## 2020-07-25 RX ORDER — SODIUM CHLORIDE 9 MG/ML
100 INJECTION, SOLUTION INTRAVENOUS CONTINUOUS
Status: DISCONTINUED | OUTPATIENT
Start: 2020-07-25 | End: 2020-07-25

## 2020-07-25 RX ADMIN — ATORVASTATIN CALCIUM 10 MG: 10 TABLET, FILM COATED ORAL at 09:21

## 2020-07-25 RX ADMIN — FERROUS SULFATE TAB 325 MG (65 MG ELEMENTAL FE) 325 MG: 325 (65 FE) TAB at 09:21

## 2020-07-25 RX ADMIN — LACTULOSE 20 G: 10 SOLUTION ORAL at 10:04

## 2020-07-25 RX ADMIN — POLYETHYLENE GLYCOL 3350 17 G: 17 POWDER, FOR SOLUTION ORAL at 10:04

## 2020-07-25 RX ADMIN — SODIUM CHLORIDE 100 ML/HR: 0.9 INJECTION, SOLUTION INTRAVENOUS at 18:05

## 2020-07-25 RX ADMIN — OXYCODONE HYDROCHLORIDE 10 MG: 10 TABLET ORAL at 14:36

## 2020-07-25 RX ADMIN — CEFTRIAXONE SODIUM 1000 MG: 10 INJECTION, POWDER, FOR SOLUTION INTRAVENOUS at 14:09

## 2020-07-25 RX ADMIN — INSULIN LISPRO 3 UNITS: 100 INJECTION, SOLUTION INTRAVENOUS; SUBCUTANEOUS at 11:37

## 2020-07-25 RX ADMIN — CEFTRIAXONE SODIUM 1000 MG: 10 INJECTION, POWDER, FOR SOLUTION INTRAVENOUS at 09:21

## 2020-07-25 RX ADMIN — DOCUSATE SODIUM 100 MG: 100 CAPSULE, LIQUID FILLED ORAL at 09:24

## 2020-07-25 RX ADMIN — SODIUM CHLORIDE 100 ML/HR: 0.9 INJECTION, SOLUTION INTRAVENOUS at 23:49

## 2020-07-25 RX ADMIN — INSULIN LISPRO 2 UNITS: 100 INJECTION, SOLUTION INTRAVENOUS; SUBCUTANEOUS at 17:00

## 2020-07-25 RX ADMIN — LOSARTAN POTASSIUM 25 MG: 25 TABLET, FILM COATED ORAL at 09:21

## 2020-07-25 RX ADMIN — INSULIN GLARGINE 8 UNITS: 100 INJECTION, SOLUTION SUBCUTANEOUS at 21:22

## 2020-07-25 RX ADMIN — MELATONIN 1000 UNITS: at 05:34

## 2020-07-25 RX ADMIN — INSULIN LISPRO 1 UNITS: 100 INJECTION, SOLUTION INTRAVENOUS; SUBCUTANEOUS at 09:21

## 2020-07-25 RX ADMIN — OXYCODONE HYDROCHLORIDE 10 MG: 10 TABLET ORAL at 21:22

## 2020-07-25 NOTE — ASSESSMENT & PLAN NOTE
Abnormal UA/micro   Urine culture >100k GNR with final culture/sensitivity pending  · Continue IV ceftriaxone  · Noted leukocytosis (22 89 in ER on 7/23, down to 17 35K on admission and resolved today)  · No other sepsis criteria and CT scan negative for pyelo/stones; however, patient did clinically complain of left-sided back and flank pain and did present with chills and vomiting which may suggest early pyelonephritis

## 2020-07-25 NOTE — ASSESSMENT & PLAN NOTE
Patient presented with nausea and vomiting, therefore was unable to tolerate PO antibiotics  · Consider possible pyelonephritis  · Advanced diet as tolerated  · Continue supportive care with IVF and Zofran

## 2020-07-25 NOTE — ASSESSMENT & PLAN NOTE
· Patient with 1 episode dark brown/ black diarrhea which occurred after patient took laxatives on Thursday 7/23   Of note he is chronically on iron for a long time  · Hemoglobin stable at 13  · Check occult stool if able to go

## 2020-07-25 NOTE — ASSESSMENT & PLAN NOTE
Lab Results   Component Value Date    HGBA1C 7 3 (H) 07/24/2020       Recent Labs     07/24/20  1600 07/24/20  2110 07/25/20  0805   POCGLU 135 132 208*       Blood Sugar Average: Last 72 hrs:  (P) 158 [id number]   A1C 7 3  Hold home regimen which is glipizide 5 mg daily, Victoza, and NovoLog 70/30 with sliding scale up to 15 units once per day  Patient reporting decreased oral intake    For now would only utilize Lantus and sliding scale coverage during hospital stay

## 2020-07-25 NOTE — UTILIZATION REVIEW
Initial Clinical Review    Admission: Date/Time/Statement: Admission Orders (From admission, onward)     Ordered        07/24/20 1211  Inpatient Admission (expected length of stay for this patient Order details is greater than two midnights)  Once                   Orders Placed This Encounter   Procedures    Inpatient Admission (expected length of stay for this patient Order details is greater than two midnights)     Standing Status:   Standing     Number of Occurrences:   1     Order Specific Question:   Admitting Physician     Answer:   Chino Hernandez [[id number]]     Order Specific Question:   Level of Care     Answer:   Med Surg [16]     Order Specific Question:   Estimated length of stay     Answer:   More than 2 Midnights     Order Specific Question:   Certification     Answer:   I certify that inpatient services are medically necessary for this patient for a duration of greater than two midnights  See H&P and MD Progress Notes for additional information about the patient's course of treatment  ED Arrival Information     Expected Arrival Acuity Means of Arrival Escorted By Service Admission Type    - 7/24/2020 10:04 Urgent Walk-In Family Member General Medicine Urgent    Arrival Complaint    black stool,back pain        Chief Complaint   Patient presents with    Vomiting     pt reports being placed on abx for UTI yesterday in ED  now has been vomiting overnight  told to come back to ER for admission and IV abx     Assessment/Plan: 79year old male, presented to the ED @ [address] [place] [address], from home via walk in  Admitted as Inpatient due to Acute Cystitis  Patient was seen in the ED yesterday for complaints of left lower abdomen and flank pain and was sent home on Vantin for UTI with obviously abnormal UA and micro noted in the ER  Due to new nausea and vomiting which began overnight, he came back into the ER for evaluation  IV abx    Patient was seen in the ED yesterday for complaints of left lower abdomen and flank pain and was sent home on Vantin for UTI with obviously abnormal UA and micro noted in the ER  Due to new nausea and vomiting which began overnight, he came back into the ER for evaluation  IV flds  Zofran PRN  ED Triage Vitals   Temperature Pulse Respirations Blood Pressure SpO2   07/24/20 1017 07/24/20 1017 07/24/20 1017 07/24/20 1017 07/24/20 1017   98 5 °F (36 9 °C) 87 18 135/66 97 %      Temp Source Heart Rate Source Patient Position - Orthostatic VS BP Location FiO2 (%)   07/24/20 1017 07/24/20 1017 07/24/20 1017 07/24/20 1017 --   Oral Monitor Lying Right arm       Pain Score       07/24/20 1107       Worst Possible Pain        Wt Readings from Last 1 Encounters:   07/24/20 103 kg (226 lb 3 1 oz)     Additional Vital Signs:   Date/Time  Temp  Pulse  Resp  BP  MAP (mmHg)  SpO2  O2 Device  Patient Position - Orthostatic VS   07/25/20 1429  98 4 °F (36 9 °C)  70  18  131/59  85  95 %  None (Room air)  Lying   07/25/20 0700  99 2 °F (37 3 °C)  76  18  108/52  75  91 %  None (Room air)  Lying   07/24/20 2325  98 5 °F (36 9 °C)                 07/24/20 2208  100 5 °F (38 1 °C)  79  18  115/59    95 %  None (Room air)  Lying   07/24/20 1320  98 4 °F (36 9 °C)  85  18  114/60    96 %  None (Room air)  Lying   07/24/20 1230    88  16  129/60  86  98 %  None (Room air)  Lying     Date and Time Eye Opening Best Verbal Response Best Motor Response [place] Coma Scale Score   07/25/20 1200 4 5 6 15   07/25/20 0000 4 5 6 15   07/24/20 1600 4 5 6 15   07/24/20 1320 4 5 6 15   07/24/20 1050 4 5 6 15     07/23/2020 @ 1340  CT abd/pel:  Mild inflammatory changes surrounding the urinary bladder; correlate with urinalysis for cystitis  Enlarged prostate gland  Stable 2 5 x 2 0 cm cystic lesion in the uncinate process of the pancreas      Pertinent Labs/Diagnostic Test Results:     Results from last 7 days   Lab Units 07/24/20  2045 07/24/20  1057 07/23/20  1236   WBC Thousand/uL  --  17 35* 22 89* HEMOGLOBIN g/dL 12 5 13 6 14 3   HEMATOCRIT % 37 4 40 5 42 6   PLATELETS Thousands/uL  --  118* 139*   NEUTROS ABS Thousands/µL  --  16 01* 20 50*     Results from last 7 days   Lab Units 07/24/20  1057 07/23/20  1236   SODIUM mmol/L 133* 135*   POTASSIUM mmol/L 3 9 3 9   CHLORIDE mmol/L 99* 100   CO2 mmol/L 24 27   ANION GAP mmol/L 10 8   BUN mg/dL 18 19   CREATININE mg/dL 1 04 1 10   EGFR ml/min/1 73sq m 72 68   CALCIUM mg/dL 8 6 8 7     Results from last 7 days   Lab Units 07/24/20  1057 07/23/20  1236   AST U/L 19 24   ALT U/L 32 39   ALK PHOS U/L 92 83   TOTAL PROTEIN g/dL 7 2 7 3   ALBUMIN g/dL 3 0* 3 3*   TOTAL BILIRUBIN mg/dL 0 79 0 99     Results from last 7 days   Lab Units 07/24/20  2110 07/24/20  1600   POC GLUCOSE mg/dl 132 135     Results from last 7 days   Lab Units 07/24/20  1057 07/23/20  1236   GLUCOSE RANDOM mg/dL 209* 227*     Results from last 7 days   Lab Units 07/24/20  1539   HEMOGLOBIN A1C % 7 3*   EAG mg/dl 163     Results from last 7 days   Lab Units 07/24/20  1057   PROTIME seconds 16 9*   INR  1 44*   PTT seconds 35     Results from last 7 days   Lab Units 07/24/20  1057   LACTIC ACID mmol/L 1 4     Results from last 7 days   Lab Units 07/23/20  1236   LIPASE u/L 73     Results from last 7 days   Lab Units 07/24/20  1126 07/23/20  1301   CLARITY UA  Slightly Cloudy Clear   COLOR UA  Orange Yellow   SPEC GRAV UA   --  >=1 030   PH UA   --  5 5   GLUCOSE UA mg/dl Interference- unable to analyze 100 (1/10%)*   KETONES UA mg/dl Interference- unable to analyze* 40 (2+)*   BLOOD UA  Interference- unable to analyze* Large*   PROTEIN UA mg/dl Interference- unable to analyze* >=300*   NITRITE UA  Interference- unable to analyze Positive*   BILIRUBIN UA  Interference- unable to analyze* Interference- unable to analyze*   UROBILINOGEN UA E U /dl Interference-unable to analyze 0 2   LEUKOCYTES UA  Interference- unable to analyze* Small*   WBC UA /hpf 10-20* Innumerable*   RBC UA /hpf 4-10* 1-2* BACTERIA UA /hpf Innumerable* Moderate*   EPITHELIAL CELLS WET PREP /hpf Occasional Occasional   MUCUS THREADS  Occasional*  --      Results from last 7 days   Lab Units 07/24/20  1059 07/23/20  1301 07/23/20  1236 07/23/20  1215   BLOOD CULTURE  Received in Microbiology Lab  Culture in Progress  --  No Growth at 24 hrs  No Growth at 24 hrs     URINE CULTURE   --  >100,000 cfu/ml Escherichia coli*  --   --      ED Treatment:   Medication Administration from 07/24/2020 1004 to 07/24/2020 1248       Date/Time Order Dose Route Action     07/24/2020 1050 ceftriaxone (ROCEPHIN) 1 g/50 mL in dextrose IVPB 1,000 mg Intravenous New Bag     07/24/2020 1050 sodium chloride 0 9 % bolus 1,000 mL 1,000 mL Intravenous New Bag     07/24/2020 1111 ondansetron (ZOFRAN) injection 4 mg 4 mg Intravenous Given     07/24/2020 1107 morphine (PF) 4 mg/mL injection 4 mg 4 mg Intravenous Given        Past Medical History:   Diagnosis Date    Anemia     Arthritis     Diabetes mellitus ([facility] )     IDDM    Diverticulosis     Enlarged prostate     Erectile dysfunction     Hypercholesteremia     Resolved post weight loss    Hypertension     Resolved post weight loss    Kidney stone     Obesity     Prostate cancer ([facility] )     Wears partial dentures     partial upper and lower     Present on Admission:   Prostate cancer ([facility] )   Benign essential hypertension    Admitting Diagnosis: UTI (urinary tract infection) [N39 0]  Black stool [K92 1]  Nausea and vomiting, intractability of vomiting not specified, unspecified vomiting type [R11 2]  Age/Sex: 79 y o  male  Admission Orders:  Scheduled Medications:  Medications:  atorvastatin 10 mg Oral Daily   [START ON 7/25/2020] cefTRIAXone 1,000 mg Intravenous Q24H   [START ON 7/25/2020] cholecalciferol 1,000 Units Oral Early Morning   [START ON 7/25/2020] ferrous sulfate 325 mg Oral Daily With Breakfast   insulin glargine 8 Units Subcutaneous HS   insulin lispro 1-5 Units Subcutaneous TID AC losartan 25 mg Oral Daily   melatonin 6 mg Oral HS   Continuous IV Infusions:  sodium chloride 75 mL/hr Intravenous Continuous   PRN Meds:  acetaminophen 975 mg Oral Q8H PRN   docusate sodium 100 mg Oral Daily PRN   HYDROmorphone 0 5 mg Intravenous Q4H PRN   ondansetron 4 mg Intravenous Q4H PRN   oxyCODONE 10 mg Oral Q4H PRN   oxyCODONE 5 mg Oral Q4H PRN     Navneet SCDs      Network Utilization Review Department  [email or web address]  ATTENTION: Please call with any questions or concerns to [phone] and carefully listen to the prompts so that you are directed to the right person  All voicemails are confidential   Mirna Zarco all requests for admission clinical reviews, approved or denied determinations and any other requests to dedicated fax number below belonging to the campus where the patient is receiving treatment   List of dedicated fax numbers for the Facilities:  [address] [place] [address] DENIALS (Administrative/Medical Necessity) [phone]   [address] [place] [address] (Maternity/NICU/Pediatrics) [phone]   Breanne Flatten [phone]   Sanjeev Romo [phone]   Marisel Servin [phone]   Shemar Jessica [phone]   [address] [facility] [address] [place] [address] [phone]   [facility]  [phone]   [address] [facility] [address] [facility]  [address] [facility] [address] [facility] [phone]

## 2020-07-25 NOTE — PROGRESS NOTES
Progress Note - David Jiménez 1950, 79 y o  male MRN: [id number]    Unit/Bed#: S -01 Encounter: [id number]    Primary Care Provider: Michael Ortez MD   Date and time admitted to hospital: 7/24/2020 10:12 AM      * Acute cystitis  Assessment & Plan  Abnormal UA/micro  Urine culture >100k GNR with final culture/sensitivity pending  · Continue IV ceftriaxone  · Noted leukocytosis (22 89 in ER on 7/23, down to 17 35K on admission and resolved today)  · No other sepsis criteria and CT scan negative for pyelo/stones; however, patient did clinically complain of left-sided back and flank pain and did present with chills and vomiting which may suggest early pyelonephritis    Nausea and vomiting  Assessment & Plan  Patient presented with nausea and vomiting, therefore was unable to tolerate PO antibiotics  · Consider possible pyelonephritis  · Advanced diet as tolerated  · Continue supportive care with IVF and Zofran    Hyponatremia  Assessment & Plan  suspect in setting of poor oral intake  · continue IVF  · AM BMP    Black stool  Assessment & Plan  · Patient with 1 episode dark brown/ black diarrhea which occurred after patient took laxatives on Thursday 7/23  Of note he is chronically on iron for a long time  · Hemoglobin stable at 13  · Check occult stool if able to go      Prostate cancer [facility])  Assessment & Plan  · Recently diagnosed prostate ca denise 9   Last seen by urology 7/8/20  · Recently started Lupron    Benign essential hypertension  Assessment & Plan  · Cozaar with hold parameters    Type 2 diabetes mellitus with insulin therapy [facility])  Assessment & Plan  Lab Results   Component Value Date    HGBA1C 7 3 (H) 07/24/2020       Recent Labs     07/24/20  1600 07/24/20  2110 07/25/20  0805   POCGLU 135 132 208*       Blood Sugar Average: Last 72 hrs:  (P) 158 [id number]   A1C 7 3  Hold home regimen which is glipizide 5 mg daily, Victoza, and NovoLog 70/30 with sliding scale up to 15 units once per day  Patient reporting decreased oral intake  For now would only utilize Lantus and sliding scale coverage during hospital stay    Constipation  Assessment & Plan  Reports 2 days of no BM which is causing him distress  · Start bowel regimen      VTE Pharmacologic Prophylaxis:   Pharmacologic: reported black stool, holding AC  Mechanical VTE Prophylaxis in Place: Yes    Patient Centered Rounds: I have performed bedside rounds with nursing staff today  Discussions with Specialists or Other Care Team Provider: none    Education and Discussions with Family / Patient: patient  Time Spent for Care: 30 minutes  More than 50% of total time spent on counseling and coordination of care as described above  Current Length of Stay: 1 day(s)    Current Patient Status: Inpatient   Certification Statement: The patient will continue to require additional inpatient hospital stay due to ongoing IV abx, follow cultures, constipation     Discharge Plan: not yet stable  Suspect 24-48 hours pendign above    Code Status: Level 1 - Full Code      Subjective:   Doing okay today  Main complaint is no BM in 2 days  Appetite poor overall but no vomiting  Continues to complain of dysuria and mild L sided flank pain  Objective:     Vitals:   Temp (24hrs), Av °F (37 2 °C), Min:98 4 °F (36 9 °C), Max:100 5 °F (38 1 °C)    Temp:  [98 4 °F (36 9 °C)-100 5 °F (38 1 °C)] 99 2 °F (37 3 °C)  HR:  [76-88] 76  Resp:  [16-18] 18  BP: (108-135)/(52-66) 108/52  SpO2:  [91 %-98 %] 91 %  Body mass index is 34 9 kg/m²  Input and Output Summary (last 24 hours): Intake/Output Summary (Last 24 hours) at 2020 0957  Last data filed at 2020 2300  Gross per 24 hour   Intake 50 ml   Output 750 ml   Net -700 ml       Physical Exam:     Physical Exam   Constitutional: He is oriented to person, place, and time  No distress  Cardiovascular: Normal rate and regular rhythm     Pulmonary/Chest: Effort normal and breath sounds normal  No respiratory distress  Abdominal: Soft  Bowel sounds are normal  He exhibits no distension  Musculoskeletal: He exhibits no edema  Neurological: He is alert and oriented to person, place, and time  Psychiatric: He has a normal mood and affect  Nursing note and vitals reviewed  Additional Data:     Labs:    Results from last 7 days   Lab Units 07/25/20  0502   WBC Thousand/uL 5 93   HEMOGLOBIN g/dL 13 7   HEMATOCRIT % 43 9   PLATELETS Thousands/uL 98*   NEUTROS PCT % 78*   LYMPHS PCT % 8*   MONOS PCT % 12   EOS PCT % 0     Results from last 7 days   Lab Units 07/25/20  0502 07/24/20  1057   POTASSIUM mmol/L 3 9 3 9   CHLORIDE mmol/L 101 99*   CO2 mmol/L 22 24   BUN mg/dL 19 18   CREATININE mg/dL 1 06 1 04   CALCIUM mg/dL 8 2* 8 6   ALK PHOS U/L  --  92   ALT U/L  --  32   AST U/L  --  19     Results from last 7 days   Lab Units 07/24/20  1057   INR  1 44*       * I Have Reviewed All Lab Data Listed Above  * Additional Pertinent Lab Tests Reviewed: All Labs Within Last 24 Hours Reviewed    Imaging:    Imaging Reports Reviewed Today Include: all  Imaging Personally Reviewed by Myself Includes:  none    Recent Cultures (last 7 days):     Results from last 7 days   Lab Units 07/24/20  1126 07/24/20  1059 07/23/20  1301 07/23/20  1236 07/23/20  1215   BLOOD CULTURE   --  Received in Microbiology Lab  Culture in Progress  --  No Growth at 24 hrs  No Growth at 24 hrs     URINE CULTURE  No Growth <1000 cfu/mL  --  >100,000 cfu/ml Escherichia coli*  --   --        Last 24 Hours Medication List:     Current Facility-Administered Medications:  acetaminophen 975 mg Oral Q8H PRN YVONNE Garcia    atorvastatin 10 mg Oral Daily Dilcia Gutierrez PA-C    bisacodyl 10 mg Oral Daily PRN Peyton Colby PA-C    cefTRIAXone 1,000 mg Intravenous Q24H Dilcia Gutierrez PA-C Last Rate: 1,000 mg (07/25/20 2881)   cholecalciferol 1,000 Units Oral Early Morning Dilcia Gutierrez PA-C    docusate sodium 100 mg Oral Daily PRN Dilcia MAYELA Gutierrez    ferrous sulfate 325 mg Oral Daily With Breakfast Dilcia Gutierrez PA-C    HYDROmorphone 0 5 mg Intravenous Q4H PRN Tata Interiano PA-C    insulin glargine 8 Units Subcutaneous HS Dilcia Gutierrez PA-C    insulin lispro 1-5 Units Subcutaneous TID AC Dilcia Gutierrez PA-C    lactulose 20 g Oral Once Casandra Ball PA-C    losartan 25 mg Oral Daily Dilcia Gutierrez PA-C    melatonin 6 mg Oral HS Juno Quintana MD    ondansetron 4 mg Intravenous Q4H PRN Dilcia Gutierrez PA-C    oxyCODONE 10 mg Oral Q4H PRN Tata Interiano PA-C    oxyCODONE 5 mg Oral Q4H PRN Tata Interiano PA-C    polyethylene glycol 17 g Oral Daily Felecia Wisdom PA-C    sodium chloride 100 mL/hr Intravenous Continuous Casandra Ball PA-C Last Rate: 75 mL/hr (07/24/20 1511)        Today, Patient Was Seen By: Casandra Ball PA-C    ** Please Note: Dictation voice to text software may have been used in the creation of this document   **

## 2020-07-26 LAB
ANION GAP SERPL CALCULATED.3IONS-SCNC: 6 MMOL/L (ref 4–13)
BASOPHILS # BLD AUTO: 0.03 THOUSANDS/ΜL (ref 0–0.1)
BASOPHILS NFR BLD AUTO: 1 % (ref 0–1)
BUN SERPL-MCNC: 11 MG/DL (ref 5–25)
CALCIUM SERPL-MCNC: 7.5 MG/DL (ref 8.3–10.1)
CHLORIDE SERPL-SCNC: 101 MMOL/L (ref 100–108)
CO2 SERPL-SCNC: 27 MMOL/L (ref 21–32)
CREAT SERPL-MCNC: 0.99 MG/DL (ref 0.6–1.3)
EOSINOPHIL # BLD AUTO: 0.06 THOUSAND/ΜL (ref 0–0.61)
EOSINOPHIL NFR BLD AUTO: 1 % (ref 0–6)
ERYTHROCYTE [DISTWIDTH] IN BLOOD BY AUTOMATED COUNT: 12.6 % (ref 11.6–15.1)
GFR SERPL CREATININE-BSD FRML MDRD: 77 ML/MIN/1.73SQ M
GLUCOSE SERPL-MCNC: 165 MG/DL (ref 65–140)
GLUCOSE SERPL-MCNC: 166 MG/DL (ref 65–140)
GLUCOSE SERPL-MCNC: 257 MG/DL (ref 65–140)
GLUCOSE SERPL-MCNC: 259 MG/DL (ref 65–140)
GLUCOSE SERPL-MCNC: 301 MG/DL (ref 65–140)
HCT VFR BLD AUTO: 34.4 % (ref 36.5–49.3)
HGB BLD-MCNC: 11.7 G/DL (ref 12–17)
IMM GRANULOCYTES # BLD AUTO: 0.05 THOUSAND/UL (ref 0–0.2)
IMM GRANULOCYTES NFR BLD AUTO: 1 % (ref 0–2)
LYMPHOCYTES # BLD AUTO: 0.49 THOUSANDS/ΜL (ref 0.6–4.47)
LYMPHOCYTES NFR BLD AUTO: 12 % (ref 14–44)
MCH RBC QN AUTO: 31.3 PG (ref 26.8–34.3)
MCHC RBC AUTO-ENTMCNC: 34 G/DL (ref 31.4–37.4)
MCV RBC AUTO: 92 FL (ref 82–98)
MONOCYTES # BLD AUTO: 0.82 THOUSAND/ΜL (ref 0.17–1.22)
MONOCYTES NFR BLD AUTO: 19 % (ref 4–12)
NEUTROPHILS # BLD AUTO: 2.81 THOUSANDS/ΜL (ref 1.85–7.62)
NEUTS SEG NFR BLD AUTO: 66 % (ref 43–75)
NRBC BLD AUTO-RTO: 0 /100 WBCS
PLATELET # BLD AUTO: 117 THOUSANDS/UL (ref 149–390)
PMV BLD AUTO: 12 FL (ref 8.9–12.7)
POTASSIUM SERPL-SCNC: 3.8 MMOL/L (ref 3.5–5.3)
RBC # BLD AUTO: 3.74 MILLION/UL (ref 3.88–5.62)
SODIUM SERPL-SCNC: 134 MMOL/L (ref 136–145)
WBC # BLD AUTO: 4.26 THOUSAND/UL (ref 4.31–10.16)

## 2020-07-26 PROCEDURE — 85025 COMPLETE CBC W/AUTO DIFF WBC: CPT | Performed by: INTERNAL MEDICINE

## 2020-07-26 PROCEDURE — 80048 BASIC METABOLIC PNL TOTAL CA: CPT | Performed by: INTERNAL MEDICINE

## 2020-07-26 PROCEDURE — 99232 SBSQ HOSP IP/OBS MODERATE 35: CPT | Performed by: INTERNAL MEDICINE

## 2020-07-26 PROCEDURE — 99223 1ST HOSP IP/OBS HIGH 75: CPT | Performed by: UROLOGY

## 2020-07-26 PROCEDURE — 82948 REAGENT STRIP/BLOOD GLUCOSE: CPT

## 2020-07-26 RX ORDER — TAMSULOSIN HYDROCHLORIDE 0.4 MG/1
0.4 CAPSULE ORAL
Status: DISCONTINUED | OUTPATIENT
Start: 2020-07-26 | End: 2020-07-27 | Stop reason: HOSPADM

## 2020-07-26 RX ADMIN — MELATONIN 1000 UNITS: at 05:12

## 2020-07-26 RX ADMIN — LOSARTAN POTASSIUM 25 MG: 25 TABLET, FILM COATED ORAL at 08:42

## 2020-07-26 RX ADMIN — INSULIN GLARGINE 8 UNITS: 100 INJECTION, SOLUTION SUBCUTANEOUS at 21:25

## 2020-07-26 RX ADMIN — SODIUM CHLORIDE 100 ML/HR: 0.9 INJECTION, SOLUTION INTRAVENOUS at 10:08

## 2020-07-26 RX ADMIN — POLYETHYLENE GLYCOL 3350 17 G: 17 POWDER, FOR SOLUTION ORAL at 08:42

## 2020-07-26 RX ADMIN — FERROUS SULFATE TAB 325 MG (65 MG ELEMENTAL FE) 325 MG: 325 (65 FE) TAB at 08:42

## 2020-07-26 RX ADMIN — BISACODYL 10 MG: 5 TABLET, COATED ORAL at 16:16

## 2020-07-26 RX ADMIN — OXYCODONE HYDROCHLORIDE 10 MG: 10 TABLET ORAL at 08:52

## 2020-07-26 RX ADMIN — TAMSULOSIN HYDROCHLORIDE 0.4 MG: 0.4 CAPSULE ORAL at 16:16

## 2020-07-26 RX ADMIN — INSULIN LISPRO 2 UNITS: 100 INJECTION, SOLUTION INTRAVENOUS; SUBCUTANEOUS at 16:17

## 2020-07-26 RX ADMIN — INSULIN LISPRO 1 UNITS: 100 INJECTION, SOLUTION INTRAVENOUS; SUBCUTANEOUS at 08:42

## 2020-07-26 RX ADMIN — SODIUM CHLORIDE 100 ML/HR: 0.9 INJECTION, SOLUTION INTRAVENOUS at 21:23

## 2020-07-26 RX ADMIN — OXYCODONE HYDROCHLORIDE 10 MG: 10 TABLET ORAL at 01:18

## 2020-07-26 RX ADMIN — CEFTRIAXONE SODIUM 1000 MG: 10 INJECTION, POWDER, FOR SOLUTION INTRAVENOUS at 14:11

## 2020-07-26 RX ADMIN — ATORVASTATIN CALCIUM 10 MG: 10 TABLET, FILM COATED ORAL at 08:42

## 2020-07-26 RX ADMIN — INSULIN LISPRO 1 UNITS: 100 INJECTION, SOLUTION INTRAVENOUS; SUBCUTANEOUS at 12:08

## 2020-07-26 NOTE — CONSULTS
UROLOGY CONSULTATION NOTE     Patient Identifiers: Rickie Davis (MRN [id number])  Service Requesting Consultation:  Medicine  Service Providing Consultation:  Urology, Annmarie Isaacs MD    Date of Service: 7/26/2020  Inpatient consult to Urology  Consult performed by: Annmarie Isaacs MD  Consult ordered by: Jayne Weems PA-C          Reason for Consultation:  UTI, prostate cancer, lower urinary tract symptoms    History of Present Illness:     Rickie Davis is a 79 y o  old with a history of prostate cancer, urinary symptoms, uro lift implantation x2, most recently on 5/8/2020, urethral stricture, now with urinary tract infection admitted for treatment  He is a patient of Dr Nargis Uriostegui  He has received Lupron injection already and planning for radiation therapy starting in the next 4 to 6 weeks  He was admitted to the hospital with significant lower urinary tract symptoms which apparently have persisted for some time despite his uro lift procedures  Urine culture revealed pansensitive E coli  Patient has insisted on urologic evaluation  Recent imaging for prostate cancer staging including CT scan and bone scan were negative for metastases      Past Medical, Past Surgical History:     Past Medical History:   Diagnosis Date    Anemia     Arthritis     Diabetes mellitus ([address] )     IDDM    Diverticulosis     Enlarged prostate     Erectile dysfunction     Hypercholesteremia     Resolved post weight loss    Hypertension     Resolved post weight loss    Kidney stone     Obesity     Prostate cancer ([address] )     Wears partial dentures     partial upper and lower   :    Past Surgical History:   Procedure Laterality Date    ABDOMINAL ADHESION SURGERY N/A 11/28/2016    Procedure: EXTENSIVE LYSIS ADHESIONS;  Surgeon: Ariel Blair MD;  [facility];  Service:    Shashi Hem FRACTURE SURGERY Left     CHOLECYSTECTOMY      COLON SURGERY      colon resection    COLONOSCOPY      COLOSTOMY      COLOSTOMY CLOSURE      DIAGNOSTIC LAPAROSCOPY      GASTRIC BYPASS      failed due to adhesions    HERNIA REPAIR      abdominal    IA BIOPSY OF PROSTATE,NEEDLE/PUNCH N/A 5/8/2020    Procedure: TRANSRECTAL NEEDLE BIOPSY PROSTATE (TRNBP) WITH TRANSRECTAL ULTRASOUND GUIDANCE;  Surgeon: Thuy Galvan MD;  [facility];  Service: Urology    IA CYSTOSCOPY,DIR VIS INT URETHROTOMY N/A 5/8/2020    Procedure: DIRECT VISUAL INTERAL URETHROTOMY (DVIU), dilation of urethral stricture;  Surgeon: Thuy Galvan MD;  [facility];  Service: Urology    IA CYSTOURETHRO W/IMPLANT N/A 3/8/2019    Procedure: CYSTOSCOPY WITH INSERTION Audrey Chasten;  Surgeon: Thuy Galvan MD;  [facility];  Service: Urology    IA CYSTOURETHRO W/IMPLANT N/A 5/8/2020    Procedure: CYSTOSCOPY WITH INSERTION Audrey Chasten;  Surgeon: Thuy Galvan MD;  [facility];  Service: Urology    IA [address] DUODENUM/JEJUNUM N/A 10/25/2018    Procedure: LINEAR ENDOSCOPIC U/S;  Surgeon: Prashant Colmenares MD;  [facility]: [facility]; Service: Gastroenterology    IA ESOPHAGOGASTRODUODENOSCOPY TRANSORAL DIAGNOSTIC N/A 7/6/2016    Procedure: EGD AND COLONOSCOPY;  Surgeon: Mary Matt MD;  [facility];   Service: Gastroenterology    IA LAP, ANEESH RESTRICT PROC, LONGITUDINAL GASTRECTOMY N/A 11/28/2016    Procedure: GASTRECTOMY SLEEVE LAPAROSCOPIC;  Surgeon: Hazel Negron MD;  [facility];  Service: [facility] BIOPSY  5/8/2020   :    Medications, Allergies:     Current Facility-Administered Medications   Medication Dose Route Frequency    acetaminophen (TYLENOL) tablet 975 mg  975 mg Oral Q8H PRN    atorvastatin (LIPITOR) tablet 10 mg  10 mg Oral Daily    bisacodyl (DULCOLAX) EC tablet 10 mg  10 mg Oral Daily PRN    cefTRIAXone (ROCEPHIN) 1,000 mg in dextrose 5 % 50 mL IVPB  1,000 mg Intravenous Q24H    cholecalciferol (VITAMIN D3) tablet 1,000 Units  1,000 Units Oral Early Morning    docusate sodium (COLACE) capsule 100 mg  100 mg Oral Daily PRN    ferrous sulfate tablet 325 mg  325 mg Oral Daily With Breakfast    HYDROmorphone (DILAUDID) injection 0 5 mg  0 5 mg Intravenous Q4H PRN    insulin glargine (LANTUS) subcutaneous injection 8 Units 0 08 mL  8 Units Subcutaneous HS    insulin lispro (HumaLOG) 100 units/mL subcutaneous injection 1-5 Units  1-5 Units Subcutaneous TID AC    losartan (COZAAR) tablet 25 mg  25 mg Oral Daily    melatonin tablet 6 mg  6 mg Oral HS    ondansetron (ZOFRAN) injection 4 mg  4 mg Intravenous Q4H PRN    oxyCODONE (ROXICODONE) immediate release tablet 10 mg  10 mg Oral Q4H PRN    oxyCODONE (ROXICODONE) IR tablet 5 mg  5 mg Oral Q4H PRN    polyethylene glycol (MIRALAX) packet 17 g  17 g Oral Daily    sodium chloride 0 9 % infusion  100 mL/hr Intravenous Continuous    tamsulosin (FLOMAX) capsule 0 4 mg  0 4 mg Oral Daily With Dinner       Allergies: Allergies   Allergen Reactions    Enalapril Anaphylaxis and Throat Swelling   :    Social and Family History:   Social History:   Social History     Tobacco Use    Smoking status: Former Smoker     Last attempt to quit: 11/10/2001     Years since quittin 7    Smokeless tobacco: Never Used   Substance Use Topics    Alcohol use: No    Drug use: No        Social History     Tobacco Use   Smoking Status Former Smoker    Last attempt to quit: 11/10/2001    Years since quittin 7   Smokeless Tobacco Never Used       Family History:  Family History   Problem Relation Age of Onset    Heart disease Mother     Breast cancer Mother 80    Diabetes Father     Colon cancer Neg Hx     Liver disease Neg Hx    :     Review of Systems:     General: Fever, chills, or night sweats: negative  Cardiac: Negative for chest pain  Pulmonary: Negative for shortness of breath  Gastrointestinal: Abdominal pain negative    Nausea, vomiting, or diarrhea negative,  Genitourinary: See HPI above  Patient does not have hematuria  All other systems queried were negative  Physical Exam:   General: Patient is pleasant and in NAD  Awake and alert  /57 (BP Location: Right arm)   Pulse 60   Temp 98 °F (36 7 °C) (Oral)   Resp 20   Wt 103 kg (226 lb 3 1 oz)   SpO2 95%   BMI 34 90 kg/m² Temp (24hrs), Av 2 °F (36 8 °C), Min:98 °F (36 7 °C), Max:98 4 °F (36 9 °C)  current; Temperature: 98 °F (36 7 °C)  I/O last 24 hours: In: 3236 7 [P O :785; I V :2451 7]  Out: 800 [Urine:800]  Eyes: Conjunctivae normal  Lungs: clear to auscultation bilaterally  Heart: regular rate and rhythm  Abdomen: soft, non-tender; bowel sounds normal; no masses,  no organomegaly  Extremities: extremities normal, warm and well-perfused; no cyanosis, clubbing, or edema  Skin: Skin color, texture, turgor normal  No rashes or lesions  Neurologic: Grossly normal    (Male):  No CVA tenderness  No suprapubic fullness  Labs:     Lab Results   Component Value Date    HGB 13 7 2020    HCT 43 9 2020    WBC 5 93 2020    PLT 98 (L) 2020   ]    Lab Results   Component Value Date     2016    K 3 9 2020     2020    CO2 22 2020    BUN 19 2020    CREATININE 1 06 2020    CALCIUM 8 2 (L) 2020    GLUCOSE 215 (H) 2016   ]    Imaging:   I personally reviewed the images and report of the following studies, and reviewed them with the patient:    CT Abdomen: No evidence of metastasis or pathology  Enlarged prostate, evidence of cystitis  ASSESSMENT:     79 y o  old male with  UTI, history of enlarged prostate with significant lower urinary tract symptoms despite uro lift x2  Urethral stricture dilated in May 2020  PLAN:     We discussed his history and current complaints  He understands a urinary tract infection can account for the symptoms  He also has history of chronic symptoms    He has been managed most recent with Myrbetriq  He is asking about additional treatments  He has tried Flomax in the past but would be willing to try this again  I think is the short-term lower urinary tract treatment this may be appropriate during his current antibiotic course  We discussed this and I ordered tamsulosin for him  He understands he is stable for discharge when agreed upon by the medical service  Plan urologic follow-up to ensure he is doing well, and possibly to alter his medication regimen at that time  Patient is comfortable with this plan  Thank you for allowing me to participate in this patients care  Please do not hesitate to call with any additional questions    Yoko Frank MD

## 2020-07-26 NOTE — PROGRESS NOTES
Progress Note - Jonnie Greenwood 1950, 79 y o  male MRN: [id number]    Unit/Bed#: S -01 Encounter: [id number]    Primary Care Provider: Derrick Garcia MD   Date and time admitted to hospital: 7/24/2020 10:12 AM      * Acute cystitis  Assessment & Plan  Abnormal UA/micro  Urine culture >100k e coli  Blood cultures negative  · Continue IV ceftriaxone  · Noted leukocytosis (22 89 in ER on 7/23, down to 17 35K on admission and resolved as of 7/25)  · No other sepsis criteria and CT scan negative for pyelo/stones; however, patient did clinically complain of left-sided back and flank pain and did present with chills and vomiting which may suggest early pyelonephritis        Nausea and vomiting  Assessment & Plan  Patient presented with nausea and vomiting, therefore was unable to tolerate PO antibiotics  · Consider possible pyelonephritis  · Tolerating diet  · Still reports vomiting however not witnessed by RN  · Continue supportive care with IVF and Zofran    Hyponatremia  Assessment & Plan  suspect in setting of poor oral intake  · continue IVF  · Awaiting AM BMP    Black stool  Assessment & Plan  Patient with 1 episode dark brown/ black diarrhea which occurred after patient took laxatives on Thursday 7/23  Of note he is chronically on iron for a long time  · Hemoglobin stable at 13  · Occult stool negative       Prostate cancer [facility])  Assessment & Plan  · Recently diagnosed prostate ca denise 9  Last seen by urology 7/8/20  · Recently started Lupron  · requesting a urology consult  I explained this is not clinically necessary at this time however he prefers, will place      Benign essential hypertension  Assessment & Plan  · Cozaar with hold parameters    Type 2 diabetes mellitus with insulin therapy [facility])  Assessment & Plan  Lab Results   Component Value Date    HGBA1C 7 3 (H) 07/24/2020       Recent Labs     07/25/20  1117 07/25/20  1602 07/25/20  2108 07/26/20  0707   POCGLU 276* 237* 275* 165*       Blood Sugar Average: Last 72 hrs:  (P) 204   A1C 7 3  · Hold home regimen which is glipizide 5 mg daily, Victoza, and NovoLog 70/30 with sliding scale up to 15 units once per day  · Patient reporting decreased oral intake  For now would only utilize Lantus and sliding scale coverage during hospital stay    Constipation  Assessment & Plan  Had BM yesterday however still reports that to him that is constipated  · Continue bowel regimen       VTE Pharmacologic Prophylaxis:   Pharmacologic: encourage ambulation   Mechanical VTE Prophylaxis in Place: Yes    Patient Centered Rounds: I have performed bedside rounds with nursing staff today  Discussions with Specialists or Other Care Team Provider: [facility] urology with patient's request for consult  Education and Discussions with Family / Patient: patient  Time Spent for Care: 30 minutes  More than 50% of total time spent on counseling and coordination of care as described above  Current Length of Stay: 2 day(s)    Current Patient Status: Inpatient   Certification Statement: The patient will continue to require additional inpatient hospital stay due to as above     Discharge Plan: hopefully home tomorrow  Code Status: Level 1 - Full Code      Subjective:   Patient begins to complain of various things when I enter the room  The first is that no one can get his labs  He is also concerned because he hasn't had a BM yet today but did yesterday  He reports an episode of vomiting this AM but didn't tell anyone  He also is upset that his urologist has not yet been in to see him  I explained that I could let him know he was here as a courtesy, however he prefers to have a urologist see him despite my explanation that at this juncture this is not clinically indicated      Objective:     Vitals:   Temp (24hrs), Av 2 °F (36 8 °C), Min:98 °F (36 7 °C), Max:98 4 °F (36 9 °C)    Temp:  [98 °F (36 7 °C)-98 4 °F (36 9 °C)] 98 °F (36 7 °C)  HR:  [60-70] 60  Resp:  [18-20] 20  BP: (119145)/(57-66) 119/57  SpO2:  [95 %] 95 %  Body mass index is 34 9 kg/m²  Input and Output Summary (last 24 hours): Intake/Output Summary (Last 24 hours) at 7/26/2020 1108  Last data filed at 7/26/2020 0927  Gross per 24 hour   Intake 3236 67 ml   Output 800 ml   Net 2436 67 ml       Physical Exam:     Physical Exam   Constitutional: He is oriented to person, place, and time  Cardiovascular: Normal rate and regular rhythm  Pulmonary/Chest: Effort normal and breath sounds normal  No respiratory distress  Abdominal: He exhibits no distension  Obese    Musculoskeletal: He exhibits no edema  Neurological: He is alert and oriented to person, place, and time  Psychiatric:   Seems anxious, upset about various things   Nursing note and vitals reviewed  Additional Data:     Labs:    Results from last 7 days   Lab Units 07/25/20  0502   WBC Thousand/uL 5 93   HEMOGLOBIN g/dL 13 7   HEMATOCRIT % 43 9   PLATELETS Thousands/uL 98*   NEUTROS PCT % 78*   LYMPHS PCT % 8*   MONOS PCT % 12   EOS PCT % 0     Results from last 7 days   Lab Units 07/25/20  0502 07/24/20  1057   POTASSIUM mmol/L 3 9 3 9   CHLORIDE mmol/L 101 99*   CO2 mmol/L 22 24   BUN mg/dL 19 18   CREATININE mg/dL 1 06 1 04   CALCIUM mg/dL 8 2* 8 6   ALK PHOS U/L  --  92   ALT U/L  --  32   AST U/L  --  19     Results from last 7 days   Lab Units 07/24/20  1057   INR  1 44*       * I Have Reviewed All Lab Data Listed Above  * Additional Pertinent Lab Tests Reviewed: All Labs Within Last 24 Hours Reviewed    Imaging:    Imaging Reports Reviewed Today Include: all  Imaging Personally Reviewed by Myself Includes:  none    Recent Cultures (last 7 days):     Results from last 7 days   Lab Units 07/24/20  1126 07/24/20  1059 07/23/20  1301 07/23/20  1236 07/23/20  1215   BLOOD CULTURE   --  No Growth at 24 hrs   --  No Growth at 48 hrs  No Growth at 48 hrs     URINE CULTURE  No Growth <1000 cfu/mL  --  >100,000 cfu/ml Escherichia coli* 10,000-19,000 cfu/ml   --   --        Last 24 Hours Medication List:     Current Facility-Administered Medications:  acetaminophen 975 mg Oral Q8H PRN YVONNE Barker    atorvastatin 10 mg Oral Daily Dilcia Gutierrez PA-C    bisacodyl 10 mg Oral Daily PRN Kirti Moon PA-C    cefTRIAXone 1,000 mg Intravenous Q24H Dilcia Gutierrez PA-C Last Rate: 1,000 mg (07/25/20 1409)   cholecalciferol 1,000 Units Oral Early Morning Dilcia Gutierrez PA-C    docusate sodium 100 mg Oral Daily PRN Dilcia Gutierrez PA-C    ferrous sulfate 325 mg Oral Daily With Breakfast Dilcia Gutierrez PA-C    HYDROmorphone 0 5 mg Intravenous Q4H PRN Tata Interiano PA-C    insulin glargine 8 Units Subcutaneous HS Dilcia Gutierrez, MAYELA    insulin lispro 1-5 Units Subcutaneous TID AC Dilcia Gutierrez PA-C    losartan 25 mg Oral Daily Dilcia Gutierrez, MAYELA    melatonin 6 mg Oral HS Juno Quintana MD    ondansetron 4 mg Intravenous Q4H PRN Dilcia Gutierrez PA-C    oxyCODONE 10 mg Oral Q4H PRN Tata Interiano PA-C    oxyCODONE 5 mg Oral Q4H PRN Tata Interiano PA-C    polyethylene glycol 17 g Oral Daily Felecia Wisdom PA-C    sodium chloride 100 mL/hr Intravenous Continuous Kirti Moon PA-C Last Rate: 100 mL/hr (07/26/20 1008)        Today, Patient Was Seen By: Kirti Moon PA-C    ** Please Note: Dictation voice to text software may have been used in the creation of this document   **

## 2020-07-26 NOTE — ASSESSMENT & PLAN NOTE
Lab Results   Component Value Date    HGBA1C 7 3 (H) 07/24/2020       Recent Labs     07/25/20  1117 07/25/20  1602 07/25/20  2108 07/26/20  0707   POCGLU 276* 237* 275* 165*       Blood Sugar Average: Last 72 hrs:  (P) 204   A1C 7 3  · Hold home regimen which is glipizide 5 mg daily, Victoza, and NovoLog 70/30 with sliding scale up to 15 units once per day  · Patient reporting decreased oral intake    For now would only utilize Lantus and sliding scale coverage during hospital stay

## 2020-07-26 NOTE — ASSESSMENT & PLAN NOTE
Abnormal UA/micro  Urine culture >100k e coli   Blood cultures negative  · Continue IV ceftriaxone  · Noted leukocytosis (22 89 in ER on 7/23, down to 17 35K on admission and resolved as of 7/25)  · No other sepsis criteria and CT scan negative for pyelo/stones; however, patient did clinically complain of left-sided back and flank pain and did present with chills and vomiting which may suggest early pyelonephritis

## 2020-07-26 NOTE — ASSESSMENT & PLAN NOTE
· Recently diagnosed prostate ca denise 9  Last seen by urology 7/8/20  · Recently started Lupron  · requesting a urology consult  I explained this is not clinically necessary at this time however he prefers, will place

## 2020-07-26 NOTE — SOCIAL WORK
CM met with Pt with an introduction and explanation of role  Pt reported residing with his significant other Windy Lay in a ranch style home with no use of DME and 3 steps to enter  Pt reported being independent with ADLs with no hx of VNA, SNF, mental health or drug/alcohol placements  Pt reported having a living will with Windy Lay as POA, uses [facility] on [address] and has Kelly Rosales as a PCP  Family to transport upon d/c     CM reviewed d/c planning process including the following: identifying help at home, patient preference for d/c planning needs, Discharge Lounge, [facility] Meds to Bed program, availability of treatment team to discuss questions or concerns patient and/or family may have regarding understanding medications and recognizing signs and symptoms once discharged  CM also encouraged patient to follow up with all recommended appointments after discharge  Patient advised of importance for patient and family to participate in managing patients medical well being

## 2020-07-26 NOTE — ASSESSMENT & PLAN NOTE
Patient presented with nausea and vomiting, therefore was unable to tolerate PO antibiotics  · Consider possible pyelonephritis  · Tolerating diet  · Still reports vomiting however not witnessed by RN  · Continue supportive care with IVF and Zofran

## 2020-07-26 NOTE — ASSESSMENT & PLAN NOTE
Patient with 1 episode dark brown/ black diarrhea which occurred after patient took laxatives on Thursday 7/23   Of note he is chronically on iron for a long time  · Hemoglobin stable at 13  · Occult stool negative

## 2020-07-26 NOTE — PLAN OF CARE
Problem: GASTROINTESTINAL - ADULT  Goal: Minimal or absence of nausea and/or vomiting  Description  INTERVENTIONS:  - Administer IV fluids if ordered to ensure adequate hydration  - Maintain NPO status until nausea and vomiting are resolved  - Nasogastric tube if ordered  - Administer ordered antiemetic medications as needed  - Provide nonpharmacologic comfort measures as appropriate  - Advance diet as tolerated, if ordered  - Consider nutrition services referral to assist patient with adequate nutrition and appropriate food choices  7/26/2020 1042 by Deanna Hannah RN  Outcome: Progressing  7/26/2020 1042 by Deanna Hannah RN  Outcome: Progressing  Goal: Maintains or returns to baseline bowel function  Description  INTERVENTIONS:  - Assess bowel function  - Encourage oral fluids to ensure adequate hydration  - Administer IV fluids if ordered to ensure adequate hydration  - Administer ordered medications as needed  - Encourage mobilization and activity  - Consider nutritional services referral to assist patient with adequate nutrition and appropriate food choices  7/26/2020 1042 by Deanna Hannah RN  Outcome: Progressing  7/26/2020 1042 by Deanna Hannah RN  Outcome: Progressing  Goal: Maintains adequate nutritional intake  Description  INTERVENTIONS:  - Monitor percentage of each meal consumed  - Identify factors contributing to decreased intake, treat as appropriate  - Assist with meals as needed  - Monitor I&O, weight, and lab values if indicated  - Obtain nutrition services referral as needed  7/26/2020 1042 by Deanna Hannah RN  Outcome: Progressing  7/26/2020 1042 by Deanna Hannah RN  Outcome: Progressing     Problem: GENITOURINARY - ADULT  Goal: Maintains or returns to baseline urinary function  Description  INTERVENTIONS:  - Assess urinary function  - Encourage oral fluids to ensure adequate hydration if ordered  - Administer IV fluids as ordered to ensure adequate hydration  - Administer ordered medications as needed  - Offer frequent toileting  - Follow urinary retention protocol if ordered  7/26/2020 1042 by Callie Chapa RN  Outcome: Progressing  7/26/2020 1042 by Callie Chapa RN  Outcome: Progressing  Goal: Absence of urinary retention  Description  INTERVENTIONS:  - Assess patients ability to void and empty bladder  - Monitor I/O  - Bladder scan as needed  - Discuss with physician/AP medications to alleviate retention as needed  - Discuss catheterization for long term situations as appropriate  7/26/2020 1042 by Callie Chapa RN  Outcome: Progressing  7/26/2020 1042 by Callie Chapa RN  Outcome: Progressing     Problem: METABOLIC, FLUID AND ELECTROLYTES - ADULT  Goal: Electrolytes maintained within normal limits  Description  INTERVENTIONS:  - Monitor labs and assess patient for signs and symptoms of electrolyte imbalances  - Administer electrolyte replacement as ordered  - Monitor response to electrolyte replacements, including repeat lab results as appropriate  - Instruct patient on fluid and nutrition as appropriate  7/26/2020 1042 by Callie Chapa RN  Outcome: Progressing  7/26/2020 1042 by Callie Chapa RN  Outcome: Progressing  Goal: Glucose maintained within target range  Description  INTERVENTIONS:  - Monitor Blood Glucose as ordered  - Assess for signs and symptoms of hyperglycemia and hypoglycemia  - Administer ordered medications to maintain glucose within target range  - Assess nutritional intake and initiate nutrition service referral as needed  7/26/2020 1042 by Callie Chapa RN  Outcome: Progressing  7/26/2020 1042 by Callie Chapa RN  Outcome: Progressing

## 2020-07-26 NOTE — PLAN OF CARE
Problem: GASTROINTESTINAL - ADULT  Goal: Minimal or absence of nausea and/or vomiting  Description  INTERVENTIONS:  - Administer IV fluids if ordered to ensure adequate hydration  - Maintain NPO status until nausea and vomiting are resolved  - Nasogastric tube if ordered  - Administer ordered antiemetic medications as needed  - Provide nonpharmacologic comfort measures as appropriate  - Advance diet as tolerated, if ordered  - Consider nutrition services referral to assist patient with adequate nutrition and appropriate food choices  7/26/2020 1042 by Manuel An RN  Outcome: Progressing  7/26/2020 1042 by Manuel An RN  Outcome: Progressing  7/26/2020 1042 by Manuel An RN  Outcome: Progressing  Goal: Maintains or returns to baseline bowel function  Description  INTERVENTIONS:  - Assess bowel function  - Encourage oral fluids to ensure adequate hydration  - Administer IV fluids if ordered to ensure adequate hydration  - Administer ordered medications as needed  - Encourage mobilization and activity  - Consider nutritional services referral to assist patient with adequate nutrition and appropriate food choices  7/26/2020 1042 by Manuel An RN  Outcome: Progressing  7/26/2020 1042 by Manuel An RN  Outcome: Progressing  7/26/2020 1042 by Manuel An RN  Outcome: Progressing  Goal: Maintains adequate nutritional intake  Description  INTERVENTIONS:  - Monitor percentage of each meal consumed  - Identify factors contributing to decreased intake, treat as appropriate  - Assist with meals as needed  - Monitor I&O, weight, and lab values if indicated  - Obtain nutrition services referral as needed  7/26/2020 1042 by Manuel An RN  Outcome: Progressing  7/26/2020 1042 by Manuel An RN  Outcome: Progressing  7/26/2020 1042 by Manuel An RN  Outcome: Progressing     Problem: GENITOURINARY - ADULT  Goal: Maintains or returns to baseline urinary function  Description  INTERVENTIONS:  - Assess urinary function  - Encourage oral fluids to ensure adequate hydration if ordered  - Administer IV fluids as ordered to ensure adequate hydration  - Administer ordered medications as needed  - Offer frequent toileting  - Follow urinary retention protocol if ordered  7/26/2020 1042 by Sanam Machuca RN  Outcome: Progressing  7/26/2020 1042 by Sanam Machuca RN  Outcome: Progressing  7/26/2020 1042 by Sanam Machuca RN  Outcome: Progressing  Goal: Absence of urinary retention  Description  INTERVENTIONS:  - Assess patients ability to void and empty bladder  - Monitor I/O  - Bladder scan as needed  - Discuss with physician/AP medications to alleviate retention as needed  - Discuss catheterization for long term situations as appropriate  7/26/2020 1042 by Sanam Machuca RN  Outcome: Progressing  7/26/2020 1042 by Sanam Machuca RN  Outcome: Progressing  7/26/2020 1042 by Sanam Machuca RN  Outcome: Progressing     Problem: METABOLIC, FLUID AND ELECTROLYTES - ADULT  Goal: Electrolytes maintained within normal limits  Description  INTERVENTIONS:  - Monitor labs and assess patient for signs and symptoms of electrolyte imbalances  - Administer electrolyte replacement as ordered  - Monitor response to electrolyte replacements, including repeat lab results as appropriate  - Instruct patient on fluid and nutrition as appropriate  7/26/2020 1042 by Sanam Machuca RN  Outcome: Progressing  7/26/2020 1042 by Sanam Machuca RN  Outcome: Progressing  7/26/2020 1042 by Sanam Machuca RN  Outcome: Progressing  Goal: Glucose maintained within target range  Description  INTERVENTIONS:  - Monitor Blood Glucose as ordered  - Assess for signs and symptoms of hyperglycemia and hypoglycemia  - Administer ordered medications to maintain glucose within target range  - Assess nutritional intake and initiate nutrition service referral as needed  7/26/2020 1042 by Sanam Machuca RN  Outcome: Progressing  7/26/2020 1042 by Sanam Machuca RN  Outcome: Progressing  7/26/2020 1042 by Manuel An, RN  Outcome: Progressing

## 2020-07-27 VITALS
RESPIRATION RATE: 16 BRPM | SYSTOLIC BLOOD PRESSURE: 124 MMHG | WEIGHT: 226.19 LBS | OXYGEN SATURATION: 94 % | TEMPERATURE: 98 F | BODY MASS INDEX: 34.9 KG/M2 | DIASTOLIC BLOOD PRESSURE: 60 MMHG | HEART RATE: 53 BPM

## 2020-07-27 LAB — GLUCOSE SERPL-MCNC: 167 MG/DL (ref 65–140)

## 2020-07-27 PROCEDURE — 99239 HOSP IP/OBS DSCHRG MGMT >30: CPT | Performed by: INTERNAL MEDICINE

## 2020-07-27 PROCEDURE — 82948 REAGENT STRIP/BLOOD GLUCOSE: CPT

## 2020-07-27 RX ORDER — CEPHALEXIN 500 MG/1
500 CAPSULE ORAL EVERY 12 HOURS SCHEDULED
Qty: 6 CAPSULE | Refills: 0 | Status: SHIPPED | OUTPATIENT
Start: 2020-07-27 | End: 2020-07-30

## 2020-07-27 RX ORDER — TAMSULOSIN HYDROCHLORIDE 0.4 MG/1
0.4 CAPSULE ORAL
Qty: 30 CAPSULE | Refills: 0 | Status: SHIPPED | OUTPATIENT
Start: 2020-07-27 | End: 2020-09-17 | Stop reason: SDUPTHER

## 2020-07-27 RX ADMIN — ATORVASTATIN CALCIUM 10 MG: 10 TABLET, FILM COATED ORAL at 09:45

## 2020-07-27 RX ADMIN — INSULIN LISPRO 1 UNITS: 100 INJECTION, SOLUTION INTRAVENOUS; SUBCUTANEOUS at 09:42

## 2020-07-27 RX ADMIN — MELATONIN 1000 UNITS: at 06:12

## 2020-07-27 RX ADMIN — FERROUS SULFATE TAB 325 MG (65 MG ELEMENTAL FE) 325 MG: 325 (65 FE) TAB at 09:45

## 2020-07-27 RX ADMIN — POLYETHYLENE GLYCOL 3350 17 G: 17 POWDER, FOR SOLUTION ORAL at 09:41

## 2020-07-27 RX ADMIN — LOSARTAN POTASSIUM 25 MG: 25 TABLET, FILM COATED ORAL at 09:43

## 2020-07-27 RX ADMIN — SODIUM CHLORIDE 100 ML/HR: 0.9 INJECTION, SOLUTION INTRAVENOUS at 07:09

## 2020-07-27 NOTE — ASSESSMENT & PLAN NOTE
Lab Results   Component Value Date    HGBA1C 7 3 (H) 07/24/2020       Recent Labs     07/26/20  1112 07/26/20  1502 07/26/20  2041 07/27/20  0816   POCGLU 166* 259* 301* 167*       Blood Sugar Average: Last 72 hrs:  (P) 211   A1C 7 3  · Resume regimen at discharge

## 2020-07-27 NOTE — DISCHARGE SUMMARY
Discharge- Keyla Stevoman 1950, 79 y o  male MRN: [id number]    Unit/Bed#: S -01 Encounter: [id number]    Primary Care Provider: Dave Velazco MD   Date and time admitted to hospital: 7/24/2020 10:12 AM    * Acute cystitis  Assessment & Plan  Abnormal UA/micro  Urine culture >100k e coli  Blood cultures negative  · Transition to oral keflex to complete course   · Noted leukocytosis (22 89 in ER on 7/23, down to 17 35K on admission and resolved as of 7/25)  · No other sepsis criteria and CT scan negative for pyelo/stones; however, patient did clinically complain of left-sided back and flank pain and did present with chills and vomiting which may suggest early pyelonephritis  · Recommend outpatient urology follow up  · Notes significant improvement in symptoms with flomax         Nausea and vomiting  Assessment & Plan  Patient presented with nausea and vomiting, therefore was unable to tolerate PO antibiotics  · Consider possible pyelonephritis  · Tolerating diet now without nausea or vomiting     Hyponatremia  Assessment & Plan  suspect in setting of poor oral intake  · Improved with IV fluid    Black stool  Assessment & Plan  Patient with 1 episode dark brown/ black diarrhea which occurred after patient took laxatives on Thursday 7/23  Of note he is chronically on iron for a long time  · Hemoglobin stable at 13  · Occult stool negative       Prostate cancer [facility])  Assessment & Plan  · Recently diagnosed prostate ca denise 9   Last seen by urology 7/8/20  · Recently started Lupron  · Requested Urology see him while admitted, they recommended starting Flomax an outpatient follow-up with his primary urologist    Benign essential hypertension  Assessment & Plan  · Continue home medication    Type 2 diabetes mellitus with insulin therapy [facility])  Assessment & Plan  Lab Results   Component Value Date    HGBA1C 7 3 (H) 07/24/2020       Recent Labs     07/26/20  1112 07/26/20  1502 07/26/20  2041 07/27/20  3933 POCGLU 166* 259* 301* 167*       Blood Sugar Average: Last 72 hrs:  (P) 211   A1C 7 3  · Resume regimen at discharge    Constipation  Assessment & Plan  · Encourage bowel regimen       Discharging Physician / Practitioner: Sarwat Carrion PA-C  PCP: Loli Connell MD  Admission Date:   Admission Orders (From admission, onward)     Ordered        07/24/20 1211  Inpatient Admission (expected length of stay for this patient Order details is greater than two midnights)  Once                   Discharge Date: 07/27/20    Resolved Problems  Date Reviewed: 7/27/2020    None          Consultations During Hospital Stay:  · Urology    Procedures Performed:   · Urine culture:  Greater than 100,000 cfu/mL E coli  · Blood cultures:  Negative  · Fecal occult blood test:  Negative    Significant Findings / Test Results:   · See above    Incidental Findings:   · None     Test Results Pending at Discharge (will require follow up): · None     Outpatient Tests Requested:  · Follow-up with urologist and PCP    Complications:  None    Reason for Admission:  Nausea vomiting    Hospital Course:     Mandi Crow is a 79 y o  male patient with past medical history as delineated above who originally presented to the hospital on 7/24/2020 due to nausea and vomiting as well as left-sided and lower abdominal pain  Could recently been seen in the ER for a UTI and was given oral antibiotics, however given complaints of nausea and vomiting could not tolerate these  Patient also reported a dark bowel movement  Workup was done as above, he greatly improved and will be discharged home  Please see above list of diagnoses and related plan for additional information  Condition at Discharge: stable     Discharge Day Visit / Exam:     Subjective:  Doing well today  Wants to go home  Feels back to baseline     Vitals: Blood Pressure: 124/60 (07/27/20 0723)  Pulse: (!) 53 (07/27/20 0723)  Temperature: 98 °F (36 7 °C) (07/27/20 0723)  Temp Source: Oral (07/27/20 3115)  Respirations: 16 (07/27/20 0723)  Weight - Scale: 103 kg (226 lb 3 1 oz) (07/24/20 1017)  SpO2: 94 % (07/27/20 0723)  Exam:   Physical Exam   Constitutional: He is oriented to person, place, and time  No distress  Cardiovascular: Normal rate and regular rhythm  Pulmonary/Chest: Effort normal and breath sounds normal  No respiratory distress  Musculoskeletal: He exhibits no edema  Neurological: He is alert and oriented to person, place, and time  Nursing note and vitals reviewed  Discussion with Family: patient  Discharge instructions/Information to patient and family:   See after visit summary for information provided to patient and family  Provisions for Follow-Up Care:  See after visit summary for information related to follow-up care and any pertinent home health orders  Disposition:     Home    For Discharges to [facility]:   · Not Applicable to this Patient - Not Applicable to this Patient    Planned Readmission: no     Discharge Statement:  I spent 34 minutes discharging the patient  This time was spent on the day of discharge  I had direct contact with the patient on the day of discharge  Greater than 50% of the total time was spent examining patient, answering all patient questions, arranging and discussing plan of care with patient as well as directly providing post-discharge instructions  Additional time then spent on discharge activities  Discharge Medications:  See after visit summary for reconciled discharge medications provided to patient and family        ** Please Note: This note has been constructed using a voice recognition system **

## 2020-07-27 NOTE — ASSESSMENT & PLAN NOTE
· Recently diagnosed prostate ca denise 9   Last seen by urology 7/8/20  · Recently started Lupron  · Requested Urology see him while admitted, they recommended starting Flomax an outpatient follow-up with his primary urologist

## 2020-07-27 NOTE — ASSESSMENT & PLAN NOTE
Abnormal UA/micro  Urine culture >100k e coli   Blood cultures negative  · Transition to oral keflex to complete course   · Noted leukocytosis (22 89 in ER on 7/23, down to 17 35K on admission and resolved as of 7/25)  · No other sepsis criteria and CT scan negative for pyelo/stones; however, patient did clinically complain of left-sided back and flank pain and did present with chills and vomiting which may suggest early pyelonephritis  · Recommend outpatient urology follow up  · Notes significant improvement in symptoms with flomax

## 2020-07-27 NOTE — ASSESSMENT & PLAN NOTE
Patient presented with nausea and vomiting, therefore was unable to tolerate PO antibiotics  · Consider possible pyelonephritis  · Tolerating diet now without nausea or vomiting

## 2020-07-28 ENCOUNTER — TRANSITIONAL CARE MANAGEMENT (OUTPATIENT)
Dept: FAMILY MEDICINE CLINIC | Facility: CLINIC | Age: 70
End: 2020-07-28

## 2020-07-28 ENCOUNTER — HOSPITAL ENCOUNTER (EMERGENCY)
Facility: HOSPITAL | Age: 70
Discharge: HOME | End: 2020-07-28
Attending: EMERGENCY MEDICINE | Admitting: EMERGENCY MEDICINE
Payer: COMMERCIAL

## 2020-07-28 ENCOUNTER — APPOINTMENT (EMERGENCY)
Dept: CT IMAGING | Facility: HOSPITAL | Age: 70
End: 2020-07-28
Payer: COMMERCIAL

## 2020-07-28 VITALS
HEART RATE: 68 BPM | OXYGEN SATURATION: 96 % | SYSTOLIC BLOOD PRESSURE: 134 MMHG | RESPIRATION RATE: 18 BRPM | DIASTOLIC BLOOD PRESSURE: 66 MMHG | TEMPERATURE: 98.1 F

## 2020-07-28 DIAGNOSIS — R10.10 UPPER ABDOMINAL PAIN: ICD-10-CM

## 2020-07-28 DIAGNOSIS — R11.2 NAUSEA AND VOMITING, INTRACTABILITY OF VOMITING NOT SPECIFIED, UNSPECIFIED VOMITING TYPE: Primary | ICD-10-CM

## 2020-07-28 LAB
ALBUMIN SERPL BCP-MCNC: 2.9 G/DL (ref 3.5–5)
ALP SERPL-CCNC: 197 U/L (ref 46–116)
ALT SERPL W P-5'-P-CCNC: 176 U/L (ref 12–78)
ANION GAP SERPL CALCULATED.3IONS-SCNC: 9 MMOL/L (ref 4–13)
AST SERPL W P-5'-P-CCNC: 89 U/L (ref 5–45)
ATRIAL RATE: 47 BPM
BACTERIA BLD CULT: NORMAL
BACTERIA BLD CULT: NORMAL
BASOPHILS # BLD AUTO: 0.03 THOUSANDS/ΜL (ref 0–0.1)
BASOPHILS NFR BLD AUTO: 1 % (ref 0–1)
BILIRUB SERPL-MCNC: 0.59 MG/DL (ref 0.2–1)
BUN SERPL-MCNC: 12 MG/DL (ref 5–25)
CALCIUM SERPL-MCNC: 8.6 MG/DL (ref 8.3–10.1)
CHLORIDE SERPL-SCNC: 104 MMOL/L (ref 100–108)
CO2 SERPL-SCNC: 26 MMOL/L (ref 21–32)
CREAT SERPL-MCNC: 0.92 MG/DL (ref 0.6–1.3)
EOSINOPHIL # BLD AUTO: 0.02 THOUSAND/ΜL (ref 0–0.61)
EOSINOPHIL NFR BLD AUTO: 0 % (ref 0–6)
ERYTHROCYTE [DISTWIDTH] IN BLOOD BY AUTOMATED COUNT: 12.7 % (ref 11.6–15.1)
GFR SERPL CREATININE-BSD FRML MDRD: 84 ML/MIN/1.73SQ M
GLUCOSE SERPL-MCNC: 225 MG/DL (ref 65–140)
HCT VFR BLD AUTO: 38.4 % (ref 36.5–49.3)
HGB BLD-MCNC: 13.1 G/DL (ref 12–17)
IMM GRANULOCYTES # BLD AUTO: 0.04 THOUSAND/UL (ref 0–0.2)
IMM GRANULOCYTES NFR BLD AUTO: 1 % (ref 0–2)
LACTATE SERPL-SCNC: 2 MMOL/L (ref 0.5–2)
LIPASE SERPL-CCNC: 168 U/L (ref 73–393)
LYMPHOCYTES # BLD AUTO: 0.58 THOUSANDS/ΜL (ref 0.6–4.47)
LYMPHOCYTES NFR BLD AUTO: 10 % (ref 14–44)
MCH RBC QN AUTO: 30.8 PG (ref 26.8–34.3)
MCHC RBC AUTO-ENTMCNC: 34.1 G/DL (ref 31.4–37.4)
MCV RBC AUTO: 90 FL (ref 82–98)
MONOCYTES # BLD AUTO: 0.62 THOUSAND/ΜL (ref 0.17–1.22)
MONOCYTES NFR BLD AUTO: 11 % (ref 4–12)
NEUTROPHILS # BLD AUTO: 4.5 THOUSANDS/ΜL (ref 1.85–7.62)
NEUTS SEG NFR BLD AUTO: 77 % (ref 43–75)
NRBC BLD AUTO-RTO: 0 /100 WBCS
P AXIS: 55 DEGREES
PLATELET # BLD AUTO: 177 THOUSANDS/UL (ref 149–390)
PMV BLD AUTO: 11.5 FL (ref 8.9–12.7)
POTASSIUM SERPL-SCNC: 3.8 MMOL/L (ref 3.5–5.3)
PR INTERVAL: 152 MS
PROT SERPL-MCNC: 6.8 G/DL (ref 6.4–8.2)
QRS AXIS: 51 DEGREES
QRSD INTERVAL: 88 MS
QT INTERVAL: 466 MS
QTC INTERVAL: 412 MS
RBC # BLD AUTO: 4.25 MILLION/UL (ref 3.88–5.62)
SODIUM SERPL-SCNC: 139 MMOL/L (ref 136–145)
T WAVE AXIS: 37 DEGREES
TROPONIN I SERPL-MCNC: <0.02 NG/ML
VENTRICULAR RATE: 47 BPM
WBC # BLD AUTO: 5.79 THOUSAND/UL (ref 4.31–10.16)

## 2020-07-28 PROCEDURE — 85025 COMPLETE CBC W/AUTO DIFF WBC: CPT | Performed by: EMERGENCY MEDICINE

## 2020-07-28 PROCEDURE — 80053 COMPREHEN METABOLIC PANEL: CPT | Performed by: EMERGENCY MEDICINE

## 2020-07-28 PROCEDURE — 99285 EMERGENCY DEPT VISIT HI MDM: CPT | Performed by: EMERGENCY MEDICINE

## 2020-07-28 PROCEDURE — 99284 EMERGENCY DEPT VISIT MOD MDM: CPT

## 2020-07-28 PROCEDURE — 83690 ASSAY OF LIPASE: CPT | Performed by: EMERGENCY MEDICINE

## 2020-07-28 PROCEDURE — 96375 TX/PRO/DX INJ NEW DRUG ADDON: CPT

## 2020-07-28 PROCEDURE — 96376 TX/PRO/DX INJ SAME DRUG ADON: CPT

## 2020-07-28 PROCEDURE — 83605 ASSAY OF LACTIC ACID: CPT | Performed by: EMERGENCY MEDICINE

## 2020-07-28 PROCEDURE — 84484 ASSAY OF TROPONIN QUANT: CPT | Performed by: EMERGENCY MEDICINE

## 2020-07-28 PROCEDURE — 93010 ELECTROCARDIOGRAM REPORT: CPT | Performed by: INTERNAL MEDICINE

## 2020-07-28 PROCEDURE — 96374 THER/PROPH/DIAG INJ IV PUSH: CPT

## 2020-07-28 PROCEDURE — 93005 ELECTROCARDIOGRAM TRACING: CPT

## 2020-07-28 PROCEDURE — 36415 COLL VENOUS BLD VENIPUNCTURE: CPT | Performed by: EMERGENCY MEDICINE

## 2020-07-28 PROCEDURE — 96361 HYDRATE IV INFUSION ADD-ON: CPT

## 2020-07-28 PROCEDURE — 74177 CT ABD & PELVIS W/CONTRAST: CPT

## 2020-07-28 RX ORDER — ONDANSETRON 2 MG/ML
4 INJECTION INTRAMUSCULAR; INTRAVENOUS ONCE
Status: COMPLETED | OUTPATIENT
Start: 2020-07-28 | End: 2020-07-28

## 2020-07-28 RX ORDER — MORPHINE SULFATE 10 MG/ML
8 INJECTION, SOLUTION INTRAMUSCULAR; INTRAVENOUS ONCE
Status: COMPLETED | OUTPATIENT
Start: 2020-07-28 | End: 2020-07-28

## 2020-07-28 RX ORDER — SODIUM CHLORIDE, SODIUM LACTATE, POTASSIUM CHLORIDE, CALCIUM CHLORIDE 600; 310; 30; 20 MG/100ML; MG/100ML; MG/100ML; MG/100ML
500 INJECTION, SOLUTION INTRAVENOUS CONTINUOUS
Status: DISCONTINUED | OUTPATIENT
Start: 2020-07-28 | End: 2020-07-28 | Stop reason: HOSPADM

## 2020-07-28 RX ADMIN — MORPHINE SULFATE 8 MG: 10 INJECTION INTRAVENOUS at 10:26

## 2020-07-28 RX ADMIN — SODIUM CHLORIDE, SODIUM LACTATE, POTASSIUM CHLORIDE, AND CALCIUM CHLORIDE 500 ML: .6; .31; .03; .02 INJECTION, SOLUTION INTRAVENOUS at 10:25

## 2020-07-28 RX ADMIN — ONDANSETRON 4 MG: 2 INJECTION INTRAMUSCULAR; INTRAVENOUS at 10:25

## 2020-07-28 RX ADMIN — MORPHINE SULFATE 8 MG: 10 INJECTION INTRAVENOUS at 11:30

## 2020-07-28 RX ADMIN — IOHEXOL 100 ML: 350 INJECTION, SOLUTION INTRAVENOUS at 13:06

## 2020-07-28 NOTE — UTILIZATION REVIEW
Notification of Discharge  This is a Notification of Discharge from our facility [address]  Please be advised that this patient has been discharge from our facility  Below you will find the admission and discharge date and time including the patients disposition  PRESENTATION DATE: 7/24/2020 10:12 AM  OBS ADMISSION DATE:   IP ADMISSION DATE: 7/24/20 1211   DISCHARGE DATE: 7/27/2020 12:41 PM  DISPOSITION: Home/Self Care Home/Self Care   Admission Orders listed below:  Admission Orders (From admission, onward)     Ordered        07/24/20 1211  Inpatient Admission (expected length of stay for this patient Order details is greater than two midnights)  Once                   Please contact the UR Department if additional information is required to close this patient's authorization/case  [address] [facility] Utilization Review Department  Main: [phone] x carefully listen to the prompts  All voicemails are confidential   [email or web address]  Send all requests for admission clinical reviews, approved or denied determinations and any other requests to dedicated fax number below belonging to the campus where the patient is receiving treatment   List of dedicated fax numbers:  [address] DENIALS (Administrative/Medical Necessity) [phone]   [address] (Maternity/NICU/Pediatrics) [phone]   Aicha Bustamante [phone]   [facility] [phone]   [facility]  [phone]   [facility] [address] [facility] [phone]   [facility]  [phone]   [address] [facility] [address]  [address] [facility] [address] [facility] [phone]

## 2020-07-28 NOTE — ED PROCEDURE NOTE
PROCEDURE  ECG 12 Lead Documentation Only  Date/Time: 7/28/2020 10:39 AM  Performed by: Jonatan Allen MD  Authorized by: Jonatan Allen MD     Indications / Diagnosis:  Upper abd pain- bradycardia  ECG reviewed by me, the ED Provider: yes    Patient location:  ED  Previous ECG:     Previous ECG:  Compared to current    Comparison ECG info:  1/13/20- other than decreased rate- no other sign changes    Similarity:  No change    Comparison to cardiac monitor: Yes    Interpretation:     Interpretation: non-specific    Rate:     ECG rate:  47    ECG rate assessment: bradycardic    Rhythm:     Rhythm: sinus bradycardia    Ectopy:     Ectopy: none    QRS:     QRS axis:  Normal    QRS intervals:  Normal  Conduction:     Conduction: normal    ST segments:     ST segments:  Normal  T waves:     T waves: flattening      Flattening:  III and V1  Other findings:     Other findings: U wave    Comments:      No ecg signs of ischemia/ injury / r heart strain/ kyleigh/pericarditis         Jonatan Allen MD  07/28/20 1043

## 2020-07-28 NOTE — DISCHARGE INSTRUCTIONS
Diagnosis: upper abdominal pain with nausea/ vomiting       - diet as tolerated    - please take medications as before     - for any nausea/ vomiting: zofran  2 tablets dissolve in the mouth  every 6-8 hrs as needed    - please return to  the er for any fevers- temp > 100 4/ any intractable vomiting/ any bloody /coffee ground vomiting/ any  worsening/ intractable abdominal pain/ any bloody /black tarry stools  or any new/ worsening/concerning symptoms to you     - if symptoms become more frequnt will need to call the bariatric surgeons to schedule an appointment

## 2020-07-28 NOTE — ED PROVIDER NOTES
History  Chief Complaint   Patient presents with    Vomiting     PT presents w/ABD pain and vomiting since being DC'd yesterday  was treated for UTI and kidney infection     79 YR MALE- WITH RECENT ADMIT D/C- FOR PRESUMED UTI- HAD BACK PAIN ABD PAIN /V-  D/NICOLA YESTERDAY ON KEFLEX- UC NEVER GREW OUT ANYTHING-- AFTER EATING SMALL AMOUNT LAST NIGHT WITH  MULTIPLE EPISODES OF NON BLOODY VOMITING/ UPPER ABD PAIN -- --  NO FEVERS- STATES WITH VOMITING PAIN GOES UP ITNO CHEST-- NO DIARRHEA- NO CHANGE IN STOOLS -- NO  COMPS--  ALSO C/O SMALE LEFT SIDED BACK PAIN WHICH IS NOT NEW --  HAD BEFORE  DURING RECENT HOSP-- NO ILL CONTACTS       History provided by:  Patient and spouse   used: No    Vomiting   Severity:  Severe  Timing:  Constant  Quality:  Stomach contents  Associated symptoms: abdominal pain    Associated symptoms: no arthralgias, no chills, no diarrhea, no fever and no myalgias        Prior to Admission Medications   Prescriptions Last Dose Informant Patient Reported? Taking? Biotin 1000 MCG tablet  Self Yes No   Sig: Take 1 tablet by mouth daily in the early morning    Blood Glucose Monitoring Suppl (GLUCOCARD VITAL MONITOR) w/Device KIT  Self No No   Sig: by Does not apply route 2 (two) times a day   Cholecalciferol (VITAMIN D3) 2000 units capsule  Self Yes No   Sig: Take 1,000 Units by mouth daily in the early morning    Cyanocobalamin (VITAMIN B-12) 5000 MCG TBDP  Self Yes No   Sig: Take 5,000 mcg by mouth once a week Takes on Mondays   Insulin Pen Needle 32G X 4 MM MISC  Self No No   Sig: by Does not apply route daily   Lancets (LIFESCAN UNISTIK 2) MISC  Self Yes No   Sig: Lifescan One Touch Ultramini Meter; use as directed; 1; 0; 31-Oct-2016; 31-Oct-2016; Melida Duran;  Active   Mirabegron ER 25 MG TB24  Self No No   Sig: TAKE ONE TABLET BY MOUTH EVERY DAY AT BEDTIME   Multiple Vitamin (MULTIVITAMIN) capsule  Self Yes No   Sig: Take 1 capsule by mouth daily in the early morning NOVOLOG MIX 70/30 FLEXPEN (70-30) 100 units/mL injection pen  Self No No   Sig: INJECT 20 UNITS UNDER THE SKIN EVERY 12 HOURS   Patient taking differently: Inject 10 Units under the skin daily after breakfast    VICTOZA injection  Self No No   Sig: INJECT 1 8MG UNDER THE SKIN ONCE DAILY   Patient taking differently: Inject 1 8 mg under the skin daily in the early morning    albuterol (PROAIR HFA) 90 mcg/act inhaler  Self Yes No   Sig: inhale 2 puff by inhalation route  every 4 - 6 hours as needed   atorvastatin (LIPITOR) 10 mg tablet  Self No No   Sig: Take 1 tablet (10 mg total) by mouth daily   calcium carbonate (OS-GILDA) 600 MG tablet  Self Yes No   Sig: Take 600 mg by mouth daily in the early morning    cephalexin (KEFLEX) 500 mg capsule   No No   Sig: Take 1 capsule (500 mg total) by mouth every 12 (twelve) hours for 3 days   ferrous sulfate 324 (65 Fe) mg  Self No No   Sig: Take 1 tablet (324 mg total) by mouth daily before breakfast   glipiZIDE (GLUCOTROL XL) 5 mg 24 hr tablet  Self No No   Sig: Take 1 tablet (5 mg total) by mouth daily after breakfast   losartan (COZAAR) 25 mg tablet   No No   Sig: TAKE ONE TABLET BY MOUTH EVERY DAY   ondansetron (ZOFRAN-ODT) 4 mg disintegrating tablet   No No   Sig: Take 1 tablet (4 mg total) by mouth every 8 (eight) hours as needed for nausea or vomiting for up to 5 days   tamsulosin (FLOMAX) 0 4 mg   No No   Sig: Take 1 capsule (0 4 mg total) by mouth daily with dinner      Facility-Administered Medications: None       Past Medical History:   Diagnosis Date    Anemia     Arthritis     Diabetes mellitus (HCC)     IDDM    Diverticulosis     Enlarged prostate     Erectile dysfunction     Hypercholesteremia     Resolved post weight loss    Hypertension     Resolved post weight loss    Kidney stone     Obesity     Prostate cancer ([address] )     Wears partial dentures     partial upper and lower       Past Surgical History:   Procedure Laterality Date    ABDOMINAL ADHESION SURGERY N/A 11/28/2016    Procedure: EXTENSIVE LYSIS ADHESIONS;  Surgeon: Raoul Mei MD;  [facility];  Service:    Yamini Barronusipallavi FRACTURE SURGERY Left     CHOLECYSTECTOMY      COLON SURGERY      colon resection    COLONOSCOPY      COLOSTOMY      COLOSTOMY CLOSURE      DIAGNOSTIC LAPAROSCOPY      GASTRIC BYPASS      failed due to adhesions    HERNIA REPAIR      abdominal    FL BIOPSY OF PROSTATE,NEEDLE/PUNCH N/A 5/8/2020    Procedure: TRANSRECTAL NEEDLE BIOPSY PROSTATE (TRNBP) WITH TRANSRECTAL ULTRASOUND GUIDANCE;  Surgeon: Shelley Matos MD;  [facility];  Service: Urology    FL CYSTOSCOPY,DIR VIS INT URETHROTOMY N/A 5/8/2020    Procedure: DIRECT VISUAL INTERAL URETHROTOMY (DVIU), dilation of urethral stricture;  Surgeon: Shelley Matos MD;  [facility];  Service: Urology    FL CYSTOURETHRO W/IMPLANT N/A 3/8/2019    Procedure: CYSTOSCOPY WITH INSERTION [place] Areas;  Surgeon: Shelley Matos MD;  [facility];  Service: Urology    FL CYSTOURETHRO W/IMPLANT N/A 5/8/2020    Procedure: CYSTOSCOPY WITH INSERTION [place] Areas;  Surgeon: Shelley Matos MD;  [facility];  Service: Urology    FL [address] DUODENUM/JEJUNUM N/A 10/25/2018    Procedure: LINEAR ENDOSCOPIC U/S;  Surgeon: Jian Mccoy MD;  [facility]; Service: Gastroenterology    FL ESOPHAGOGASTRODUODENOSCOPY TRANSORAL DIAGNOSTIC N/A 7/6/2016    Procedure: EGD AND COLONOSCOPY;  Surgeon: Ema Rodríguez MD;  [facility];   Service: Gastroenterology    FL LAP, ANEESH RESTRICT PROC, LONGITUDINAL GASTRECTOMY N/A 11/28/2016    Procedure: GASTRECTOMY SLEEVE LAPAROSCOPIC;  Surgeon: Raoul Mei MD;  [facility];  Service: [address] [place] BIOPSY  5/8/2020       Family History   Problem Relation Age of Onset    Heart disease Mother     Breast cancer Mother 80    Diabetes Father     Colon cancer Neg Hx  Liver disease Neg Hx      I have reviewed and agree with the history as documented  E-Cigarette/Vaping    E-Cigarette Use Never User      E-Cigarette/Vaping Substances    Nicotine No     THC No     CBD No     Flavoring No     Other No     Unknown No      Social History     Tobacco Use    Smoking status: Former Smoker     Last attempt to quit: 11/10/2001     Years since quittin 7    Smokeless tobacco: Former User   Substance Use Topics    Alcohol use: No    Drug use: No       Review of Systems   Constitutional: Positive for activity change and appetite change  Negative for chills, diaphoresis, fatigue, fever and unexpected weight change  HENT: Negative  Eyes: Negative  Respiratory: Negative  Cardiovascular: Negative  Gastrointestinal: Positive for abdominal pain, nausea and vomiting  Negative for abdominal distention, anal bleeding, blood in stool, constipation, diarrhea and rectal pain  Endocrine: Negative  Genitourinary: Negative  Musculoskeletal: Positive for back pain  Negative for arthralgias, gait problem, joint swelling, myalgias, neck pain and neck stiffness  Skin: Negative  Allergic/Immunologic: Negative  Neurological: Negative  Hematological: Negative  Psychiatric/Behavioral: Negative  Physical Exam  Physical Exam   Constitutional: He is oriented to person, place, and time  He appears well-developed and well-nourished  He appears distressed  AVSS-- PULSE  % ON RA- INTERPRETATION IS NORMAL- NO INTERVENTION    HENT:   Head: Normocephalic and atraumatic  Eyes: Pupils are equal, round, and reactive to light  Conjunctivae and EOM are normal  Right eye exhibits no discharge  Left eye exhibits no discharge  No scleral icterus  MM PINK   Neck: Normal range of motion  Neck supple  No JVD present  No tracheal deviation present  No thyromegaly present     NO PMT C SPINE    Cardiovascular: Regular rhythm, normal heart sounds and intact distal pulses  Exam reveals no gallop and no friction rub  No murmur heard  Pulmonary/Chest: Effort normal and breath sounds normal  No stridor  No respiratory distress  He has no wheezes  He has no rales  He exhibits no tenderness  Abdominal: Soft  Bowel sounds are normal  He exhibits no distension and no mass  There is tenderness  There is no rebound and no guarding  No hernia  BUQ/EPIGASTRIC/ SUPRAUMBILICAL TENDERNESS- REST OF ABD SOFT-  NO CVA TENDERNESS- NO PERITONEAL SIGNS- NO PULSATILE ABD MASS/BRUIT/ TENDENRESS   Musculoskeletal: Normal range of motion  He exhibits no edema, tenderness or deformity  EQUAL BILATERAL RADIAL/DP PULSES- NO BLE EDEMA/CALF TENDERNESS/ASYM/ ERYTHEMA   Lymphadenopathy:     He has no cervical adenopathy  Neurological: He is alert and oriented to person, place, and time  No cranial nerve deficit or sensory deficit  He exhibits normal muscle tone  Coordination normal    NON FOCAL NEURO EXAM    Skin: Skin is warm  Capillary refill takes less than 2 seconds  No rash noted  He is not diaphoretic  No erythema  No pallor  Psychiatric: He has a normal mood and affect  His behavior is normal  Judgment and thought content normal    Nursing note and vitals reviewed        Vital Signs  ED Triage Vitals [07/28/20 0905]   Temperature Pulse Respirations Blood Pressure SpO2   98 1 °F (36 7 °C) 68 18 169/70 100 %      Temp Source Heart Rate Source Patient Position - Orthostatic VS BP Location FiO2 (%)   Oral Monitor Sitting Left arm --      Pain Score       Worst Possible Pain           Vitals:    07/28/20 0905   BP: 169/70   Pulse: 68   Patient Position - Orthostatic VS: Sitting         Visual Acuity      ED Medications  Medications   lactated ringers infusion 500 mL (has no administration in time range)   ondansetron (ZOFRAN) injection 4 mg (has no administration in time range)   morphine (PF) 10 mg/mL injection 8 mg (has no administration in time range)       Diagnostic Studies  Results Reviewed     Procedure Component Value Units Date/Time    CBC and differential [[id number]]     Lab Status:  No result Specimen:  Blood     Comprehensive metabolic panel [[id number]]     Lab Status:  No result Specimen:  Blood     Lipase [[id number]]     Lab Status:  No result Specimen:  Blood     Lactic acid, plasma [[id number]]     Lab Status:  No result Specimen:  Blood                  No orders to display              Procedures  Procedures         ED Course  ED Course as of Jul 28 1853   Tue Jul 28, 2020   0935 ER MD NOTE- RECENT D /C SUMMARY /LABS/ IMAGING ALL REVIEWED BY ER MD --       5 - er md note- pt- re-evaluated- feels mildly improved with pain - points to epigastrium- will re dose iv  morphine and order ct scan of abd/pelvis       1201 - er md note- pt- re-evaluated- sound asleep- waiting for ct scan       1340 ER MD NOTE- PT- RE-EVALUATED- FEELS MUCH IMPROVED - NOW SIPPING ON LIQUIDS IN ER - AWARE OF WAITING FOR CT SCAN RESULTS       1428 - ER MD NOTE- PT RE-EVALUATED- DISCUSSED RESULTS OF CT SCAN - PT NOW STATES FEELS BACK TO NORMAL WITH NO COMPS -- WILL GIVE DIET TRIAL PRIOR TO ER D/C      1536 Er md note- pt- re-evaluated- feels improved tolerated solids with no problems- abd soft nt on exam - at time of d/c pt is completely asymptomatic                                                MDM      Disposition  Final diagnoses:   None     ED Disposition     None      Follow-up Information    None         Patient's Medications   Discharge Prescriptions    No medications on file     No discharge procedures on file      PDMP Review     None          ED Provider  Electronically Signed by           Florinda Buck MD  07/28/20 9337

## 2020-07-28 NOTE — ED PROCEDURE NOTE
PROCEDURE  CriticalCare Time  Performed by: Silvina Zavala MD  Authorized by: Silvina Zavala MD     Critical care provider statement:     Critical care time (minutes):  35    Critical care start time:  7/28/2020 1:30 PM    Critical care end time:  7/28/2020 2:05 PM    Critical care time was exclusive of:  Separately billable procedures and treating other patients and teaching time    Critical care was necessary to treat or prevent imminent or life-threatening deterioration of the following conditions: repeat evaluation s adn re exams     Critical care was time spent personally by me on the following activities:  Examination of patient, evaluation of patient's response to treatment, development of treatment plan with patient or surrogate, obtaining history from patient or surrogate, review of old charts, re-evaluation of patient's condition, ordering and review of radiographic studies and ordering and review of laboratory studies    I assumed direction of critical care for this patient from another provider in my specialty: no           Silvina Zavala MD  07/28/20 9333

## 2020-07-29 LAB — BACTERIA BLD CULT: NORMAL

## 2020-08-02 ENCOUNTER — APPOINTMENT (EMERGENCY)
Dept: RADIOLOGY | Facility: HOSPITAL | Age: 70
End: 2020-08-02
Payer: COMMERCIAL

## 2020-08-02 ENCOUNTER — HOSPITAL ENCOUNTER (OUTPATIENT)
Facility: HOSPITAL | Age: 70
Setting detail: OBSERVATION
Discharge: HOME | End: 2020-08-04
Attending: EMERGENCY MEDICINE | Admitting: INTERNAL MEDICINE
Payer: COMMERCIAL

## 2020-08-02 DIAGNOSIS — R10.84 GENERALIZED ABDOMINAL PAIN: ICD-10-CM

## 2020-08-02 DIAGNOSIS — R10.9 INTRACTABLE ABDOMINAL PAIN: ICD-10-CM

## 2020-08-02 DIAGNOSIS — R11.0 NAUSEA: ICD-10-CM

## 2020-08-02 DIAGNOSIS — R10.9 ABDOMINAL PAIN: Primary | ICD-10-CM

## 2020-08-02 LAB
ALBUMIN SERPL BCP-MCNC: 3.5 G/DL (ref 3.5–5)
ALP SERPL-CCNC: 159 U/L (ref 46–116)
ALT SERPL W P-5'-P-CCNC: 91 U/L (ref 12–78)
ANION GAP SERPL CALCULATED.3IONS-SCNC: 5 MMOL/L (ref 4–13)
AST SERPL W P-5'-P-CCNC: 35 U/L (ref 5–45)
ATRIAL RATE: 56 BPM
BACTERIA UR QL AUTO: ABNORMAL /HPF
BASOPHILS # BLD AUTO: 0.06 THOUSANDS/ΜL (ref 0–0.1)
BASOPHILS NFR BLD AUTO: 1 % (ref 0–1)
BILIRUB SERPL-MCNC: 0.56 MG/DL (ref 0.2–1)
BILIRUB UR QL STRIP: NEGATIVE
BUN SERPL-MCNC: 23 MG/DL (ref 5–25)
CALCIUM SERPL-MCNC: 10.1 MG/DL (ref 8.3–10.1)
CHLORIDE SERPL-SCNC: 104 MMOL/L (ref 100–108)
CLARITY UR: ABNORMAL
CO2 SERPL-SCNC: 28 MMOL/L (ref 21–32)
COLOR UR: YELLOW
CREAT SERPL-MCNC: 1.02 MG/DL (ref 0.6–1.3)
EOSINOPHIL # BLD AUTO: 0.09 THOUSAND/ΜL (ref 0–0.61)
EOSINOPHIL NFR BLD AUTO: 1 % (ref 0–6)
ERYTHROCYTE [DISTWIDTH] IN BLOOD BY AUTOMATED COUNT: 12.9 % (ref 11.6–15.1)
GFR SERPL CREATININE-BSD FRML MDRD: 74 ML/MIN/1.73SQ M
GLUCOSE SERPL-MCNC: 139 MG/DL (ref 65–140)
GLUCOSE SERPL-MCNC: 260 MG/DL (ref 65–140)
GLUCOSE UR STRIP-MCNC: ABNORMAL MG/DL
HCT VFR BLD AUTO: 36.9 % (ref 36.5–49.3)
HGB BLD-MCNC: 12.3 G/DL (ref 12–17)
HGB UR QL STRIP.AUTO: NEGATIVE
HYALINE CASTS #/AREA URNS LPF: ABNORMAL /LPF
IMM GRANULOCYTES # BLD AUTO: 0.17 THOUSAND/UL (ref 0–0.2)
IMM GRANULOCYTES NFR BLD AUTO: 2 % (ref 0–2)
KETONES UR STRIP-MCNC: NEGATIVE MG/DL
LEUKOCYTE ESTERASE UR QL STRIP: NEGATIVE
LIPASE SERPL-CCNC: 240 U/L (ref 73–393)
LYMPHOCYTES # BLD AUTO: 0.76 THOUSANDS/ΜL (ref 0.6–4.47)
LYMPHOCYTES NFR BLD AUTO: 9 % (ref 14–44)
MCH RBC QN AUTO: 30.3 PG (ref 26.8–34.3)
MCHC RBC AUTO-ENTMCNC: 33.3 G/DL (ref 31.4–37.4)
MCV RBC AUTO: 91 FL (ref 82–98)
MONOCYTES # BLD AUTO: 0.55 THOUSAND/ΜL (ref 0.17–1.22)
MONOCYTES NFR BLD AUTO: 7 % (ref 4–12)
NEUTROPHILS # BLD AUTO: 6.43 THOUSANDS/ΜL (ref 1.85–7.62)
NEUTS SEG NFR BLD AUTO: 80 % (ref 43–75)
NITRITE UR QL STRIP: NEGATIVE
NON-SQ EPI CELLS URNS QL MICRO: ABNORMAL /HPF
NRBC BLD AUTO-RTO: 0 /100 WBCS
P AXIS: 65 DEGREES
PH UR STRIP.AUTO: 8 [PH]
PLATELET # BLD AUTO: 154 THOUSANDS/UL (ref 149–390)
PMV BLD AUTO: 12.6 FL (ref 8.9–12.7)
POTASSIUM SERPL-SCNC: 4.6 MMOL/L (ref 3.5–5.3)
PR INTERVAL: 160 MS
PROT SERPL-MCNC: 7.5 G/DL (ref 6.4–8.2)
PROT UR STRIP-MCNC: NEGATIVE MG/DL
QRS AXIS: 63 DEGREES
QRSD INTERVAL: 84 MS
QT INTERVAL: 434 MS
QTC INTERVAL: 415 MS
RBC # BLD AUTO: 4.06 MILLION/UL (ref 3.88–5.62)
RBC #/AREA URNS AUTO: ABNORMAL /HPF
SODIUM SERPL-SCNC: 137 MMOL/L (ref 136–145)
SP GR UR STRIP.AUTO: 1.02 (ref 1–1.03)
T WAVE AXIS: 54 DEGREES
TROPONIN I SERPL-MCNC: <0.02 NG/ML
UROBILINOGEN UR QL STRIP.AUTO: 1 E.U./DL
VENTRICULAR RATE: 55 BPM
WBC # BLD AUTO: 8.06 THOUSAND/UL (ref 4.31–10.16)
WBC #/AREA URNS AUTO: ABNORMAL /HPF

## 2020-08-02 PROCEDURE — 96375 TX/PRO/DX INJ NEW DRUG ADDON: CPT

## 2020-08-02 PROCEDURE — 99285 EMERGENCY DEPT VISIT HI MDM: CPT | Performed by: EMERGENCY MEDICINE

## 2020-08-02 PROCEDURE — 83690 ASSAY OF LIPASE: CPT | Performed by: EMERGENCY MEDICINE

## 2020-08-02 PROCEDURE — 82948 REAGENT STRIP/BLOOD GLUCOSE: CPT

## 2020-08-02 PROCEDURE — C9113 INJ PANTOPRAZOLE SODIUM, VIA: HCPCS | Performed by: STUDENT IN AN ORGANIZED HEALTH CARE EDUCATION/TRAINING PROGRAM

## 2020-08-02 PROCEDURE — 99285 EMERGENCY DEPT VISIT HI MDM: CPT

## 2020-08-02 PROCEDURE — 96376 TX/PRO/DX INJ SAME DRUG ADON: CPT

## 2020-08-02 PROCEDURE — 36415 COLL VENOUS BLD VENIPUNCTURE: CPT

## 2020-08-02 PROCEDURE — 93005 ELECTROCARDIOGRAM TRACING: CPT

## 2020-08-02 PROCEDURE — 81001 URINALYSIS AUTO W/SCOPE: CPT | Performed by: STUDENT IN AN ORGANIZED HEALTH CARE EDUCATION/TRAINING PROGRAM

## 2020-08-02 PROCEDURE — G1004 CDSM NDSC: HCPCS

## 2020-08-02 PROCEDURE — 99219 PR INITIAL OBSERVATION CARE/DAY 50 MINUTES: CPT | Performed by: INTERNAL MEDICINE

## 2020-08-02 PROCEDURE — 74177 CT ABD & PELVIS W/CONTRAST: CPT

## 2020-08-02 PROCEDURE — 93010 ELECTROCARDIOGRAM REPORT: CPT | Performed by: INTERNAL MEDICINE

## 2020-08-02 PROCEDURE — 84484 ASSAY OF TROPONIN QUANT: CPT | Performed by: STUDENT IN AN ORGANIZED HEALTH CARE EDUCATION/TRAINING PROGRAM

## 2020-08-02 PROCEDURE — 74018 RADEX ABDOMEN 1 VIEW: CPT

## 2020-08-02 PROCEDURE — 80053 COMPREHEN METABOLIC PANEL: CPT | Performed by: EMERGENCY MEDICINE

## 2020-08-02 PROCEDURE — 96365 THER/PROPH/DIAG IV INF INIT: CPT

## 2020-08-02 PROCEDURE — 85025 COMPLETE CBC W/AUTO DIFF WBC: CPT | Performed by: EMERGENCY MEDICINE

## 2020-08-02 RX ORDER — PANTOPRAZOLE SODIUM 40 MG/1
40 INJECTION, POWDER, FOR SOLUTION INTRAVENOUS EVERY 12 HOURS SCHEDULED
Status: DISCONTINUED | OUTPATIENT
Start: 2020-08-03 | End: 2020-08-04 | Stop reason: HOSPADM

## 2020-08-02 RX ORDER — MORPHINE SULFATE 10 MG/ML
10 INJECTION, SOLUTION INTRAMUSCULAR; INTRAVENOUS ONCE
Status: COMPLETED | OUTPATIENT
Start: 2020-08-02 | End: 2020-08-02

## 2020-08-02 RX ORDER — ONDANSETRON 2 MG/ML
4 INJECTION INTRAMUSCULAR; INTRAVENOUS EVERY 8 HOURS PRN
Status: DISCONTINUED | OUTPATIENT
Start: 2020-08-02 | End: 2020-08-03

## 2020-08-02 RX ORDER — OXYBUTYNIN CHLORIDE 5 MG/1
5 TABLET, EXTENDED RELEASE ORAL DAILY
Status: DISCONTINUED | OUTPATIENT
Start: 2020-08-03 | End: 2020-08-04 | Stop reason: HOSPADM

## 2020-08-02 RX ORDER — LOSARTAN POTASSIUM 25 MG/1
25 TABLET ORAL DAILY
Status: DISCONTINUED | OUTPATIENT
Start: 2020-08-03 | End: 2020-08-04 | Stop reason: HOSPADM

## 2020-08-02 RX ORDER — ATORVASTATIN CALCIUM 10 MG/1
10 TABLET, FILM COATED ORAL
Status: DISCONTINUED | OUTPATIENT
Start: 2020-08-02 | End: 2020-08-04 | Stop reason: HOSPADM

## 2020-08-02 RX ORDER — MORPHINE SULFATE 4 MG/ML
4 INJECTION, SOLUTION INTRAMUSCULAR; INTRAVENOUS ONCE
Status: COMPLETED | OUTPATIENT
Start: 2020-08-02 | End: 2020-08-02

## 2020-08-02 RX ORDER — ONDANSETRON 2 MG/ML
4 INJECTION INTRAMUSCULAR; INTRAVENOUS ONCE
Status: COMPLETED | OUTPATIENT
Start: 2020-08-02 | End: 2020-08-02

## 2020-08-02 RX ORDER — BIOTIN 1 MG
1 TABLET ORAL
Status: DISCONTINUED | OUTPATIENT
Start: 2020-08-03 | End: 2020-08-02

## 2020-08-02 RX ORDER — TAMSULOSIN HYDROCHLORIDE 0.4 MG/1
0.4 CAPSULE ORAL
Status: DISCONTINUED | OUTPATIENT
Start: 2020-08-02 | End: 2020-08-04 | Stop reason: HOSPADM

## 2020-08-02 RX ORDER — ALBUTEROL SULFATE 90 UG/1
1 AEROSOL, METERED RESPIRATORY (INHALATION) EVERY 4 HOURS PRN
Status: DISCONTINUED | OUTPATIENT
Start: 2020-08-02 | End: 2020-08-04 | Stop reason: HOSPADM

## 2020-08-02 RX ORDER — DICYCLOMINE HYDROCHLORIDE 10 MG/1
10 CAPSULE ORAL
Status: DISCONTINUED | OUTPATIENT
Start: 2020-08-02 | End: 2020-08-04 | Stop reason: HOSPADM

## 2020-08-02 RX ADMIN — MORPHINE SULFATE 10 MG: 10 INJECTION INTRAVENOUS at 12:06

## 2020-08-02 RX ADMIN — SODIUM CHLORIDE 80 MG: 9 INJECTION, SOLUTION INTRAVENOUS at 12:47

## 2020-08-02 RX ADMIN — IOHEXOL 100 ML: 350 INJECTION, SOLUTION INTRAVENOUS at 16:09

## 2020-08-02 RX ADMIN — ONDANSETRON 4 MG: 2 INJECTION INTRAMUSCULAR; INTRAVENOUS at 11:12

## 2020-08-02 RX ADMIN — ONDANSETRON 4 MG: 2 INJECTION INTRAMUSCULAR; INTRAVENOUS at 15:17

## 2020-08-02 RX ADMIN — MORPHINE SULFATE 4 MG: 4 INJECTION INTRAVENOUS at 15:17

## 2020-08-02 RX ADMIN — DICYCLOMINE HYDROCHLORIDE 10 MG: 10 CAPSULE ORAL at 21:13

## 2020-08-02 RX ADMIN — IOHEXOL 50 ML: 240 INJECTION, SOLUTION INTRATHECAL; INTRAVASCULAR; INTRAVENOUS; ORAL at 13:22

## 2020-08-02 RX ADMIN — ATORVASTATIN CALCIUM 10 MG: 10 TABLET, FILM COATED ORAL at 18:21

## 2020-08-02 RX ADMIN — TAMSULOSIN HYDROCHLORIDE 0.4 MG: 0.4 CAPSULE ORAL at 18:21

## 2020-08-02 RX ADMIN — DICYCLOMINE HYDROCHLORIDE 10 MG: 10 CAPSULE ORAL at 18:21

## 2020-08-02 NOTE — ED NOTES
Pharmacy called to send protonix at this time, tech reports they will send it when it is ready      Kelly Person RN  08/02/20 [id number]

## 2020-08-02 NOTE — ED PROVIDER NOTES
History  Chief Complaint   Patient presents with    Abdominal Pain     Pt was recently seen at [facility] for similar, pt having abd pain, and vomiting returning this morning  Pt lethargic in waiting room      72-year-old male past medical history of GERD, gastric sleeve, diabetes, recent hospitalization for cystitis 7/24 through 07/27/2020, and recent ED visit for abdominal pain with nausea and vomiting on 07/28/2020 is here today for abdominal pain and nausea and vomiting  Patient states that his but abdominal pain today is the same as it was on the 28th  The abdominal pain started suddenly at 7:00 a m  This morning is described as a sharp pain that is diffuse and constant, he has not tried any palliative treatments on his own today  The pain that he experienced on 07/28/2020 was alleviated with morphine and antinausea medication and he has not felt pain since ED discharge until this morning  Patient admits to vomiting multiple times this morning  Vomitus is described as bilious and nonbloody  The pain radiates up to his chest   Patient denies any diarrhea, fever, chills, dysuria, blood in stool, and shortness of breath  Patient is able to pass gas  Abdominal Pain   Pain location:  Generalized  Pain quality: sharp    Pain radiates to:  Chest  Pain severity:  Severe  Onset quality:  Sudden  Timing:  Constant  Progression:  Unchanged  Chronicity:  Recurrent  Relieved by:  Nothing  Worsened by:  Nothing  Ineffective treatments:  None tried  Associated symptoms: chest pain, nausea and vomiting    Associated symptoms: no chills, no constipation, no cough, no diarrhea, no dysuria, no fatigue, no fever, no flatus, no hematemesis, no hematochezia, no hematuria and no shortness of breath    Risk factors: multiple surgeries        Prior to Admission Medications   Prescriptions Last Dose Informant Patient Reported? Taking?    Biotin 1000 MCG tablet  Self Yes No   Sig: Take 1 tablet by mouth daily in the early morning Blood Glucose Monitoring Suppl (GLUCOCARD VITAL MONITOR) w/Device KIT  Self No No   Sig: by Does not apply route 2 (two) times a day   Cholecalciferol (VITAMIN D3) 2000 units capsule  Self Yes No   Sig: Take 1,000 Units by mouth daily in the early morning    Cyanocobalamin (VITAMIN B-12) 5000 MCG TBDP  Self Yes No   Sig: Take 5,000 mcg by mouth once a week Takes on Mondays   Insulin Pen Needle 32G X 4 MM MISC  Self No No   Sig: by Does not apply route daily   Lancets (LIFESCAN UNISTIK 2) MISC  Self Yes No   Sig: Lifescan One Touch Ultramini Meter; use as directed; 1; 0; 31-Oct-2016; 31-Oct-2016; Melida Duran;  Active   Mirabegron ER 25 MG TB24  Self No No   Sig: TAKE ONE TABLET BY MOUTH EVERY DAY AT BEDTIME   Multiple Vitamin (MULTIVITAMIN) capsule  Self Yes No   Sig: Take 1 capsule by mouth daily in the early morning    NOVOLOG MIX 70/30 FLEXPEN (70-30) 100 units/mL injection pen  Self No No   Sig: INJECT 20 UNITS UNDER THE SKIN EVERY 12 HOURS   Patient taking differently: Inject 10 Units under the skin daily after breakfast    VICTOZA injection  Self No No   Sig: INJECT 1 8MG UNDER THE SKIN ONCE DAILY   Patient taking differently: Inject 1 8 mg under the skin daily in the early morning    albuterol (PROAIR HFA) 90 mcg/act inhaler  Self Yes No   Sig: inhale 2 puff by inhalation route  every 4 - 6 hours as needed   atorvastatin (LIPITOR) 10 mg tablet  Self No No   Sig: Take 1 tablet (10 mg total) by mouth daily   calcium carbonate (OS-GILDA) 600 MG tablet  Self Yes No   Sig: Take 600 mg by mouth daily in the early morning    ferrous sulfate 324 (65 Fe) mg  Self No No   Sig: Take 1 tablet (324 mg total) by mouth daily before breakfast   glipiZIDE (GLUCOTROL XL) 5 mg 24 hr tablet  Self No No   Sig: Take 1 tablet (5 mg total) by mouth daily after breakfast   losartan (COZAAR) 25 mg tablet   No No   Sig: TAKE ONE TABLET BY MOUTH EVERY DAY   ondansetron (ZOFRAN-ODT) 4 mg disintegrating tablet   No No   Sig: Take 1 tablet (4 mg total) by mouth every 8 (eight) hours as needed for nausea or vomiting for up to 5 days   tamsulosin (FLOMAX) 0 4 mg   No No   Sig: Take 1 capsule (0 4 mg total) by mouth daily with dinner      Facility-Administered Medications: None       Past Medical History:   Diagnosis Date    Anemia     Arthritis     Diabetes mellitus ([facility] )     IDDM    Diverticulosis     Enlarged prostate     Erectile dysfunction     Hypercholesteremia     Resolved post weight loss    Hypertension     Resolved post weight loss    Kidney stone     Obesity     Prostate cancer ([facility] )     Wears partial dentures     partial upper and lower       Past Surgical History:   Procedure Laterality Date    ABDOMINAL ADHESION SURGERY N/A 11/28/2016    Procedure: EXTENSIVE LYSIS ADHESIONS;  Surgeon: Areli Alexander MD;  [facility];  Service:    [facility] FRACTURE SURGERY Left     CHOLECYSTECTOMY      COLON SURGERY      colon resection    COLONOSCOPY      COLOSTOMY      COLOSTOMY CLOSURE      DIAGNOSTIC LAPAROSCOPY      GASTRIC BYPASS      failed due to adhesions    HERNIA REPAIR      abdominal    FL BIOPSY OF PROSTATE,NEEDLE/PUNCH N/A 5/8/2020    Procedure: TRANSRECTAL NEEDLE BIOPSY PROSTATE (TRNBP) WITH TRANSRECTAL ULTRASOUND GUIDANCE;  Surgeon: Maximiliano Reinoso MD;  [facility];  Service: Urology    FL CYSTOSCOPY,DIR VIS INT URETHROTOMY N/A 5/8/2020    Procedure: DIRECT VISUAL INTERAL URETHROTOMY (DVIU), dilation of urethral stricture;  Surgeon: Maximiliano Reinoso MD;  [facility];  Service: Urology    FL CYSTOURETHRO W/IMPLANT N/A 3/8/2019    Procedure: CYSTOSCOPY WITH INSERTION Earnesteen Ba;  Surgeon: Maximiliano Reinoso MD;  [facility];  Service: Urology    FL CYSTOURETHRO W/IMPLANT N/A 5/8/2020    Procedure: CYSTOSCOPY WITH Daina Gum;  Surgeon: Maximiliano Reinsoo MD;  [facility];  Service: Urology    FL EDG US EXAM SURGICAL ALTER STOM DUODENUM/JEJUNUM N/A 10/25/2018    Procedure: LINEAR ENDOSCOPIC U/S;  Surgeon: Mery Sierra MD;  Location: [facility]; Service: Gastroenterology    WY ESOPHAGOGASTRODUODENOSCOPY TRANSORAL DIAGNOSTIC N/A 2016    Procedure: EGD AND COLONOSCOPY;  Surgeon: Pal Wray MD;  [facility]: [facility]; Service: Gastroenterology    WY LAP, ANEESH RESTRICT PROC, LONGITUDINAL GASTRECTOMY N/A 2016    Procedure: GASTRECTOMY SLEEVE LAPAROSCOPIC;  Surgeon: Sonia Robles MD;  [facility];  Service: [address] [place] [address] [place] BIOPSY  2020       Family History   Problem Relation Age of Onset    Heart disease Mother     Breast cancer Mother 80    Diabetes Father     Colon cancer Neg Hx     Liver disease Neg Hx      I have reviewed and agree with the history as documented  E-Cigarette/Vaping    E-Cigarette Use Never User      E-Cigarette/Vaping Substances    Nicotine No     THC No     CBD No     Flavoring No     Other No     Unknown No      Social History     Tobacco Use    Smoking status: Former Smoker     Last attempt to quit: 11/10/2001     Years since quittin 7    Smokeless tobacco: Former User   Substance Use Topics    Alcohol use: No    Drug use: No        Review of Systems   Constitutional: Negative for chills, fatigue and fever  HENT: Negative for ear pain and postnasal drip  Eyes: Negative for pain  Respiratory: Negative for cough and shortness of breath  Cardiovascular: Positive for chest pain  Gastrointestinal: Positive for abdominal pain, nausea and vomiting  Negative for constipation, diarrhea, flatus, hematemesis and hematochezia  Endocrine: Negative for cold intolerance  Genitourinary: Negative for dysuria and hematuria  Musculoskeletal: Negative for back pain  Skin: Negative for color change  Neurological: Negative for tremors  Psychiatric/Behavioral: Negative for behavioral problems  All other systems reviewed and are negative        Physical Exam  ED Triage Vitals   Temperature Pulse Respirations Blood Pressure SpO2   08/02/20 1056 08/02/20 1056 08/02/20 1056 08/02/20 1102 08/02/20 1056   (!) 97 1 °F (36 2 °C) (!) 53 20 170/80 96 %      Temp Source Heart Rate Source Patient Position - Orthostatic VS BP Location FiO2 (%)   08/02/20 1056 08/02/20 1056 08/02/20 1056 08/02/20 1056 --   Tympanic Monitor Lying Right arm       Pain Score       08/02/20 1056       Worst Possible Pain             Orthostatic Vital Signs  Vitals:    08/02/20 1400 08/02/20 1430 08/02/20 1530 08/02/20 1556   BP: 146/78 151/66 142/65 127/60   Pulse: 56 64 (!) 54 57   Patient Position - Orthostatic VS:   Lying Lying       Physical Exam  Constitutional:       Appearance: He is well-developed  He is not toxic-appearing or diaphoretic  HENT:      Head: Normocephalic and atraumatic  Eyes:      Extraocular Movements: Extraocular movements intact  Cardiovascular:      Rate and Rhythm: Regular rhythm  Bradycardia present  Heart sounds: Normal heart sounds  Pulmonary:      Effort: Pulmonary effort is normal  No respiratory distress  Breath sounds: Normal breath sounds  No stridor  No wheezing, rhonchi or rales  Chest:      Chest wall: No tenderness  Abdominal:      General: Abdomen is protuberant  Bowel sounds are normal  There is no distension  Palpations: Abdomen is soft  Tenderness: There is generalized abdominal tenderness and tenderness in the epigastric area, left upper quadrant and left lower quadrant  Skin:     General: Skin is warm  Capillary Refill: Capillary refill takes less than 2 seconds  Neurological:      Mental Status: He is alert           ED Medications  Medications   ondansetron (ZOFRAN) injection 4 mg (4 mg Intravenous Given 8/2/20 1112)   morphine (PF) 10 mg/mL injection 10 mg (10 mg Intravenous Given 8/2/20 1206)   pantoprazole (PROTONIX) 80 mg in sodium chloride 0 9 % 100 mL IVPB (0 mg Intravenous Stopped 8/2/20 1322)   iohexol (OMNIPAQUE) 240 MG/ML solution 50 mL (50 mL Oral Given 8/2/20 1322)   morphine (PF) 4 mg/mL injection 4 mg (4 mg Intravenous Given 8/2/20 1517)   ondansetron (ZOFRAN) injection 4 mg (4 mg Intravenous Given 8/2/20 1517)   iohexol (OMNIPAQUE) 350 MG/ML injection (MULTI-DOSE) 100 mL (100 mL Intravenous Given 8/2/20 1609)       Diagnostic Studies  Results Reviewed     Procedure Component Value Units Date/Time    Urinalysis with microscopic [[id number]]  (Abnormal) Collected:  08/02/20 1500    Lab Status:  Final result Specimen:  Urine, Clean Catch Updated:  08/02/20 1530     Clarity, UA Turbid     Color, UA Yellow     Specific Gravity, UA 1 022     pH, UA 8 0     Glucose,  (1/10%) mg/dl      Ketones, UA Negative mg/dl      Blood, UA Negative     Protein, UA Negative mg/dl      Nitrite, UA Negative     Bilirubin, UA Negative     Urobilinogen, UA 1 0 E U /dl      Leukocytes, UA Negative     WBC, UA None Seen /hpf      RBC, UA None Seen /hpf      Hyaline Casts, UA None Seen /lpf      Bacteria, UA None Seen /hpf      Epithelial Cells None Seen /hpf     Troponin I [[id number]]  (Normal) Collected:  08/02/20 1207    Lab Status:  Final result Specimen:  Blood from Arm, Left Updated:  08/02/20 1242     Troponin I <0 02 ng/mL     Comprehensive metabolic panel [[id number]]  (Abnormal) Collected:  08/02/20 1113    Lab Status:  Final result Specimen:  Blood from Arm, Left Updated:  08/02/20 1145     Sodium 137 mmol/L      Potassium 4 6 mmol/L      Chloride 104 mmol/L      CO2 28 mmol/L      ANION GAP 5 mmol/L      BUN 23 mg/dL      Creatinine 1 02 mg/dL      Glucose 260 mg/dL      Calcium 10 1 mg/dL      AST 35 U/L      ALT 91 U/L      Alkaline Phosphatase 159 U/L      Total Protein 7 5 g/dL      Albumin 3 5 g/dL      Total Bilirubin 0 56 mg/dL      eGFR 74 ml/min/1 73sq m     Narrative:       [place] guidelines for Chronic Kidney Disease (CKD):     Stage 1 with normal or high GFR (GFR > 90 mL/min/1 73 square meters)    Stage 2 Mild CKD (GFR = 60-89 mL/min/1 73 square meters)    Stage 3A Moderate CKD (GFR = 45-59 mL/min/1 73 square meters)    Stage 3B Moderate CKD (GFR = 30-44 mL/min/1 73 square meters)    Stage 4 Severe CKD (GFR = 15-29 mL/min/1 73 square meters)    Stage 5 End Stage CKD (GFR <15 mL/min/1 73 square meters)  Note: GFR calculation is accurate only with a steady state creatinine    Lipase [[id number]]  (Normal) Collected:  08/02/20 1113    Lab Status:  Final result Specimen:  Blood from Arm, Left Updated:  08/02/20 1145     Lipase 240 u/L     CBC and differential [[id number]]  (Abnormal) Collected:  08/02/20 1113    Lab Status:  Final result Specimen:  Blood from Arm, Left Updated:  08/02/20 1130     WBC 8 06 Thousand/uL      RBC 4 06 Million/uL      Hemoglobin 12 3 g/dL      Hematocrit 36 9 %      MCV 91 fL      MCH 30 3 pg      MCHC 33 3 g/dL      RDW 12 9 %      MPV 12 6 fL      Platelets 584 Thousands/uL      nRBC 0 /100 WBCs      Neutrophils Relative 80 %      Immat GRANS % 2 %      Lymphocytes Relative 9 %      Monocytes Relative 7 %      Eosinophils Relative 1 %      Basophils Relative 1 %      Neutrophils Absolute 6 43 Thousands/µL      Immature Grans Absolute 0 17 Thousand/uL      Lymphocytes Absolute 0 76 Thousands/µL      Monocytes Absolute 0 55 Thousand/µL      Eosinophils Absolute 0 09 Thousand/µL      Basophils Absolute 0 06 Thousands/µL                  CT abdomen pelvis with contrast   Final Result by Judi Bhatia MD (08/02 1632)      1  No acute abnormality within the abdomen or pelvis  2   Stable incidental findings as above  Workstation performed: [id number]         XR abdomen 1 view kub   Final Result by Judi Bhatia MD (08/02 1633)      Unremarkable abdomen                 Workstation performed: [id number]               Procedures  Procedures      ED Course  ED Course as of Aug 02 1705   Elsa Sample Aug 02, 2020   1256 Spoke with Dr Marco Kwon with bariatric on the phone  He recommends doing oral and IV contrast CT of abdomen  He suggested starting the patient on Carafate 4 times a day and Protonix twice a day for possible esophagitis or ulcer  Cannot say that there is a emergent process happening from a 3year-old bariatric surgery  1309 Patient's pain and nausea is well controlled on current medication regimen  1412 XR abdomen 1 view kub   1635 CT abdomen pelvis with contrast       US AUDIT      Most Recent Value   Initial Alcohol Screen: US AUDIT-C    1  How often do you have a drink containing alcohol?  0 Filed at: 08/02/2020 1054   2  How many drinks containing alcohol do you have on a typical day you are drinking? 0 Filed at: 08/02/2020 1054   3b  FEMALE Any Age, or MALE 65+: How often do you have 4 or more drinks on one occassion? 0 Filed at: 08/02/2020 1054   Audit-C Score  0 Filed at: 08/02/2020 1054              Identification of Seniors at Risk      Most Recent Value   (ISAR) Identification of Seniors at Risk   Before the illness or injury that brought you to the Emergency, did you need someone to help you on a regular basis? 0 Filed at: 08/02/2020 1055   In the last 24 hours, have you needed more help than usual?  0 Filed at: 08/02/2020 1055   Have you been hospitalized for one or more nights during the past 6 months? 1 Filed at: 08/02/2020 1055   In general, do you see well?  0 Filed at: 08/02/2020 1055   In general, do you have serious problems with your memory? 0 Filed at: 08/02/2020 1055   Do you take more than three different medications every day? 1 Filed at: 08/02/2020 1055   ISAR Score  2 Filed at: 08/02/2020 1055          LORRAINE/DAST-10      Most Recent Value   How many times in the past year have you    Used an illegal drug or used a prescription medication for non-medical reasons?   Never Filed at: 08/02/2020 1054                              MDM  Number of Diagnoses or Management Options  Abdominal pain:   Generalized abdominal pain:   Nausea:   Diagnosis management comments: 79-year-old male past medical history of GERD, gastric sleeve, diabetes, recent hospitalization for cystitis 7/24 through 07/27/2020, and recent ED visit for abdominal pain with nausea and vomiting on 07/28/2020 is here today for abdominal pain and nausea and vomiting  The patient's 2nd visit to the ED for abdominal pain and 4 days  DDx:  ACS, bowel perforation, pancreatitis, small-bowel obstruction, appendicitis, ulcer     Lipase:  240  Troponin:  Less than 0 02      CT abdomen pelvis with IV and oral contrast shows:  No changes from previous EKG no acute intra-abdominal findings    Admit to [facility] for intractable abdominal pain  Disposition  Final diagnoses:   Abdominal pain   Nausea   Generalized abdominal pain     Time reflects when diagnosis was documented in both MDM as applicable and the Disposition within this note     Time User Action Codes Description Comment    8/2/2020  5:05 PM Chiquis Comfort Add [R10 9] Abdominal pain     8/2/2020  5:05 PM Chiquis Comfort Add [R11 0] Nausea     8/2/2020  5:05 PM Chiquis Comfort Add [R10 84] Generalized abdominal pain       ED Disposition     ED Disposition Condition Date/Time Comment    Admit Stable Sun Aug 2, 2020  5:05 PM Case was discussed with Dr Nelson Aqq  106 and the patient's admission status was agreed to be Admission Status: observation status to the service of [facility]   Follow-up Information    None         Current Discharge Medication List      CONTINUE these medications which have NOT CHANGED    Details   albuterol (PROAIR HFA) 90 mcg/act inhaler inhale 2 puff by inhalation route  every 4 - 6 hours as needed    Comments: Substitution to a formulary equivalent within the same pharmaceutical class is authorized        atorvastatin (LIPITOR) 10 mg tablet Take 1 tablet (10 mg total) by mouth daily  Qty: 90 tablet, Refills: 3    Associated Diagnoses: Type 2 diabetes mellitus with insulin therapy ([facility] )      Biotin 1000 MCG tablet Take 1 tablet by mouth daily in the early morning       Blood Glucose Monitoring Suppl (GLUCOCARD VITAL MONITOR) w/Device KIT by Does not apply route 2 (two) times a day  Qty: 1 kit, Refills: 0    Associated Diagnoses: Type 2 diabetes mellitus with insulin therapy (HCC)      calcium carbonate (OS-GILDA) 600 MG tablet Take 600 mg by mouth daily in the early morning       Cholecalciferol (VITAMIN D3) 2000 units capsule Take 1,000 Units by mouth daily in the early morning       Cyanocobalamin (VITAMIN B-12) 5000 MCG TBDP Take 5,000 mcg by mouth once a week Takes on Mondays      ferrous sulfate 324 (65 Fe) mg Take 1 tablet (324 mg total) by mouth daily before breakfast  Qty: 90 tablet, Refills: 0    Associated Diagnoses: Postgastrectomy malabsorption      glipiZIDE (GLUCOTROL XL) 5 mg 24 hr tablet Take 1 tablet (5 mg total) by mouth daily after breakfast  Qty: 90 tablet, Refills: 3    Associated Diagnoses: Type 2 diabetes mellitus with insulin therapy ([facility])      Insulin Pen Needle 32G X 4 MM MISC by Does not apply route daily  Qty: 100 each, Refills: 5    Associated Diagnoses: Type 2 diabetes mellitus with insulin therapy ([facility])      Lancets (LIFESCAN UNISTIK 2) MISC Lifescan One Touch Ultramini Meter; use as directed; 1; 0; 31-Oct-2016; 31-Oct-2016; Melida Duran;  Active      losartan (COZAAR) 25 mg tablet TAKE ONE TABLET BY MOUTH EVERY DAY  Qty: 30 tablet, Refills: 0    Associated Diagnoses: Benign essential hypertension      Mirabegron ER 25 MG TB24 TAKE ONE TABLET BY MOUTH EVERY DAY AT BEDTIME  Qty: 30 tablet, Refills: 6    Associated Diagnoses: Benign prostatic hyperplasia with urinary frequency      Multiple Vitamin (MULTIVITAMIN) capsule Take 1 capsule by mouth daily in the early morning       NOVOLOG MIX 70/30 FLEXPEN (70-30) 100 units/mL injection pen INJECT 20 UNITS UNDER THE SKIN EVERY 12 HOURS  Qty: 15 mL, Refills: 5    Associated Diagnoses: Type 2 diabetes mellitus without complication, without long-term current use of insulin (HCC)      ondansetron (ZOFRAN-ODT) 4 mg disintegrating tablet Take 1 tablet (4 mg total) by mouth every 8 (eight) hours as needed for nausea or vomiting for up to 5 days  Qty: 12 tablet, Refills: 0    Associated Diagnoses: Urinary tract infection      tamsulosin (FLOMAX) 0 4 mg Take 1 capsule (0 4 mg total) by mouth daily with dinner  Qty: 30 capsule, Refills: 0    Associated Diagnoses: Benign prostatic hyperplasia with urinary frequency      VICTOZA injection INJECT 1 8MG UNDER THE SKIN ONCE DAILY  Qty: 9 mL, Refills: 5    Associated Diagnoses: Type 2 diabetes mellitus with insulin therapy ([facility] )           No discharge procedures on file  PDMP Review     None           ED Provider  Attending physically available and evaluated Praneethoziel Isaura ORTEGA managed the patient along with the ED Attending      Electronically Signed by         Beatrice Wells DO  08/02/20 1767

## 2020-08-02 NOTE — ASSESSMENT & PLAN NOTE
Lab Results   Component Value Date    HGBA1C 7 3 (H) 07/24/2020       No results for input(s): POCGLU in the last 72 hours  Blood Sugar Average: Last 72 hrs:     Hold home regimen  Sliding-scale insulin for now

## 2020-08-02 NOTE — PLAN OF CARE
Problem: Potential for Falls  Goal: Patient will remain free of falls  Description: INTERVENTIONS:  - Assess patient frequently for physical needs  -  Identify cognitive and physical deficits and behaviors that affect risk of falls  -  [place] fall precautions as indicated by assessment   - Educate patient/family on patient safety including physical limitations  - Instruct patient to call for assistance with activity based on assessment  - Modify environment to reduce risk of injury  - Consider OT/PT consult to assist with strengthening/mobility  Outcome: Progressing     Problem: Nutrition/Hydration-ADULT  Goal: Nutrient/Hydration intake appropriate for improving, restoring or maintaining nutritional needs  Description: Monitor and assess patient's nutrition/hydration status for malnutrition  Collaborate with interdisciplinary team and initiate plan and interventions as ordered  Monitor patient's weight and dietary intake as ordered or per policy  Utilize nutrition screening tool and intervene as necessary  Determine patient's food preferences and provide high-protein, high-caloric foods as appropriate       INTERVENTIONS:  - Monitor oral intake, urinary output, labs, and treatment plans  - Assess nutrition and hydration status and recommend course of action  - Evaluate amount of meals eaten  - Assist patient with eating if necessary   - Allow adequate time for meals  - Recommend/ encourage appropriate diets, oral nutritional supplements, and vitamin/mineral supplements  - Order, calculate, and assess calorie counts as needed  - Recommend, monitor, and adjust tube feedings and TPN/PPN based on assessed needs  - Assess need for intravenous fluids  - Provide specific nutrition/hydration education as appropriate  - Include patient/family/caregiver in decisions related to nutrition  Outcome: Progressing

## 2020-08-02 NOTE — ASSESSMENT & PLAN NOTE
Etiology unclear  Patient has had multiple CT abdomen recently had multiple ER visits  ? Due to Lupron  CT abdomen unremarkable  Patient has known history of gastric sleeve surgery 4 years ago  Patient feeling better on my evaluation as he got IV morphine 10 mg and 4 mg  Continue Protonix 40 IV b i d     Avoid opioids  Bentyl for abdominal pain  Clear liquid diet for now    GI consult to evaluate for EGD given the multiple ER visits

## 2020-08-02 NOTE — H&P
H&P- Shante Dong 1950, 79 y o  male MRN: [id number]    Unit/Bed#: ED 25 Encounter: [id number]    Primary Care Provider: Dayron Matthews MD   Date and time admitted to hospital: 8/2/2020 10:53 AM        * Intractable abdominal pain with nausea and vomiting  Assessment & Plan  Etiology unclear  Patient has had multiple CT abdomen recently had multiple ER visits  ? Due to Lupron  CT abdomen unremarkable  Patient has known history of gastric sleeve surgery 4 years ago  Patient feeling better on my evaluation as he got IV morphine 10 mg and 4 mg  Continue Protonix 40 IV b i d     Avoid opioids  Bentyl for abdominal pain  Clear liquid diet for now  GI consult to evaluate for EGD given the multiple ER visits    Prostate cancer [facility])  Assessment & Plan  Following with urology - gets Lupron shots q 6 months  Also scheduled for radiation     Benign essential hypertension  Assessment & Plan  Well controlled  Continue home meds  Type 2 diabetes mellitus with insulin therapy [facility])  Assessment & Plan  Lab Results   Component Value Date    HGBA1C 7 3 (H) 07/24/2020       No results for input(s): POCGLU in the last 72 hours  Blood Sugar Average: Last 72 hrs:     Hold home regimen  Sliding-scale insulin for now  VTE Prophylaxis: Heparin  / sequential compression device   Code Status: full  POLST: POLST form is not discussed and not completed at this time  Discussion with family: wife at bedside    Anticipated Length of Stay:  Patient will be admitted on an Observation basis with an anticipated length of stay of  < 2 midnights  Justification for Hospital Stay: above    Total Time for Visit, including Counseling / Coordination of Care: 45 minutes  Greater than 50% of this total time spent on direct patient counseling and coordination of care      Chief Complaint:   Left-sided abdominal pain with multiple episodes of nausea and vomiting since last night    History of Present Illness:    Shante Dong is a 79 y o  male who presents with Left-sided abdominal pain with multiple episodes of nausea and vomiting since last night  It woke him up from sleep  The pain is mostly left-sided  No specific aggravating or elevating factors  Associated multiple episodes of nausea and nonbloody vomiting  Patient said he has not been able to keep anything down  Birdmaxim Bessy he had similar pain on Tuesday, the day after he was discharged from [facility]  He went back [facility]  CT of the abdomen was done which was unremarkable and he was sent back home  He was feeling fine up until early this morning  Review of Systems:    Review of Systems   Constitutional: Negative for chills and fever  HENT: Negative for congestion and sore throat  Respiratory: Negative for cough and shortness of breath  Cardiovascular: Negative for chest pain and palpitations  Gastrointestinal: Positive for abdominal pain, nausea and vomiting  Negative for diarrhea  Genitourinary: Negative for difficulty urinating, dysuria, flank pain, frequency and urgency  Musculoskeletal: Negative for arthralgias, back pain and myalgias  Skin: Negative for color change and rash  Neurological: Negative for dizziness, light-headedness and headaches  Psychiatric/Behavioral: Negative for agitation, behavioral problems and confusion         Past Medical and Surgical History:     Past Medical History:   Diagnosis Date    Anemia     Arthritis     Diabetes mellitus ([facility] [address] )     IDDM    Diverticulosis     Enlarged prostate     Erectile dysfunction     Hypercholesteremia     Resolved post weight loss    Hypertension     Resolved post weight loss    Kidney stone     Obesity     Prostate cancer ([facility] [address] [facility]     Wears partial dentures     partial upper and lower       Past Surgical History:   Procedure Laterality Date    ABDOMINAL ADHESION SURGERY N/A 11/28/2016    Procedure: EXTENSIVE LYSIS ADHESIONS;  Surgeon: Rosalie Fierro MD;  [facility];  Service:     ANKLE FRACTURE SURGERY Left     CHOLECYSTECTOMY      COLON SURGERY      colon resection    COLONOSCOPY      COLOSTOMY      COLOSTOMY CLOSURE      DIAGNOSTIC LAPAROSCOPY      GASTRIC BYPASS      failed due to adhesions    HERNIA REPAIR      abdominal    MO BIOPSY OF PROSTATE,NEEDLE/PUNCH N/A 5/8/2020    Procedure: TRANSRECTAL NEEDLE BIOPSY PROSTATE (TRNBP) WITH TRANSRECTAL ULTRASOUND GUIDANCE;  Surgeon: Cedric Angeles MD;  [facility];  Service: Urology    MO CYSTOSCOPY,DIR VIS INT URETHROTOMY N/A 5/8/2020    Procedure: DIRECT VISUAL INTERAL URETHROTOMY (DVIU), dilation of urethral stricture;  Surgeon: Cedric Angeles MD;  [facility];  Service: Urology    MO CYSTOURETHRO W/IMPLANT N/A 3/8/2019    Procedure: CYSTOSCOPY WITH INSERTION Eh Brett;  Surgeon: Cedric Angeles MD;  [facility];  Service: Urology    MO CYSTOURETHRO W/IMPLANT N/A 5/8/2020    Procedure: CYSTOSCOPY WITH INSERTION Eh Brett;  Surgeon: Cedric Angeles MD;  [facility];  Service: Urology    MO [address] DUODENUM/JEJUNUM N/A 10/25/2018    Procedure: LINEAR ENDOSCOPIC U/S;  Surgeon: Yan Ramirez MD;  [facility]; Service: Gastroenterology    MO ESOPHAGOGASTRODUODENOSCOPY TRANSORAL DIAGNOSTIC N/A 7/6/2016    Procedure: EGD AND COLONOSCOPY;  Surgeon: Ольга Hampton MD;  [facility]; Service: Gastroenterology    MO LAP, ANEESH RESTRICT PROC, LONGITUDINAL GASTRECTOMY N/A 11/28/2016    Procedure: GASTRECTOMY SLEEVE LAPAROSCOPIC;  Surgeon: Carrie Valencia MD;  [facility];  Service: Bariatrics    TONSILLECTOMY      US GUIDED PROSTATE BIOPSY  5/8/2020       Meds/Allergies:    Prior to Admission medications    Medication Sig Start Date End Date Taking?  Authorizing Provider   albuterol (PROAIR HFA) 90 mcg/act inhaler inhale 2 puff by inhalation route  every 4 - 6 hours as needed 11/13/15   Historical Provider, MD atorvastatin (LIPITOR) 10 mg tablet Take 1 tablet (10 mg total) by mouth daily 1/15/20   Melida Duran MD   Biotin 1000 MCG tablet Take 1 tablet by mouth daily in the early morning     Historical Provider, MD   Blood Glucose Monitoring Suppl ([address] [place] [address]) w/Device KIT by Does not apply route 2 (two) times a day 5/14/19   Rutherford Goodpasture, MD   calcium carbonate (OS-GILDA) 600 MG tablet Take 600 mg by mouth daily in the early morning     Historical Provider, MD   Cholecalciferol (VITAMIN D3) 2000 units capsule Take 1,000 Units by mouth daily in the early morning     Historical Provider, MD   Cyanocobalamin (VITAMIN B-12) 5000 MCG TBDP Take 5,000 mcg by mouth once a week Takes on Mondays    Historical Provider, MD   ferrous sulfate 324 (65 Fe) mg Take 1 tablet (324 mg total) by mouth daily before breakfast 9/10/19   Melida Duran MD   glipiZIDE (GLUCOTROL XL) 5 mg 24 hr tablet Take 1 tablet (5 mg total) by mouth daily after breakfast 2/24/20   Melida Duran MD   Insulin Pen Needle 32G X 4 MM MISC by Does not apply route daily 1/14/20   Rutherford Goodpasture, MD   Lancets (Jluis Camden 2) MISC Lif[employer]can One Touch Ultramini Meter; use as directed; 1; 0; 31-Oct-2016; 31-Oct-2016; Cindy Castro;  Active 10/31/16   Historical Provider, MD   losartan (COZAAR) 25 mg tablet TAKE ONE TABLET BY MOUTH EVERY DAY 7/20/20   Melida Duran MD   Mirabegron ER 25 MG TB24 TAKE ONE TABLET BY MOUTH EVERY DAY AT BEDTIME 2/14/20   Linda Saldaña PA-C   Multiple Vitamin (MULTIVITAMIN) capsule Take 1 capsule by mouth daily in the early morning     Historical Provider, MD   NOVOLOG MIX 70/30 FLEXPEN (70-30) 100 units/mL injection pen INJECT 20 UNITS UNDER THE SKIN EVERY 12 HOURS  Patient taking differently: Inject 10 Units under the skin daily after breakfast  6/30/19   Melida Duran MD   ondansetron (ZOFRAN-ODT) 4 mg disintegrating tablet Take 1 tablet (4 mg total) by mouth every 8 (eight) hours as needed for nausea or vomiting for up to 5 days 20  Anabell Carter MD   tamsulosin [facility]) 0 4 mg Take 1 capsule (0 4 mg total) by mouth daily with dinner 20   Sarwat Carrion PA-C   VICTOZA injection INJECT 1 8MG UNDER THE SKIN ONCE DAILY  Patient taking differently: Inject 1 8 mg under the skin daily in the early morning  3/4/20   Loli Connell MD         Allergies: Allergies   Allergen Reactions    Enalapril Anaphylaxis and Throat Swelling       Social History:     Marital Status: Single   Occupation:   Patient Pre-hospital Living Situation:   Patient Pre-hospital Level of Mobility:   Patient Pre-hospital Diet Restrictions:   Substance Use History:   Social History     Substance and Sexual Activity   Alcohol Use No     Social History     Tobacco Use   Smoking Status Former Smoker    Last attempt to quit: 11/10/2001    Years since quittin 7   Smokeless Tobacco Former User     Social History     Substance and Sexual Activity   Drug Use No       Family History:    Family History   Problem Relation Age of Onset    Heart disease Mother     Breast cancer Mother 80    Diabetes Father     Colon cancer Neg Hx     Liver disease Neg Hx        Physical Exam:     Vitals:   Blood Pressure: 127/60 (20 1556)  Pulse: 57 (20 1556)  Temperature: (!) 97 1 °F (36 2 °C) (20 1056)  Temp Source: Tympanic (20 1056)  Respirations: 20 (20 1556)  Weight - Scale: 104 kg (230 lb) (20 1056)  SpO2: 94 % (20 1556)    Physical Exam      Constitutional: Pt appears comfortable  Not in any acute distress  HENT:   Head: Normocephalic and atraumatic  Eyes: EOM are normal    Neck: Neck supple  Cardiovascular: Normal rate, regular rhythm, normal heart sounds  No murmur heard  Pulmonary/Chest: Effort normal, air entry b/l equal  No respiratory distress  Pt has no wheezes or crackles  Abdominal: Soft  Non-distended, Non-tender  Bowel sounds are normal    Musculoskeletal: Normal range of motion  Neurological: awake, alert  Moving all extremities spontaneously  Psychiatric: normal mood and affect  Additional Data:     Lab Results: I have personally reviewed pertinent reports  Results from last 7 days   Lab Units 08/02/20  1113   WBC Thousand/uL 8 06   HEMOGLOBIN g/dL 12 3   HEMATOCRIT % 36 9   PLATELETS Thousands/uL 154   NEUTROS PCT % 80*   LYMPHS PCT % 9*   MONOS PCT % 7   EOS PCT % 1     Results from last 7 days   Lab Units 08/02/20  1113   SODIUM mmol/L 137   POTASSIUM mmol/L 4 6   CHLORIDE mmol/L 104   CO2 mmol/L 28   BUN mg/dL 23   CREATININE mg/dL 1 02   ANION GAP mmol/L 5   CALCIUM mg/dL 10 1   ALBUMIN g/dL 3 5   TOTAL BILIRUBIN mg/dL 0 56   ALK PHOS U/L 159*   ALT U/L 91*   AST U/L 35   GLUCOSE RANDOM mg/dL 260*         Results from last 7 days   Lab Units 07/27/20  0816 07/26/20  2041   POC GLUCOSE mg/dl 167* 301*         Results from last 7 days   Lab Units 07/28/20  1019   LACTIC ACID mmol/L 2 0       Imaging: I have personally reviewed pertinent reports  CT abdomen pelvis with contrast   Final Result by Marion Burden MD (08/02 1632)      1  No acute abnormality within the abdomen or pelvis  2   Stable incidental findings as above  Workstation performed: [id number]         XR abdomen 1 view kub   Final Result by Marion Burden MD (08/02 1633)      Unremarkable abdomen  Workstation performed: [id number]             EKG, Pathology, and Other Studies Reviewed on Admission:   · EKG:  Sinus bradycardia, sinus arrhythmia, no acute ST-T changes    Allscripts / Epic Records Reviewed: Yes     ** Please Note: This note has been constructed using a voice recognition system   **

## 2020-08-02 NOTE — ED NOTES
Pt ambulated to the staff bathroom with his family member  Pt reporting black stools, and requesting pain medication   Dr Heather Fernandes and Dr Salomon Stagers aware of pt's requests     Mohit Clark RN  08/02/20 0101

## 2020-08-03 ENCOUNTER — ANESTHESIA EVENT (OUTPATIENT)
Dept: GASTROENTEROLOGY | Facility: HOSPITAL | Age: 70
End: 2020-08-03
Payer: COMMERCIAL

## 2020-08-03 ENCOUNTER — ANESTHESIA (OUTPATIENT)
Dept: GASTROENTEROLOGY | Facility: HOSPITAL | Age: 70
End: 2020-08-03
Payer: COMMERCIAL

## 2020-08-03 ENCOUNTER — APPOINTMENT (OUTPATIENT)
Dept: GASTROENTEROLOGY | Facility: HOSPITAL | Age: 70
End: 2020-08-03
Payer: COMMERCIAL

## 2020-08-03 LAB
ALBUMIN SERPL BCP-MCNC: 2.9 G/DL (ref 3.5–5)
ALP SERPL-CCNC: 129 U/L (ref 46–116)
ALT SERPL W P-5'-P-CCNC: 69 U/L (ref 12–78)
ANION GAP SERPL CALCULATED.3IONS-SCNC: 7 MMOL/L (ref 4–13)
AST SERPL W P-5'-P-CCNC: 27 U/L (ref 5–45)
BACTERIA BLD CULT: NORMAL
BASOPHILS # BLD AUTO: 0.06 THOUSANDS/ΜL (ref 0–0.1)
BASOPHILS NFR BLD AUTO: 1 % (ref 0–1)
BILIRUB SERPL-MCNC: 0.64 MG/DL (ref 0.2–1)
BUN SERPL-MCNC: 16 MG/DL (ref 5–25)
CALCIUM SERPL-MCNC: 8.5 MG/DL (ref 8.3–10.1)
CHLORIDE SERPL-SCNC: 101 MMOL/L (ref 100–108)
CO2 SERPL-SCNC: 28 MMOL/L (ref 21–32)
CREAT SERPL-MCNC: 0.82 MG/DL (ref 0.6–1.3)
EOSINOPHIL # BLD AUTO: 0.16 THOUSAND/ΜL (ref 0–0.61)
EOSINOPHIL NFR BLD AUTO: 2 % (ref 0–6)
ERYTHROCYTE [DISTWIDTH] IN BLOOD BY AUTOMATED COUNT: 12.8 % (ref 11.6–15.1)
GFR SERPL CREATININE-BSD FRML MDRD: 90 ML/MIN/1.73SQ M
GLUCOSE P FAST SERPL-MCNC: 134 MG/DL (ref 65–99)
GLUCOSE SERPL-MCNC: 134 MG/DL (ref 65–140)
GLUCOSE SERPL-MCNC: 154 MG/DL (ref 65–140)
GLUCOSE SERPL-MCNC: 165 MG/DL (ref 65–140)
GLUCOSE SERPL-MCNC: 221 MG/DL (ref 65–140)
GLUCOSE SERPL-MCNC: 237 MG/DL (ref 65–140)
HCT VFR BLD AUTO: 38.3 % (ref 36.5–49.3)
HGB BLD-MCNC: 12.5 G/DL (ref 12–17)
IMM GRANULOCYTES # BLD AUTO: 0.14 THOUSAND/UL (ref 0–0.2)
IMM GRANULOCYTES NFR BLD AUTO: 2 % (ref 0–2)
LYMPHOCYTES # BLD AUTO: 1.73 THOUSANDS/ΜL (ref 0.6–4.47)
LYMPHOCYTES NFR BLD AUTO: 19 % (ref 14–44)
MCH RBC QN AUTO: 30.4 PG (ref 26.8–34.3)
MCHC RBC AUTO-ENTMCNC: 32.6 G/DL (ref 31.4–37.4)
MCV RBC AUTO: 93 FL (ref 82–98)
MONOCYTES # BLD AUTO: 0.79 THOUSAND/ΜL (ref 0.17–1.22)
MONOCYTES NFR BLD AUTO: 9 % (ref 4–12)
NEUTROPHILS # BLD AUTO: 6.29 THOUSANDS/ΜL (ref 1.85–7.62)
NEUTS SEG NFR BLD AUTO: 67 % (ref 43–75)
NRBC BLD AUTO-RTO: 0 /100 WBCS
PLATELET # BLD AUTO: 231 THOUSANDS/UL (ref 149–390)
PMV BLD AUTO: 11.9 FL (ref 8.9–12.7)
POTASSIUM SERPL-SCNC: 4.2 MMOL/L (ref 3.5–5.3)
PROT SERPL-MCNC: 6.6 G/DL (ref 6.4–8.2)
RBC # BLD AUTO: 4.11 MILLION/UL (ref 3.88–5.62)
SARS-COV-2 RNA RESP QL NAA+PROBE: NEGATIVE
SODIUM SERPL-SCNC: 136 MMOL/L (ref 136–145)
WBC # BLD AUTO: 9.17 THOUSAND/UL (ref 4.31–10.16)

## 2020-08-03 PROCEDURE — 99217 PR OBSERVATION CARE DISCHARGE MANAGEMENT: CPT | Performed by: PHYSICIAN ASSISTANT

## 2020-08-03 PROCEDURE — U0003 INFECTIOUS AGENT DETECTION BY NUCLEIC ACID (DNA OR RNA); SEVERE ACUTE RESPIRATORY SYNDROME CORONAVIRUS 2 (SARS-COV-2) (CORONAVIRUS DISEASE [COVID-19]), AMPLIFIED PROBE TECHNIQUE, MAKING USE OF HIGH THROUGHPUT TECHNOLOGIES AS DESCRIBED BY CMS-2020-01-R: HCPCS | Performed by: INTERNAL MEDICINE

## 2020-08-03 PROCEDURE — 82948 REAGENT STRIP/BLOOD GLUCOSE: CPT

## 2020-08-03 PROCEDURE — 99214 OFFICE O/P EST MOD 30 MIN: CPT | Performed by: INTERNAL MEDICINE

## 2020-08-03 PROCEDURE — C9113 INJ PANTOPRAZOLE SODIUM, VIA: HCPCS | Performed by: INTERNAL MEDICINE

## 2020-08-03 PROCEDURE — 80053 COMPREHEN METABOLIC PANEL: CPT | Performed by: INTERNAL MEDICINE

## 2020-08-03 PROCEDURE — 85025 COMPLETE CBC W/AUTO DIFF WBC: CPT | Performed by: INTERNAL MEDICINE

## 2020-08-03 PROCEDURE — 88305 TISSUE EXAM BY PATHOLOGIST: CPT | Performed by: PATHOLOGY

## 2020-08-03 PROCEDURE — 43239 EGD BIOPSY SINGLE/MULTIPLE: CPT | Performed by: INTERNAL MEDICINE

## 2020-08-03 RX ORDER — PROPOFOL 10 MG/ML
INJECTION, EMULSION INTRAVENOUS CONTINUOUS PRN
Status: DISCONTINUED | OUTPATIENT
Start: 2020-08-03 | End: 2020-08-03

## 2020-08-03 RX ORDER — METOCLOPRAMIDE HYDROCHLORIDE 5 MG/ML
10 INJECTION INTRAMUSCULAR; INTRAVENOUS EVERY 6 HOURS SCHEDULED
Status: DISCONTINUED | OUTPATIENT
Start: 2020-08-03 | End: 2020-08-04 | Stop reason: HOSPADM

## 2020-08-03 RX ORDER — CYCLOBENZAPRINE HCL 10 MG
10 TABLET ORAL 3 TIMES DAILY
Status: DISCONTINUED | OUTPATIENT
Start: 2020-08-03 | End: 2020-08-04 | Stop reason: HOSPADM

## 2020-08-03 RX ORDER — LIDOCAINE HYDROCHLORIDE 10 MG/ML
INJECTION, SOLUTION EPIDURAL; INFILTRATION; INTRACAUDAL; PERINEURAL AS NEEDED
Status: DISCONTINUED | OUTPATIENT
Start: 2020-08-03 | End: 2020-08-03

## 2020-08-03 RX ORDER — ONDANSETRON 4 MG/1
4 TABLET, ORALLY DISINTEGRATING ORAL EVERY 4 HOURS PRN
Status: DISCONTINUED | OUTPATIENT
Start: 2020-08-03 | End: 2020-08-03

## 2020-08-03 RX ORDER — MORPHINE SULFATE 4 MG/ML
4 INJECTION, SOLUTION INTRAMUSCULAR; INTRAVENOUS EVERY 4 HOURS PRN
Status: DISCONTINUED | OUTPATIENT
Start: 2020-08-03 | End: 2020-08-04 | Stop reason: HOSPADM

## 2020-08-03 RX ORDER — PROPOFOL 10 MG/ML
INJECTION, EMULSION INTRAVENOUS AS NEEDED
Status: DISCONTINUED | OUTPATIENT
Start: 2020-08-03 | End: 2020-08-03

## 2020-08-03 RX ORDER — ONDANSETRON 2 MG/ML
4 INJECTION INTRAMUSCULAR; INTRAVENOUS EVERY 4 HOURS PRN
Status: DISCONTINUED | OUTPATIENT
Start: 2020-08-03 | End: 2020-08-04 | Stop reason: HOSPADM

## 2020-08-03 RX ORDER — PANTOPRAZOLE SODIUM 40 MG/1
40 TABLET, DELAYED RELEASE ORAL DAILY
Qty: 30 TABLET | Refills: 0 | Status: SHIPPED | OUTPATIENT
Start: 2020-08-03 | End: 2020-12-01 | Stop reason: SDUPTHER

## 2020-08-03 RX ORDER — OXYCODONE HYDROCHLORIDE 5 MG/1
5 TABLET ORAL EVERY 4 HOURS PRN
Status: DISCONTINUED | OUTPATIENT
Start: 2020-08-03 | End: 2020-08-04 | Stop reason: HOSPADM

## 2020-08-03 RX ORDER — SODIUM CHLORIDE 9 MG/ML
INJECTION, SOLUTION INTRAVENOUS CONTINUOUS PRN
Status: DISCONTINUED | OUTPATIENT
Start: 2020-08-03 | End: 2020-08-03

## 2020-08-03 RX ADMIN — MORPHINE SULFATE 4 MG: 4 INJECTION INTRAVENOUS at 16:45

## 2020-08-03 RX ADMIN — DICYCLOMINE HYDROCHLORIDE 10 MG: 10 CAPSULE ORAL at 21:51

## 2020-08-03 RX ADMIN — METOCLOPRAMIDE 10 MG: 5 INJECTION, SOLUTION INTRAMUSCULAR; INTRAVENOUS at 18:04

## 2020-08-03 RX ADMIN — CYCLOBENZAPRINE HYDROCHLORIDE 10 MG: 10 TABLET, FILM COATED ORAL at 18:04

## 2020-08-03 RX ADMIN — PROPOFOL 100 MG: 10 INJECTION, EMULSION INTRAVENOUS at 12:50

## 2020-08-03 RX ADMIN — ENOXAPARIN SODIUM 40 MG: 40 INJECTION SUBCUTANEOUS at 11:12

## 2020-08-03 RX ADMIN — OXYBUTYNIN 5 MG: 5 TABLET, FILM COATED, EXTENDED RELEASE ORAL at 21:55

## 2020-08-03 RX ADMIN — METOCLOPRAMIDE 10 MG: 5 INJECTION, SOLUTION INTRAMUSCULAR; INTRAVENOUS at 23:15

## 2020-08-03 RX ADMIN — SODIUM CHLORIDE: 0.9 INJECTION, SOLUTION INTRAVENOUS at 12:38

## 2020-08-03 RX ADMIN — OXYCODONE HYDROCHLORIDE 5 MG: 5 TABLET ORAL at 18:04

## 2020-08-03 RX ADMIN — LOSARTAN POTASSIUM 25 MG: 25 TABLET, FILM COATED ORAL at 10:00

## 2020-08-03 RX ADMIN — LIDOCAINE HYDROCHLORIDE 5 ML: 10 INJECTION, SOLUTION EPIDURAL; INFILTRATION; INTRACAUDAL; PERINEURAL at 12:50

## 2020-08-03 RX ADMIN — DICYCLOMINE HYDROCHLORIDE 10 MG: 10 CAPSULE ORAL at 16:50

## 2020-08-03 RX ADMIN — PANTOPRAZOLE SODIUM 40 MG: 40 INJECTION, POWDER, FOR SOLUTION INTRAVENOUS at 05:32

## 2020-08-03 RX ADMIN — CYCLOBENZAPRINE HYDROCHLORIDE 10 MG: 10 TABLET, FILM COATED ORAL at 21:51

## 2020-08-03 RX ADMIN — MORPHINE SULFATE 4 MG: 4 INJECTION INTRAVENOUS at 21:59

## 2020-08-03 RX ADMIN — TAMSULOSIN HYDROCHLORIDE 0.4 MG: 0.4 CAPSULE ORAL at 18:04

## 2020-08-03 RX ADMIN — PROPOFOL 120 MCG/KG/MIN: 10 INJECTION, EMULSION INTRAVENOUS at 12:51

## 2020-08-03 RX ADMIN — ATORVASTATIN CALCIUM 10 MG: 10 TABLET, FILM COATED ORAL at 18:04

## 2020-08-03 RX ADMIN — ONDANSETRON 4 MG: 2 INJECTION INTRAMUSCULAR; INTRAVENOUS at 16:46

## 2020-08-03 RX ADMIN — PANTOPRAZOLE SODIUM 40 MG: 40 INJECTION, POWDER, FOR SOLUTION INTRAVENOUS at 21:51

## 2020-08-03 NOTE — CONSULTS
Consult Service: Gastroenterology      PATIENT INFORMATION      Aminah Rodriguez 79 y o  male MRN: [id number]  Unit/Bed#: [facility] 825-01 Encounter: [id number]  PCP: Ish Loya MD  Date of Admission:  8/2/2020  Date of Consultation: 08/03/20  Requesting Physician: Maris Carvalho MD       ASSESSMENTS & PLAN   Patient is a 72-year-old male with a past medical history of gastric sleeve (2016), GERD, IPMN, diabetes and history of nephrolithiasis who presents with abdominal pain    Abdominal pain  Differential is broad including gastritis, PUD, dyspepsia, esophagitis, H pylori, gastric outlet obstruction among other causes  Given that he has presented to the emergency department multiple times for same complaint will proceed with  · EGD today, NPO  If EGD unremarkable, consider functional causes, mesenteric ischemia, cyclical vomiting syndrome, musculoskeletal causes, and/or gastroparesis  · Continue PPI b i d  for 8-12 weeks  · Will schedule outpt f/u  · Continue Zofran    GERD  · PPI as above    Pancreatic cyst (IPMN-side branch)  Previous MRI of the abdomen on 11/2019 showed 2 2 x 1 7 x 3 2 cm pancreatic cyst in the uncinate head of pancreas that is likely side branch IPMN  Previously met with our advanced endoscopist and surgical oncologist and the decision was made to monitor this cyst  · Due for MRI of the abdomen in November of 2019  Placed order as outtp  REASON FOR CONSULTATION      Abdominal pain      HISTORY OF PRESENT ILLNESS      Patient is a 72-year-old male with a past medical history of gastric sleeve (2016), GERD, IPMN, diabetes and history of nephrolithiasis who presents with abdominal pain  Patient states since early July he has been experiencing abdominal pain occurring 1-2 times per week lasting several hours mainly in the epigastric region but also going to the umbilicus and back that worsens with food  He has associated nausea and repeated episodes of nonbloody vomiting    No change in bowel habits  He does not take any NSAIDs  He had EGD/EUS in 2018 with the EGD portion of the exam was unremarkable but EUS showed multiples pancreatic cystic lesions with communication of the pancreatic duct suggestive of side branch IPMN, the largest of which measured 3 cm in the uncinate process of the pancreas  No high risk features  Last colonoscopy in 2019 showed 1 small polyp with repeat colonoscopy in 3 years  9  Does not smoke or drink alcohol  REVIEW OF SYSTEMS     A thorough 12-point review of systems has been conducted  Pertinent positives and negatives are mentioned in the history of present illness         PAST MEDICAL & SURGICAL HISTORY      Past Medical History:   Diagnosis Date    Anemia     Arthritis     Diabetes mellitus ([facility] [address] [facility]     IDDM    Diverticulosis     Enlarged prostate     Erectile dysfunction     Hypercholesteremia     Resolved post weight loss    Hypertension     Resolved post weight loss    Kidney stone     Obesity     Prostate cancer ([facility] [address] )     Wears partial dentures     partial upper and lower       Past Surgical History:   Procedure Laterality Date    ABDOMINAL ADHESION SURGERY N/A 11/28/2016    Procedure: EXTENSIVE LYSIS ADHESIONS;  Surgeon: Amanda Ordoñez MD;  [facility];  Service:    Yovany Bunk FRACTURE SURGERY Left     CHOLECYSTECTOMY      COLON SURGERY      colon resection    COLONOSCOPY      COLOSTOMY      COLOSTOMY CLOSURE      DIAGNOSTIC LAPAROSCOPY      GASTRIC BYPASS      failed due to adhesions    HERNIA REPAIR      abdominal    NE BIOPSY OF PROSTATE,NEEDLE/PUNCH N/A 5/8/2020    Procedure: TRANSRECTAL NEEDLE BIOPSY PROSTATE (TRNBP) WITH TRANSRECTAL ULTRASOUND GUIDANCE;  Surgeon: Andrew Wadedll MD;  [facility];  Service: Urology    NE CYSTOSCOPY,DIR VIS INT URETHROTOMY N/A 5/8/2020    Procedure: DIRECT VISUAL INTERAL URETHROTOMY (DVIU), dilation of urethral stricture;  Surgeon: Andrew Waddell MD;  [facility];  Service: Urology    WV CYSTOURETHRO W/IMPLANT N/A 3/8/2019    Procedure: CYSTOSCOPY WITH INSERTION Monae Fickle;  Surgeon: Onelia Abraham MD;  [facility];  Service: Urology    WV CYSTOURETHRO W/IMPLANT N/A 5/8/2020    Procedure: CYSTOSCOPY WITH INSERTION Monae Fickle;  Surgeon: Onelia Abraham MD;  [facility];  Service: Urology     [facility] STOM DUODENUM/JEJUNUM N/A 10/25/2018    Procedure: LINEAR ENDOSCOPIC U/S;  Surgeon: Connor Garcia MD;  [facility]: [facility]; Service: Gastroenterology    WV ESOPHAGOGASTRODUODENOSCOPY TRANSORAL DIAGNOSTIC N/A 7/6/2016    Procedure: EGD AND COLONOSCOPY;  Surgeon: Chiara Maciel MD;  [facility]; Service: Gastroenterology    WV LAP, ANEESH RESTRICT PROC, LONGITUDINAL GASTRECTOMY N/A 11/28/2016    Procedure: GASTRECTOMY SLEEVE LAPAROSCOPIC;  Surgeon: Ransom Lanes, MD;  [facility]: [facility];  Service: [facility] BIOPSY  5/8/2020         MEDICATIONS & ALLERGIES       Medications:   Prior to Admission medications    Medication Sig Start Date End Date Taking?  Authorizing Provider   albuterol (PROAIR HFA) 90 mcg/act inhaler inhale 2 puff by inhalation route  every 4 - 6 hours as needed 11/13/15   Historical Provider, MD   atorvastatin (LIPITOR) 10 mg tablet Take 1 tablet (10 mg total) by mouth daily 1/15/20   Melida Duran MD   Biotin 1000 MCG tablet Take 1 tablet by mouth daily in the early morning     Historical Provider, MD   Blood Glucose Monitoring Suppl ([address] [place] [address]) w/Device KIT by Does not apply route 2 (two) times a day 5/14/19   Kelly Rosales MD   calcium carbonate (OS-GILDA) 600 MG tablet Take 600 mg by mouth daily in the early morning     Historical Provider, MD   Cholecalciferol (VITAMIN D3) 2000 units capsule Take 1,000 Units by mouth daily in the early morning     Historical Provider, MD   Cyanocobalamin (VITAMIN B-12) 5000 MCG TBDP Take 5,000 mcg by mouth once a week Takes on Mondays    Historical Provider, MD   ferrous sulfate 324 (65 Fe) mg Take 1 tablet (324 mg total) by mouth daily before breakfast 9/10/19   Melida Duran MD   glipiZIDE (GLUCOTROL XL) 5 mg 24 hr tablet Take 1 tablet (5 mg total) by mouth daily after breakfast 2/24/20   Melida Duran MD   Insulin Pen Needle 32G X 4 MM MISC by Does not apply route daily 1/14/20   Melida Duran MD   Lancets (LIFESCAN UNISTIK 2) MISC Lifescan One Touch Ultramini Meter; use as directed; 1; 0; 31-Oct-2016; 31-Oct-2016; Tayla Umanzor; Active 10/31/16   Historical Provider, MD   losartan (COZAAR) 25 mg tablet TAKE ONE TABLET BY MOUTH EVERY DAY 7/20/20   Melida Duran MD   Mirabegron ER 25 MG TB24 TAKE ONE TABLET BY MOUTH EVERY DAY AT BEDTIME 2/14/20   Patsy Saldaña PA-C   Multiple Vitamin (MULTIVITAMIN) capsule Take 1 capsule by mouth daily in the early morning     Historical Provider, MD   NOVOLOG MIX 70/30 FLEXPEN (70-30) 100 units/mL injection pen INJECT 20 UNITS UNDER THE SKIN EVERY 12 HOURS  Patient taking differently: Inject 10 Units under the skin daily after breakfast  6/30/19   Melida Duran MD   ondansetron (ZOFRAN-ODT) 4 mg disintegrating tablet Take 1 tablet (4 mg total) by mouth every 8 (eight) hours as needed for nausea or vomiting for up to 5 days 7/23/20 7/28/20  Ole Ng MD   tamsulosin [facility]) 0 4 mg Take 1 capsule (0 4 mg total) by mouth daily with dinner 7/27/20   Tremaine Harris PA-C   VICTOZA injection INJECT 1 8MG UNDER THE SKIN ONCE DAILY  Patient taking differently: Inject 1 8 mg under the skin daily in the early morning  3/4/20   Ish Loya MD       Allergies:    Allergies   Allergen Reactions    Enalapril Anaphylaxis and Throat Swelling         SOCIAL HISTORY      Marital Status: Single    Substance Use History:   Social History     Substance and Sexual Activity   Alcohol Use Not Currently     Social History     Tobacco Use   Smoking Status Former Smoker    Last attempt to quit: 11/10/2001    Years since quittin 7   Smokeless Tobacco Former User     Social History     Substance and Sexual Activity   Drug Use No         FAMILY HISTORY      Non-Contributory      PHYSICAL EXAM     Vitals:   Blood Pressure: 124/57 (20 1331)  Pulse: 60 (20 1331)  Temperature: 98 2 °F (36 8 °C) (20 1209)  Temp Source: Tympanic (20 1209)  Respirations: 15 (20 1331)  Height: 5' 7 5" (20 1123)  Weight - Scale: 104 kg (229 lb 4 5 oz) (20 1123)  SpO2: 96 % (20 1331)    Physical Exam:   GENERAL: NAD  HEENT:  NC/AT, no scleral icterus  CARDIAC:  RRR, +S1/S2  PULMONARY:  CTA B/L, no wheezing/rales/rhonci, non-labored breathing  ABDOMEN:  Soft, NT/ND, +BS, no rebound/guarding/rigidity  Extremities:  No edema, cyanosis, or clubbing  NEUROLOGIC:  Alert  SKIN:  No rashes or erythema        ADDITIONAL DATA     Lab Results:     Results from last 7 days   Lab Units 20  0534   WBC Thousand/uL 9 17   HEMOGLOBIN g/dL 12 5   HEMATOCRIT % 38 3   PLATELETS Thousands/uL 231   NEUTROS PCT % 67   LYMPHS PCT % 19   MONOS PCT % 9   EOS PCT % 2     Results from last 7 days   Lab Units 20  0534   POTASSIUM mmol/L 4 2   CHLORIDE mmol/L 101   CO2 mmol/L 28   BUN mg/dL 16   CREATININE mg/dL 0 82   CALCIUM mg/dL 8 5   ALK PHOS U/L 129*   ALT U/L 69   AST U/L 27           Imaging:    Xr Abdomen 1 View Kub    Result Date: 2020  Narrative: ABDOMEN INDICATION:   abdominal pain  COMPARISON:  8/10/2017 VIEWS:  AP supine FINDINGS: There is a nonobstructive bowel gas pattern  No discernible free air on this supine study  Upright or left lateral decubitus imaging is more sensitive to detect subtle free air in the appropriate setting  No pathologic calcifications or soft tissue masses  Visualized lung bases are clear  Visualized osseous structures are unremarkable for the patient's age  Mesh from hernia repair is noted  Cholecystectomy clips are present    Brachial therapy beads are noted in the region of the prostate  Surgical clips within the stomach from prior gastric bypass surgery are noted  Impression: Unremarkable abdomen  Workstation performed: [id number]     Ct Abdomen Pelvis With Contrast    Result Date: 8/2/2020  Narrative: CT ABDOMEN AND PELVIS WITH IV CONTRAST INDICATION:   abdominal pain  COMPARISON:  7/28/2020 TECHNIQUE:  CT examination of the abdomen and pelvis was performed  Axial, sagittal, and coronal 2D reformatted images were created from the source data and submitted for interpretation  Radiation dose length product (DLP) for this visit:  888 25 mGy-cm   This examination, like all CT scans performed in the [facility], was performed utilizing techniques to minimize radiation dose exposure, including the use of iterative  reconstruction and automated exposure control  IV Contrast:  50 mL of iohexol (OMNIPAQUE) 100 mL of iohexol (OMNIPAQUE) Enteric Contrast:  Enteric contrast was administered  FINDINGS: ABDOMEN LOWER CHEST:  No clinically significant abnormality identified in the visualized lower chest  LIVER/BILIARY TREE:  Unremarkable  GALLBLADDER:  Gallbladder is surgically absent  SPLEEN:  Unremarkable  PANCREAS:  2 6 x 1 8 cm cyst within the uncinate process of the pancreas is not significantly changed on for differences in measuring technique  ADRENAL GLANDS:  Unremarkable  KIDNEYS/URETERS:  Lobulated left renal cyst is again seen  No hydronephrosis  STOMACH AND BOWEL:  Postsurgical changes of gastric bypass surgery are again noted  Small hiatal hernia is present  Diverticulosis without acute diverticulitis is present  APPENDIX:  A normal appendix was visualized  ABDOMINOPELVIC CAVITY:  No ascites  No pneumoperitoneum  No lymphadenopathy  VESSELS:  Unremarkable for patient's age  PELVIS REPRODUCTIVE ORGANS:  Brachytherapy beads are present within the prostate  URINARY BLADDER:  Unremarkable   ABDOMINAL WALL/INGUINAL REGIONS:  Postsurgical changes of ventral hernia repair are present  OSSEOUS STRUCTURES:  No acute fracture or destructive osseous lesion  Impression: 1  No acute abnormality within the abdomen or pelvis  2   Stable incidental findings as above  Workstation performed: [id number]     Ct Abdomen Pelvis With Contrast    Result Date: 7/28/2020  Narrative: CT ABDOMEN AND PELVIS WITH IV CONTRAST INDICATION:   Epigastric pain  COMPARISON:  CT abdomen pelvis 7/23/2020  TECHNIQUE:  CT examination of the abdomen and pelvis was performed  Axial, sagittal, and coronal 2D reformatted images were created from the source data and submitted for interpretation  Radiation dose length product (DLP) for this visit:  941 mGy-cm   This examination, like all CT scans performed in the [facility], was performed utilizing techniques to minimize radiation dose exposure, including the use of iterative reconstruction and automated exposure control  IV Contrast:  100 mL of iohexol (OMNIPAQUE) Enteric Contrast:  Enteric contrast was not administered  FINDINGS: ABDOMEN LOWER CHEST:  Minimal bibasilar dependent atelectasis  No pericardial or pleural effusion  LIVER/BILIARY TREE:  Unremarkable  GALLBLADDER:  Surgically removed  SPLEEN:  Unremarkable  PANCREAS:  Stable 2 5 x 2 0 cm cystic lesion in the uncinate process of the pancreas  ADRENAL GLANDS:  Unremarkable  KIDNEYS/URETERS:  Stable 2 0 cm cyst arising from the anterior interpolar cortex of the left kidney  No hydronephrosis  STOMACH AND BOWEL:  No evidence for bowel obstruction     Previous gastric sleeve surgery  Small hiatal hernia with thickening of the distal esophagus  Colonic diverticulosis without evidence for acute diverticulitis is seen  APPENDIX:  No findings to suggest appendicitis  ABDOMINOPELVIC CAVITY:  No ascites  No pneumoperitoneum  No lymphadenopathy  VESSELS:  Unremarkable for patient's age   PELVIS REPRODUCTIVE ORGANS:  Stable prostatomegaly with radiation implant seeds  URINARY BLADDER:  Unremarkable  ABDOMINAL WALL/INGUINAL REGIONS:  Prior ventral hernia repair  OSSEOUS STRUCTURES:  No acute fracture or destructive osseous lesion  Impression: Small hiatal hernia with thickening of the distal esophagus suggesting mild esophagitis  Correlate for possible GERD  Stable left renal cyst  Stable colonic diverticulosis without evidence of diverticulitis  Stable pancreatic cyst  Stable prostatomegaly  Workstation performed: [id number]     Ct Abdomen Pelvis With Contrast    Result Date: 7/23/2020  Narrative: CT ABDOMEN AND PELVIS WITH IV CONTRAST INDICATION:   back pain radiating to the LLQ of the abdomen, history of prostate cancer  COMPARISON:  CT abdomen/pelvis dated 6/10/2020  TECHNIQUE:  CT examination of the abdomen and pelvis was performed  Axial, sagittal, and coronal 2D reformatted images were created from the source data and submitted for interpretation  Radiation dose length product (DLP) for this visit:  23-14-20-09 mGy-cm   This examination, like all CT scans performed in the [facility], was performed utilizing techniques to minimize radiation dose exposure, including the use of iterative reconstruction and automated exposure control  IV Contrast:  100 mL of iohexol (OMNIPAQUE) Enteric Contrast:  Enteric contrast was not administered  FINDINGS: ABDOMEN LOWER CHEST:  No clinically significant abnormality identified in the visualized lower chest  LIVER/BILIARY TREE:  Unremarkable  GALLBLADDER:  No calcified gallstones  No pericholecystic inflammatory change  SPLEEN:  Unremarkable  PANCREAS:  A 2 5 x 2 0 cm cystic lesion in the uncinate process of the pancreas is not significantly changed  ADRENAL GLANDS:  Unremarkable  KIDNEYS/URETERS:  2 cm hypoattenuating lesion, likely cyst, is again seen arising from the anterior interpolar region of the left kidney  1 7 cm cyst is again seen in the upper pole of the right kidney  No hydronephrosis   STOMACH AND BOWEL: No evidence for bowel obstruction  Prior gastric surgery  Colonic diverticulosis without evidence for acute diverticulitis is seen  APPENDIX:  No findings to suggest appendicitis  ABDOMINOPELVIC CAVITY:  No ascites  No pneumoperitoneum  No lymphadenopathy  VESSELS:  Unremarkable for patient's age  PELVIS REPRODUCTIVE ORGANS:  The prostate gland is enlarged measuring 7 0 x 6 1 x 6 9 cm  Prostatic radiation implant seeds are noted  URINARY BLADDER:  The urinary bladder is minimally distended  Mild surrounding inflammatory changes are seen  ABDOMINAL WALL/INGUINAL REGIONS:  Prior ventral hernia repair  OSSEOUS STRUCTURES:  No acute fracture or destructive osseous lesion  Impression: Mild inflammatory changes surrounding the urinary bladder; correlate with urinalysis for cystitis  Enlarged prostate gland  Stable 2 5 x 2 0 cm cystic lesion in the uncinate process of the pancreas  Workstation performed: [id number]       EKG, Pathology, and Other Studies Reviewed on Admission:   · EKG: Reviewed      Counseling / Coordination of Care Time: 30 total mins spent n consult  Greater than 50% of total time spent on patient counseling and coordination of care     ** Please Note: This note is constructed using a voice recognition dictation system   **

## 2020-08-03 NOTE — ANESTHESIA POSTPROCEDURE EVALUATION
Post-Op Assessment Note    CV Status:  Stable    Pain management: adequate     Mental Status:  Awake   Hydration Status:  Stable   PONV Controlled:  Controlled   Airway Patency:  Patent      Post Op Vitals Reviewed: Yes      Staff: Anesthesiologist, CRNA         No complications documented      BP   96/45   Temp      Pulse  82   Resp   18   SpO2   100

## 2020-08-03 NOTE — UTILIZATION REVIEW
Initial Clinical Review    Admission: Date/Time/Statement:   Admission Orders (From admission, onward)     Ordered        08/02/20 1706  Place in Observation  Once                   Orders Placed This Encounter   Procedures    Place in Observation     Standing Status:   Standing     Number of Occurrences:   1     Order Specific Question:   Admitting Physician     Answer:   Marycruz Clayton [[id number]]     Order Specific Question:   Level of Care     Answer:   Med Surg [[id number]]     ED Arrival Information     Expected Arrival Acuity Means of Arrival Escorted By Service Admission Type    - 8/2/2020 10:49 Urgent Walk-In Self General Medicine Urgent    Arrival Complaint    abdominal pain        Chief Complaint   Patient presents with    Abdominal Pain     Pt was recently seen at [facility] for similar, pt having abd pain, and vomiting returning this morning  Pt lethargic in waiting room      Assessment/Plan:  80 y/o male with PMhx of GERD, gastric sleeve, DM who presents to ED from home with abdominal pain, N/V  Pt was recently seen in ED for same complaints on 7/28 and states pain today feels same as on the 28th  The pain started @ 7 AM this morning as sharp pain that is diffuse and constant  Has not tried anything at home to alleviate pain  Pt states his pain was alleviated in the ED on the 28th with morphing and antinausea meds and pain had not returned until this AM   Admits to vomiting multiple times this AM, describes vomitus as bilious and non-bloody  CT abd no acute abnl  Admit observation to M/S unit with intractable abd pain, N/V  Continue Protonix IV BID, clear liquid diet for now  GI consult        ED Triage Vitals   Temperature Pulse Respirations Blood Pressure SpO2   08/02/20 1056 08/02/20 1056 08/02/20 1056 08/02/20 1102 08/02/20 1056   (!) 97 1 °F (36 2 °C) (!) 53 20 170/80 96 %      Temp Source Heart Rate Source Patient Position - Orthostatic VS BP Location FiO2 (%)   08/02/20 1056 08/02/20 1056 08/02/20 1056 08/02/20 1056 --   Tympanic Monitor Lying Right arm       Pain Score       08/02/20 1056       Worst Possible Pain          Wt Readings from Last 1 Encounters:   08/02/20 104 kg (230 lb)     Additional Vital Signs:   Date/Time   Temp   Pulse   Resp   BP   MAP (mmHg)   SpO2   O2 Device    08/03/20 07:42:29   97 9 °F (36 6 °C)   54Abnormal        119/63   82   95 %       08/03/20 0026                  94 %   None (Room air)    08/02/20 22:42:30   98 2 °F (36 8 °C)   63   18   117/63   81   95 %       08/02/20 1930                     None (Room air)    08/02/20 1900      [facility]            None (Room air)    08/02/20 18:00:32   97 8 °F (36 6 °C)   61   18   116/64   81   95 %       08/02/20 1556      57   20   127/60      94 %   None (Room air)    08/02/20 1530      54Abnormal     16   142/65   94   93 %   None (Room air)    08/02/20 1400      56   17   146/78   106   95 %       08/02/20 1330      54Abnormal     14   147/64   92   94 %       08/02/20 1300      48Abnormal     16   132/59   85   99 %       08/02/20 1245      48Abnormal     15   122/77      99 %       08/02/20 1145      56   20   170/80      99 %       08/02/20 1056   97 1 °F (36 2 °C)Abnormal     53Abnormal     20         96 %   None (Room air)        Pertinent Labs/Diagnostic Test Results:   US abd kub --- 8/2 -- Unremarkable abdomen    CT a/p 8/2 ---   1  No acute abnormality within the abdomen or pelvis  2   Stable incidental findings as above         EKG -- Sinus bradycardia with sinus arrhythmia    Results from last 7 days   Lab Units 08/03/20  0534 08/02/20  1113 07/28/20  1019   WBC Thousand/uL 9 17 8 06 5 79   HEMOGLOBIN g/dL 12 5 12 3 13 1   HEMATOCRIT % 38 3 36 9 38 4   PLATELETS Thousands/uL 231 154 177   NEUTROS ABS Thousands/µL 6 29 6 43 4 50     Results from last 7 days   Lab Units 08/03/20  0534 08/02/20  1113 07/28/20  1019   SODIUM mmol/L 136 137 139   POTASSIUM mmol/L 4 2 4 6 3 8   CHLORIDE mmol/L 101 104 104   CO2 mmol/L 28 28 26   ANION GAP mmol/L 7 5 9   BUN mg/dL 16 23 12   CREATININE mg/dL 0 82 1 02 0 92   EGFR ml/min/1 73sq m 90 74 84   CALCIUM mg/dL 8 5 10 1 8 6     Results from last 7 days   Lab Units 08/03/20  0534 08/02/20  1113 07/28/20  1019   AST U/L 27 35 89*   ALT U/L 69 91* 176*   ALK PHOS U/L 129* 159* 197*   TOTAL PROTEIN g/dL 6 6 7 5 6 8   ALBUMIN g/dL 2 9* 3 5 2 9*   TOTAL BILIRUBIN mg/dL 0 64 0 56 0 59     Results from last 7 days   Lab Units 08/03/20  0803 08/02/20  1817   POC GLUCOSE mg/dl 154* 139     Results from last 7 days   Lab Units 08/03/20  0534 08/02/20  1113 07/28/20  1019   GLUCOSE RANDOM mg/dL 134 260* 225*     Results from last 7 days   Lab Units 08/02/20  1207 07/28/20  1130   TROPONIN I ng/mL <0 02 <0 02     Results from last 7 days   Lab Units 07/28/20  1019   LACTIC ACID mmol/L 2 0     Results from last 7 days   Lab Units 08/02/20  1113 07/28/20  1019   LIPASE u/L 240 168     Results from last 7 days   Lab Units 08/02/20  1500   CLARITY UA  Turbid   COLOR UA  Yellow   SPEC GRAV UA  1 022   PH UA  8 0   GLUCOSE UA mg/dl 100 (1/10%)*   KETONES UA mg/dl Negative   BLOOD UA  Negative   PROTEIN UA mg/dl Negative   NITRITE UA  Negative   BILIRUBIN UA  Negative   UROBILINOGEN UA E U /dl 1 0   LEUKOCYTES UA  Negative   WBC UA /hpf None Seen   RBC UA /hpf None Seen   BACTERIA UA /hpf None Seen   EPITHELIAL CELLS WET PREP /hpf None Seen         ED Treatment:   Medication Administration from 08/02/2020 1048 to 08/02/2020 1750       Date/Time Order Dose Route Action     08/02/2020 1112 ondansetron (ZOFRAN) injection 4 mg 4 mg Intravenous Given     08/02/2020 1206 morphine (PF) 10 mg/mL injection 10 mg 10 mg Intravenous Given     08/02/2020 1247 pantoprazole (PROTONIX) 80 mg in sodium chloride 0 9 % 100 mL IVPB 80 mg Intravenous New Bag     08/02/2020 1517 morphine (PF) 4 mg/mL injection 4 mg 4 mg Intravenous Given     08/02/2020 1517 ondansetron (ZOFRAN) injection 4 mg 4 mg Intravenous Given     Past Medical History:   Diagnosis Date    Anemia     Arthritis     Diabetes mellitus (HCC)     IDDM    Diverticulosis     Enlarged prostate     Erectile dysfunction     Hypercholesteremia     Resolved post weight loss    Hypertension     Resolved post weight loss    Kidney stone     Obesity     Prostate cancer ([facility] [address]     Wears partial dentures     partial upper and lower     Present on Admission:   Prostate cancer ([facility] [address]   Benign essential hypertension      Admitting Diagnosis: Nausea [R11 0]  Abdominal pain [R10 9]  Generalized abdominal pain [R10 84]  Intractable abdominal pain [R10 9]  Age/Sex: 79 y o  male  Admission Orders:  Scheduled Medications:  atorvastatin, 10 mg, Oral, Daily With Dinner  dicyclomine, 10 mg, Oral, 4x Daily (AC & HS)  enoxaparin, 40 mg, Subcutaneous, Daily  insulin lispro, 1-5 Units, Subcutaneous, TID AC  losartan, 25 mg, Oral, Daily  oxybutynin, 5 mg, Oral, Daily  pantoprazole, 40 mg, Intravenous, Q12H PEGGY  tamsulosin, 0 4 mg, Oral, Daily With Dinner    PRN Meds:  albuterol, 1 puff, Inhalation, Q4H PRN  ondansetron, 4 mg, Intravenous, Q8H PRN        IP CONSULT TO GASTROENTEROLOGY    Network Utilization Review Department  [email or web address]  ATTENTION: Please call with any questions or concerns to [phone] and carefully listen to the prompts so that you are directed to the right person  All voicemails are confidential   Yoseph Suero all requests for admission clinical reviews, approved or denied determinations and any other requests to dedicated fax number below belonging to the campus where the patient is receiving treatment   List of dedicated fax numbers for the Facilities:  FACILITY NAME UR FAX NUMBER   ADMISSION DENIALS (Administrative/Medical Necessity) [phone]   [address] (Maternity/NICU/Pediatrics) [phone]   Maicol Coates [address] [phone]   Judy Medina [facility] [phone]   Milly Wray [place] [address] [facility] [phone]   [facility]  [phone] [address] [facility], [facility]  [phone]   [address] [place] [address] [address] [facility] [phone]

## 2020-08-03 NOTE — APP STUDENT NOTE
Progress Note  Milena Friedman 79 y o  male MRN: [id number]  Unit/Bed#: [facility] 825-01 Encounter: [id number]    Assessment/Plan      1  Intractable abdominal pain with N/V  -etiology unknown- CT abdomen/pelvis 8/2/2020 without evidence of acute abnormality   -currently without symptoms, states symptoms resolved with medications provided   -recurrent- multiple ER visits; patient states he first noted symptoms after Lupron   -tolerated clear diet  -EGD today 8/3/2020  -continue following meds: dicyclomine (Bentyl) 10 mg capsule PO 4 times daily (before meals, at bedtime), pantoprazole (Protonix) 40 mg IV q 12h , administer over 2 min  Ondansetron (Zofran) 4 mg injection q 8h PRN N/V    2  Prostate cancer  -following with urology, Lupron shots q 6 months, possible radiation in future  3  Primary HTN  -controlled, continue home meds- losartan (Cozaar) 25 mg tablet PO daily    4  DM2  -   A1C 7 3% (7/24/2020)  - sliding scale insulin: insulin lispro (HumaLOG) 100 units/mL- 1-5 units SQ injection TID before meals  -monitor blood sugars via fingerstick glucose TID before meals     5  VTE prophylaxis  - enoxaparin (Lovenox) SQ injection 40 mg daily   - SCDs         /63   Pulse (!) 54   Temp 97 9 °F (36 6 °C)   Resp 18   Wt 104 kg (230 lb)   SpO2 95%   BMI 35 49 kg/m²     Labs in chart were reviewed    Results from last 7 days   Lab Units 08/03/20  0534   WBC Thousand/uL 9 17   HEMOGLOBIN g/dL 12 5   HEMATOCRIT % 38 3   PLATELETS Thousands/uL 231     Results from last 7 days   Lab Units 08/03/20  0534   POTASSIUM mmol/L 4 2   CHLORIDE mmol/L 101   CO2 mmol/L 28   BUN mg/dL 16   CREATININE mg/dL 0 82     Results from last 7 days   Lab Units 08/03/20  0534   CALCIUM mg/dL 8 5     Results from last 7 days   Lab Units 08/03/20  0534   AST U/L 27   ALT U/L 69   ALK PHOS U/L 129*   TOTAL BILIRUBIN mg/dL 0 64   ALBUMIN g/dL 2 9*           Invalid input(s): LABPT  Results from last 7 days   Lab Units 08/02/20  1500   COLOR UA Yellow   CLARITY UA  Turbid   SPEC GRAV UA  1 022   PH UA  8 0   GLUCOSE UA mg/dl 100 (1/10%)*   KETONES UA mg/dl Negative   BLOOD UA  Negative   NITRITE UA  Negative   LEUKOCYTES UA  Negative   BILIRUBIN UA  Negative   UROBILINOGEN UA E U /dl 1 0   RBC UA /hpf None Seen   WBC UA /hpf None Seen   BACTERIA UA /hpf None Seen     Results from last 7 days   Lab Units 08/02/20  1207   TROPONIN I ng/mL <0 02           Intake/Output Summary (Last 24 hours) at 8/3/2020 1110  Last data filed at 8/3/2020 0900  Gross per 24 hour   Intake 100 ml   Output 425 ml   Net -325 ml           Subjective/Objective     Subjective: 80 yo M with significant PMH of HTN, prostate cancer, DM2, surgical history of gastric sleeve presenting hospital day 1 after presenting to the ED yesterday 8/2/2020 for intractable abdominal pain and N/V  States abdominal pain was mostly left-sided and associated with nausea and frequent non-bloody vomiting  Patient denies symptoms currently, states that symptoms resolved yesterday with administration of pain/nausea medications  Patient states he had no issues overnight, slept well  Currently without pain  Had BM last night, normal, soft and non-bloody  Urination without dysuria  States he tolerated clear liquid diet, had water yesterday  States he is able to ambulate without issues, no dizziness, lightheadedness, SOB  Patient aware of EGD occurring at some point today 8/3/2020      Review of Systems - History obtained from the patient  General ROS: negative for - chills, fever, night sweats or sleep disturbance  ENT ROS: negative for - headaches  Respiratory ROS: negative for - cough or shortness of breath  Cardiovascular ROS: negative for - chest pain or palpitations  Gastrointestinal ROS: negative for - abdominal pain, appetite loss, constipation, diarrhea or nausea/vomiting; patient states that abdominal pain, N/V resolved with medications; currently asymptomatic   Genito-Urinary ROS: negative for - dysuria, hematuria or urinary frequency/urgency  Musculoskeletal ROS: negative for - joint pain or muscular weakness  Neurological ROS: negative for - dizziness, gait disturbance or numbness/tingling       Objective:     Physical Exam:  /63   Pulse (!) 54   Temp 97 9 °F (36 6 °C)   Resp 18   Wt 104 kg (230 lb)   SpO2 95%   BMI 35 49 kg/m²   General appearance: patient lying in bed, NAD, was sleeping upon arrival but alert, awake, and interactive  Head: Normocephalic, without obvious abnormality, atraumatic  Eyes: anicteric sclera, PERRLA  Lungs: clear to auscultation bilaterally and no wheezes, rales, rhonchi  Heart: regular rate and rhythm, S1, S2 normal, no murmur, click, rub or gallop  Abdomen: soft, non-tender; bowel sounds normal; no masses,  no organomegaly and midline abdominal scar noted, no distension  Extremities: extremities normal, warm and well-perfused; no cyanosis, clubbing, or edema  Pulses: 2+ and symmetric  DP/PT pulses b/l  Skin: warm, dry; no rashes, lesions      Sofia CORNEJO  8/3/2020

## 2020-08-03 NOTE — ASSESSMENT & PLAN NOTE
Lab Results   Component Value Date    HGBA1C 7 3 (H) 07/24/2020       Recent Labs     08/02/20  1817 08/03/20  0803 08/03/20  1052   POCGLU 139 154* 165*       Blood Sugar Average: Last 72 hrs:  (P) 221 [id number]   Hold home regimen  Sliding-scale insulin for now    Discussed better diet control

## 2020-08-03 NOTE — SOCIAL WORK
Pt lives in single level home [address]  Pt lives with his significant other  No DME  Patient drives  No LW  Denies MH or substance issues  Uses [facility] in [facility]  Independent Pta  Drives  States he can afford his medications  No Hx of VNA  No Hx of IP rehab  CM reviewed d/c planning process including the following: identifying help at home, patient preference for d/c planning needs, Discharge Lounge, [facility] Meds to Bed program, availability of treatment team to discuss questions or concerns patient and/or family may have regarding understanding medications and recognizing signs and symptoms once discharged  CM also encouraged patient to follow up with all recommended appointments after discharge  Patient advised of importance for patient and family to participate in managing patients medical well being

## 2020-08-03 NOTE — ASSESSMENT & PLAN NOTE
Etiology unclear  Patient has had multiple CT abdomen recently had multiple ER visits  Probably multifactorial secondary to Lupron as well as esophagitis and hiatal hernia  CT abdomen unremarkable  Patient has known history of gastric sleeve surgery 4 years ago  GI consult appreciated  S/P EGD showing 2 centimeter sliding hiatal hernia  LA class A esophagitis  Abnormal nodular mucosa in the duodenal    Biopsied  Continue PPI daily for 8-12 weeks  Follow-up biopsies  Okay to restart diet

## 2020-08-03 NOTE — ANESTHESIA PREPROCEDURE EVALUATION
Procedure:  EGD    Relevant Problems   CARDIO   (+) Benign essential hypertension   (+) Hypercholesteremia      ENDO   (+) Type 2 diabetes mellitus with insulin therapy (HCC)      GI/HEPATIC   (+) GERD (gastroesophageal reflux disease)   (+) IPMN (intraductal papillary mucinous neoplasm)   (+) Pancreatic cyst      /RENAL   (+) Benign enlargement of prostate   (+) Prostate cancer ([facility] [address] )      NEURO/PSYCH   (+) History of colon polyps      Other   (+) Morbid obesity due to excess calories [facility])        Physical Exam    Airway    Mallampati score: II         Dental   No notable dental hx     Cardiovascular      Pulmonary      Other Findings        Anesthesia Plan  ASA Score- 3     Anesthesia Type- IV sedation with anesthesia with ASA Monitors  Additional Monitors:   Airway Plan:     Comment: I, Dr Lalita Boswell, the attending physician, have personally seen and evaluated the patient prior to anesthetic care  I have reviewed the pre-anesthetic record, and other medical records if appropriate to the anesthetic care  If a CRNA is involved in the case, I have reviewed the CRNA assessment, if present, and agree  The patient is in a suitable condition to proceed with my formulated anesthetic plan          Plan Factors-    Chart reviewed  Induction- intravenous  Postoperative Plan-     Informed Consent- Anesthetic plan and risks discussed with patient  I personally reviewed this patient with the CRNA  Discussed and agreed on the Anesthesia Plan with the CRNA  Alee Mckinney

## 2020-08-03 NOTE — PLAN OF CARE
Problem: Potential for Falls  Goal: Patient will remain free of falls  Description: INTERVENTIONS:  - Assess patient frequently for physical needs  -  Identify cognitive and physical deficits and behaviors that affect risk of falls  -  [place] fall precautions as indicated by assessment   - Educate patient/family on patient safety including physical limitations  - Instruct patient to call for assistance with activity based on assessment  - Modify environment to reduce risk of injury  - Consider OT/PT consult to assist with strengthening/mobility  Outcome: Progressing     Problem: Nutrition/Hydration-ADULT  Goal: Nutrient/Hydration intake appropriate for improving, restoring or maintaining nutritional needs  Description: Monitor and assess patient's nutrition/hydration status for malnutrition  Collaborate with interdisciplinary team and initiate plan and interventions as ordered  Monitor patient's weight and dietary intake as ordered or per policy  Utilize nutrition screening tool and intervene as necessary  Determine patient's food preferences and provide high-protein, high-caloric foods as appropriate       INTERVENTIONS:  - Monitor oral intake, urinary output, labs, and treatment plans  - Assess nutrition and hydration status and recommend course of action  - Evaluate amount of meals eaten  - Assist patient with eating if necessary   - Allow adequate time for meals  - Recommend/ encourage appropriate diets, oral nutritional supplements, and vitamin/mineral supplements  - Order, calculate, and assess calorie counts as needed  - Recommend, monitor, and adjust tube feedings and TPN/PPN based on assessed needs  - Assess need for intravenous fluids  - Provide specific nutrition/hydration education as appropriate  - Include patient/family/caregiver in decisions related to nutrition  Outcome: Progressing

## 2020-08-03 NOTE — DISCHARGE SUMMARY
Discharge held  Called back to see patient secondary to 10/10 pain with nausea  Occurred about 10 minutes after eating which is typical for him  Will hold discharge  Start IV reglan OTC  Abdominal exam with some firmness around the epigastrium  D/W GI, continue Bentyl and try a muscle relaxer - will add flexeril  Patient will require a greater than 2 midnight stay based on intractable abdominal pain  Discharge- Marge Childs 1950, 79 y o  male MRN: [id number]    Unit/Bed#: [facility] 825-01 Encounter: [id number]    Primary Care Provider: Pepe Forrest MD   Date and time admitted to hospital: 8/2/2020 10:53 AM        * Intractable abdominal pain with nausea and vomiting  Assessment & Plan  Etiology unclear  Patient has had multiple CT abdomen recently had multiple ER visits  Probably multifactorial secondary to Lupron as well as esophagitis and hiatal hernia  CT abdomen unremarkable  Patient has known history of gastric sleeve surgery 4 years ago  GI consult appreciated  S/P EGD showing 2 centimeter sliding hiatal hernia  LA class A esophagitis  Abnormal nodular mucosa in the duodenal    Biopsied  Continue PPI daily for 8-12 weeks  Follow-up biopsies  Okay to restart diet  Type 2 diabetes mellitus with insulin therapy [facility])  Assessment & Plan  Lab Results   Component Value Date    HGBA1C 7 3 (H) 07/24/2020       Recent Labs     08/02/20  1817 08/03/20  0803 08/03/20  1052   POCGLU 139 154* 165*       Blood Sugar Average: Last 72 hrs:  (P) 502 [id number]   Hold home regimen  Sliding-scale insulin for now  Discussed better diet control    Benign essential hypertension  Assessment & Plan  Well controlled  Continue home meds      Prostate cancer [facility])  Assessment & Plan  Following with urology - gets Lupron shots q 6 months  Also scheduled for radiation         Discharging Physician / Practitioner: Celina Cintron PA-C  PCP: Pepe Forrest MD  Admission Date:   Admission Orders (From admission, onward) Ordered        08/02/20 1706  Place in Observation  Once                   Discharge Date: 08/03/20    Resolved Problems  Date Reviewed: 8/3/2020    None          Consultations During Hospital Stay:  · Dr Norris Portillo    Procedures Performed:   EGD - 2 centimeter sliding hiatal hernia  LA class A esophagitis  Abnormal nodular mucosa in the duodenal    Biopsied      RECOMMENDATION:  Continue PPI daily for 8-12 weeks  Follow-up biopsies  Okay to restart diet  Significant Findings / Test Results:   · See above    Incidental Findings:   · none     Test Results Pending at Discharge (will require follow up):   · EGD biopsies     Outpatient Tests Requested:  · none    Complications:  none    Reason for Admission: abdominal pain, nausea, vomiting    Hospital Course:     Felipe Everett is a 79 y o  male patient who originally presented to the hospital on 8/2/2020 due to left sided abdominal pain with nausea and vomiting  Patient had previous hospital visits with the same complaints  Previous CT scans were unremarkable  Patient states that his symptoms started after his last Lupron injection  Due to his multiple hospital presentations, GI was consulted for further workup  EGD was performed  Results as above  At time of discharge, patient denies any abdominal pain  His nausea and vomiting have resolved  He is tolerating oral diet  Please see above list of diagnoses and related plan for additional information  Condition at Discharge: good     Discharge Day Visit / Exam:     Subjective:  Offers no complaints    Abdominal pain, nausea, and vomiting have improved  Vitals: Blood Pressure: 124/57 (08/03/20 1331)  Pulse: 60 (08/03/20 1331)  Temperature: 98 2 °F (36 8 °C) (08/03/20 1209)  Temp Source: Tympanic (08/03/20 1209)  Respirations: 15 (08/03/20 1331)  Height: 5' 7 5" (08/03/20 1123)  Weight - Scale: 104 kg (229 lb 4 5 oz) (08/03/20 1123)  SpO2: 96 % (08/03/20 1331)  Exam:   Physical Exam   Constitutional: He is oriented to person, place, and time  He appears well-developed  No distress  HENT:   Head: Normocephalic and atraumatic  Neck: Normal range of motion  Neck supple  Cardiovascular: Normal rate and regular rhythm  No murmur heard  Pulmonary/Chest: Effort normal and breath sounds normal  No respiratory distress  He has no wheezes  He has no rales  Abdominal: Soft  Bowel sounds are normal  He exhibits no distension  Neurological: He is alert and oriented to person, place, and time  No cranial nerve deficit  Skin: Skin is warm and dry  No rash noted  Discussion with Family: wife at bedside    Discharge instructions/Information to patient and family:   See after visit summary for information provided to patient and family  Provisions for Follow-Up Care:  See after visit summary for information related to follow-up care and any pertinent home health orders  Disposition:     Home    For Discharges to [facility] SNF:   · Not Applicable to this Patient - Not Applicable to this Patient    Planned Readmission: none     Discharge Statement:  I spent 35 minutes discharging the patient  This time was spent on the day of discharge  I had direct contact with the patient on the day of discharge  Greater than 50% of the total time was spent examining patient, answering all patient questions, arranging and discussing plan of care with patient as well as directly providing post-discharge instructions  Additional time then spent on discharge activities  Discharge Medications:  See after visit summary for reconciled discharge medications provided to patient and family        ** Please Note: This note has been constructed using a voice recognition system **

## 2020-08-04 VITALS
BODY MASS INDEX: 34.75 KG/M2 | OXYGEN SATURATION: 98 % | TEMPERATURE: 97.8 F | HEART RATE: 49 BPM | DIASTOLIC BLOOD PRESSURE: 55 MMHG | HEIGHT: 68 IN | WEIGHT: 229.28 LBS | SYSTOLIC BLOOD PRESSURE: 116 MMHG | RESPIRATION RATE: 16 BRPM

## 2020-08-04 LAB
ALBUMIN SERPL BCP-MCNC: 2.8 G/DL (ref 3.5–5)
ALP SERPL-CCNC: 129 U/L (ref 46–116)
ALT SERPL W P-5'-P-CCNC: 57 U/L (ref 12–78)
ANION GAP SERPL CALCULATED.3IONS-SCNC: 5 MMOL/L (ref 4–13)
AST SERPL W P-5'-P-CCNC: 25 U/L (ref 5–45)
BILIRUB SERPL-MCNC: 0.53 MG/DL (ref 0.2–1)
BUN SERPL-MCNC: 18 MG/DL (ref 5–25)
CALCIUM SERPL-MCNC: 8.9 MG/DL (ref 8.3–10.1)
CHLORIDE SERPL-SCNC: 104 MMOL/L (ref 100–108)
CO2 SERPL-SCNC: 28 MMOL/L (ref 21–32)
CREAT SERPL-MCNC: 0.93 MG/DL (ref 0.6–1.3)
ERYTHROCYTE [DISTWIDTH] IN BLOOD BY AUTOMATED COUNT: 12.6 % (ref 11.6–15.1)
GFR SERPL CREATININE-BSD FRML MDRD: 83 ML/MIN/1.73SQ M
GLUCOSE SERPL-MCNC: 173 MG/DL (ref 65–140)
GLUCOSE SERPL-MCNC: 183 MG/DL (ref 65–140)
GLUCOSE SERPL-MCNC: 232 MG/DL (ref 65–140)
HCT VFR BLD AUTO: 37.2 % (ref 36.5–49.3)
HGB BLD-MCNC: 12.3 G/DL (ref 12–17)
MCH RBC QN AUTO: 30.1 PG (ref 26.8–34.3)
MCHC RBC AUTO-ENTMCNC: 33.1 G/DL (ref 31.4–37.4)
MCV RBC AUTO: 91 FL (ref 82–98)
PLATELET # BLD AUTO: 213 THOUSANDS/UL (ref 149–390)
PMV BLD AUTO: 11.8 FL (ref 8.9–12.7)
POTASSIUM SERPL-SCNC: 4 MMOL/L (ref 3.5–5.3)
PROT SERPL-MCNC: 6.5 G/DL (ref 6.4–8.2)
RBC # BLD AUTO: 4.08 MILLION/UL (ref 3.88–5.62)
SODIUM SERPL-SCNC: 137 MMOL/L (ref 136–145)
WBC # BLD AUTO: 7.51 THOUSAND/UL (ref 4.31–10.16)

## 2020-08-04 PROCEDURE — 82948 REAGENT STRIP/BLOOD GLUCOSE: CPT

## 2020-08-04 PROCEDURE — 99217 PR OBSERVATION CARE DISCHARGE MANAGEMENT: CPT | Performed by: PHYSICIAN ASSISTANT

## 2020-08-04 PROCEDURE — 85027 COMPLETE CBC AUTOMATED: CPT | Performed by: PHYSICIAN ASSISTANT

## 2020-08-04 PROCEDURE — C9113 INJ PANTOPRAZOLE SODIUM, VIA: HCPCS | Performed by: INTERNAL MEDICINE

## 2020-08-04 PROCEDURE — 99214 OFFICE O/P EST MOD 30 MIN: CPT | Performed by: INTERNAL MEDICINE

## 2020-08-04 PROCEDURE — 80053 COMPREHEN METABOLIC PANEL: CPT | Performed by: PHYSICIAN ASSISTANT

## 2020-08-04 RX ORDER — METOCLOPRAMIDE 10 MG/1
10 TABLET ORAL 4 TIMES DAILY
Qty: 54 TABLET | Refills: 0 | Status: SHIPPED | OUTPATIENT
Start: 2020-08-04 | End: 2021-03-16 | Stop reason: ALTCHOICE

## 2020-08-04 RX ORDER — CYCLOBENZAPRINE HCL 10 MG
10 TABLET ORAL 3 TIMES DAILY
Qty: 42 TABLET | Refills: 0 | Status: SHIPPED | OUTPATIENT
Start: 2020-08-04 | End: 2020-11-12 | Stop reason: ALTCHOICE

## 2020-08-04 RX ADMIN — INSULIN LISPRO 1 UNITS: 100 INJECTION, SOLUTION INTRAVENOUS; SUBCUTANEOUS at 08:51

## 2020-08-04 RX ADMIN — LOSARTAN POTASSIUM 25 MG: 25 TABLET, FILM COATED ORAL at 08:52

## 2020-08-04 RX ADMIN — METOCLOPRAMIDE 10 MG: 5 INJECTION, SOLUTION INTRAMUSCULAR; INTRAVENOUS at 05:13

## 2020-08-04 RX ADMIN — DICYCLOMINE HYDROCHLORIDE 10 MG: 10 CAPSULE ORAL at 12:15

## 2020-08-04 RX ADMIN — PANTOPRAZOLE SODIUM 40 MG: 40 INJECTION, POWDER, FOR SOLUTION INTRAVENOUS at 08:52

## 2020-08-04 RX ADMIN — METOCLOPRAMIDE 10 MG: 5 INJECTION, SOLUTION INTRAMUSCULAR; INTRAVENOUS at 12:15

## 2020-08-04 RX ADMIN — INSULIN LISPRO 2 UNITS: 100 INJECTION, SOLUTION INTRAVENOUS; SUBCUTANEOUS at 12:13

## 2020-08-04 RX ADMIN — CYCLOBENZAPRINE HYDROCHLORIDE 10 MG: 10 TABLET, FILM COATED ORAL at 08:52

## 2020-08-04 RX ADMIN — DICYCLOMINE HYDROCHLORIDE 10 MG: 10 CAPSULE ORAL at 06:04

## 2020-08-04 RX ADMIN — ENOXAPARIN SODIUM 40 MG: 40 INJECTION SUBCUTANEOUS at 08:52

## 2020-08-04 RX ADMIN — OXYBUTYNIN 5 MG: 5 TABLET, FILM COATED, EXTENDED RELEASE ORAL at 12:16

## 2020-08-04 NOTE — ASSESSMENT & PLAN NOTE
· Etiology unclear  Patient has had multiple CT abdomen recently had multiple ER visits  Symptoms occur after eating  Workup essentially unremarkable, so suspect a functional cause like gastroparesis vs esophageal spasm  · GI consult appreciated  · CT abdomen unremarkable  · Patient has known history of gastric sleeve surgery 4 years ago  · S/P EGD showing 2 centimeter sliding hiatal hernia, LA class A esophagitis  Abnormal nodular mucosa in the duodenal    Biopsied  · Continue PPI daily for 8-12 weeks  · Continue Reglan and flexeril  · Follow up outpatient with GI for possible manometry and gastric emptying studies

## 2020-08-04 NOTE — ED ATTENDING ATTESTATION
8/2/2020  IMary Grace MD, saw and evaluated the patient  I have discussed the patient with the resident/non-physician practitioner and agree with the resident's/non-physician practitioner's findings, Plan of Care, and MDM as documented in the resident's/non-physician practitioner's note, except where noted  All available labs and Radiology studies were reviewed  I was present for key portions of any procedure(s) performed by the resident/non-physician practitioner and I was immediately available to provide assistance  At this point I agree with the current assessment done in the Emergency Department  I have conducted an independent evaluation of this patient a history and physical is as follows:      Patient is a 51-year-old male presents with epigastric abdominal pain which woke him from rest this morning  Past medical history is remarkable for gastric sleeve operation performed by bariatric approximately 4 years ago, patient has had multiple abdominal surgeries including cholecystectomy an ex lap  Patient recently was hospitalized and treated for urinary tract infection discharged  He developed epigastric pain 1 prior ED visit had a negative workup including CT imaging at that time felt better after pain medications was discharged  Per patient and wife patient states his pain started this morning will confirm rest   With patient weight state that his symptoms are consistent with his prior abdominal pain he has experienced on his last ED visit  Abdomen is soft with moderate epigastric tenderness to palpation normal bowel sounds no signs of peritonitis  Patient also admits to dark stools however he does also admit that he has been taking supplemental iron  Bedside guaiac test performed by resident he negative stool  Impression epigastric abdominal pain  Will treat patient's pain antiemetics PPI check screening labs    Will also check ECG and troponin to exclude possible atypical presentation of ACS  Patient previously had a contrast CT scan which was unremarkable despite having ongoing symptoms  Given history of bariatric surgery case was discussed with on-call bariatric is regarding possibility that patient's symptoms are related to a late complication of surgery such as an ulcer or stricture  Bariatric fellow recommends getting a CT scan with both IV and p o  contrast        ED Course    CT imaging reviewed no acute disease noted  patient's pain persisted despite parental analgesia in the emergency department  Will admit patient for intractable pain and for further evaluation management of his symptoms        Critical Care Time  Procedures

## 2020-08-04 NOTE — DISCHARGE INSTRUCTIONS
Gastroparesis   WHAT YOU NEED TO KNOW:   Gastroparesis is a condition that causes food to move more slowly than normal from the stomach to the intestines  Gastroparesis is not caused by blockage  Often, the cause may not be known  It may be caused by damage to a nerve that controls muscles used to move food to your small intestines  DISCHARGE INSTRUCTIONS:   You or someone else should call 911 if:   · Your heart is beating faster and you are breathing faster than usual     · You cannot be woken up  Seek care immediately if:   · You are confused or have trouble thinking clearly  · You are dizzy or very drowsy  Contact your healthcare provider if:   · You are urinating less than usual     · Your symptoms return or become worse  · The color of your urine is dark yellow  · You have questions or concerns about your condition or care  Medicines:   · Medicines may  be given to control your nausea and vomiting  You may  also receive medicines that help food move through your stomach at a more normal rate  · Take your medicine as directed  Contact your healthcare provider if you think your medicine is not helping or if you have side effects  Tell him or her if you are allergic to any medicine  Keep a list of the medicines, vitamins, and herbs you take  Include the amounts, and when and why you take them  Bring the list or the pill bottles to follow-up visits  Carry your medicine list with you in case of an emergency  Follow up with your healthcare provider as directed: You may need tests to check if treatment is working  You may need to see a dietitian for help with a nutrition plan  Write down your questions so you remember to ask them during your visits  Self-care: Your healthcare provider may suggest any of the following:  · Change your eating habits    Take small bites of food to make it easier for your body to digest  You may need to eat several small meals low in fiber and fat throughout the day  Ask your dietitian for help with planning meals  · Do not eat raw fruits, vegetables, or whole grains  These can cause you to have undigested food in your stomach  The undigested food can form a blockage that can become life-threatening  · Drink liquids as directed  Liquids will prevent dehydration caused by vomiting  Slowly drink small amounts of liquids at a time  Ask your healthcare provider how much liquid to drink each day, and which liquids are best for you  You may also need to drink an oral rehydration solution (ORS)  An ORS has the right amounts of sugar, salt, and minerals in water to replace body fluids  · Do not lie down for 2 hours after your meals  Walking and sitting after meals help with digestion  · Control your blood sugar levels if you have diabetes  High blood sugar levels may make your symptoms worse  Ask your healthcare provider how to control your blood sugar levels  © 2017 [employer] Information is for End User's use only and may not be sold, redistributed or otherwise used for commercial purposes  All illustrations and images included in CareNotes® are the copyrighted property of A D A M , Inc  or [employer]  The above information is an  only  It is not intended as medical advice for individual conditions or treatments  Talk to your doctor, nurse or pharmacist before following any medical regimen to see if it is safe and effective for you

## 2020-08-04 NOTE — UTILIZATION REVIEW
Notification of Observation Admission/Observation Authorization Request   This is a Notification of Observation Admission for [address]  Be advised that this patient was admitted to our facility under Observation Status  Contact Lori Feldman at [phone] for additional admission information  [facility]  DEDICATED [phone]  Patient Name:   Candis Chan   YOB: 1950       [address]   [address]:   [facility]  Tax ID: [id number]  NPI: [id number] Attending Provider/NPI: Soham Nichole Md [[id number]]   Place of Service Code: 25     Place of Service Name:  CPT Code for Observation:  On [address] [facility] / CPT 87573   Start Date:      Discharge Date & Time: No discharge date for patient encounter  Type of Admission: Observation Status Discharge Disposition (if discharged): Home/Self Care   Patient Diagnoses: Nausea [R11 0]  Abdominal pain [R10 9]  Generalized abdominal pain [R10 84]  Intractable abdominal pain [R10 9]     Orders: Admission Orders (From admission, onward)     Ordered        08/02/20 1706  Place in Observation  Once                    Assigned Utilization Review Contact: Lori Feldman  Utilization   Network Utilization Review Department  Phone: [phone]; Fax [phone]  Email: [email or web address]   ATTENTION PAYERS: Please call the assigned Utilization  directly with any questions or concerns ALL voicemails in the department are confidential  Send all requests for admission clinical reviews, approved or denied determinations and any other requests to dedicated fax number belonging to the campus where the patient is receiving treatment

## 2020-08-04 NOTE — PROGRESS NOTES
Gastroenterology Progress Note      PATIENT INFORMATION      Patient: Gualberto Cole 79 y o  male   MRN: [id number]  PCP: Michelle Dutton MD  Unit/Bed#: [facility] 825-01 Encounter: [id number]  Date Of Visit: 08/04/20  Current Length of Stay: 0 day(s)     ASSESSMENTS & PLAN    Patient is a 68-year-old male with a past medical history of gastric sleeve (2016), GERD, IPMN, diabetes and history of nephrolithiasis who presents with abdominal pain     Abdominal pain  Unclear etiology at this time  Status post EGD which showed 2 cm sliding hiatal hernia, class a esophagitis, abnormal bladder mucosa and dilated lactealse in the 2nd portion of the duodenum  Currently resolved  Consider esophageal spasm or other dysmotility disorders (including gastroparesis)  Can consider functional causes, mesenteric ischemia, cyclical vomiting syndrome, musculoskeletal causes  · Continue PPI b i d  for 8-12 weeks  · Continue Bentyl and Flexeril  · Follow-up EGD biopsy results  · Will schedule outpt f/u and consider outpatient manometry and/or emptying study at that time  · Recommended small frequent low-fat/low-fiber meals at this time  · Continue Zofran p r n      GERD  · PPI as above     Pancreatic cyst (IPMN-side branch)  Previous MRI of the abdomen on 11/2019 showed 2 2 x 1 7 x 3 2 cm pancreatic cyst in the uncinate head of pancreas that is likely side branch IPMN  Previously met with our advanced endoscopist and surgical oncologist and the decision was made to monitor this cyst  · Due for MRI of the abdomen in November of 2019  Placed order as outpt in [address]  GI will sign off  Please call with any questions  SUBJECTIVE     Yesterday after EGD patient tried to eat chicken salad and noticed an episode of epigastric pain that resolved with Bentyl and Flexeril  He has not tried eating since that time  Patient states he has no symptoms at this time   Patient denies abdominal pain, nausea, vomiting, constipation, diarrhea, fevers/chills  OBJECTIVE     Vitals:   Temp (24hrs), Av 2 °F (36 8 °C), Min:97 8 °F (36 6 °C), Max:98 6 °F (37 °C)    Temp:  [97 8 °F (36 6 °C)-98 6 °F (37 °C)] 97 8 °F (36 6 °C)  HR:  [49-76] 49  Resp:  [15-20] 16  BP: ()/(50-65) 116/55  SpO2:  [95 %-98 %] 98 %  Body mass index is 35 38 kg/m²  Input and Output Summary (last 24 hours): Intake/Output Summary (Last 24 hours) at 2020 1001  Last data filed at 8/3/2020 1313  Gross per 24 hour   Intake 400 ml   Output    Net 400 ml       Physical Exam:   GENERAL: NAD  HEENT:  NC/AT, no scleral icterus  CARDIAC:  RRR, +S1/S2  PULMONARY:  non-labored breathing  ABDOMEN:  Soft, NT/ND, +BS, no rebound/guarding/rigidity  Extremities:  No edema, cyanosis, or clubbing  NEUROLOGIC:  Alert  SKIN:  No rashes or erythema          ADDITIONAL DATA     Labs & Recent Cultures:     Results from last 7 days   Lab Units 20  0511 20  0534   WBC Thousand/uL 7 51 9 17   HEMOGLOBIN g/dL 12 3 12 5   HEMATOCRIT % 37 2 38 3   PLATELETS Thousands/uL 213 231   NEUTROS PCT %  --  67   LYMPHS PCT %  --  19   MONOS PCT %  --  9   EOS PCT %  --  2     Results from last 7 days   Lab Units 20  0511   POTASSIUM mmol/L 4 0   CHLORIDE mmol/L 104   CO2 mmol/L 28   BUN mg/dL 18   CREATININE mg/dL 0 93   CALCIUM mg/dL 8 9   ALK PHOS U/L 129*   ALT U/L 57   AST U/L 25                     Nutrition:  Discharge Diet  Diet Quan/CHO Controlled; Consistent Carbohydrate Diet Level 2 (5 carb servings/75 grams CHO/meal); Lo Fiber/Lo Residue, Lo Fat  Radiology Results:   CT abdomen pelvis with contrast   Final Result by Yecenia Logan MD ( [id number])      1  No acute abnormality within the abdomen or pelvis  2   Stable incidental findings as above  Workstation performed: [id number]         XR abdomen 1 view kub   Final Result by Yecenia Logan MD ( [id number])      Unremarkable abdomen                 Workstation performed: [id number]         MRI abdomen w wo contrast    (Results Pending)     Scheduled Medications:  atorvastatin, 10 mg, Daily With Dinner  cyclobenzaprine, 10 mg, TID  dicyclomine, 10 mg, 4x Daily (AC & HS)  enoxaparin, 40 mg, Daily  insulin lispro, 1-5 Units, TID AC  losartan, 25 mg, Daily  metoclopramide, 10 mg, Q6H PEGGY  oxybutynin, 5 mg, Daily  pantoprazole, 40 mg, Q12H PEGGY  tamsulosin, 0 4 mg, Daily With Dinner        PRN MEDS:  albuterol, 1 puff, Q4H PRN  morphine injection, 4 mg, Q4H PRN  ondansetron, 4 mg, Q4H PRN  oxyCODONE, 5 mg, Q4H PRN        Last 24 Hours Medication List:   albuterol, 1 puff, Inhalation, Q4H PRN, Roro Figueredo MD  atorvastatin, 10 mg, Oral, Daily With Cheryl Yu MD  cyclobenzaprine, 10 mg, Oral, TID, Johana Padilla PA-C  dicyclomine, 10 mg, Oral, 4x Daily (AC & HS), Roro Figueredo MD  enoxaparin, 40 mg, Subcutaneous, Daily, Roro Figueredo MD  insulin lispro, 1-5 Units, Subcutaneous, TID AC, Roro Figueredo MD  losartan, 25 mg, Oral, Daily, Roro Figueredo MD  metoclopramide, 10 mg, Intravenous, Q6H [address], Johana Padilla PA-C  morphine injection, 4 mg, Intravenous, Q4H PRN, Johana Padilla PA-C  ondansetron, 4 mg, Intravenous, Q4H PRN, Johana Padilla PA-C  oxybutynin, 5 mg, Oral, Daily, Gus Moise MD  oxyCODONE, 5 mg, Oral, Q4H PRN, Johana Padilla PA-C  pantoprazole, 40 mg, Intravenous, Q12H [address], Roro Figueredo MD  tamsulosin, 0 4 mg, Oral, Daily With Cheryl Yu MD           Time Spent for Care: 30 mins spent in total   More than 50% of total time spent on counseling and coordination of care as described above  Current Length of Stay: 0 day(s)      Code Status: Level 1 - Full Code          ** Please Note: This note is constructed using a voice recognition dictation system   **

## 2020-08-04 NOTE — NURSING NOTE
Pt dressed and ready for discharge prior to AVS completed  IV removed  AVS printed and reviewed with patient and wife  Left floor ambulatory, discharged to home

## 2020-08-04 NOTE — UTILIZATION REVIEW
Continued Stay Review    Date: 8/4/20                     Current Patient Class: IP Current Level of Care: MS    HPI:70 y o  male initially admitted on 8/2/20 with c/o abd pain, N/V several times  This paint was first noted after starting on Lupron  CTA no acute abnormality  Assessment/Plan:    Pt had EGD yesterday - hikarenl herlandy, Class A esophagitis, abnormal nodular mucose in duodenal bulb with bx  Will continue on PPI x 8-12 weeks,     ++++++++++++++++++++++++++++++  8/3 EGD -   2 centimeter sliding hiatal hernia  LA class A esophagitis  Status post sleeve gastrectomy  Abnormal nodular mucosa in the duodenal bulb   Biopsied  Dilated lacteals in the second portion of duodenum  Biopsied       RECOMMENDATION:  Continue PPI daily for 8-12 weeks  Follow-up biopsies  Okay to restart diet    No etiology for abdominal pain on this exam   ++++++++++++++++++++++++++++++    Pertinent Labs/Diagnostic Results:     Results from last 7 days   Lab Units 08/03/20  1021   SARS-COV-2  Negative     Results from last 7 days   Lab Units 08/04/20  0511 08/03/20  0534 08/02/20  1113 07/28/20  1019   WBC Thousand/uL 7 51 9 17 8 06 5 79   HEMOGLOBIN g/dL 12 3 12 5 12 3 13 1   HEMATOCRIT % 37 2 38 3 36 9 38 4   PLATELETS Thousands/uL 213 231 154 177   NEUTROS ABS Thousands/µL  --  6 29 6 43 4 50         Results from last 7 days   Lab Units 08/04/20  0511 08/03/20  0534 08/02/20  1113 07/28/20  1019   SODIUM mmol/L 137 136 137 139   POTASSIUM mmol/L 4 0 4 2 4 6 3 8   CHLORIDE mmol/L 104 101 104 104   CO2 mmol/L 28 28 28 26   ANION GAP mmol/L 5 7 5 9   BUN mg/dL 18 16 23 12   CREATININE mg/dL 0 93 0 82 1 02 0 92   EGFR ml/min/1 73sq m 83 90 74 84   CALCIUM mg/dL 8 9 8 5 10 1 8 6     Results from last 7 days   Lab Units 08/04/20  0511 08/03/20  0534 08/02/20  1113 07/28/20  1019   AST U/L 25 27 35 89*   ALT U/L 57 69 91* 176*   ALK PHOS U/L 129* 129* 159* 197*   TOTAL PROTEIN g/dL 6 5 6 6 7 5 6 8   ALBUMIN g/dL 2 8* 2 9* 3 5 2 9* TOTAL BILIRUBIN mg/dL 0 53 0 64 0 56 0 59     Results from last 7 days   Lab Units 08/04/20  0734 08/03/20  2131 08/03/20  1656 08/03/20  1052 08/03/20  0803 08/02/20  1817   POC GLUCOSE mg/dl 183* 221* 237* 165* 154* 139     Results from last 7 days   Lab Units 08/04/20  0511 08/03/20  0534 08/02/20  1113 07/28/20  1019   GLUCOSE RANDOM mg/dL 173* 134 260* 225*     Results from last 7 days   Lab Units 08/02/20  1207 07/28/20  1130   TROPONIN I ng/mL <0 02 <0 02     Results from last 7 days   Lab Units 07/28/20  1019   LACTIC ACID mmol/L 2 0     Results from last 7 days   Lab Units 08/02/20  1113 07/28/20  1019   LIPASE u/L 240 168     Results from last 7 days   Lab Units 08/02/20  1500   CLARITY UA  Turbid   COLOR UA  Yellow   SPEC GRAV UA  1 022   PH UA  8 0   GLUCOSE UA mg/dl 100 (1/10%)*   KETONES UA mg/dl Negative   BLOOD UA  Negative   PROTEIN UA mg/dl Negative   NITRITE UA  Negative   BILIRUBIN UA  Negative   UROBILINOGEN UA E U /dl 1 0   LEUKOCYTES UA  Negative   WBC UA /hpf None Seen   RBC UA /hpf None Seen   BACTERIA UA /hpf None Seen   EPITHELIAL CELLS WET PREP /hpf None Seen     Vital Signs:   08/04/20 0700   97 8 °F (36 6 °C)   49Abnormal     16   116/55      98 %   None (Room air)   Lying    08/03/20 22:04:13   98 1 °F (36 7 °C)   70   20   114/56   75   96 %   None (Room air)   Lying    08/03/20 21:33:50   98 6 °F (37 °C)   65   20   112/51   71   95 %          08/03/20 1945                     None (Room air)       08/03/20 1331      60   15   124/57      96 %   None (Room air)       08/03/20 1316      76   16   96/50      95 %   None (Room air)       08/03/20 1209   98 2 °F (36 8 °C)   60   18   148/65      95 %   None (Room air)       08/03/20 07:42:29   97 9 °F (36 6 °C)   54Abnormal        119/63   82   95 %          08/03/20 0026                  94 %   None (Room air)       08/02/20 22:42:30   98 2 °F (36 8 °C)   63   18   117/63   81   95 %          08/02/20 1930 [place]      None (Room air)       08/02/20 1900      [place]      None (Room air)       08/02/20 1823                  97 %   None (Room air)       08/02/20 18:00:32   97 8 °F (36 6 °C)   61   18   116/64   81   95 %          08/02/20 1556      57   20   127/60      94 %   None (Room air)   Lying    08/02/20 1530      54Abnormal     16   142/65   94   93 %   None (Room air)   Lying    08/02/20 1430      64   18   151/66   95   94 %          08/02/20 1400      56   17   146/78   106   95 %            Medications:   Scheduled Medications:  atorvastatin, 10 mg, Oral, Daily With Dinner  cyclobenzaprine, 10 mg, Oral, TID  dicyclomine, 10 mg, Oral, 4x Daily (AC & HS)  enoxaparin, 40 mg, Subcutaneous, Daily  insulin lispro, 1-5 Units, Subcutaneous, TID AC  losartan, 25 mg, Oral, Daily  metoclopramide, 10 mg, Intravenous, Q6H PEGGY  oxybutynin, 5 mg, Oral, Daily  pantoprazole, 40 mg, Intravenous, Q12H PEGGY  tamsulosin, 0 4 mg, Oral, Daily With Dinner    Continuous IV Infusions:     PRN Meds:  albuterol, 1 puff, Inhalation, Q4H PRN  morphine injection, 4 mg, Intravenous, Q4H PRN x2 8/3  ondansetron, 4 mg, Intravenous, Q4H PRN x1 8/3  oxyCODONE, 5 mg, Oral, Q4H PRN x 1 8/3      Discharge Plan: home       Network Utilization Review Department  [email or web address]  ATTENTION: Please call with any questions or concerns to [phone] and carefully listen to the prompts so that you are directed to the right person  All voicemails are confidential   Maximino Cochran all requests for admission clinical reviews, approved or denied determinations and any other requests to dedicated fax number below belonging to the campus where the patient is receiving treatment   List of dedicated fax numbers for the Facilities:  [address] [place] [address] DENIALS (Administrative/Medical Necessity) [phone]   [address] (Maternity/NICU/Pediatrics) [phone]   [facility] [phone]     [address] [phone]   [facility] [phone]   [address] [phone]   [facility] [phone]   [address] [facility] [address]  [address] [facility] Main [address] [facility] [phone]

## 2020-08-04 NOTE — PLAN OF CARE
Problem: Potential for Falls  Goal: Patient will remain free of falls  Description: INTERVENTIONS:  - Assess patient frequently for physical needs  -  Identify cognitive and physical deficits and behaviors that affect risk of falls    -  [place] fall precautions as indicated by assessment   - Educate patient/family on patient safety including physical limitations  - Instruct patient to call for assistance with activity based on assessment  - Modify environment to reduce risk of injury  - Consider OT/PT consult to assist with strengthening/mobility  Outcome: Progressing

## 2020-08-04 NOTE — ASSESSMENT & PLAN NOTE
Lab Results   Component Value Date    HGBA1C 7 3 (H) 07/24/2020       Recent Labs     08/03/20  1656 08/03/20  2131 08/04/20  0734 08/04/20  1118   POCGLU 237* 221* 183* 232*       Blood Sugar Average: Last 72 hrs:  (P) 190 [id number]     Resume home regimen

## 2020-08-04 NOTE — DISCHARGE SUMMARY
Discharge- Nader Bose 1950, 79 y o  male MRN: [id number]    [facility] Encounter: [id number]    Primary Care Provider: Amy Duke MD   Date and time admitted to hospital: 8/2/2020 10:53 AM        * Intractable abdominal pain with nausea and vomiting  Assessment & Plan  · Etiology unclear  Patient has had multiple CT abdomen recently had multiple ER visits  Symptoms occur after eating  Workup essentially unremarkable, so suspect a functional cause like gastroparesis vs esophageal spasm  · GI consult appreciated  · CT abdomen unremarkable  · Patient has known history of gastric sleeve surgery 4 years ago  · S/P EGD showing 2 centimeter sliding hiatal hernia, LA class A esophagitis  Abnormal nodular mucosa in the duodenal    Biopsied  · Continue PPI daily for 8-12 weeks  · Continue Reglan and flexeril  · Follow up outpatient with GI for possible manometry and gastric emptying studies  Type 2 diabetes mellitus with insulin therapy [facility])  Assessment & Plan  Lab Results   Component Value Date    HGBA1C 7 3 (H) 07/24/2020       Recent Labs     08/03/20  1656 08/03/20  2131 08/04/20  0734 08/04/20  1118   POCGLU 237* 221* 183* 232*       Blood Sugar Average: Last 72 hrs:  (P) 190 [id number]     Resume home regimen      Benign essential hypertension  Assessment & Plan  Well controlled  Continue home meds  Prostate cancer [facility])  Assessment & Plan  Following with urology - gets Lupron shots q 6 months  Also scheduled for radiation         Discharging Physician / Practitioner: Babita Oconnell PA-C  PCP: Amy Duke MD  Admission Date:   Admission Orders (From admission, onward)     Ordered        08/02/20 1706  Place in Observation  Once                   Discharge Date: 08/04/20    Resolved Problems  Date Reviewed: 8/4/2020    None          Consultations During Hospital Stay:  · Dr Jovita Gomes    Procedures Performed:     CT abd/pelvis - 1    No acute abnormality within the abdomen or pelvis  2   Stable incidental findings as above    KUB - Unremarkable abdomen  Significant Findings / Test Results:   · none    Incidental Findings:   ·  none    Test Results Pending at Discharge (will require follow up): · Biopsies from EGD     Outpatient Tests Requested:  · none    Complications:  none    Reason for Admission: Abdominal pain    Hospital Course:     Ramez Garay is a 79 y o  male patient who originally presented to the hospital on 8/2/2020 due to intractable abdominal pain with nausea vomiting  Patient's symptoms appear to occur mostly after eating  Patient was seen for the same complaints 3 times prior to this admission  As result GI was consulted  They performed an EGD showing mild esophagitis and a small hiatal hernia  CT scan was unremarkable  After the EGD, about 10 minutes after he ate, patient again developed severe abdominal pain and nausea  At this time suspect that this is likely functional secondary to gastroparesis versus esophageal spasm  Plan is to continue PPI for about 12 weeks  Patient was also started on Reglan and Flexeril  He has been tolerating a diet since the initiation of these medications  He will need to follow up with GI for further testing  Please see above list of diagnoses and related plan for additional information  Condition at Discharge: good     Discharge Day Visit / Exam:     Subjective:  Patient states he is feeling better  He tolerated breakfast and lunch today without any difficulty  Vitals: Blood Pressure: 116/55 (08/04/20 0700)  Pulse: (!) 49 (08/04/20 0700)  Temperature: 97 8 °F (36 6 °C) (08/04/20 0700)  Temp Source: Oral (08/03/20 2204)  Respirations: 16 (08/04/20 0700)  Height: 5' 7 5" (08/03/20 1123)  Weight - Scale: 104 kg (229 lb 4 5 oz) (08/03/20 1123)  SpO2: 98 % (08/04/20 0700)  Exam:   Physical Exam   Constitutional: He is oriented to person, place, and time  He appears well-developed  No distress     HENT:   Head: Normocephalic and atraumatic  Neck: Normal range of motion  Neck supple  Cardiovascular: Normal rate and regular rhythm  No murmur heard  Pulmonary/Chest: Effort normal and breath sounds normal  No respiratory distress  He has no wheezes  He has no rales  Abdominal: Soft  Bowel sounds are normal  He exhibits no distension  Neurological: He is alert and oriented to person, place, and time  No cranial nerve deficit  Skin: Skin is warm and dry  No rash noted  Discussion with Family: wife updated at bedside    Discharge instructions/Information to patient and family:   See after visit summary for information provided to patient and family  Provisions for Follow-Up Care:  See after visit summary for information related to follow-up care and any pertinent home health orders  Disposition:     Home    For Discharges to [facility] SNF:   · Not Applicable to this Patient - Not Applicable to this Patient    Planned Readmission: none     Discharge Statement:  I spent 45 minutes discharging the patient  This time was spent on the day of discharge  I had direct contact with the patient on the day of discharge  Greater than 50% of the total time was spent examining patient, answering all patient questions, arranging and discussing plan of care with patient as well as directly providing post-discharge instructions  Additional time then spent on discharge activities  Discharge Medications:  See after visit summary for reconciled discharge medications provided to patient and family        ** Please Note: This note has been constructed using a voice recognition system **

## 2020-08-05 ENCOUNTER — TRANSITIONAL CARE MANAGEMENT (OUTPATIENT)
Dept: FAMILY MEDICINE CLINIC | Facility: CLINIC | Age: 70
End: 2020-08-05

## 2020-08-05 NOTE — UTILIZATION REVIEW
Notification of Discharge  This is a Notification of Discharge from our facility [address]  Please be advised that this patient has been discharge from our facility  Below you will find the admission and discharge date and time including the patients disposition  PRESENTATION DATE: 8/2/2020 10:53 AM  OBS ADMISSION DATE:   IP ADMISSION DATE: N/A N/A   DISCHARGE DATE: 8/4/2020  3:05 PM  DISPOSITION: Home/Self Care Home/Self Care   Admission Orders listed below:  Admission Orders (From admission, onward)     Ordered        08/02/20 1706  Place in Observation  Once                   Please contact the UR Department if additional information is required to close this patient's authorization/case  [address] Utilization Review Department  Main: [phone] x carefully listen to the prompts  All voicemails are confidential   [email or web address]  Send all requests for admission clinical reviews, approved or denied determinations and any other requests to dedicated fax number below belonging to the campus where the patient is receiving treatment   List of dedicated fax numbers:  [address] DENIALS (Administrative/Medical Necessity) [phone]   [address] (Maternity/NICU/Pediatrics) [phone]   Faith Handing [phone]   Heriberto Castillo [phone]   Kim Mclain [phone]   [facility] [address] [place] [address] [phone]   [facility]  [phone]   [address] [facility] [address]  [address] [address] [address] [facility] [phone]

## 2020-08-06 ENCOUNTER — TRANSCRIBE ORDERS (OUTPATIENT)
Dept: LAB | Facility: CLINIC | Age: 70
End: 2020-08-06

## 2020-08-06 ENCOUNTER — OFFICE VISIT (OUTPATIENT)
Dept: FAMILY MEDICINE CLINIC | Facility: CLINIC | Age: 70
End: 2020-08-06
Payer: COMMERCIAL

## 2020-08-06 VITALS
DIASTOLIC BLOOD PRESSURE: 62 MMHG | HEIGHT: 68 IN | TEMPERATURE: 98.3 F | WEIGHT: 225.8 LBS | SYSTOLIC BLOOD PRESSURE: 116 MMHG | BODY MASS INDEX: 34.22 KG/M2 | RESPIRATION RATE: 20 BRPM | HEART RATE: 81 BPM | OXYGEN SATURATION: 96 %

## 2020-08-06 DIAGNOSIS — K21.9 GASTROESOPHAGEAL REFLUX DISEASE, ESOPHAGITIS PRESENCE NOT SPECIFIED: ICD-10-CM

## 2020-08-06 DIAGNOSIS — Z79.4 TYPE 2 DIABETES MELLITUS WITH INSULIN THERAPY (HCC): ICD-10-CM

## 2020-08-06 DIAGNOSIS — Z09 HOSPITAL DISCHARGE FOLLOW-UP: Primary | ICD-10-CM

## 2020-08-06 DIAGNOSIS — R10.9 INTRACTABLE ABDOMINAL PAIN: ICD-10-CM

## 2020-08-06 DIAGNOSIS — E11.9 TYPE 2 DIABETES MELLITUS WITH INSULIN THERAPY (HCC): ICD-10-CM

## 2020-08-06 DIAGNOSIS — C61 PROSTATE CANCER (HCC): ICD-10-CM

## 2020-08-06 DIAGNOSIS — E78.00 HYPERCHOLESTEREMIA: ICD-10-CM

## 2020-08-06 PROBLEM — K92.1 BLACK STOOL: Status: RESOLVED | Noted: 2020-07-24 | Resolved: 2020-08-06

## 2020-08-06 PROCEDURE — 99496 TRANSJ CARE MGMT HIGH F2F 7D: CPT | Performed by: FAMILY MEDICINE

## 2020-08-06 PROCEDURE — 1111F DSCHRG MED/CURRENT MED MERGE: CPT | Performed by: FAMILY MEDICINE

## 2020-08-06 NOTE — PROGRESS NOTES
Assessment/Plan:     No problem-specific Assessment & Plan notes found for this encounter  Diagnoses and all orders for this visit:    Hospital discharge follow-up    Intractable abdominal pain with nausea and vomiting  Comments:  improved  suspect Gastroperesis   small frequent meals discussed today     Type 2 diabetes mellitus with insulin therapy ([facility] )  Comments:  recent HBa1c 7 3%  watch sweets and fatty food  to adjust his insulin according to the sugars     Gastroesophageal reflux disease, esophagitis presence not specified  Comments:  stable  continue Pantoprazole as directed    Prostate cancer ([facility] )  Comments:  lupron injection every 6 months  care per urology     Hypercholesteremia  Comments:  stable  lipitor as directed       BMI Counseling: Body mass index is 34 84 kg/m²  The BMI is above normal  Nutrition recommendations include decreasing portion sizes and encouraging healthy choices of fruits and vegetables  Exercise recommendations include moderate physical activity 150 minutes/week  No pharmacotherapy was ordered  Subjective:     Patient ID: Nargis Colby is a 79 y o  male  Here for follow up  Was recently admitted on 8/2/2020 due to intractable abdominal pain with nausea and vomiting - occurs after eating   Had EGD and showed mild esophagitis and a small hiatal hernia    Was placed on protonix for 8-12 weeks   Had acute cystitis back in July 2020 and given keflex that time   Had First lupron injection in June 2020 for prostate cancer  Feeling well overall today   No vomiting since he was sent home       Diabetes   He presents for his follow-up diabetic visit  He has type 2 diabetes mellitus  His disease course has been stable  There are no hypoglycemic associated symptoms  There are no diabetic associated symptoms  There are no hypoglycemic complications  Symptoms are stable  There are no diabetic complications  Risk factors for coronary artery disease include obesity and male sex  Current diabetic treatment includes intensive insulin program  He is compliant with treatment all of the time  His weight is stable  He is following a diabetic diet  Meal planning includes avoidance of concentrated sweets  His home blood glucose trend is increasing steadily  His overall blood glucose range is 130-140 mg/dl  Review of Systems   Constitutional: Negative  HENT: Negative  Eyes: Negative  Respiratory: Negative  Cardiovascular: Negative  Gastrointestinal: Negative  Genitourinary: Negative  Neurological: Negative  Hematological: Negative  Objective:     Physical Exam   Constitutional: He is oriented to person, place, and time  HENT:   Head: Normocephalic and atraumatic  Nose: Nose normal  No rhinorrhea or congestion  Neck: Normal range of motion  Neck supple  Cardiovascular: Normal rate and regular rhythm  Exam reveals no friction rub  Pulses are no weak pulses  No murmur heard  Pulses:       Dorsalis pedis pulses are 2+ on the right side and 2+ on the left side  Posterior tibial pulses are 2+ on the right side and 2+ on the left side  Abdominal: Normal appearance  He exhibits no distension and no mass  Musculoskeletal: Normal range of motion  Feet:   Right Foot:   Skin Integrity: Negative for ulcer, skin breakdown, erythema, warmth, callus or dry skin  Left Foot:   Skin Integrity: Negative for ulcer, skin breakdown, erythema, warmth, callus or dry skin  Neurological: He is alert and oriented to person, place, and time  Psychiatric: Mood normal          Vitals:    08/06/20 1353   BP: 116/62   BP Location: Right arm   Patient Position: Sitting   Cuff Size: Large   Pulse: 81   Resp: 20   Temp: 98 3 °F (36 8 °C)   SpO2: 96%   Weight: 102 kg (225 lb 12 8 oz)   Height: 5' 7 5" (1 715 m)       Transitional Care Management Review:  Nader Bose is a 79 y o  male here for TCM follow up       During the TCM phone call patient stated:    TCM Call (since 7/6/2020)     Date and time call was made  8/5/2020 11:59 AM    Patient was hospitialized at  [facility]    Date of Admission  08/02/20    Date of discharge  08/04/20    Diagnosis  TCM - 08/02 - 08/04 INTRACTABLE ABDOMINAL PAIN WITH Luevenia Peoples  AND VOMITING  LD:  08/17/2020    Disposition  Home      TCM Call (since 7/6/2020)     Should patient be enrolled in anticoag monitoring? Yes    Scheduled for follow up? Yes    Did you obtain your prescribed medications  Yes    Do you need help managing your prescriptions or medications  No    Is transportation to your appointment needed  No    I have advised the patient to call PCP with any new or worsening symptoms  LAILA RAMOS MA    Living Arrangements  Children    Are you recieving any outpatient services  No    Are you recieving home care services  No    Are you using any community resources  No    Current waiver services  No    Have you fallen in the last 12 months  No    Interperter language line needed  No    Comments  PT WAS GIVING 4 DIFFERENT NEW MEDICATIONS          Dewayne Luciano MD    Patient's shoes and socks removed  Right Foot/Ankle   Right Foot Inspection  Skin Exam: skin normal and skin intact no dry skin, no warmth, no callus, no erythema, no maceration, no abnormal color, no pre-ulcer, no ulcer and no callus                          Toe Exam: ROM and strength within normal limits  Sensory   Vibration: intact  Proprioception: intact   Monofilament testing: intact  Vascular  Capillary refills: < 3 seconds  The right DP pulse is 2+  The right PT pulse is 2+       Left Foot/Ankle  Left Foot Inspection  Skin Exam: skin normal and skin intactno dry skin, no warmth, no erythema, no maceration, normal color, no pre-ulcer, no ulcer and no callus                         Toe Exam: ROM and strength within normal limits                   Sensory   Vibration: intact  Proprioception: intact  Monofilament: intact  Vascular  Capillary refills: < 3 seconds  The left DP pulse is 2+  The left PT pulse is 2+  Assign Risk Category:  No deformity present; No loss of protective sensation;  No weak pulses       Risk: 0

## 2020-08-07 ENCOUNTER — TELEPHONE (OUTPATIENT)
Dept: GASTROENTEROLOGY | Facility: CLINIC | Age: 70
End: 2020-08-07

## 2020-08-07 NOTE — TELEPHONE ENCOUNTER
----- Message from Megan Varela MD sent at 8/4/2020  9:59 AM EDT -----  Please schedule patient for clinic follow-up in 2-4 weeks for epigastric pain of unknown origin  He is to be discharged from hospital likely today  YASEMIN Cho    Gastroenterology Fellow PGY-4  8/4/2020 10:00 AM

## 2020-08-18 DIAGNOSIS — I10 BENIGN ESSENTIAL HYPERTENSION: ICD-10-CM

## 2020-08-18 RX ORDER — LOSARTAN POTASSIUM 25 MG/1
TABLET ORAL
Qty: 90 TABLET | Refills: 0 | Status: SHIPPED | OUTPATIENT
Start: 2020-08-18 | End: 2020-11-18

## 2020-08-19 ENCOUNTER — RADIATION THERAPY TREATMENT (OUTPATIENT)
Dept: RADIATION ONCOLOGY | Facility: HOSPITAL | Age: 70
End: 2020-08-19
Attending: RADIOLOGY
Payer: COMMERCIAL

## 2020-08-19 PROCEDURE — 77334 RADIATION TREATMENT AID(S): CPT | Performed by: RADIOLOGY

## 2020-08-25 PROCEDURE — 77338 DESIGN MLC DEVICE FOR IMRT: CPT | Performed by: RADIOLOGY

## 2020-08-25 PROCEDURE — 77301 RADIOTHERAPY DOSE PLAN IMRT: CPT | Performed by: RADIOLOGY

## 2020-08-25 PROCEDURE — 77300 RADIATION THERAPY DOSE PLAN: CPT | Performed by: RADIOLOGY

## 2020-09-01 ENCOUNTER — APPOINTMENT (OUTPATIENT)
Dept: RADIATION ONCOLOGY | Facility: HOSPITAL | Age: 70
End: 2020-09-01
Attending: RADIOLOGY
Payer: COMMERCIAL

## 2020-09-04 DIAGNOSIS — E11.9 TYPE 2 DIABETES MELLITUS WITHOUT COMPLICATION, WITHOUT LONG-TERM CURRENT USE OF INSULIN (HCC): ICD-10-CM

## 2020-09-04 RX ORDER — INSULIN ASPART 100 [IU]/ML
INJECTION, SUSPENSION SUBCUTANEOUS
Qty: 15 ML | Refills: 5 | Status: SHIPPED | OUTPATIENT
Start: 2020-09-04 | End: 2021-10-04

## 2020-09-08 ENCOUNTER — APPOINTMENT (OUTPATIENT)
Dept: RADIATION ONCOLOGY | Facility: HOSPITAL | Age: 70
End: 2020-09-08
Attending: RADIOLOGY
Payer: COMMERCIAL

## 2020-09-08 PROCEDURE — 77385 HB NTSTY MODUL RAD TX DLVR SMPL: CPT | Performed by: RADIOLOGY

## 2020-09-09 ENCOUNTER — APPOINTMENT (OUTPATIENT)
Dept: RADIATION ONCOLOGY | Facility: HOSPITAL | Age: 70
End: 2020-09-09
Attending: RADIOLOGY
Payer: COMMERCIAL

## 2020-09-09 PROCEDURE — 77385 HB NTSTY MODUL RAD TX DLVR SMPL: CPT | Performed by: RADIOLOGY

## 2020-09-10 ENCOUNTER — APPOINTMENT (OUTPATIENT)
Dept: RADIATION ONCOLOGY | Facility: HOSPITAL | Age: 70
End: 2020-09-10
Attending: RADIOLOGY
Payer: COMMERCIAL

## 2020-09-10 PROCEDURE — 77385 HB NTSTY MODUL RAD TX DLVR SMPL: CPT | Performed by: RADIOLOGY

## 2020-09-11 ENCOUNTER — APPOINTMENT (OUTPATIENT)
Dept: RADIATION ONCOLOGY | Facility: HOSPITAL | Age: 70
End: 2020-09-11
Attending: RADIOLOGY
Payer: COMMERCIAL

## 2020-09-11 PROCEDURE — 77385 HB NTSTY MODUL RAD TX DLVR SMPL: CPT | Performed by: RADIOLOGY

## 2020-09-14 ENCOUNTER — APPOINTMENT (OUTPATIENT)
Dept: RADIATION ONCOLOGY | Facility: HOSPITAL | Age: 70
End: 2020-09-14
Attending: RADIOLOGY
Payer: COMMERCIAL

## 2020-09-14 PROCEDURE — 77336 RADIATION PHYSICS CONSULT: CPT | Performed by: RADIOLOGY

## 2020-09-14 PROCEDURE — 77385 HB NTSTY MODUL RAD TX DLVR SMPL: CPT | Performed by: RADIOLOGY

## 2020-09-15 ENCOUNTER — APPOINTMENT (OUTPATIENT)
Dept: RADIATION ONCOLOGY | Facility: HOSPITAL | Age: 70
End: 2020-09-15
Attending: RADIOLOGY
Payer: COMMERCIAL

## 2020-09-15 PROCEDURE — 77385 HB NTSTY MODUL RAD TX DLVR SMPL: CPT | Performed by: RADIOLOGY

## 2020-09-16 ENCOUNTER — APPOINTMENT (OUTPATIENT)
Dept: RADIATION ONCOLOGY | Facility: HOSPITAL | Age: 70
End: 2020-09-16
Attending: RADIOLOGY
Payer: COMMERCIAL

## 2020-09-16 DIAGNOSIS — C61 PROSTATE CANCER (HCC): Primary | ICD-10-CM

## 2020-09-16 PROCEDURE — 77385 HB NTSTY MODUL RAD TX DLVR SMPL: CPT | Performed by: RADIOLOGY

## 2020-09-17 ENCOUNTER — APPOINTMENT (OUTPATIENT)
Dept: RADIATION ONCOLOGY | Facility: HOSPITAL | Age: 70
End: 2020-09-17
Attending: RADIOLOGY
Payer: COMMERCIAL

## 2020-09-17 DIAGNOSIS — Z79.4 TYPE 2 DIABETES MELLITUS WITH INSULIN THERAPY (HCC): ICD-10-CM

## 2020-09-17 DIAGNOSIS — R35.0 BENIGN PROSTATIC HYPERPLASIA WITH URINARY FREQUENCY: ICD-10-CM

## 2020-09-17 DIAGNOSIS — N40.1 BENIGN PROSTATIC HYPERPLASIA WITH URINARY FREQUENCY: ICD-10-CM

## 2020-09-17 DIAGNOSIS — E11.9 TYPE 2 DIABETES MELLITUS WITH INSULIN THERAPY (HCC): ICD-10-CM

## 2020-09-17 PROCEDURE — 77385 HB NTSTY MODUL RAD TX DLVR SMPL: CPT | Performed by: RADIOLOGY

## 2020-09-17 RX ORDER — TAMSULOSIN HYDROCHLORIDE 0.4 MG/1
0.4 CAPSULE ORAL
Qty: 30 CAPSULE | Refills: 1 | Status: SHIPPED | OUTPATIENT
Start: 2020-09-17 | End: 2020-11-12 | Stop reason: SDUPTHER

## 2020-09-17 RX ORDER — DOCUSATE SODIUM 100 MG/1
100 CAPSULE, LIQUID FILLED ORAL DAILY PRN
COMMUNITY

## 2020-09-18 ENCOUNTER — APPOINTMENT (OUTPATIENT)
Dept: RADIATION ONCOLOGY | Facility: HOSPITAL | Age: 70
End: 2020-09-18
Attending: RADIOLOGY
Payer: COMMERCIAL

## 2020-09-18 PROCEDURE — 77385 HB NTSTY MODUL RAD TX DLVR SMPL: CPT | Performed by: RADIOLOGY

## 2020-09-18 RX ORDER — LIRAGLUTIDE 6 MG/ML
INJECTION SUBCUTANEOUS
Qty: 9 ML | Refills: 5 | Status: SHIPPED | OUTPATIENT
Start: 2020-09-18 | End: 2021-03-11

## 2020-09-21 ENCOUNTER — APPOINTMENT (OUTPATIENT)
Dept: RADIATION ONCOLOGY | Facility: HOSPITAL | Age: 70
End: 2020-09-21
Attending: RADIOLOGY
Payer: COMMERCIAL

## 2020-09-21 PROCEDURE — 77336 RADIATION PHYSICS CONSULT: CPT | Performed by: RADIOLOGY

## 2020-09-21 PROCEDURE — 77385 HB NTSTY MODUL RAD TX DLVR SMPL: CPT | Performed by: RADIOLOGY

## 2020-09-22 ENCOUNTER — APPOINTMENT (OUTPATIENT)
Dept: RADIATION ONCOLOGY | Facility: HOSPITAL | Age: 70
End: 2020-09-22
Attending: RADIOLOGY
Payer: COMMERCIAL

## 2020-09-22 PROCEDURE — 77385 HB NTSTY MODUL RAD TX DLVR SMPL: CPT | Performed by: RADIOLOGY

## 2020-09-23 ENCOUNTER — APPOINTMENT (OUTPATIENT)
Dept: RADIATION ONCOLOGY | Facility: HOSPITAL | Age: 70
End: 2020-09-23
Attending: RADIOLOGY
Payer: COMMERCIAL

## 2020-09-23 ENCOUNTER — APPOINTMENT (OUTPATIENT)
Dept: LAB | Facility: CLINIC | Age: 70
End: 2020-09-23
Payer: COMMERCIAL

## 2020-09-23 DIAGNOSIS — C61 PROSTATE CANCER (HCC): ICD-10-CM

## 2020-09-23 LAB
BASOPHILS # BLD AUTO: 0.03 THOUSANDS/ΜL (ref 0–0.1)
BASOPHILS NFR BLD AUTO: 1 % (ref 0–1)
EOSINOPHIL # BLD AUTO: 0.14 THOUSAND/ΜL (ref 0–0.61)
EOSINOPHIL NFR BLD AUTO: 3 % (ref 0–6)
ERYTHROCYTE [DISTWIDTH] IN BLOOD BY AUTOMATED COUNT: 13.1 % (ref 11.6–15.1)
HCT VFR BLD AUTO: 40.8 % (ref 36.5–49.3)
HGB BLD-MCNC: 13.7 G/DL (ref 12–17)
IMM GRANULOCYTES # BLD AUTO: 0.05 THOUSAND/UL (ref 0–0.2)
IMM GRANULOCYTES NFR BLD AUTO: 1 % (ref 0–2)
LYMPHOCYTES # BLD AUTO: 0.65 THOUSANDS/ΜL (ref 0.6–4.47)
LYMPHOCYTES NFR BLD AUTO: 11 % (ref 14–44)
MCH RBC QN AUTO: 30.6 PG (ref 26.8–34.3)
MCHC RBC AUTO-ENTMCNC: 33.6 G/DL (ref 31.4–37.4)
MCV RBC AUTO: 91 FL (ref 82–98)
MONOCYTES # BLD AUTO: 0.54 THOUSAND/ΜL (ref 0.17–1.22)
MONOCYTES NFR BLD AUTO: 10 % (ref 4–12)
NEUTROPHILS # BLD AUTO: 4.28 THOUSANDS/ΜL (ref 1.85–7.62)
NEUTS SEG NFR BLD AUTO: 74 % (ref 43–75)
NRBC BLD AUTO-RTO: 0 /100 WBCS
PLATELET # BLD AUTO: 134 THOUSANDS/UL (ref 149–390)
PMV BLD AUTO: 12.1 FL (ref 8.9–12.7)
RBC # BLD AUTO: 4.48 MILLION/UL (ref 3.88–5.62)
WBC # BLD AUTO: 5.69 THOUSAND/UL (ref 4.31–10.16)

## 2020-09-23 PROCEDURE — 77385 HB NTSTY MODUL RAD TX DLVR SMPL: CPT | Performed by: RADIOLOGY

## 2020-09-23 PROCEDURE — 36415 COLL VENOUS BLD VENIPUNCTURE: CPT

## 2020-09-23 PROCEDURE — 85025 COMPLETE CBC W/AUTO DIFF WBC: CPT

## 2020-09-24 ENCOUNTER — APPOINTMENT (OUTPATIENT)
Dept: RADIATION ONCOLOGY | Facility: HOSPITAL | Age: 70
End: 2020-09-24
Attending: RADIOLOGY
Payer: COMMERCIAL

## 2020-09-24 PROCEDURE — 77385 HB NTSTY MODUL RAD TX DLVR SMPL: CPT | Performed by: RADIOLOGY

## 2020-09-25 ENCOUNTER — APPOINTMENT (OUTPATIENT)
Dept: RADIATION ONCOLOGY | Facility: HOSPITAL | Age: 70
End: 2020-09-25
Attending: RADIOLOGY
Payer: COMMERCIAL

## 2020-09-25 PROCEDURE — 77385 HB NTSTY MODUL RAD TX DLVR SMPL: CPT | Performed by: RADIOLOGY

## 2020-09-28 ENCOUNTER — APPOINTMENT (OUTPATIENT)
Dept: RADIATION ONCOLOGY | Facility: HOSPITAL | Age: 70
End: 2020-09-28
Attending: RADIOLOGY
Payer: COMMERCIAL

## 2020-09-28 PROCEDURE — 77336 RADIATION PHYSICS CONSULT: CPT | Performed by: RADIOLOGY

## 2020-09-28 PROCEDURE — 77385 HB NTSTY MODUL RAD TX DLVR SMPL: CPT | Performed by: RADIOLOGY

## 2020-09-29 ENCOUNTER — APPOINTMENT (OUTPATIENT)
Dept: RADIATION ONCOLOGY | Facility: HOSPITAL | Age: 70
End: 2020-09-29
Attending: RADIOLOGY
Payer: COMMERCIAL

## 2020-09-29 PROCEDURE — 77385 HB NTSTY MODUL RAD TX DLVR SMPL: CPT | Performed by: RADIOLOGY

## 2020-09-30 ENCOUNTER — APPOINTMENT (OUTPATIENT)
Dept: RADIATION ONCOLOGY | Facility: HOSPITAL | Age: 70
End: 2020-09-30
Attending: RADIOLOGY
Payer: COMMERCIAL

## 2020-09-30 PROCEDURE — 77385 HB NTSTY MODUL RAD TX DLVR SMPL: CPT | Performed by: RADIOLOGY

## 2020-10-01 ENCOUNTER — APPOINTMENT (OUTPATIENT)
Dept: RADIATION ONCOLOGY | Facility: HOSPITAL | Age: 70
End: 2020-10-01
Attending: RADIOLOGY
Payer: COMMERCIAL

## 2020-10-01 PROCEDURE — 77385 HB NTSTY MODUL RAD TX DLVR SMPL: CPT | Performed by: RADIOLOGY

## 2020-10-02 ENCOUNTER — APPOINTMENT (OUTPATIENT)
Dept: RADIATION ONCOLOGY | Facility: HOSPITAL | Age: 70
End: 2020-10-02
Attending: RADIOLOGY
Payer: COMMERCIAL

## 2020-10-02 PROCEDURE — 77385 HB NTSTY MODUL RAD TX DLVR SMPL: CPT | Performed by: RADIOLOGY

## 2020-10-05 ENCOUNTER — APPOINTMENT (OUTPATIENT)
Dept: RADIATION ONCOLOGY | Facility: HOSPITAL | Age: 70
End: 2020-10-05
Attending: RADIOLOGY
Payer: COMMERCIAL

## 2020-10-05 PROCEDURE — 77336 RADIATION PHYSICS CONSULT: CPT | Performed by: RADIOLOGY

## 2020-10-05 PROCEDURE — 77385 HB NTSTY MODUL RAD TX DLVR SMPL: CPT | Performed by: RADIOLOGY

## 2020-10-06 ENCOUNTER — APPOINTMENT (OUTPATIENT)
Dept: RADIATION ONCOLOGY | Facility: HOSPITAL | Age: 70
End: 2020-10-06
Attending: RADIOLOGY
Payer: COMMERCIAL

## 2020-10-06 PROCEDURE — 77385 HB NTSTY MODUL RAD TX DLVR SMPL: CPT | Performed by: RADIOLOGY

## 2020-10-07 ENCOUNTER — APPOINTMENT (OUTPATIENT)
Dept: RADIATION ONCOLOGY | Facility: HOSPITAL | Age: 70
End: 2020-10-07
Attending: RADIOLOGY
Payer: COMMERCIAL

## 2020-10-07 PROCEDURE — 77385 HB NTSTY MODUL RAD TX DLVR SMPL: CPT | Performed by: RADIOLOGY

## 2020-10-08 ENCOUNTER — APPOINTMENT (OUTPATIENT)
Dept: RADIATION ONCOLOGY | Facility: HOSPITAL | Age: 70
End: 2020-10-08
Attending: RADIOLOGY
Payer: COMMERCIAL

## 2020-10-08 PROCEDURE — 77385 HB NTSTY MODUL RAD TX DLVR SMPL: CPT | Performed by: RADIOLOGY

## 2020-10-09 ENCOUNTER — APPOINTMENT (OUTPATIENT)
Dept: RADIATION ONCOLOGY | Facility: HOSPITAL | Age: 70
End: 2020-10-09
Attending: RADIOLOGY
Payer: COMMERCIAL

## 2020-10-09 PROCEDURE — 77385 HB NTSTY MODUL RAD TX DLVR SMPL: CPT | Performed by: RADIOLOGY

## 2020-10-12 ENCOUNTER — APPOINTMENT (OUTPATIENT)
Dept: RADIATION ONCOLOGY | Facility: HOSPITAL | Age: 70
End: 2020-10-12
Attending: RADIOLOGY
Payer: COMMERCIAL

## 2020-10-12 PROCEDURE — 77336 RADIATION PHYSICS CONSULT: CPT | Performed by: RADIOLOGY

## 2020-10-12 PROCEDURE — 77385 HB NTSTY MODUL RAD TX DLVR SMPL: CPT | Performed by: RADIOLOGY

## 2020-10-13 ENCOUNTER — APPOINTMENT (OUTPATIENT)
Dept: RADIATION ONCOLOGY | Facility: HOSPITAL | Age: 70
End: 2020-10-13
Attending: RADIOLOGY
Payer: COMMERCIAL

## 2020-10-13 PROCEDURE — 77385 HB NTSTY MODUL RAD TX DLVR SMPL: CPT | Performed by: RADIOLOGY

## 2020-10-14 ENCOUNTER — APPOINTMENT (OUTPATIENT)
Dept: RADIATION ONCOLOGY | Facility: HOSPITAL | Age: 70
End: 2020-10-14
Attending: RADIOLOGY
Payer: COMMERCIAL

## 2020-10-14 PROCEDURE — 77385 HB NTSTY MODUL RAD TX DLVR SMPL: CPT | Performed by: RADIOLOGY

## 2020-10-15 ENCOUNTER — APPOINTMENT (OUTPATIENT)
Dept: RADIATION ONCOLOGY | Facility: HOSPITAL | Age: 70
End: 2020-10-15
Attending: RADIOLOGY
Payer: COMMERCIAL

## 2020-10-15 ENCOUNTER — LAB (OUTPATIENT)
Dept: LAB | Facility: CLINIC | Age: 70
End: 2020-10-15
Payer: COMMERCIAL

## 2020-10-15 DIAGNOSIS — C61 PROSTATE CANCER (HCC): Primary | ICD-10-CM

## 2020-10-15 LAB
BACTERIA UR QL AUTO: ABNORMAL /HPF
BILIRUB UR QL STRIP: NEGATIVE
CLARITY UR: CLEAR
COLOR UR: YELLOW
GLUCOSE UR STRIP-MCNC: ABNORMAL MG/DL
HGB UR QL STRIP.AUTO: NEGATIVE
KETONES UR STRIP-MCNC: NEGATIVE MG/DL
LEUKOCYTE ESTERASE UR QL STRIP: ABNORMAL
NITRITE UR QL STRIP: NEGATIVE
NON-SQ EPI CELLS URNS QL MICRO: ABNORMAL /HPF
PH UR STRIP.AUTO: 6 [PH]
PROT UR STRIP-MCNC: NEGATIVE MG/DL
RBC #/AREA URNS AUTO: ABNORMAL /HPF
SP GR UR STRIP.AUTO: 1.02 (ref 1–1.03)
UROBILINOGEN UR QL STRIP.AUTO: 0.2 E.U./DL
WBC #/AREA URNS AUTO: ABNORMAL /HPF

## 2020-10-15 PROCEDURE — 77385 HB NTSTY MODUL RAD TX DLVR SMPL: CPT | Performed by: RADIOLOGY

## 2020-10-15 PROCEDURE — 81001 URINALYSIS AUTO W/SCOPE: CPT

## 2020-10-16 ENCOUNTER — APPOINTMENT (OUTPATIENT)
Dept: RADIATION ONCOLOGY | Facility: HOSPITAL | Age: 70
End: 2020-10-16
Attending: RADIOLOGY
Payer: COMMERCIAL

## 2020-10-16 PROCEDURE — 77385 HB NTSTY MODUL RAD TX DLVR SMPL: CPT | Performed by: RADIOLOGY

## 2020-10-19 ENCOUNTER — APPOINTMENT (OUTPATIENT)
Dept: RADIATION ONCOLOGY | Facility: HOSPITAL | Age: 70
End: 2020-10-19
Attending: RADIOLOGY
Payer: COMMERCIAL

## 2020-10-19 PROCEDURE — 77336 RADIATION PHYSICS CONSULT: CPT | Performed by: RADIOLOGY

## 2020-10-19 PROCEDURE — 77385 HB NTSTY MODUL RAD TX DLVR SMPL: CPT | Performed by: RADIOLOGY

## 2020-10-20 ENCOUNTER — APPOINTMENT (OUTPATIENT)
Dept: RADIATION ONCOLOGY | Facility: HOSPITAL | Age: 70
End: 2020-10-20
Attending: RADIOLOGY
Payer: COMMERCIAL

## 2020-10-20 PROCEDURE — 77385 HB NTSTY MODUL RAD TX DLVR SMPL: CPT | Performed by: RADIOLOGY

## 2020-10-21 ENCOUNTER — APPOINTMENT (OUTPATIENT)
Dept: RADIATION ONCOLOGY | Facility: HOSPITAL | Age: 70
End: 2020-10-21
Attending: RADIOLOGY
Payer: COMMERCIAL

## 2020-10-21 PROCEDURE — 77385 HB NTSTY MODUL RAD TX DLVR SMPL: CPT | Performed by: RADIOLOGY

## 2020-10-22 ENCOUNTER — APPOINTMENT (OUTPATIENT)
Dept: RADIATION ONCOLOGY | Facility: HOSPITAL | Age: 70
End: 2020-10-22
Attending: RADIOLOGY
Payer: COMMERCIAL

## 2020-10-22 PROCEDURE — 77385 HB NTSTY MODUL RAD TX DLVR SMPL: CPT | Performed by: RADIOLOGY

## 2020-10-23 ENCOUNTER — APPOINTMENT (OUTPATIENT)
Dept: RADIATION ONCOLOGY | Facility: HOSPITAL | Age: 70
End: 2020-10-23
Attending: RADIOLOGY
Payer: COMMERCIAL

## 2020-10-23 PROCEDURE — 77385 HB NTSTY MODUL RAD TX DLVR SMPL: CPT | Performed by: RADIOLOGY

## 2020-10-26 ENCOUNTER — APPOINTMENT (OUTPATIENT)
Dept: RADIATION ONCOLOGY | Facility: HOSPITAL | Age: 70
End: 2020-10-26
Attending: RADIOLOGY
Payer: COMMERCIAL

## 2020-10-26 PROCEDURE — 77336 RADIATION PHYSICS CONSULT: CPT | Performed by: RADIOLOGY

## 2020-10-26 PROCEDURE — 77385 HB NTSTY MODUL RAD TX DLVR SMPL: CPT | Performed by: RADIOLOGY

## 2020-10-27 ENCOUNTER — APPOINTMENT (OUTPATIENT)
Dept: RADIATION ONCOLOGY | Facility: HOSPITAL | Age: 70
End: 2020-10-27
Attending: RADIOLOGY
Payer: COMMERCIAL

## 2020-10-27 PROCEDURE — 77385 HB NTSTY MODUL RAD TX DLVR SMPL: CPT | Performed by: RADIOLOGY

## 2020-10-28 ENCOUNTER — APPOINTMENT (OUTPATIENT)
Dept: RADIATION ONCOLOGY | Facility: HOSPITAL | Age: 70
End: 2020-10-28
Attending: RADIOLOGY
Payer: COMMERCIAL

## 2020-10-28 PROCEDURE — 77385 HB NTSTY MODUL RAD TX DLVR SMPL: CPT | Performed by: RADIOLOGY

## 2020-10-29 ENCOUNTER — APPOINTMENT (OUTPATIENT)
Dept: RADIATION ONCOLOGY | Facility: HOSPITAL | Age: 70
End: 2020-10-29
Attending: RADIOLOGY
Payer: COMMERCIAL

## 2020-10-29 PROCEDURE — 77385 HB NTSTY MODUL RAD TX DLVR SMPL: CPT | Performed by: RADIOLOGY

## 2020-10-30 ENCOUNTER — APPOINTMENT (OUTPATIENT)
Dept: RADIATION ONCOLOGY | Facility: HOSPITAL | Age: 70
End: 2020-10-30
Attending: RADIOLOGY
Payer: COMMERCIAL

## 2020-11-02 ENCOUNTER — APPOINTMENT (OUTPATIENT)
Dept: RADIATION ONCOLOGY | Facility: HOSPITAL | Age: 70
End: 2020-11-02
Attending: RADIOLOGY
Payer: COMMERCIAL

## 2020-11-02 PROCEDURE — 77385 HB NTSTY MODUL RAD TX DLVR SMPL: CPT | Performed by: RADIOLOGY

## 2020-11-03 ENCOUNTER — APPOINTMENT (OUTPATIENT)
Dept: RADIATION ONCOLOGY | Facility: HOSPITAL | Age: 70
End: 2020-11-03
Attending: RADIOLOGY
Payer: COMMERCIAL

## 2020-11-04 ENCOUNTER — TELEPHONE (OUTPATIENT)
Dept: FAMILY MEDICINE CLINIC | Facility: CLINIC | Age: 70
End: 2020-11-04

## 2020-11-04 ENCOUNTER — APPOINTMENT (OUTPATIENT)
Dept: RADIATION ONCOLOGY | Facility: HOSPITAL | Age: 70
End: 2020-11-04
Attending: RADIOLOGY
Payer: COMMERCIAL

## 2020-11-04 PROCEDURE — 77336 RADIATION PHYSICS CONSULT: CPT | Performed by: RADIOLOGY

## 2020-11-04 PROCEDURE — 77385 HB NTSTY MODUL RAD TX DLVR SMPL: CPT | Performed by: RADIOLOGY

## 2020-11-05 ENCOUNTER — APPOINTMENT (OUTPATIENT)
Dept: RADIATION ONCOLOGY | Facility: HOSPITAL | Age: 70
End: 2020-11-05
Attending: RADIOLOGY
Payer: COMMERCIAL

## 2020-11-05 PROCEDURE — 77385 HB NTSTY MODUL RAD TX DLVR SMPL: CPT | Performed by: RADIOLOGY

## 2020-11-06 ENCOUNTER — APPOINTMENT (OUTPATIENT)
Dept: RADIATION ONCOLOGY | Facility: HOSPITAL | Age: 70
End: 2020-11-06
Attending: RADIOLOGY
Payer: COMMERCIAL

## 2020-11-06 PROCEDURE — 77385 HB NTSTY MODUL RAD TX DLVR SMPL: CPT | Performed by: RADIOLOGY

## 2020-11-09 ENCOUNTER — APPOINTMENT (OUTPATIENT)
Dept: RADIATION ONCOLOGY | Facility: HOSPITAL | Age: 70
End: 2020-11-09
Attending: RADIOLOGY
Payer: COMMERCIAL

## 2020-11-09 PROCEDURE — 77385 HB NTSTY MODUL RAD TX DLVR SMPL: CPT | Performed by: RADIOLOGY

## 2020-11-10 ENCOUNTER — APPOINTMENT (OUTPATIENT)
Dept: RADIATION ONCOLOGY | Facility: HOSPITAL | Age: 70
End: 2020-11-10
Attending: RADIOLOGY
Payer: COMMERCIAL

## 2020-11-10 DIAGNOSIS — N40.1 BENIGN PROSTATIC HYPERPLASIA WITH URINARY FREQUENCY: ICD-10-CM

## 2020-11-10 DIAGNOSIS — R35.0 BENIGN PROSTATIC HYPERPLASIA WITH URINARY FREQUENCY: ICD-10-CM

## 2020-11-10 PROCEDURE — 77336 RADIATION PHYSICS CONSULT: CPT | Performed by: RADIOLOGY

## 2020-11-10 PROCEDURE — 77385 HB NTSTY MODUL RAD TX DLVR SMPL: CPT | Performed by: RADIOLOGY

## 2020-11-10 RX ORDER — MIRABEGRON 25 MG/1
TABLET, FILM COATED, EXTENDED RELEASE ORAL
Qty: 30 TABLET | Refills: 6 | Status: SHIPPED | OUTPATIENT
Start: 2020-11-10 | End: 2021-01-13

## 2020-11-11 ENCOUNTER — APPOINTMENT (OUTPATIENT)
Dept: RADIATION ONCOLOGY | Facility: HOSPITAL | Age: 70
End: 2020-11-11
Attending: RADIOLOGY
Payer: COMMERCIAL

## 2020-11-12 ENCOUNTER — OFFICE VISIT (OUTPATIENT)
Dept: FAMILY MEDICINE CLINIC | Facility: CLINIC | Age: 70
End: 2020-11-12
Payer: COMMERCIAL

## 2020-11-12 VITALS
HEART RATE: 79 BPM | HEIGHT: 68 IN | SYSTOLIC BLOOD PRESSURE: 132 MMHG | BODY MASS INDEX: 35.28 KG/M2 | WEIGHT: 232.8 LBS | DIASTOLIC BLOOD PRESSURE: 68 MMHG | OXYGEN SATURATION: 98 % | TEMPERATURE: 97.1 F

## 2020-11-12 DIAGNOSIS — Z90.3 POSTGASTRECTOMY MALABSORPTION: ICD-10-CM

## 2020-11-12 DIAGNOSIS — Z79.4 TYPE 2 DIABETES MELLITUS WITH INSULIN THERAPY (HCC): ICD-10-CM

## 2020-11-12 DIAGNOSIS — K91.2 POSTGASTRECTOMY MALABSORPTION: ICD-10-CM

## 2020-11-12 DIAGNOSIS — Z23 FLU VACCINE NEED: ICD-10-CM

## 2020-11-12 DIAGNOSIS — Z00.00 MEDICARE ANNUAL WELLNESS VISIT, SUBSEQUENT: Primary | ICD-10-CM

## 2020-11-12 DIAGNOSIS — E11.9 TYPE 2 DIABETES MELLITUS WITH INSULIN THERAPY (HCC): ICD-10-CM

## 2020-11-12 LAB — SL AMB POCT HEMOGLOBIN AIC: 7.5 (ref ?–6.5)

## 2020-11-12 PROCEDURE — 1125F AMNT PAIN NOTED PAIN PRSNT: CPT | Performed by: FAMILY MEDICINE

## 2020-11-12 PROCEDURE — 90662 IIV NO PRSV INCREASED AG IM: CPT

## 2020-11-12 PROCEDURE — 3008F BODY MASS INDEX DOCD: CPT | Performed by: FAMILY MEDICINE

## 2020-11-12 PROCEDURE — 3078F DIAST BP <80 MM HG: CPT | Performed by: FAMILY MEDICINE

## 2020-11-12 PROCEDURE — 1160F RVW MEDS BY RX/DR IN RCRD: CPT | Performed by: FAMILY MEDICINE

## 2020-11-12 PROCEDURE — 1036F TOBACCO NON-USER: CPT | Performed by: FAMILY MEDICINE

## 2020-11-12 PROCEDURE — G0439 PPPS, SUBSEQ VISIT: HCPCS | Performed by: FAMILY MEDICINE

## 2020-11-12 PROCEDURE — 1170F FXNL STATUS ASSESSED: CPT | Performed by: FAMILY MEDICINE

## 2020-11-12 PROCEDURE — 3051F HG A1C>EQUAL 7.0%<8.0%: CPT | Performed by: FAMILY MEDICINE

## 2020-11-12 PROCEDURE — 3075F SYST BP GE 130 - 139MM HG: CPT | Performed by: FAMILY MEDICINE

## 2020-11-12 PROCEDURE — G0008 ADMIN INFLUENZA VIRUS VAC: HCPCS

## 2020-11-12 PROCEDURE — 83036 HEMOGLOBIN GLYCOSYLATED A1C: CPT | Performed by: FAMILY MEDICINE

## 2020-11-12 RX ORDER — TAMSULOSIN HYDROCHLORIDE 0.4 MG/1
0.4 CAPSULE ORAL
Qty: 90 CAPSULE | Refills: 0 | Status: SHIPPED | OUTPATIENT
Start: 2020-11-12 | End: 2021-01-13

## 2020-11-13 ENCOUNTER — TELEPHONE (OUTPATIENT)
Dept: ADMINISTRATIVE | Facility: OTHER | Age: 70
End: 2020-11-13

## 2020-11-17 DIAGNOSIS — I10 BENIGN ESSENTIAL HYPERTENSION: ICD-10-CM

## 2020-11-18 ENCOUNTER — TELEPHONE (OUTPATIENT)
Dept: ADMINISTRATIVE | Facility: OTHER | Age: 70
End: 2020-11-18

## 2020-11-18 PROCEDURE — 4010F ACE/ARB THERAPY RXD/TAKEN: CPT | Performed by: FAMILY MEDICINE

## 2020-11-18 RX ORDER — LOSARTAN POTASSIUM 25 MG/1
TABLET ORAL
Qty: 90 TABLET | Refills: 3 | Status: SHIPPED | OUTPATIENT
Start: 2020-11-18 | End: 2021-11-12

## 2020-11-27 ENCOUNTER — TELEPHONE (OUTPATIENT)
Dept: SURGICAL ONCOLOGY | Facility: CLINIC | Age: 70
End: 2020-11-27

## 2020-11-27 ENCOUNTER — HOSPITAL ENCOUNTER (OUTPATIENT)
Dept: MRI IMAGING | Facility: HOSPITAL | Age: 70
Discharge: HOME | End: 2020-11-27
Attending: SURGERY

## 2020-11-27 DIAGNOSIS — D49.0 IPMN (INTRADUCTAL PAPILLARY MUCINOUS NEOPLASM): ICD-10-CM

## 2020-11-27 DIAGNOSIS — D49.0 IPMN (INTRADUCTAL PAPILLARY MUCINOUS NEOPLASM): Primary | ICD-10-CM

## 2020-11-27 RX ORDER — DIAZEPAM 5 MG/1
TABLET ORAL
Qty: 2 TABLET | Refills: 0 | Status: SHIPPED | OUTPATIENT
Start: 2020-11-27 | End: 2022-02-22

## 2020-11-30 ENCOUNTER — TELEPHONE (OUTPATIENT)
Dept: SURGICAL ONCOLOGY | Facility: CLINIC | Age: 70
End: 2020-11-30

## 2020-12-01 ENCOUNTER — HOSPITAL ENCOUNTER (OUTPATIENT)
Dept: MRI IMAGING | Facility: HOSPITAL | Age: 70
Discharge: HOME | End: 2020-12-01
Attending: SURGERY
Payer: COMMERCIAL

## 2020-12-01 DIAGNOSIS — R10.9 ABDOMINAL PAIN: ICD-10-CM

## 2020-12-01 DIAGNOSIS — D49.0 IPMN (INTRADUCTAL PAPILLARY MUCINOUS NEOPLASM): ICD-10-CM

## 2020-12-01 PROCEDURE — 74181 MRI ABDOMEN W/O CONTRAST: CPT

## 2020-12-01 RX ORDER — PANTOPRAZOLE SODIUM 40 MG/1
40 TABLET, DELAYED RELEASE ORAL DAILY
Qty: 30 TABLET | Refills: 0 | Status: SHIPPED | OUTPATIENT
Start: 2020-12-01 | End: 2021-01-26

## 2020-12-02 DIAGNOSIS — D49.0 IPMN (INTRADUCTAL PAPILLARY MUCINOUS NEOPLASM): Primary | ICD-10-CM

## 2020-12-04 ENCOUNTER — LAB (OUTPATIENT)
Dept: LAB | Facility: CLINIC | Age: 70
End: 2020-12-04
Payer: COMMERCIAL

## 2020-12-04 DIAGNOSIS — Z90.3 POSTGASTRECTOMY MALABSORPTION: ICD-10-CM

## 2020-12-04 DIAGNOSIS — E11.9 TYPE 2 DIABETES MELLITUS WITH INSULIN THERAPY (HCC): ICD-10-CM

## 2020-12-04 DIAGNOSIS — D69.6 THROMBOCYTOPENIA (HCC): Primary | ICD-10-CM

## 2020-12-04 DIAGNOSIS — K91.2 POSTGASTRECTOMY MALABSORPTION: ICD-10-CM

## 2020-12-04 DIAGNOSIS — Z79.4 TYPE 2 DIABETES MELLITUS WITH INSULIN THERAPY (HCC): ICD-10-CM

## 2020-12-04 LAB
ALBUMIN SERPL BCP-MCNC: 3.2 G/DL (ref 3.5–5)
ALP SERPL-CCNC: 97 U/L (ref 46–116)
ALT SERPL W P-5'-P-CCNC: 36 U/L (ref 12–78)
ANION GAP SERPL CALCULATED.3IONS-SCNC: 9 MMOL/L (ref 4–13)
AST SERPL W P-5'-P-CCNC: 20 U/L (ref 5–45)
BASOPHILS # BLD AUTO: 0.05 THOUSANDS/ΜL (ref 0–0.1)
BASOPHILS NFR BLD AUTO: 1 % (ref 0–1)
BILIRUB SERPL-MCNC: 0.36 MG/DL (ref 0.2–1)
BUN SERPL-MCNC: 23 MG/DL (ref 5–25)
CALCIUM ALBUM COR SERPL-MCNC: 9.4 MG/DL (ref 8.3–10.1)
CALCIUM SERPL-MCNC: 8.8 MG/DL (ref 8.3–10.1)
CHLORIDE SERPL-SCNC: 105 MMOL/L (ref 100–108)
CHOLEST SERPL-MCNC: 146 MG/DL (ref 50–200)
CO2 SERPL-SCNC: 28 MMOL/L (ref 21–32)
CREAT SERPL-MCNC: 0.95 MG/DL (ref 0.6–1.3)
CREAT UR-MCNC: 154 MG/DL
EOSINOPHIL # BLD AUTO: 0.16 THOUSAND/ΜL (ref 0–0.61)
EOSINOPHIL NFR BLD AUTO: 3 % (ref 0–6)
ERYTHROCYTE [DISTWIDTH] IN BLOOD BY AUTOMATED COUNT: 13.2 % (ref 11.6–15.1)
GFR SERPL CREATININE-BSD FRML MDRD: 81 ML/MIN/1.73SQ M
GLUCOSE P FAST SERPL-MCNC: 179 MG/DL (ref 65–99)
HCT VFR BLD AUTO: 40 % (ref 36.5–49.3)
HDLC SERPL-MCNC: 55 MG/DL
HGB BLD-MCNC: 13.3 G/DL (ref 12–17)
IMM GRANULOCYTES # BLD AUTO: 0.08 THOUSAND/UL (ref 0–0.2)
IMM GRANULOCYTES NFR BLD AUTO: 1 % (ref 0–2)
IRON SERPL-MCNC: 78 UG/DL (ref 65–175)
LDLC SERPL CALC-MCNC: 71 MG/DL (ref 0–100)
LYMPHOCYTES # BLD AUTO: 0.5 THOUSANDS/ΜL (ref 0.6–4.47)
LYMPHOCYTES NFR BLD AUTO: 9 % (ref 14–44)
MCH RBC QN AUTO: 31.2 PG (ref 26.8–34.3)
MCHC RBC AUTO-ENTMCNC: 33.3 G/DL (ref 31.4–37.4)
MCV RBC AUTO: 94 FL (ref 82–98)
MICROALBUMIN UR-MCNC: 51 MG/L (ref 0–20)
MICROALBUMIN/CREAT 24H UR: 33 MG/G CREATININE (ref 0–30)
MONOCYTES # BLD AUTO: 0.67 THOUSAND/ΜL (ref 0.17–1.22)
MONOCYTES NFR BLD AUTO: 12 % (ref 4–12)
NEUTROPHILS # BLD AUTO: 4.36 THOUSANDS/ΜL (ref 1.85–7.62)
NEUTS SEG NFR BLD AUTO: 74 % (ref 43–75)
NRBC BLD AUTO-RTO: 0 /100 WBCS
PLATELET # BLD AUTO: 137 THOUSANDS/UL (ref 149–390)
PMV BLD AUTO: 11.8 FL (ref 8.9–12.7)
POTASSIUM SERPL-SCNC: 4.6 MMOL/L (ref 3.5–5.3)
PROT SERPL-MCNC: 6.9 G/DL (ref 6.4–8.2)
RBC # BLD AUTO: 4.26 MILLION/UL (ref 3.88–5.62)
SODIUM SERPL-SCNC: 142 MMOL/L (ref 136–145)
TRIGL SERPL-MCNC: 100 MG/DL
WBC # BLD AUTO: 5.82 THOUSAND/UL (ref 4.31–10.16)

## 2020-12-04 PROCEDURE — 36415 COLL VENOUS BLD VENIPUNCTURE: CPT | Performed by: FAMILY MEDICINE

## 2020-12-04 PROCEDURE — 3060F POS MICROALBUMINURIA REV: CPT | Performed by: FAMILY MEDICINE

## 2020-12-04 PROCEDURE — 85025 COMPLETE CBC W/AUTO DIFF WBC: CPT | Performed by: FAMILY MEDICINE

## 2020-12-04 PROCEDURE — 83540 ASSAY OF IRON: CPT

## 2020-12-04 PROCEDURE — 82043 UR ALBUMIN QUANTITATIVE: CPT | Performed by: FAMILY MEDICINE

## 2020-12-04 PROCEDURE — 80053 COMPREHEN METABOLIC PANEL: CPT | Performed by: FAMILY MEDICINE

## 2020-12-04 PROCEDURE — 80061 LIPID PANEL: CPT

## 2020-12-04 PROCEDURE — 82570 ASSAY OF URINE CREATININE: CPT | Performed by: FAMILY MEDICINE

## 2020-12-07 ENCOUNTER — TELEPHONE (OUTPATIENT)
Dept: SURGICAL ONCOLOGY | Facility: CLINIC | Age: 70
End: 2020-12-07

## 2020-12-07 ENCOUNTER — TELEPHONE (OUTPATIENT)
Dept: FAMILY MEDICINE CLINIC | Facility: CLINIC | Age: 70
End: 2020-12-07

## 2020-12-14 ENCOUNTER — APPOINTMENT (EMERGENCY)
Dept: CT IMAGING | Facility: HOSPITAL | Age: 70
End: 2020-12-14
Payer: COMMERCIAL

## 2020-12-14 ENCOUNTER — HOSPITAL ENCOUNTER (OUTPATIENT)
Facility: HOSPITAL | Age: 70
Setting detail: OBSERVATION
Discharge: HOME | End: 2020-12-14
Attending: SURGERY | Admitting: SURGERY
Payer: COMMERCIAL

## 2020-12-14 ENCOUNTER — APPOINTMENT (EMERGENCY)
Dept: RADIOLOGY | Facility: HOSPITAL | Age: 70
End: 2020-12-14
Payer: COMMERCIAL

## 2020-12-14 VITALS
BODY MASS INDEX: 35.41 KG/M2 | HEART RATE: 64 BPM | DIASTOLIC BLOOD PRESSURE: 50 MMHG | SYSTOLIC BLOOD PRESSURE: 103 MMHG | RESPIRATION RATE: 18 BRPM | OXYGEN SATURATION: 94 % | TEMPERATURE: 98.5 F | WEIGHT: 231.48 LBS
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
## 2020-12-14 DIAGNOSIS — N39.0 URINARY TRACT INFECTION: ICD-10-CM

## 2020-12-14 LAB
ANION GAP SERPL CALCULATED.3IONS-SCNC: 8 MMOL/L (ref 4–13)
ATRIAL RATE: 67 BPM
BACTERIA UR QL AUTO: ABNORMAL /HPF
BASE EXCESS BLDA CALC-SCNC: 5 MMOL/L (ref -2–3)
BASOPHILS # BLD AUTO: 0.06 THOUSANDS/ΜL (ref 0–0.1)
BASOPHILS NFR BLD AUTO: 1 % (ref 0–1)
BILIRUB UR QL STRIP: NEGATIVE
BUN SERPL-MCNC: 18 MG/DL (ref 5–25)
CA-I BLD-SCNC: 1.15 MMOL/L (ref 1.12–1.32)
CALCIUM SERPL-MCNC: 9.1 MG/DL (ref 8.3–10.1)
CHLORIDE SERPL-SCNC: 101 MMOL/L (ref 100–108)
CLARITY UR: ABNORMAL
CO2 SERPL-SCNC: 28 MMOL/L (ref 21–32)
COLOR UR: YELLOW
CREAT SERPL-MCNC: 1.03 MG/DL (ref 0.6–1.3)
EOSINOPHIL # BLD AUTO: 0.11 THOUSAND/ΜL (ref 0–0.61)
EOSINOPHIL NFR BLD AUTO: 1 % (ref 0–6)
ERYTHROCYTE [DISTWIDTH] IN BLOOD BY AUTOMATED COUNT: 13 % (ref 11.6–15.1)
GFR SERPL CREATININE-BSD FRML MDRD: 73 ML/MIN/1.73SQ M
GLUCOSE SERPL-MCNC: 152 MG/DL (ref 65–140)
GLUCOSE SERPL-MCNC: 198 MG/DL (ref 65–140)
GLUCOSE SERPL-MCNC: 199 MG/DL (ref 65–140)
GLUCOSE UR STRIP-MCNC: NEGATIVE MG/DL
HCO3 BLDA-SCNC: 29.9 MMOL/L (ref 24–30)
HCT VFR BLD AUTO: 40.6 % (ref 36.5–49.3)
HCT VFR BLD CALC: 41 % (ref 36.5–49.3)
HGB BLD-MCNC: 13.7 G/DL (ref 12–17)
HGB BLDA-MCNC: 13.9 G/DL (ref 12–17)
HGB UR QL STRIP.AUTO: NEGATIVE
IMM GRANULOCYTES # BLD AUTO: 0.07 THOUSAND/UL (ref 0–0.2)
IMM GRANULOCYTES NFR BLD AUTO: 1 % (ref 0–2)
KETONES UR STRIP-MCNC: NEGATIVE MG/DL
LEUKOCYTE ESTERASE UR QL STRIP: ABNORMAL
LIPASE SERPL-CCNC: 160 U/L (ref 73–393)
LYMPHOCYTES # BLD AUTO: 0.43 THOUSANDS/ΜL (ref 0.6–4.47)
LYMPHOCYTES NFR BLD AUTO: 5 % (ref 14–44)
MAGNESIUM SERPL-MCNC: 1.9 MG/DL (ref 1.6–2.6)
MCH RBC QN AUTO: 31.5 PG (ref 26.8–34.3)
MCHC RBC AUTO-ENTMCNC: 33.7 G/DL (ref 31.4–37.4)
MCV RBC AUTO: 93 FL (ref 82–98)
MONOCYTES # BLD AUTO: 0.64 THOUSAND/ΜL (ref 0.17–1.22)
MONOCYTES NFR BLD AUTO: 7 % (ref 4–12)
NEUTROPHILS # BLD AUTO: 7.29 THOUSANDS/ΜL (ref 1.85–7.62)
NEUTS SEG NFR BLD AUTO: 85 % (ref 43–75)
NITRITE UR QL STRIP: NEGATIVE
NON-SQ EPI CELLS URNS QL MICRO: ABNORMAL /HPF
NRBC BLD AUTO-RTO: 0 /100 WBCS
P AXIS: 67 DEGREES
PCO2 BLD: 31 MMOL/L (ref 21–32)
PCO2 BLD: 45.3 MM HG (ref 42–50)
PH BLD: 7.43 [PH] (ref 7.3–7.4)
PH UR STRIP.AUTO: 7.5 [PH] (ref 4.5–8)
PHOSPHATE SERPL-MCNC: 3.9 MG/DL (ref 2.3–4.1)
PLATELET # BLD AUTO: 133 THOUSANDS/UL (ref 149–390)
PLATELET # BLD AUTO: 155 THOUSANDS/UL (ref 149–390)
PMV BLD AUTO: 11.7 FL (ref 8.9–12.7)
PMV BLD AUTO: 11.7 FL (ref 8.9–12.7)
PO2 BLD: 35 MM HG (ref 35–45)
POTASSIUM BLD-SCNC: 4.4 MMOL/L (ref 3.5–5.3)
POTASSIUM SERPL-SCNC: 4.5 MMOL/L (ref 3.5–5.3)
PR INTERVAL: 158 MS
PROT UR STRIP-MCNC: NEGATIVE MG/DL
QRS AXIS: 62 DEGREES
QRSD INTERVAL: 88 MS
QT INTERVAL: 400 MS
QTC INTERVAL: 422 MS
RBC # BLD AUTO: 4.35 MILLION/UL (ref 3.88–5.62)
RBC #/AREA URNS AUTO: ABNORMAL /HPF
SAO2 % BLD FROM PO2: 69 % (ref 60–85)
SODIUM BLD-SCNC: 138 MMOL/L (ref 136–145)
SODIUM SERPL-SCNC: 137 MMOL/L (ref 136–145)
SP GR UR STRIP.AUTO: 1.01 (ref 1–1.03)
SPECIMEN SOURCE: ABNORMAL
T WAVE AXIS: 54 DEGREES
TROPONIN I SERPL-MCNC: <0.02 NG/ML
TROPONIN I SERPL-MCNC: <0.02 NG/ML
UROBILINOGEN UR QL STRIP.AUTO: 0.2 E.U./DL
VENTRICULAR RATE: 67 BPM
WBC # BLD AUTO: 8.6 THOUSAND/UL (ref 4.31–10.16)
WBC #/AREA URNS AUTO: ABNORMAL /HPF

## 2020-12-14 PROCEDURE — NC001 PR NO CHARGE: Performed by: EMERGENCY MEDICINE

## 2020-12-14 PROCEDURE — 93010 ELECTROCARDIOGRAM REPORT: CPT | Performed by: INTERNAL MEDICINE

## 2020-12-14 PROCEDURE — 82803 BLOOD GASES ANY COMBINATION: CPT

## 2020-12-14 PROCEDURE — 36415 COLL VENOUS BLD VENIPUNCTURE: CPT | Performed by: SURGERY

## 2020-12-14 PROCEDURE — 99284 EMERGENCY DEPT VISIT MOD MDM: CPT

## 2020-12-14 PROCEDURE — 70450 CT HEAD/BRAIN W/O DYE: CPT

## 2020-12-14 PROCEDURE — 84132 ASSAY OF SERUM POTASSIUM: CPT

## 2020-12-14 PROCEDURE — 87077 CULTURE AEROBIC IDENTIFY: CPT

## 2020-12-14 PROCEDURE — 85049 AUTOMATED PLATELET COUNT: CPT | Performed by: PHYSICIAN ASSISTANT

## 2020-12-14 PROCEDURE — 81001 URINALYSIS AUTO W/SCOPE: CPT

## 2020-12-14 PROCEDURE — 71260 CT THORAX DX C+: CPT

## 2020-12-14 PROCEDURE — 83690 ASSAY OF LIPASE: CPT | Performed by: SURGERY

## 2020-12-14 PROCEDURE — 82948 REAGENT STRIP/BLOOD GLUCOSE: CPT

## 2020-12-14 PROCEDURE — 85025 COMPLETE CBC W/AUTO DIFF WBC: CPT | Performed by: SURGERY

## 2020-12-14 PROCEDURE — 80048 BASIC METABOLIC PNL TOTAL CA: CPT | Performed by: SURGERY

## 2020-12-14 PROCEDURE — 85014 HEMATOCRIT: CPT

## 2020-12-14 PROCEDURE — 74177 CT ABD & PELVIS W/CONTRAST: CPT

## 2020-12-14 PROCEDURE — 84100 ASSAY OF PHOSPHORUS: CPT | Performed by: PHYSICIAN ASSISTANT

## 2020-12-14 PROCEDURE — 82330 ASSAY OF CALCIUM: CPT

## 2020-12-14 PROCEDURE — 83735 ASSAY OF MAGNESIUM: CPT | Performed by: PHYSICIAN ASSISTANT

## 2020-12-14 PROCEDURE — 84295 ASSAY OF SERUM SODIUM: CPT

## 2020-12-14 PROCEDURE — 93005 ELECTROCARDIOGRAM TRACING: CPT

## 2020-12-14 PROCEDURE — 84484 ASSAY OF TROPONIN QUANT: CPT | Performed by: PHYSICIAN ASSISTANT

## 2020-12-14 PROCEDURE — 87086 URINE CULTURE/COLONY COUNT: CPT

## 2020-12-14 PROCEDURE — 99236 HOSP IP/OBS SAME DATE HI 85: CPT | Performed by: SURGERY

## 2020-12-14 PROCEDURE — NC001 PR NO CHARGE: Performed by: SURGERY

## 2020-12-14 PROCEDURE — 71045 X-RAY EXAM CHEST 1 VIEW: CPT

## 2020-12-14 PROCEDURE — 87186 SC STD MICRODIL/AGAR DIL: CPT

## 2020-12-14 PROCEDURE — 82947 ASSAY GLUCOSE BLOOD QUANT: CPT

## 2020-12-14 PROCEDURE — 96374 THER/PROPH/DIAG INJ IV PUSH: CPT

## 2020-12-14 RX ORDER — CALCIUM CARBONATE 500(1250)
1 TABLET ORAL
Status: DISCONTINUED | OUTPATIENT
Start: 2020-12-14 | End: 2020-12-14

## 2020-12-14 RX ORDER — PANTOPRAZOLE SODIUM 40 MG/1
40 TABLET, DELAYED RELEASE ORAL
Status: DISCONTINUED | OUTPATIENT
Start: 2020-12-14 | End: 2020-12-14

## 2020-12-14 RX ORDER — ACETAMINOPHEN 325 MG/1
975 TABLET ORAL EVERY 8 HOURS SCHEDULED
Status: DISCONTINUED | OUTPATIENT
Start: 2020-12-14 | End: 2020-12-14

## 2020-12-14 RX ORDER — MELATONIN
1000 DAILY
Status: DISCONTINUED | OUTPATIENT
Start: 2020-12-14 | End: 2020-12-14

## 2020-12-14 RX ORDER — ATORVASTATIN CALCIUM 10 MG/1
10 TABLET, FILM COATED ORAL DAILY
Status: DISCONTINUED | OUTPATIENT
Start: 2020-12-14 | End: 2020-12-14

## 2020-12-14 RX ORDER — METOCLOPRAMIDE 10 MG/1
10 TABLET ORAL 4 TIMES DAILY
Status: DISCONTINUED | OUTPATIENT
Start: 2020-12-14 | End: 2020-12-14

## 2020-12-14 RX ORDER — ALBUTEROL SULFATE 90 UG/1
2 AEROSOL, METERED RESPIRATORY (INHALATION) EVERY 4 HOURS PRN
Status: DISCONTINUED | OUTPATIENT
Start: 2020-12-14 | End: 2020-12-14

## 2020-12-14 RX ORDER — ACETAMINOPHEN 325 MG/1
650 TABLET ORAL EVERY 4 HOURS PRN
Refills: 0
Start: 2020-12-14

## 2020-12-14 RX ORDER — SODIUM CHLORIDE, SODIUM GLUCONATE, SODIUM ACETATE, POTASSIUM CHLORIDE, MAGNESIUM CHLORIDE, SODIUM PHOSPHATE, DIBASIC, AND POTASSIUM PHOSPHATE .53; .5; .37; .037; .03; .012; .00082 G/100ML; G/100ML; G/100ML; G/100ML; G/100ML; G/100ML; G/100ML
100 INJECTION, SOLUTION INTRAVENOUS CONTINUOUS
Status: DISCONTINUED | OUTPATIENT
Start: 2020-12-14 | End: 2020-12-14

## 2020-12-14 RX ORDER — LOSARTAN POTASSIUM 25 MG/1
25 TABLET ORAL DAILY
Status: DISCONTINUED | OUTPATIENT
Start: 2020-12-14 | End: 2020-12-14

## 2020-12-14 RX ORDER — LORAZEPAM 2 MG/ML
1 INJECTION INTRAMUSCULAR ONCE
Status: COMPLETED | OUTPATIENT
Start: 2020-12-14 | End: 2020-12-14

## 2020-12-14 RX ORDER — INSULIN GLARGINE 100 [IU]/ML
20 INJECTION, SOLUTION SUBCUTANEOUS
Status: DISCONTINUED | OUTPATIENT
Start: 2020-12-14 | End: 2020-12-14

## 2020-12-14 RX ORDER — OXYBUTYNIN CHLORIDE 5 MG/1
5 TABLET, EXTENDED RELEASE ORAL DAILY
Status: DISCONTINUED | OUTPATIENT
Start: 2020-12-14 | End: 2020-12-14

## 2020-12-14 RX ORDER — ONDANSETRON 2 MG/ML
4 INJECTION INTRAMUSCULAR; INTRAVENOUS EVERY 6 HOURS PRN
Status: DISCONTINUED | OUTPATIENT
Start: 2020-12-14 | End: 2020-12-14

## 2020-12-14 RX ORDER — OXYCODONE HYDROCHLORIDE 5 MG/1
5 TABLET ORAL EVERY 4 HOURS PRN
Status: DISCONTINUED | OUTPATIENT
Start: 2020-12-14 | End: 2020-12-14

## 2020-12-14 RX ORDER — TAMSULOSIN HYDROCHLORIDE 0.4 MG/1
0.4 CAPSULE ORAL
Status: DISCONTINUED | OUTPATIENT
Start: 2020-12-14 | End: 2020-12-14

## 2020-12-14 RX ORDER — DOCUSATE SODIUM 100 MG/1
100 CAPSULE, LIQUID FILLED ORAL DAILY PRN
Status: DISCONTINUED | OUTPATIENT
Start: 2020-12-14 | End: 2020-12-14

## 2020-12-14 RX ADMIN — IOHEXOL 100 ML: 350 INJECTION, SOLUTION INTRAVENOUS at 05:15

## 2020-12-14 RX ADMIN — METOCLOPRAMIDE 10 MG: 10 TABLET ORAL at 08:24

## 2020-12-14 RX ADMIN — OXYBUTYNIN 5 MG: 5 TABLET, FILM COATED, EXTENDED RELEASE ORAL at 08:24

## 2020-12-14 RX ADMIN — ENOXAPARIN SODIUM 30 MG: 30 INJECTION SUBCUTANEOUS at 06:20

## 2020-12-14 RX ADMIN — SODIUM CHLORIDE, SODIUM GLUCONATE, SODIUM ACETATE, POTASSIUM CHLORIDE, MAGNESIUM CHLORIDE, SODIUM PHOSPHATE, DIBASIC, AND POTASSIUM PHOSPHATE 100 ML/HR: .53; .5; .37; .037; .03; .012; .00082 INJECTION, SOLUTION INTRAVENOUS at 06:14

## 2020-12-14 RX ADMIN — INSULIN LISPRO 7 UNITS: 100 INJECTION, SOLUTION INTRAVENOUS; SUBCUTANEOUS at 08:25

## 2020-12-14 RX ADMIN — CHOLECALCIFEROL TAB 25 MCG (1000 UNIT) 1000 UNITS: 25 TAB at 08:59

## 2020-12-14 RX ADMIN — LORAZEPAM 1 MG: 2 INJECTION INTRAMUSCULAR; INTRAVENOUS at 05:21

## 2020-12-14 RX ADMIN — ATORVASTATIN CALCIUM 10 MG: 10 TABLET, FILM COATED ORAL at 08:24

## 2020-12-14 RX ADMIN — LOSARTAN POTASSIUM 25 MG: 25 TABLET, FILM COATED ORAL at 08:23

## 2020-12-14 RX ADMIN — CALCIUM 1 TABLET: 500 TABLET ORAL at 08:24

## 2020-12-14 RX ADMIN — ACETAMINOPHEN 975 MG: 325 TABLET, FILM COATED ORAL at 06:22

## 2020-12-14 RX ADMIN — PANTOPRAZOLE SODIUM 40 MG: 40 TABLET, DELAYED RELEASE ORAL at 06:22

## 2020-12-18 ENCOUNTER — TELEPHONE (OUTPATIENT)
Dept: SURGICAL ONCOLOGY | Facility: CLINIC | Age: 70
End: 2020-12-18

## 2020-12-18 RX ORDER — CEPHALEXIN 500 MG/1
500 CAPSULE ORAL EVERY 6 HOURS SCHEDULED
Qty: 28 CAPSULE | Refills: 0 | Status: SHIPPED | OUTPATIENT
Start: 2020-12-18 | End: 2020-12-25

## 2020-12-19 LAB
BACTERIA UR CULT: ABNORMAL
BACTERIA UR CULT: ABNORMAL

## 2020-12-24 ENCOUNTER — TELEMEDICINE (OUTPATIENT)
Dept: RADIATION ONCOLOGY | Facility: HOSPITAL | Age: 70
End: 2020-12-24
Attending: RADIOLOGY

## 2020-12-24 DIAGNOSIS — C61 PROSTATE CANCER (HCC): Primary | ICD-10-CM

## 2020-12-30 ENCOUNTER — LAB (OUTPATIENT)
Dept: LAB | Facility: CLINIC | Age: 70
End: 2020-12-30
Payer: COMMERCIAL

## 2020-12-30 ENCOUNTER — TRANSCRIBE ORDERS (OUTPATIENT)
Dept: LAB | Facility: CLINIC | Age: 70
End: 2020-12-30

## 2020-12-30 DIAGNOSIS — C61 PROSTATE CANCER (HCC): ICD-10-CM

## 2020-12-30 LAB — PSA SERPL-MCNC: 0.1 NG/ML (ref 0–4)

## 2020-12-30 PROCEDURE — 84153 ASSAY OF PSA TOTAL: CPT

## 2021-01-07 DIAGNOSIS — E11.9 TYPE 2 DIABETES MELLITUS WITH INSULIN THERAPY (HCC): ICD-10-CM

## 2021-01-07 DIAGNOSIS — Z79.4 TYPE 2 DIABETES MELLITUS WITH INSULIN THERAPY (HCC): ICD-10-CM

## 2021-01-07 RX ORDER — ATORVASTATIN CALCIUM 10 MG/1
TABLET, FILM COATED ORAL
Qty: 90 TABLET | Refills: 3 | Status: SHIPPED | OUTPATIENT
Start: 2021-01-07 | End: 2022-02-09

## 2021-01-12 ENCOUNTER — TELEPHONE (OUTPATIENT)
Dept: FAMILY MEDICINE CLINIC | Facility: CLINIC | Age: 71
End: 2021-01-12

## 2021-01-12 DIAGNOSIS — E11.9 TYPE 2 DIABETES MELLITUS WITH INSULIN THERAPY (HCC): ICD-10-CM

## 2021-01-12 DIAGNOSIS — Z79.4 TYPE 2 DIABETES MELLITUS WITH INSULIN THERAPY (HCC): ICD-10-CM

## 2021-01-12 NOTE — TELEPHONE ENCOUNTER
nargis called for his pen needles and needs to know does he test more then once a day? pls call pharmacy to let them know   Thank you

## 2021-01-12 NOTE — TELEPHONE ENCOUNTER
Medication:Insulin Pen Needle 32G X 4 MM MISC       Dosage:  How Often:by Does not apply route daily  Quantity:  100  Last Office Visit:   Next Office Visit:  Last refilled:unk  How many pills left:unk  Pharmacy:   [facility] [address], [address] [address]  Phone: [phone] Fax: [phone]

## 2021-01-13 ENCOUNTER — OFFICE VISIT (OUTPATIENT)
Dept: UROLOGY | Facility: CLINIC | Age: 71
End: 2021-01-13
Payer: COMMERCIAL

## 2021-01-13 ENCOUNTER — TELEPHONE (OUTPATIENT)
Dept: UROLOGY | Facility: MEDICAL CENTER | Age: 71
End: 2021-01-13

## 2021-01-13 VITALS
HEART RATE: 86 BPM | SYSTOLIC BLOOD PRESSURE: 118 MMHG | HEIGHT: 67 IN | WEIGHT: 239 LBS | DIASTOLIC BLOOD PRESSURE: 72 MMHG | BODY MASS INDEX: 37.51 KG/M2

## 2021-01-13 DIAGNOSIS — R39.9 LOWER URINARY TRACT SYMPTOMS (LUTS): Primary | ICD-10-CM

## 2021-01-13 DIAGNOSIS — R35.0 BENIGN PROSTATIC HYPERPLASIA WITH URINARY FREQUENCY: ICD-10-CM

## 2021-01-13 DIAGNOSIS — N40.1 BENIGN PROSTATIC HYPERPLASIA WITH URINARY FREQUENCY: ICD-10-CM

## 2021-01-13 DIAGNOSIS — C61 PROSTATE CANCER (HCC): Primary | ICD-10-CM

## 2021-01-13 LAB
SL AMB  POCT GLUCOSE, UA: NORMAL
SL AMB LEUKOCYTE ESTERASE,UA: NORMAL
SL AMB POCT BILIRUBIN,UA: NORMAL
SL AMB POCT BLOOD,UA: NORMAL
SL AMB POCT CLARITY,UA: CLEAR
SL AMB POCT COLOR,UA: YELLOW
SL AMB POCT KETONES,UA: NORMAL
SL AMB POCT NITRITE,UA: NORMAL
SL AMB POCT PH,UA: 5
SL AMB POCT SPECIFIC GRAVITY,UA: 1.02
SL AMB POCT URINE PROTEIN: NORMAL
SL AMB POCT UROBILINOGEN: 0.2

## 2021-01-13 PROCEDURE — 1160F RVW MEDS BY RX/DR IN RCRD: CPT | Performed by: UROLOGY

## 2021-01-13 PROCEDURE — 99214 OFFICE O/P EST MOD 30 MIN: CPT | Performed by: UROLOGY

## 2021-01-13 PROCEDURE — 87086 URINE CULTURE/COLONY COUNT: CPT | Performed by: UROLOGY

## 2021-01-13 PROCEDURE — 81002 URINALYSIS NONAUTO W/O SCOPE: CPT | Performed by: UROLOGY

## 2021-01-13 PROCEDURE — 96402 CHEMO HORMON ANTINEOPL SQ/IM: CPT

## 2021-01-13 PROCEDURE — 3074F SYST BP LT 130 MM HG: CPT | Performed by: UROLOGY

## 2021-01-13 PROCEDURE — 1036F TOBACCO NON-USER: CPT | Performed by: UROLOGY

## 2021-01-13 PROCEDURE — 3008F BODY MASS INDEX DOCD: CPT | Performed by: UROLOGY

## 2021-01-13 PROCEDURE — 3078F DIAST BP <80 MM HG: CPT | Performed by: UROLOGY

## 2021-01-13 RX ORDER — TAMSULOSIN HYDROCHLORIDE 0.4 MG/1
0.4 CAPSULE ORAL
Qty: 90 CAPSULE | Refills: 3 | Status: SHIPPED | OUTPATIENT
Start: 2021-01-13 | End: 2021-11-05 | Stop reason: SDUPTHER

## 2021-01-13 NOTE — PROGRESS NOTES
Referring Physician: Frederick Novak MD  A copy of this note was sent to the referring physician  Diagnoses and all orders for this visit:    Prostate cancer ([facility] [address] )  -     leuprolide (LUPRON DEPOT 6 MONTH KIT) IM injection kit 45 mg  -     PSA Total, Diagnostic; Future    Benign prostatic hyperplasia with urinary frequency  -     tamsulosin (FLOMAX) 0 4 mg; Take 1 capsule (0 4 mg total) by mouth daily with dinner  -     Discontinue: Mirabegron ER 25 MG TB24; Take 50 mg by mouth daily at bedtime  -     Urine culture  -     POCT urine dip            Assessment and plan:       1  Newly diagnosed [place] 9 prostate cancer  -cT1c,  Pretreatment PSA 5 3, [place] 4+5=9, 1 of 12 cores, 5% total core volume ( pathology reviewed by Dr Samy Castillo)  - negative staging evaluation (CT and bone scan)  - pending radiation treatment, we will utilize Uro lift as fiducial markers  - Lupron administered (1 of for planned treatments for a total of 2 years ADT)  - PSA currently 0 1    2  Medically refractory BPH  -status post Uro lift, and recent redo procedure     3  Urethral stricture  - status post recent dilation    4  History of UTI    I was very happy to review his PSA  He has had an excellent biochemical response with a PSA of down to 0 1 for his [place] 9 disease with a combination of pelvic radiation as well as androgen deprivation therapy  He is agreeable to a total 2 year course    Given his prior urinary tract infections which required hospitalization we will check a urinalysis today  He is somewhat symptomatic    Regarding his ongoing complex voiding dysfunction I recommended continuing tamsulosin and increasing the dose of Myrbetriq to 50 mg  Second dose of Lupron was administered today  He will follow up with me in 6 months with a PSA prior to the    Is good to see Jim Arguello doing well  He received his 1st dose of Lupron today  [facility]   He will return to see me in 6 months time with a PSA prior to the visit for his next dose of Lupron  Carlos Walker MD      Chief Complaint     Prostate cancer follow-up      History of Present Illness     Tk Logan is a 79 y o  returns in follow-up for  prostate cancer, BPH, urethral stricture disease, UTI     Patient is now status post his initial dose of androgen deprivation therapy administered in June of 2020  He has completed pelvic radiation  He has complex BPH status post Uro lift and subsequent revision  Also dilation of urethral stricture at the time of his last procedure  He was hospitalized with a febrile urinary tract infection  He had an additional pansensitive E coli diagnosed 1 month ago  He has ongoing urgency, frequency  His largest complaint is incontinence particularly when ambulating to the bathroom for his 1st morning void  Detailed Urologic History     - please refer to HPI    Review of Systems     Review of Systems   Constitutional: Negative for activity change and fatigue  HENT: Negative for congestion  Eyes: Negative for visual disturbance  Respiratory: Negative for shortness of breath and wheezing  Cardiovascular: Negative for chest pain and leg swelling  Gastrointestinal: Negative for abdominal pain  Genitourinary: Negative for flank pain, hematuria and urgency  Musculoskeletal: Negative for back pain  Allergic/Immunologic: Negative for immunocompromised state  Neurological: Negative for dizziness and numbness  Psychiatric/Behavioral: Negative for dysphoric mood  All other systems reviewed and are negative  Allergies     Allergies   Allergen Reactions    Enalapril Anaphylaxis and Throat Swelling       Physical Exam     Physical Exam  Constitutional:       General: He is not in acute distress  Appearance: He is well-developed  HENT:      Head: Normocephalic and atraumatic  Neck:      Musculoskeletal: Normal range of motion     Cardiovascular:      Comments: Negative lower extremity edema  Pulmonary:      Effort: Pulmonary effort is normal       Breath sounds: Normal breath sounds  Abdominal:      Palpations: Abdomen is soft  Genitourinary:     Comments: Negative CVA tenderness  Musculoskeletal: Normal range of motion  Skin:     General: Skin is warm  Neurological:      Mental Status: He is alert and oriented to person, place, and time  Psychiatric:         Behavior: Behavior normal              Vital Signs  Vitals:    01/13/21 1041   BP: 118/72   Pulse: 86   Weight: 108 kg (239 lb)   Height: 5' 7" (1 702 m)         Current Medications       Current Outpatient Medications:     acetaminophen (TYLENOL) 325 mg tablet, Take 2 tablets (650 mg total) by mouth every 4 (four) hours as needed for mild pain, headaches or fever, Disp: , Rfl: 0    albuterol (PROAIR HFA) 90 mcg/act inhaler, inhale 2 puff by inhalation route  every 4 - 6 hours as needed, Disp: , Rfl:     atorvastatin (LIPITOR) 10 mg tablet, TAKE ONE TABLET BY MOUTH EVERY DAY, Disp: 90 tablet, Rfl: 3    Biotin 1000 MCG tablet, Take 1 tablet by mouth daily in the early morning , Disp: , Rfl:     Blood Glucose Monitoring Suppl (GLUCOCARD VITAL MONITOR) w/Device KIT, by Does not apply route 2 (two) times a day, Disp: 1 kit, Rfl: 0    calcium carbonate (OS-GILDA) 600 MG tablet, Take 600 mg by mouth daily in the early morning , Disp: , Rfl:     Cholecalciferol (VITAMIN D3) 2000 units capsule, Take 1,000 Units by mouth daily in the early morning , Disp: , Rfl:     Cyanocobalamin (VITAMIN B-12) 5000 MCG TBDP, Take 5,000 mcg by mouth once a week Takes on Mondays, Disp: , Rfl:     diazepam (VALIUM) 5 mg tablet, Take 5mg tablet 30-60 mins prior to MRI  Repeat immediately prior if needed  , Disp: 2 tablet, Rfl: 0    docusate sodium (DULCOLAX) 100 mg capsule, Take 100 mg by mouth daily as needed for constipation, Disp: , Rfl:     glipiZIDE (GLUCOTROL XL) 5 mg 24 hr tablet, Take 1 tablet (5 mg total) by mouth daily after breakfast, Disp: 90 tablet, Rfl: 3    Insulin Pen Needle 32G X 4 MM MISC, Use daily, Disp: 100 each, Rfl: 5    Lancets (LIFESCAN UNISTIK 2) MISC, Lifescan One Touch Ultramini Meter; use as directed; 1; 0; 31-Oct-2016; 31-Oct-2016; Melida Duran; Active, Disp: , Rfl:     losartan (COZAAR) 25 mg tablet, TAKE ONE TABLET BY MOUTH EVERY DAY, Disp: 90 tablet, Rfl: 3    metoclopramide (REGLAN) 10 mg tablet, Take 1 tablet (10 mg total) by mouth 4 (four) times a day, Disp: 54 tablet, Rfl: 0    Multiple Vitamin (MULTIVITAMIN) capsule, Take 1 capsule by mouth daily in the early morning , Disp: , Rfl:     NovoLOG Mix 70/30 FlexPen (70-30) 100 units/mL injection pen, INJECT 20 UNITS UNDER THE SKIN EVERY 12 HOURS, Disp: 15 mL, Rfl: 5    pantoprazole (PROTONIX) 40 mg tablet, Take 1 tablet (40 mg total) by mouth daily, Disp: 30 tablet, Rfl: 0    tamsulosin (FLOMAX) 0 4 mg, Take 1 capsule (0 4 mg total) by mouth daily with dinner, Disp: 90 capsule, Rfl: 3    Victoza injection, INJECT 1 8 MG SUBCUTANEOUSLY ONCE DAILY, Disp: 9 mL, Rfl: 5    Mirabegron ER 50 MG TB24, Take 1 tablet (50 mg total) by mouth daily, Disp: 30 tablet, Rfl: 3  No current facility-administered medications for this visit         Active Problems     Patient Active Problem List   Diagnosis    Morbid obesity due to excess calories (HCC)    GERD (gastroesophageal reflux disease)    Obesity    Constipation    Hypercholesteremia    Postgastrectomy malabsorption    Type 2 diabetes mellitus with insulin therapy ([facility] [address] )    Benign essential hypertension    Benign enlargement of prostate    Pancreatic cyst    History of colon polyps    IPMN (intraductal papillary mucinous neoplasm)    ED (erectile dysfunction) of organic origin    Benign prostatic hyperplasia with urinary frequency    Bruxism    Prostate cancer (HCC)    Nausea and vomiting    Hyponatremia    Intractable abdominal pain with nausea and vomiting         Past Medical History     Past Medical History:   Diagnosis Date    Anemia     Arthritis     Black stool 7/24/2020    Diabetes mellitus ([facility] [address] )     IDDM    Diverticulosis     Enlarged prostate     Erectile dysfunction     Hypercholesteremia     Resolved post weight loss    Hypertension     Resolved post weight loss    Kidney stone     Obesity     Prostate cancer ([facility] [address] )     Wears partial dentures     partial upper and lower         Surgical History     Past Surgical History:   Procedure Laterality Date    ABDOMINAL ADHESION SURGERY N/A 11/28/2016    Procedure: EXTENSIVE LYSIS ADHESIONS;  Surgeon: Chacorta Monroy MD;  [facility];  Service:    Sharmila Kobe FRACTURE SURGERY Left     CHOLECYSTECTOMY      COLON SURGERY      colon resection    COLONOSCOPY      COLOSTOMY      COLOSTOMY CLOSURE      DIAGNOSTIC LAPAROSCOPY      GASTRIC BYPASS      failed due to adhesions    HERNIA REPAIR      abdominal    NE BIOPSY OF PROSTATE,NEEDLE/PUNCH N/A 5/8/2020    Procedure: TRANSRECTAL NEEDLE BIOPSY PROSTATE (TRNBP) WITH TRANSRECTAL ULTRASOUND GUIDANCE;  Surgeon: Han Rollins MD;  [facility];  Service: Urology    NE CYSTOSCOPY,DIR VIS INT URETHROTOMY N/A 5/8/2020    Procedure: DIRECT VISUAL INTERAL URETHROTOMY (DVIU), dilation of urethral stricture;  Surgeon: Han Rollins MD;  [facility];  Service: Urology    NE CYSTOURETHRO W/IMPLANT N/A 3/8/2019    Procedure: CYSTOSCOPY WITH INSERTION Giuliana Stakes;  Surgeon: Han Rollins MD;  [facility];  Service: Urology    NE CYSTOURETHRO W/IMPLANT N/A 5/8/2020    Procedure: CYSTOSCOPY WITH INSERTION Giuliana Stakes;  Surgeon: Han Rollins MD;  [facility];  Service: Urology    NE [address] N/A 10/25/2018    Procedure: LINEAR ENDOSCOPIC U/S;  Surgeon: Rachel Howell MD;  [facility]: [facility];   Service: Gastroenterology    NE ESOPHAGOGASTRODUODENOSCOPY TRANSORAL DIAGNOSTIC N/A 7/6/2016    Procedure: EGD AND COLONOSCOPY;  Surgeon: Joy Paz MD;  [facility]; Service: Gastroenterology    [place] LAP, ANEESH RESTRICT PROC, LONGITUDINAL GASTRECTOMY N/A 2016    Procedure: GASTRECTOMY SLEEVE LAPAROSCOPIC;  Surgeon: Fernando Trejo MD;  [facility];  Service: [facility] BIOPSY  2020         Family History     Family History   Problem Relation Age of Onset    Heart disease Mother     Breast cancer Mother 80    Diabetes Father     Colon cancer Neg Hx     Liver disease Neg Hx          Social History     Social History     Social History     Tobacco Use   Smoking Status Former Smoker    Quit date: 11/10/2001    Years since quittin 1   Smokeless Tobacco Former User         Pertinent Lab Values     Lab Results   Component Value Date    CREATININE 1 03 2020       Lab Results   Component Value Date    PSA 0 1 2020    PSA 5 3 (H) 2020    PSA 2 6 10/23/2018       @RESULTRCNT(1H])@      Pertinent Imaging     FINDINGS:     There is no scintigraphic evidence of osseous metastasis  Scattered changes of arthritis noted involving the ankles and knees  Normal activity noted in the bony pelvis, thoracolumbar spine, ribs and skull      IMPRESSION:     No scintigraphic evidence of osseous metastasis      FINDINGS:     ABDOMEN     LOWER CHEST:  No clinically significant abnormality identified in the visualized lower chest      LIVER/BILIARY TREE:  Unremarkable      GALLBLADDER:  Gallbladder is surgically absent      SPLEEN:  Unremarkable      PANCREAS:  Uncinate process  again demonstrates a lobulated hypodense heterogeneous predominantly cystic focus measuring 2 5 x 1 8 x 2 5 cm    This was suggested to be IPMN on MRI of 2019      ADRENAL GLANDS:  There is low density lobulated thickening of adrenal glands bilaterally statistically most likely to represent adenomatous hyperplasia      KIDNEYS/URETERS:  Exophytic hypodensity from the anterior midpole of the left kidney measuring 2 2 x 1 9 x 2 2 cm suggestive for cyst   Smaller subcentimeter hypodensities in the right kidney  No renal calculus    No hydronephrosis      STOMACH AND BOWEL: Findings of gastric surgery  Sigmoid diverticulosis  No diverticulitis      APPENDIX:  No findings to suggest appendicitis      ABDOMINOPELVIC CAVITY:  No ascites  No pneumoperitoneum  No lymphadenopathy      VESSELS:  Unremarkable for patient's age      PELVIS     REPRODUCTIVE ORGANS:  Prostate measures 5 5 x 5 1 cm with a linear radiopaque seed implants      URINARY BLADDER:  Unremarkable      ABDOMINAL WALL/INGUINAL REGIONS:  Prior anterior pelvic wall hernia repair findings seen  Adherent bowel loops seen      OSSEOUS STRUCTURES:  No acute fracture or destructive osseous lesion  No lytic or blastic lesion seen      IMPRESSION:  1  Complex cystic mass in the uncinate process of pancreas similar to prior studies and suggestive to IPMN  follow-up MRI was recommended on the prior study  2  Bilateral renal cysts as described mostly unchanged  3  Prostate is prominent with hyperdense seed implants        Portions of the record may have been created with voice recognition software   Occasional wrong word or "sound a like" substitutions may have occurred due to the inherent limitations of voice recognition software   Read the chart carefully and recognize, using context, where substitutions have occurred

## 2021-01-13 NOTE — LETTER
January 13, 2021     Rosa Isela Arango MD  [address] [facility] [place]  [address] [address]    Patient: Alba Pisano   YOB: 1950   Date of Visit: 1/13/2021       Dear Dr Emilie Smalls:    Thank you for referring Alba Pisano to me for evaluation  Below are my notes for this consultation  If you have questions, please do not hesitate to call me  I look forward to following your patient along with you  Sincerely,        Giovanny Geiger MD        CC: MD Giovanny Montgomery MD  1/13/2021 12:58 PM  Sign when Signing Visit  Referring Physician: Rosa Isela Arango MD  A copy of this note was sent to the referring physician  Diagnoses and all orders for this visit:    Prostate cancer ([facility] [address] )  -     leuprolide (LUPRON DEPOT 6 MONTH KIT) IM injection kit 45 mg  -     PSA Total, Diagnostic; Future    Benign prostatic hyperplasia with urinary frequency  -     tamsulosin (FLOMAX) 0 4 mg; Take 1 capsule (0 4 mg total) by mouth daily with dinner  -     Discontinue: Mirabegron ER 25 MG TB24; Take 50 mg by mouth daily at bedtime  -     Urine culture  -     POCT urine dip            Assessment and plan:       1  Newly diagnosed Rafael 9 prostate cancer  -cT1c,  Pretreatment PSA 5 3, Rafael 4+5=9, 1 of 12 cores, 5% total core volume ( pathology reviewed by Dr Sophia Koehler)  - negative staging evaluation (CT and bone scan)  - pending radiation treatment, we will utilize Uro lift as fiducial markers  - Lupron administered (1 of for planned treatments for a total of 2 years ADT)  - PSA currently 0 1    2  Medically refractory BPH  -status post Uro lift, and recent redo procedure     3  Urethral stricture  - status post recent dilation    4  History of UTI    I was very happy to review his PSA  He has had an excellent biochemical response with a PSA of down to 0 1 for his Rafael 9 disease with a combination of pelvic radiation as well as androgen deprivation therapy    He is agreeable to a total 2 year course    Given his prior urinary tract infections which required hospitalization we will check a urinalysis today  He is somewhat symptomatic    Regarding his ongoing complex voiding dysfunction I recommended continuing tamsulosin and increasing the dose of Myrbetriq to 50 mg  Second dose of Lupron was administered today  He will follow up with me in 6 months with a PSA prior to the    Is good to see Vashti Ayala doing well  He received his 1st dose of Lupron today  Darrion Rodgers He will return to see me in 6 months time with a PSA prior to the visit for his next dose of Lupron  Elizabeth Joya MD      Chief Complaint     Prostate cancer follow-up      History of Present Illness     Melania Salas is a 79 y o  returns in follow-up for  prostate cancer, BPH, urethral stricture disease, UTI     Patient is now status post his initial dose of androgen deprivation therapy administered in June of 2020  He has completed pelvic radiation  He has complex BPH status post Uro lift and subsequent revision  Also dilation of urethral stricture at the time of his last procedure  He was hospitalized with a febrile urinary tract infection  He had an additional pansensitive E coli diagnosed 1 month ago  He has ongoing urgency, frequency  His largest complaint is incontinence particularly when ambulating to the bathroom for his 1st morning void  Detailed Urologic History     - please refer to HPI    Review of Systems     Review of Systems   Constitutional: Negative for activity change and fatigue  HENT: Negative for congestion  Eyes: Negative for visual disturbance  Respiratory: Negative for shortness of breath and wheezing  Cardiovascular: Negative for chest pain and leg swelling  Gastrointestinal: Negative for abdominal pain  Genitourinary: Negative for flank pain, hematuria and urgency  Musculoskeletal: Negative for back pain  Allergic/Immunologic: Negative for immunocompromised state  Neurological: Negative for dizziness and numbness  Psychiatric/Behavioral: Negative for dysphoric mood  All other systems reviewed and are negative  Allergies     Allergies   Allergen Reactions    Enalapril Anaphylaxis and Throat Swelling       Physical Exam     Physical Exam  Constitutional:       General: He is not in acute distress  Appearance: He is well-developed  HENT:      Head: Normocephalic and atraumatic  Neck:      Musculoskeletal: Normal range of motion  Cardiovascular:      Comments: Negative lower extremity edema  Pulmonary:      Effort: Pulmonary effort is normal       Breath sounds: Normal breath sounds  Abdominal:      Palpations: Abdomen is soft  Genitourinary:     Comments: Negative CVA tenderness  Musculoskeletal: Normal range of motion  Skin:     General: Skin is warm  Neurological:      Mental Status: He is alert and oriented to person, place, and time     Psychiatric:         Behavior: Behavior normal              Vital Signs  Vitals:    01/13/21 1041   BP: 118/72   Pulse: 86   Weight: 108 kg (239 lb)   Height: 5' 7" (1 702 m)         Current Medications       Current Outpatient Medications:     acetaminophen (TYLENOL) 325 mg tablet, Take 2 tablets (650 mg total) by mouth every 4 (four) hours as needed for mild pain, headaches or fever, Disp: , Rfl: 0    albuterol (PROAIR HFA) 90 mcg/act inhaler, inhale 2 puff by inhalation route  every 4 - 6 hours as needed, Disp: , Rfl:     atorvastatin (LIPITOR) 10 mg tablet, TAKE ONE TABLET BY MOUTH EVERY DAY, Disp: 90 tablet, Rfl: 3    Biotin 1000 MCG tablet, Take 1 tablet by mouth daily in the early morning , Disp: , Rfl:     Blood Glucose Monitoring Suppl (GLUCOCARD VITAL MONITOR) w/Device KIT, by Does not apply route 2 (two) times a day, Disp: 1 kit, Rfl: 0    calcium carbonate (OS-GILDA) 600 MG tablet, Take 600 mg by mouth daily in the early morning , Disp: , Rfl:     Cholecalciferol (VITAMIN D3) 2000 units capsule, Take 1,000 Units by mouth daily in the early morning , Disp: , Rfl:     Cyanocobalamin (VITAMIN B-12) 5000 MCG TBDP, Take 5,000 mcg by mouth once a week Takes on Mondays, Disp: , Rfl:     diazepam (VALIUM) 5 mg tablet, Take 5mg tablet 30-60 mins prior to MRI  Repeat immediately prior if needed  , Disp: 2 tablet, Rfl: 0    docusate sodium (DULCOLAX) 100 mg capsule, Take 100 mg by mouth daily as needed for constipation, Disp: , Rfl:     glipiZIDE (GLUCOTROL XL) 5 mg 24 hr tablet, Take 1 tablet (5 mg total) by mouth daily after breakfast, Disp: 90 tablet, Rfl: 3    Insulin Pen Needle 32G X 4 MM MISC, Use daily, Disp: 100 each, Rfl: 5    Lancets (LIFESCAN UNISTIK 2) MISC, Lifescan One Touch Ultramini Meter; use as directed; 1; 0; 31-Oct-2016; 31-Oct-2016; Melida Duran; Active, Disp: , Rfl:     losartan (COZAAR) 25 mg tablet, TAKE ONE TABLET BY MOUTH EVERY DAY, Disp: 90 tablet, Rfl: 3    metoclopramide (REGLAN) 10 mg tablet, Take 1 tablet (10 mg total) by mouth 4 (four) times a day, Disp: 54 tablet, Rfl: 0    Multiple Vitamin (MULTIVITAMIN) capsule, Take 1 capsule by mouth daily in the early morning , Disp: , Rfl:     NovoLOG Mix 70/30 FlexPen (70-30) 100 units/mL injection pen, INJECT 20 UNITS UNDER THE SKIN EVERY 12 HOURS, Disp: 15 mL, Rfl: 5    pantoprazole (PROTONIX) 40 mg tablet, Take 1 tablet (40 mg total) by mouth daily, Disp: 30 tablet, Rfl: 0    tamsulosin (FLOMAX) 0 4 mg, Take 1 capsule (0 4 mg total) by mouth daily with dinner, Disp: 90 capsule, Rfl: 3    Victoza injection, INJECT 1 8 MG SUBCUTANEOUSLY ONCE DAILY, Disp: 9 mL, Rfl: 5    Mirabegron ER 50 MG TB24, Take 1 tablet (50 mg total) by mouth daily, Disp: 30 tablet, Rfl: 3  No current facility-administered medications for this visit         Active Problems     Patient Active Problem List   Diagnosis    Morbid obesity due to excess calories (HCC)    GERD (gastroesophageal reflux disease)    Obesity    Constipation    Hypercholesteremia    Postgastrectomy malabsorption    Type 2 diabetes mellitus with insulin therapy (HCC)    Benign essential hypertension    Benign enlargement of prostate    Pancreatic cyst    History of colon polyps    IPMN (intraductal papillary mucinous neoplasm)    ED (erectile dysfunction) of organic origin    Benign prostatic hyperplasia with urinary frequency    Bruxism    Prostate cancer (HCC)    Nausea and vomiting    Hyponatremia    Intractable abdominal pain with nausea and vomiting         Past Medical History     Past Medical History:   Diagnosis Date    Anemia     Arthritis     Black stool 7/24/2020    Diabetes mellitus ([facility] [address] )     IDDM    Diverticulosis     Enlarged prostate     Erectile dysfunction     Hypercholesteremia     Resolved post weight loss    Hypertension     Resolved post weight loss    Kidney stone     Obesity     Prostate cancer ([facility] [address] )     Wears partial dentures     partial upper and lower         Surgical History     Past Surgical History:   Procedure Laterality Date    ABDOMINAL ADHESION SURGERY N/A 11/28/2016    Procedure: EXTENSIVE LYSIS ADHESIONS;  Surgeon: Ezra Pickard MD;  [facility];  Service:    [place] FRACTURE SURGERY Left     CHOLECYSTECTOMY      COLON SURGERY      colon resection    COLONOSCOPY      COLOSTOMY      COLOSTOMY CLOSURE      DIAGNOSTIC LAPAROSCOPY      GASTRIC BYPASS      failed due to adhesions    HERNIA REPAIR      abdominal    IN BIOPSY OF PROSTATE,NEEDLE/PUNCH N/A 5/8/2020    Procedure: TRANSRECTAL NEEDLE BIOPSY PROSTATE (TRNBP) WITH TRANSRECTAL ULTRASOUND GUIDANCE;  Surgeon: Vargas Hitchcock MD;  [facility];  Service: Urology    IN CYSTOSCOPY,DIR VIS INT URETHROTOMY N/A 5/8/2020    Procedure: DIRECT VISUAL INTERAL URETHROTOMY (DVIU), dilation of urethral stricture;  Surgeon: Vargas Hitchcock MD;  [facility];  Service: Urology    IN CYSTOURETHRO W/IMPLANT N/A 3/8/2019 Procedure: CYSTOSCOPY WITH INSERTION Fahad Torres;  Surgeon: Ignacio Gong MD;  [facility];  Service: Urology    [place] CYSTOURETHRO W/IMPLANT N/A 2020    Procedure: CYSTOSCOPY WITH INSERTION Fahad Torres;  Surgeon: Ignacio Gong MD;  [facility];  Service: Urology     [facility] [address] STOM DUODENUM/JEJUNUM N/A 10/25/2018    Procedure: LINEAR ENDOSCOPIC U/S;  Surgeon: Jessica Emery MD;  [facility]; Service: Gastroenterology    [place] ESOPHAGOGASTRODUODENOSCOPY TRANSORAL DIAGNOSTIC N/A 2016    Procedure: EGD AND COLONOSCOPY;  Surgeon: Brian Collins MD;  [facility]; Service: Gastroenterology    [place] LAP, ANEESH RESTRICT PROC, LONGITUDINAL GASTRECTOMY N/A 2016    Procedure: GASTRECTOMY SLEEVE LAPAROSCOPIC;  Surgeon: Tricia Robin MD;  [facility];  Service: [address] [facility] [place] BIOPSY  2020         Family History     Family History   Problem Relation Age of Onset    Heart disease Mother     Breast cancer Mother 80    Diabetes Father     Colon cancer Neg Hx     Liver disease Neg Hx          Social History     Social History     Social History     Tobacco Use   Smoking Status Former Smoker    Quit date: 11/10/2001    Years since quittin 1   Smokeless Tobacco Former User         Pertinent Lab Values     Lab Results   Component Value Date    CREATININE 1 03 2020       Lab Results   Component Value Date    PSA 0 1 2020    PSA 5 3 (H) 2020    PSA 2 6 10/23/2018       @RESULTRCNT(1H])@      Pertinent Imaging     FINDINGS:     There is no scintigraphic evidence of osseous metastasis  Scattered changes of arthritis noted involving the ankles and knees    Normal activity noted in the bony pelvis, thoracolumbar spine, ribs and skull      IMPRESSION:     No scintigraphic evidence of osseous metastasis      FINDINGS:     ABDOMEN     LOWER CHEST:  No clinically significant abnormality identified in the visualized lower chest      LIVER/BILIARY TREE:  Unremarkable      GALLBLADDER:  Gallbladder is surgically absent      SPLEEN:  Unremarkable      PANCREAS:  Uncinate process  again demonstrates a lobulated hypodense heterogeneous predominantly cystic focus measuring 2 5 x 1 8 x 2 5 cm  This was suggested to be IPMN on MRI of 11/26/2019      ADRENAL GLANDS:  There is low density lobulated thickening of adrenal glands bilaterally statistically most likely to represent adenomatous hyperplasia      KIDNEYS/URETERS:  Exophytic hypodensity from the anterior midpole of the left kidney measuring 2 2 x 1 9 x 2 2 cm suggestive for cyst   Smaller subcentimeter hypodensities in the right kidney  No renal calculus    No hydronephrosis      STOMACH AND BOWEL: Findings of gastric surgery  Sigmoid diverticulosis  No diverticulitis      APPENDIX:  No findings to suggest appendicitis      ABDOMINOPELVIC CAVITY:  No ascites  No pneumoperitoneum  No lymphadenopathy      VESSELS:  Unremarkable for patient's age      PELVIS     REPRODUCTIVE ORGANS:  Prostate measures 5 5 x 5 1 cm with a linear radiopaque seed implants      URINARY BLADDER:  Unremarkable      ABDOMINAL WALL/INGUINAL REGIONS:  Prior anterior pelvic wall hernia repair findings seen  Adherent bowel loops seen      OSSEOUS STRUCTURES:  No acute fracture or destructive osseous lesion  No lytic or blastic lesion seen      IMPRESSION:  1  Complex cystic mass in the uncinate process of pancreas similar to prior studies and suggestive to IPMN  follow-up MRI was recommended on the prior study  2  Bilateral renal cysts as described mostly unchanged    3  Prostate is prominent with hyperdense seed implants        Portions of the record may have been created with voice recognition software   Occasional wrong word or "sound a like" substitutions may have occurred due to the inherent limitations of voice recognition software   Read the chart carefully and recognize, using context, where substitutions have occurred

## 2021-01-13 NOTE — TELEPHONE ENCOUNTER
This is a patient of Dr Shea Calderon in [place]  [facility] called and said patients prescription needs to say 50 mg instead of 2  Pills at 25 mg and they said it should be 30 pills  We can try 90 and he is not sure if 90 is covered or if it has to be 30 pills  Please call Emanuel at [phone]

## 2021-01-14 LAB — BACTERIA UR CULT: NORMAL

## 2021-01-25 DIAGNOSIS — R10.9 ABDOMINAL PAIN: ICD-10-CM

## 2021-01-26 RX ORDER — PANTOPRAZOLE SODIUM 40 MG/1
TABLET, DELAYED RELEASE ORAL
Qty: 30 TABLET | Refills: 0 | Status: SHIPPED | OUTPATIENT
Start: 2021-01-26 | End: 2021-02-24

## 2021-02-05 DIAGNOSIS — Z79.4 TYPE 2 DIABETES MELLITUS WITH INSULIN THERAPY (HCC): ICD-10-CM

## 2021-02-05 DIAGNOSIS — E11.9 TYPE 2 DIABETES MELLITUS WITH INSULIN THERAPY (HCC): ICD-10-CM

## 2021-02-05 RX ORDER — GLIPIZIDE 5 MG/1
TABLET, FILM COATED, EXTENDED RELEASE ORAL
Qty: 90 TABLET | Refills: 3 | Status: SHIPPED | OUTPATIENT
Start: 2021-02-05 | End: 2022-03-07

## 2021-02-13 DIAGNOSIS — Z23 ENCOUNTER FOR IMMUNIZATION: ICD-10-CM

## 2021-02-23 ENCOUNTER — IMMUNIZATIONS (OUTPATIENT)
Dept: FAMILY MEDICINE CLINIC | Facility: HOSPITAL | Age: 71
End: 2021-02-23

## 2021-02-23 DIAGNOSIS — Z23 ENCOUNTER FOR IMMUNIZATION: Primary | ICD-10-CM

## 2021-02-23 PROCEDURE — 0001A SARS-COV-2 / COVID-19 MRNA VACCINE (PFIZER-BIONTECH) 30 MCG: CPT

## 2021-02-23 PROCEDURE — 91300 SARS-COV-2 / COVID-19 MRNA VACCINE (PFIZER-BIONTECH) 30 MCG: CPT

## 2021-02-24 DIAGNOSIS — R10.9 ABDOMINAL PAIN: ICD-10-CM

## 2021-02-24 RX ORDER — PANTOPRAZOLE SODIUM 40 MG/1
TABLET, DELAYED RELEASE ORAL
Qty: 30 TABLET | Refills: 0 | Status: SHIPPED | OUTPATIENT
Start: 2021-02-24 | End: 2021-04-05

## 2021-02-25 ENCOUNTER — PREPPED CHART (OUTPATIENT)
Dept: URBAN - METROPOLITAN AREA CLINIC 6 | Facility: CLINIC | Age: 71
End: 2021-02-25

## 2021-02-25 LAB
LEFT EYE DIABETIC RETINOPATHY: NORMAL
RIGHT EYE DIABETIC RETINOPATHY: NORMAL

## 2021-03-11 DIAGNOSIS — Z79.4 TYPE 2 DIABETES MELLITUS WITH INSULIN THERAPY (HCC): ICD-10-CM

## 2021-03-11 DIAGNOSIS — E11.9 TYPE 2 DIABETES MELLITUS WITH INSULIN THERAPY (HCC): ICD-10-CM

## 2021-03-11 RX ORDER — LIRAGLUTIDE 6 MG/ML
INJECTION SUBCUTANEOUS
Qty: 9 PEN | Refills: 5 | Status: SHIPPED | OUTPATIENT
Start: 2021-03-11 | End: 2021-03-16 | Stop reason: ALTCHOICE

## 2021-03-14 ENCOUNTER — IMMUNIZATIONS (OUTPATIENT)
Dept: FAMILY MEDICINE CLINIC | Facility: HOSPITAL | Age: 71
End: 2021-03-14

## 2021-03-14 DIAGNOSIS — Z23 ENCOUNTER FOR IMMUNIZATION: Primary | ICD-10-CM

## 2021-03-14 PROCEDURE — 0002A SARS-COV-2 / COVID-19 MRNA VACCINE (PFIZER-BIONTECH) 30 MCG: CPT

## 2021-03-14 PROCEDURE — 91300 SARS-COV-2 / COVID-19 MRNA VACCINE (PFIZER-BIONTECH) 30 MCG: CPT

## 2021-03-16 ENCOUNTER — OFFICE VISIT (OUTPATIENT)
Dept: FAMILY MEDICINE CLINIC | Facility: CLINIC | Age: 71
End: 2021-03-16
Payer: COMMERCIAL

## 2021-03-16 VITALS
DIASTOLIC BLOOD PRESSURE: 80 MMHG | HEART RATE: 74 BPM | SYSTOLIC BLOOD PRESSURE: 120 MMHG | TEMPERATURE: 96.4 F | OXYGEN SATURATION: 98 % | WEIGHT: 230 LBS | BODY MASS INDEX: 36.1 KG/M2 | HEIGHT: 67 IN

## 2021-03-16 DIAGNOSIS — E66.01 CLASS 2 SEVERE OBESITY DUE TO EXCESS CALORIES WITH SERIOUS COMORBIDITY AND BODY MASS INDEX (BMI) OF 35.0 TO 35.9 IN ADULT (HCC): Primary | ICD-10-CM

## 2021-03-16 DIAGNOSIS — Z71.84 TRAVEL ADVICE ENCOUNTER: ICD-10-CM

## 2021-03-16 DIAGNOSIS — Z79.4 TYPE 2 DIABETES MELLITUS WITH INSULIN THERAPY (HCC): ICD-10-CM

## 2021-03-16 DIAGNOSIS — N40.1 BENIGN PROSTATIC HYPERPLASIA WITH URINARY FREQUENCY: ICD-10-CM

## 2021-03-16 DIAGNOSIS — M25.552 LEFT HIP PAIN: ICD-10-CM

## 2021-03-16 DIAGNOSIS — I10 BENIGN ESSENTIAL HYPERTENSION: ICD-10-CM

## 2021-03-16 DIAGNOSIS — E11.9 TYPE 2 DIABETES MELLITUS WITH INSULIN THERAPY (HCC): ICD-10-CM

## 2021-03-16 DIAGNOSIS — E78.00 HYPERCHOLESTEREMIA: ICD-10-CM

## 2021-03-16 DIAGNOSIS — R35.0 BENIGN PROSTATIC HYPERPLASIA WITH URINARY FREQUENCY: ICD-10-CM

## 2021-03-16 PROBLEM — R10.9 INTRACTABLE ABDOMINAL PAIN: Status: RESOLVED | Noted: 2020-08-02 | Resolved: 2021-03-16

## 2021-03-16 PROCEDURE — 3725F SCREEN DEPRESSION PERFORMED: CPT | Performed by: FAMILY MEDICINE

## 2021-03-16 PROCEDURE — 1160F RVW MEDS BY RX/DR IN RCRD: CPT | Performed by: FAMILY MEDICINE

## 2021-03-16 PROCEDURE — 1036F TOBACCO NON-USER: CPT | Performed by: FAMILY MEDICINE

## 2021-03-16 PROCEDURE — 3008F BODY MASS INDEX DOCD: CPT | Performed by: FAMILY MEDICINE

## 2021-03-16 PROCEDURE — 99214 OFFICE O/P EST MOD 30 MIN: CPT | Performed by: FAMILY MEDICINE

## 2021-03-16 RX ORDER — SEMAGLUTIDE 1.34 MG/ML
INJECTION, SOLUTION SUBCUTANEOUS
Qty: 1.5 ML | Refills: 0 | Status: SHIPPED | OUTPATIENT
Start: 2021-03-16 | End: 2021-04-19

## 2021-03-16 NOTE — PROGRESS NOTES
Assessment/Plan:    No problem-specific Assessment & Plan notes found for this encounter  Diagnoses and all orders for this visit:    Class 2 severe obesity due to excess calories with serious comorbidity and body mass index (BMI) of 35 0 to 35 9 in adult [facility])  Comments:  diet and exercise encouraged today    Hypercholesteremia  Comments:  liptior as directed    Benign prostatic hyperplasia with urinary frequency  Comments:  stable  care per urology    Travel advice encounter  -     Novel Coronavirus (COVID-19), PCR [facility] Collected at [facility] or [facility]; Future    Left hip pain  -     XR hip/pelv 2-3 vws left if performed; Future    Benign essential hypertension  Comments:  stable on current meds    Type 2 diabetes mellitus with insulin therapy ([facility] [address] )  -     Comprehensive metabolic panel  -     Lipid Panel with Direct LDL reflex; Future  -     Hemoglobin A1C  -     Microalbumin / creatinine urine ratio  -     Semaglutide,0 25 or 0 5MG/DOS, (Ozempic, 0 25 or 0 5 MG/DOSE,) 2 MG/1 5ML SOPN; Take 0 25 mg once a week for 2 weeks then take 0 5 mg once a week after          Subjective:      Patient ID: Jeet Vincent is a 79 y o  male  Here for follow up  S/p last radiation for prostate in November 2020  Still having issues with incontinence and wearing depends   Left hip pain started last week   Pain on and off since then       Diabetes  He presents for his follow-up diabetic visit  He has type 2 diabetes mellitus  His disease course has been stable  There are no hypoglycemic associated symptoms  There are no diabetic associated symptoms  There are no hypoglycemic complications  Symptoms are stable  Pertinent negatives for diabetic complications include no autonomic neuropathy, CVA, heart disease, impotence, nephropathy, peripheral neuropathy, PVD or retinopathy  He is compliant with treatment all of the time             The following portions of the patient's history were reviewed and updated as appropriate: allergies, current medications, past family history, past medical history, past social history, past surgical history and problem list     Review of Systems   Constitutional: Negative  HENT: Negative  Respiratory: Negative  Cardiovascular: Negative  Endocrine: Negative  Genitourinary: Negative  Negative for impotence  Musculoskeletal: Positive for arthralgias and joint swelling  Negative for myalgias  Allergic/Immunologic: Negative  Neurological: Negative  Hematological: Negative  Objective:      /80 (BP Location: Left arm, Patient Position: Sitting, Cuff Size: Large)   Pulse 74   Temp (!) 96 4 °F (35 8 °C) (Tympanic)   Ht 5' 7" (1 702 m)   Wt 104 kg (230 lb)   SpO2 98%   BMI 36 02 kg/m²          Physical Exam  Constitutional:       Appearance: Normal appearance  HENT:      Right Ear: Tympanic membrane normal       Left Ear: Tympanic membrane normal    Eyes:      Extraocular Movements: Extraocular movements intact  Pupils: Pupils are equal, round, and reactive to light  Cardiovascular:      Rate and Rhythm: Normal rate and regular rhythm  Pulses: Normal pulses  Heart sounds: Normal heart sounds  Pulmonary:      Effort: Pulmonary effort is normal       Breath sounds: Normal breath sounds  Musculoskeletal: Normal range of motion  General: No swelling  Skin:     Capillary Refill: Capillary refill takes less than 2 seconds  Neurological:      General: No focal deficit present  Mental Status: He is alert and oriented to person, place, and time     Psychiatric:         Mood and Affect: Mood normal          Behavior: Behavior normal

## 2021-03-18 ENCOUNTER — APPOINTMENT (OUTPATIENT)
Dept: LAB | Facility: CLINIC | Age: 71
End: 2021-03-18
Payer: COMMERCIAL

## 2021-03-18 ENCOUNTER — HOSPITAL ENCOUNTER (OUTPATIENT)
Dept: RADIOLOGY | Facility: HOSPITAL | Age: 71
Discharge: HOME | End: 2021-03-18
Payer: COMMERCIAL

## 2021-03-18 DIAGNOSIS — Z79.4 TYPE 2 DIABETES MELLITUS WITH INSULIN THERAPY (HCC): ICD-10-CM

## 2021-03-18 DIAGNOSIS — E11.9 TYPE 2 DIABETES MELLITUS WITH INSULIN THERAPY (HCC): ICD-10-CM

## 2021-03-18 DIAGNOSIS — M25.552 LEFT HIP PAIN: ICD-10-CM

## 2021-03-18 LAB
ALBUMIN SERPL BCP-MCNC: 3.3 G/DL (ref 3.5–5)
ALP SERPL-CCNC: 103 U/L (ref 46–116)
ALT SERPL W P-5'-P-CCNC: 32 U/L (ref 12–78)
ANION GAP SERPL CALCULATED.3IONS-SCNC: 10 MMOL/L (ref 4–13)
AST SERPL W P-5'-P-CCNC: 17 U/L (ref 5–45)
BILIRUB SERPL-MCNC: 0.35 MG/DL (ref 0.2–1)
BUN SERPL-MCNC: 21 MG/DL (ref 5–25)
CALCIUM ALBUM COR SERPL-MCNC: 9.6 MG/DL (ref 8.3–10.1)
CALCIUM SERPL-MCNC: 9 MG/DL (ref 8.3–10.1)
CHLORIDE SERPL-SCNC: 106 MMOL/L (ref 100–108)
CHOLEST SERPL-MCNC: 123 MG/DL (ref 50–200)
CO2 SERPL-SCNC: 26 MMOL/L (ref 21–32)
CREAT SERPL-MCNC: 1.06 MG/DL (ref 0.6–1.3)
CREAT UR-MCNC: 199 MG/DL
GFR SERPL CREATININE-BSD FRML MDRD: 71 ML/MIN/1.73SQ M
GLUCOSE P FAST SERPL-MCNC: 191 MG/DL (ref 65–99)
HDLC SERPL-MCNC: 49 MG/DL
LDLC SERPL CALC-MCNC: 57 MG/DL (ref 0–100)
MICROALBUMIN UR-MCNC: 67 MG/L (ref 0–20)
MICROALBUMIN/CREAT 24H UR: 34 MG/G CREATININE (ref 0–30)
POTASSIUM SERPL-SCNC: 4.3 MMOL/L (ref 3.5–5.3)
PROT SERPL-MCNC: 7.1 G/DL (ref 6.4–8.2)
SODIUM SERPL-SCNC: 142 MMOL/L (ref 136–145)
TRIGL SERPL-MCNC: 83 MG/DL

## 2021-03-18 PROCEDURE — 83036 HEMOGLOBIN GLYCOSYLATED A1C: CPT | Performed by: FAMILY MEDICINE

## 2021-03-18 PROCEDURE — 80061 LIPID PANEL: CPT

## 2021-03-18 PROCEDURE — 82570 ASSAY OF URINE CREATININE: CPT | Performed by: FAMILY MEDICINE

## 2021-03-18 PROCEDURE — 3060F POS MICROALBUMINURIA REV: CPT | Performed by: FAMILY MEDICINE

## 2021-03-18 PROCEDURE — 73502 X-RAY EXAM HIP UNI 2-3 VIEWS: CPT

## 2021-03-18 PROCEDURE — 36415 COLL VENOUS BLD VENIPUNCTURE: CPT | Performed by: FAMILY MEDICINE

## 2021-03-18 PROCEDURE — 80053 COMPREHEN METABOLIC PANEL: CPT | Performed by: FAMILY MEDICINE

## 2021-03-18 PROCEDURE — 82043 UR ALBUMIN QUANTITATIVE: CPT | Performed by: FAMILY MEDICINE

## 2021-03-19 LAB
EST. AVERAGE GLUCOSE BLD GHB EST-MCNC: 166 MG/DL
HBA1C MFR BLD: 7.4 %

## 2021-03-19 PROCEDURE — 3051F HG A1C>EQUAL 7.0%<8.0%: CPT | Performed by: FAMILY MEDICINE

## 2021-03-22 ENCOUNTER — TELEPHONE (OUTPATIENT)
Dept: FAMILY MEDICINE CLINIC | Facility: CLINIC | Age: 71
End: 2021-03-22

## 2021-03-22 NOTE — TELEPHONE ENCOUNTER
Pt called requesting a PSA ab order in preparation for his appt with Dr Rosa An on  6/22/2021  Pt states that he was instructed that the order would be there after his last appt  Pt has an order in, written by Urology in preparation for appt on 7/13/2021  Advised pt, unable to do the labs for the order in the system due to the expected date on the order  New order requested

## 2021-03-22 NOTE — TELEPHONE ENCOUNTER
That could be due to insurance reason that he had PSA done in December it has to be at least 6 months from the last one so he cant get it done until June or after

## 2021-04-05 DIAGNOSIS — R10.9 ABDOMINAL PAIN: ICD-10-CM

## 2021-04-05 RX ORDER — PANTOPRAZOLE SODIUM 40 MG/1
TABLET, DELAYED RELEASE ORAL
Qty: 30 TABLET | Refills: 0 | Status: SHIPPED | OUTPATIENT
Start: 2021-04-05 | End: 2021-05-12

## 2021-04-13 DIAGNOSIS — Z20.822 EXPOSURE TO COVID-19 VIRUS: Primary | ICD-10-CM

## 2021-04-14 ENCOUNTER — OFFICE VISIT (OUTPATIENT)
Dept: OBGYN CLINIC | Facility: CLINIC | Age: 71
End: 2021-04-14
Payer: COMMERCIAL

## 2021-04-14 VITALS — BODY MASS INDEX: 36.02 KG/M2 | WEIGHT: 230 LBS

## 2021-04-14 DIAGNOSIS — M16.12 PRIMARY OSTEOARTHRITIS OF ONE HIP, LEFT: ICD-10-CM

## 2021-04-14 DIAGNOSIS — M70.62 GREATER TROCHANTERIC BURSITIS OF LEFT HIP: Primary | ICD-10-CM

## 2021-04-14 PROCEDURE — 1160F RVW MEDS BY RX/DR IN RCRD: CPT | Performed by: ORTHOPAEDIC SURGERY

## 2021-04-14 PROCEDURE — 1036F TOBACCO NON-USER: CPT | Performed by: ORTHOPAEDIC SURGERY

## 2021-04-14 PROCEDURE — 99203 OFFICE O/P NEW LOW 30 MIN: CPT | Performed by: ORTHOPAEDIC SURGERY

## 2021-04-14 PROCEDURE — 20610 DRAIN/INJ JOINT/BURSA W/O US: CPT | Performed by: ORTHOPAEDIC SURGERY

## 2021-04-14 RX ORDER — LIDOCAINE HYDROCHLORIDE 5 MG/ML
1 INJECTION, SOLUTION INFILTRATION; PERINEURAL
Status: COMPLETED | OUTPATIENT
Start: 2021-04-14 | End: 2021-04-14

## 2021-04-14 RX ORDER — BUPIVACAINE HYDROCHLORIDE 2.5 MG/ML
1 INJECTION, SOLUTION INFILTRATION; PERINEURAL
Status: COMPLETED | OUTPATIENT
Start: 2021-04-14 | End: 2021-04-14

## 2021-04-14 RX ORDER — BETAMETHASONE SODIUM PHOSPHATE AND BETAMETHASONE ACETATE 3; 3 MG/ML; MG/ML
12 INJECTION, SUSPENSION INTRA-ARTICULAR; INTRALESIONAL; INTRAMUSCULAR; SOFT TISSUE
Status: COMPLETED | OUTPATIENT
Start: 2021-04-14 | End: 2021-04-14

## 2021-04-14 RX ADMIN — BUPIVACAINE HYDROCHLORIDE 1 ML: 2.5 INJECTION, SOLUTION INFILTRATION; PERINEURAL at 10:36

## 2021-04-14 RX ADMIN — LIDOCAINE HYDROCHLORIDE 1 ML: 5 INJECTION, SOLUTION INFILTRATION; PERINEURAL at 10:36

## 2021-04-14 RX ADMIN — BETAMETHASONE SODIUM PHOSPHATE AND BETAMETHASONE ACETATE 12 MG: 3; 3 INJECTION, SUSPENSION INTRA-ARTICULAR; INTRALESIONAL; INTRAMUSCULAR; SOFT TISSUE at 10:36

## 2021-04-14 NOTE — PROGRESS NOTES
Assessment/Plan:  1  Greater trochanteric bursitis of left hip  Ambulatory referral to Physical Therapy    Large joint arthrocentesis     Patient Active Problem List   Diagnosis    GERD (gastroesophageal reflux disease)    Obesity    Constipation    Hypercholesteremia    Postgastrectomy malabsorption    Type 2 diabetes mellitus with insulin therapy ([address] )    Benign essential hypertension    Benign enlargement of prostate    Pancreatic cyst    History of colon polyps    IPMN (intraductal papillary mucinous neoplasm)    ED (erectile dysfunction) of organic origin    Benign prostatic hyperplasia with urinary frequency    Bruxism    Prostate cancer (HCC)    Nausea and vomiting    Hyponatremia    Greater trochanteric bursitis of left hip       Discussion/Summary:    79 y o  male  With left hip  Traumatic greater trochanteric bursitis after fall on left hip 4 months ago  Does have moderate osteoarthritis on x-ray has some symptoms suggestive of arthritis or possible labral pathology in the hip although he does not have any pain in the groin on the left side  He received a cortisone injection over the left greater trochanter today  If he continues to have pain after 1-2 weeks he will call and let us know and we will send him for intra-articular left hip injection of cortisone  Otherwise he  Was referred to physical therapy to include Graston technique on the ITB band  He will follow up on as-needed basis if he gets good relief from today's injection and therapy alone  The patient was seen and examined by Dr Caty Hawkins and myself  The assessment and plan were formulated by Dr Caty Hawkins and I assisted in carrying it out  Subjective:   Patient ID: Deuce Raygoza is a 79 y o  male   HPI    Patient presents to the office complaining of bilateral hip pain, left worse than right  Symptoms began about 2 months ago after radiation of the prostate for his prostate cancer, pain got worse after that   About 5-6 months ago he also had a fall from a 5 foot ledge on the left side, just diagnosed with contusion after that  Pain is located lateral left hip  Pain is described as jabbing, intermittent  The pain does not radiate   The pain is 8-9/10 at worst, 3-4/10 at best  It is made worse by sitting for awhile and when he first gets up it is painful, does have some morning stiffness and aching as well, laying on the left side directly  It is made better by after walking for a little bit  So far the patient has tried salonpas with partial relief, ibuprofen with relief, no PT  The patient denies past episodes similar to this  The patient denies any numbness or tingling      The following portions of the patient's history were reviewed and updated as appropriate: allergies, current medications, past family history, past social history, past surgical history and problem list     Social History     Socioeconomic History    Marital status: Registered Domestic Partner     Spouse name: Not on file    Number of children: Not on file    Years of education: Not on file    Highest education level: Not on file   Occupational History    Not on file   Social Needs    Financial resource strain: Not on file    Food insecurity     Worry: Not on file     Inability: Not on file    Transportation needs     Medical: Not on file     Non-medical: Not on file   Tobacco Use    Smoking status: Former Smoker     Quit date: 11/10/2001     Years since quittin 4    Smokeless tobacco: Former User   Substance and Sexual Activity    Alcohol use: Not Currently    Drug use: Yes     Comment: RSO    Sexual activity: Yes   Lifestyle    Physical activity     Days per week: Not on file     Minutes per session: Not on file    Stress: Not on file   Relationships    Social connections     Talks on phone: Not on file     Gets together: Not on file     Attends [employer] witness service: Not on file     Active member of club or organization: Not on file Attends meetings of clubs or organizations: Not on file     Relationship status: Not on file    Intimate partner violence     Fear of current or ex partner: Not on file     Emotionally abused: Not on file     Physically abused: Not on file     Forced sexual activity: Not on file   Other Topics Concern    Not on file   Social History Narrative    Not on file     Past Medical History:   Diagnosis Date    Anemia     Arthritis     Black stool 7/24/2020    Diabetes mellitus ([facility] [address]     IDDM    Diverticulosis     Enlarged prostate     Erectile dysfunction     Hypercholesteremia     Resolved post weight loss    Hypertension     Resolved post weight loss    Kidney stone     Obesity     Prostate cancer ([facility] [address] )     Wears partial dentures     partial upper and lower     Past Surgical History:   Procedure Laterality Date    ABDOMINAL ADHESION SURGERY N/A 11/28/2016    Procedure: EXTENSIVE LYSIS ADHESIONS;  Surgeon: Raoul Mei MD;  [facility];  Service:     ANKLE FRACTURE SURGERY Left     CHOLECYSTECTOMY      COLON SURGERY      colon resection    COLONOSCOPY      COLOSTOMY      COLOSTOMY CLOSURE      DIAGNOSTIC LAPAROSCOPY      GASTRIC BYPASS      failed due to adhesions    HERNIA REPAIR      abdominal    SC BIOPSY OF PROSTATE,NEEDLE/PUNCH N/A 5/8/2020    Procedure: TRANSRECTAL NEEDLE BIOPSY PROSTATE (TRNBP) WITH TRANSRECTAL ULTRASOUND GUIDANCE;  Surgeon: Shelley Matos MD;  [facility];  Service: Urology    SC CYSTOSCOPY,DIR VIS INT URETHROTOMY N/A 5/8/2020    Procedure: DIRECT VISUAL INTERAL URETHROTOMY (DVIU), dilation of urethral stricture;  Surgeon: Shelley Matos MD;  [facility];  Service: Urology    [address] W/IMPLANT N/A 3/8/2019    Procedure: CYSTOSCOPY WITH INSERTION [place];  Surgeon: Shelley Matos MD;  [facility];  Service: Urology    SC CYSTOURETHRO W/IMPLANT N/A 5/8/2020    Procedure: CYSTOSCOPY WITH INSERTION Alisa Hammans;  Surgeon: Shreyas Sanz MD;  [facility];  Service: Urology    [place] [address] N/A 10/25/2018    Procedure: LINEAR ENDOSCOPIC U/S;  Surgeon: Katharina Butler MD;  [facility]; Service: Gastroenterology    [place] ESOPHAGOGASTRODUODENOSCOPY TRANSORAL DIAGNOSTIC N/A 7/6/2016    Procedure: EGD AND COLONOSCOPY;  Surgeon: Adriana Espinal MD;  [facility]; Service: Gastroenterology    [place] LAP, ANEESH RESTRICT PROC, LONGITUDINAL GASTRECTOMY N/A 11/28/2016    Procedure: GASTRECTOMY SLEEVE LAPAROSCOPIC;  Surgeon: Vera Hurt MD;  [facility];  Service: [address] [place] BIOPSY  5/8/2020     Allergies   Allergen Reactions    Enalapril Anaphylaxis and Throat Swelling     Current Outpatient Medications on File Prior to Visit   Medication Sig Dispense Refill    acetaminophen (TYLENOL) 325 mg tablet Take 2 tablets (650 mg total) by mouth every 4 (four) hours as needed for mild pain, headaches or fever  0    albuterol (PROAIR HFA) 90 mcg/act inhaler inhale 2 puff by inhalation route  every 4 - 6 hours as needed      atorvastatin (LIPITOR) 10 mg tablet TAKE ONE TABLET BY MOUTH EVERY DAY 90 tablet 3    Biotin 1000 MCG tablet Take 1 tablet by mouth daily in the early morning       Blood Glucose Monitoring Suppl (GLUCOCARD VITAL MONITOR) w/Device KIT by Does not apply route 2 (two) times a day 1 kit 0    calcium carbonate (OS-GILDA) 600 MG tablet Take 600 mg by mouth daily in the early morning       Cholecalciferol (VITAMIN D3) 2000 units capsule Take 1,000 Units by mouth daily in the early morning       Cyanocobalamin (VITAMIN B-12) 5000 MCG TBDP Take 5,000 mcg by mouth once a week Takes on Mondays      diazepam (VALIUM) 5 mg tablet Take 5mg tablet 30-60 mins prior to MRI  Repeat immediately prior if needed   2 tablet 0    docusate sodium (DULCOLAX) 100 mg capsule Take 100 mg by mouth daily as needed for constipation      glipiZIDE (GLUCOTROL XL) 5 mg 24 hr tablet TAKE ONE TABLET BY MOUTH EVERY DAY AFTER BREAKFAST 90 tablet 3    Insulin Pen Needle 32G X 4 MM MISC Use daily 100 each 5    Lancets (LIFESCAN UNISTIK 2) MISC Lifescan One Touch Ultramini Meter; use as directed; 1; 0; 31-Oct-2016; 31-Oct-2016; Melida Duran; Active      losartan (COZAAR) 25 mg tablet TAKE ONE TABLET BY MOUTH EVERY DAY 90 tablet 3    Mirabegron ER 50 MG TB24 Take 1 tablet (50 mg total) by mouth daily 30 tablet 3    Multiple Vitamin (MULTIVITAMIN) capsule Take 1 capsule by mouth daily in the early morning       NovoLOG Mix 70/30 FlexPen (70-30) 100 units/mL injection pen INJECT 20 UNITS UNDER THE SKIN EVERY 12 HOURS 15 mL 5    pantoprazole (PROTONIX) 40 mg tablet TAKE ONE TABLET BY MOUTH EVERY DAY 30 tablet 0    Semaglutide,0 25 or 0 5MG/DOS, (Ozempic, 0 25 or 0 5 MG/DOSE,) 2 MG/1 5ML SOPN Take 0 25 mg once a week for 2 weeks then take 0 5 mg once a week after 1 5 mL 0    tamsulosin (FLOMAX) 0 4 mg Take 1 capsule (0 4 mg total) by mouth daily with dinner 90 capsule 3     No current facility-administered medications on file prior to visit  Review of Systems   Constitutional: Negative for chills, fever and unexpected weight change  HENT: Negative for hearing loss, nosebleeds and sore throat  Eyes: Negative for pain, redness and visual disturbance  Respiratory: Negative for cough, shortness of breath and wheezing  Cardiovascular: Negative for chest pain, palpitations and leg swelling  Gastrointestinal: Negative for abdominal pain, nausea and vomiting  Endocrine: Negative for polydipsia and polyuria  Genitourinary: Negative for dysuria and hematuria  Musculoskeletal:          As noted in HPI   Skin: Negative for rash and wound  Neurological: Negative for dizziness, numbness and headaches  Psychiatric/Behavioral: Negative for decreased concentration, dysphoric mood and suicidal ideas   The patient is not nervous/anxious  Objective: There were no vitals filed for this visit  Physical Exam  Constitutional:       General: He is not in acute distress  Appearance: He is well-developed  HENT:      Head: Normocephalic and atraumatic  Eyes:      General: No scleral icterus  Conjunctiva/sclera: Conjunctivae normal    Neck:      Musculoskeletal: Neck supple  Trachea: No tracheal deviation  Cardiovascular:      Comments: No discernible arrhthymias   Pulmonary:      Effort: Pulmonary effort is normal  No respiratory distress  Skin:     General: Skin is warm and dry  Neurological:      Mental Status: He is alert  Psychiatric:         Behavior: Behavior normal          Left Hip Exam     Tenderness   The patient is experiencing tenderness in the greater trochanter  Range of Motion   Flexion:  100 abnormal   External rotation:  40 abnormal   Internal rotation: 10 abnormal     Muscle Strength   The patient has normal left hip strength  Tests   CT: negative    Other   Erythema: absent  Scars: absent  Sensation: normal  Pulse: present            I have personally reviewed pertinent films in PACS and my interpretation is   X-ray of the left hip from 03/18/2021 demonstrates mild-to-moderate degenerative changes of the bilateral hips with osteophyte formation and joint space loss seen  No acute fractures are seen       Large joint arthrocentesis: L greater trochanteric bursa  Universal Protocol:  Consent given by: patient  Time out: Immediately prior to procedure a "time out" was called to verify the correct patient, procedure, equipment, support staff and site/side marked as required    Site marked: the operative site was marked  Supporting Documentation  Indications: pain   Procedure Details  Location: hip - L greater trochanteric bursa  Preparation: Patient was prepped and draped in the usual sterile fashion  Needle size: 22 G  Medications administered: 1 mL bupivacaine 0 25 %; 1 mL lidocaine 0 5 %; 12 mg betamethasone acetate-betamethasone sodium phosphate 6 (3-3) mg/mL    Patient tolerance: patient tolerated the procedure well with no immediate complications  Dressing:  Sterile dressing applied            Portions of the record may have been created with voice recognition software  Occasional wrong word or "sound a like" substitutions may have occurred due to the inherent limitations of voice recognition software  Read the chart carefully and recognize, using context, where substitutions have occurred

## 2021-04-19 DIAGNOSIS — Z79.4 TYPE 2 DIABETES MELLITUS WITH INSULIN THERAPY (HCC): ICD-10-CM

## 2021-04-19 DIAGNOSIS — E11.9 TYPE 2 DIABETES MELLITUS WITH INSULIN THERAPY (HCC): ICD-10-CM

## 2021-04-19 RX ORDER — SEMAGLUTIDE 1.34 MG/ML
INJECTION, SOLUTION SUBCUTANEOUS
Qty: 4 PEN | Refills: 0 | Status: SHIPPED | OUTPATIENT
Start: 2021-04-19 | End: 2021-06-22

## 2021-05-10 DIAGNOSIS — R39.9 LOWER URINARY TRACT SYMPTOMS (LUTS): ICD-10-CM

## 2021-05-10 RX ORDER — MIRABEGRON 50 MG/1
TABLET, FILM COATED, EXTENDED RELEASE ORAL
Qty: 30 TABLET | Refills: 3 | Status: SHIPPED | OUTPATIENT
Start: 2021-05-10 | End: 2021-05-11

## 2021-05-11 DIAGNOSIS — R39.9 LOWER URINARY TRACT SYMPTOMS (LUTS): ICD-10-CM

## 2021-05-11 RX ORDER — MIRABEGRON 50 MG/1
TABLET, FILM COATED, EXTENDED RELEASE ORAL
Qty: 30 TABLET | Refills: 3 | Status: SHIPPED | OUTPATIENT
Start: 2021-05-11 | End: 2021-09-27

## 2021-05-12 DIAGNOSIS — R10.9 ABDOMINAL PAIN: ICD-10-CM

## 2021-05-12 RX ORDER — PANTOPRAZOLE SODIUM 40 MG/1
TABLET, DELAYED RELEASE ORAL
Qty: 30 TABLET | Refills: 0 | Status: SHIPPED | OUTPATIENT
Start: 2021-05-12 | End: 2021-06-22

## 2021-06-01 ENCOUNTER — APPOINTMENT (OUTPATIENT)
Dept: LAB | Facility: CLINIC | Age: 71
End: 2021-06-01
Payer: COMMERCIAL

## 2021-06-01 ENCOUNTER — TELEPHONE (OUTPATIENT)
Dept: UROLOGY | Facility: AMBULATORY SURGERY CENTER | Age: 71
End: 2021-06-01

## 2021-06-01 DIAGNOSIS — R39.9 LOWER URINARY TRACT SYMPTOMS (LUTS): ICD-10-CM

## 2021-06-01 DIAGNOSIS — R39.9 LOWER URINARY TRACT SYMPTOMS (LUTS): Primary | ICD-10-CM

## 2021-06-01 LAB
BACTERIA UR QL AUTO: ABNORMAL /HPF
BILIRUB UR QL STRIP: NEGATIVE
CLARITY UR: ABNORMAL
COLOR UR: ABNORMAL
GLUCOSE UR STRIP-MCNC: ABNORMAL MG/DL
HGB UR QL STRIP.AUTO: NEGATIVE
KETONES UR STRIP-MCNC: ABNORMAL MG/DL
LEUKOCYTE ESTERASE UR QL STRIP: ABNORMAL
NITRITE UR QL STRIP: POSITIVE
NON-SQ EPI CELLS URNS QL MICRO: ABNORMAL /HPF
PH UR STRIP.AUTO: 5 [PH]
PROT UR STRIP-MCNC: >=300 MG/DL
RBC #/AREA URNS AUTO: ABNORMAL /HPF
SP GR UR STRIP.AUTO: 1.02 (ref 1–1.03)
UROBILINOGEN UR QL STRIP.AUTO: >=8 E.U./DL
WBC #/AREA URNS AUTO: ABNORMAL /HPF

## 2021-06-01 PROCEDURE — 81001 URINALYSIS AUTO W/SCOPE: CPT

## 2021-06-01 PROCEDURE — 87086 URINE CULTURE/COLONY COUNT: CPT

## 2021-06-01 NOTE — TELEPHONE ENCOUNTER
Patient managed by Dr Kristie Figueroa patient called in stating he is experiencing frequent urination and a lot of burning when urinating  Patient also stated he is having pain on his left side   Patient can be reached at [phone]

## 2021-06-01 NOTE — TELEPHONE ENCOUNTER
Returned patient's phone call to advise him to go to the lab , I placed ordering for him to give a culture   Patient asked if he would get antibioitics   I informed him we will wait to see if the urine sample shows any infection   Advised him in the mean time to drink a lot of water

## 2021-06-02 DIAGNOSIS — C61 PROSTATE CANCER (HCC): Primary | ICD-10-CM

## 2021-06-02 DIAGNOSIS — R39.9 LOWER URINARY TRACT SYMPTOMS (LUTS): ICD-10-CM

## 2021-06-02 DIAGNOSIS — N30.00 ACUTE CYSTITIS WITHOUT HEMATURIA: ICD-10-CM

## 2021-06-02 LAB — BACTERIA UR CULT: NORMAL

## 2021-06-02 RX ORDER — SULFAMETHOXAZOLE AND TRIMETHOPRIM 800; 160 MG/1; MG/1
1 TABLET ORAL 2 TIMES DAILY
Qty: 10 TABLET | Refills: 0 | Status: SHIPPED | OUTPATIENT
Start: 2021-06-02 | End: 2021-06-07

## 2021-06-02 NOTE — TELEPHONE ENCOUNTER
Patient called for urine test results  I advised patient that his urine culture is not back yet and can take up to 72 hours  I advised that his UA is back and I will have clinical reach out regarding that result  Patient advised that he needs antibiotics because he is having a lot of pain  Please advise

## 2021-06-02 NOTE — TELEPHONE ENCOUNTER
Called and left message (unable to leave details as communication consent did not have voicemail checked off)  When patient returns call please advise UA indicative of infection  Bactrim sent to preferred pharmacy  We will call back if antibiotic needs to be changed based on final culture results

## 2021-06-16 ENCOUNTER — APPOINTMENT (OUTPATIENT)
Dept: LAB | Facility: CLINIC | Age: 71
End: 2021-06-16
Payer: COMMERCIAL

## 2021-06-16 DIAGNOSIS — C61 PROSTATE CANCER (HCC): ICD-10-CM

## 2021-06-16 LAB — PSA SERPL-MCNC: <0.1 NG/ML (ref 0–4)

## 2021-06-16 PROCEDURE — G0103 PSA SCREENING: HCPCS

## 2021-06-16 PROCEDURE — 36415 COLL VENOUS BLD VENIPUNCTURE: CPT

## 2021-06-22 ENCOUNTER — OFFICE VISIT (OUTPATIENT)
Dept: FAMILY MEDICINE CLINIC | Facility: CLINIC | Age: 71
End: 2021-06-22
Payer: COMMERCIAL

## 2021-06-22 VITALS
HEART RATE: 55 BPM | HEIGHT: 67 IN | DIASTOLIC BLOOD PRESSURE: 68 MMHG | TEMPERATURE: 97.5 F | OXYGEN SATURATION: 95 % | RESPIRATION RATE: 16 BRPM | WEIGHT: 225 LBS | SYSTOLIC BLOOD PRESSURE: 134 MMHG | BODY MASS INDEX: 35.31 KG/M2

## 2021-06-22 DIAGNOSIS — Z90.3 POSTGASTRECTOMY MALABSORPTION: ICD-10-CM

## 2021-06-22 DIAGNOSIS — K21.9 GASTROESOPHAGEAL REFLUX DISEASE, UNSPECIFIED WHETHER ESOPHAGITIS PRESENT: ICD-10-CM

## 2021-06-22 DIAGNOSIS — R35.0 BENIGN PROSTATIC HYPERPLASIA WITH URINARY FREQUENCY: ICD-10-CM

## 2021-06-22 DIAGNOSIS — Z79.4 TYPE 2 DIABETES MELLITUS WITH INSULIN THERAPY (HCC): Primary | ICD-10-CM

## 2021-06-22 DIAGNOSIS — E78.00 HYPERCHOLESTEREMIA: ICD-10-CM

## 2021-06-22 DIAGNOSIS — N40.1 BENIGN PROSTATIC HYPERPLASIA WITH URINARY FREQUENCY: ICD-10-CM

## 2021-06-22 DIAGNOSIS — E11.9 TYPE 2 DIABETES MELLITUS WITH INSULIN THERAPY (HCC): Primary | ICD-10-CM

## 2021-06-22 DIAGNOSIS — K91.2 POSTGASTRECTOMY MALABSORPTION: ICD-10-CM

## 2021-06-22 DIAGNOSIS — I10 BENIGN ESSENTIAL HYPERTENSION: ICD-10-CM

## 2021-06-22 DIAGNOSIS — E66.01 CLASS 2 SEVERE OBESITY DUE TO EXCESS CALORIES WITH SERIOUS COMORBIDITY AND BODY MASS INDEX (BMI) OF 35.0 TO 35.9 IN ADULT (HCC): ICD-10-CM

## 2021-06-22 DIAGNOSIS — R10.9 ABDOMINAL PAIN: ICD-10-CM

## 2021-06-22 PROBLEM — E87.1 HYPONATREMIA: Status: RESOLVED | Noted: 2020-07-25 | Resolved: 2021-06-22

## 2021-06-22 PROCEDURE — 1160F RVW MEDS BY RX/DR IN RCRD: CPT | Performed by: FAMILY MEDICINE

## 2021-06-22 PROCEDURE — 99214 OFFICE O/P EST MOD 30 MIN: CPT | Performed by: FAMILY MEDICINE

## 2021-06-22 RX ORDER — PANTOPRAZOLE SODIUM 40 MG/1
TABLET, DELAYED RELEASE ORAL
Qty: 30 TABLET | Refills: 0 | Status: SHIPPED | OUTPATIENT
Start: 2021-06-22 | End: 2021-07-18

## 2021-06-22 RX ORDER — LIRAGLUTIDE 6 MG/ML
1.8 INJECTION SUBCUTANEOUS DAILY
Qty: 27 ML | Refills: 0 | Status: SHIPPED | OUTPATIENT
Start: 2021-06-22 | End: 2021-10-06 | Stop reason: DRUGHIGH

## 2021-06-22 RX ORDER — LIRAGLUTIDE 6 MG/ML
INJECTION SUBCUTANEOUS
COMMUNITY
Start: 2021-06-22 | End: 2021-06-22 | Stop reason: SDUPTHER

## 2021-06-22 NOTE — PROGRESS NOTES
Assessment/Plan:    No problem-specific Assessment & Plan notes found for this encounter  Diagnoses and all orders for this visit:    Type 2 diabetes mellitus with insulin therapy ([facility] [address] )  -     Hemoglobin A1C  -     Comprehensive metabolic panel  -     Victoza injection; Inject 0 3 mL (1 8 mg total) under the skin daily    Postgastrectomy malabsorption    Benign essential hypertension  -     Comprehensive metabolic panel    Gastroesophageal reflux disease, unspecified whether esophagitis present  Comments:  pantoprazole is helping     Benign prostatic hyperplasia with urinary frequency  Comments:  stable  care per urologist     Hypercholesteremia  Comments:  lipitor as directed     Class 2 severe obesity due to excess calories with serious comorbidity and body mass index (BMI) of 35 0 to 35 9 in [facility])  Comments:  diet and exercise encouraged     Other orders  -     Discontinue: Victoza injection        BMI Counseling: Body mass index is 35 24 kg/m²  The BMI is above normal  Nutrition recommendations include decreasing portion sizes and encouraging healthy choices of fruits and vegetables  Exercise recommendations include moderate physical activity 150 minutes/week  No pharmacotherapy was ordered  Subjective:      Patient ID: Candis Chan is a 70 y o  male  Here for follow up  Continued constipation where he goes every 2 days   Lost 5 pounds since last visit since he is eating less carb      Diabetes  He presents for his follow-up diabetic visit  He has type 2 diabetes mellitus  His disease course has been stable  There are no hypoglycemic associated symptoms  There are no diabetic associated symptoms  There are no hypoglycemic complications  Symptoms are stable  There are no diabetic complications  Risk factors for coronary artery disease include obesity and male sex  He is compliant with treatment all of the time  He is following a diabetic diet  He participates in exercise intermittently   There is no change in his home blood glucose trend  His overall blood glucose range is 110-130 mg/dl  The following portions of the patient's history were reviewed and updated as appropriate: allergies, current medications, past family history, past medical history, past social history, past surgical history and problem list     Review of Systems   Constitutional: Negative  HENT: Negative  Respiratory: Negative  Cardiovascular: Negative  Gastrointestinal: Positive for constipation  Endocrine: Negative  Genitourinary: Negative  Musculoskeletal: Negative  Allergic/Immunologic: Negative  Neurological: Negative  Hematological: Negative  Psychiatric/Behavioral: Negative  Objective:      /68 (BP Location: Left arm, Patient Position: Sitting, Cuff Size: Large)   Pulse 55   Temp 97 5 °F (36 4 °C) (Skin)   Resp 16   Ht 5' 7" (1 702 m)   Wt 102 kg (225 lb)   SpO2 95%   BMI 35 24 kg/m²          Physical Exam  Constitutional:       Appearance: Normal appearance  Cardiovascular:      Rate and Rhythm: Normal rate and regular rhythm  Pulses: Normal pulses  no weak pulses          Dorsalis pedis pulses are 2+ on the right side and 2+ on the left side  Posterior tibial pulses are 2+ on the right side and 2+ on the left side  Heart sounds: Normal heart sounds  Feet:      Right foot:      Skin integrity: No ulcer, skin breakdown, erythema, warmth, callus or dry skin  Left foot:      Skin integrity: No ulcer, skin breakdown, erythema, warmth, callus or dry skin  Skin:     Capillary Refill: Capillary refill takes less than 2 seconds  Neurological:      General: No focal deficit present  Mental Status: He is alert  Psychiatric:         Mood and Affect: Mood normal          Behavior: Behavior normal        Patient's shoes and socks removed  Right Foot/Ankle   Right Foot Inspection  Skin Exam: skin normal and skin intact no dry skin, no warmth, no callus, no erythema, no maceration, no abnormal color, no pre-ulcer, no ulcer and no callus                          Toe Exam: ROM and strength within normal limits  Sensory   Vibration: intact  Proprioception: intact   Monofilament testing: intact  Vascular  Capillary refills: < 3 seconds  The right DP pulse is 2+  The right PT pulse is 2+  Left Foot/Ankle  Left Foot Inspection  Skin Exam: skin normal and skin intactno dry skin, no warmth, no erythema, no maceration, normal color, no pre-ulcer, no ulcer and no callus                         Toe Exam: ROM and strength within normal limits                   Sensory   Vibration: intact  Proprioception: intact  Monofilament: intact  Vascular  Capillary refills: < 3 seconds  The left DP pulse is 2+  The left PT pulse is 2+  Assign Risk Category:  No deformity present; No loss of protective sensation;  No weak pulses       Risk: 0

## 2021-06-28 ENCOUNTER — APPOINTMENT (EMERGENCY)
Dept: RADIOLOGY | Facility: HOSPITAL | Age: 71
End: 2021-06-28
Payer: COMMERCIAL

## 2021-06-28 ENCOUNTER — HOSPITAL ENCOUNTER (OUTPATIENT)
Facility: HOSPITAL | Age: 71
Setting detail: OBSERVATION
Discharge: HOME | End: 2021-06-29
Attending: EMERGENCY MEDICINE | Admitting: INTERNAL MEDICINE
Payer: COMMERCIAL

## 2021-06-28 DIAGNOSIS — K52.9 ACUTE GASTROENTERITIS: Primary | ICD-10-CM

## 2021-06-28 DIAGNOSIS — R53.1 GENERALIZED WEAKNESS: ICD-10-CM

## 2021-06-28 DIAGNOSIS — R26.2 AMBULATORY DYSFUNCTION: ICD-10-CM

## 2021-06-28 PROBLEM — R07.9 CHEST PAIN: Status: ACTIVE | Noted: 2021-06-28

## 2021-06-28 LAB
ALBUMIN SERPL BCP-MCNC: 3.3 G/DL (ref 3.5–5)
ALP SERPL-CCNC: 112 U/L (ref 46–116)
ALT SERPL W P-5'-P-CCNC: 29 U/L (ref 12–78)
ANION GAP SERPL CALCULATED.3IONS-SCNC: 12 MMOL/L (ref 4–13)
AST SERPL W P-5'-P-CCNC: 26 U/L (ref 5–45)
BASOPHILS # BLD AUTO: 0.05 THOUSANDS/ΜL (ref 0–0.1)
BASOPHILS NFR BLD AUTO: 1 % (ref 0–1)
BILIRUB SERPL-MCNC: 0.45 MG/DL (ref 0.2–1)
BUN SERPL-MCNC: 19 MG/DL (ref 5–25)
CALCIUM ALBUM COR SERPL-MCNC: 9.2 MG/DL (ref 8.3–10.1)
CALCIUM SERPL-MCNC: 8.6 MG/DL (ref 8.3–10.1)
CHLORIDE SERPL-SCNC: 104 MMOL/L (ref 100–108)
CO2 SERPL-SCNC: 22 MMOL/L (ref 21–32)
CREAT SERPL-MCNC: 0.99 MG/DL (ref 0.6–1.3)
EOSINOPHIL # BLD AUTO: 0.04 THOUSAND/ΜL (ref 0–0.61)
EOSINOPHIL NFR BLD AUTO: 0 % (ref 0–6)
ERYTHROCYTE [DISTWIDTH] IN BLOOD BY AUTOMATED COUNT: 13.1 % (ref 11.6–15.1)
EST. AVERAGE GLUCOSE BLD GHB EST-MCNC: 166 MG/DL
GFR SERPL CREATININE-BSD FRML MDRD: 76 ML/MIN/1.73SQ M
GLUCOSE SERPL-MCNC: 213 MG/DL (ref 65–140)
GLUCOSE SERPL-MCNC: 220 MG/DL (ref 65–140)
GLUCOSE SERPL-MCNC: 250 MG/DL (ref 65–140)
HBA1C MFR BLD: 7.4 %
HCT VFR BLD AUTO: 39 % (ref 36.5–49.3)
HGB BLD-MCNC: 12.7 G/DL (ref 12–17)
IMM GRANULOCYTES # BLD AUTO: 0.09 THOUSAND/UL (ref 0–0.2)
IMM GRANULOCYTES NFR BLD AUTO: 1 % (ref 0–2)
LIPASE SERPL-CCNC: 121 U/L (ref 73–393)
LYMPHOCYTES # BLD AUTO: 0.44 THOUSANDS/ΜL (ref 0.6–4.47)
LYMPHOCYTES NFR BLD AUTO: 5 % (ref 14–44)
MCH RBC QN AUTO: 30.7 PG (ref 26.8–34.3)
MCHC RBC AUTO-ENTMCNC: 32.6 G/DL (ref 31.4–37.4)
MCV RBC AUTO: 94 FL (ref 82–98)
MONOCYTES # BLD AUTO: 0.69 THOUSAND/ΜL (ref 0.17–1.22)
MONOCYTES NFR BLD AUTO: 7 % (ref 4–12)
NEUTROPHILS # BLD AUTO: 8.21 THOUSANDS/ΜL (ref 1.85–7.62)
NEUTS SEG NFR BLD AUTO: 86 % (ref 43–75)
NRBC BLD AUTO-RTO: 0 /100 WBCS
PLATELET # BLD AUTO: 152 THOUSANDS/UL (ref 149–390)
PMV BLD AUTO: 12.3 FL (ref 8.9–12.7)
POTASSIUM SERPL-SCNC: 4.4 MMOL/L (ref 3.5–5.3)
PROT SERPL-MCNC: 7.3 G/DL (ref 6.4–8.2)
RBC # BLD AUTO: 4.14 MILLION/UL (ref 3.88–5.62)
SODIUM SERPL-SCNC: 138 MMOL/L (ref 136–145)
TROPONIN I SERPL-MCNC: <0.02 NG/ML
WBC # BLD AUTO: 9.52 THOUSAND/UL (ref 4.31–10.16)

## 2021-06-28 PROCEDURE — C9113 INJ PANTOPRAZOLE SODIUM, VIA: HCPCS | Performed by: PHYSICIAN ASSISTANT

## 2021-06-28 PROCEDURE — 96375 TX/PRO/DX INJ NEW DRUG ADDON: CPT

## 2021-06-28 PROCEDURE — 3051F HG A1C>EQUAL 7.0%<8.0%: CPT | Performed by: PHYSICIAN ASSISTANT

## 2021-06-28 PROCEDURE — 83036 HEMOGLOBIN GLYCOSYLATED A1C: CPT | Performed by: PHYSICIAN ASSISTANT

## 2021-06-28 PROCEDURE — 99285 EMERGENCY DEPT VISIT HI MDM: CPT | Performed by: EMERGENCY MEDICINE

## 2021-06-28 PROCEDURE — 82948 REAGENT STRIP/BLOOD GLUCOSE: CPT

## 2021-06-28 PROCEDURE — 84484 ASSAY OF TROPONIN QUANT: CPT | Performed by: EMERGENCY MEDICINE

## 2021-06-28 PROCEDURE — 84484 ASSAY OF TROPONIN QUANT: CPT | Performed by: PHYSICIAN ASSISTANT

## 2021-06-28 PROCEDURE — 96361 HYDRATE IV INFUSION ADD-ON: CPT

## 2021-06-28 PROCEDURE — 96374 THER/PROPH/DIAG INJ IV PUSH: CPT

## 2021-06-28 PROCEDURE — 99285 EMERGENCY DEPT VISIT HI MDM: CPT

## 2021-06-28 PROCEDURE — 99220 PR INITIAL OBSERVATION CARE/DAY 70 MINUTES: CPT | Performed by: INTERNAL MEDICINE

## 2021-06-28 PROCEDURE — 80053 COMPREHEN METABOLIC PANEL: CPT | Performed by: EMERGENCY MEDICINE

## 2021-06-28 PROCEDURE — 83690 ASSAY OF LIPASE: CPT | Performed by: EMERGENCY MEDICINE

## 2021-06-28 PROCEDURE — 93005 ELECTROCARDIOGRAM TRACING: CPT

## 2021-06-28 PROCEDURE — 36415 COLL VENOUS BLD VENIPUNCTURE: CPT | Performed by: EMERGENCY MEDICINE

## 2021-06-28 PROCEDURE — 71046 X-RAY EXAM CHEST 2 VIEWS: CPT

## 2021-06-28 PROCEDURE — 85025 COMPLETE CBC W/AUTO DIFF WBC: CPT | Performed by: EMERGENCY MEDICINE

## 2021-06-28 RX ORDER — MORPHINE SULFATE 4 MG/ML
4 INJECTION, SOLUTION INTRAMUSCULAR; INTRAVENOUS ONCE
Status: COMPLETED | OUTPATIENT
Start: 2021-06-28 | End: 2021-06-28

## 2021-06-28 RX ORDER — OXYBUTYNIN CHLORIDE 5 MG/1
10 TABLET, EXTENDED RELEASE ORAL
Status: DISCONTINUED | OUTPATIENT
Start: 2021-06-28 | End: 2021-06-29 | Stop reason: HOSPADM

## 2021-06-28 RX ORDER — ATORVASTATIN CALCIUM 10 MG/1
10 TABLET, FILM COATED ORAL DAILY
Status: DISCONTINUED | OUTPATIENT
Start: 2021-06-29 | End: 2021-06-29 | Stop reason: HOSPADM

## 2021-06-28 RX ORDER — TAMSULOSIN HYDROCHLORIDE 0.4 MG/1
0.4 CAPSULE ORAL
Status: DISCONTINUED | OUTPATIENT
Start: 2021-06-28 | End: 2021-06-29 | Stop reason: HOSPADM

## 2021-06-28 RX ORDER — MAGNESIUM HYDROXIDE/ALUMINUM HYDROXICE/SIMETHICONE 120; 1200; 1200 MG/30ML; MG/30ML; MG/30ML
30 SUSPENSION ORAL EVERY 6 HOURS PRN
Status: DISCONTINUED | OUTPATIENT
Start: 2021-06-28 | End: 2021-06-29 | Stop reason: HOSPADM

## 2021-06-28 RX ORDER — ONDANSETRON 2 MG/ML
4 INJECTION INTRAMUSCULAR; INTRAVENOUS ONCE
Status: COMPLETED | OUTPATIENT
Start: 2021-06-28 | End: 2021-06-28

## 2021-06-28 RX ORDER — MELATONIN
1000 DAILY
Status: DISCONTINUED | OUTPATIENT
Start: 2021-06-29 | End: 2021-06-29 | Stop reason: HOSPADM

## 2021-06-28 RX ORDER — SODIUM CHLORIDE 9 MG/ML
125 INJECTION, SOLUTION INTRAVENOUS CONTINUOUS
Status: DISCONTINUED | OUTPATIENT
Start: 2021-06-28 | End: 2021-06-29 | Stop reason: HOSPADM

## 2021-06-28 RX ORDER — INSULIN ASPART 100 [IU]/ML
20 INJECTION, SUSPENSION SUBCUTANEOUS
Status: DISCONTINUED | OUTPATIENT
Start: 2021-06-28 | End: 2021-06-29 | Stop reason: HOSPADM

## 2021-06-28 RX ORDER — LOSARTAN POTASSIUM 25 MG/1
25 TABLET ORAL DAILY
Status: DISCONTINUED | OUTPATIENT
Start: 2021-06-29 | End: 2021-06-29 | Stop reason: HOSPADM

## 2021-06-28 RX ORDER — ALBUTEROL SULFATE 90 UG/1
2 AEROSOL, METERED RESPIRATORY (INHALATION) EVERY 6 HOURS PRN
Status: DISCONTINUED | OUTPATIENT
Start: 2021-06-28 | End: 2021-06-29 | Stop reason: HOSPADM

## 2021-06-28 RX ORDER — CALCIUM CARBONATE 500(1250)
1 TABLET ORAL
Status: DISCONTINUED | OUTPATIENT
Start: 2021-06-29 | End: 2021-06-29 | Stop reason: HOSPADM

## 2021-06-28 RX ORDER — ACETAMINOPHEN 325 MG/1
650 TABLET ORAL EVERY 4 HOURS PRN
Status: DISCONTINUED | OUTPATIENT
Start: 2021-06-28 | End: 2021-06-29 | Stop reason: HOSPADM

## 2021-06-28 RX ORDER — ONDANSETRON 2 MG/ML
4 INJECTION INTRAMUSCULAR; INTRAVENOUS EVERY 6 HOURS PRN
Status: DISCONTINUED | OUTPATIENT
Start: 2021-06-28 | End: 2021-06-29 | Stop reason: HOSPADM

## 2021-06-28 RX ORDER — DICYCLOMINE HYDROCHLORIDE 10 MG/1
10 CAPSULE ORAL 4 TIMES DAILY PRN
Status: DISCONTINUED | OUTPATIENT
Start: 2021-06-28 | End: 2021-06-29 | Stop reason: HOSPADM

## 2021-06-28 RX ORDER — PANTOPRAZOLE SODIUM 40 MG/1
40 INJECTION, POWDER, FOR SOLUTION INTRAVENOUS EVERY 12 HOURS SCHEDULED
Status: DISCONTINUED | OUTPATIENT
Start: 2021-06-28 | End: 2021-06-29 | Stop reason: HOSPADM

## 2021-06-28 RX ADMIN — ONDANSETRON 4 MG: 2 INJECTION INTRAMUSCULAR; INTRAVENOUS at 12:58

## 2021-06-28 RX ADMIN — INSULIN LISPRO 2 UNITS: 100 INJECTION, SOLUTION INTRAVENOUS; SUBCUTANEOUS at 22:30

## 2021-06-28 RX ADMIN — PANTOPRAZOLE SODIUM 40 MG: 40 INJECTION, POWDER, FOR SOLUTION INTRAVENOUS at 22:29

## 2021-06-28 RX ADMIN — SODIUM CHLORIDE 125 ML/HR: 0.9 INJECTION, SOLUTION INTRAVENOUS at 17:26

## 2021-06-28 RX ADMIN — DICYCLOMINE HYDROCHLORIDE 10 MG: 10 CAPSULE ORAL at 22:24

## 2021-06-28 RX ADMIN — MORPHINE SULFATE 4 MG: 4 INJECTION INTRAVENOUS at 14:55

## 2021-06-28 RX ADMIN — MORPHINE SULFATE 2 MG: 2 INJECTION, SOLUTION INTRAMUSCULAR; INTRAVENOUS at 13:48

## 2021-06-28 RX ADMIN — ONDANSETRON 4 MG: 2 INJECTION INTRAMUSCULAR; INTRAVENOUS at 22:24

## 2021-06-28 RX ADMIN — INSULIN ASPART 5 UNITS: 100 INJECTION, SUSPENSION SUBCUTANEOUS at 17:56

## 2021-06-28 RX ADMIN — ONDANSETRON 4 MG: 2 INJECTION INTRAMUSCULAR; INTRAVENOUS at 17:26

## 2021-06-28 RX ADMIN — SODIUM CHLORIDE 1000 ML: 0.9 INJECTION, SOLUTION INTRAVENOUS at 12:58

## 2021-06-28 NOTE — ED NOTES
Pt sleeping comfortably on stretcher w/ lights out  Denies any other needs or complaints at this time        Junaid Norwood RN  06/28/21 [id number]

## 2021-06-28 NOTE — H&P
[facility]  H&P- Tammi Locks 1950, 70 y o  male MRN: [id number]  Unit/Bed#: ED 27 Encounter: [id number]  Primary Care Provider: Felicitas Armendariz MD   Date and time admitted to hospital: 6/28/2021 12:42 PM    * Gastroenteritis  Assessment & Plan  · POA, suspect food borne given occurred after eating grilled pork and pasta salad with his wife experiencing mild symptoms as well  Abdomen soft  Multiple episodes of vomiting and diarrhea without blood  History of gastrectomy   · Admit patient to med/surg under observation status   · Check Stool PCR, C  Diff, O/P  · Protonix 40 mg IV BID   · Mylanta PRN  · Bentyl PRN   · IVF  · If stool infectious work up negative can start antidiarrheals   · Clear liquid diet     Chest pain  Assessment & Plan  · Patient reported chest pain today, likely due to N/V  Initial troponin and EKG negative   · Trend troponin     GERD (gastroesophageal reflux disease)  Assessment & Plan  · Noted that patient on PPI outpatient  · Increased to BID for now     Prostate cancer [facility])  Assessment & Plan  · Not currently receiving active treatment  Has outpatient follow up planned   · Outpatient follow up with Urology     Type 2 diabetes mellitus with hyperglycemia, with long-term current use of insulin ([facility] )  Assessment & Plan  Lab Results   Component Value Date    HGBA1C 7 4 (H) 03/18/2021       No results for input(s): POCGLU in the last 72 hours  Blood Sugar Average: Last 72 hrs:  · Prior A1c controlled, hyperglycemic on admission   · Continue 70/30 20 U BID AC   · SSI Algorithm with meals and bedtime   · QID Accu-Checks   · Hold Glipizide and Victoza     Benign essential hypertension  Assessment & Plan  · BP acceptable   · Continue Losartan       VTE Pharmacologic Prophylaxis: VTE Score: 3 Moderate Risk (Score 3-4) - Pharmacological DVT Prophylaxis Ordered: enoxaparin (Lovenox)    Code Status: Full Code   Discussion with family: Updated  (wife) at bedside  Anticipated Length of Stay: Patient will be admitted on an observation basis with an anticipated length of stay of less than 2 midnights secondary to as per above assessment and plan   Total Time for Visit, including Counseling / Coordination of Care: 70 minutes Greater than 50% of this total time spent on direct patient counseling and coordination of care  Chief Complaint: Abdominal Pain, Nausea, Vomiting     History of Present Illness:  David Ford is a 70 y o  male with a PMH of gastrectomy, T2DM on insulin, HTN, and HLD who presents with abdominal pain, nausea, vomiting, and diarrhea  The patient reports that his symptoms started last evening  His wife notes that they ate some grilled pork and some pasta salad and she had had mild nausea while the table, but her symptoms resolved  The patient reports that he has been up all night with vomiting and diarrhea  He reports 10-20 episodes of both that were non-bloody  He reports generalized cramping abdominal pain and intermittent hot and cold sweats  He reports starting with chest pain and shortness of breath today  He reported some dizziness, but denies LOC or HA  Denies recent long distance travel  Denies anyone else having symptoms like him and his wife  He reports history of gastrectomy in the past  Reports history of prostate cancer without active treatment currently  He denies history of stenting, bypass, or smoking history  Review of Systems:  Review of Systems   Constitutional: Positive for appetite change, chills and diaphoresis  Negative for fatigue and fever  HENT: Negative for congestion, rhinorrhea and sore throat  Eyes: Negative for visual disturbance  Respiratory: Positive for shortness of breath  Negative for cough, chest tightness and wheezing  Cardiovascular: Positive for chest pain  Negative for palpitations and leg swelling  Gastrointestinal: Positive for abdominal pain, diarrhea, nausea and vomiting   Negative for blood in stool and constipation  Genitourinary: Positive for dysuria  Negative for hematuria  Musculoskeletal: Positive for back pain  Negative for arthralgias and myalgias  Neurological: Negative for dizziness, syncope, weakness, light-headedness, numbness and headaches  All other systems reviewed and are negative        Past Medical and Surgical History:   Past Medical History:   Diagnosis Date    Anemia     Arthritis     Black stool 7/24/2020    Diabetes mellitus ([facility] )     IDDM    Diverticulosis     Enlarged prostate     Erectile dysfunction     Hypercholesteremia     Resolved post weight loss    Hypertension     Resolved post weight loss    Kidney stone     Obesity     Prostate cancer ([facility] )     Wears partial dentures     partial upper and lower       Past Surgical History:   Procedure Laterality Date    ABDOMINAL ADHESION SURGERY N/A 11/28/2016    Procedure: EXTENSIVE LYSIS ADHESIONS;  Surgeon: Aaron Chapman MD;  [facility];  Service:    Janee Griffes FRACTURE SURGERY Left     CHOLECYSTECTOMY      COLON SURGERY      colon resection    COLONOSCOPY      COLOSTOMY      COLOSTOMY CLOSURE      DIAGNOSTIC LAPAROSCOPY      GASTRIC BYPASS      failed due to adhesions    HERNIA REPAIR      abdominal    OK BIOPSY OF PROSTATE,NEEDLE/PUNCH N/A 5/8/2020    Procedure: TRANSRECTAL NEEDLE BIOPSY PROSTATE (TRNBP) WITH TRANSRECTAL ULTRASOUND GUIDANCE;  Surgeon: Rachel Oconnell MD;  [facility];  Service: Urology    OK CYSTOSCOPY,DIR VIS INT URETHROTOMY N/A 5/8/2020    Procedure: DIRECT VISUAL INTERAL URETHROTOMY (DVIU), dilation of urethral stricture;  Surgeon: Rachel Oconnell MD;  [facility];  Service: Urology    OK CYSTOURETHRO W/IMPLANT N/A 3/8/2019    Procedure: CYSTOSCOPY WITH INSERTION Jenna Carmen;  Surgeon: Rachel Oconnell MD;  [facility];  Service: Urology    OK CYSTOURETHRO W/IMPLANT N/A 5/8/2020    Procedure: CYSTOSCOPY WITH INSERTION Dax Joel;  Surgeon: Tammi Lemus MD;  [facility];  Service: Urology    LA [address] N/A 10/25/2018    Procedure: LINEAR ENDOSCOPIC U/S;  Surgeon: Justice Vu MD;  [facility]: [facility]; Service: Gastroenterology    LA ESOPHAGOGASTRODUODENOSCOPY TRANSORAL DIAGNOSTIC N/A 7/6/2016    Procedure: EGD AND COLONOSCOPY;  Surgeon: Precious Boateng MD;  [facility]; Service: Gastroenterology    LA LAP, ANEESH RESTRICT PROC, LONGITUDINAL GASTRECTOMY N/A 11/28/2016    Procedure: GASTRECTOMY SLEEVE LAPAROSCOPIC;  Surgeon: Angel Rome MD;  [facility];  Service: Bariatrics    TONSILLECTOMY      US GUIDED PROSTATE BIOPSY  5/8/2020       Meds/Allergies:  Prior to Admission medications    Medication Sig Start Date End Date Taking? Authorizing Provider   acetaminophen (TYLENOL) 325 mg tablet Take 2 tablets (650 mg total) by mouth every 4 (four) hours as needed for mild pain, headaches or fever 12/14/20   Linwood Bueno PA-C   albuterol [facility]FA) 90 mcg/act inhaler inhale 2 puff by inhalation route  every 4 - 6 hours as needed 11/13/15   Historical Provider, MD   atorvastatin (LIPITOR) 10 mg tablet TAKE ONE TABLET BY MOUTH EVERY DAY 1/7/21   Melida Duran MD   Biotin 1000 MCG tablet Take 1 tablet by mouth daily in the early morning     Historical Provider, MD   Blood Glucose Monitoring Suppl ([address] [place] [address]) w/Device KIT by Does not apply route 2 (two) times a day 5/14/19   Jace Sandy MD   calcium carbonate (OS-GILDA) 600 MG tablet Take 600 mg by mouth daily in the early morning     Historical Provider, MD   Cholecalciferol (VITAMIN D3) 2000 units capsule Take 1,000 Units by mouth daily in the early morning     Historical Provider, MD   Cyanocobalamin (VITAMIN B-12) 5000 MCG TBDP Take 5,000 mcg by mouth once a week Takes on Mondays    Historical Provider, MD   diazepam (VALIUM) 5 mg tablet Take 5mg tablet 30-60 mins prior to MRI    Repeat immediately prior if needed  Patient not taking: Reported on 6/28/2021 11/27/20   Miki Andino MD   docusate sodium (DULCOLAX) 100 mg capsule Take 100 mg by mouth daily as needed for constipation    Historical Provider, MD   glipiZIDE (GLUCOTROL XL) 5 mg 24 hr tablet TAKE ONE TABLET BY MOUTH EVERY DAY AFTER BREAKFAST 2/5/21   Melida Duran MD   Insulin Pen Needle 32G X 4 MM MISC Use daily 1/12/21   Martell Mays MD   Lancets (Felipe Diver 2) MISC [employer]can One Touch Ultramini Meter; use as directed; 1; 0; 31-Oct-2016; 31-Oct-2016; Cyndee Angles; Active 10/31/16   Historical Provider, MD   losartan (COZAAR) 25 mg tablet TAKE ONE TABLET BY MOUTH EVERY DAY 11/18/20 2/16/21  Melida Duran MD   Multiple Vitamin (MULTIVITAMIN) capsule Take 1 capsule by mouth daily in the early morning     Historical Provider, MD   Myrbetriq 50 MG TB24 TAKE ONE TABLET BY MOUTH EVERY DAY 5/11/21   Eliana Arguello PA-C   NovoLOG Mix 70/30 FlexPen (70-30) 100 units/mL injection pen INJECT 20 UNITS UNDER THE SKIN EVERY 12 HOURS 9/4/20   Melida Duran MD   pantoprazole (PROTONIX) 40 mg tablet TAKE ONE TABLET BY MOUTH EVERY DAY 6/22/21   Melida Duran MD   tamsulosin (FLOMAX) 0 4 mg Take 1 capsule (0 4 mg total) by mouth daily with dinner 1/13/21   Jason Gong MD   Victoza injection Inject 0 3 mL (1 8 mg total) under the skin daily 6/22/21 9/20/21  Martell Mays MD     I have reviewed home medications with patient personally  Allergies:    Allergies   Allergen Reactions    Enalapril Anaphylaxis and Throat Swelling    Metformin Abdominal Pain       Social History:  Marital Status: Registered Domestic Partner   Occupation: Noncontributory   Patient Pre-hospital Living Situation: Home  Patient Pre-hospital Level of Mobility: walks  Patient Pre-hospital Diet Restrictions: None  Substance Use History:   Social History     Substance and Sexual Activity   Alcohol Use Not Currently     Social History     Tobacco Use   Smoking Status Former Smoker    Quit date: 11/10/2001    Years since quittin 6   Smokeless Tobacco Former User     Social History     Substance and Sexual Activity   Drug Use Not Currently    Comment: RSO       Family History:  Family History   Problem Relation Age of Onset    Heart disease Mother     Breast cancer Mother 80    Diabetes Father     Colon cancer Neg Hx     Liver disease Neg Hx        Physical Exam:     Vitals:   Blood Pressure: 149/67 (21 1528)  Pulse: (!) 53 (21 1528)  Temperature: 98 °F (36 7 °C) (21 1240)  Temp Source: Oral (21 1240)  Respirations: 16 (21 1349)  SpO2: 98 % (21 1528)    Physical Exam  Constitutional:       General: He is not in acute distress  Appearance: Normal appearance  He is normal weight  He is ill-appearing  He is not diaphoretic  HENT:      Head: Normocephalic and atraumatic  Mouth/Throat:      Mouth: Mucous membranes are dry  Eyes:      General: No scleral icterus  Pupils: Pupils are equal, round, and reactive to light  Cardiovascular:      Rate and Rhythm: Normal rate and regular rhythm  Pulses: Normal pulses  Heart sounds: Normal heart sounds, S1 normal and S2 normal  No murmur heard  No systolic murmur is present  No diastolic murmur is present  No gallop  No S3 or S4 sounds  Pulmonary:      Effort: Pulmonary effort is normal  No accessory muscle usage or respiratory distress  Breath sounds: Normal breath sounds  No stridor  No decreased breath sounds, wheezing, rhonchi or rales  Chest:      Chest wall: No tenderness  Abdominal:      General: Bowel sounds are normal  There is no distension  Palpations: Abdomen is soft  Tenderness: There is generalized abdominal tenderness  There is no guarding  Musculoskeletal:      Right lower leg: No edema  Left lower leg: No edema  Skin:     General: Skin is warm and dry  Coloration: Skin is not jaundiced     Neurological: General: No focal deficit present  Mental Status: He is alert  Mental status is at baseline  Motor: No tremor or seizure activity  Psychiatric:         Behavior: Behavior is cooperative  Additional Data:     Lab Results:  Results from last 7 days   Lab Units 06/28/21  1255   WBC Thousand/uL 9 52   HEMOGLOBIN g/dL 12 7   HEMATOCRIT % 39 0   PLATELETS Thousands/uL 152   NEUTROS PCT % 86*   LYMPHS PCT % 5*   MONOS PCT % 7   EOS PCT % 0     Results from last 7 days   Lab Units 06/28/21  1255   SODIUM mmol/L 138   POTASSIUM mmol/L 4 4   CHLORIDE mmol/L 104   CO2 mmol/L 22   BUN mg/dL 19   CREATININE mg/dL 0 99   ANION GAP mmol/L 12   CALCIUM mg/dL 8 6   ALBUMIN g/dL 3 3*   TOTAL BILIRUBIN mg/dL 0 45   ALK PHOS U/L 112   ALT U/L 29   AST U/L 26   GLUCOSE RANDOM mg/dL 250*                       Imaging: Reviewed radiology reports from this admission including: chest xray  XR chest 2 views   ED Interpretation by Yvonne Ascencio DO (06/28 1328)   No acute pathology      Final Result by Delano Lemon MD (06/28 1430)      No acute cardiopulmonary disease  Workstation performed: [id number]             EKG and Other Studies Reviewed on Admission:   · EKG: Sinus Bradycardia  HR 58  · CXR: No acute pulmonary disease     ** Please Note: This note has been constructed using a voice recognition system   **

## 2021-06-28 NOTE — ASSESSMENT & PLAN NOTE
· Not currently receiving active treatment   Has outpatient follow up planned   · Outpatient follow up with Urology

## 2021-06-28 NOTE — ASSESSMENT & PLAN NOTE
· Patient reported chest pain today, likely due to N/V   Initial troponin and EKG negative   · Trend troponin

## 2021-06-28 NOTE — ED PROVIDER NOTES
History  Chief Complaint   Patient presents with    Chest Pain     started with diarrhea and vomiting last night, now c/o chest pain today       History provided by:  Patient and spouse  Diarrhea  Quality:  Semi-solid and watery  Severity:  Moderate  Onset quality:  Gradual  Number of episodes:  20  Duration:  16 hours  Timing:  Constant  Progression:  Partially resolved  Relieved by:  Nothing  Worsened by:  Nothing  Ineffective treatments:  None tried  Associated symptoms: abdominal pain and vomiting    Associated symptoms: no chills, no diaphoresis, no fever and no headaches    Abdominal pain:     Location:  Generalized    Quality: cramping      Severity:  Moderate    Onset quality:  Gradual    Duration:  14 hours    Timing:  Constant    Progression:  Partially resolved    Chronicity:  New  Vomiting:     Quality:  Stomach contents and bilious material    Number of occurrences:  15    Severity:  Moderate    Duration:  14 hours    Timing:  Constant    Progression:  Partially resolved  Risk factors comment:  Questionable food poisoning, wife said she started vomiting eating after a similar food but there were other individuals who ate food did not get ill  Chest Pain  Pain location:  Substernal area  Pain quality: aching and burning    Pain radiates to:  Does not radiate  Pain severity:  Moderate  Onset quality:  Gradual  Duration:  6 hours  Timing:  Intermittent  Progression:  Waxing and waning  Chronicity:  New  Relieved by:  None tried  Worsened by:  Nothing tried  Ineffective treatments:  None tried  Associated symptoms: abdominal pain and vomiting    Associated symptoms: no cough, no diaphoresis, no dizziness, no fever, no headache, no nausea, no numbness, no palpitations and no shortness of breath    Associated symptoms comment:  No associated diaphoresis with the chest pain      Prior to Admission Medications   Prescriptions Last Dose Informant Patient Reported? Taking?    Biotin 1000 MCG tablet  Self Yes No Sig: Take 1 tablet by mouth daily in the early morning    Blood Glucose Monitoring Suppl (GLUCOCARD VITAL MONITOR) w/Device KIT  Self No No   Sig: by Does not apply route 2 (two) times a day   Cholecalciferol (VITAMIN D3) 2000 units capsule  Self Yes No   Sig: Take 1,000 Units by mouth daily in the early morning    Cyanocobalamin (VITAMIN B-12) 5000 MCG TBDP  Self Yes No   Sig: Take 5,000 mcg by mouth once a week Takes on Mondays   Insulin Pen Needle 32G X 4 MM MISC  Self No No   Sig: Use daily   Lancets (LIFESCAN UNISTIK 2) MISC  Self Yes No   Sig: Lifescan One Touch Ultramini Meter; use as directed; 1; 0; 31-Oct-2016; 31-Oct-2016; Melida Duran; Active   Multiple Vitamin (MULTIVITAMIN) capsule  Self Yes No   Sig: Take 1 capsule by mouth daily in the early morning    Myrbetriq 50 MG TB24   No No   Sig: TAKE ONE TABLET BY MOUTH EVERY DAY   NovoLOG Mix 70/30 FlexPen (70-30) 100 units/mL injection pen  Self No No   Sig: INJECT 20 UNITS UNDER THE SKIN EVERY 12 HOURS   Victoza injection   No No   Sig: Inject 0 3 mL (1 8 mg total) under the skin daily   acetaminophen (TYLENOL) 325 mg tablet  Self No No   Sig: Take 2 tablets (650 mg total) by mouth every 4 (four) hours as needed for mild pain, headaches or fever   albuterol (PROAIR HFA) 90 mcg/act inhaler  Self Yes No   Sig: inhale 2 puff by inhalation route  every 4 - 6 hours as needed   atorvastatin (LIPITOR) 10 mg tablet  Self No No   Sig: TAKE ONE TABLET BY MOUTH EVERY DAY   calcium carbonate (OS-GILDA) 600 MG tablet  Self Yes No   Sig: Take 600 mg by mouth daily in the early morning    diazepam (VALIUM) 5 mg tablet  Self No No   Sig: Take 5mg tablet 30-60 mins prior to MRI  Repeat immediately prior if needed     docusate sodium (DULCOLAX) 100 mg capsule  Self Yes No   Sig: Take 100 mg by mouth daily as needed for constipation   glipiZIDE (GLUCOTROL XL) 5 mg 24 hr tablet   No No   Sig: TAKE ONE TABLET BY MOUTH EVERY DAY AFTER BREAKFAST   losartan (COZAAR) 25 mg tablet Self No No   Sig: TAKE ONE TABLET BY MOUTH EVERY DAY   pantoprazole (PROTONIX) 40 mg tablet   No No   Sig: TAKE ONE TABLET BY MOUTH EVERY DAY   tamsulosin (FLOMAX) 0 4 mg   No No   Sig: Take 1 capsule (0 4 mg total) by mouth daily with dinner      Facility-Administered Medications: None       Past Medical History:   Diagnosis Date    Anemia     Arthritis     Black stool 7/24/2020    Diabetes mellitus ([facility] )     IDDM    Diverticulosis     Enlarged prostate     Erectile dysfunction     Hypercholesteremia     Resolved post weight loss    Hypertension     Resolved post weight loss    Kidney stone     Obesity     Prostate cancer ([facility] )     Wears partial dentures     partial upper and lower       Past Surgical History:   Procedure Laterality Date    ABDOMINAL ADHESION SURGERY N/A 11/28/2016    Procedure: EXTENSIVE LYSIS ADHESIONS;  Surgeon: Queta Perez MD;  [facility];  Service:    [place]v[facility] [place] FRACTURE SURGERY Left     CHOLECYSTECTOMY      COLON SURGERY      colon resection    COLONOSCOPY      COLOSTOMY      COLOSTOMY CLOSURE      DIAGNOSTIC LAPAROSCOPY      GASTRIC BYPASS      failed due to adhesions    HERNIA REPAIR      abdominal    NC BIOPSY OF PROSTATE,NEEDLE/PUNCH N/A 5/8/2020    Procedure: TRANSRECTAL NEEDLE BIOPSY PROSTATE (TRNBP) WITH TRANSRECTAL ULTRASOUND GUIDANCE;  Surgeon: Eloy Falk MD;  [facility];  Service: Urology    NC CYSTOSCOPY,DIR VIS INT URETHROTOMY N/A 5/8/2020    Procedure: DIRECT VISUAL INTERAL URETHROTOMY (DVIU), dilation of urethral stricture;  Surgeon: Eloy Falk MD;  [facility];  Service: Urology    NC CYSTOURETHRO W/IMPLANT N/A 3/8/2019    Procedure: CYSTOSCOPY WITH INSERTION Arianna Tj;  Surgeon: Eloy Falk MD;  [facility];  Service: Urology    NC CYSTOURETHRO W/IMPLANT N/A 5/8/2020    Procedure: CYSTOSCOPY WITH INSERTION Arianna Tj;  Surgeon: Eloy Falk MD;  [facility];  Service: Urology    AL EDG US EXAM SURGICAL ALTER STOM DUODENUM/JEJUNUM N/A 10/25/2018    Procedure: LINEAR ENDOSCOPIC U/S;  Surgeon: Abdoul Hall MD;  [facility]; Service: Gastroenterology    AL ESOPHAGOGASTRODUODENOSCOPY TRANSORAL DIAGNOSTIC N/A 2016    Procedure: EGD AND COLONOSCOPY;  Surgeon: Jamie Schaefer MD;  [facility]; Service: Gastroenterology    AL LAP, ANEESH RESTRICT PROC, LONGITUDINAL GASTRECTOMY N/A 2016    Procedure: GASTRECTOMY SLEEVE LAPAROSCOPIC;  Surgeon: Nick Young MD;  [facility];  Service: [facility] [place] BIOPSY  2020       Family History   Problem Relation Age of Onset    Heart disease Mother     Breast cancer Mother 80    Diabetes Father     Colon cancer Neg Hx     Liver disease Neg Hx      I have reviewed and agree with the history as documented  E-Cigarette/Vaping    E-Cigarette Use Never User      E-Cigarette/Vaping Substances    Nicotine No     THC No     CBD No     Flavoring No     Other No     Unknown No      Social History     Tobacco Use    Smoking status: Former Smoker     Quit date: 11/10/2001     Years since quittin 6    Smokeless tobacco: Former User   Vaping Use    Vaping Use: Never used   Substance Use Topics    Alcohol use: Not Currently    Drug use: Not Currently     Comment: RSO       Review of Systems   Constitutional: Negative for activity change, chills, diaphoresis and fever  HENT: Negative for congestion, sinus pressure and sore throat  Eyes: Negative for pain and visual disturbance  Respiratory: Negative for cough, chest tightness, shortness of breath, wheezing and stridor  Cardiovascular: Negative for chest pain and palpitations  Gastrointestinal: Positive for abdominal pain, diarrhea and vomiting  Negative for abdominal distention, constipation and nausea  Genitourinary: Negative for dysuria and frequency     Musculoskeletal: Negative for neck pain and neck stiffness  Skin: Negative for rash  Neurological: Negative for dizziness, speech difficulty, light-headedness, numbness and headaches  Physical Exam  Physical Exam  Vitals reviewed  Constitutional:       General: He is not in acute distress  Appearance: He is well-developed  He is not diaphoretic  HENT:      Head: Normocephalic and atraumatic  Right Ear: External ear normal       Left Ear: External ear normal       Nose: Nose normal    Eyes:      General:         Right eye: No discharge  Left eye: No discharge  Pupils: Pupils are equal, round, and reactive to light  Neck:      Trachea: No tracheal deviation  Cardiovascular:      Rate and Rhythm: Normal rate and regular rhythm  Heart sounds: Normal heart sounds  No murmur heard  Pulmonary:      Effort: Pulmonary effort is normal  No respiratory distress  Breath sounds: Normal breath sounds  No stridor  Abdominal:      General: There is no distension  Palpations: Abdomen is soft  Tenderness: There is no abdominal tenderness  There is no guarding or rebound  Musculoskeletal:         General: Normal range of motion  Cervical back: Normal range of motion and neck supple  Skin:     General: Skin is warm and dry  Coloration: Skin is not pale  Findings: No erythema  Neurological:      General: No focal deficit present  Mental Status: He is alert and oriented to person, place, and time           Vital Signs  ED Triage Vitals   Temperature Pulse Respirations Blood Pressure SpO2   06/28/21 1240 06/28/21 1239 06/28/21 1239 06/28/21 1240 06/28/21 1239   98 °F (36 7 °C) 63 18 154/70 100 %      Temp Source Heart Rate Source Patient Position - Orthostatic VS BP Location FiO2 (%)   06/28/21 1240 06/28/21 1239 06/28/21 1349 06/28/21 1349 --   Oral Monitor Sitting Right arm       Pain Score       --                  Vitals:    06/28/21 1239 06/28/21 1240 06/28/21 1349 06/28/21 1400   BP: 154/70 154/66 128/60   Pulse: 63  55 60   Patient Position - Orthostatic VS:   Sitting Sitting         Visual Acuity      ED Medications  Medications   morphine (PF) 4 mg/mL injection 4 mg (has no administration in time range)   sodium chloride 0 9 % bolus 1,000 mL (0 mL Intravenous Stopped 6/28/21 1428)   ondansetron (ZOFRAN) injection 4 mg (4 mg Intravenous Given 6/28/21 1258)   morphine injection 2 mg (2 mg Intravenous Given 6/28/21 1348)       Diagnostic Studies  Results Reviewed     Procedure Component Value Units Date/Time    Comprehensive metabolic panel [[id number]]  (Abnormal) Collected: 06/28/21 1255    Lab Status: Final result Specimen: Blood from Arm, Left Updated: 06/28/21 1359     Sodium 138 mmol/L      Potassium 4 4 mmol/L      Chloride 104 mmol/L      CO2 22 mmol/L      ANION GAP 12 mmol/L      BUN 19 mg/dL      Creatinine 0 99 mg/dL      Glucose 250 mg/dL      Calcium 8 6 mg/dL      Corrected Calcium 9 2 mg/dL      AST 26 U/L      ALT 29 U/L      Alkaline Phosphatase 112 U/L      Total Protein 7 3 g/dL      Albumin 3 3 g/dL      Total Bilirubin 0 45 mg/dL      eGFR 76 ml/min/1 73sq m     Narrative:      [place] guidelines for Chronic Kidney Disease (CKD):     Stage 1 with normal or high GFR (GFR > 90 mL/min/1 73 square meters)    Stage 2 Mild CKD (GFR = 60-89 mL/min/1 73 square meters)    Stage 3A Moderate CKD (GFR = 45-59 mL/min/1 73 square meters)    Stage 3B Moderate CKD (GFR = 30-44 mL/min/1 73 square meters)    Stage 4 Severe CKD (GFR = 15-29 mL/min/1 73 square meters)    Stage 5 End Stage CKD (GFR <15 mL/min/1 73 square meters)  Note: GFR calculation is accurate only with a steady state creatinine    Lipase [[id number]]  (Normal) Collected: 06/28/21 1255    Lab Status: Final result Specimen: Blood from Arm, Left Updated: 06/28/21 1359     Lipase 121 u/L     Troponin I [[id number]]  (Normal) Collected: 06/28/21 1255    Lab Status: Final result Specimen: Blood from Arm, Left Updated: 06/28/21 1354     Troponin I <0 02 ng/mL     CBC and differential [[id number]]  (Abnormal) Collected: 06/28/21 1255    Lab Status: Final result Specimen: Blood from Arm, Left Updated: 06/28/21 1312     WBC 9 52 Thousand/uL      RBC 4 14 Million/uL      Hemoglobin 12 7 g/dL      Hematocrit 39 0 %      MCV 94 fL      MCH 30 7 pg      MCHC 32 6 g/dL      RDW 13 1 %      MPV 12 3 fL      Platelets 890 Thousands/uL      nRBC 0 /100 WBCs      Neutrophils Relative 86 %      Immat GRANS % 1 %      Lymphocytes Relative 5 %      Monocytes Relative 7 %      Eosinophils Relative 0 %      Basophils Relative 1 %      Neutrophils Absolute 8 21 Thousands/µL      Immature Grans Absolute 0 09 Thousand/uL      Lymphocytes Absolute 0 44 Thousands/µL      Monocytes Absolute 0 69 Thousand/µL      Eosinophils Absolute 0 04 Thousand/µL      Basophils Absolute 0 05 Thousands/µL                  XR chest 2 views   ED Interpretation by Olegario Rivero DO (06/28 1328)   No acute pathology      Final Result by Maryjane Heard MD (06/28 1430)      No acute cardiopulmonary disease                    Workstation performed: [id number]                    Procedures  ECG 12 Lead Documentation Only    Date/Time: 6/28/2021 12:51 PM  Performed by: Olegario Rivero DO  Authorized by: Olegario Rivero DO     ECG reviewed by me, the ED Provider: yes    Patient location:  ED  Previous ECG:     Previous ECG:  Compared to current    Comparison ECG info:  12 15 2020    Similarity:  No change  Interpretation:     Interpretation: normal    Rate:     ECG rate:  58    ECG rate assessment: bradycardic    Rhythm:     Rhythm: sinus rhythm    Ectopy:     Ectopy: none    QRS:     QRS axis:  Normal    QRS intervals:  Normal  Conduction:     Conduction: normal    ST segments:     ST segments:  Normal  T waves:     T waves: normal               ED Course                                           MDM  Number of Diagnoses or Management Options  Acute gastroenteritis: new and requires workup  Ambulatory dysfunction: new and requires workup  Generalized weakness: new and requires workup  Diagnosis management comments:       Initial ED assessment:  66-year-old male, 20+ episodes of diarrhea last night throughout the night and about 15 episodes of vomiting, says started after eating pasta salad a pork dishes wife had multiple episodes of vomiting after but did not have any diarrhea  Having some abdominal pain occasional chest pains  Chest pain started after is described more as a burning type sensation likely from heartburn from the persistent vomiting period, is able tolerate small amounts of water but nothing else  Feels weak and dizzy, unable to ambulate due to weakness    Initial DDx includes but is not limited to:   Gastroenteritis, secondary to food poisoning versus viral illness  Doubt ACS, chest pains likely related to esophageal pain from persistent vomiting  Abdominal pain is likely multifactorial, due to anterior abdominal wall strain for multiple episodes of vomiting as well as intestinal cramping from persistent diarrhea      Initial ED plan:   Blood work IV fluids, disposition pending ED workup        Final ED summary/disposition:   After evaluation and workup in the emergency department, patient ultimately feels increasingly weak, unable to ambulate saying he feels too tired and sick, will admit to hospital          Amount and/or Complexity of Data Reviewed  Clinical lab tests: reviewed and ordered  Decide to obtain previous medical records or to obtain history from someone other than the patient: yes  Obtain history from someone other than the patient: yes  Review and summarize past medical records: yes  Discuss the patient with other providers: yes  Independent visualization of images, tracings, or specimens: yes        Disposition  Final diagnoses:   Acute gastroenteritis   Generalized weakness   Ambulatory dysfunction     Time reflects when diagnosis was documented in both MDM as applicable and the Disposition within this note     Time User Action Codes Description Comment    6/28/2021  2:31 PM Jigar Li Add [K52 9] Acute gastroenteritis     6/28/2021  2:31 PM Asha PANDA Add [R53 1] Generalized weakness     6/28/2021  2:32 PM Jigar Li Add [R26 2] Ambulatory dysfunction       ED Disposition     ED Disposition Condition Date/Time Comment    Admit Stable Mon Jun 28, 2021  2:31 PM Case was discussed with Dr La Holder and the patient's admission status was agreed to be Admission Status: observation status to the service of Dr La Holder    Follow-up Information    None         Patient's Medications   Discharge Prescriptions    No medications on file     No discharge procedures on file      PDMP Review     None          ED Provider  Electronically Signed by           Olegario Rivero DO  06/28/21 9523

## 2021-06-28 NOTE — ASSESSMENT & PLAN NOTE
· POA, suspect food borne given occurred after eating grilled pork and pasta salad with his wife experiencing mild symptoms as well  Abdomen soft  Multiple episodes of vomiting and diarrhea without blood  History of gastrectomy   · Admit patient to med/surg under observation status   · Check Stool PCR, C   Diff, O/P  · Protonix 40 mg IV BID   · Mylanta PRN  · Bentyl PRN   · IVF  · If stool infectious work up negative can start antidiarrheals   · Clear liquid diet

## 2021-06-28 NOTE — ASSESSMENT & PLAN NOTE
Lab Results   Component Value Date    HGBA1C 7 4 (H) 03/18/2021       No results for input(s): POCGLU in the last 72 hours      Blood Sugar Average: Last 72 hrs:  · Prior A1c controlled, hyperglycemic on admission   · Continue 70/30 20 U BID AC   · SSI Algorithm with meals and bedtime   · QID Accu-Checks   · Hold Glipizide and Victoza

## 2021-06-29 VITALS
DIASTOLIC BLOOD PRESSURE: 66 MMHG | BODY MASS INDEX: 35.12 KG/M2 | OXYGEN SATURATION: 93 % | WEIGHT: 223.77 LBS | RESPIRATION RATE: 17 BRPM | SYSTOLIC BLOOD PRESSURE: 149 MMHG | HEART RATE: 58 BPM | HEIGHT: 67 IN | TEMPERATURE: 98.6 F

## 2021-06-29 PROBLEM — R07.9 CHEST PAIN: Status: RESOLVED | Noted: 2021-06-28 | Resolved: 2021-06-29

## 2021-06-29 LAB
ANION GAP SERPL CALCULATED.3IONS-SCNC: 9 MMOL/L (ref 4–13)
BACTERIA UR QL AUTO: ABNORMAL /HPF
BILIRUB UR QL STRIP: NEGATIVE
BUN SERPL-MCNC: 18 MG/DL (ref 5–25)
CALCIUM SERPL-MCNC: 8.5 MG/DL (ref 8.3–10.1)
CHLORIDE SERPL-SCNC: 106 MMOL/L (ref 100–108)
CLARITY UR: CLEAR
CO2 SERPL-SCNC: 25 MMOL/L (ref 21–32)
COLOR UR: YELLOW
CREAT SERPL-MCNC: 0.87 MG/DL (ref 0.6–1.3)
ERYTHROCYTE [DISTWIDTH] IN BLOOD BY AUTOMATED COUNT: 13.1 % (ref 11.6–15.1)
GFR SERPL CREATININE-BSD FRML MDRD: 87 ML/MIN/1.73SQ M
GLUCOSE P FAST SERPL-MCNC: 183 MG/DL (ref 65–99)
GLUCOSE SERPL-MCNC: 150 MG/DL (ref 65–140)
GLUCOSE SERPL-MCNC: 163 MG/DL (ref 65–140)
GLUCOSE SERPL-MCNC: 183 MG/DL (ref 65–140)
GLUCOSE UR STRIP-MCNC: NEGATIVE MG/DL
HCT VFR BLD AUTO: 35.5 % (ref 36.5–49.3)
HGB BLD-MCNC: 11.5 G/DL (ref 12–17)
HGB UR QL STRIP.AUTO: ABNORMAL
KETONES UR STRIP-MCNC: NEGATIVE MG/DL
LEUKOCYTE ESTERASE UR QL STRIP: NEGATIVE
MCH RBC QN AUTO: 30.6 PG (ref 26.8–34.3)
MCHC RBC AUTO-ENTMCNC: 32.4 G/DL (ref 31.4–37.4)
MCV RBC AUTO: 94 FL (ref 82–98)
NITRITE UR QL STRIP: NEGATIVE
NON-SQ EPI CELLS URNS QL MICRO: ABNORMAL /HPF
PH UR STRIP.AUTO: 6 [PH]
PLATELET # BLD AUTO: 137 THOUSANDS/UL (ref 149–390)
PMV BLD AUTO: 12.4 FL (ref 8.9–12.7)
POTASSIUM SERPL-SCNC: 3.8 MMOL/L (ref 3.5–5.3)
PROT UR STRIP-MCNC: ABNORMAL MG/DL
RBC # BLD AUTO: 3.76 MILLION/UL (ref 3.88–5.62)
RBC #/AREA URNS AUTO: ABNORMAL /HPF
SODIUM SERPL-SCNC: 140 MMOL/L (ref 136–145)
SP GR UR STRIP.AUTO: >=1.03 (ref 1–1.03)
TROPONIN I SERPL-MCNC: <0.02 NG/ML
UROBILINOGEN UR QL STRIP.AUTO: 1 E.U./DL
WBC # BLD AUTO: 10.69 THOUSAND/UL (ref 4.31–10.16)
WBC #/AREA URNS AUTO: ABNORMAL /HPF

## 2021-06-29 PROCEDURE — 80048 BASIC METABOLIC PNL TOTAL CA: CPT | Performed by: PHYSICIAN ASSISTANT

## 2021-06-29 PROCEDURE — 84484 ASSAY OF TROPONIN QUANT: CPT | Performed by: PHYSICIAN ASSISTANT

## 2021-06-29 PROCEDURE — 81001 URINALYSIS AUTO W/SCOPE: CPT | Performed by: PHYSICIAN ASSISTANT

## 2021-06-29 PROCEDURE — 99217 PR OBSERVATION CARE DISCHARGE MANAGEMENT: CPT | Performed by: INTERNAL MEDICINE

## 2021-06-29 PROCEDURE — C9113 INJ PANTOPRAZOLE SODIUM, VIA: HCPCS | Performed by: PHYSICIAN ASSISTANT

## 2021-06-29 PROCEDURE — 82948 REAGENT STRIP/BLOOD GLUCOSE: CPT

## 2021-06-29 PROCEDURE — 85027 COMPLETE CBC AUTOMATED: CPT | Performed by: PHYSICIAN ASSISTANT

## 2021-06-29 RX ORDER — MAGNESIUM HYDROXIDE/ALUMINUM HYDROXICE/SIMETHICONE 120; 1200; 1200 MG/30ML; MG/30ML; MG/30ML
30 SUSPENSION ORAL EVERY 6 HOURS PRN
Qty: 355 ML | Refills: 0 | Status: SHIPPED | OUTPATIENT
Start: 2021-06-29

## 2021-06-29 RX ORDER — ONDANSETRON 4 MG/1
4 TABLET, ORALLY DISINTEGRATING ORAL EVERY 6 HOURS PRN
Qty: 20 TABLET | Refills: 0 | Status: SHIPPED | OUTPATIENT
Start: 2021-06-29

## 2021-06-29 RX ORDER — DICYCLOMINE HYDROCHLORIDE 10 MG/1
10 CAPSULE ORAL 4 TIMES DAILY PRN
Qty: 20 CAPSULE | Refills: 0 | Status: SHIPPED | OUTPATIENT
Start: 2021-06-29 | End: 2022-06-24

## 2021-06-29 RX ORDER — HYDROMORPHONE HCL/PF 1 MG/ML
0.2 SYRINGE (ML) INJECTION ONCE
Status: COMPLETED | OUTPATIENT
Start: 2021-06-29 | End: 2021-06-29

## 2021-06-29 RX ORDER — LIDOCAINE 50 MG/G
1 PATCH TOPICAL DAILY
Status: DISCONTINUED | OUTPATIENT
Start: 2021-06-29 | End: 2021-06-29 | Stop reason: HOSPADM

## 2021-06-29 RX ORDER — ACETAMINOPHEN 325 MG/1
975 TABLET ORAL ONCE
Status: COMPLETED | OUTPATIENT
Start: 2021-06-29 | End: 2021-06-29

## 2021-06-29 RX ADMIN — HYDROMORPHONE HYDROCHLORIDE 0.2 MG: 1 INJECTION, SOLUTION INTRAMUSCULAR; INTRAVENOUS; SUBCUTANEOUS at 05:00

## 2021-06-29 RX ADMIN — ALUMINA, MAGNESIA, AND SIMETHICONE ORAL SUSPENSION REGULAR STRENGTH 30 ML: 1200; 1200; 120 SUSPENSION ORAL at 10:24

## 2021-06-29 RX ADMIN — ACETAMINOPHEN 975 MG: 325 TABLET ORAL at 04:59

## 2021-06-29 RX ADMIN — PANTOPRAZOLE SODIUM 40 MG: 40 INJECTION, POWDER, FOR SOLUTION INTRAVENOUS at 08:12

## 2021-06-29 RX ADMIN — LOSARTAN POTASSIUM 25 MG: 25 TABLET, FILM COATED ORAL at 08:12

## 2021-06-29 RX ADMIN — ONDANSETRON 4 MG: 2 INJECTION INTRAMUSCULAR; INTRAVENOUS at 04:59

## 2021-06-29 RX ADMIN — ATORVASTATIN CALCIUM 10 MG: 10 TABLET, FILM COATED ORAL at 08:12

## 2021-06-29 RX ADMIN — DICYCLOMINE HYDROCHLORIDE 10 MG: 10 CAPSULE ORAL at 10:26

## 2021-06-29 RX ADMIN — LIDOCAINE 5% 1 PATCH: 700 PATCH TOPICAL at 08:13

## 2021-06-29 NOTE — DISCHARGE INSTR - AVS FIRST PAGE
Dear Aidan Shine,     It was our pleasure to care for you here at [facility]  It is our hope that we were always able to exceed the expected standards for your care during your stay  You were hospitalized due to gastroenteritis  You were cared for on the [address] [place] [address] 4th floor by Donnie Shah PA-C under the service of Calvin Oakes MD with the [facility] who covers for your primary care physician (PCP), Milind Self MD, while you were hospitalized  If you have any questions or concerns related to this hospitalization, you may contact us at [phone]  For follow up as well as any medication refills, we recommend that you follow up with your primary care physician  A registered nurse will reach out to you by phone within a few days after your discharge to answer any additional questions that you may have after going home  However, at this time we provide for you here, the most important instructions / recommendations at discharge:     · Notable Medication Adjustments -   · START The following medications   · Mylanta OTC - Take as directed on packaging every 6 hours as needed for indigestion/reflux  · Zofran 4 mg ODT tablets - Place one tablet under the tongue and dissolve every 6 hours as needed for nausea/vomiting   · Dicyclomine (Bentyl) 10 mg Tablets - Take 1 tablet by mouth every 6 hours as needed for abdominal cramping   · Continue the rest of your home medications   · Testing Required after Discharge -   · None  · Important follow up information -   · Follow up with PCP   · Other Instructions -   · Advance diet slowly as you tolerate  Make sure you stay focused on staying hydrated  Avoid sugary sports drink due to diabetes   Recommend propel water or gatorade zero  · Please review this entire after visit summary as additional general instructions including medication list, appointments, activity, diet, any pertinent wound care, and other additional recommendations from your care team that may be provided for you        Sincerely,     Emily Goode PA-C

## 2021-06-29 NOTE — DISCHARGE SUMMARY
[facility]  Discharge- Emma Carbajal 1950, 70 y o  male MRN: [id number]  Unit/Bed#: W -01 Encounter: [id number]  Primary Care Provider: Holly Tse MD   Date and time admitted to hospital: 6/28/2021 12:42 PM    * Gastroenteritis  Assessment & Plan  · POA, suspect food borne given occurred after eating grilled pork and pasta salad with his wife experiencing mild symptoms as well  Abdomen soft  Multiple episodes of vomiting and diarrhea without blood  History of gastrectomy   · Symptoms improved after Mylanta   · Stable for discharge   · Continue Mylanta, Bentyl, and Zofran as needed at home   · Advance diet as tolerated       Chest pain-resolved as of 6/29/2021  Assessment & Plan  · Patient reported chest pain today, likely due to N/V  Initial troponin and EKG negative  Troponin remained negative   · Likely due to GI distress     GERD (gastroesophageal reflux disease)  Assessment & Plan  · Noted that patient on PPI outpatient  · Resume home PPI    Prostate cancer [facility])  Assessment & Plan  · Not currently receiving active treatment   Has outpatient follow up planned   · Outpatient follow up with Urology     Type 2 diabetes mellitus with hyperglycemia, with long-term current use of insulin [facility])  Assessment & Plan  Lab Results   Component Value Date    HGBA1C 7 4 (H) 06/28/2021       Recent Labs     06/28/21  1655 06/28/21  2202 06/29/21  0740 06/29/21  1148   POCGLU 213* 220* 163* 150*       Blood Sugar Average: Last 72 hrs:  · (P) 186 5   · Prior A1c controlled, hyperglycemic on admission   · Continue 70/30 20 U BID AC   · Restart Glipizide and Victoza     Benign essential hypertension  Assessment & Plan  · BP acceptable   · Continue Losartan       Medical Problems     Resolved Problems  Date Reviewed: 6/29/2021        Resolved    Chest pain 6/29/2021     Resolved by  Jenifer Justice PA-C              Discharging Physician / Practitioner: Jenifer Justice PA-C  PCP: Holly Tse MD  Admission Date:   Admission Orders (From admission, onward)     Ordered        06/28/21 1432  Place in Observation  Once                   Discharge Date: 06/29/21    Consultations During Hospital Stay:  · None    Procedures Performed:   · CXR     Significant Findings / Test Results:   · CXR: No acute pulmonary disease     Incidental Findings:   · None     Test Results Pending at Discharge (will require follow up): · None     Outpatient Tests Requested:  · None    Complications:  None    Reason for Admission: Gastroenteritis     Hospital Course:   Margie Lopez is a 70 y o  male patient who originally presented to the hospital on 6/28/2021 due to abdominal pain  In the emergency department the patient's however he was noted to be in severe distress  His history was consistent and acute food-borne illness secondary to pork that was not stored appropriately  The patient was hydrated and given supportive care  Overnight his symptoms improved  He also underwent cardiac workup with troponin which was negative  The patient able to tolerate eating and drinking by mouth next day therefore he was discharged home in stable condition  Supportive care was continued at home with as needed Mylanta, Zofran, and Bentyl  He will follow up with PCP        Please see above list of diagnoses and related plan for additional information  Condition at Discharge: stable    Discharge Day Visit / Exam:   Subjective:  Patient reports feeling much better after having Mylanta  Denies any further nausea, vomiting, or diarrhea  Denies any fevers or chills  Denies any chest pain    Vitals: Blood Pressure: 149/66 (06/29/21 0540)  Pulse: 58 (06/29/21 0540)  Temperature: 98 6 °F (37 °C) (06/29/21 0540)  Temp Source: Oral (06/28/21 1240)  Respirations: 17 (06/29/21 0540)  Height: 5' 7" (170 2 cm) (06/29/21 0539)  Weight - Scale: 101 kg (223 lb 12 3 oz) (06/29/21 0539)  SpO2: 93 % (06/29/21 0540)  Exam:   Physical Exam  Constitutional:       General: He is not in acute distress  Appearance: Normal appearance  He is normal weight  He is not ill-appearing or diaphoretic  HENT:      Head: Normocephalic and atraumatic  Mouth/Throat:      Mouth: Mucous membranes are moist    Eyes:      General: No scleral icterus  Pupils: Pupils are equal, round, and reactive to light  Cardiovascular:      Rate and Rhythm: Normal rate and regular rhythm  Pulses: Normal pulses  Heart sounds: Normal heart sounds, S1 normal and S2 normal  No murmur heard  No systolic murmur is present  No diastolic murmur is present  No gallop  No S3 or S4 sounds  Pulmonary:      Effort: Pulmonary effort is normal  No accessory muscle usage or respiratory distress  Breath sounds: Normal breath sounds  No stridor  No decreased breath sounds, wheezing, rhonchi or rales  Chest:      Chest wall: No tenderness  Abdominal:      General: Bowel sounds are normal  There is no distension  Palpations: Abdomen is soft  Tenderness: There is no abdominal tenderness  There is no guarding  Musculoskeletal:      Right lower leg: No edema  Left lower leg: No edema  Skin:     General: Skin is warm and dry  Coloration: Skin is not jaundiced  Neurological:      General: No focal deficit present  Mental Status: He is alert  Mental status is at baseline  Motor: No tremor or seizure activity  Psychiatric:         Behavior: Behavior is cooperative  Discussion with Family: Patient declined call to   Discharge instructions/Information to patient and family:   See after visit summary for information provided to patient and family  Provisions for Follow-Up Care:  See after visit summary for information related to follow-up care and any pertinent home health orders         Disposition:   Home    Planned Readmission: None     Discharge Statement:  I spent 45 minutes discharging the patient  This time was spent on the day of discharge  I had direct contact with the patient on the day of discharge  Greater than 50% of the total time was spent examining patient, answering all patient questions, arranging and discussing plan of care with patient as well as directly providing post-discharge instructions  Additional time then spent on discharge activities  Discharge Medications:  See after visit summary for reconciled discharge medications provided to patient and/or family        **Please Note: This note may have been constructed using a voice recognition system**

## 2021-06-29 NOTE — UTILIZATION REVIEW
Initial Clinical Review    Admission: Date/Time/Statement:   Admission Orders (From admission, onward)     Ordered        06/28/21 1432  Place in Observation  Once                   Orders Placed This Encounter   Procedures    Place in Observation     Standing Status:   Standing     Number of Occurrences:   1     Order Specific Question:   Level of Care     Answer:   Med Surg [16]     ED Arrival Information     Expected Arrival Acuity    - 6/28/2021 12:32 Emergent         Means of arrival Escorted by Service Admission type    Wheelchair Spouse Hospitalist Emergency         Arrival complaint    CHEST PAIN        Chief Complaint   Patient presents with    Chest Pain     started with diarrhea and vomiting last night, now c/o chest pain today       Initial Presentation: 43-year-old male with hx gastrectomy, T2DM on insulin, HTN, and HLD, prostate CA  presents to ED from home with abdominal pain, nausea, vomiting and diarrhea that started yesterday after eating pork and pasta salad(10-20 episodes of diarrhea and vomiting each)Pt reports chest pain and SOB today  , some dizziness  On exam, pt has dry mucous membranes, generalized abdominal tenderness  Pt given IVF, IV antiemetic and IV analgesic in ED  Initial troponin neg, ECG negative  Pt admitted as OBS with gastroenteritis, suspect food borne  Plan- stool studies, Protonix IV BID, IVF, CL diet, prn mylanta and prn bentyl, start antidiarrheals if stool studies neg, trend troponins        ED Triage Vitals   Temperature Pulse Respirations Blood Pressure SpO2   06/28/21 1240 06/28/21 1239 06/28/21 1239 06/28/21 1240 06/28/21 1239   98 °F (36 7 °C) 63 18 154/70 100 %      Temp Source Heart Rate Source Patient Position - Orthostatic VS BP Location FiO2 (%)   06/28/21 1240 06/28/21 1239 06/28/21 1349 06/28/21 1349 --   Oral Monitor Sitting Right arm       Pain Score       06/28/21 1545       1          Wt Readings from Last 1 Encounters:   06/29/21 101 kg (223 lb 12 3 oz) Additional Vital Signs:   Date/Time  Temp  Pulse  Resp  BP  MAP (mmHg)  SpO2    06/29/21 05:40:19  98 6 °F (37 °C)  58  17  149/66  94  93 %    06/28/21 2210  --  66  --  136/58  --  98 %    06/28/21 1730  --  56  16  147/72  101  97 %    06/28/21 1528  --  53Abnormal   --  149/67  --  98 %    06/28/21 1455  --  48Abnormal   --  158/72  103  --    06/28/21 1400  --  60  --  128/60  86  98 %    06/28/21 1349  --  55  16  154/66  --  98 %        Pertinent Labs/Diagnostic Test Results:   6/28  ECG- ED-ECG rate:  58     ECG rate assessment: bradycardic     Rhythm:     Rhythm: sinus rhythm     Ectopy:     Ectopy: none     QRS:     QRS axis:  Normal     QRS intervals:  Normal   Conduction:     Conduction: normal     ST segments:     ST segments:  Normal   T waves:     T waves: normal     CXR-No acute cardiopulmonary disease          Results from last 7 days   Lab Units 06/29/21  0636 06/28/21  1255   WBC Thousand/uL 10 69* 9 52   HEMOGLOBIN g/dL 11 5* 12 7   HEMATOCRIT % 35 5* 39 0   PLATELETS Thousands/uL 137* 152   NEUTROS ABS Thousands/µL  --  8 21*         Results from last 7 days   Lab Units 06/29/21  0636 06/28/21  1255   SODIUM mmol/L 140 138   POTASSIUM mmol/L 3 8 4 4   CHLORIDE mmol/L 106 104   CO2 mmol/L 25 22   ANION GAP mmol/L 9 12   BUN mg/dL 18 19   CREATININE mg/dL 0 87 0 99   EGFR ml/min/1 73sq m 87 76   CALCIUM mg/dL 8 5 8 6     Results from last 7 days   Lab Units 06/28/21  1255   AST U/L 26   ALT U/L 29   ALK PHOS U/L 112   TOTAL PROTEIN g/dL 7 3   ALBUMIN g/dL 3 3*   TOTAL BILIRUBIN mg/dL 0 45     Results from last 7 days   Lab Units 06/29/21  0740 06/28/21  2202 06/28/21  1655   POC GLUCOSE mg/dl 163* 220* 213*     Results from last 7 days   Lab Units 06/29/21  0636 06/28/21  1255   GLUCOSE RANDOM mg/dL 183* 250*         Results from last 7 days   Lab Units 06/28/21  1255   HEMOGLOBIN A1C % 7 4*   EAG mg/dl 166       Results from last 7 days   Lab Units 06/29/21  0636 06/28/21  8994 06/28/21  3594 06/28/21  1255   TROPONIN I ng/mL <0 02 <0 02 <0 02 <0 02               Results from last 7 days   Lab Units 06/28/21  1255   LIPASE u/L 121             ED Treatment:   Medication Administration from 06/28/2021 1232 to 06/29/2021 0535       Date/Time Order Dose Route Action     06/28/2021 1258 sodium chloride 0 9 % bolus 1,000 mL 1,000 mL Intravenous New Bag     06/28/2021 1258 ondansetron (ZOFRAN) injection 4 mg 4 mg Intravenous Given     06/28/2021 1348 morphine injection 2 mg 2 mg Intravenous Given     06/28/2021 1455 morphine (PF) 4 mg/mL injection 4 mg 4 mg Intravenous Given     06/28/2021 1756 insulin aspart protamine-insulin aspart (NovoLOG 70/30) 100 units/mL subcutaneous injection 20 Units 5 Units Subcutaneous Given     06/28/2021 1726 sodium chloride 0 9 % infusion 125 mL/hr Intravenous New Bag     06/29/2021 0459 ondansetron (ZOFRAN) injection 4 mg 4 mg Intravenous Given     06/28/2021 2224 ondansetron (ZOFRAN) injection 4 mg 4 mg Intravenous Given     06/28/2021 1726 ondansetron (ZOFRAN) injection 4 mg 4 mg Intravenous Given     06/28/2021 2230 insulin lispro (HumaLOG) 100 units/mL subcutaneous injection 1-6 Units 2 Units Subcutaneous Given     06/28/2021 2229 pantoprazole (PROTONIX) injection 40 mg 40 mg Intravenous Given     06/28/2021 2224 dicyclomine (BENTYL) capsule 10 mg 10 mg Oral Given     06/29/2021 0500 HYDROmorphone (DILAUDID) injection 0 2 mg 0 2 mg Intravenous Given     06/29/2021 0459 acetaminophen (TYLENOL) tablet 975 mg 975 mg Oral Given        Past Medical History:   Diagnosis Date    Anemia     Arthritis     Black stool 7/24/2020    Diabetes mellitus ([facility] [address] )     IDDM    Diverticulosis     Enlarged prostate     Erectile dysfunction     Hypercholesteremia     Resolved post weight loss    Hypertension     Resolved post weight loss    Kidney stone     Obesity     Prostate cancer ([facility] [address] [facility]     Wears partial dentures     partial upper and lower     Present on Admission:   Gastroenteritis   Benign essential hypertension   GERD (gastroesophageal reflux disease)   Prostate cancer (HCC)      Admitting Diagnosis: Acute gastroenteritis [K52 9]  Chest pain [R07 9]  Generalized weakness [R53 1]  Ambulatory dysfunction [R26 2]  Age/Sex: 70 y o  male  Admission Orders:  Scheduled Medications:  atorvastatin, 10 mg, Oral, Daily  calcium carbonate, 1 tablet, Oral, Daily With Breakfast  cholecalciferol, 1,000 Units, Oral, Daily  enoxaparin, 40 mg, Subcutaneous, Daily  insulin aspart protamine-insulin aspart, 20 Units, Subcutaneous, BID AC  insulin lispro, 1-6 Units, Subcutaneous, TID AC  insulin lispro, 1-6 Units, Subcutaneous, HS  lidocaine, 1 patch, Topical, Daily  losartan, 25 mg, Oral, Daily  oxybutynin, 10 mg, Oral, HS  pantoprazole, 40 mg, Intravenous, Q12H PEGGY  tamsulosin, 0 4 mg, Oral, Daily With Dinner      Continuous IV Infusions:  sodium chloride, 125 mL/hr, Intravenous, Continuous      PRN Meds:  acetaminophen, 650 mg, Oral, Q4H PRN  albuterol, 2 puff, Inhalation, Q6H PRN  aluminum-magnesium hydroxide-simethicone, 30 mL, Oral, Q6H PRN  dicyclomine, 10 mg, Oral, 4x Daily PRN  ondansetron, 4 mg, Intravenous, Q6H PRN    POCT glucose QID  CL diabetic diet      Network Utilization Review Department  ATTENTION: Please call with any questions or concerns to [phone] and carefully listen to the prompts so that you are directed to the right person  All voicemails are confidential   Carlton Newell all requests for admission clinical reviews, approved or denied determinations and any other requests to dedicated fax number below belonging to the campus where the patient is receiving treatment   List of dedicated fax numbers for the Facilities:  [address] DENIALS (Administrative/Medical Necessity) [phone]   [address] [place] [address] (Maternity/NICU/Pediatrics) [phone]   [address] [address] [facility] [address] [address] [place] [address] [address] [place] [address] [phone]

## 2021-06-29 NOTE — ASSESSMENT & PLAN NOTE
Lab Results   Component Value Date    HGBA1C 7 4 (H) 06/28/2021       Recent Labs     06/28/21  1655 06/28/21  2202 06/29/21  0740 06/29/21  1148   POCGLU 213* 220* 163* 150*       Blood Sugar Average: Last 72 hrs:  · (P) 186 5   · Prior A1c controlled, hyperglycemic on admission   · Continue 70/30 20 U BID AC   · Restart Glipizide and Victoza

## 2021-06-29 NOTE — ASSESSMENT & PLAN NOTE
· Patient reported chest pain today, likely due to N/V  Initial troponin and EKG negative   Troponin remained negative   · Likely due to GI distress

## 2021-06-29 NOTE — PLAN OF CARE
Problem: PAIN - ADULT  Goal: Verbalizes/displays adequate comfort level or baseline comfort level  Description: Interventions:  - Encourage patient to monitor pain and request assistance  - Assess pain using appropriate pain scale  - Administer analgesics based on type and severity of pain and evaluate response  - Implement non-pharmacological measures as appropriate and evaluate response  - Consider cultural and social influences on pain and pain management  - Notify physician/advanced practitioner if interventions unsuccessful or patient reports new pain  Outcome: Progressing     Problem: Potential for Falls  Goal: Patient will remain free of falls  Description: INTERVENTIONS:  - Educate patient/family on patient safety including physical limitations  - Instruct patient to call for assistance with activity   - Keep Call bell within reach  - Keep bed low and locked with side rails adjusted as appropriate  - Keep care items and personal belongings within reach  - Initiate and maintain comfort rounds  Problem: DISCHARGE PLANNING  Goal: Discharge to home or other facility with appropriate resources  Description: INTERVENTIONS:  - Identify barriers to discharge w/patient and caregiver  - Arrange for needed discharge resources and transportation as appropriate  - Identify discharge learning needs (meds, wound care, etc )  - Arrange for interpretive services to assist at discharge as needed  - Refer to Case Management Department for coordinating discharge planning if the patient needs post-hospital services based on physician/advanced practitioner order or complex needs related to functional status, cognitive ability, or social support system  Outcome: Progressing     - Apply yellow socks and bracelet for high fall risk patients  - Consider moving patient to room near nurses station  Outcome: Progressing

## 2021-06-29 NOTE — ASSESSMENT & PLAN NOTE
· POA, suspect food borne given occurred after eating grilled pork and pasta salad with his wife experiencing mild symptoms as well  Abdomen soft  Multiple episodes of vomiting and diarrhea without blood   History of gastrectomy   · Symptoms improved after Mylanta   · Stable for discharge   · Continue Mylanta, Bentyl, and Zofran as needed at home   · Advance diet as tolerated

## 2021-06-30 LAB
ATRIAL RATE: 58 BPM
P AXIS: 77 DEGREES
PR INTERVAL: 152 MS
QRS AXIS: 49 DEGREES
QRSD INTERVAL: 82 MS
QT INTERVAL: 428 MS
QTC INTERVAL: 420 MS
T WAVE AXIS: 17 DEGREES
VENTRICULAR RATE: 58 BPM

## 2021-06-30 PROCEDURE — 93010 ELECTROCARDIOGRAM REPORT: CPT | Performed by: INTERNAL MEDICINE

## 2021-07-07 ENCOUNTER — OFFICE VISIT (OUTPATIENT)
Dept: DENTISTRY | Facility: CLINIC | Age: 71
End: 2021-07-07

## 2021-07-07 VITALS — DIASTOLIC BLOOD PRESSURE: 75 MMHG | HEART RATE: 69 BPM | SYSTOLIC BLOOD PRESSURE: 138 MMHG | TEMPERATURE: 97 F

## 2021-07-07 DIAGNOSIS — K05.4 PERIODONTOSIS: Primary | ICD-10-CM

## 2021-07-07 PROCEDURE — D0191 ASSESSMENT OF A PATIENT: HCPCS | Performed by: DENTIST

## 2021-07-13 ENCOUNTER — OFFICE VISIT (OUTPATIENT)
Dept: UROLOGY | Facility: CLINIC | Age: 71
End: 2021-07-13
Payer: COMMERCIAL

## 2021-07-13 VITALS
WEIGHT: 225 LBS | DIASTOLIC BLOOD PRESSURE: 60 MMHG | HEART RATE: 85 BPM | BODY MASS INDEX: 35.31 KG/M2 | SYSTOLIC BLOOD PRESSURE: 130 MMHG | HEIGHT: 67 IN

## 2021-07-13 DIAGNOSIS — C61 PROSTATE CANCER (HCC): ICD-10-CM

## 2021-07-13 DIAGNOSIS — N35.919 STRICTURE OF MALE URETHRA, UNSPECIFIED STRICTURE TYPE: Primary | ICD-10-CM

## 2021-07-13 LAB — POST-VOID RESIDUAL VOLUME, ML POC: 15 ML

## 2021-07-13 PROCEDURE — 3008F BODY MASS INDEX DOCD: CPT | Performed by: PHYSICIAN ASSISTANT

## 2021-07-13 PROCEDURE — 96402 CHEMO HORMON ANTINEOPL SQ/IM: CPT

## 2021-07-13 PROCEDURE — 51798 US URINE CAPACITY MEASURE: CPT | Performed by: PHYSICIAN ASSISTANT

## 2021-07-13 PROCEDURE — 1160F RVW MEDS BY RX/DR IN RCRD: CPT | Performed by: PHYSICIAN ASSISTANT

## 2021-07-13 PROCEDURE — 1036F TOBACCO NON-USER: CPT | Performed by: PHYSICIAN ASSISTANT

## 2021-07-13 PROCEDURE — 99213 OFFICE O/P EST LOW 20 MIN: CPT | Performed by: PHYSICIAN ASSISTANT

## 2021-07-13 NOTE — PROGRESS NOTES
7/13/2021      Chief Complaint   Patient presents with    Prostate Cancer     Assessment and Plan    1  [place] [address] prostate cancer   - cT1c, Pretreatment PSA 5 3, Rafael 4+5=9, 1 of 12 cores, 5% total core volume (pathology reviewed by Dr Neha Jensen)  - Negative staging evaluation (CT and bone scan)  - S/p radiation therapy in 11/2020 with Urolift implants as fiducial markers  - Third Lupron administered today of 2 years total of planned ADT  - Most recent PSA from 6/16/21 was <0 1    - Return for Lupron in 6 months with PSA prior to visit  2  Medically refractory BPH  3  Urethral stricture  4  Recurrent UTIs  - S/p Urolift and recent redo procedure   - Mainly complains of severe incontinence  - Continue Tamsulosin and Myrbetriq  - Discussed pelvic floor PT however patient declines  - Recommend avoidance of constipation and bowel regimen  - Recommend re-evaluation with cystoscopy and consideration of possible bladder botox vs PTNS  Consider referral for AUS if warranted  - F/u for cystoscopy and then in 6 months for PSA check and Lupron  History of Present Illness  Nando Sorto is a 70 y o  male here for follow up evaluation of   Prostate cancer, BPH, urethral stricture disease, and UTI  He is status post pelvic radiation in November 2020  He presents for 3rd Lupron injection with planned total of 2 years of androgen deprivation therapy  He has complex BPH history status post UroLift with subsequent revision  Also dilation of urethral stricture at the time of his last procedure  His Myrbetriq was increased to 50 mg at last office visit  He continues with tamsulosin  He reports continued extremely bothersome urinary symptoms including urgency, frequency, with his main complaint being severe urinary incontinence using several pads per day  He has history of recurrent UTIs, some requiring hospitalization  He denies any hematuria or dysuria       PVR=15 mL    AUA SYMPTOM SCORE      Most Recent Value AUA SYMPTOM SCORE   How often have you had a sensation of not emptying your bladder completely after you finished urinating? 4   How often have you had to urinate again less than two hours after you finished urinating? 4   How often have you found you stopped and started again several times when you urinate? 4   How often have you found it difficult to postpone urination? 3   How often have you had a weak urinary stream?  4   How often have you had to push or strain to begin urination? 4   How many times did you most typically get up to urinate from the time you went to bed at night until the time you got up in the morning? 1   Quality of Life: If you were to spend the rest of your life with your urinary condition just the way it is now, how would you feel about that?  4   AUA SYMPTOM SCORE  24          Review of Systems   Constitutional: Negative for chills and fever  Respiratory: Negative for shortness of breath  Cardiovascular: Negative for chest pain  Gastrointestinal: Positive for constipation  Negative for abdominal pain, diarrhea, nausea and vomiting  Genitourinary: Positive for frequency and urgency  Negative for difficulty urinating, dysuria, flank pain and hematuria  Neurological: Negative for dizziness                    Past Medical History  Past Medical History:   Diagnosis Date    Anemia     Arthritis     Black stool 7/24/2020    Diabetes mellitus ([facility] [address] )     IDDM    Diverticulosis     Enlarged prostate     Erectile dysfunction     Hypercholesteremia     Resolved post weight loss    Hypertension     Resolved post weight loss    Kidney stone     Obesity     Prostate cancer ([facility] [address] [facility]     Wears partial dentures     partial upper and lower       Past Social History  Past Surgical History:   Procedure Laterality Date    ABDOMINAL ADHESION SURGERY N/A 11/28/2016    Procedure: EXTENSIVE LYSIS ADHESIONS;  Surgeon: Jalen Salgado MD;  [facility];  Service:    Ander Vazquez FRACTURE SURGERY Left     CHOLECYSTECTOMY      COLON SURGERY      colon resection    COLONOSCOPY      COLOSTOMY      COLOSTOMY CLOSURE      DIAGNOSTIC LAPAROSCOPY      GASTRIC BYPASS      failed due to adhesions    HERNIA REPAIR      abdominal    NC BIOPSY OF PROSTATE,NEEDLE/PUNCH N/A 2020    Procedure: TRANSRECTAL NEEDLE BIOPSY PROSTATE (TRNBP) WITH TRANSRECTAL ULTRASOUND GUIDANCE;  Surgeon: Augustus Villagran MD;  [facility];  Service: Urology    NC CYSTOSCOPY,DIR VIS INT URETHROTOMY N/A 2020    Procedure: DIRECT VISUAL INTERAL URETHROTOMY (DVIU), dilation of urethral stricture;  Surgeon: Augustus Villagran MD;  [facility];  Service: Urology    NC CYSTOURETHRO W/IMPLANT N/A 3/8/2019    Procedure: CYSTOSCOPY WITH INSERTION Rhiannon Ashing;  Surgeon: Augustus Villagran MD;  [facility];  Service: Urology    NC CYSTOURETHRO W/IMPLANT N/A 2020    Procedure: CYSTOSCOPY WITH INSERTION Rhiannon Ashing;  Surgeon: Augustus Villagran MD;  [facility];  Service: Urology    NC [address] DUODENUM/JEJUNUM N/A 10/25/2018    Procedure: LINEAR ENDOSCOPIC U/S;  Surgeon: Diane Sosa MD;  [facility]; Service: Gastroenterology    NC ESOPHAGOGASTRODUODENOSCOPY TRANSORAL DIAGNOSTIC N/A 2016    Procedure: EGD AND COLONOSCOPY;  Surgeon: Ashkan Reed MD;  [facility];   Service: Gastroenterology    NC LAP, ANEESH RESTRICT PROC, LONGITUDINAL GASTRECTOMY N/A 2016    Procedure: GASTRECTOMY SLEEVE LAPAROSCOPIC;  Surgeon: Mayela Barr MD;  [facility];  Service: [facility] BIOPSY  2020     Social History     Tobacco Use   Smoking Status Former Smoker    Quit date: 11/10/2001    Years since quittin 6   Smokeless Tobacco Former User       Past Family History  Family History   Problem Relation Age of Onset    Heart disease Mother     Breast cancer Mother 80    Diabetes Father     Colon cancer Neg Hx     Liver disease Neg Hx        Past Social history  Social History     Socioeconomic History    Marital status: Registered Domestic Partner     Spouse name: Not on file    Number of children: Not on file    Years of education: Not on file    Highest education level: Not on file   Occupational History    Not on file   Tobacco Use    Smoking status: Former Smoker     Quit date: 11/10/2001     Years since quittin 6    Smokeless tobacco: Former User   Vaping Use    Vaping Use: Never used   Substance and Sexual Activity    Alcohol use: Not Currently    Drug use: Not Currently     Comment: RSO    Sexual activity: Yes     Partners: Female   Other Topics Concern    Not on file   Social History Narrative    Not on file     Social Determinants of Health     Financial Resource Strain:     Difficulty of Paying Living Expenses:    Food Insecurity:     Worried About Running Out of Food in the Last Year:     [address] in the Last Year:    Transportation Needs:     Lack of Transportation (Medical):      Lack of Transportation (Non-Medical):    Physical Activity:     Days of Exercise per Week:     Minutes of Exercise per Session:    Stress:     Feeling of Stress :    Social Connections:     Frequency of Communication with Friends and Family:     Frequency of Social Gatherings with Friends and Family:     Attends Mosque Services:     Active Member of Clubs or Organizations:     Attends Club or Organization Meetings:     Marital Status:    Intimate Partner Violence:     Fear of Current or Ex-Partner:     Emotionally Abused:     Physically Abused:     Sexually Abused:        Current Medications  Current Outpatient Medications   Medication Sig Dispense Refill    acetaminophen (TYLENOL) 325 mg tablet Take 2 tablets (650 mg total) by mouth every 4 (four) hours as needed for mild pain, headaches or fever  0    albuterol (PROAIR HFA) 90 mcg/act inhaler inhale 2 puff by inhalation route  every 4 - 6 hours as needed      aluminum-magnesium hydroxide-simethicone (MYLANTA) 200-200-20 mg/5 mL suspension Take 30 mL by mouth every 6 (six) hours as needed for indigestion or heartburn 355 mL 0    atorvastatin (LIPITOR) 10 mg tablet TAKE ONE TABLET BY MOUTH EVERY DAY 90 tablet 3    Biotin 1000 MCG tablet Take 1 tablet by mouth daily in the early morning       Blood Glucose Monitoring Suppl (GLUCOCARD VITAL MONITOR) w/Device KIT by Does not apply route 2 (two) times a day 1 kit 0    calcium carbonate (OS-GILDA) 600 MG tablet Take 600 mg by mouth daily in the early morning       Cholecalciferol (VITAMIN D3) 2000 units capsule Take 1,000 Units by mouth daily in the early morning       Cyanocobalamin (VITAMIN B-12) 5000 MCG TBDP Take 5,000 mcg by mouth once a week Takes on Mondays      dicyclomine (BENTYL) 10 mg capsule Take 1 capsule (10 mg total) by mouth 4 (four) times a day as needed (abdominal cramping) 20 capsule 0    docusate sodium (DULCOLAX) 100 mg capsule Take 100 mg by mouth daily as needed for constipation      glipiZIDE (GLUCOTROL XL) 5 mg 24 hr tablet TAKE ONE TABLET BY MOUTH EVERY DAY AFTER BREAKFAST 90 tablet 3    Insulin Pen Needle 32G X 4 MM MISC Use daily 100 each 5    Lancets (LIFESCAN UNISTIK 2) MISC Lifescan One Touch Ultramini Meter; use as directed; 1; 0; 31-Oct-2016; 31-Oct-2016; Melida Duran;  Active      Multiple Vitamin (MULTIVITAMIN) capsule Take 1 capsule by mouth daily in the early morning       Myrbetriq 50 MG TB24 TAKE ONE TABLET BY MOUTH EVERY DAY 30 tablet 3    NovoLOG Mix 70/30 FlexPen (70-30) 100 units/mL injection pen INJECT 20 UNITS UNDER THE SKIN EVERY 12 HOURS 15 mL 5    ondansetron (ZOFRAN-ODT) 4 mg disintegrating tablet Take 1 tablet (4 mg total) by mouth every 6 (six) hours as needed for nausea or vomiting 20 tablet 0    pantoprazole (PROTONIX) 40 mg tablet TAKE ONE TABLET BY MOUTH EVERY DAY 30 tablet 0    tamsulosin (FLOMAX) 0 4 mg Take 1 capsule (0 4 mg total) by mouth daily with dinner 90 capsule 3    Victoza injection Inject 0 3 mL (1 8 mg total) under the skin daily 27 mL 0    diazepam (VALIUM) 5 mg tablet Take 5mg tablet 30-60 mins prior to MRI  Repeat immediately prior if needed  (Patient not taking: Reported on 7/13/2021) 2 tablet 0    losartan (COZAAR) 25 mg tablet TAKE ONE TABLET BY MOUTH EVERY DAY 90 tablet 3     No current facility-administered medications for this visit  Allergies  Allergies   Allergen Reactions    Enalapril Anaphylaxis and Throat Swelling    Metformin Abdominal Pain         The following portions of the patient's history were reviewed and updated as appropriate: allergies, current medications, past medical history, past social history, past surgical history and problem list       Vitals  Vitals:    07/13/21 1033   BP: 130/60   BP Location: Left arm   Patient Position: Sitting   Cuff Size: Adult   Pulse: 85   Weight: 102 kg (225 lb)   Height: 5' 7" (1 702 m)           Physical Exam  Physical Exam  Constitutional:       Appearance: Normal appearance  HENT:      Head: Normocephalic and atraumatic  Right Ear: External ear normal       Left Ear: External ear normal    Eyes:      General: No scleral icterus  Conjunctiva/sclera: Conjunctivae normal    Cardiovascular:      Pulses: Normal pulses  Pulmonary:      Effort: Pulmonary effort is normal    Musculoskeletal:         General: Normal range of motion  Cervical back: Normal range of motion  Neurological:      General: No focal deficit present  Mental Status: He is alert and oriented to person, place, and time  Psychiatric:         Mood and Affect: Mood normal          Behavior: Behavior normal          Thought Content:  Thought content normal          Judgment: Judgment normal            Results  Recent Results (from the past 1 hour(s))   POCT Measure PVR    Collection Time: 07/13/21 10:35 AM   Result Value Ref Range POST-VOID RESIDUAL VOLUME, ML POC 15 mL   ]  Lab Results   Component Value Date    PSA <0 1 06/16/2021    PSA 0 1 12/30/2020    PSA 5 3 (H) 01/13/2020     Lab Results   Component Value Date    GLUCOSE 198 (H) 12/14/2020    CALCIUM 8 5 06/29/2021     01/07/2016    K 3 8 06/29/2021    CO2 25 06/29/2021     06/29/2021    BUN 18 06/29/2021    CREATININE 0 87 06/29/2021     Lab Results   Component Value Date    WBC 10 69 (H) 06/29/2021    HGB 11 5 (L) 06/29/2021    HCT 35 5 (L) 06/29/2021    MCV 94 06/29/2021     (L) 06/29/2021           Orders  Orders Placed This Encounter   Procedures    POCT Measure PVR       Sarah Ely PA-C

## 2021-07-18 DIAGNOSIS — R10.9 ABDOMINAL PAIN: ICD-10-CM

## 2021-07-18 RX ORDER — PANTOPRAZOLE SODIUM 40 MG/1
TABLET, DELAYED RELEASE ORAL
Qty: 30 TABLET | Refills: 0 | Status: SHIPPED | OUTPATIENT
Start: 2021-07-18 | End: 2021-09-15

## 2021-08-09 ENCOUNTER — OFFICE VISIT (OUTPATIENT)
Dept: DENTISTRY | Facility: CLINIC | Age: 71
End: 2021-08-09

## 2021-08-09 VITALS — DIASTOLIC BLOOD PRESSURE: 70 MMHG | HEART RATE: 76 BPM | SYSTOLIC BLOOD PRESSURE: 158 MMHG

## 2021-08-09 DIAGNOSIS — K05.4 PERIODONTOSIS: Primary | ICD-10-CM

## 2021-08-09 PROCEDURE — D7140 EXTRACTION, ERUPTED TOOTH OR EXPOSED ROOT (ELEVATION AND/OR FORCEPS REMOVAL): HCPCS | Performed by: DENTIST

## 2021-08-09 NOTE — PROGRESS NOTES
Oral Surgery    Kamran Alcaraz presents for Ext #18 and 23    [address], patient denies any changes  Obtained a direct and personal consent  Risks and complications were explained  Pt agreed and consented  Consent scanned in doc center  Pre-Op BP WNL  Administered 4 24 cc of 2 % Lidocaine w/ 1:100,000 epi via BRIAN block/infiltration  3 4 cc 4% Articaine 1:100,000 epinephrine? Adequate anesthesia obtained, reflected gingiva, elevated, and extracted #18 and 19   Socket irrigated bone filed using bone saw in the area of #19  Excess tissue excised using scissors  3 0 chromic gut sutures placed  Pt was informed that he can call dental office if he has any problems  If having any immediate needs or swelling to report to emergency room  Upon dismissal, patient received POI and gauze      Done today: Extraction of #18 and 19    NV: Sc/Rp of UR and LR    Dr Tonya Bench Dr Donah Mcardle

## 2021-09-25 DIAGNOSIS — R39.9 LOWER URINARY TRACT SYMPTOMS (LUTS): ICD-10-CM

## 2021-09-25 DIAGNOSIS — R10.9 ABDOMINAL PAIN: ICD-10-CM

## 2021-09-27 ENCOUNTER — TELEPHONE (OUTPATIENT)
Dept: UROLOGY | Facility: CLINIC | Age: 71
End: 2021-09-27

## 2021-09-27 ENCOUNTER — APPOINTMENT (OUTPATIENT)
Dept: LAB | Facility: AMBULARY SURGERY CENTER | Age: 71
End: 2021-09-27
Attending: UROLOGY
Payer: COMMERCIAL

## 2021-09-27 ENCOUNTER — PROCEDURE VISIT (OUTPATIENT)
Dept: UROLOGY | Facility: CLINIC | Age: 71
End: 2021-09-27
Payer: COMMERCIAL

## 2021-09-27 VITALS
WEIGHT: 240 LBS | SYSTOLIC BLOOD PRESSURE: 142 MMHG | DIASTOLIC BLOOD PRESSURE: 70 MMHG | BODY MASS INDEX: 37.67 KG/M2 | HEIGHT: 67 IN

## 2021-09-27 DIAGNOSIS — C61 PROSTATE CANCER (HCC): Primary | ICD-10-CM

## 2021-09-27 DIAGNOSIS — C61 PROSTATE CANCER (HCC): ICD-10-CM

## 2021-09-27 DIAGNOSIS — R39.9 LOWER URINARY TRACT SYMPTOMS (LUTS): ICD-10-CM

## 2021-09-27 LAB
ALBUMIN SERPL BCP-MCNC: 3.3 G/DL (ref 3.5–5)
ALP SERPL-CCNC: 116 U/L (ref 46–116)
ALT SERPL W P-5'-P-CCNC: 28 U/L (ref 12–78)
ANION GAP SERPL CALCULATED.3IONS-SCNC: 4 MMOL/L (ref 4–13)
AST SERPL W P-5'-P-CCNC: 22 U/L (ref 5–45)
BILIRUB SERPL-MCNC: 0.44 MG/DL (ref 0.2–1)
BUN SERPL-MCNC: 22 MG/DL (ref 5–25)
CALCIUM ALBUM COR SERPL-MCNC: 9.5 MG/DL (ref 8.3–10.1)
CALCIUM SERPL-MCNC: 8.9 MG/DL (ref 8.3–10.1)
CHLORIDE SERPL-SCNC: 108 MMOL/L (ref 100–108)
CO2 SERPL-SCNC: 25 MMOL/L (ref 21–32)
CREAT SERPL-MCNC: 0.93 MG/DL (ref 0.6–1.3)
EST. AVERAGE GLUCOSE BLD GHB EST-MCNC: 194 MG/DL
GFR SERPL CREATININE-BSD FRML MDRD: 82 ML/MIN/1.73SQ M
GLUCOSE SERPL-MCNC: 214 MG/DL (ref 65–140)
HBA1C MFR BLD: 8.4 %
POTASSIUM SERPL-SCNC: 4.2 MMOL/L (ref 3.5–5.3)
PROT SERPL-MCNC: 7.2 G/DL (ref 6.4–8.2)
PSA SERPL-MCNC: <0.1 NG/ML (ref 0–4)
SODIUM SERPL-SCNC: 137 MMOL/L (ref 136–145)

## 2021-09-27 PROCEDURE — 3008F BODY MASS INDEX DOCD: CPT | Performed by: UROLOGY

## 2021-09-27 PROCEDURE — G0103 PSA SCREENING: HCPCS

## 2021-09-27 PROCEDURE — 3077F SYST BP >= 140 MM HG: CPT | Performed by: UROLOGY

## 2021-09-27 PROCEDURE — 1160F RVW MEDS BY RX/DR IN RCRD: CPT | Performed by: UROLOGY

## 2021-09-27 PROCEDURE — 87147 CULTURE TYPE IMMUNOLOGIC: CPT | Performed by: UROLOGY

## 2021-09-27 PROCEDURE — 83036 HEMOGLOBIN GLYCOSYLATED A1C: CPT | Performed by: FAMILY MEDICINE

## 2021-09-27 PROCEDURE — 87086 URINE CULTURE/COLONY COUNT: CPT | Performed by: UROLOGY

## 2021-09-27 PROCEDURE — 52000 CYSTOURETHROSCOPY: CPT | Performed by: UROLOGY

## 2021-09-27 PROCEDURE — 3078F DIAST BP <80 MM HG: CPT | Performed by: UROLOGY

## 2021-09-27 PROCEDURE — 1036F TOBACCO NON-USER: CPT | Performed by: UROLOGY

## 2021-09-27 PROCEDURE — 3052F HG A1C>EQUAL 8.0%<EQUAL 9.0%: CPT | Performed by: UROLOGY

## 2021-09-27 PROCEDURE — 99215 OFFICE O/P EST HI 40 MIN: CPT | Performed by: UROLOGY

## 2021-09-27 PROCEDURE — 36415 COLL VENOUS BLD VENIPUNCTURE: CPT

## 2021-09-27 PROCEDURE — 80053 COMPREHEN METABOLIC PANEL: CPT | Performed by: FAMILY MEDICINE

## 2021-09-27 RX ORDER — PANTOPRAZOLE SODIUM 40 MG/1
TABLET, DELAYED RELEASE ORAL
Qty: 30 TABLET | Refills: 0 | Status: SHIPPED | OUTPATIENT
Start: 2021-09-27 | End: 2021-11-09

## 2021-09-27 RX ORDER — CEPHALEXIN 500 MG/1
500 CAPSULE ORAL EVERY 12 HOURS SCHEDULED
Qty: 6 CAPSULE | Refills: 0 | Status: SHIPPED | OUTPATIENT
Start: 2021-09-27 | End: 2021-09-30

## 2021-09-27 RX ORDER — MIRABEGRON 50 MG/1
TABLET, FILM COATED, EXTENDED RELEASE ORAL
Qty: 30 TABLET | Refills: 3 | Status: SHIPPED | OUTPATIENT
Start: 2021-09-27 | End: 2022-01-25

## 2021-09-27 NOTE — TELEPHONE ENCOUNTER
Please let the patient know his PSA looks perfect  He remains cancer free    He was seen in the office today and has a good follow-up plan going forward

## 2021-09-27 NOTE — TELEPHONE ENCOUNTER
Called patients phone, went straight to voicemail, Informed patient of his good lab work  Left office number in case of questions

## 2021-09-27 NOTE — PROGRESS NOTES
Cystoscopy     Date/Time 9/27/2021 3:37 PM     Performed by  Normajean Mortimer, MD     Authorized by Normajean Mortimer, MD          Procedure Details:  Procedure type: cystoscopy      Office Cystoscopy Procedure Note    Indication:     medically refractory lower urinary tract symptoms     Informed consent   The risks, benefits, complications, treatment options, and expected outcomes were discussed with the patient  The patient concurred with the proposed plan and provided informed consent  Anesthesia  Lidocaine jelly 2%    Antibiotic prophylaxis   Keflex x3 days prescribed    Procedure  The patient was placed in the supineposition, was prepped and draped in the usual manner using sterile technique, and 2% lidocaine jelly instilled into the urethra  A 17 F flexible cystoscope was then inserted into the urethra and the urethra and bladder carefully examined  The following findings were noted:    Findings:  Urethra:  Mild stenosis of the urethral meatus is noted  This is gently dilated using introduction tip of a Uro jet syringe followed by an 18 [place] Tin dilator introduced just distally  The endoscope was then introduced  There is a mild recurrence of his bulbar urethral stricture which measures approximately 16 [place]  It was difficult to introduce the cystoscope through this lesion initially  I gently dilated again using the Tin sound and was then able to advance proximal to the stricture and inspect the prostatic fossa and beyond  Of note the external urethral sphincter complex is noted to be very pale and atrophic and appears to coapt very poorly  Prostate: There is a nice open anterior channel status post prior Uro lift procedure  There is no exposed hardware  There is good invagination noted of each implant  I do not visualize any significant prostatic regrowth in the level of the gland to the apex    On retroflexion there is no prostatic capsular tabs or foreign bodies or stones noted within the bladder  Bladder:  Normal  Ureteral orifices:  Normal  Other findings:  None     Specimens: None                 Complications:    None; patient tolerated the procedure well           Disposition: To home after 30 minute observation             Condition: Stable

## 2021-09-27 NOTE — PROGRESS NOTES
Referring Physician: Susan London MD  A copy of this note was sent to the referring physician  Diagnoses and all orders for this visit:    Prostate cancer ([facility] [address] )  -     PSA, Total Screen; Future  -     Ambulatory referral to Physical Therapy; Future  -     PSA, Total Screen; Future  -     cephalexin (KEFLEX) 500 mg capsule; Take 1 capsule (500 mg total) by mouth every 12 (twelve) hours for 3 days    Lower urinary tract symptoms (LUTS)  -     Cancel: Urine culture  -     Urine culture            Assessment and plan:       1  Newly diagnosed [place] 9 prostate cancer  -cT1c,  Pretreatment PSA 5 3, [place] 4+5=9, 1 of 12 cores, 5% total core volume ( pathology reviewed by Dr Ade Morejon)  - negative staging evaluation (CT and bone scan)  - pending radiation treatment, we will utilize Uro lift as fiducial markers  - Lupron ongoing (3/4 doses, last July 13 2021)  - PSA currently pending (last 0 1 in December 2020)    2  Medically refractory BPH  -status post Uro lift, and subsequent redo procedure     3  Urethral stricture  - status post recent dilation    4  History of UTI    5  Mixed urge and stress incontinence  - 2-4 depends per day  - mirabetriq 50mg non-efficaious  - poor coaptation of external sphincter noted on cystoscopy      I had an extensive in productive discussion with the patient and his angie wife  He remains frustrated with his progressive incontinence requiring 2-4 depends per day  His incontinence appears to be both mixed urge and stress type  His risk factors include prior history of pelvic radiation for [place] 9 prostate cancer, obesity, and diabetes        Reviewed today cystoscopic findings which demonstrate:   -a nice open prostatic fossa status post prior Uro lift and subsequent Uro lift revision  -poor coaptation of the external urethral sphincter  -mild nonobstructing urethral stricture disease involving the bulbar urethra and urethral meatus     I have suggested a comprehensive a closed each including pelvic floor physical therapy  I also encouraged weight loss, healthy diet, and improved glycemic control  Finally I discussed referral to a reconstructive urologist for consideration of an artificial urinary sphincter  The present time he would like to move forward with pelvic floor physical therapy  He would like to hold off on evaluation for an artificial urinary sphincter at the present time  Comprehensive plan is as follows:  -check PSA today   -referral placed to pelvic floor physical therapy   -Keflex x3 days prescribed empirically due to prior history of urinary tract infection following cystoscopic procedures   -he will follow-up as scheduled in January for his 4th and final injection of Lupron   -we will repeat a PSA prior to that visit   -if the patient's continence does not improved to his satisfaction with conservative measures, he should be referred to Dr Pilar Sandoval reconstructive urology for consideration of an artificial urinary sphincter  Antione Tipton MD      Chief Complaint     Prostate cancer follow-up      History of Present Illness     Susi Files is a 70 y o  returns in follow-up for  prostate cancer, BPH, urethral stricture disease, UTI     Patient is now status post his initial dose of androgen deprivation therapy administered in June of 2020  He has completed pelvic radiation  He has complex BPH status post Uro lift and subsequent revision  Also dilation of urethral stricture at the time of his last procedure  He was hospitalized with a febrile urinary tract infection  Kidney has been frustrated by his progressive incontinence now wearing 2-4 depends per day  He appears to have poor sensation of when he is leaking and when he is dry  He has significant urinary urgency with episodes of urge urinary incontinence that he is able to perceive  He does continue on maximum dose Myrbetriq    He last received his 3rd injection of Lupron in July   PSA was ordered for prior to today's visit which he has yet to complete  He has had no interval UTIs or no hematuria  Detailed Urologic History     - please refer to HPI    Review of Systems     Review of Systems   Constitutional: Negative for activity change and fatigue  HENT: Negative for congestion  Eyes: Negative for visual disturbance  Respiratory: Negative for shortness of breath and wheezing  Cardiovascular: Negative for chest pain and leg swelling  Gastrointestinal: Negative for abdominal pain  Endocrine: Positive for polyuria  Genitourinary: Positive for dysuria  Negative for flank pain, hematuria and urgency  Musculoskeletal: Negative for back pain  Allergic/Immunologic: Negative for immunocompromised state  Neurological: Negative for dizziness and numbness  Psychiatric/Behavioral: Negative for dysphoric mood  All other systems reviewed and are negative  Allergies     Allergies   Allergen Reactions    Enalapril Anaphylaxis, Throat Swelling and Hives    Metformin Abdominal Pain       Physical Exam     Physical Exam  Constitutional:       General: He is not in acute distress  Appearance: He is well-developed  HENT:      Head: Normocephalic and atraumatic  Cardiovascular:      Comments: Negative lower extremity edema, obese  Pulmonary:      Effort: Pulmonary effort is normal       Breath sounds: Normal breath sounds  Abdominal:      Palpations: Abdomen is soft  Genitourinary:     Penis: Normal        Comments: Negative CVA tenderness  Musculoskeletal:         General: Normal range of motion  Cervical back: Normal range of motion  Skin:     General: Skin is warm  Neurological:      Mental Status: He is alert and oriented to person, place, and time     Psychiatric:         Behavior: Behavior normal              Vital Signs  Vitals:    09/27/21 1029   BP: 142/70   BP Location: Left arm   Patient Position: Sitting   Cuff Size: Adult Weight: 109 kg (240 lb)   Height: 5' 7" (1 702 m)         Current Medications       Current Outpatient Medications:     acetaminophen (TYLENOL) 325 mg tablet, Take 2 tablets (650 mg total) by mouth every 4 (four) hours as needed for mild pain, headaches or fever, Disp: , Rfl: 0    albuterol (PROAIR HFA) 90 mcg/act inhaler, inhale 2 puff by inhalation route  every 4 - 6 hours as needed, Disp: , Rfl:     aluminum-magnesium hydroxide-simethicone (MYLANTA) 200-200-20 mg/5 mL suspension, Take 30 mL by mouth every 6 (six) hours as needed for indigestion or heartburn, Disp: 355 mL, Rfl: 0    atorvastatin (LIPITOR) 10 mg tablet, TAKE ONE TABLET BY MOUTH EVERY DAY, Disp: 90 tablet, Rfl: 3    Biotin 1000 MCG tablet, Take 1 tablet by mouth daily in the early morning , Disp: , Rfl:     Blood Glucose Monitoring Suppl (GLUCOCARD VITAL MONITOR) w/Device KIT, by Does not apply route 2 (two) times a day, Disp: 1 kit, Rfl: 0    calcium carbonate (OS-GILDA) 600 MG tablet, Take 600 mg by mouth daily in the early morning , Disp: , Rfl:     Cholecalciferol (VITAMIN D3) 2000 units capsule, Take 1,000 Units by mouth daily in the early morning , Disp: , Rfl:     Cyanocobalamin (VITAMIN B-12) 5000 MCG TBDP, Take 5,000 mcg by mouth once a week Takes on Mondays, Disp: , Rfl:     dicyclomine (BENTYL) 10 mg capsule, Take 1 capsule (10 mg total) by mouth 4 (four) times a day as needed (abdominal cramping), Disp: 20 capsule, Rfl: 0    docusate sodium (DULCOLAX) 100 mg capsule, Take 100 mg by mouth daily as needed for constipation, Disp: , Rfl:     glipiZIDE (GLUCOTROL XL) 5 mg 24 hr tablet, TAKE ONE TABLET BY MOUTH EVERY DAY AFTER BREAKFAST, Disp: 90 tablet, Rfl: 3    Insulin Pen Needle 32G X 4 MM MISC, Use daily, Disp: 100 each, Rfl: 5    Lancets (LIFESCAN UNISTIK 2) MISC, Lifescan One Touch Ultramini Meter; use as directed; 1; 0; 31-Oct-2016; 31-Oct-2016; Melida Duran;  Active, Disp: , Rfl:     Multiple Vitamin (MULTIVITAMIN) capsule, Take 1 capsule by mouth daily in the early morning , Disp: , Rfl:     NovoLOG Mix 70/30 FlexPen (70-30) 100 units/mL injection pen, INJECT 20 UNITS UNDER THE SKIN EVERY 12 HOURS, Disp: 15 mL, Rfl: 5    ondansetron (ZOFRAN-ODT) 4 mg disintegrating tablet, Take 1 tablet (4 mg total) by mouth every 6 (six) hours as needed for nausea or vomiting, Disp: 20 tablet, Rfl: 0    pantoprazole (PROTONIX) 40 mg tablet, TAKE ONE TABLET BY MOUTH EVERY DAY, Disp: 30 tablet, Rfl: 0    tamsulosin (FLOMAX) 0 4 mg, Take 1 capsule (0 4 mg total) by mouth daily with dinner, Disp: 90 capsule, Rfl: 3    cephalexin (KEFLEX) 500 mg capsule, Take 1 capsule (500 mg total) by mouth every 12 (twelve) hours for 3 days, Disp: 6 capsule, Rfl: 0    diazepam (VALIUM) 5 mg tablet, Take 5mg tablet 30-60 mins prior to MRI  Repeat immediately prior if needed   (Patient not taking: Reported on 7/13/2021), Disp: 2 tablet, Rfl: 0    losartan (COZAAR) 25 mg tablet, TAKE ONE TABLET BY MOUTH EVERY DAY, Disp: 90 tablet, Rfl: 3    Myrbetriq 50 MG TB24, TAKE ONE TABLET BY MOUTH EVERY DAY, Disp: 30 tablet, Rfl: 3    Victoza injection, Inject 0 3 mL (1 8 mg total) under the skin daily, Disp: 27 mL, Rfl: 0      Active Problems     Patient Active Problem List   Diagnosis    GERD (gastroesophageal reflux disease)    Obesity    Constipation    Hypercholesteremia    Postgastrectomy malabsorption    Type 2 diabetes mellitus with hyperglycemia, with long-term current use of insulin (HCC)    Benign essential hypertension    Benign enlargement of prostate    Pancreatic cyst    History of colon polyps    IPMN (intraductal papillary mucinous neoplasm)    ED (erectile dysfunction) of organic origin    Benign prostatic hyperplasia with urinary frequency    Bruxism    Prostate cancer (HCC)    Nausea and vomiting    Greater trochanteric bursitis of left hip    Primary osteoarthritis of one hip, left    Gastroenteritis         Past Medical History     Past Medical History:   Diagnosis Date    Anemia     Arthritis     Black stool 7/24/2020    Diabetes mellitus ([facility] [address] )     IDDM    Diverticulosis     Enlarged prostate     Erectile dysfunction     Hypercholesteremia     Resolved post weight loss    Hypertension     Resolved post weight loss    Kidney stone     Obesity     Prostate cancer ([facility] [address] )     Wears partial dentures     partial upper and lower         Surgical History     Past Surgical History:   Procedure Laterality Date    ABDOMINAL ADHESION SURGERY N/A 11/28/2016    Procedure: EXTENSIVE LYSIS ADHESIONS;  Surgeon: Brandi Draper MD;  [facility];  Service:    [facility] FRACTURE SURGERY Left     CHOLECYSTECTOMY      COLON SURGERY      colon resection    COLONOSCOPY      COLOSTOMY      COLOSTOMY CLOSURE      DIAGNOSTIC LAPAROSCOPY      GASTRIC BYPASS      failed due to adhesions    HERNIA REPAIR      abdominal    TN BIOPSY OF PROSTATE,NEEDLE/PUNCH N/A 5/8/2020    Procedure: TRANSRECTAL NEEDLE BIOPSY PROSTATE (TRNBP) WITH TRANSRECTAL ULTRASOUND GUIDANCE;  Surgeon: Chema Jamil MD;  [facility];  Service: Urology    TN CYSTOSCOPY,DIR VIS INT URETHROTOMY N/A 5/8/2020    Procedure: DIRECT VISUAL INTERAL URETHROTOMY (DVIU), dilation of urethral stricture;  Surgeon: Chema Jamil MD;  [facility];  Service: Urology    TN CYSTOURETHRO W/IMPLANT N/A 3/8/2019    Procedure: CYSTOSCOPY WITH INSERTION Shearon Kohut;  Surgeon: Chema Jamil MD;  [facility];  Service: Urology    TN CYSTOURETHRO W/IMPLANT N/A 5/8/2020    Procedure: CYSTOSCOPY WITH INSERTION Shearon Kohut;  Surgeon: Chema Jamil MD;  [facility];  Service: Urology    TN [address] N/A 10/25/2018    Procedure: LINEAR ENDOSCOPIC U/S;  Surgeon: Lilo Alonzo MD;  [facility];   Service: Gastroenterology    TN ESOPHAGOGASTRODUODENOSCOPY TRANSORAL DIAGNOSTIC N/A 7/6/2016 Procedure: EGD AND COLONOSCOPY;  Surgeon: Gisela Carranza MD;  [facility]; Service: Gastroenterology    RI LAP, ANEESH RESTRICT PROC, LONGITUDINAL GASTRECTOMY N/A 2016    Procedure: GASTRECTOMY SLEEVE LAPAROSCOPIC;  Surgeon: Aruna Marinelli MD;  [facility];  Service: [facility] BIOPSY  2020         Family History     Family History   Problem Relation Age of Onset    Heart disease Mother     Breast cancer Mother 80    Diabetes Father     Colon cancer Neg Hx     Liver disease Neg Hx          Social History     Social History     Social History     Tobacco Use   Smoking Status Former Smoker    Quit date: 11/10/2001    Years since quittin 8   Smokeless Tobacco Former User         Pertinent Lab Values     Lab Results   Component Value Date    CREATININE 0 93 2021       Lab Results   Component Value Date    PSA <0 1 2021    PSA <0 1 2021    PSA 0 1 2020       @RESULTRCNT(1H])@      Pertinent Imaging     FINDINGS:     There is no scintigraphic evidence of osseous metastasis  Scattered changes of arthritis noted involving the ankles and knees  Normal activity noted in the bony pelvis, thoracolumbar spine, ribs and skull      IMPRESSION:     No scintigraphic evidence of osseous metastasis      FINDINGS:     ABDOMEN     LOWER CHEST:  No clinically significant abnormality identified in the visualized lower chest      LIVER/BILIARY TREE:  Unremarkable      GALLBLADDER:  Gallbladder is surgically absent      SPLEEN:  Unremarkable      PANCREAS:  Uncinate process  again demonstrates a lobulated hypodense heterogeneous predominantly cystic focus measuring 2 5 x 1 8 x 2 5 cm    This was suggested to be IPMN on MRI of 2019      ADRENAL GLANDS:  There is low density lobulated thickening of adrenal glands bilaterally statistically most likely to represent adenomatous hyperplasia      KIDNEYS/URETERS:  Exophytic hypodensity from the anterior midpole of the left kidney measuring 2 2 x 1 9 x 2 2 cm suggestive for cyst   Smaller subcentimeter hypodensities in the right kidney  No renal calculus    No hydronephrosis      STOMACH AND BOWEL: Findings of gastric surgery  Sigmoid diverticulosis  No diverticulitis      APPENDIX:  No findings to suggest appendicitis      ABDOMINOPELVIC CAVITY:  No ascites  No pneumoperitoneum  No lymphadenopathy      VESSELS:  Unremarkable for patient's age      PELVIS     REPRODUCTIVE ORGANS:  Prostate measures 5 5 x 5 1 cm with a linear radiopaque seed implants      URINARY BLADDER:  Unremarkable      ABDOMINAL WALL/INGUINAL REGIONS:  Prior anterior pelvic wall hernia repair findings seen  Adherent bowel loops seen      OSSEOUS STRUCTURES:  No acute fracture or destructive osseous lesion  No lytic or blastic lesion seen      IMPRESSION:  1  Complex cystic mass in the uncinate process of pancreas similar to prior studies and suggestive to IPMN  follow-up MRI was recommended on the prior study  2  Bilateral renal cysts as described mostly unchanged  3  Prostate is prominent with hyperdense seed implants        Portions of the record may have been created with voice recognition software   Occasional wrong word or "sound a like" substitutions may have occurred due to the inherent limitations of voice recognition software   Read the chart carefully and recognize, using context, where substitutions have occurred

## 2021-09-28 LAB — BACTERIA UR CULT: ABNORMAL

## 2021-10-03 DIAGNOSIS — E11.9 TYPE 2 DIABETES MELLITUS WITHOUT COMPLICATION, WITHOUT LONG-TERM CURRENT USE OF INSULIN (HCC): ICD-10-CM

## 2021-10-04 RX ORDER — INSULIN ASPART 100 [IU]/ML
INJECTION, SUSPENSION SUBCUTANEOUS
Qty: 15 ML | Refills: 5 | Status: SHIPPED | OUTPATIENT
Start: 2021-10-04 | End: 2022-02-04

## 2021-10-06 ENCOUNTER — OFFICE VISIT (OUTPATIENT)
Dept: FAMILY MEDICINE CLINIC | Facility: CLINIC | Age: 71
End: 2021-10-06
Payer: COMMERCIAL

## 2021-10-06 VITALS
HEIGHT: 67 IN | BODY MASS INDEX: 36.1 KG/M2 | OXYGEN SATURATION: 98 % | WEIGHT: 230 LBS | DIASTOLIC BLOOD PRESSURE: 68 MMHG | TEMPERATURE: 97 F | SYSTOLIC BLOOD PRESSURE: 120 MMHG | RESPIRATION RATE: 16 BRPM | HEART RATE: 62 BPM

## 2021-10-06 DIAGNOSIS — K21.9 GASTROESOPHAGEAL REFLUX DISEASE, UNSPECIFIED WHETHER ESOPHAGITIS PRESENT: ICD-10-CM

## 2021-10-06 DIAGNOSIS — I10 BENIGN ESSENTIAL HYPERTENSION: ICD-10-CM

## 2021-10-06 DIAGNOSIS — Z79.4 TYPE 2 DIABETES MELLITUS WITH HYPERGLYCEMIA, WITH LONG-TERM CURRENT USE OF INSULIN (HCC): Primary | ICD-10-CM

## 2021-10-06 DIAGNOSIS — E78.00 HYPERCHOLESTEREMIA: ICD-10-CM

## 2021-10-06 DIAGNOSIS — E11.65 TYPE 2 DIABETES MELLITUS WITH HYPERGLYCEMIA, WITH LONG-TERM CURRENT USE OF INSULIN (HCC): Primary | ICD-10-CM

## 2021-10-06 DIAGNOSIS — M25.562 ACUTE PAIN OF LEFT KNEE: ICD-10-CM

## 2021-10-06 DIAGNOSIS — E66.01 CLASS 2 SEVERE OBESITY DUE TO EXCESS CALORIES WITH SERIOUS COMORBIDITY AND BODY MASS INDEX (BMI) OF 35.0 TO 35.9 IN ADULT (HCC): ICD-10-CM

## 2021-10-06 PROBLEM — K52.9 GASTROENTERITIS: Status: RESOLVED | Noted: 2021-06-28 | Resolved: 2021-10-06

## 2021-10-06 PROBLEM — R11.2 NAUSEA AND VOMITING: Status: RESOLVED | Noted: 2020-07-24 | Resolved: 2021-10-06

## 2021-10-06 PROCEDURE — 3288F FALL RISK ASSESSMENT DOCD: CPT | Performed by: FAMILY MEDICINE

## 2021-10-06 PROCEDURE — 99214 OFFICE O/P EST MOD 30 MIN: CPT | Performed by: FAMILY MEDICINE

## 2021-10-06 PROCEDURE — 1160F RVW MEDS BY RX/DR IN RCRD: CPT | Performed by: FAMILY MEDICINE

## 2021-10-06 PROCEDURE — 1036F TOBACCO NON-USER: CPT | Performed by: FAMILY MEDICINE

## 2021-10-06 PROCEDURE — 3008F BODY MASS INDEX DOCD: CPT | Performed by: FAMILY MEDICINE

## 2021-10-06 PROCEDURE — 1101F PT FALLS ASSESS-DOCD LE1/YR: CPT | Performed by: FAMILY MEDICINE

## 2021-10-06 RX ORDER — DULAGLUTIDE 1.5 MG/.5ML
1.5 INJECTION, SOLUTION SUBCUTANEOUS WEEKLY
Qty: 2 ML | Refills: 0 | Status: SHIPPED | OUTPATIENT
Start: 2021-10-06 | End: 2021-11-10

## 2021-10-06 RX ORDER — EMPAGLIFLOZIN 25 MG/1
25 TABLET, FILM COATED ORAL EVERY MORNING
Qty: 90 TABLET | Refills: 0
Start: 2021-10-06 | End: 2021-11-05 | Stop reason: SDUPTHER

## 2021-10-07 ENCOUNTER — IMMUNIZATIONS (OUTPATIENT)
Dept: FAMILY MEDICINE CLINIC | Facility: HOSPITAL | Age: 71
End: 2021-10-07

## 2021-10-07 DIAGNOSIS — Z23 ENCOUNTER FOR IMMUNIZATION: Primary | ICD-10-CM

## 2021-10-07 PROCEDURE — 0001A SARS-COV-2 / COVID-19 MRNA VACCINE (PFIZER-BIONTECH) 30 MCG: CPT

## 2021-10-07 PROCEDURE — 91300 SARS-COV-2 / COVID-19 MRNA VACCINE (PFIZER-BIONTECH) 30 MCG: CPT

## 2021-10-18 ENCOUNTER — OFFICE VISIT (OUTPATIENT)
Dept: DENTISTRY | Facility: CLINIC | Age: 71
End: 2021-10-18

## 2021-10-18 ENCOUNTER — TELEPHONE (OUTPATIENT)
Dept: FAMILY MEDICINE CLINIC | Facility: CLINIC | Age: 71
End: 2021-10-18

## 2021-10-18 VITALS — SYSTOLIC BLOOD PRESSURE: 130 MMHG | HEART RATE: 60 BPM | DIASTOLIC BLOOD PRESSURE: 70 MMHG | TEMPERATURE: 97.7 F

## 2021-10-18 DIAGNOSIS — K05.6 PERIODONTAL DISEASE: Primary | ICD-10-CM

## 2021-10-18 PROCEDURE — D4341 PERIODONTAL SCALING AND ROOT PLANING - 4 OR MORE TEETH PER QUADRANT: HCPCS

## 2021-10-20 ENCOUNTER — OFFICE VISIT (OUTPATIENT)
Dept: DENTISTRY | Facility: CLINIC | Age: 71
End: 2021-10-20

## 2021-10-20 VITALS — TEMPERATURE: 97.8 F | DIASTOLIC BLOOD PRESSURE: 77 MMHG | SYSTOLIC BLOOD PRESSURE: 152 MMHG | HEART RATE: 80 BPM

## 2021-10-20 DIAGNOSIS — Z01.20 ENCOUNTER FOR DENTAL EXAMINATION AND CLEANING WITHOUT ABNORMAL FINDINGS: Primary | ICD-10-CM

## 2021-10-20 DIAGNOSIS — Z01.21 ENCOUNTER FOR DENTAL EXAMINATION AND CLEANING WITH ABNORMAL FINDINGS: ICD-10-CM

## 2021-10-20 PROCEDURE — D0140 LIMITED ORAL EVALUATION - PROBLEM FOCUSED: HCPCS | Performed by: DENTIST

## 2021-10-20 PROCEDURE — D0220 INTRAORAL - PERIAPICAL FIRST RADIOGRAPHIC IMAGE: HCPCS | Performed by: DENTIST

## 2021-10-21 ENCOUNTER — IMMUNIZATIONS (OUTPATIENT)
Dept: FAMILY MEDICINE CLINIC | Facility: CLINIC | Age: 71
End: 2021-10-21
Payer: COMMERCIAL

## 2021-10-21 DIAGNOSIS — Z23 ENCOUNTER FOR IMMUNIZATION: Primary | ICD-10-CM

## 2021-10-21 PROCEDURE — 90686 IIV4 VACC NO PRSV 0.5 ML IM: CPT

## 2021-10-21 PROCEDURE — G0008 ADMIN INFLUENZA VIRUS VAC: HCPCS

## 2021-11-04 ENCOUNTER — APPOINTMENT (OUTPATIENT)
Dept: RADIOLOGY | Facility: AMBULARY SURGERY CENTER | Age: 71
End: 2021-11-04
Attending: ORTHOPAEDIC SURGERY
Payer: COMMERCIAL

## 2021-11-04 ENCOUNTER — CONSULT (OUTPATIENT)
Dept: OBGYN CLINIC | Facility: CLINIC | Age: 71
End: 2021-11-04
Payer: COMMERCIAL

## 2021-11-04 VITALS
SYSTOLIC BLOOD PRESSURE: 126 MMHG | BODY MASS INDEX: 37.2 KG/M2 | HEART RATE: 85 BPM | DIASTOLIC BLOOD PRESSURE: 73 MMHG | HEIGHT: 67 IN | WEIGHT: 237 LBS

## 2021-11-04 DIAGNOSIS — M25.562 LEFT KNEE PAIN, UNSPECIFIED CHRONICITY: ICD-10-CM

## 2021-11-04 DIAGNOSIS — M25.562 LEFT KNEE PAIN, UNSPECIFIED CHRONICITY: Primary | ICD-10-CM

## 2021-11-04 PROCEDURE — 3008F BODY MASS INDEX DOCD: CPT | Performed by: ORTHOPAEDIC SURGERY

## 2021-11-04 PROCEDURE — 73562 X-RAY EXAM OF KNEE 3: CPT

## 2021-11-04 PROCEDURE — 99212 OFFICE O/P EST SF 10 MIN: CPT | Performed by: ORTHOPAEDIC SURGERY

## 2021-11-04 PROCEDURE — 1036F TOBACCO NON-USER: CPT | Performed by: ORTHOPAEDIC SURGERY

## 2021-11-05 DIAGNOSIS — N40.1 BENIGN PROSTATIC HYPERPLASIA WITH URINARY FREQUENCY: ICD-10-CM

## 2021-11-05 DIAGNOSIS — Z79.4 TYPE 2 DIABETES MELLITUS WITH HYPERGLYCEMIA, WITH LONG-TERM CURRENT USE OF INSULIN (HCC): ICD-10-CM

## 2021-11-05 DIAGNOSIS — R35.0 BENIGN PROSTATIC HYPERPLASIA WITH URINARY FREQUENCY: ICD-10-CM

## 2021-11-05 DIAGNOSIS — E11.65 TYPE 2 DIABETES MELLITUS WITH HYPERGLYCEMIA, WITH LONG-TERM CURRENT USE OF INSULIN (HCC): ICD-10-CM

## 2021-11-05 RX ORDER — EMPAGLIFLOZIN 25 MG/1
25 TABLET, FILM COATED ORAL EVERY MORNING
Qty: 90 TABLET | Refills: 0
Start: 2021-11-05 | End: 2021-11-18 | Stop reason: SDUPTHER

## 2021-11-05 RX ORDER — TAMSULOSIN HYDROCHLORIDE 0.4 MG/1
0.4 CAPSULE ORAL
Qty: 90 CAPSULE | Refills: 0 | Status: CANCELLED | OUTPATIENT
Start: 2021-11-05 | End: 2022-02-03

## 2021-11-05 RX ORDER — TAMSULOSIN HYDROCHLORIDE 0.4 MG/1
0.4 CAPSULE ORAL
Qty: 90 CAPSULE | Refills: 0 | Status: SHIPPED | OUTPATIENT
Start: 2021-11-05

## 2021-11-09 DIAGNOSIS — R10.9 ABDOMINAL PAIN: ICD-10-CM

## 2021-11-09 DIAGNOSIS — E11.65 TYPE 2 DIABETES MELLITUS WITH HYPERGLYCEMIA, WITH LONG-TERM CURRENT USE OF INSULIN (HCC): ICD-10-CM

## 2021-11-09 DIAGNOSIS — Z79.4 TYPE 2 DIABETES MELLITUS WITH HYPERGLYCEMIA, WITH LONG-TERM CURRENT USE OF INSULIN (HCC): ICD-10-CM

## 2021-11-09 RX ORDER — PANTOPRAZOLE SODIUM 40 MG/1
TABLET, DELAYED RELEASE ORAL
Qty: 30 TABLET | Refills: 0 | Status: SHIPPED | OUTPATIENT
Start: 2021-11-09 | End: 2021-12-07

## 2021-11-10 ENCOUNTER — HOSPITAL ENCOUNTER (OUTPATIENT)
Dept: CT IMAGING | Facility: HOSPITAL | Age: 71
Discharge: HOME | End: 2021-11-10
Payer: COMMERCIAL

## 2021-11-10 DIAGNOSIS — M25.562 LEFT KNEE PAIN, UNSPECIFIED CHRONICITY: ICD-10-CM

## 2021-11-10 PROCEDURE — 73700 CT LOWER EXTREMITY W/O DYE: CPT

## 2021-11-10 RX ORDER — DULAGLUTIDE 1.5 MG/.5ML
INJECTION, SOLUTION SUBCUTANEOUS
Qty: 0.5 ML | Refills: 1 | Status: SHIPPED | OUTPATIENT
Start: 2021-11-10 | End: 2022-01-06

## 2021-11-11 ENCOUNTER — TELEPHONE (OUTPATIENT)
Dept: OBGYN CLINIC | Facility: CLINIC | Age: 71
End: 2021-11-11

## 2021-11-11 DIAGNOSIS — M25.562 LEFT KNEE PAIN, UNSPECIFIED CHRONICITY: Primary | ICD-10-CM

## 2021-11-11 RX ORDER — MELOXICAM 15 MG/1
15 TABLET ORAL DAILY
Qty: 30 TABLET | Refills: 1 | Status: SHIPPED | OUTPATIENT
Start: 2021-11-11

## 2021-11-12 DIAGNOSIS — I10 BENIGN ESSENTIAL HYPERTENSION: ICD-10-CM

## 2021-11-12 PROCEDURE — 4010F ACE/ARB THERAPY RXD/TAKEN: CPT | Performed by: ORTHOPAEDIC SURGERY

## 2021-11-12 RX ORDER — LOSARTAN POTASSIUM 25 MG/1
TABLET ORAL
Qty: 90 TABLET | Refills: 3 | Status: SHIPPED | OUTPATIENT
Start: 2021-11-12 | End: 2022-08-10 | Stop reason: SDUPTHER

## 2021-11-15 ENCOUNTER — OFFICE VISIT (OUTPATIENT)
Dept: DENTISTRY | Facility: CLINIC | Age: 71
End: 2021-11-15

## 2021-11-15 VITALS — SYSTOLIC BLOOD PRESSURE: 149 MMHG | TEMPERATURE: 78.4 F | HEART RATE: 67 BPM | DIASTOLIC BLOOD PRESSURE: 74 MMHG

## 2021-11-15 DIAGNOSIS — Z01.21 ENCOUNTER FOR DENTAL EXAMINATION AND CLEANING WITH ABNORMAL FINDINGS: Primary | ICD-10-CM

## 2021-11-15 PROCEDURE — D2394 RESIN-BASED COMPOSITE - 4 OR MORE SURFACES, POSTERIOR: HCPCS | Performed by: DENTIST

## 2021-11-15 PROCEDURE — D7140 EXTRACTION, ERUPTED TOOTH OR EXPOSED ROOT (ELEVATION AND/OR FORCEPS REMOVAL): HCPCS | Performed by: DENTIST

## 2021-11-18 ENCOUNTER — TELEPHONE (OUTPATIENT)
Dept: FAMILY MEDICINE CLINIC | Facility: CLINIC | Age: 71
End: 2021-11-18

## 2021-11-18 DIAGNOSIS — E11.65 TYPE 2 DIABETES MELLITUS WITH HYPERGLYCEMIA, WITH LONG-TERM CURRENT USE OF INSULIN (HCC): ICD-10-CM

## 2021-11-18 DIAGNOSIS — Z79.4 TYPE 2 DIABETES MELLITUS WITH HYPERGLYCEMIA, WITH LONG-TERM CURRENT USE OF INSULIN (HCC): ICD-10-CM

## 2021-11-18 RX ORDER — EMPAGLIFLOZIN 25 MG/1
25 TABLET, FILM COATED ORAL EVERY MORNING
Qty: 90 TABLET | Refills: 1 | Status: SHIPPED | OUTPATIENT
Start: 2021-11-18 | End: 2022-02-22 | Stop reason: SINTOL

## 2021-12-07 DIAGNOSIS — R10.9 ABDOMINAL PAIN: ICD-10-CM

## 2021-12-07 RX ORDER — PANTOPRAZOLE SODIUM 40 MG/1
TABLET, DELAYED RELEASE ORAL
Qty: 30 TABLET | Refills: 0 | Status: SHIPPED | OUTPATIENT
Start: 2021-12-07 | End: 2022-01-06

## 2021-12-27 DIAGNOSIS — Z20.822 EXPOSURE TO COVID-19 VIRUS: Primary | ICD-10-CM

## 2021-12-28 PROCEDURE — U0005 INFEC AGEN DETEC AMPLI PROBE: HCPCS | Performed by: FAMILY MEDICINE

## 2021-12-28 PROCEDURE — U0003 INFECTIOUS AGENT DETECTION BY NUCLEIC ACID (DNA OR RNA); SEVERE ACUTE RESPIRATORY SYNDROME CORONAVIRUS 2 (SARS-COV-2) (CORONAVIRUS DISEASE [COVID-19]), AMPLIFIED PROBE TECHNIQUE, MAKING USE OF HIGH THROUGHPUT TECHNOLOGIES AS DESCRIBED BY CMS-2020-01-R: HCPCS | Performed by: FAMILY MEDICINE

## 2021-12-29 LAB — SARS-COV-2 RNA RESP QL NAA+PROBE: NEGATIVE

## 2022-01-04 ENCOUNTER — TELEPHONE (OUTPATIENT)
Dept: UROLOGY | Facility: AMBULATORY SURGERY CENTER | Age: 72
End: 2022-01-04

## 2022-01-04 NOTE — TELEPHONE ENCOUNTER
Patient is scheduled for a Lupron injection 1/12/22 and current insurance on file is not active  Please follow up with patient to see if he got new insurance as of the new year  Let me know    Thanks  Mary Lemus

## 2022-01-05 ENCOUNTER — TELEPHONE (OUTPATIENT)
Dept: OTHER | Facility: OTHER | Age: 72
End: 2022-01-05

## 2022-01-05 NOTE — TELEPHONE ENCOUNTER
Called and spoke with patient at this time  He states he was wondering if he needed a PSA done before his upcoming apt  Advised he did  Also advised he should continue taking his flomax until appt on 1/12/22  He states he did already stop taking it and is feeling slightly better  Advised he can discuss further at upcoming appt  He verbalized understanding

## 2022-01-05 NOTE — TELEPHONE ENCOUNTER
Office had previously notified patient of PSA on 9/27/21 which was <0 1  Would recommend continue tamsulosin until symptoms can be re-discussed at upcoming appointment on 1/12/2022

## 2022-01-05 NOTE — TELEPHONE ENCOUNTER
Patient is waiting for a call back with his latest PSA test results  He also has a issue with frequent urination, and sent the office a message awhile ago asking if he should discontinue the Flomax  He is still waiting for a response on that, also

## 2022-01-06 DIAGNOSIS — R10.9 ABDOMINAL PAIN: ICD-10-CM

## 2022-01-06 DIAGNOSIS — Z79.4 TYPE 2 DIABETES MELLITUS WITH HYPERGLYCEMIA, WITH LONG-TERM CURRENT USE OF INSULIN (HCC): ICD-10-CM

## 2022-01-06 DIAGNOSIS — E11.65 TYPE 2 DIABETES MELLITUS WITH HYPERGLYCEMIA, WITH LONG-TERM CURRENT USE OF INSULIN (HCC): ICD-10-CM

## 2022-01-06 RX ORDER — DULAGLUTIDE 1.5 MG/.5ML
INJECTION, SOLUTION SUBCUTANEOUS
Qty: 2 ML | Refills: 1 | Status: SHIPPED | OUTPATIENT
Start: 2022-01-06 | End: 2022-03-30

## 2022-01-06 RX ORDER — PANTOPRAZOLE SODIUM 40 MG/1
TABLET, DELAYED RELEASE ORAL
Qty: 30 TABLET | Refills: 0 | Status: SHIPPED | OUTPATIENT
Start: 2022-01-06 | End: 2022-02-09

## 2022-01-06 NOTE — TELEPHONE ENCOUNTER
Spoke with patient, patient new Insurance:  [address] [facility]  Member id# [id number]  Grp# [id number]  eff date: 1/1/2022

## 2022-01-07 NOTE — TELEPHONE ENCOUNTER
Boyd Cotton  Please enter his new insurance into [employer]  Lupron is approved    Thanks  Ale Tony

## 2022-01-10 ENCOUNTER — APPOINTMENT (OUTPATIENT)
Dept: LAB | Facility: AMBULARY SURGERY CENTER | Age: 72
End: 2022-01-10
Payer: COMMERCIAL

## 2022-01-10 DIAGNOSIS — C61 PROSTATE CANCER (HCC): ICD-10-CM

## 2022-01-10 LAB — PSA SERPL-MCNC: <0.1 NG/ML (ref 0–4)

## 2022-01-10 PROCEDURE — 36415 COLL VENOUS BLD VENIPUNCTURE: CPT

## 2022-01-10 PROCEDURE — 84153 ASSAY OF PSA TOTAL: CPT

## 2022-01-12 ENCOUNTER — OFFICE VISIT (OUTPATIENT)
Dept: UROLOGY | Facility: CLINIC | Age: 72
End: 2022-01-12
Payer: COMMERCIAL

## 2022-01-12 VITALS
HEIGHT: 67 IN | HEART RATE: 64 BPM | WEIGHT: 239 LBS | BODY MASS INDEX: 37.51 KG/M2 | SYSTOLIC BLOOD PRESSURE: 130 MMHG | DIASTOLIC BLOOD PRESSURE: 70 MMHG

## 2022-01-12 DIAGNOSIS — C61 PROSTATE CANCER (HCC): Primary | ICD-10-CM

## 2022-01-12 PROCEDURE — 96402 CHEMO HORMON ANTINEOPL SQ/IM: CPT

## 2022-01-12 PROCEDURE — 99213 OFFICE O/P EST LOW 20 MIN: CPT | Performed by: PHYSICIAN ASSISTANT

## 2022-01-12 NOTE — PROGRESS NOTES
UROLOGY PROGRESS NOTE   Patient Identifiers: Felipe Everett (MRN [id number])  Date of Service: 1/12/2022    Subjective:     70-year-old man history of [place] 9 prostate cancer  He had redo UroLift implants in May  2020 and was treated with radiation and ongoing ADT  He has a history of bulbar urethral stricture recently dilated in September  PSA is< 0 1  He reason for visit:  Prostate cancer follow-up     Objective:     VITALS:    There were no vitals filed for this visit          LABS:  Lab Results   Component Value Date    HGB 11 5 (L) 06/29/2021    HCT 35 5 (L) 06/29/2021    WBC 10 69 (H) 06/29/2021     (L) 06/29/2021   ]    Lab Results   Component Value Date     01/07/2016    K 4 2 09/27/2021     09/27/2021    CO2 25 09/27/2021    BUN 22 09/27/2021    CREATININE 0 93 09/27/2021    CALCIUM 8 9 09/27/2021    GLUCOSE 198 (H) 12/14/2020   ]        INPATIENT MEDS:    Current Outpatient Medications:     acetaminophen (TYLENOL) 325 mg tablet, Take 2 tablets (650 mg total) by mouth every 4 (four) hours as needed for mild pain, headaches or fever, Disp: , Rfl: 0    albuterol (PROAIR HFA) 90 mcg/act inhaler, inhale 2 puff by inhalation route  every 4 - 6 hours as needed, Disp: , Rfl:     aluminum-magnesium hydroxide-simethicone (MYLANTA) 200-200-20 mg/5 mL suspension, Take 30 mL by mouth every 6 (six) hours as needed for indigestion or heartburn (Patient not taking: Reported on 10/18/2021), Disp: 355 mL, Rfl: 0    atorvastatin (LIPITOR) 10 mg tablet, TAKE ONE TABLET BY MOUTH EVERY DAY, Disp: 90 tablet, Rfl: 3    Biotin 1000 MCG tablet, Take 1 tablet by mouth daily in the early morning , Disp: , Rfl:     Blood Glucose Monitoring Suppl (GLUCOCARD VITAL MONITOR) w/Device KIT, by Does not apply route 2 (two) times a day, Disp: 1 kit, Rfl: 0    calcium carbonate (OS-GILDA) 600 MG tablet, Take 600 mg by mouth daily in the early morning , Disp: , Rfl:     Cholecalciferol (VITAMIN D3) 2000 units capsule, Take 1,000 Units by mouth daily in the early morning , Disp: , Rfl:     Continuous Blood Gluc  (FreeStyle [place]ia 14 Day Burton) CHARITY, Use as directed, Disp: 2 each, Rfl: 3    Continuous Blood Gluc Sensor (FreeStyle [place] 14 Day Sensor) MISC, Use as directed, Disp: 2 each, Rfl: 3    Cyanocobalamin (VITAMIN B-12) 5000 MCG TBDP, Take 5,000 mcg by mouth once a week Takes on Mondays, Disp: , Rfl:     diazepam (VALIUM) 5 mg tablet, Take 5mg tablet 30-60 mins prior to MRI  Repeat immediately prior if needed  (Patient not taking: Reported on 7/13/2021), Disp: 2 tablet, Rfl: 0    dicyclomine (BENTYL) 10 mg capsule, Take 1 capsule (10 mg total) by mouth 4 (four) times a day as needed (abdominal cramping) (Patient not taking: Reported on 10/18/2021), Disp: 20 capsule, Rfl: 0    docusate sodium (DULCOLAX) 100 mg capsule, Take 100 mg by mouth daily as needed for constipation, Disp: , Rfl:     Empagliflozin (Jardiance) 25 MG TABS, Take 1 tablet (25 mg total) by mouth every morning, Disp: 90 tablet, Rfl: 1    glipiZIDE (GLUCOTROL XL) 5 mg 24 hr tablet, TAKE ONE TABLET BY MOUTH EVERY DAY AFTER BREAKFAST, Disp: 90 tablet, Rfl: 3    Insulin Pen Needle 32G X 4 MM MISC, Use daily, Disp: 100 each, Rfl: 5    Lancets (LIFESCAN UNISTIK 2) MISC, Lifescan One Touch Ultramini Meter; use as directed; 1; 0; 31-Oct-2016; 31-Oct-2016; Melida Duran;  Active, Disp: , Rfl:     losartan (COZAAR) 25 mg tablet, TAKE ONE TABLET BY MOUTH EVERY DAY, Disp: 90 tablet, Rfl: 3    meloxicam (Mobic) 15 mg tablet, Take 1 tablet (15 mg total) by mouth daily As needed for pain, Disp: 30 tablet, Rfl: 1    Multiple Vitamin (MULTIVITAMIN) capsule, Take 1 capsule by mouth daily in the early morning , Disp: , Rfl:     Myrbetriq 50 MG TB24, TAKE ONE TABLET BY MOUTH EVERY DAY, Disp: 30 tablet, Rfl: 3    NovoLOG Mix 70/30 FlexPen (70-30) 100 units/mL injection pen, INJECT 20 UNITS UNDER THE SKIN EVERY 12 HOURS, Disp: 15 mL, Rfl: 5   ondansetron (ZOFRAN-ODT) 4 mg disintegrating tablet, Take 1 tablet (4 mg total) by mouth every 6 (six) hours as needed for nausea or vomiting, Disp: 20 tablet, Rfl: 0    pantoprazole (PROTONIX) 40 mg tablet, TAKE ONE TABLET BY MOUTH EVERY DAY, Disp: 30 tablet, Rfl: 0    tamsulosin (FLOMAX) 0 4 mg, Take 1 capsule (0 4 mg total) by mouth daily with dinner, Disp: 90 capsule, Rfl: 0    Trulicity 1 5 RY/5 5PX SOPN, INJECT 0 5ML (1 5MG) UNDER THE SKIN ONCE WEEKLY, Disp: 2 mL, Rfl: 1      Physical Exam:   There were no vitals taken for this visit  GEN: no acute distress    RESP: breathing comfortably with no accessory muscle use    ABD: soft, non-tender, non-distended   INCISION:    EXT: no significant peripheral edema       RADIOLOGY:    none     Assessment:    1   Prostate cancer [place] 9  Status post RT and ongoing ADT      Plan:   -  Lupron 45 mg given today  - follow-up in 6 months with PSA prior to visit for his last Lupron injection  -  -

## 2022-01-18 ENCOUNTER — TELEPHONE (OUTPATIENT)
Dept: FAMILY MEDICINE CLINIC | Facility: CLINIC | Age: 72
End: 2022-01-18

## 2022-01-18 NOTE — TELEPHONE ENCOUNTER
Patient called and asked if you wanted him to go for blood for A1C because it has been 3 months since he went for that  Patients next appiontment with you on 2/22/22  Please advise

## 2022-01-25 DIAGNOSIS — R39.9 LOWER URINARY TRACT SYMPTOMS (LUTS): ICD-10-CM

## 2022-01-25 RX ORDER — MIRABEGRON 50 MG/1
TABLET, FILM COATED, EXTENDED RELEASE ORAL
Qty: 30 TABLET | Refills: 3 | Status: SHIPPED | OUTPATIENT
Start: 2022-01-25 | End: 2022-06-01

## 2022-02-04 ENCOUNTER — TELEPHONE (OUTPATIENT)
Dept: FAMILY MEDICINE CLINIC | Facility: CLINIC | Age: 72
End: 2022-02-04

## 2022-02-04 NOTE — TELEPHONE ENCOUNTER
He is okay to switch  [facility]   Please let the patient know about the change

## 2022-02-04 NOTE — TELEPHONE ENCOUNTER
Humalog mix 75/25 kwikpen is preferred through insure vs novolog mix 70/30 flexpen  Can this be changed or do you prefer I continue with the prior authorization?

## 2022-02-09 DIAGNOSIS — E11.9 TYPE 2 DIABETES MELLITUS WITH INSULIN THERAPY (HCC): ICD-10-CM

## 2022-02-09 DIAGNOSIS — R10.9 ABDOMINAL PAIN: ICD-10-CM

## 2022-02-09 DIAGNOSIS — Z79.4 TYPE 2 DIABETES MELLITUS WITH INSULIN THERAPY (HCC): ICD-10-CM

## 2022-02-09 RX ORDER — PANTOPRAZOLE SODIUM 40 MG/1
TABLET, DELAYED RELEASE ORAL
Qty: 30 TABLET | Refills: 0 | Status: SHIPPED | OUTPATIENT
Start: 2022-02-09 | End: 2022-03-07

## 2022-02-09 RX ORDER — ATORVASTATIN CALCIUM 10 MG/1
TABLET, FILM COATED ORAL
Qty: 90 TABLET | Refills: 3 | Status: SHIPPED | OUTPATIENT
Start: 2022-02-09 | End: 2022-08-10 | Stop reason: SDUPTHER

## 2022-02-21 ENCOUNTER — APPOINTMENT (OUTPATIENT)
Dept: LAB | Facility: AMBULARY SURGERY CENTER | Age: 72
End: 2022-02-21
Payer: COMMERCIAL

## 2022-02-21 DIAGNOSIS — E11.65 TYPE 2 DIABETES MELLITUS WITH HYPERGLYCEMIA, WITH LONG-TERM CURRENT USE OF INSULIN (HCC): ICD-10-CM

## 2022-02-21 DIAGNOSIS — C61 PROSTATE CANCER (HCC): ICD-10-CM

## 2022-02-21 DIAGNOSIS — Z79.4 TYPE 2 DIABETES MELLITUS WITH HYPERGLYCEMIA, WITH LONG-TERM CURRENT USE OF INSULIN (HCC): ICD-10-CM

## 2022-02-21 LAB
CHOLEST SERPL-MCNC: 148 MG/DL
HDLC SERPL-MCNC: 44 MG/DL
LDLC SERPL CALC-MCNC: 81 MG/DL (ref 0–100)
PSA SERPL-MCNC: <0.1 NG/ML (ref 0–4)
TRIGL SERPL-MCNC: 114 MG/DL

## 2022-02-21 PROCEDURE — 80061 LIPID PANEL: CPT

## 2022-02-21 PROCEDURE — 84153 ASSAY OF PSA TOTAL: CPT

## 2022-02-22 ENCOUNTER — OFFICE VISIT (OUTPATIENT)
Dept: FAMILY MEDICINE CLINIC | Facility: CLINIC | Age: 72
End: 2022-02-22
Payer: COMMERCIAL

## 2022-02-22 VITALS
BODY MASS INDEX: 36.28 KG/M2 | HEIGHT: 68 IN | OXYGEN SATURATION: 95 % | WEIGHT: 239.4 LBS | DIASTOLIC BLOOD PRESSURE: 62 MMHG | HEART RATE: 71 BPM | SYSTOLIC BLOOD PRESSURE: 130 MMHG | TEMPERATURE: 96.8 F | RESPIRATION RATE: 16 BRPM

## 2022-02-22 DIAGNOSIS — C61 PROSTATE CANCER (HCC): ICD-10-CM

## 2022-02-22 DIAGNOSIS — Z00.00 ANNUAL PHYSICAL EXAM: Primary | ICD-10-CM

## 2022-02-22 DIAGNOSIS — Z79.4 TYPE 2 DIABETES MELLITUS WITH HYPERGLYCEMIA, WITH LONG-TERM CURRENT USE OF INSULIN (HCC): ICD-10-CM

## 2022-02-22 DIAGNOSIS — I10 BENIGN ESSENTIAL HYPERTENSION: ICD-10-CM

## 2022-02-22 DIAGNOSIS — E11.65 TYPE 2 DIABETES MELLITUS WITH HYPERGLYCEMIA, WITH LONG-TERM CURRENT USE OF INSULIN (HCC): ICD-10-CM

## 2022-02-22 DIAGNOSIS — E66.01 CLASS 2 SEVERE OBESITY DUE TO EXCESS CALORIES WITH SERIOUS COMORBIDITY AND BODY MASS INDEX (BMI) OF 35.0 TO 35.9 IN ADULT (HCC): ICD-10-CM

## 2022-02-22 PROCEDURE — G0439 PPPS, SUBSEQ VISIT: HCPCS | Performed by: FAMILY MEDICINE

## 2022-02-22 RX ORDER — DULAGLUTIDE 3 MG/.5ML
3 INJECTION, SOLUTION SUBCUTANEOUS WEEKLY
Qty: 6 ML | Refills: 0 | Status: SHIPPED | OUTPATIENT
Start: 2022-02-22 | End: 2022-03-28

## 2022-02-22 NOTE — ASSESSMENT & PLAN NOTE
Lab Results   Component Value Date    HGBA1C 7 6 (H) 02/21/2022   improving   Increased trulicity to 3mg weekly for better glycemic control

## 2022-02-22 NOTE — PROGRESS NOTES
[address] [facility] PRACTICE    NAME: Nader Bose  AGE: 70 y o  SEX: male  : 1950     DATE: 2022     Assessment and Plan:     Problem List Items Addressed This Visit        Endocrine    Type 2 diabetes mellitus with hyperglycemia, with long-term current use of insulin (HCC) (Chronic)       Lab Results   Component Value Date    HGBA1C 7 6 (H) 2022   improving   Increased trulicity to 3mg weekly for better glycemic control            Relevant Medications    Dulaglutide (Trulicity) 3 SI 5OG SOPN    Other Relevant Orders    CBC and differential    Comprehensive metabolic panel    Hemoglobin A1C       Cardiovascular and Mediastinum    Benign essential hypertension     Stable on current meds             Genitourinary    Prostate cancer ([address] )     On lupron shot every 6 months   In remission   Care per urology             Other    Class 2 severe obesity due to excess calories with serious comorbidity and body mass index (BMI) of 35 0 to 35 9 in adult [facility])      Other Visit Diagnoses     Annual physical exam    -  Primary          Immunizations and preventive care screenings were discussed with patient today  Appropriate education was printed on patient's after visit summary  Counseling:  Alcohol/drug use: discussed moderation in alcohol intake, the recommendations for healthy alcohol use, and avoidance of illicit drug use  Dental Health: discussed importance of regular tooth brushing, flossing, and dental visits  Injury prevention: discussed safety/seat belts, safety helmets, smoke detectors, carbon dioxide detectors, and smoking near bedding or upholstery  Sexual health: discussed sexually transmitted diseases, partner selection, use of condoms, avoidance of unintended pregnancy, and contraceptive alternatives  · Exercise: the importance of regular exercise/physical activity was discussed   Recommend exercise 3-5 times per week for at least 30 minutes  BMI Counseling: Body mass index is 36 45 kg/m²  The BMI is above normal  Nutrition recommendations include decreasing portion sizes and encouraging healthy choices of fruits and vegetables  Exercise recommendations include moderate physical activity 150 minutes/week  No pharmacotherapy was ordered  Rationale for BMI follow-up plan is due to patient being overweight or obese  Depression Screening and Follow-up Plan: Patient was screened for depression during today's encounter  They screened negative with a PHQ-2 score of 0  No follow-ups on file  Chief Complaint:     Chief Complaint   Patient presents with    Physical Exam     Patient is here today for an annual exam       History of Present Illness:     Adult Annual Physical   Patient here for a comprehensive physical exam  The patient reports no problems  Diet and Physical Activity  · Diet/Nutrition: well balanced diet and consuming 3-5 servings of fruits/vegetables daily  · Exercise: walking  Depression Screening  PHQ-2/9 Depression Screening    Little interest or pleasure in doing things: 0 - not at all  Feeling down, depressed, or hopeless: 0 - not at all  PHQ-2 Score: 0  PHQ-2 Interpretation: Negative depression screen       General Health  · Sleep: sleeps well and gets 7-8 hours of sleep on average  · Hearing: normal - bilateral   · Vision: goes for regular eye exams and most recent eye exam <1 year ago  · Dental: regular dental visits and brushes teeth twice daily   Health  · Symptoms include: none     Review of Systems:     Review of Systems   Constitutional: Negative  HENT: Negative  Eyes: Negative  Respiratory: Negative  Cardiovascular: Negative  Gastrointestinal: Negative  Endocrine: Negative  Genitourinary: Negative  Musculoskeletal: Negative  Skin: Negative  Allergic/Immunologic: Negative  Neurological: Negative  Hematological: Negative  Psychiatric/Behavioral: Negative  Past Medical History:     Past Medical History:   Diagnosis Date    Anemia     Arthritis     Black stool 7/24/2020    Diabetes mellitus [address] [address] )     IDDM    Diverticulosis     Enlarged prostate     Erectile dysfunction     Hypercholesteremia     Resolved post weight loss    Hypertension     Resolved post weight loss    Kidney stone     Obesity     Prostate cancer ([facility] [address] )     Wears partial dentures     partial upper and lower      Past Surgical History:     Past Surgical History:   Procedure Laterality Date    ABDOMINAL ADHESION SURGERY N/A 11/28/2016    Procedure: EXTENSIVE LYSIS ADHESIONS;  Surgeon: Sonia Robles MD;  [facility]: [facility];  Service:    [facility] FRACTURE SURGERY Left     CHOLECYSTECTOMY      COLON SURGERY      colon resection    COLONOSCOPY      COLOSTOMY      COLOSTOMY CLOSURE      DIAGNOSTIC LAPAROSCOPY      GASTRIC BYPASS      failed due to adhesions    HERNIA REPAIR      abdominal    KS BIOPSY OF PROSTATE,NEEDLE/PUNCH N/A 5/8/2020    Procedure: TRANSRECTAL NEEDLE BIOPSY PROSTATE (TRNBP) WITH TRANSRECTAL ULTRASOUND GUIDANCE;  Surgeon: Chana Burns MD;  [facility];  Service: Urology    KS CYSTOSCOPY,DIR VIS INT URETHROTOMY N/A 5/8/2020    Procedure: DIRECT VISUAL INTERAL URETHROTOMY (DVIU), dilation of urethral stricture;  Surgeon: Chana Burns MD;  [facility];  Service: Urology    KS CYSTOURETHRO W/IMPLANT N/A 3/8/2019    Procedure: CYSTOSCOPY WITH INSERTION Alma Craw;  Surgeon: hCana Burns MD;  [facility];  Service: Urology    KS CYSTOURETHRO W/IMPLANT N/A 5/8/2020    Procedure: CYSTOSCOPY WITH INSERTION Alma Craw;  Surgeon: Chana Burns MD;  [facility];  Service: Urology    KS [address] DUODENUM/JEJUNUM N/A 10/25/2018    Procedure: LINEAR ENDOSCOPIC U/S;  Surgeon: Mery Sierra MD;  Location: [facility];   Service: Gastroenterology    KS ESOPHAGOGASTRODUODENOSCOPY TRANSORAL DIAGNOSTIC N/A 2016    Procedure: EGD AND COLONOSCOPY;  Surgeon: Teressa Ward MD;  [facility]: [facility];   Service: Gastroenterology    DE LAP, ANEESH RESTRICT PROC, LONGITUDINAL GASTRECTOMY N/A 2016    Procedure: GASTRECTOMY SLEEVE LAPAROSCOPIC;  Surgeon: Ariel Blair MD;  [facility];  Service: [address] [place] BIOPSY  2020      Family History:     Family History   Problem Relation Age of Onset    Heart disease Mother     Breast cancer Mother 80    Diabetes Father     Colon cancer Neg Hx     Liver disease Neg Hx       Social History:     Social History     Socioeconomic History    Marital status: Registered Domestic Partner     Spouse name: None    Number of children: None    Years of education: None    Highest education level: None   Occupational History    None   Tobacco Use    Smoking status: Former Smoker     Quit date: 11/10/2001     Years since quittin 2    Smokeless tobacco: Former User   Vaping Use    Vaping Use: Never used   Substance and Sexual Activity    Alcohol use: Not Currently    Drug use: Not Currently     Comment: RSO    Sexual activity: Yes     Partners: Female   Other Topics Concern    None   Social History Narrative    None     Social Determinants of Health     Financial Resource Strain: Not on file   Food Insecurity: Not on file   Transportation Needs: Not on file   Physical Activity: Not on file   Stress: Not on file   Social Connections: Not on file   Intimate Partner Violence: Not on file   Housing Stability: Not on file      Current Medications:     Current Outpatient Medications   Medication Sig Dispense Refill    acetaminophen (TYLENOL) 325 mg tablet Take 2 tablets (650 mg total) by mouth every 4 (four) hours as needed for mild pain, headaches or fever  0    albuterol (PROAIR HFA) 90 mcg/act inhaler inhale 2 puff by inhalation route  every 4 - 6 hours as needed  aluminum-magnesium hydroxide-simethicone (MYLANTA) 200-200-20 mg/5 mL suspension Take 30 mL by mouth every 6 (six) hours as needed for indigestion or heartburn 355 mL 0    atorvastatin (LIPITOR) 10 mg tablet TAKE ONE TABLET BY MOUTH EVERY DAY 90 tablet 3    Biotin 1000 MCG tablet Take 1 tablet by mouth daily in the early morning       Blood Glucose Monitoring Suppl (GLUCOCARD VITAL MONITOR) w/Device KIT by Does not apply route 2 (two) times a day 1 kit 0    calcium carbonate (OS-GILDA) 600 MG tablet Take 600 mg by mouth daily in the early morning       Cholecalciferol (VITAMIN D3) 2000 units capsule Take 1,000 Units by mouth daily in the early morning       Cyanocobalamin (VITAMIN B-12) 5000 MCG TBDP Take 5,000 mcg by mouth once a week Takes on Mondays      dicyclomine (BENTYL) 10 mg capsule Take 1 capsule (10 mg total) by mouth 4 (four) times a day as needed (abdominal cramping) 20 capsule 0    docusate sodium (DULCOLAX) 100 mg capsule Take 100 mg by mouth daily as needed for constipation      glipiZIDE (GLUCOTROL XL) 5 mg 24 hr tablet TAKE ONE TABLET BY MOUTH EVERY DAY AFTER BREAKFAST 90 tablet 3    Insulin Lispro Prot & Lispro (HumaLOG Mix 75/25 KwikPen) (75-25) 100 units/mL injection pen Inject 20 Units under the skin 2 (two) times a day with meals 15 mL 3    Insulin Pen Needle 32G X 4 MM MISC Use daily 100 each 5    Lancets (LIFESCAN UNISTIK 2) MISC Lifescan One Touch Ultramini Meter; use as directed; 1; 0; 31-Oct-2016; 31-Oct-2016; Melida Duran;  Active      meloxicam (Mobic) 15 mg tablet Take 1 tablet (15 mg total) by mouth daily As needed for pain 30 tablet 1    Multiple Vitamin (MULTIVITAMIN) capsule Take 1 capsule by mouth daily in the early morning       Myrbetriq 50 MG TB24 TAKE ONE TABLET BY MOUTH EVERY DAY 30 tablet 3    ondansetron (ZOFRAN-ODT) 4 mg disintegrating tablet Take 1 tablet (4 mg total) by mouth every 6 (six) hours as needed for nausea or vomiting 20 tablet 0    pantoprazole (PROTONIX) 40 mg tablet TAKE ONE TABLET BY MOUTH EVERY DAY 30 tablet 0    tamsulosin (FLOMAX) 0 4 mg Take 1 capsule (0 4 mg total) by mouth daily with dinner 90 capsule 0    Trulicity 1 5 MQ/3 8FU SOPN INJECT 0 5ML (1 5MG) UNDER THE SKIN ONCE WEEKLY 2 mL 1    Dulaglutide (Trulicity) 3 MB/8 0KV SOPN Inject 0 5 mL (3 mg total) under the skin once a week 6 mL 0    losartan (COZAAR) 25 mg tablet TAKE ONE TABLET BY MOUTH EVERY DAY 90 tablet 3     No current facility-administered medications for this visit  Allergies: Allergies   Allergen Reactions    Enalapril Anaphylaxis, Throat Swelling and Hives    Metformin Abdominal Pain      Physical Exam:     /62 (BP Location: Left arm, Patient Position: Sitting, Cuff Size: Large)   Pulse 71   Temp (!) 96 8 °F (36 °C) (Skin)   Resp 16   Ht 5' 7 95" (1 726 m)   Wt 109 kg (239 lb 6 4 oz)   SpO2 95%   BMI 36 45 kg/m²     Physical Exam  Constitutional:       Appearance: He is well-developed  HENT:      Head: Normocephalic and atraumatic  Right Ear: Tympanic membrane normal       Left Ear: Tympanic membrane normal       Nose: Nose normal       Mouth/Throat:      Mouth: Mucous membranes are moist    Eyes:      Pupils: Pupils are equal, round, and reactive to light  Cardiovascular:      Rate and Rhythm: Normal rate and regular rhythm  Pulses: Normal pulses  Pulmonary:      Effort: Pulmonary effort is normal       Breath sounds: Normal breath sounds  Abdominal:      Palpations: Abdomen is soft  Musculoskeletal:         General: Normal range of motion  Cervical back: Normal range of motion and neck supple  Skin:     General: Skin is warm  Capillary Refill: Capillary refill takes less than 2 seconds  Neurological:      General: No focal deficit present  Mental Status: He is alert and oriented to person, place, and time     Psychiatric:         Mood and Affect: Mood normal          Behavior: Behavior normal  Lyudmila Kate MD  [address] [facility]

## 2022-02-22 NOTE — PATIENT INSTRUCTIONS

## 2022-02-25 ENCOUNTER — TELEPHONE (OUTPATIENT)
Dept: OTHER | Facility: OTHER | Age: 72
End: 2022-02-25

## 2022-02-25 NOTE — TELEPHONE ENCOUNTER
DAYAN Yo from [employer] and wanted to know if office received medical records that were faxed on 2/16/2022

## 2022-03-07 DIAGNOSIS — R10.9 ABDOMINAL PAIN: ICD-10-CM

## 2022-03-07 DIAGNOSIS — E11.9 TYPE 2 DIABETES MELLITUS WITH INSULIN THERAPY (HCC): ICD-10-CM

## 2022-03-07 DIAGNOSIS — Z79.4 TYPE 2 DIABETES MELLITUS WITH INSULIN THERAPY (HCC): ICD-10-CM

## 2022-03-07 RX ORDER — PANTOPRAZOLE SODIUM 40 MG/1
TABLET, DELAYED RELEASE ORAL
Qty: 30 TABLET | Refills: 0 | Status: SHIPPED | OUTPATIENT
Start: 2022-03-07 | End: 2022-04-04

## 2022-03-07 RX ORDER — GLIPIZIDE 5 MG/1
TABLET, FILM COATED, EXTENDED RELEASE ORAL
Qty: 90 TABLET | Refills: 3 | Status: SHIPPED | OUTPATIENT
Start: 2022-03-07

## 2022-03-11 ENCOUNTER — TELEPHONE (OUTPATIENT)
Dept: FAMILY MEDICINE CLINIC | Facility: CLINIC | Age: 72
End: 2022-03-11

## 2022-03-11 NOTE — TELEPHONE ENCOUNTER
LMOM for patient to return call  Insurance will not cover Novolog FlexPen but they will cover Humalog KwikPen  Only difference is brand      Please route response to Dr Jani Dwyer if patient calls back and is ok with change

## 2022-03-11 NOTE — TELEPHONE ENCOUNTER
Insulin Lispro Prot & Lispro (HumaLOG Mix 75/25 KwikPen) (75-25) 100 units/mL injection pen [[id number]]     Order Details  Dose: 20 Units Route: Subcutaneous Frequency: 2 times daily with meals  Dispense Quantity: 15 mL Refills: 3        Sig: Inject 20 Units under the skin 2 (two) times a day with meals       Start Date: 02/04/22 End Date: --  Written Date: 02/04/22 Expiration Date: 02/04/23       Did he not get it yet?

## 2022-03-14 ENCOUNTER — OFFICE VISIT (OUTPATIENT)
Dept: FAMILY MEDICINE CLINIC | Facility: CLINIC | Age: 72
End: 2022-03-14
Payer: COMMERCIAL

## 2022-03-14 VITALS
OXYGEN SATURATION: 93 % | RESPIRATION RATE: 12 BRPM | SYSTOLIC BLOOD PRESSURE: 130 MMHG | DIASTOLIC BLOOD PRESSURE: 82 MMHG | HEIGHT: 68 IN | HEART RATE: 62 BPM | BODY MASS INDEX: 36.92 KG/M2 | WEIGHT: 243.6 LBS | TEMPERATURE: 96.8 F

## 2022-03-14 DIAGNOSIS — J45.901 MILD ASTHMA EXACERBATION: Primary | ICD-10-CM

## 2022-03-14 PROCEDURE — 99213 OFFICE O/P EST LOW 20 MIN: CPT | Performed by: FAMILY MEDICINE

## 2022-03-14 RX ORDER — ALBUTEROL SULFATE 90 UG/1
2 AEROSOL, METERED RESPIRATORY (INHALATION) 4 TIMES DAILY
Qty: 6.7 G | Refills: 1 | Status: SHIPPED | OUTPATIENT
Start: 2022-03-14 | End: 2022-06-24

## 2022-03-14 RX ORDER — PREDNISONE 10 MG/1
TABLET ORAL
Qty: 20 TABLET | Refills: 0 | Status: SHIPPED | OUTPATIENT
Start: 2022-03-14 | End: 2022-03-22

## 2022-03-14 NOTE — ASSESSMENT & PLAN NOTE
Symptoms x 1 week with expiratory wheezing on exam    Spo2 93%, HR and BP normal  History of asthma with prn albuterol use   Start prednisone taper  Take with protonix  Aware it may cause elevation in BP and BS  Home BS have ranged form 110- 200  Asked to call if above 300 and will provide a sliding scale   Pt will also continue albuterol q6 hrs for 24 hours to allow prednisone to work  Then he may switch to prn use     Mucolytic preferred over antitussive to help clear the congestion   Call precautions discussed

## 2022-03-14 NOTE — PROGRESS NOTES
Assessment/Plan:         Problem List Items Addressed This Visit        Respiratory    Mild asthma exacerbation - Primary     Symptoms x 1 week with expiratory wheezing on exam    Spo2 93%, HR and BP normal  History of asthma with prn albuterol use   Start prednisone taper  Take with protonix  Aware it may cause elevation in BP and BS  Home BS have ranged form 110- 200  Asked to call if above 300 and will provide a sliding scale   Pt will also continue albuterol q6 hrs for 24 hours to allow prednisone to work  Then he may switch to prn use  Mucolytic preferred over antitussive to help clear the congestion   Call precautions discussed          Relevant Medications    guaiFENesin (ROBITUSSIN) 100 MG/5ML oral liquid    predniSONE 10 mg tablet    albuterol (ProAir HFA) 90 mcg/act inhaler            Subjective:      Patient ID: Lito Solano is a 70 y o  male with history of DM, asthma, HTN, and prostate cancer who complains of productive cough with dark green phlegm, rhinorrhea, nasal congestion, and chest congestion for 1 week  + sick contacts (wife has similar symptoms)  Negative home COVID test  Denies fevers, body aches, or sore throat  Used albuterol twice last night  Mildly short of breath with chest tightness and wheezing  The following portions of the patient's history were reviewed and updated as appropriate:   He  has a past medical history of Anemia, Arthritis, Black stool (7/24/2020), Diabetes mellitus ([address] ), Diverticulosis, Enlarged prostate, Erectile dysfunction, Hypercholesteremia, Hypertension, Kidney stone, Obesity, Prostate cancer ([address] ), and Wears partial dentures    He   Patient Active Problem List    Diagnosis Date Noted    Mild asthma exacerbation 03/14/2022    Greater trochanteric bursitis of left hip 04/14/2021    Primary osteoarthritis of one hip, left 04/14/2021    Prostate cancer ([facility] [address] )     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    Type 2 diabetes mellitus with hyperglycemia, with long-term current use of insulin ([facility] [address] ) 02/02/2016    Hypercholesteremia 01/05/2016    Benign essential hypertension 01/05/2016    Benign enlargement of prostate 01/05/2016     He  has a past surgical history that includes Ankle fracture surgery (Left); Cholecystectomy; Colostomy; Colostomy closure; Diagnostic laparoscopy; Hernia repair; Tonsillectomy; Gastric bypass; pr lap, stephie restrict proc, longitudinal gastrectomy (N/A, 11/28/2016); Abdominal adhesion surgery (N/A, 11/28/2016); pr esophagogastroduodenoscopy transoral diagnostic (N/A, 7/6/2016); Colonoscopy; Colon surgery; pr edg us exam surgical alter stom duodenum/jejunum (N/A, 10/25/2018); pr cystourethro w/implant (N/A, 3/8/2019); US guided prostate biopsy (5/8/2020); pr cystourethro w/implant (N/A, 5/8/2020); pr biopsy of prostate,needle/punch (N/A, 5/8/2020); and pr cystoscopy,dir vis int urethrotomy (N/A, 5/8/2020)  His family history includes Breast cancer (age of onset: 80) in his mother; Diabetes in his father; Heart disease in his mother  He  reports that he quit smoking about 20 years ago  He has quit using smokeless tobacco  He reports previous alcohol use  He reports previous drug use    Current Outpatient Medications on File Prior to Visit   Medication Sig    acetaminophen (TYLENOL) 325 mg tablet Take 2 tablets (650 mg total) by mouth every 4 (four) hours as needed for mild pain, headaches or fever    aluminum-magnesium hydroxide-simethicone (MYLANTA) 136-700-21 mg/5 mL suspension Take 30 mL by mouth every 6 (six) hours as needed for indigestion or heartburn    atorvastatin (LIPITOR) 10 mg tablet TAKE ONE TABLET BY MOUTH EVERY DAY    Biotin 1000 MCG tablet Take 1 tablet by mouth daily in the early morning     Blood Glucose Monitoring Suppl (GLUCOCARD VITAL MONITOR) w/Device KIT by Does not apply route 2 (two) times a day    calcium carbonate (OS-GILDA) 600 MG tablet Take 600 mg by mouth daily in the early morning     Cholecalciferol (VITAMIN D3) 2000 units capsule Take 1,000 Units by mouth daily in the early morning     Cyanocobalamin (VITAMIN B-12) 5000 MCG TBDP Take 5,000 mcg by mouth once a week Takes on Mondays    dicyclomine (BENTYL) 10 mg capsule Take 1 capsule (10 mg total) by mouth 4 (four) times a day as needed (abdominal cramping)    docusate sodium (DULCOLAX) 100 mg capsule Take 100 mg by mouth daily as needed for constipation    Dulaglutide (Trulicity) 3 WZ/1 1QQ SOPN Inject 0 5 mL (3 mg total) under the skin once a week    glipiZIDE (GLUCOTROL XL) 5 mg 24 hr tablet TAKE ONE TABLET BY MOUTH EVERY DAY AFTER BREAKFAST    Insulin Lispro Prot & Lispro (HumaLOG Mix 75/25 KwikPen) (75-25) 100 units/mL injection pen Inject 20 Units under the skin 2 (two) times a day with meals    Insulin Pen Needle 32G X 4 MM MISC Use daily    Lancets (LIFESCAN UNISTIK 2) MISC Lifescan One Touch Ultramini Meter; use as directed; 1; 0; 31-Oct-2016; 31-Oct-2016; Melida Duran;  Active    losartan (COZAAR) 25 mg tablet TAKE ONE TABLET BY MOUTH EVERY DAY    meloxicam (Mobic) 15 mg tablet Take 1 tablet (15 mg total) by mouth daily As needed for pain    Multiple Vitamin (MULTIVITAMIN) capsule Take 1 capsule by mouth daily in the early morning     Myrbetriq 50 MG TB24 TAKE ONE TABLET BY MOUTH EVERY DAY    ondansetron (ZOFRAN-ODT) 4 mg disintegrating tablet Take 1 tablet (4 mg total) by mouth every 6 (six) hours as needed for nausea or vomiting    pantoprazole (PROTONIX) 40 mg tablet TAKE ONE TABLET BY MOUTH EVERY DAY    tamsulosin (FLOMAX) 0 4 mg Take 1 capsule (0 4 mg total) by mouth daily with dinner    Trulicity 1 5 VC/5 2UM SOPN INJECT 0 5ML (1 5MG) UNDER THE SKIN ONCE WEEKLY    [DISCONTINUED] albuterol (PROAIR HFA) 90 mcg/act inhaler inhale 2 puff by inhalation route  every 4 - 6 hours as needed     No current facility-administered medications on file prior to visit  He is allergic to enalapril and metformin       Review of Systems   Constitutional: Negative for fever  HENT: Positive for postnasal drip and rhinorrhea  Respiratory: Positive for cough, chest tightness, shortness of breath and wheezing  Cardiovascular: Negative for chest pain, palpitations and leg swelling  Objective:      /82   Pulse 62   Temp (!) 96 8 °F (36 °C) (Tympanic)   Resp 12   Ht 5' 7 95" (1 726 m)   Wt 110 kg (243 lb 9 6 oz)   SpO2 93%   BMI 37 09 kg/m²          Physical Exam  Constitutional:       General: He is not in acute distress  Appearance: Normal appearance  He is not ill-appearing, toxic-appearing or diaphoretic  HENT:      Head: Normocephalic and atraumatic  Right Ear: Tympanic membrane normal       Left Ear: Tympanic membrane normal       Nose: Congestion and rhinorrhea present  Mouth/Throat:      Mouth: Mucous membranes are moist       Pharynx: No posterior oropharyngeal erythema  Comments: Tonsils absent   Eyes:      Extraocular Movements: Extraocular movements intact  Cardiovascular:      Rate and Rhythm: Normal rate and regular rhythm  Heart sounds: No murmur heard  Pulmonary:      Effort: Pulmonary effort is normal  No respiratory distress  Breath sounds: No stridor  Wheezing present  No rhonchi or rales  Neurological:      Mental Status: He is alert  Psychiatric:         Mood and Affect: Mood normal          Behavior: Behavior normal          Thought Content:  Thought content normal

## 2022-03-15 ENCOUNTER — TELEPHONE (OUTPATIENT)
Dept: OTHER | Facility: OTHER | Age: 72
End: 2022-03-15

## 2022-03-15 NOTE — TELEPHONE ENCOUNTER
Sanaz calling from [facility] to confirm medical record request form for most recent chart notes  Please call or fax at the number provided

## 2022-03-28 DIAGNOSIS — Z79.4 TYPE 2 DIABETES MELLITUS WITH HYPERGLYCEMIA, WITH LONG-TERM CURRENT USE OF INSULIN (HCC): ICD-10-CM

## 2022-03-28 DIAGNOSIS — E11.65 TYPE 2 DIABETES MELLITUS WITH HYPERGLYCEMIA, WITH LONG-TERM CURRENT USE OF INSULIN (HCC): ICD-10-CM

## 2022-03-28 RX ORDER — DULAGLUTIDE 3 MG/.5ML
INJECTION, SOLUTION SUBCUTANEOUS
Qty: 6 ML | Refills: 0 | Status: SHIPPED | OUTPATIENT
Start: 2022-03-28

## 2022-03-31 ENCOUNTER — TELEPHONE (OUTPATIENT)
Dept: OTHER | Facility: OTHER | Age: 72
End: 2022-03-31

## 2022-03-31 NOTE — TELEPHONE ENCOUNTER
Sherman Solis from [employer] has questions about the Two  Insulin medications that were called in today by Dr Rebekah Lu    Please call her back at [phone]

## 2022-04-04 DIAGNOSIS — R10.9 ABDOMINAL PAIN: ICD-10-CM

## 2022-04-04 RX ORDER — PANTOPRAZOLE SODIUM 40 MG/1
TABLET, DELAYED RELEASE ORAL
Qty: 30 TABLET | Refills: 0 | Status: SHIPPED | OUTPATIENT
Start: 2022-04-04 | End: 2022-05-25

## 2022-04-06 ENCOUNTER — OFFICE VISIT (OUTPATIENT)
Dept: DENTISTRY | Facility: CLINIC | Age: 72
End: 2022-04-06

## 2022-04-06 DIAGNOSIS — Z00.00 ENCOUNTER FOR SCREENING AND PREVENTATIVE CARE: Primary | ICD-10-CM

## 2022-04-06 PROCEDURE — D0191 ASSESSMENT OF A PATIENT: HCPCS

## 2022-04-06 NOTE — PROGRESS NOTES
Pt arrived for SRP Ul apt but did not want to pay fee he was asked to pay  I discussed with him he did have Rt side SRP completed back in Oct  So we needed to finish lt side (4-7mm pockets throughout)   ended up speaking with him  Decided to reschedule apt after figuring out his insurance coverage      NV: Srp UL  NVV SRP LL

## 2022-05-04 ENCOUNTER — OFFICE VISIT (OUTPATIENT)
Dept: DENTISTRY | Facility: CLINIC | Age: 72
End: 2022-05-04

## 2022-05-04 VITALS — HEART RATE: 77 BPM | DIASTOLIC BLOOD PRESSURE: 70 MMHG | SYSTOLIC BLOOD PRESSURE: 134 MMHG

## 2022-05-04 DIAGNOSIS — K05.4 PERIODONTOSIS: Primary | ICD-10-CM

## 2022-05-04 PROCEDURE — D4341 PERIODONTAL SCALING AND ROOT PLANING - 4 OR MORE TEETH PER QUADRANT: HCPCS

## 2022-05-04 NOTE — PROGRESS NOTES
SCALE and RP UL   Dr Araceli Stauffer   Administered local anesthesia and will add notes  CHIEF CONCERN: none   PAIN SCALE: 0  ASA CLASS: I  PLAQUE:  moderate   CALCULUS: mod calculus  BLEEDING: moderate  STAIN :none   ORAL HYGIENE:   fair    Hand scaled, polished and flossed  Used cavitron    Oral Hygiene Instruction:  recommended brushing 2 x daily for 2 minutes MIN, recommended flossing daily, reviewed dietary precautions  Printed AVS summary with post sc/rp instructions     Soft tissue exam:  soft tissue exam was normal  ExtraOral exam:   Extraoral exam was normal    REFERRALS: no referrals needed    Next Visit: sc/rp LL

## 2022-05-04 NOTE — PATIENT INSTRUCTIONS
Scaling + Root Planing Post Op Instructions    You had a procedure performed called Scaling + Root Planing (SRP)  This is a non-surgical treatment of periodontal disease (gum disease)  The purpose of this treatment is to remove bacterial plaque and tartar from the root surface below the gumline, which can cause serious health conditions including bone loss  The goal of this treatment is to allow reattachment of the gums to the clean root surface and to shrink the periodontal pockets to levels that can be maintained by daily flossing and brushing  Following the procedure  When anesthesia has been used, your lips, teeth and tongue may remain numb after the procedure  The length of time you experience numbness varies, depending on the type of anesthetic received  Please be aware of your tongue and lips  It is easy to bite or burn your tongue or lips while numb  Please do not eat or drink anything that is excessively hot  You may want to refrain from eating or drinking anything until the numbness has worn off  Discomfort or Pain  It is not uncommon for your gum tissue to feel achy for a few days  You may take an over the counter analgesic such as Advil or Aleve if you have any tenderness of the gums  If you must avoid these analgesics because you are already taking NSAIDs, are allergic to them, or you have ulcers, then you may take acetaminophen (Tylenol)  Please follow dosage recommendations on the product labels  Tooth Sensitivity  It is not unusual for your teeth to be more sensitive to hot or cold temperatures, and/or sweets  This occurs as the gum tissue heals and shrinks in size  This sensitivity is the most common complaint after scaling and root planing  Any sensitivity should gradually go away in a few weeks   Brushing daily with sensitivity toothpaste or using fluoride rinses may help alleviate this over time  Avoid Smoking/Tobacco products  Smoking and the use of tobacco products should be avoided for a minimum of 24-48 hrs  Tobacco use slows the healing process and increases the chance of post op complications  Smoking cessation is highly recommended  Bleeding  Slight bleeding may continue for 24-48 hours  This is not unusual and should subside  You may return to work, but it is advised to limit strenuous activity for 24 hours after the procedure as this may increase bleeding  For the rest of the day, avoid rinsing your mouth vigorously, as this will prolong the bleeding  If bleeding continues, apply light pressure to the area with a moistened gauze or moistened tea bag  Keep in place for 20-30 minutes  If you are still concerned about bleeding after following these instructions, please call our office  Food/ Diet  You may eat as tolerated after the numbness has worn off  Your next meal should consist of soft foods  ( pasta, scrambled eggs, mashed potatoes, mac + cheese, etc  ) Avoid hard crunchy foods like chips, popcorn, nuts or seeds, alcoholic beverages and hot liquids for today  Maintain Home Care/Follow up visits  Resume your at home oral healthcare routine by regular brushing 2x daily and flossing 1x daily even if you experience any bleeding or tenderness in the gums  Proper dental hygiene at home as well as routine visits to our dental office are the most important ways you can maintain the health of your gums after scaling and root planing  It is good to rinse your mouth with warm salt water rinses (1/4 teaspoon of salt in full glass of comfortably very warm water) or an antimicrobial mouthwash after the procedure  After treatment, your gums should appear more pink, less swollen, and bleed less  These are signs of healing and improving periodontal health  3-4 month maintenance cleanings are recommended to monitor your progress and treat periodontal disease

## 2022-05-14 ENCOUNTER — OFFICE VISIT (OUTPATIENT)
Dept: URGENT CARE | Age: 72
End: 2022-05-14
Payer: COMMERCIAL

## 2022-05-14 VITALS
HEIGHT: 68 IN | DIASTOLIC BLOOD PRESSURE: 65 MMHG | TEMPERATURE: 96.5 F | OXYGEN SATURATION: 95 % | BODY MASS INDEX: 36.76 KG/M2 | WEIGHT: 242.56 LBS | HEART RATE: 63 BPM | SYSTOLIC BLOOD PRESSURE: 145 MMHG

## 2022-05-14 DIAGNOSIS — J45.901 MILD ASTHMA EXACERBATION: ICD-10-CM

## 2022-05-14 DIAGNOSIS — J20.9 ACUTE BRONCHITIS, UNSPECIFIED ORGANISM: Primary | ICD-10-CM

## 2022-05-14 PROCEDURE — S9083 URGENT CARE CENTER GLOBAL: HCPCS

## 2022-05-14 PROCEDURE — 99213 OFFICE O/P EST LOW 20 MIN: CPT

## 2022-05-14 RX ORDER — AZITHROMYCIN 250 MG/1
TABLET, FILM COATED ORAL
Qty: 6 TABLET | Refills: 0 | Status: SHIPPED | OUTPATIENT
Start: 2022-05-14 | End: 2022-05-18

## 2022-05-14 RX ORDER — ALBUTEROL SULFATE 90 UG/1
2 AEROSOL, METERED RESPIRATORY (INHALATION) EVERY 6 HOURS PRN
Qty: 8.5 G | Refills: 0 | Status: SHIPPED | OUTPATIENT
Start: 2022-05-14

## 2022-05-14 RX ORDER — METHYLPREDNISOLONE 4 MG/1
TABLET ORAL
Qty: 21 TABLET | Refills: 0 | Status: SHIPPED | OUTPATIENT
Start: 2022-05-14 | End: 2022-05-20

## 2022-05-14 NOTE — PROGRESS NOTES
[facility]        NAME: Ifeoma Aponte is a 70 y o  male  : 1950    MRN: [id number]  DATE: May 14, 2022  TIME: 10:28 AM    Assessment and Plan   Acute bronchitis, unspecified organism [J20 9]  1  Acute bronchitis, unspecified organism  azithromycin (ZITHROMAX) 250 mg tablet    methylPREDNISolone 4 MG tablet therapy pack   2  Mild asthma exacerbation  albuterol (ProAir HFA) 90 mcg/act inhaler         Patient Instructions     Antibiotics as discussed  Steroids as discussed  Albuterol as needed for wheezing and chest tightness  Follow up with PCP in 3-5 days  Proceed to  ER if symptoms worsen  Chief Complaint     Chief Complaint   Patient presents with    Cough     With yellow phlegm    Nasal Congestion         History of Present Illness       Patient presenting for evaluation of productive cough, nasal congestion, wheezing and shortness of breath with exertion and lying flat  Patient states that the symptoms have worsened over the past week  Patient states that he had a similar occurrence approximately 1 month ago it was treated with Tessalon and prednisone, which provided mild relief of his symptoms, but they have since returned  Patient states that his wife is sick with similar symptoms  Patient has taken a home COVID test and it was negative, and he states that he is vaccinated against COVID  Patient states he has been taking albuterol for his symptoms, and is providing mild relief  He also states that he is taking Tylenol  Patient denies a history of lung disease or a smoking history  Review of Systems   Review of Systems   Constitutional: Negative for chills and fever  HENT: Positive for congestion  Negative for ear pain and sore throat  Eyes: Negative for pain and visual disturbance  Respiratory: Positive for cough, shortness of breath and wheezing  Cardiovascular: Negative for chest pain and palpitations     Gastrointestinal: Negative for abdominal pain and vomiting  Genitourinary: Negative for dysuria and hematuria  Musculoskeletal: Negative for arthralgias and back pain  Skin: Negative for color change and rash  Neurological: Negative for seizures and syncope  All other systems reviewed and are negative          Current Medications       Current Outpatient Medications:     albuterol (ProAir HFA) 90 mcg/act inhaler, Inhale 2 puffs 4 (four) times a day, Disp: 6 7 g, Rfl: 1    albuterol (ProAir HFA) 90 mcg/act inhaler, Inhale 2 puffs every 6 (six) hours as needed for wheezing or shortness of breath, Disp: 8 5 g, Rfl: 0    atorvastatin (LIPITOR) 10 mg tablet, TAKE ONE TABLET BY MOUTH EVERY DAY, Disp: 90 tablet, Rfl: 3    azithromycin (ZITHROMAX) 250 mg tablet, Take 2 tablets today then 1 tablet daily x 4 days, Disp: 6 tablet, Rfl: 0    Biotin 1000 MCG tablet, Take 1 tablet by mouth daily in the early morning , Disp: , Rfl:     calcium carbonate (OS-GILDA) 600 MG tablet, Take 600 mg by mouth daily in the early morning , Disp: , Rfl:     Cholecalciferol (VITAMIN D3) 2000 units capsule, Take 1,000 Units by mouth daily in the early morning , Disp: , Rfl:     Cyanocobalamin (VITAMIN B-12) 5000 MCG TBDP, Take 5,000 mcg by mouth once a week Takes on Mondays, Disp: , Rfl:     dicyclomine (BENTYL) 10 mg capsule, Take 1 capsule (10 mg total) by mouth 4 (four) times a day as needed (abdominal cramping), Disp: 20 capsule, Rfl: 0    docusate sodium (COLACE) 100 mg capsule, Take 100 mg by mouth daily as needed for constipation, Disp: , Rfl:     glipiZIDE (GLUCOTROL XL) 5 mg 24 hr tablet, TAKE ONE TABLET BY MOUTH EVERY DAY AFTER BREAKFAST, Disp: 90 tablet, Rfl: 3    Insulin Lispro Prot & Lispro (HumaLOG Mix 75/25 KwikPen) (75-25) 100 units/mL injection pen, Inject 20 Units under the skin 2 (two) times a day with meals, Disp: 15 mL, Rfl: 3    Insulin Pen Needle 32G X 4 MM MISC, Use daily, Disp: 100 each, Rfl: 5    Lancets (LIFESCAN UNISTIK 2) MISC, Lifescan One Touch Ultramini Meter; use as directed; 1; 0; 31-Oct-2016; 31-Oct-2016; Melida Duran; Active, Disp: , Rfl:     meloxicam (Mobic) 15 mg tablet, Take 1 tablet (15 mg total) by mouth daily As needed for pain, Disp: 30 tablet, Rfl: 1    methylPREDNISolone 4 MG tablet therapy pack, Take 6 tablets (24 mg total) by mouth daily for 1 day, THEN 5 tablets (20 mg total) daily for 1 day, THEN 4 tablets (16 mg total) daily for 1 day, THEN 3 tablets (12 mg total) daily for 1 day, THEN 2 tablets (8 mg total) daily for 1 day, THEN 1 tablet (4 mg total) daily for 1 day  Use as directed on package  , Disp: 21 tablet, Rfl: 0    Multiple Vitamin (MULTIVITAMIN) capsule, Take 1 capsule by mouth daily in the early morning , Disp: , Rfl:     Myrbetriq 50 MG TB24, TAKE ONE TABLET BY MOUTH EVERY DAY, Disp: 30 tablet, Rfl: 3    ondansetron (ZOFRAN-ODT) 4 mg disintegrating tablet, Take 1 tablet (4 mg total) by mouth every 6 (six) hours as needed for nausea or vomiting, Disp: 20 tablet, Rfl: 0    pantoprazole (PROTONIX) 40 mg tablet, TAKE ONE TABLET BY MOUTH EVERY DAY, Disp: 30 tablet, Rfl: 0    Trulicity 3 OF/1 7ZZ SOPN, INJECT 0 5ML (3MG) UNDER THE SKIN ONCE A WEEK, Disp: 6 mL, Rfl: 0    acetaminophen (TYLENOL) 325 mg tablet, Take 2 tablets (650 mg total) by mouth every 4 (four) hours as needed for mild pain, headaches or fever (Patient not taking: Reported on 5/4/2022 ), Disp: , Rfl: 0    aluminum-magnesium hydroxide-simethicone (MYLANTA) 200-200-20 mg/5 mL suspension, Take 30 mL by mouth every 6 (six) hours as needed for indigestion or heartburn, Disp: 355 mL, Rfl: 0    Blood Glucose Monitoring Suppl (GLUCOCARD VITAL MONITOR) w/Device KIT, by Does not apply route 2 (two) times a day, Disp: 1 kit, Rfl: 0    guaiFENesin (ROBITUSSIN) 100 MG/5ML oral liquid, Take 10 mL (200 mg total) by mouth 3 (three) times a day as needed for cough (Patient not taking: Reported on 5/14/2022), Disp: 120 mL, Rfl: 0    losartan (COZAAR) 25 mg tablet, TAKE ONE TABLET BY MOUTH EVERY DAY, Disp: 90 tablet, Rfl: 3    tamsulosin (FLOMAX) 0 4 mg, Take 1 capsule (0 4 mg total) by mouth daily with dinner (Patient not taking: No sig reported), Disp: 90 capsule, Rfl: 0    Current Allergies     Allergies as of 05/14/2022 - Reviewed 05/04/2022   Allergen Reaction Noted    Enalapril Anaphylaxis, Throat Swelling, and Hives 01/05/2016    Metformin Abdominal Pain 06/22/2021            The following portions of the patient's history were reviewed and updated as appropriate: allergies, current medications, past family history, past medical history, past social history, past surgical history and problem list      Past Medical History:   Diagnosis Date    Anemia     Arthritis     Black stool 7/24/2020    Diabetes mellitus ([facility] [address] )     IDDM    Diverticulosis     Enlarged prostate     Erectile dysfunction     Hypercholesteremia     Resolved post weight loss    Hypertension     Resolved post weight loss    Kidney stone     Obesity     Prostate cancer ([facility] [address] )     Wears partial dentures     partial upper and lower       Past Surgical History:   Procedure Laterality Date    ABDOMINAL ADHESION SURGERY N/A 11/28/2016    Procedure: EXTENSIVE LYSIS ADHESIONS;  Surgeon: Umair England MD;  [facility];  Service:    Ami Prow FRACTURE SURGERY Left     CHOLECYSTECTOMY      COLON SURGERY      colon resection    COLONOSCOPY      COLOSTOMY      COLOSTOMY CLOSURE      DIAGNOSTIC LAPAROSCOPY      GASTRIC BYPASS      failed due to adhesions    HERNIA REPAIR      abdominal    NH BIOPSY OF PROSTATE,NEEDLE/PUNCH N/A 5/8/2020    Procedure: TRANSRECTAL NEEDLE BIOPSY PROSTATE (TRNBP) WITH TRANSRECTAL ULTRASOUND GUIDANCE;  Surgeon: Sona Barba MD;  [facility];  Service: Urology    NH CYSTOSCOPY,DIR VIS INT URETHROTOMY N/A 5/8/2020    Procedure: DIRECT VISUAL INTERAL URETHROTOMY (DVIU), dilation of urethral stricture;  Surgeon: Sona Barba MD; [facility];  Service: Urology    NJ CYSTOURETHRO W/IMPLANT N/A 3/8/2019    Procedure: CYSTOSCOPY WITH INSERTION UROLIFT;  Surgeon: Jabari Harding MD;  [facility];  Service: Urology    NJ CYSTOURETHRO W/IMPLANT N/A 5/8/2020    Procedure: CYSTOSCOPY WITH INSERTION Janit Likens;  Surgeon: Jabari Harding MD;  [facility];  Service: Urology     [facility] STOM DUODENUM/JEJUNUM N/A 10/25/2018    Procedure: LINEAR ENDOSCOPIC U/S;  Surgeon: Jesus Lima MD;  Location: [facility]; Service: Gastroenterology    NJ ESOPHAGOGASTRODUODENOSCOPY TRANSORAL DIAGNOSTIC N/A 7/6/2016    Procedure: EGD AND COLONOSCOPY;  Surgeon: Katty Buenrostro MD;  [facility]: [facility]; Service: Gastroenterology    NJ LAP, ANEESH RESTRICT PROC, LONGITUDINAL GASTRECTOMY N/A 11/28/2016    Procedure: GASTRECTOMY SLEEVE LAPAROSCOPIC;  Surgeon: Jemima Velazco MD;  [facility];  Service: [facility] [place] BIOPSY  5/8/2020       Family History   Problem Relation Age of Onset    Heart disease Mother     Breast cancer Mother 80    Diabetes Father     Colon cancer Neg Hx     Liver disease Neg Hx          Medications have been verified  Objective   /65   Pulse 63   Temp (!) 96 5 °F (35 8 °C) (Temporal)   Ht 5' 7 95" (1 726 m)   Wt 110 kg (242 lb 9 oz)   SpO2 95%   BMI 36 94 kg/m²        Physical Exam     Physical Exam  Vitals and nursing note reviewed  Constitutional:       General: He is not in acute distress  Appearance: Normal appearance  He is normal weight  He is not ill-appearing or toxic-appearing  HENT:      Head: Normocephalic and atraumatic  Right Ear: Tympanic membrane normal       Left Ear: Tympanic membrane normal       Nose: Congestion present  No rhinorrhea  Mouth/Throat:      Mouth: Mucous membranes are moist       Pharynx: Oropharynx is clear  No oropharyngeal exudate or posterior oropharyngeal erythema  Eyes:      Extraocular Movements: Extraocular movements intact  Conjunctiva/sclera: Conjunctivae normal       Pupils: Pupils are equal, round, and reactive to light  Cardiovascular:      Rate and Rhythm: Normal rate and regular rhythm  Pulses: Normal pulses  Heart sounds: Normal heart sounds  No murmur heard  No friction rub  No gallop  Pulmonary:      Effort: Pulmonary effort is normal  No respiratory distress  Breath sounds: No stridor  Examination of the right-upper field reveals wheezing  Examination of the left-upper field reveals wheezing  Examination of the right-middle field reveals wheezing  Examination of the left-middle field reveals wheezing  Examination of the right-lower field reveals rhonchi  Examination of the left-lower field reveals rhonchi  Wheezing and rhonchi present  No decreased breath sounds or rales  Chest:      Chest wall: No tenderness  Abdominal:      General: Bowel sounds are normal       Palpations: Abdomen is soft  Tenderness: There is no abdominal tenderness  There is no guarding or rebound  Musculoskeletal:         General: Normal range of motion  Cervical back: Normal range of motion and neck supple  Skin:     General: Skin is warm and dry  Capillary Refill: Capillary refill takes less than 2 seconds  Neurological:      General: No focal deficit present  Mental Status: He is alert and oriented to person, place, and time     Psychiatric:         Mood and Affect: Mood normal          Behavior: Behavior normal

## 2022-05-14 NOTE — PATIENT INSTRUCTIONS
Please take Medrol Dosepak as follows: 6 tabs on day 1, 5 tabs on day 2, 4 tabs on day 3, 3 tabs on day 4, 2 tabs on day 5, and 1 tab on day 6  Please take azithromycin as discussed  You may take albuterol as needed for shortness of breath and wheezing  If her symptoms persist, please follow-up with your primary care provider  If her symptoms worsen, please go to the emergency department  1   Drink plenty fluids  2   Take probiotics [i e  Yogurt, Acidophilus, Florastor (liquid)] daily  3   Over-the-counter medications as needed for symptomatic care  4    Advance activities as tolerated  5    Follow-up with your primary care physician in 3-4 days  6   Go to emergency room if symptoms are worsening      7   Use a humidifier at bedtime

## 2022-05-25 DIAGNOSIS — R10.9 ABDOMINAL PAIN: ICD-10-CM

## 2022-05-25 RX ORDER — PANTOPRAZOLE SODIUM 40 MG/1
TABLET, DELAYED RELEASE ORAL
Qty: 30 TABLET | Refills: 0 | Status: SHIPPED | OUTPATIENT
Start: 2022-05-25 | End: 2022-06-21

## 2022-06-01 DIAGNOSIS — R39.9 LOWER URINARY TRACT SYMPTOMS (LUTS): ICD-10-CM

## 2022-06-01 RX ORDER — MIRABEGRON 50 MG/1
TABLET, FILM COATED, EXTENDED RELEASE ORAL
Qty: 30 TABLET | Refills: 3 | Status: SHIPPED | OUTPATIENT
Start: 2022-06-01

## 2022-06-06 ENCOUNTER — OFFICE VISIT (OUTPATIENT)
Dept: DENTISTRY | Facility: CLINIC | Age: 72
End: 2022-06-06

## 2022-06-06 VITALS — HEART RATE: 52 BPM | DIASTOLIC BLOOD PRESSURE: 74 MMHG | SYSTOLIC BLOOD PRESSURE: 139 MMHG

## 2022-06-06 DIAGNOSIS — K05.4 PERIODONTOSIS: Primary | ICD-10-CM

## 2022-06-06 PROCEDURE — D4341 PERIODONTAL SCALING AND ROOT PLANING - 4 OR MORE TEETH PER QUADRANT: HCPCS

## 2022-06-06 NOTE — PROGRESS NOTES
SCALE and RP LL  Dr Be Doshi  Administered local anesthesia and will add notes  CHIEF CONCERN: none   PAIN SCALE: 0  ASA CLASS: I  PLAQUE: mild   CALCULUS: mod calculus  BLEEDING:  light   STAIN :none   ORAL HYGIENE:  fair    Hand scaled, polished and flossed  Used cavitron  Refined all other quads  Soft tissue exam:  soft tissue exam was normal  ExtraOral exam:   Extraoral exam was normal    REFERRALS: no referrals needed    Next Visit: appt with  for #4 MO + discuss new partials/ pt interested in/ also pt would like to discuss any options with upper anterior bridge/ gums receeding       Next Recall: 3 month perio maintenance

## 2022-06-06 NOTE — PROGRESS NOTES
Pt presented for LL SRP  Reviewed PMH and pt denied any changes  1 Carpule 4% Septocaine 1:100K epi infiltrations given for LL teeth  Pt handled procedure very well  [place] hygienist took over for [employer]       Oneida Lazo

## 2022-06-10 ENCOUNTER — TELEPHONE (OUTPATIENT)
Dept: OTHER | Facility: OTHER | Age: 72
End: 2022-06-10

## 2022-06-10 DIAGNOSIS — C61 PROSTATE CANCER (HCC): Primary | ICD-10-CM

## 2022-06-10 NOTE — TELEPHONE ENCOUNTER
PSA order placed in chart and patient was informed to get it at least 4-5 days prior to his next appt  He verbalized understanding

## 2022-06-21 DIAGNOSIS — R10.9 ABDOMINAL PAIN: ICD-10-CM

## 2022-06-21 RX ORDER — PANTOPRAZOLE SODIUM 40 MG/1
TABLET, DELAYED RELEASE ORAL
Qty: 30 TABLET | Refills: 0 | Status: SHIPPED | OUTPATIENT
Start: 2022-06-21 | End: 2022-07-25 | Stop reason: HOSPADM

## 2022-06-22 ENCOUNTER — APPOINTMENT (OUTPATIENT)
Dept: LAB | Facility: AMBULARY SURGERY CENTER | Age: 72
End: 2022-06-22
Payer: COMMERCIAL

## 2022-06-22 DIAGNOSIS — C61 PROSTATE CANCER (HCC): ICD-10-CM

## 2022-06-22 LAB
ALBUMIN SERPL BCP-MCNC: 3.3 G/DL (ref 3.5–5)
ALP SERPL-CCNC: 93 U/L (ref 46–116)
ALT SERPL W P-5'-P-CCNC: 28 U/L (ref 12–78)
ANION GAP SERPL CALCULATED.3IONS-SCNC: 5 MMOL/L (ref 4–13)
AST SERPL W P-5'-P-CCNC: 22 U/L (ref 5–45)
BASOPHILS # BLD AUTO: 0.05 THOUSANDS/ΜL (ref 0–0.1)
BASOPHILS NFR BLD AUTO: 1 % (ref 0–1)
BILIRUB SERPL-MCNC: 0.45 MG/DL (ref 0.2–1)
BUN SERPL-MCNC: 21 MG/DL (ref 5–25)
CALCIUM ALBUM COR SERPL-MCNC: 9.4 MG/DL (ref 8.3–10.1)
CALCIUM SERPL-MCNC: 8.8 MG/DL (ref 8.3–10.1)
CHLORIDE SERPL-SCNC: 108 MMOL/L (ref 100–108)
CO2 SERPL-SCNC: 26 MMOL/L (ref 21–32)
CREAT SERPL-MCNC: 0.94 MG/DL (ref 0.6–1.3)
EOSINOPHIL # BLD AUTO: 0.15 THOUSAND/ΜL (ref 0–0.61)
EOSINOPHIL NFR BLD AUTO: 2 % (ref 0–6)
ERYTHROCYTE [DISTWIDTH] IN BLOOD BY AUTOMATED COUNT: 13.2 % (ref 11.6–15.1)
GFR SERPL CREATININE-BSD FRML MDRD: 80 ML/MIN/1.73SQ M
GLUCOSE SERPL-MCNC: 209 MG/DL (ref 65–140)
HCT VFR BLD AUTO: 40.9 % (ref 36.5–49.3)
HGB BLD-MCNC: 13.5 G/DL (ref 12–17)
IMM GRANULOCYTES # BLD AUTO: 0.04 THOUSAND/UL (ref 0–0.2)
IMM GRANULOCYTES NFR BLD AUTO: 1 % (ref 0–2)
LYMPHOCYTES # BLD AUTO: 0.55 THOUSANDS/ΜL (ref 0.6–4.47)
LYMPHOCYTES NFR BLD AUTO: 9 % (ref 14–44)
MCH RBC QN AUTO: 30.1 PG (ref 26.8–34.3)
MCHC RBC AUTO-ENTMCNC: 33 G/DL (ref 31.4–37.4)
MCV RBC AUTO: 91 FL (ref 82–98)
MONOCYTES # BLD AUTO: 0.56 THOUSAND/ΜL (ref 0.17–1.22)
MONOCYTES NFR BLD AUTO: 9 % (ref 4–12)
NEUTROPHILS # BLD AUTO: 5.14 THOUSANDS/ΜL (ref 1.85–7.62)
NEUTS SEG NFR BLD AUTO: 78 % (ref 43–75)
NRBC BLD AUTO-RTO: 0 /100 WBCS
PLATELET # BLD AUTO: 140 THOUSANDS/UL (ref 149–390)
PMV BLD AUTO: 13.1 FL (ref 8.9–12.7)
POTASSIUM SERPL-SCNC: 4.3 MMOL/L (ref 3.5–5.3)
PROT SERPL-MCNC: 7 G/DL (ref 6.4–8.2)
PSA SERPL-MCNC: <0.1 NG/ML (ref 0–4)
RBC # BLD AUTO: 4.49 MILLION/UL (ref 3.88–5.62)
SODIUM SERPL-SCNC: 139 MMOL/L (ref 136–145)
WBC # BLD AUTO: 6.49 THOUSAND/UL (ref 4.31–10.16)

## 2022-06-22 PROCEDURE — 80053 COMPREHEN METABOLIC PANEL: CPT | Performed by: FAMILY MEDICINE

## 2022-06-22 PROCEDURE — 36415 COLL VENOUS BLD VENIPUNCTURE: CPT | Performed by: FAMILY MEDICINE

## 2022-06-22 PROCEDURE — 84153 ASSAY OF PSA TOTAL: CPT

## 2022-06-22 PROCEDURE — 85025 COMPLETE CBC W/AUTO DIFF WBC: CPT | Performed by: FAMILY MEDICINE

## 2022-06-22 PROCEDURE — 83036 HEMOGLOBIN GLYCOSYLATED A1C: CPT | Performed by: FAMILY MEDICINE

## 2022-06-23 LAB
EST. AVERAGE GLUCOSE BLD GHB EST-MCNC: 148 MG/DL
HBA1C MFR BLD: 6.8 %

## 2022-06-23 PROCEDURE — 3044F HG A1C LEVEL LT 7.0%: CPT | Performed by: FAMILY MEDICINE

## 2022-06-24 ENCOUNTER — OFFICE VISIT (OUTPATIENT)
Dept: FAMILY MEDICINE CLINIC | Facility: CLINIC | Age: 72
End: 2022-06-24
Payer: COMMERCIAL

## 2022-06-24 VITALS
BODY MASS INDEX: 34.86 KG/M2 | HEIGHT: 68 IN | SYSTOLIC BLOOD PRESSURE: 120 MMHG | OXYGEN SATURATION: 94 % | HEART RATE: 57 BPM | DIASTOLIC BLOOD PRESSURE: 78 MMHG | WEIGHT: 230 LBS | TEMPERATURE: 96.8 F | RESPIRATION RATE: 16 BRPM

## 2022-06-24 DIAGNOSIS — E11.65 TYPE 2 DIABETES MELLITUS WITH HYPERGLYCEMIA, WITH LONG-TERM CURRENT USE OF INSULIN (HCC): Primary | Chronic | ICD-10-CM

## 2022-06-24 DIAGNOSIS — Z79.4 TYPE 2 DIABETES MELLITUS WITH HYPERGLYCEMIA, WITH LONG-TERM CURRENT USE OF INSULIN (HCC): Primary | Chronic | ICD-10-CM

## 2022-06-24 DIAGNOSIS — N40.1 BENIGN PROSTATIC HYPERPLASIA WITH URINARY FREQUENCY: ICD-10-CM

## 2022-06-24 DIAGNOSIS — E66.01 CLASS 2 SEVERE OBESITY DUE TO EXCESS CALORIES WITH SERIOUS COMORBIDITY AND BODY MASS INDEX (BMI) OF 35.0 TO 35.9 IN ADULT (HCC): ICD-10-CM

## 2022-06-24 DIAGNOSIS — C61 PROSTATE CANCER (HCC): ICD-10-CM

## 2022-06-24 DIAGNOSIS — D69.6 PLATELETS DECREASED (HCC): ICD-10-CM

## 2022-06-24 DIAGNOSIS — J45.901 MILD ASTHMA EXACERBATION: ICD-10-CM

## 2022-06-24 DIAGNOSIS — Z90.3 POSTGASTRECTOMY MALABSORPTION: ICD-10-CM

## 2022-06-24 DIAGNOSIS — E78.00 HYPERCHOLESTEREMIA: ICD-10-CM

## 2022-06-24 DIAGNOSIS — K91.2 POSTGASTRECTOMY MALABSORPTION: ICD-10-CM

## 2022-06-24 DIAGNOSIS — I10 BENIGN ESSENTIAL HYPERTENSION: ICD-10-CM

## 2022-06-24 DIAGNOSIS — R35.0 BENIGN PROSTATIC HYPERPLASIA WITH URINARY FREQUENCY: ICD-10-CM

## 2022-06-24 PROBLEM — K59.00 CONSTIPATION: Status: RESOLVED | Noted: 2017-08-10 | Resolved: 2022-06-24

## 2022-06-24 PROCEDURE — 99214 OFFICE O/P EST MOD 30 MIN: CPT | Performed by: FAMILY MEDICINE

## 2022-06-24 PROCEDURE — 3008F BODY MASS INDEX DOCD: CPT | Performed by: FAMILY MEDICINE

## 2022-06-24 PROCEDURE — 1160F RVW MEDS BY RX/DR IN RCRD: CPT | Performed by: FAMILY MEDICINE

## 2022-06-24 PROCEDURE — 1036F TOBACCO NON-USER: CPT | Performed by: FAMILY MEDICINE

## 2022-06-24 RX ORDER — DEXAMETHASONE 4 MG/1
2 TABLET ORAL 2 TIMES DAILY
Qty: 12 G | Refills: 0 | Status: SHIPPED | OUTPATIENT
Start: 2022-06-24 | End: 2022-07-24

## 2022-06-24 NOTE — PROGRESS NOTES
Assessment/Plan:    Type 2 diabetes mellitus with hyperglycemia, with long-term current use of insulin ([facility])    Lab Results   Component Value Date    HGBA1C 6 8 (H) 06/22/2022   stable   Continue current meds    Benign essential hypertension  Doing well on current meds    Class 2 severe obesity due to excess calories with serious comorbidity and body mass index (BMI) of 35 0 to 35 9 in [facility])  Diet and exercise discussed today     Prostate cancer ([place] [id number] )  Final lupron next month   In remission   Care per Urology     Postgastrectomy malabsorption  Continue multivitamins daily    Benign prostatic hyperplasia with urinary frequency  Stable  Care per urology    Hypercholesteremia  Atorvastatin as directed         Diagnoses and all orders for this visit:    Type 2 diabetes mellitus with hyperglycemia, with long-term current use of insulin ([facility])  -     Comprehensive metabolic panel  -     CBC and differential  -     TSH, 3rd generation with Free T4 reflex; Future  -     Hemoglobin A1C  -     Lipid Panel with Direct LDL reflex; Future    Benign essential hypertension    Mild asthma exacerbation  -     fluticasone (Flovent HFA) 110 MCG/ACT inhaler; Inhale 2 puffs 2 (two) times a day Rinse mouth after use  Platelets decreased ([place] [id number] )  -     Ambulatory referral to Hematology / Oncology; Future    Class 2 severe obesity due to excess calories with serious comorbidity and body mass index (BMI) of 35 0 to 35 9 in [facility])    Prostate cancer ([place] [id number] )    Postgastrectomy malabsorption    Benign prostatic hyperplasia with urinary frequency    Hypercholesteremia        Subjective:      Patient ID: Shana Estrdaa is a 67 y o  male      HPI   Here for follow up  Feeling well over except of easy bruising on arms and hands   No side effects from the medications       The following portions of the patient's history were reviewed and updated as appropriate: allergies, current medications, past family history, past medical history, past social history, past surgical history and problem list     Review of Systems   Constitutional: Negative  HENT: Negative  Respiratory: Negative  Endocrine: Negative  Musculoskeletal: Negative  Neurological: Negative  Hematological: Negative  Objective:      /78 (BP Location: Left arm, Patient Position: Sitting, Cuff Size: Large)   Pulse 57   Temp (!) 96 8 °F (36 °C) (Tympanic)   Resp 16   Ht 5' 7 95" (1 726 m)   Wt 104 kg (230 lb)   SpO2 94%   BMI 35 02 kg/m²          Physical Exam  Constitutional:       Appearance: Normal appearance  HENT:      Mouth/Throat:      Mouth: Mucous membranes are moist       Pharynx: No posterior oropharyngeal erythema  Cardiovascular:      Rate and Rhythm: Normal rate and regular rhythm  Pulses: Normal pulses  no weak pulses          Dorsalis pedis pulses are 2+ on the right side and 2+ on the left side  Posterior tibial pulses are 2+ on the right side and 2+ on the left side  Heart sounds: Normal heart sounds  Feet:      Right foot:      Skin integrity: No ulcer, skin breakdown, erythema, warmth, callus or dry skin  Left foot:      Skin integrity: No ulcer, skin breakdown, erythema, warmth, callus or dry skin  Skin:     Capillary Refill: Capillary refill takes less than 2 seconds  Neurological:      General: No focal deficit present  Mental Status: He is alert and oriented to person, place, and time  Psychiatric:         Mood and Affect: Mood normal          Behavior: Behavior normal            Patient's shoes and socks removed  Right Foot/Ankle   Right Foot Inspection  Skin Exam: skin normal and skin intact  No dry skin, no warmth, no callus, no erythema, no maceration, no abnormal color, no pre-ulcer, no ulcer and no callus  Toe Exam: ROM and strength within normal limits       Sensory   Vibration: intact  Proprioception: intact  Monofilament testing: intact    Vascular  Capillary refills: < 3 seconds  The right DP pulse is 2+  The right PT pulse is 2+  Left Foot/Ankle  Left Foot Inspection  Skin Exam: skin normal and skin intact  No dry skin, no warmth, no erythema, no maceration, normal color, no pre-ulcer, no ulcer and no callus  Toe Exam: ROM and strength within normal limits  Sensory   Vibration: intact  Proprioception: intact  Monofilament testing: intact    Vascular  Capillary refills: < 3 seconds  The left DP pulse is 2+  The left PT pulse is 2+       Assign Risk Category  No deformity present  No loss of protective sensation  No weak pulses  Risk: 0

## 2022-06-28 ENCOUNTER — TELEPHONE (OUTPATIENT)
Dept: HEMATOLOGY ONCOLOGY | Facility: CLINIC | Age: 72
End: 2022-06-28

## 2022-06-28 NOTE — TELEPHONE ENCOUNTER
New Patient Intake Form   Patient Details:    Mian Westbrook  1950    Appointment Information   Who is calling to schedule? Spouse   If not self, what is the caller's name? Kelsi    DID YOU CONFIRM INSURANCE WITH PATIENT? Yes    Referring provider Leia Helms MD   What is the diagnosis? Platelets decreased     Is there a confirmed tissue diagnosis? No     Is there a biopsy ordered or pending? Please specify dates  If yes, route to NN/OCC   No biopsy is pending or ordered      Is patient aware of diagnosis? Yes     Have you had any imaging or labs done? If yes, where? (If imaging done outside of [facility], please remind patient to bring a disk ) Yes     06/22     If imaging done at outside facility, did you instruct patient to obtain discs and bring to visit? no   Have you been seen by another Oncologist/Hematologist?  If so, who and where? no   Are the records in [facility] or Care Everywhere? yes   Does the patient have records at another facility/hospital?    If yes, Name of facility, city and state where facility is located  no     Did you instruct patient to have records faxed to [facility] and provide rightfax number? no   Preferred [place]   Is the patient willing to be seen by another provider? (This is for breast patients only) n/a     Did you send new patient paperwork?   Email or mail? no   Miscellaneous Information: appt made for 07/25

## 2022-06-29 ENCOUNTER — OFFICE VISIT (OUTPATIENT)
Dept: DENTISTRY | Facility: CLINIC | Age: 72
End: 2022-06-29

## 2022-06-29 VITALS — SYSTOLIC BLOOD PRESSURE: 153 MMHG | HEART RATE: 64 BPM | DIASTOLIC BLOOD PRESSURE: 73 MMHG

## 2022-06-29 DIAGNOSIS — Z59.9 FINANCIAL DIFFICULTIES: ICD-10-CM

## 2022-06-29 DIAGNOSIS — K02.62 DENTAL CARIES EXTENDING INTO DENTIN: Primary | ICD-10-CM

## 2022-06-29 PROCEDURE — D2392 RESIN-BASED COMPOSITE - 2 SURFACES, POSTERIOR: HCPCS | Performed by: DENTIST

## 2022-06-29 SDOH — ECONOMIC STABILITY - INCOME SECURITY: PROBLEM RELATED TO HOUSING AND ECONOMIC CIRCUMSTANCES, UNSPECIFIED: Z59.9

## 2022-06-29 NOTE — PROGRESS NOTES
Composite Filling    Shana Estrada presents for composite filling  PMH reviewed, no changes  #4-MO  Discussed with patient need for RCT if pulp exposure occurs or in future if pulp is inflamed  Pt understands and consents  Applied topical benzocaine, administered 1 carps 2% lido 1:100k epi via infiltration  Prepped tooth #4 with 245 carbide on high speed  Caries removed with round carbide on slow speed  Placed tofflemire  Isolation with cotton rolls and dri-angles    Etch with 37% H2PO4, rinse, dry  Applied Adhese with 20 second scrub once, gentle air dry and light cured for 10s  Restored with Tetric bulk regina shade A2 and light cured  Refined with finishing burs, polished with enhance point  Verified occlusion and contacts  Perio-reval done today and still 5-6 mm pockets with bleeding  Grade three Stage IV Periodontitis  Pt left satisfied and in good health  NV: Consult at [facility] for Gum surgery with any resident  NNV: Finalize tx plan for upper and lower partials-pt interested in resin partial only       Jerald Bowie

## 2022-07-11 NOTE — PROGRESS NOTES
UROLOGY PROGRESS NOTE   Patient Identifiers: Efren Heard (MRN [id number])  Date of Service: 7/11/2022    Subjective:    77-year-old man history of [place] 9 prostate cancer  He had a redo UroLift in May of 2020 and was treated with radiation and completed ADT today  ADT   He has a history of bulbar urethral stricture which was dilated last September  PSA remains< 0 1  Reason for visit:  Prostate cancer follow-up    Objective:     VITALS:    There were no vitals filed for this visit          LABS:  Lab Results   Component Value Date    HGB 13 5 06/22/2022    HCT 40 9 06/22/2022    WBC 6 49 06/22/2022     (L) 06/22/2022   ]    Lab Results   Component Value Date     01/07/2016    K 4 3 06/22/2022     06/22/2022    CO2 26 06/22/2022    BUN 21 06/22/2022    CREATININE 0 94 06/22/2022    CALCIUM 8 8 06/22/2022    GLUCOSE 198 (H) 12/14/2020   ]        INPATIENT MEDS:    Current Outpatient Medications:     acetaminophen (TYLENOL) 325 mg tablet, Take 2 tablets (650 mg total) by mouth every 4 (four) hours as needed for mild pain, headaches or fever, Disp: , Rfl: 0    albuterol (ProAir HFA) 90 mcg/act inhaler, Inhale 2 puffs every 6 (six) hours as needed for wheezing or shortness of breath, Disp: 8 5 g, Rfl: 0    aluminum-magnesium hydroxide-simethicone (MYLANTA) 200-200-20 mg/5 mL suspension, Take 30 mL by mouth every 6 (six) hours as needed for indigestion or heartburn, Disp: 355 mL, Rfl: 0    atorvastatin (LIPITOR) 10 mg tablet, TAKE ONE TABLET BY MOUTH EVERY DAY, Disp: 90 tablet, Rfl: 3    Biotin 1000 MCG tablet, Take 1 tablet by mouth daily in the early morning , Disp: , Rfl:     Blood Glucose Monitoring Suppl (GLUCOCARD VITAL MONITOR) w/Device KIT, by Does not apply route 2 (two) times a day, Disp: 1 kit, Rfl: 0    calcium carbonate (OS-GILDA) 600 MG tablet, Take 600 mg by mouth daily in the early morning , Disp: , Rfl:     Cholecalciferol (VITAMIN D3) 2000 units capsule, Take 1,000 Units by mouth daily in the early morning , Disp: , Rfl:     Cyanocobalamin (VITAMIN B-12) 5000 MCG TBDP, Take 5,000 mcg by mouth once a week Takes on Mondays, Disp: , Rfl:     docusate sodium (COLACE) 100 mg capsule, Take 100 mg by mouth daily as needed for constipation, Disp: , Rfl:     fluticasone (Flovent HFA) 110 MCG/ACT inhaler, Inhale 2 puffs 2 (two) times a day Rinse mouth after use , Disp: 12 g, Rfl: 0    glipiZIDE (GLUCOTROL XL) 5 mg 24 hr tablet, TAKE ONE TABLET BY MOUTH EVERY DAY AFTER BREAKFAST, Disp: 90 tablet, Rfl: 3    guaiFENesin (ROBITUSSIN) 100 MG/5ML oral liquid, Take 10 mL (200 mg total) by mouth 3 (three) times a day as needed for cough (Patient not taking: No sig reported), Disp: 120 mL, Rfl: 0    Insulin Lispro Prot & Lispro (HumaLOG Mix 75/25 KwikPen) (75-25) 100 units/mL injection pen, Inject 20 Units under the skin 2 (two) times a day with meals, Disp: 15 mL, Rfl: 3    Insulin Pen Needle 32G X 4 MM MISC, Use daily, Disp: 100 each, Rfl: 5    Lancets (LIFESCAN UNISTIK 2) MISC, Lifescan One Touch Ultramini Meter; use as directed; 1; 0; 31-Oct-2016; 31-Oct-2016; Melida Duran;  Active, Disp: , Rfl:     losartan (COZAAR) 25 mg tablet, TAKE ONE TABLET BY MOUTH EVERY DAY, Disp: 90 tablet, Rfl: 3    meloxicam (Mobic) 15 mg tablet, Take 1 tablet (15 mg total) by mouth daily As needed for pain, Disp: 30 tablet, Rfl: 1    Multiple Vitamin (MULTIVITAMIN) capsule, Take 1 capsule by mouth daily in the early morning , Disp: , Rfl:     Myrbetriq 50 MG TB24, TAKE ONE TABLET BY MOUTH EVERY DAY, Disp: 30 tablet, Rfl: 3    ondansetron (ZOFRAN-ODT) 4 mg disintegrating tablet, Take 1 tablet (4 mg total) by mouth every 6 (six) hours as needed for nausea or vomiting, Disp: 20 tablet, Rfl: 0    pantoprazole (PROTONIX) 40 mg tablet, TAKE ONE TABLET BY MOUTH EVERY DAY, Disp: 30 tablet, Rfl: 0    tamsulosin (FLOMAX) 0 4 mg, Take 1 capsule (0 4 mg total) by mouth daily with dinner (Patient not taking: No sig reported), Disp: 90 capsule, Rfl: 0    Trulicity 3 XH/1 7XT SOPN, INJECT 0 5ML (3MG) UNDER THE SKIN ONCE A WEEK, Disp: 6 mL, Rfl: 0      Physical Exam:   There were no vitals taken for this visit  GEN: no acute distress    RESP: breathing comfortably with no accessory muscle use    ABD: soft, non-tender, non-distended   INCISION:    EXT: no significant peripheral edema         RADIOLOGY:   None     Assessment:   1   Prostate cancer     Plan:   -Lupron 45 mg given today which completes his treatment of 2 years  -follow-up in 6 months with PSA prior to visit  -he has been adjusting his medications between tamsulosin and Myrbetriq  -

## 2022-07-12 ENCOUNTER — OFFICE VISIT (OUTPATIENT)
Dept: UROLOGY | Facility: CLINIC | Age: 72
End: 2022-07-12
Payer: COMMERCIAL

## 2022-07-12 VITALS
TEMPERATURE: 68 F | SYSTOLIC BLOOD PRESSURE: 128 MMHG | HEIGHT: 67 IN | WEIGHT: 230 LBS | DIASTOLIC BLOOD PRESSURE: 72 MMHG | OXYGEN SATURATION: 98 % | BODY MASS INDEX: 36.1 KG/M2

## 2022-07-12 DIAGNOSIS — C61 PROSTATE CANCER (HCC): Primary | ICD-10-CM

## 2022-07-12 PROCEDURE — 96402 CHEMO HORMON ANTINEOPL SQ/IM: CPT

## 2022-07-12 PROCEDURE — 99213 OFFICE O/P EST LOW 20 MIN: CPT | Performed by: PHYSICIAN ASSISTANT

## 2022-07-20 ENCOUNTER — TELEMEDICINE (OUTPATIENT)
Dept: FAMILY MEDICINE CLINIC | Facility: CLINIC | Age: 72
End: 2022-07-20
Payer: COMMERCIAL

## 2022-07-20 VITALS — TEMPERATURE: 98.2 F | WEIGHT: 230 LBS | HEIGHT: 67 IN | HEART RATE: 75 BPM | BODY MASS INDEX: 36.1 KG/M2

## 2022-07-20 DIAGNOSIS — Z79.4 TYPE 2 DIABETES MELLITUS WITH HYPERGLYCEMIA, WITH LONG-TERM CURRENT USE OF INSULIN (HCC): Chronic | ICD-10-CM

## 2022-07-20 DIAGNOSIS — J45.901 MILD ASTHMA EXACERBATION: ICD-10-CM

## 2022-07-20 DIAGNOSIS — E11.65 TYPE 2 DIABETES MELLITUS WITH HYPERGLYCEMIA, WITH LONG-TERM CURRENT USE OF INSULIN (HCC): Chronic | ICD-10-CM

## 2022-07-20 DIAGNOSIS — J30.89 NON-SEASONAL ALLERGIC RHINITIS, UNSPECIFIED TRIGGER: ICD-10-CM

## 2022-07-20 DIAGNOSIS — I10 BENIGN ESSENTIAL HYPERTENSION: Primary | ICD-10-CM

## 2022-07-20 PROCEDURE — 99442 PR PHYS/QHP TELEPHONE EVALUATION 11-20 MIN: CPT | Performed by: FAMILY MEDICINE

## 2022-07-20 RX ORDER — FLUTICASONE PROPIONATE 50 MCG
2 SPRAY, SUSPENSION (ML) NASAL DAILY
Qty: 16 G | Refills: 0 | Status: SHIPPED | OUTPATIENT
Start: 2022-07-20

## 2022-07-20 NOTE — PROGRESS NOTES
COVID-19 Outpatient Progress Note    Assessment/Plan:    Problem List Items Addressed This Visit        Endocrine    Type 2 diabetes mellitus with hyperglycemia, with long-term current use of insulin (HCC) (Chronic)       Lab Results   Component Value Date    HGBA1C 6 8 (H) 06/22/2022   stable on current meds            Respiratory    Mild asthma exacerbation     flovent as directed            Cardiovascular and Mediastinum    Benign essential hypertension - Primary     Doing well on current meds           Other Visit Diagnoses     Non-seasonal allergic rhinitis, unspecified trigger        Relevant Medications    fluticasone (FLONASE) 50 mcg/act nasal spray         Disposition:     Patient was advised to take covid test at home   Patient doesn't think he has covid as he doesn't go anywhere and no recent sick contact  Just went out to cut grass yesterday when the symptoms started and he thinks its all allergies  May use flonase daily and benadryl at bedtime  Continue claritin daily as its helping       I have spent 15 minutes directly with the patient  Encounter provider Kelly Rosales MD    Provider located at [address] [id number]    Recent Visits  No visits were found meeting these conditions  Showing recent visits within past 7 days and meeting all other requirements  Today's Visits  Date Type Provider Dept   07/20/22 Telemedicine Kelly Rosales MD [facility]   Showing today's visits and meeting all other requirements  Future Appointments  No visits were found meeting these conditions  Showing future appointments within next 150 days and meeting all other requirements     This virtual check-in was done via telephone and he agrees to proceed  Patient agrees to participate in a virtual check in via telephone or video visit instead of presenting to the office to address urgent/immediate medical needs   Patient is aware this is a [employer] service  After connecting through Telephone, the patient was identified by name and date of birth  Krupa Frances was informed that this was a telemedicine visit and that the exam was being conducted confidentially over secure lines  My office door was closed  No one else was in the room  Krupa Frances acknowledged consent and understanding of privacy and security of the telemedicine visit  I informed the patient that I have reviewed his record in Epic and presented the opportunity for him to ask any questions regarding the visit today  The patient agreed to participate  It was my intent to perform this visit via video technology but the patient was not able to do a video connection so the visit was completed via audio telephone only  Verification of patient location:  Patient is located in the following state in which I hold an active license: [place]    Subjective:   Krupa Frances is a 67 y o  male who is concerned about COVID-19  Patient's symptoms include nasal congestion, rhinorrhea and cough  Patient denies fever, chills, fatigue, malaise, sore throat, anosmia, loss of taste, shortness of breath, chest tightness, abdominal pain, nausea, vomiting, diarrhea, myalgias and headaches       - Date of symptom onset: 7/19/2022      COVID-19 vaccination status: Fully vaccinated with booster    Exposure:   Contact with a person who is under investigation (PUI) for or who is positive for COVID-19 within the last 14 days?: No    Hospitalized recently for fever and/or lower respiratory symptoms?: No      Currently a healthcare worker that is involved in direct patient care?: No      Works in a special setting where the risk of COVID-19 transmission may be high? (this may include long-term care, correctional and correction facilities; homeless shelters; assisted-living facilities and group homes ): No      Resident in a special setting where the risk of COVID-19 transmission may be high? (this may include long-term care, correctional and care home facilities; homeless shelters; assisted-living facilities and group homes ): No      Lab Results   Component Value Date    SARSCOV2 Negative 12/28/2021    SARSCOV2 NOT DETECTED 04/17/2021     Past Medical History:   Diagnosis Date    Anemia     Arthritis     Black stool 7/24/2020    Diabetes mellitus ([facility] [address] )     IDDM    Diverticulosis     Enlarged prostate     Erectile dysfunction     Hypercholesteremia     Resolved post weight loss    Hypertension     Resolved post weight loss    Kidney stone     Obesity     Prostate cancer ([facility] [address] )     Wears partial dentures     partial upper and lower     Past Surgical History:   Procedure Laterality Date    ABDOMINAL ADHESION SURGERY N/A 11/28/2016    Procedure: EXTENSIVE LYSIS ADHESIONS;  Surgeon: Ruiz Chou MD;  [facility];  Service:    Glenora Grieves FRACTURE SURGERY Left     CHOLECYSTECTOMY      COLON SURGERY      colon resection    COLONOSCOPY      COLOSTOMY      COLOSTOMY CLOSURE      DIAGNOSTIC LAPAROSCOPY      GASTRIC BYPASS      failed due to adhesions    HERNIA REPAIR      abdominal    TN BIOPSY OF PROSTATE,NEEDLE/PUNCH N/A 5/8/2020    Procedure: TRANSRECTAL NEEDLE BIOPSY PROSTATE (TRNBP) WITH TRANSRECTAL ULTRASOUND GUIDANCE;  Surgeon: Princess Howell MD;  [facility];  Service: Urology    TN CYSTOSCOPY,DIR VIS INT URETHROTOMY N/A 5/8/2020    Procedure: DIRECT VISUAL INTERAL URETHROTOMY (DVIU), dilation of urethral stricture;  Surgeon: Princess Howell MD;  [facility];  Service: Urology    TN CYSTOURETHRO W/IMPLANT N/A 3/8/2019    Procedure: CYSTOSCOPY WITH INSERTION [address];  Surgeon: Princess Howell MD;  [facility];  Service: Urology    TN CYSTOURETHRO W/IMPLANT N/A 5/8/2020    Procedure: CYSTOSCOPY WITH INSERTION [address];  Surgeon: Princess Howell MD;  [facility];  Service: Urology    TN EDG US EXAM SURGICAL ALTER STOM DUODENUM/JEJUNUM N/A 10/25/2018    Procedure: LINEAR ENDOSCOPIC U/S;  Surgeon: Abby Bravo MD;  [facility]; Service: Gastroenterology    IN ESOPHAGOGASTRODUODENOSCOPY TRANSORAL DIAGNOSTIC N/A 7/6/2016    Procedure: EGD AND COLONOSCOPY;  Surgeon: Azucena Anderson MD;  [facility];   Service: Gastroenterology    IN LAP, ANEESH RESTRICT PROC, LONGITUDINAL GASTRECTOMY N/A 11/28/2016    Procedure: GASTRECTOMY SLEEVE LAPAROSCOPIC;  Surgeon: Carlita Tom MD;  [facility];  Service: [facility] BIOPSY  5/8/2020     Current Outpatient Medications   Medication Sig Dispense Refill    acetaminophen (TYLENOL) 325 mg tablet Take 2 tablets (650 mg total) by mouth every 4 (four) hours as needed for mild pain, headaches or fever  0    albuterol (ProAir HFA) 90 mcg/act inhaler Inhale 2 puffs every 6 (six) hours as needed for wheezing or shortness of breath 8 5 g 0    aluminum-magnesium hydroxide-simethicone (MYLANTA) 200-200-20 mg/5 mL suspension Take 30 mL by mouth every 6 (six) hours as needed for indigestion or heartburn 355 mL 0    atorvastatin (LIPITOR) 10 mg tablet TAKE ONE TABLET BY MOUTH EVERY DAY 90 tablet 3    Biotin 1000 MCG tablet Take 1 tablet by mouth daily in the early morning       Blood Glucose Monitoring Suppl (GLUCOCARD VITAL MONITOR) w/Device KIT by Does not apply route 2 (two) times a day 1 kit 0    calcium carbonate (OS-GILDA) 600 MG tablet Take 600 mg by mouth daily in the early morning       Cholecalciferol (VITAMIN D3) 2000 units capsule Take 1,000 Units by mouth daily in the early morning       Cyanocobalamin (VITAMIN B-12) 5000 MCG TBDP Take 5,000 mcg by mouth once a week Takes on Mondays      docusate sodium (COLACE) 100 mg capsule Take 100 mg by mouth daily as needed for constipation      fluticasone (FLONASE) 50 mcg/act nasal spray 2 sprays into each nostril daily 16 g 0    fluticasone (Flovent HFA) 110 MCG/ACT inhaler Inhale 2 puffs 2 (two) times a day Rinse mouth after use  12 g 0    glipiZIDE (GLUCOTROL XL) 5 mg 24 hr tablet TAKE ONE TABLET BY MOUTH EVERY DAY AFTER BREAKFAST 90 tablet 3    guaiFENesin (ROBITUSSIN) 100 MG/5ML oral liquid Take 10 mL (200 mg total) by mouth 3 (three) times a day as needed for cough 120 mL 0    Insulin Lispro Prot & Lispro (HumaLOG Mix 75/25 KwikPen) (75-25) 100 units/mL injection pen Inject 20 Units under the skin 2 (two) times a day with meals 15 mL 3    Lancets (LIFESCAN UNISTIK 2) MISC Lifescan One Touch Ultramini Meter; use as directed; 1; 0; 31-Oct-2016; 31-Oct-2016; Melida Duran; Active      losartan (COZAAR) 25 mg tablet TAKE ONE TABLET BY MOUTH EVERY DAY 90 tablet 3    meloxicam (Mobic) 15 mg tablet Take 1 tablet (15 mg total) by mouth daily As needed for pain 30 tablet 1    Multiple Vitamin (MULTIVITAMIN) capsule Take 1 capsule by mouth daily in the early morning       ondansetron (ZOFRAN-ODT) 4 mg disintegrating tablet Take 1 tablet (4 mg total) by mouth every 6 (six) hours as needed for nausea or vomiting 20 tablet 0    pantoprazole (PROTONIX) 40 mg tablet TAKE ONE TABLET BY MOUTH EVERY DAY 30 tablet 0    Trulicity 3 VZ/3 1GU SOPN INJECT 0 5ML (3MG) UNDER THE SKIN ONCE A WEEK 6 mL 0    Myrbetriq 50 MG TB24 TAKE ONE TABLET BY MOUTH EVERY DAY (Patient not taking: Reported on 7/20/2022) 30 tablet 3    tamsulosin (FLOMAX) 0 4 mg Take 1 capsule (0 4 mg total) by mouth daily with dinner (Patient not taking: No sig reported) 90 capsule 0     No current facility-administered medications for this visit  Allergies   Allergen Reactions    Enalapril Anaphylaxis, Throat Swelling and Hives    Metformin Abdominal Pain       Review of Systems   Constitutional: Negative for chills, fatigue and fever  HENT: Positive for congestion and rhinorrhea  Negative for sore throat  Respiratory: Positive for cough  Negative for chest tightness and shortness of breath      Gastrointestinal: Negative for abdominal pain, diarrhea, nausea and vomiting  Musculoskeletal: Negative for myalgias  Neurological: Negative for headaches  Objective:    Vitals:    07/20/22 1507   Pulse: 75   Temp: 98 2 °F (36 8 °C)   TempSrc: Temporal   Weight: 104 kg (230 lb)   Height: 5' 7" (1 702 m)       Physical Exam  Vitals reviewed  some coughing over the phone     VIRTUAL VISIT DISCLAIMER    Felipe Everett verbally agrees to participate in [employer]  Pt is aware that [employer] could be limited without vital signs or the ability to perform a full hands-on physical exam  Mario Quinones understands he or the provider may request at any time to terminate the video visit and request the patient to seek care or treatment in person

## 2022-07-22 ENCOUNTER — TELEPHONE (OUTPATIENT)
Dept: HEMATOLOGY ONCOLOGY | Facility: CLINIC | Age: 72
End: 2022-07-22

## 2022-07-25 ENCOUNTER — OFFICE VISIT (OUTPATIENT)
Dept: GASTROENTEROLOGY | Facility: AMBULARY SURGERY CENTER | Age: 72
End: 2022-07-25
Payer: COMMERCIAL

## 2022-07-25 ENCOUNTER — APPOINTMENT (OUTPATIENT)
Dept: LAB | Facility: CLINIC | Age: 72
End: 2022-07-25
Payer: COMMERCIAL

## 2022-07-25 ENCOUNTER — CONSULT (OUTPATIENT)
Dept: HEMATOLOGY ONCOLOGY | Facility: CLINIC | Age: 72
End: 2022-07-25
Payer: COMMERCIAL

## 2022-07-25 ENCOUNTER — TELEPHONE (OUTPATIENT)
Dept: GASTROENTEROLOGY | Facility: AMBULARY SURGERY CENTER | Age: 72
End: 2022-07-25

## 2022-07-25 VITALS
DIASTOLIC BLOOD PRESSURE: 68 MMHG | SYSTOLIC BLOOD PRESSURE: 120 MMHG | OXYGEN SATURATION: 95 % | BODY MASS INDEX: 37.35 KG/M2 | HEIGHT: 67 IN | HEART RATE: 58 BPM | WEIGHT: 238 LBS

## 2022-07-25 VITALS
BODY MASS INDEX: 37.35 KG/M2 | SYSTOLIC BLOOD PRESSURE: 128 MMHG | WEIGHT: 238 LBS | RESPIRATION RATE: 16 BRPM | HEIGHT: 67 IN | DIASTOLIC BLOOD PRESSURE: 60 MMHG | HEART RATE: 60 BPM | TEMPERATURE: 96.1 F | OXYGEN SATURATION: 92 %

## 2022-07-25 DIAGNOSIS — Z12.11 COLON CANCER SCREENING: ICD-10-CM

## 2022-07-25 DIAGNOSIS — R30.0 DYSURIA: ICD-10-CM

## 2022-07-25 DIAGNOSIS — Z86.010 HISTORY OF COLON POLYPS: Primary | ICD-10-CM

## 2022-07-25 DIAGNOSIS — C61 PROSTATE CANCER (HCC): ICD-10-CM

## 2022-07-25 DIAGNOSIS — R23.3 EASY BRUISING: ICD-10-CM

## 2022-07-25 DIAGNOSIS — D69.6 THROMBOCYTOPENIA (HCC): ICD-10-CM

## 2022-07-25 DIAGNOSIS — K21.9 GASTROESOPHAGEAL REFLUX DISEASE WITHOUT ESOPHAGITIS: ICD-10-CM

## 2022-07-25 DIAGNOSIS — D69.6 THROMBOCYTOPENIA (HCC): Primary | ICD-10-CM

## 2022-07-25 DIAGNOSIS — K91.2 POSTSURGICAL MALABSORPTION: ICD-10-CM

## 2022-07-25 LAB
ALBUMIN SERPL BCP-MCNC: 3.9 G/DL (ref 3.5–5)
ALP SERPL-CCNC: 75 U/L (ref 34–104)
ALT SERPL W P-5'-P-CCNC: 18 U/L (ref 7–52)
ANION GAP SERPL CALCULATED.3IONS-SCNC: 5 MMOL/L (ref 4–13)
APTT PPP: 33 SECONDS (ref 23–37)
AST SERPL W P-5'-P-CCNC: 20 U/L (ref 13–39)
BACTERIA UR QL AUTO: ABNORMAL /HPF
BASOPHILS NFR BLD MANUAL: 1 % (ref 0–1)
BILIRUB SERPL-MCNC: 0.44 MG/DL (ref 0.2–1)
BILIRUB UR QL STRIP: NEGATIVE
BUN SERPL-MCNC: 20 MG/DL (ref 5–25)
CALCIUM SERPL-MCNC: 9.1 MG/DL (ref 8.4–10.2)
CHLORIDE SERPL-SCNC: 106 MMOL/L (ref 96–108)
CLARITY UR: ABNORMAL
CO2 SERPL-SCNC: 29 MMOL/L (ref 21–32)
COLOR UR: YELLOW
CREAT SERPL-MCNC: 0.96 MG/DL (ref 0.6–1.3)
CRP SERPL QL: 9.9 MG/L
ERYTHROCYTE [DISTWIDTH] IN BLOOD BY AUTOMATED COUNT: 13 % (ref 11.6–15.1)
ERYTHROCYTE [SEDIMENTATION RATE] IN BLOOD: 34 MM/HOUR (ref 0–19)
EST. AVERAGE GLUCOSE BLD GHB EST-MCNC: 146 MG/DL
FIBRINOGEN PPP-MCNC: 487 MG/DL (ref 227–495)
FOLATE SERPL-MCNC: >20 NG/ML (ref 3.1–17.5)
GFR SERPL CREATININE-BSD FRML MDRD: 78 ML/MIN/1.73SQ M
GLUCOSE P FAST SERPL-MCNC: 67 MG/DL (ref 65–99)
GLUCOSE UR STRIP-MCNC: NEGATIVE MG/DL
HBA1C MFR BLD: 6.7 %
HCT VFR BLD AUTO: 41.7 % (ref 36.5–49.3)
HGB BLD-MCNC: 13.7 G/DL (ref 12–17)
HGB UR QL STRIP.AUTO: NEGATIVE
INR PPP: 1.09 (ref 0.84–1.19)
KETONES UR STRIP-MCNC: NEGATIVE MG/DL
LEUKOCYTE ESTERASE UR QL STRIP: ABNORMAL
LYMPHOCYTES NFR BLD: 14 % (ref 14–44)
MCH RBC QN AUTO: 30.1 PG (ref 26.8–34.3)
MCHC RBC AUTO-ENTMCNC: 32.9 G/DL (ref 31.4–37.4)
MCV RBC AUTO: 92 FL (ref 82–98)
MONOCYTES NFR BLD AUTO: 10 % (ref 4–12)
NEUTS SEG NFR BLD AUTO: 75 % (ref 45–77)
NITRITE UR QL STRIP: NEGATIVE
NON-SQ EPI CELLS URNS QL MICRO: ABNORMAL /HPF
NRBC BLD AUTO-RTO: 0 /100 WBCS
PATHOLOGIST INTERPRETATION: NORMAL
PH UR STRIP.AUTO: 7.5 [PH]
PLATELET # BLD AUTO: 125 THOUSANDS/UL (ref 149–390)
PMV BLD AUTO: 12.2 FL (ref 8.9–12.7)
POTASSIUM SERPL-SCNC: 4.1 MMOL/L (ref 3.5–5.3)
PROT SERPL-MCNC: 7 G/DL (ref 6.4–8.4)
PROT UR STRIP-MCNC: ABNORMAL MG/DL
PROTHROMBIN TIME: 14.1 SECONDS (ref 11.6–14.5)
PSA SERPL-MCNC: <0.1 NG/ML (ref 0–4)
RBC # BLD AUTO: 4.55 MILLION/UL (ref 3.88–5.62)
RBC #/AREA URNS AUTO: ABNORMAL /HPF
RHEUMATOID FACT SER QL LA: NEGATIVE
SODIUM SERPL-SCNC: 140 MMOL/L (ref 135–147)
SP GR UR STRIP.AUTO: 1.02 (ref 1–1.03)
TOTAL CELLS COUNTED SPEC: 100
UROBILINOGEN UR STRIP-ACNC: <2 MG/DL
VIT B12 SERPL-MCNC: 610 PG/ML (ref 100–900)
WBC # BLD AUTO: 6.87 THOUSAND/UL (ref 4.31–10.16)
WBC #/AREA URNS AUTO: ABNORMAL /HPF

## 2022-07-25 PROCEDURE — 87077 CULTURE AEROBIC IDENTIFY: CPT

## 2022-07-25 PROCEDURE — 3074F SYST BP LT 130 MM HG: CPT | Performed by: INTERNAL MEDICINE

## 2022-07-25 PROCEDURE — 36415 COLL VENOUS BLD VENIPUNCTURE: CPT

## 2022-07-25 PROCEDURE — 99204 OFFICE O/P NEW MOD 45 MIN: CPT

## 2022-07-25 PROCEDURE — 83918 ORGANIC ACIDS TOTAL QUANT: CPT

## 2022-07-25 PROCEDURE — 82607 VITAMIN B-12: CPT

## 2022-07-25 PROCEDURE — 83036 HEMOGLOBIN GLYCOSYLATED A1C: CPT | Performed by: FAMILY MEDICINE

## 2022-07-25 PROCEDURE — 1160F RVW MEDS BY RX/DR IN RCRD: CPT | Performed by: INTERNAL MEDICINE

## 2022-07-25 PROCEDURE — 86038 ANTINUCLEAR ANTIBODIES: CPT

## 2022-07-25 PROCEDURE — 85027 COMPLETE CBC AUTOMATED: CPT | Performed by: FAMILY MEDICINE

## 2022-07-25 PROCEDURE — 81001 URINALYSIS AUTO W/SCOPE: CPT

## 2022-07-25 PROCEDURE — 87086 URINE CULTURE/COLONY COUNT: CPT

## 2022-07-25 PROCEDURE — 87186 SC STD MICRODIL/AGAR DIL: CPT

## 2022-07-25 PROCEDURE — 84153 ASSAY OF PSA TOTAL: CPT

## 2022-07-25 PROCEDURE — 85610 PROTHROMBIN TIME: CPT

## 2022-07-25 PROCEDURE — 86430 RHEUMATOID FACTOR TEST QUAL: CPT

## 2022-07-25 PROCEDURE — 85730 THROMBOPLASTIN TIME PARTIAL: CPT

## 2022-07-25 PROCEDURE — 85652 RBC SED RATE AUTOMATED: CPT

## 2022-07-25 PROCEDURE — 85007 BL SMEAR W/DIFF WBC COUNT: CPT

## 2022-07-25 PROCEDURE — 3078F DIAST BP <80 MM HG: CPT | Performed by: INTERNAL MEDICINE

## 2022-07-25 PROCEDURE — 85384 FIBRINOGEN ACTIVITY: CPT

## 2022-07-25 PROCEDURE — 80053 COMPREHEN METABOLIC PANEL: CPT | Performed by: FAMILY MEDICINE

## 2022-07-25 PROCEDURE — 3044F HG A1C LEVEL LT 7.0%: CPT

## 2022-07-25 PROCEDURE — 82746 ASSAY OF FOLIC ACID SERUM: CPT

## 2022-07-25 PROCEDURE — 86140 C-REACTIVE PROTEIN: CPT

## 2022-07-25 PROCEDURE — 99214 OFFICE O/P EST MOD 30 MIN: CPT | Performed by: INTERNAL MEDICINE

## 2022-07-25 RX ORDER — MAGNESIUM CARB/ALUMINUM HYDROX 105-160MG
296 TABLET,CHEWABLE ORAL ONCE
Qty: 296 ML | Refills: 0 | Status: SHIPPED | OUTPATIENT
Start: 2022-07-25 | End: 2022-09-23

## 2022-07-25 NOTE — ASSESSMENT & PLAN NOTE
Patient with history of GERD but denies any more symptoms pantoprazole     -discussed about the long-term side effects from pantoprazole and advised him to stop and observe  May take Pepcid as needed     -Patient was explained about the lifestyle and dietary modifications  Advised to avoid fatty foods, chocolates, caffeine, alcohol and any other triggering foods  Avoid eating for at least 3 hours before going to bed

## 2022-07-25 NOTE — TELEPHONE ENCOUNTER
LVM for pt re: instructions for 2 day bowel prep/provided direct phone # in scheduling on pt's phone message

## 2022-07-25 NOTE — PROGRESS NOTES
Follow up -  [facility]  Marleni Delarosa 1950 male         Chief Complaint:  For colonoscopy    HPI:  Mr Mcihelle Tyson was referred for colonoscopy because of history of colon polyps and the preparation was somewhat suboptimal during the last colonoscopy  Last colonoscopy was about 3 years ago  Patient has regular bowel movements and denies any blood or mucus in the stool  Appetite is good and denies any recent weight loss  Denies any abdominal pain, nausea, or vomiting  Has no heartburn or acid reflux  Denies any difficulty swallowing  He reports using pantoprazole regularly  Chaperon: Ms Clark Huge: Review of Systems   Constitutional: Negative for activity change, appetite change, chills, diaphoresis, fatigue, fever and unexpected weight change  HENT: Negative for ear discharge, ear pain, facial swelling, hearing loss, nosebleeds, sore throat, tinnitus and voice change  Eyes: Negative for pain, discharge, redness, itching and visual disturbance  Respiratory: Negative for apnea, cough, chest tightness, shortness of breath and wheezing  Cardiovascular: Negative for chest pain and palpitations  Gastrointestinal:        As noted in HPI   Endocrine: Negative for cold intolerance, heat intolerance and polyuria  Genitourinary: Negative for difficulty urinating, dysuria, flank pain, hematuria and urgency  Musculoskeletal: Negative for arthralgias, back pain, gait problem, joint swelling and myalgias  Skin: Negative for rash and wound  Neurological: Negative for dizziness, tremors, seizures, speech difficulty, light-headedness, numbness and headaches  Hematological: Negative for adenopathy  Does not bruise/bleed easily  Psychiatric/Behavioral: Negative for agitation, behavioral problems and confusion  The patient is not nervous/anxious           Past Medical History:   Diagnosis Date    Anemia     Arthritis     Black stool 7/24/2020    Diabetes mellitus ([facility] [address] )     IDDM    Diverticulosis     Enlarged prostate     Erectile dysfunction     Hypercholesteremia     Resolved post weight loss    Hypertension     Resolved post weight loss    Kidney stone     Obesity     Prostate cancer ([facility] [address] )     Wears partial dentures     partial upper and lower      Past Surgical History:   Procedure Laterality Date    ABDOMINAL ADHESION SURGERY N/A 11/28/2016    Procedure: EXTENSIVE LYSIS ADHESIONS;  Surgeon: Che Aguirre MD;  [facility];  Service:    Romero Rist FRACTURE SURGERY Left     CHOLECYSTECTOMY      COLON SURGERY      colon resection    COLONOSCOPY      COLOSTOMY      COLOSTOMY CLOSURE      DIAGNOSTIC LAPAROSCOPY      GASTRIC BYPASS      failed due to adhesions    HERNIA REPAIR      abdominal    OH BIOPSY OF PROSTATE,NEEDLE/PUNCH N/A 5/8/2020    Procedure: TRANSRECTAL NEEDLE BIOPSY PROSTATE (TRNBP) WITH TRANSRECTAL ULTRASOUND GUIDANCE;  Surgeon: Willem Fajardo MD;  [facility];  Service: Urology    OH CYSTOSCOPY,DIR VIS INT URETHROTOMY N/A 5/8/2020    Procedure: DIRECT VISUAL INTERAL URETHROTOMY (DVIU), dilation of urethral stricture;  Surgeon: Willem Fajardo MD;  [facility];  Service: Urology    OH CYSTOURETHRO W/IMPLANT N/A 3/8/2019    Procedure: CYSTOSCOPY WITH INSERTION Hieu Robins;  Surgeon: Willem Fajardo MD;  [facility];  Service: Urology    OH CYSTOURETHRO W/IMPLANT N/A 5/8/2020    Procedure: CYSTOSCOPY WITH INSERTION Hieu Robins;  Surgeon: Willem Fajardo MD;  [facility];  Service: Urology    OH [address] DUODENUM/JEJUNUM N/A 10/25/2018    Procedure: LINEAR ENDOSCOPIC U/S;  Surgeon: Val Higuera MD;  [facility]; Service: Gastroenterology    OH ESOPHAGOGASTRODUODENOSCOPY TRANSORAL DIAGNOSTIC N/A 7/6/2016    Procedure: EGD AND COLONOSCOPY;  Surgeon: Saran Kaye MD;  [facility];   Service: Gastroenterology    OH LAP, ANEESH RESTRICT PROC, LONGITUDINAL GASTRECTOMY N/A 2016    Procedure: GASTRECTOMY SLEEVE LAPAROSCOPIC;  Surgeon: Fadia Cunningham MD;  Location: [facility];  Service: [address] [place] [address] BIOPSY  2020     Social History     Socioeconomic History    Marital status: Registered Domestic Partner     Spouse name: Not on file    Number of children: Not on file    Years of education: Not on file    Highest education level: Not on file   Occupational History    Not on file   Tobacco Use    Smoking status: Former Smoker     Packs/day: 0 25     Quit date: 11/10/2001     Years since quittin 7    Smokeless tobacco: Former User   Vaping Use    Vaping Use: Never used   Substance and Sexual Activity    Alcohol use: Not Currently    Drug use: Not Currently     Comment: RSO    Sexual activity: Yes     Partners: Female   Other Topics Concern    Not on file   Social History Narrative    Not on file     Social Determinants of Health     Financial Resource Strain: High Risk    Difficulty of Paying Living Expenses: Hard   Food Insecurity: Food Insecurity Present    Worried About Running Out of Food in the Last Year: Often true    Jelani of Food in the Last Year: Often true   Transportation Needs: No Transportation Needs    Lack of Transportation (Medical): No    Lack of Transportation (Non-Medical):  No   Physical Activity: Not on file   Stress: Not on file   Social Connections: Not on file   Intimate Partner Violence: Not on file   Housing Stability: Not on file     Family History   Problem Relation Age of Onset    Heart disease Mother     Breast cancer Mother 80    Diabetes Father     Colon cancer Neg Hx     Liver disease Neg Hx      Enalapril and Metformin  Current Outpatient Medications   Medication Sig Dispense Refill    acetaminophen (TYLENOL) 325 mg tablet Take 2 tablets (650 mg total) by mouth every 4 (four) hours as needed for mild pain, headaches or fever  0    albuterol (ProAir HFA) 90 mcg/act inhaler Inhale 2 puffs every 6 (six) hours as needed for wheezing or shortness of breath 8 5 g 0    aluminum-magnesium hydroxide-simethicone (MYLANTA) 200-200-20 mg/5 mL suspension Take 30 mL by mouth every 6 (six) hours as needed for indigestion or heartburn 355 mL 0    atorvastatin (LIPITOR) 10 mg tablet TAKE ONE TABLET BY MOUTH EVERY DAY 90 tablet 3    Biotin 1000 MCG tablet Take 1 tablet by mouth daily in the early morning       Blood Glucose Monitoring Suppl (GLUCOCARD VITAL MONITOR) w/Device KIT by Does not apply route 2 (two) times a day 1 kit 0    calcium carbonate (OS-GILDA) 600 MG tablet Take 600 mg by mouth daily in the early morning       Cholecalciferol (VITAMIN D3) 2000 units capsule Take 1,000 Units by mouth daily in the early morning       Cyanocobalamin (VITAMIN B-12) 5000 MCG TBDP Take 5,000 mcg by mouth once a week Takes on Mondays      docusate sodium (COLACE) 100 mg capsule Take 100 mg by mouth daily as needed for constipation      fluticasone (FLONASE) 50 mcg/act nasal spray 2 sprays into each nostril daily 16 g 0    glipiZIDE (GLUCOTROL XL) 5 mg 24 hr tablet TAKE ONE TABLET BY MOUTH EVERY DAY AFTER BREAKFAST 90 tablet 3    guaiFENesin (ROBITUSSIN) 100 MG/5ML oral liquid Take 10 mL (200 mg total) by mouth 3 (three) times a day as needed for cough 120 mL 0    Insulin Lispro Prot & Lispro (HumaLOG Mix 75/25 KwikPen) (75-25) 100 units/mL injection pen Inject 20 Units under the skin 2 (two) times a day with meals 15 mL 3    Lancets (LIFESCAN UNISTIK 2) MISC Lifescan One Touch Ultramini Meter; use as directed; 1; 0; 31-Oct-2016; 31-Oct-2016; Melida Duran;  Active      magnesium citrate (CITROMA) 1 745 g/30 mL oral solution Take 296 mL by mouth once for 1 dose 296 mL 0    meloxicam (Mobic) 15 mg tablet Take 1 tablet (15 mg total) by mouth daily As needed for pain 30 tablet 1    Multiple Vitamin (MULTIVITAMIN) capsule Take 1 capsule by mouth daily in the early morning       ondansetron (ZOFRAN-ODT) 4 mg disintegrating tablet Take 1 tablet (4 mg total) by mouth every 6 (six) hours as needed for nausea or vomiting 20 tablet 0    polyethylene glycol (GOLYTELY) 4000 mL solution Take 4,000 mL by mouth once for 1 dose 7146 mL 0    Trulicity 3 ZH/5 1OH SOPN INJECT 0 5ML (3MG) UNDER THE SKIN ONCE A WEEK 6 mL 0    fluticasone (Flovent HFA) 110 MCG/ACT inhaler Inhale 2 puffs 2 (two) times a day Rinse mouth after use  12 g 0    losartan (COZAAR) 25 mg tablet TAKE ONE TABLET BY MOUTH EVERY DAY 90 tablet 3    Myrbetriq 50 MG TB24 TAKE ONE TABLET BY MOUTH EVERY DAY (Patient not taking: No sig reported) 30 tablet 3    tamsulosin (FLOMAX) 0 4 mg Take 1 capsule (0 4 mg total) by mouth daily with dinner (Patient not taking: No sig reported) 90 capsule 0     No current facility-administered medications for this visit  Blood pressure 120/68, pulse 58, height 5' 7" (1 702 m), weight 108 kg (238 lb), SpO2 95 %  PHYSICAL EXAM: Physical Exam  Constitutional:       Appearance: He is well-developed  HENT:      Head: Normocephalic and atraumatic  Eyes:      General: No scleral icterus  Right eye: No discharge  Left eye: No discharge  Conjunctiva/sclera: Conjunctivae normal       Pupils: Pupils are equal, round, and reactive to light  Neck:      Thyroid: No thyromegaly  Vascular: No JVD  Trachea: No tracheal deviation  Cardiovascular:      Rate and Rhythm: Normal rate and regular rhythm  Heart sounds: Normal heart sounds  No murmur heard  No friction rub  No gallop  Pulmonary:      Effort: Pulmonary effort is normal  No respiratory distress  Breath sounds: Normal breath sounds  No wheezing or rales  Chest:      Chest wall: No tenderness  Abdominal:      General: Bowel sounds are normal  There is no distension  Palpations: Abdomen is soft  There is no mass  Tenderness: There is no abdominal tenderness   There is no guarding or rebound  Hernia: No hernia is present  Musculoskeletal:      Cervical back: Neck supple  Lymphadenopathy:      Cervical: No cervical adenopathy  Skin:     General: Skin is warm and dry  Findings: No erythema or rash  Neurological:      Mental Status: He is alert and oriented to person, place, and time  Psychiatric:         Behavior: Behavior normal          Thought Content: Thought content normal           Lab Results   Component Value Date    WBC 6 87 07/25/2022    HGB 13 7 07/25/2022    HCT 41 7 07/25/2022    MCV 92 07/25/2022     (L) 07/25/2022     Lab Results   Component Value Date    GLUCOSE 198 (H) 12/14/2020    CALCIUM 9 1 07/25/2022     01/07/2016    K 4 1 07/25/2022    CO2 29 07/25/2022     07/25/2022    BUN 20 07/25/2022    CREATININE 0 96 07/25/2022     Lab Results   Component Value Date    ALT 18 07/25/2022    AST 20 07/25/2022    ALKPHOS 75 07/25/2022    BILITOT 0 36 01/07/2016     Lab Results   Component Value Date    INR 1 09 07/25/2022    INR 1 44 (H) 07/24/2020    INR 0 94 08/09/2017    PROTIME 14 1 07/25/2022    PROTIME 16 9 (H) 07/24/2020    PROTIME 12 8 08/09/2017       CT lower extremity wo contrast left    Result Date: 11/11/2021  Impression: No evidence of acute traumatic injury to the left knee  Specifically, there is no evidence of proximal tibial fracture  Workstation performed: [id number]       ASSESSMENT & PLAN:    History of colon polyps  Personal history of colon polyps- patient is at increased risk for colon cancer screening  Rule out colorectal lesions including polyps or malignancy     -Schedule for colonoscopy  -High-fiber diet     -Patient was given instructions about the colonoscopy prep     -Patient was explained about  the risks and benefits of the procedure  Risks including but not limited to bleeding, infection, perforation were explained in detail   Also explained about less than 100% sensitivity with the exam and other alternatives  GERD (gastroesophageal reflux disease)  Patient with history of GERD but denies any more symptoms pantoprazole     -discussed about the long-term side effects from pantoprazole and advised him to stop and observe  May take Pepcid as needed     -Patient was explained about the lifestyle and dietary modifications  Advised to avoid fatty foods, chocolates, caffeine, alcohol and any other triggering foods  Avoid eating for at least 3 hours before going to bed

## 2022-07-25 NOTE — PROGRESS NOTES
[address] [facility] [id number]  Hematology Ambulatory Consult  Jason ramirez, 1950, [id number]  7/25/2022    Assessment/Plan:  1  Thrombocytopenia [facility])  Mr Hammad Mane is a 63-year-old male seen in consultation for thrombocytopenia  This appears to be a chronic waxing and waning problem  He was admitted to the hospital in July of 2020 with UTI and GI bleed and his lowest platelet count was documented at 98,000  His baseline tends to run between 140,000 and 150,000  He has some easy bruising on his arms otherwise no excess bleeding or bruising noted  He does not have any other symptoms at this time  I explained in detail the workup listed below including an abdominal ultrasound, inflammatory markers, autoimmune workup, folate deficiency, B12 deficiency, iron panel, and bleeding times  I will also check his UA as he is having some urinary symptoms  I will call him with results of this blood work and if there are any further interventions or workup needed at that time  He will follow-up with me in the office in 1 month with repeat CBC prior  I explained that this is likely his normal consulting the testes to be monitored more frequently over time  If he does have any signs and symptoms of infection he should let my office know so that his levels can be monitored more closely  He knows to call the office with any severe bruising, bleeding gums, hematuria, blood in the stool  2  Postsurgical malabsorption  I explained that the thrombocytopenia could be due to a folate, MMA, iron deficiency  This is common after gastric bypass surgery  He is taking multivitamins and B12 supplement  3  Easy bruising  Platelet count does not appear low enough to be contributing to his easy bruising  Will check clotting times along with fibrinogen  He is not on any anticoagulation or chronic NSAIDs    He denies any bleeding gums, hematuria, blood in the stool  Bruising only noted on arms, no petechia or purpura  4  Dysuria  In July of 2020 he was admitted for UTI and GI bleed  He is exhibiting some dysuria along with his chronic incontinence and frequency  Will check for UTI as this could be contributing to thrombocytopenia  5  Colon cancer screening  Last colonoscopy was July of 2019 and should be repeated in 3 years  He is technically due this month for repeat colonoscopy  Referral placed to GI to have this completed  - Ambulatory referral to Gastroenterology; Future    - Ambulatory referral to Hematology / Oncology  - CBC and differential  - Comprehensive metabolic panel; Future  - C-reactive protein; Future  - Folate; Future  - Methylmalonic acid, serum; Future  - Sedimentation rate, automated; Future  - RF Screen w/ Reflex to Titer; Future  - US abdomen complete; Future  - Iron Panel (Includes Ferritin, Iron Sat%, Iron, and TIBC); Future  - Vitamin B12; Future  - JERRELL w/Reflex; Future  - UA w Reflex to Microscopic w Reflex to Culture  - Protime-INR; Future  - APTT; Future  - Fibrinogen; Future  - Peripheral Smear; Future    The patient is scheduled for follow-up in approximately 1 month  Patient voiced agreement and understanding to the above  Patient knows to call the Hematology/Oncology office with any questions and concerns regarding the above  Barrier(s) to care: None     The patient is able to self care     -------------------------------------------------------------------------------------------------------    Chief Complaint   Patient presents with    Consult       Referring provider:  Lyudmila Kate MD  [address]  [address] [place] [address],[address]  [facility],  [facility]     History of present illness:  Jacobo Red is a 27-year-old male with past medical history of GERD, type 2 diabetes, asthma, hypertension, prostate cancer diagnosed in May of 2020 status post radiation therapy and on Lupron injections, and gastric bypass surgery (sleeve) about 5 years ago  He seen in consultation at the request of his PCP for a low platelet count  This appears to be a waxing and weaning condition  Previously he was low in July of 2020 while admitted for a GI bleed and urinary tract infection  His baseline platelet count runs between 140,000-150,000  He does complain of some easy bruising on his arms but denies any petechia or purpura  He denies any hematuria, blood in the stool, dark stools, bleeding gums  He denies any abdominal pain or bloating  He denies any rash  He denies any new medications and he is not on anticoagulation  He denies history of liver kidney disease  He denies any fevers, night sweats, weight loss, early satiety  He has no family history of any blood conditions  He does complain of some fatigue since starting Lupron injections  He continues to follow with urology for his prostate cancer and his PSA has been undetectable  Her since his diagnosis with prostate cancer he has suffered from incontinence and frequency  Most recently he does notice a slight burning sensation upon voiding and his urine does have a distinct odor  His last colonoscopy was in July of 2019 and was suggested that he repeat in 3 years  He is due now for a repeat colonoscopy  Since his gastric bypass surgery he has not continued to follow with his bariatric surgeon but does take daily multivitamin, vitamin-D, and B12 sublingual drops 2 times per week        01/07/2016:  Hemoglobin 13 6, platelets 853, WBC 3 44  01/05/2017:  Hemoglobin 14 2, platelets 178, WBC 1 88  03/04/2017:  Hemoglobin 12 7, platelets 959, WBC 7 23  04/09/2018:  Hemoglobin 13 7, platelets 145, WBC 2 96  09/09/2019:  Hemoglobin 13 2, platelets 807, WBC 1 08  07/24/2020:  Hemoglobin 13 6, platelets 475, WBC 92 11  07/25/2020:  Hemoglobin 13 7, platelets 98, WBC 6 64  07/28/2020:  Hemoglobin 13 1, platelet 622, WBC 5 31  06/28/2021:  Hemoglobin 12 7, platelets 428, WBC 9 52  02/21/2022:  Hemoglobin 13 8, platelets 019, WBC 4 31  06/22/2022:  Hemoglobin 13 5, platelets 576, WBC 9 11    Review of Systems   Constitutional: Positive for fatigue (mild)  Negative for activity change, appetite change, diaphoresis, fever and unexpected weight change  HENT: Negative for trouble swallowing and voice change  Eyes: Negative for photophobia and visual disturbance  Respiratory: Negative for cough, chest tightness and shortness of breath  Cardiovascular: Negative for chest pain, palpitations and leg swelling  Gastrointestinal: Negative for abdominal distention, abdominal pain, blood in stool, constipation, diarrhea, nausea and vomiting  Endocrine: Negative for cold intolerance and heat intolerance  Genitourinary: Positive for dysuria and frequency  Negative for decreased urine volume, hematuria and urgency  Odor, incontinence    Musculoskeletal: Negative for arthralgias, back pain, gait problem, joint swelling and myalgias  Skin: Negative for pallor and rash  Neurological: Negative for dizziness, tremors, weakness, light-headedness, numbness and headaches  Hematological: Bruises/bleeds easily  Psychiatric/Behavioral: Negative for confusion and sleep disturbance         Patient Active Problem List   Diagnosis    GERD (gastroesophageal reflux disease)    Class 2 severe obesity due to excess calories with serious comorbidity and body mass index (BMI) of 35 0 to 35 9 in adult [facility])    Hypercholesteremia    Postgastrectomy malabsorption    Type 2 diabetes mellitus with hyperglycemia, with long-term current use of insulin (HCC)    Benign essential hypertension    Benign enlargement of prostate    Pancreatic cyst    History of colon polyps    IPMN (intraductal papillary mucinous neoplasm)    ED (erectile dysfunction) of organic origin    Benign prostatic hyperplasia with urinary frequency    Bruxism    [facility]state cancer [facility] [address] [facility]    Greater trochanteric bursitis of left hip    Primary osteoarthritis of one hip, left    Mild asthma exacerbation    Platelets decreased ([facility] [address] [address] )       Past Medical History:   Diagnosis Date    Anemia     Arthritis     Black stool 7/24/2020    Diabetes mellitus ([facility] )     IDDM    Diverticulosis     Enlarged prostate     Erectile dysfunction     Hypercholesteremia     Resolved post weight loss    Hypertension     Resolved post weight loss    Kidney stone     Obesity     Prostate cancer ([facility] )     Wears partial dentures     partial upper and lower       Past Surgical History:   Procedure Laterality Date    ABDOMINAL ADHESION SURGERY N/A 11/28/2016    Procedure: EXTENSIVE LYSIS ADHESIONS;  Surgeon: Ariel Collier MD;  [facility];  Service:    [place] FRACTURE SURGERY Left     CHOLECYSTECTOMY      COLON SURGERY      colon resection    COLONOSCOPY      COLOSTOMY      COLOSTOMY CLOSURE      DIAGNOSTIC LAPAROSCOPY      GASTRIC BYPASS      failed due to adhesions    HERNIA REPAIR      abdominal    IL BIOPSY OF PROSTATE,NEEDLE/PUNCH N/A 5/8/2020    Procedure: TRANSRECTAL NEEDLE BIOPSY PROSTATE (TRNBP) WITH TRANSRECTAL ULTRASOUND GUIDANCE;  Surgeon: Angeles Zazueta MD;  [facility];  Service: Urology    IL CYSTOSCOPY,DIR VIS INT URETHROTOMY N/A 5/8/2020    Procedure: DIRECT VISUAL INTERAL URETHROTOMY (DVIU), dilation of urethral stricture;  Surgeon: Angeles Zazueta MD;  [facility];  Service: Urology    IL CYSTOURETHRO W/IMPLANT N/A 3/8/2019    Procedure: CYSTOSCOPY WITH INSERTION Pasty Masterson;  Surgeon: Angeles Zazueta MD;  [facility];  Service: Urology    IL CYSTOURETHRO W/IMPLANT N/A 5/8/2020    Procedure: CYSTOSCOPY WITH INSERTION Pasty Masterson;  Surgeon: Angeles Zazueta MD;  [facility];  Service: Urology    IL [address] N/A 10/25/2018    Procedure: LINEAR ENDOSCOPIC U/S;  Surgeon: Ralph Castellano MD;  [facility]: [facility];   Service: Gastroenterology    FL ESOPHAGOGASTRODUODENOSCOPY TRANSORAL DIAGNOSTIC N/A 2016    Procedure: EGD AND COLONOSCOPY;  Surgeon: Gracy Rodriguez MD;  [facility]; Service: Gastroenterology    FL LAP, ANEESH RESTRICT PROC, LONGITUDINAL GASTRECTOMY N/A 2016    Procedure: GASTRECTOMY SLEEVE LAPAROSCOPIC;  Surgeon: Aleena Friedman MD;  [facility];  Service: [address] [place] [address] [place] BIOPSY  2020       Family History   Problem Relation Age of Onset    Heart disease Mother     Breast cancer Mother 80    Diabetes Father     Colon cancer Neg Hx     Liver disease Neg Hx        Social History     Socioeconomic History    Marital status: Registered Domestic Partner     Spouse name: None    Number of children: None    Years of education: None    Highest education level: None   Occupational History    None   Tobacco Use    Smoking status: Former Smoker     Packs/day: 0 25     Quit date: 11/10/2001     Years since quittin 7    Smokeless tobacco: Former User   Vaping Use    Vaping Use: Never used   Substance and Sexual Activity    Alcohol use: Not Currently    Drug use: Not Currently     Comment: RSO    Sexual activity: Yes     Partners: Female   Other Topics Concern    None   Social History Narrative    None     Social Determinants of Health     Financial Resource Strain: High Risk    Difficulty of Paying Living Expenses: Hard   Food Insecurity: Food Insecurity Present    Worried About Running Out of Food in the Last Year: Often true    Jelani of Food in the Last Year: Often true   Transportation Needs: No Transportation Needs    Lack of Transportation (Medical): No    Lack of Transportation (Non-Medical):  No   Physical Activity: Not on file   Stress: Not on file   Social Connections: Not on file   Intimate Partner Violence: Not on file   Housing Stability: Not on file         Current Outpatient Medications:     acetaminophen (TYLENOL) 325 mg tablet, Take 2 tablets (650 mg total) by mouth every 4 (four) hours as needed for mild pain, headaches or fever, Disp: , Rfl: 0    albuterol (ProAir HFA) 90 mcg/act inhaler, Inhale 2 puffs every 6 (six) hours as needed for wheezing or shortness of breath, Disp: 8 5 g, Rfl: 0    aluminum-magnesium hydroxide-simethicone (MYLANTA) 200-200-20 mg/5 mL suspension, Take 30 mL by mouth every 6 (six) hours as needed for indigestion or heartburn, Disp: 355 mL, Rfl: 0    atorvastatin (LIPITOR) 10 mg tablet, TAKE ONE TABLET BY MOUTH EVERY DAY, Disp: 90 tablet, Rfl: 3    Biotin 1000 MCG tablet, Take 1 tablet by mouth daily in the early morning , Disp: , Rfl:     Blood Glucose Monitoring Suppl (GLUCOCARD VITAL MONITOR) w/Device KIT, by Does not apply route 2 (two) times a day, Disp: 1 kit, Rfl: 0    calcium carbonate (OS-GILDA) 600 MG tablet, Take 600 mg by mouth daily in the early morning , Disp: , Rfl:     Cholecalciferol (VITAMIN D3) 2000 units capsule, Take 1,000 Units by mouth daily in the early morning , Disp: , Rfl:     Cyanocobalamin (VITAMIN B-12) 5000 MCG TBDP, Take 5,000 mcg by mouth once a week Takes on Mondays, Disp: , Rfl:     docusate sodium (COLACE) 100 mg capsule, Take 100 mg by mouth daily as needed for constipation, Disp: , Rfl:     fluticasone (FLONASE) 50 mcg/act nasal spray, 2 sprays into each nostril daily, Disp: 16 g, Rfl: 0    glipiZIDE (GLUCOTROL XL) 5 mg 24 hr tablet, TAKE ONE TABLET BY MOUTH EVERY DAY AFTER BREAKFAST, Disp: 90 tablet, Rfl: 3    guaiFENesin (ROBITUSSIN) 100 MG/5ML oral liquid, Take 10 mL (200 mg total) by mouth 3 (three) times a day as needed for cough, Disp: 120 mL, Rfl: 0    Insulin Lispro Prot & Lispro (HumaLOG Mix 75/25 KwikPen) (75-25) 100 units/mL injection pen, Inject 20 Units under the skin 2 (two) times a day with meals, Disp: 15 mL, Rfl: 3    Lancets ([place] UNISTIK 2) MISC, Lifescan One Touch Ultramini Meter; use as directed; 1; 0; 31-Oct-2016; 31-Oct-2016; Filiberto Abrams; Active, Disp: , Rfl:     meloxicam (Mobic) 15 mg tablet, Take 1 tablet (15 mg total) by mouth daily As needed for pain, Disp: 30 tablet, Rfl: 1    Multiple Vitamin (MULTIVITAMIN) capsule, Take 1 capsule by mouth daily in the early morning , Disp: , Rfl:     ondansetron (ZOFRAN-ODT) 4 mg disintegrating tablet, Take 1 tablet (4 mg total) by mouth every 6 (six) hours as needed for nausea or vomiting, Disp: 20 tablet, Rfl: 0    pantoprazole (PROTONIX) 40 mg tablet, TAKE ONE TABLET BY MOUTH EVERY DAY, Disp: 30 tablet, Rfl: 0    Trulicity 3 LM/8 0GC SOPN, INJECT 0 5ML (3MG) UNDER THE SKIN ONCE A WEEK, Disp: 6 mL, Rfl: 0    fluticasone (Flovent HFA) 110 MCG/ACT inhaler, Inhale 2 puffs 2 (two) times a day Rinse mouth after use , Disp: 12 g, Rfl: 0    losartan (COZAAR) 25 mg tablet, TAKE ONE TABLET BY MOUTH EVERY DAY, Disp: 90 tablet, Rfl: 3    Myrbetriq 50 MG TB24, TAKE ONE TABLET BY MOUTH EVERY DAY (Patient not taking: No sig reported), Disp: 30 tablet, Rfl: 3    tamsulosin (FLOMAX) 0 4 mg, Take 1 capsule (0 4 mg total) by mouth daily with dinner (Patient not taking: No sig reported), Disp: 90 capsule, Rfl: 0    Allergies   Allergen Reactions    Enalapril Anaphylaxis, Throat Swelling and Hives    Metformin Abdominal Pain       Objective:  /60 (BP Location: Left arm, Patient Position: Sitting, Cuff Size: Adult)   Pulse 60   Temp (!) 96 1 °F (35 6 °C) (Tympanic)   Resp 16   Ht 5' 7" (1 702 m)   Wt 108 kg (238 lb)   SpO2 92%   BMI 37 28 kg/m²   Physical Exam  Constitutional:       General: He is not in acute distress  Appearance: Normal appearance  He is obese  He is not ill-appearing  HENT:      Head: Atraumatic  Eyes:      Extraocular Movements: Extraocular movements intact  Conjunctiva/sclera: Conjunctivae normal    Cardiovascular:      Rate and Rhythm: Normal rate and regular rhythm  Pulses: Normal pulses  Heart sounds: Normal heart sounds     Pulmonary: Effort: Pulmonary effort is normal  No respiratory distress  Breath sounds: Normal breath sounds  Abdominal:      General: Bowel sounds are normal       Palpations: Abdomen is soft  Tenderness: There is no abdominal tenderness  Musculoskeletal:         General: Normal range of motion  Cervical back: Normal range of motion  No tenderness  Right lower leg: No edema  Left lower leg: No edema  Lymphadenopathy:      Cervical: No cervical adenopathy  Skin:     General: Skin is warm and dry  Capillary Refill: Capillary refill takes less than 2 seconds  Coloration: Skin is not pale  Findings: No bruising or rash  Neurological:      General: No focal deficit present  Mental Status: He is alert and oriented to person, place, and time  Mental status is at baseline  Motor: No weakness  Gait: Gait normal    Psychiatric:         Mood and Affect: Mood normal          Behavior: Behavior normal          Thought Content:  Thought content normal          Judgment: Judgment normal          Result Review  Labs:   Lab Results   Component Value Date    PSA <0 1 2022    PSA <0 1 2022    PSA <0 1 01/10/2022     Lab Results   Component Value Date    WBC 6 49 2022    HGB 13 5 2022    HCT 40 9 2022    MCV 91 2022     (L) 2022     Lab Results   Component Value Date     2016    SODIUM 139 2022    K 4 3 2022     2022    CO2 26 2022    ANIONGAP 9 2016    AGAP 5 2022    BUN 21 2022    CREATININE 0 94 2022    GLUC 209 (H) 2022    GLUF 134 (H) 2022    CALCIUM 8 8 2022    AST 22 2022    ALT 28 2022    ALKPHOS 93 2022    PROT 7 2 2016    TP 7 0 2022    BILITOT 0 36 2016    TBILI 0 45 2022    EGFR 80 2022       Imagin2020: CT chest abdomen pelvis w contrast  No evidence of acute intrathoracic, intra-abdominal or intrapelvic parenchymal organ injury  No pneumothorax      There is an approximately 2 6 x 2 cm low-density lesion in the region of the uncinate process of the pancreas  This appears similar to August 2, 2020  Follow-up is recommended  Nonemergent outpatient MRI with MRCP is recommended for further   characterization, if there are no contraindications  Please see discussion      Numerous radiation therapy seeds are again evident within the prostate  Please note: This report has been generated by a voice recognition software system  Therefore there may be syntax, spelling, and/or grammatical errors  Please call if you have any questions

## 2022-07-25 NOTE — TELEPHONE ENCOUNTER
Pt states his procedure is not until the end of October/pt states he will call back at the end of Septemeber to see if Golytely will be IN STOCK by then

## 2022-07-25 NOTE — TELEPHONE ENCOUNTER
Would do prep for 2 days with clear liquids for 2 days with Mag citrate on day 1 and MiraLax on day 2 since prep was suboptimal

## 2022-07-25 NOTE — TELEPHONE ENCOUNTER
Patients GI provider:  Dr Tera Marie    Number to return call: ( [phone]      Reason for call:  [employer] called to inform the doctor that the Golytely is currently on back order    Scheduled procedure/appointment date if applicable: N/A

## 2022-07-26 NOTE — PATIENT INSTRUCTIONS
Scheduled date of colonoscopy (as of today):10/20/2022  Physician performing colonoscopy:DR LAGUNAS  Location of colonoscopy:[facility]  Bowel prep reviewed with patient:GOLYTELY/MAG CITRATE PER DR Nicole Brown  Instructions reviewed with patient by:RONALDO OTTO  Clearances:

## 2022-07-27 ENCOUNTER — TELEPHONE (OUTPATIENT)
Dept: HEMATOLOGY ONCOLOGY | Facility: CLINIC | Age: 72
End: 2022-07-27

## 2022-07-27 DIAGNOSIS — N30.00 ACUTE CYSTITIS WITHOUT HEMATURIA: Primary | ICD-10-CM

## 2022-07-27 LAB
BACTERIA UR CULT: ABNORMAL
RYE IGE QN: NEGATIVE

## 2022-07-27 RX ORDER — CEPHALEXIN 500 MG/1
500 CAPSULE ORAL EVERY 8 HOURS SCHEDULED
Qty: 21 CAPSULE | Refills: 0 | Status: SHIPPED | OUTPATIENT
Start: 2022-07-27 | End: 2022-08-03

## 2022-07-27 NOTE — TELEPHONE ENCOUNTER
Called patient to make him aware his PCP sent in antibiotics for his UTI  He was appreciative of the phone call

## 2022-07-28 LAB — METHYLMALONATE SERPL-SCNC: 116 NMOL/L (ref 0–378)

## 2022-07-29 ENCOUNTER — HOSPITAL ENCOUNTER (OUTPATIENT)
Dept: ULTRASOUND IMAGING | Facility: HOSPITAL | Age: 72
Discharge: HOME | End: 2022-07-29
Payer: COMMERCIAL

## 2022-07-29 DIAGNOSIS — D69.6 THROMBOCYTOPENIA (HCC): ICD-10-CM

## 2022-07-29 PROCEDURE — 76700 US EXAM ABDOM COMPLETE: CPT

## 2022-08-01 ENCOUNTER — ESTABLISHED COMPREHENSIVE EXAM (OUTPATIENT)
Dept: URBAN - METROPOLITAN AREA CLINIC 6 | Facility: CLINIC | Age: 72
End: 2022-08-01

## 2022-08-01 DIAGNOSIS — H35.413: ICD-10-CM

## 2022-08-01 DIAGNOSIS — Z79.4: ICD-10-CM

## 2022-08-01 DIAGNOSIS — D69.6 THROMBOCYTOPENIA (HCC): Primary | ICD-10-CM

## 2022-08-01 DIAGNOSIS — H25.813: ICD-10-CM

## 2022-08-01 DIAGNOSIS — E11.9: ICD-10-CM

## 2022-08-01 PROCEDURE — 92015 DETERMINE REFRACTIVE STATE: CPT

## 2022-08-01 PROCEDURE — 92014 COMPRE OPH EXAM EST PT 1/>: CPT

## 2022-08-01 PROCEDURE — 92250 FUNDUS PHOTOGRAPHY W/I&R: CPT

## 2022-08-01 ASSESSMENT — VISUAL ACUITY
OS_CC: 20/40-1
OD_CC: 20/60
OD_GLARE: 20/100
OU_CC: J3
OS_GLARE: 20/60-1

## 2022-08-01 ASSESSMENT — TONOMETRY
OD_IOP_MMHG: 17
OS_IOP_MMHG: 16

## 2022-08-10 DIAGNOSIS — E11.9 TYPE 2 DIABETES MELLITUS WITH INSULIN THERAPY (HCC): ICD-10-CM

## 2022-08-10 DIAGNOSIS — I10 BENIGN ESSENTIAL HYPERTENSION: ICD-10-CM

## 2022-08-10 DIAGNOSIS — Z79.4 TYPE 2 DIABETES MELLITUS WITH INSULIN THERAPY (HCC): ICD-10-CM

## 2022-08-10 PROCEDURE — 4010F ACE/ARB THERAPY RXD/TAKEN: CPT

## 2022-08-10 RX ORDER — LOSARTAN POTASSIUM 25 MG/1
25 TABLET ORAL DAILY
Qty: 100 TABLET | Refills: 3 | Status: SHIPPED | OUTPATIENT
Start: 2022-08-10 | End: 2022-11-08

## 2022-08-10 RX ORDER — ATORVASTATIN CALCIUM 10 MG/1
10 TABLET, FILM COATED ORAL DAILY
Qty: 100 TABLET | Refills: 3 | Status: SHIPPED | OUTPATIENT
Start: 2022-08-10

## 2022-08-11 ENCOUNTER — OFFICE VISIT (OUTPATIENT)
Dept: DENTISTRY | Facility: CLINIC | Age: 72
End: 2022-08-11

## 2022-08-11 VITALS — TEMPERATURE: 97.8 F | DIASTOLIC BLOOD PRESSURE: 85 MMHG | SYSTOLIC BLOOD PRESSURE: 137 MMHG | HEART RATE: 64 BPM

## 2022-08-11 DIAGNOSIS — K05.30 PERIODONTITIS: Primary | ICD-10-CM

## 2022-08-11 PROCEDURE — D9310 CONSULTATION - DIAGNOSTIC SERVICE PROVIDED BY DENTIST OR PHYSICIAN OTHER THAN REQUESTING DENTIST OR PHYSICIAN: HCPCS | Performed by: DENTIST

## 2022-08-11 NOTE — PROGRESS NOTES
Leia Melton presents to the clinic for gum surgery consult  Med hx updated  Vitals form updated  Pt presents with Stage IV Grade B periodontitis  Pt completed 1 round of full mouth SRPs and is on 3 month perio maintenance  At this point, pt does not require perio surgery and will be on 3 month perio maintenance  Upon clinic examination, pt presents with no posterior support and has collapsed bite  Pt also has 7 to 10 4 unit bridge with #7 and #10 having grade 2 mobility  The bridge is loose, but per Dr Gt Martell does not need to be replaced at the moment and pt was advised to avoid biting into anything hard  And maintain his bridge for as long as possible  Pt is interested in maxillary and mandibular resin based dentures  Pt is scheduled to extract #31 due to grade 3 mobility  After site #31 heals, pt can be scheduled for preliminary impressions for partial dentures         NV: Extraction #31  NNV: impressions for partial dentures

## 2022-08-16 DIAGNOSIS — E11.65 TYPE 2 DIABETES MELLITUS WITH HYPERGLYCEMIA, WITH LONG-TERM CURRENT USE OF INSULIN (HCC): Primary | ICD-10-CM

## 2022-08-16 DIAGNOSIS — Z79.4 TYPE 2 DIABETES MELLITUS WITH HYPERGLYCEMIA, WITH LONG-TERM CURRENT USE OF INSULIN (HCC): Primary | ICD-10-CM

## 2022-08-17 ENCOUNTER — OFFICE VISIT (OUTPATIENT)
Dept: DENTISTRY | Facility: CLINIC | Age: 72
End: 2022-08-17

## 2022-08-17 VITALS — TEMPERATURE: 97.3 F | DIASTOLIC BLOOD PRESSURE: 78 MMHG | SYSTOLIC BLOOD PRESSURE: 135 MMHG | HEART RATE: 65 BPM

## 2022-08-17 DIAGNOSIS — Z92.89 HISTORY OF DENTAL SURGERY: Primary | ICD-10-CM

## 2022-08-17 DIAGNOSIS — K02.9 CARIES: ICD-10-CM

## 2022-08-17 PROCEDURE — D7140 EXTRACTION, ERUPTED TOOTH OR EXPOSED ROOT (ELEVATION AND/OR FORCEPS REMOVAL): HCPCS | Performed by: DENTIST

## 2022-08-17 RX ORDER — ACETAMINOPHEN AND CODEINE PHOSPHATE 300; 30 MG/1; MG/1
1 TABLET ORAL EVERY 4 HOURS PRN
Qty: 30 TABLET | Refills: 0 | Status: SHIPPED | OUTPATIENT
Start: 2022-08-17 | End: 2022-09-23

## 2022-08-17 RX ORDER — AMOXICILLIN 500 MG/1
500 CAPSULE ORAL EVERY 8 HOURS SCHEDULED
Qty: 21 CAPSULE | Refills: 0 | Status: SHIPPED | OUTPATIENT
Start: 2022-08-17 | End: 2022-08-25

## 2022-08-17 NOTE — PROGRESS NOTES
Oral Surgery    Pauly Ferrara presents for Ext #31 due to mobility  PMH, patient denies any changes  Obtained a direct and personal consent  Risks and complications were explained  Pt agreed and consented  Consent scanned in doc center  Pre-Op BP WNL  Administered 3 carpules of 2% lidocaine w/ 1:100,000 epi via BRIAN and long buccal block and 2 carpules of 4% septocaine w/ 1:100,000 epi via buccal infiltration and intrapulpal injection  ? Adequate anesthesia obtained, reflected gingiva, elevated, surgical handpiece used to section tooth buccal-lingually and extracted #31 roots individually  Socket curetted and irrigated  No sutures placed  Post op instructions given including alternating between advil and tylenol at the recommended doses, icing face if swollen, no straws or spitting or smoking  Prescribed Tylenol 3 and amoxicillin  Upon dismissal, patient received POI and gauze      NV: impressions for partial dentures with Dr Zay Hilton

## 2022-08-23 ENCOUNTER — APPOINTMENT (OUTPATIENT)
Dept: LAB | Facility: AMBULARY SURGERY CENTER | Age: 72
End: 2022-08-23
Payer: COMMERCIAL

## 2022-08-23 DIAGNOSIS — E11.65 TYPE 2 DIABETES MELLITUS WITH HYPERGLYCEMIA, WITH LONG-TERM CURRENT USE OF INSULIN (HCC): Chronic | ICD-10-CM

## 2022-08-23 DIAGNOSIS — C61 PROSTATE CANCER (HCC): ICD-10-CM

## 2022-08-23 DIAGNOSIS — Z79.4 TYPE 2 DIABETES MELLITUS WITH HYPERGLYCEMIA, WITH LONG-TERM CURRENT USE OF INSULIN (HCC): Chronic | ICD-10-CM

## 2022-08-23 DIAGNOSIS — D69.6 THROMBOCYTOPENIA (HCC): ICD-10-CM

## 2022-08-23 LAB
CHOLEST SERPL-MCNC: 135 MG/DL
ERYTHROCYTE [DISTWIDTH] IN BLOOD BY AUTOMATED COUNT: 12.8 % (ref 11.6–15.1)
FERRITIN SERPL-MCNC: 76 NG/ML (ref 8–388)
HCT VFR BLD AUTO: 42.9 % (ref 36.5–49.3)
HDLC SERPL-MCNC: 38 MG/DL
HGB BLD-MCNC: 14.1 G/DL (ref 12–17)
IRON SATN MFR SERPL: 23 % (ref 20–50)
IRON SERPL-MCNC: 63 UG/DL (ref 65–175)
LDLC SERPL CALC-MCNC: 68 MG/DL (ref 0–100)
MCH RBC QN AUTO: 29.9 PG (ref 26.8–34.3)
MCHC RBC AUTO-ENTMCNC: 32.9 G/DL (ref 31.4–37.4)
MCV RBC AUTO: 91 FL (ref 82–98)
NRBC BLD AUTO-RTO: 0 /100 WBCS
PLATELET # BLD AUTO: 157 THOUSANDS/UL (ref 149–390)
PMV BLD AUTO: 12 FL (ref 8.9–12.7)
PSA SERPL-MCNC: <0.1 NG/ML (ref 0–4)
RBC # BLD AUTO: 4.71 MILLION/UL (ref 3.88–5.62)
TIBC SERPL-MCNC: 278 UG/DL (ref 250–450)
TRIGL SERPL-MCNC: 147 MG/DL
TSH SERPL DL<=0.05 MIU/L-ACNC: 2.99 UIU/ML (ref 0.45–4.5)
WBC # BLD AUTO: 7.14 THOUSAND/UL (ref 4.31–10.16)

## 2022-08-23 PROCEDURE — 85007 BL SMEAR W/DIFF WBC COUNT: CPT

## 2022-08-23 PROCEDURE — 84443 ASSAY THYROID STIM HORMONE: CPT

## 2022-08-23 PROCEDURE — 82728 ASSAY OF FERRITIN: CPT

## 2022-08-23 PROCEDURE — 83540 ASSAY OF IRON: CPT

## 2022-08-23 PROCEDURE — 80061 LIPID PANEL: CPT

## 2022-08-23 PROCEDURE — 85027 COMPLETE CBC AUTOMATED: CPT

## 2022-08-23 PROCEDURE — 36415 COLL VENOUS BLD VENIPUNCTURE: CPT

## 2022-08-23 PROCEDURE — 84153 ASSAY OF PSA TOTAL: CPT

## 2022-08-23 PROCEDURE — 83550 IRON BINDING TEST: CPT

## 2022-08-24 LAB
BURR CELLS BLD QL SMEAR: PRESENT
LYMPHOCYTES NFR BLD: 17 % (ref 14–44)
MONOCYTES NFR BLD AUTO: 10 % (ref 4–12)
NEUTS SEG NFR BLD AUTO: 73 % (ref 45–77)
PATHOLOGIST INTERPRETATION: NORMAL
PATHOLOGY REVIEW: YES
POIKILOCYTOSIS BLD QL SMEAR: PRESENT
TOTAL CELLS COUNTED SPEC: 100

## 2022-08-25 ENCOUNTER — PRE-OP CATARACT MEASUREMENTS (OUTPATIENT)
Dept: URBAN - METROPOLITAN AREA CLINIC 6 | Facility: CLINIC | Age: 72
End: 2022-08-25

## 2022-08-25 ENCOUNTER — OFFICE VISIT (OUTPATIENT)
Dept: HEMATOLOGY ONCOLOGY | Facility: CLINIC | Age: 72
End: 2022-08-25
Payer: COMMERCIAL

## 2022-08-25 VITALS
HEIGHT: 67 IN | RESPIRATION RATE: 14 BRPM | WEIGHT: 234 LBS | BODY MASS INDEX: 36.73 KG/M2 | TEMPERATURE: 97.8 F | OXYGEN SATURATION: 98 % | DIASTOLIC BLOOD PRESSURE: 74 MMHG | HEART RATE: 74 BPM | SYSTOLIC BLOOD PRESSURE: 120 MMHG

## 2022-08-25 DIAGNOSIS — K91.2 POSTSURGICAL MALABSORPTION: ICD-10-CM

## 2022-08-25 DIAGNOSIS — Z79.4: ICD-10-CM

## 2022-08-25 DIAGNOSIS — D69.6 THROMBOCYTOPENIA (HCC): Primary | ICD-10-CM

## 2022-08-25 DIAGNOSIS — E11.9: ICD-10-CM

## 2022-08-25 DIAGNOSIS — R30.0 DYSURIA: ICD-10-CM

## 2022-08-25 DIAGNOSIS — H25.813: ICD-10-CM

## 2022-08-25 DIAGNOSIS — H35.413: ICD-10-CM

## 2022-08-25 PROCEDURE — 1160F RVW MEDS BY RX/DR IN RCRD: CPT

## 2022-08-25 PROCEDURE — 92012 INTRM OPH EXAM EST PATIENT: CPT

## 2022-08-25 PROCEDURE — 92136 OPHTHALMIC BIOMETRY: CPT

## 2022-08-25 PROCEDURE — 99213 OFFICE O/P EST LOW 20 MIN: CPT

## 2022-08-25 ASSESSMENT — VISUAL ACUITY
OD_CC: 20/50
OS_GLARE: 20/60
OS_CC: 20/40-1
OU_SC: J1+
OD_GLARE: 20/100

## 2022-08-25 ASSESSMENT — TONOMETRY
OS_IOP_MMHG: 18
OD_IOP_MMHG: 17

## 2022-08-25 ASSESSMENT — KERATOMETRY
OD_AXISANGLE2_DEGREES: 145
OD_AXISANGLE_DEGREES: 055
OS_K2POWER_DIOPTERS: 45.75
OS_AXISANGLE2_DEGREES: 115
OS_AXISANGLE_DEGREES: 025
OS_K1POWER_DIOPTERS: 44.50
OD_K1POWER_DIOPTERS: 43.75
OD_K2POWER_DIOPTERS: 46.00

## 2022-08-25 NOTE — PROGRESS NOTES
[address] [facility] [address]  Hematology Ambulatory Follow-Up  Jason ramirez, 1950, [id number]  8/25/2022    Assessment/Plan:  1  Thrombocytopenia ([address] )  2  Dysuria  Mr Antonio Benjamin is a 70-year-old male seen in follow-up for thrombocytopenia  His thrombocytopenia is noted while under stress from hospitalization or infection  Most recently he was treated for UTI which corrected a platelet count of 262,965 to now 157,000  I stressed the importance of treatment for a UTI early to avoid complications from thrombocytopenia  I instructed him to call his PCP with for signs of UTI including frequency and dysuria  He will discuss with his urologist in January if a prophylaxis is needed  I explained that during episodes of infection his platelet count should be checked more frequently to assess for resolution  If he is ever hospitalized requiring IV antibiotics this may become a problem with severe thrombocytopenia and hematology should be consulted at that time  If his platelet count drops below 100,000 I suggest weekly CBCs and there would not be need for intervention unless below 30,000  I reitereated when to seek emergency care with signs of excess bleeding, bruising, or petechial rash  This time he does not need to follow-up with me in the office  His PCP can monitor his blood counts regularly with the above precautions to see me in follow-up  Patient voiced agreement and understanding to the above  Patient knows to call the Hematology/Oncology office with any questions and concerns regarding the above        Barrier(s) to care: None  The patient is able to self care   ------------------------------------------------------------------------------------------------------    Chief Complaint   Patient presents with    Follow-up       History of present illness:   Jason ramirez is a 70-year-old male with past medical history of GERD, type 2 diabetes, asthma, hypertension, prostate cancer diagnosed in May of 2020 status post radiation therapy and on Lupron injections, and gastric bypass surgery (sleeve) about 5 years ago  He is seen in follow-up for thrombocytopenia  This appears to be a waxing and weaning condition  Previously he was low in July of 2020 while admitted for a GI bleed and urinary tract infection  He does complain of some easy bruising on his arms but denies any petechia or purpura  He denies any hematuria, blood in the stool, dark stools, bleeding gums  He denies any abdominal pain or bloating  He denies any rash  He denies any new medications and he is not on anticoagulation  He denies history of liver kidney disease  He denies any fevers, night sweats, weight loss, early satiety  He has no family history of any blood conditions  He does complain of some fatigue since starting Lupron injections  He continues to follow with urology for his prostate cancer and his PSA has been undetectable  Since his diagnosis with prostate cancer he has suffered from incontinence and frequency  Since his gastric bypass surgery he has not continued to follow with his bariatric surgeon but does take daily multivitamin, vitamin-D, and B12 sublingual drops 2 times per week  Work up included:   Folate, B12, iron: Normal  PTT, PT INR, fibrinogen: Normal  JERRELL, RF: Normal  Sed rate and CRP: Slightly elevated  UA: Positive for UTI (treated by PCP)      01/07/2016:  Hemoglobin 13 6, platelets 563, WBC 6 78  01/05/2017:  Hemoglobin 14 2, platelets 418, WBC 9 32  03/04/2017:  Hemoglobin 12 7, platelets 588, WBC 1 35  04/09/2018:  Hemoglobin 13 7, platelets 431, WBC 6 99  09/09/2019:  Hemoglobin 13 2, platelets 193, WBC 5 77  07/24/2020:  Hemoglobin 13 6, platelets 816, WBC 69 00  07/25/2020:  Hemoglobin 13 7, platelets 98, WBC 9 73  07/28/2020:  Hemoglobin 13 1, platelet 922, WBC 3 19  06/28/2021:  Hemoglobin 12 7, platelets 327, WBC 9 52  02/21/2022:  Hemoglobin 13 8, platelets 169, WBC 0 55  06/22/2022:  Hemoglobin 13 5, platelets 305, WBC 8 14  07/25/2022:  Hemoglobin 13 7, platelets 177, WBC 8 49  08/23/2022:  Hemoglobin 14 1, platelets 032, WBC 9 75    Interval history:  Since his last visit his PCPs prescribed him a course of antibiotics for his UTI  He is doing well feels good  Most recent labs showed an improvement of platelets to 660,644  He denies any excess bleeding or bruising  He does have thin skin and a small skin tear on his left arm which has stop bleeding without intervention  Review of Systems   Constitutional: Positive for fatigue (mild)  Negative for activity change, appetite change, diaphoresis, fever and unexpected weight change  HENT: Negative for trouble swallowing and voice change  Eyes: Negative for photophobia and visual disturbance  Respiratory: Negative for cough, chest tightness and shortness of breath  Cardiovascular: Negative for chest pain, palpitations and leg swelling  Gastrointestinal: Negative for abdominal distention, abdominal pain, blood in stool, constipation, diarrhea, nausea and vomiting  Endocrine: Negative for cold intolerance and heat intolerance  Genitourinary: Negative for decreased urine volume, dysuria, frequency, hematuria and urgency  Odor, incontinence    Musculoskeletal: Negative for arthralgias, back pain, gait problem, joint swelling and myalgias  Skin: Negative for pallor and rash  Neurological: Negative for dizziness, tremors, weakness, light-headedness, numbness and headaches  Hematological: Bruises/bleeds easily  Psychiatric/Behavioral: Negative for confusion and sleep disturbance         Patient Active Problem List   Diagnosis    GERD (gastroesophageal reflux disease)    Class 2 severe obesity due to excess calories with serious comorbidity and body mass index (BMI) of 35 0 to 35 9 in [facility])    Hypercholesteremia    Postgastrectomy malabsorption    Type 2 diabetes mellitus with hyperglycemia, with long-term current use of insulin (HCC)    Benign essential hypertension    Benign enlargement of prostate    Pancreatic cyst    History of colon polyps    IPMN (intraductal papillary mucinous neoplasm)    ED (erectile dysfunction) of organic origin    Benign prostatic hyperplasia with urinary frequency    Bruxism    Prostate cancer (HCC)    Greater trochanteric bursitis of left hip    Primary osteoarthritis of one hip, left    Mild asthma exacerbation    Platelets decreased ([facility] )       Past Medical History:   Diagnosis Date    Anemia     Arthritis     Black stool 7/24/2020    Diabetes mellitus ([facility] [address] [address] )     IDDM    Diverticulosis     Enlarged prostate     Erectile dysfunction     Hypercholesteremia     Resolved post weight loss    Hypertension     Resolved post weight loss    Kidney stone     Obesity     Prostate cancer ([facility] )     Wears partial dentures     partial upper and lower       Past Surgical History:   Procedure Laterality Date    ABDOMINAL ADHESION SURGERY N/A 11/28/2016    Procedure: EXTENSIVE LYSIS ADHESIONS;  Surgeon: Tyrese Lowry MD;  [facility];  Service:    Mace Spittle FRACTURE SURGERY Left     CHOLECYSTECTOMY      COLON SURGERY      colon resection    COLONOSCOPY      COLOSTOMY      COLOSTOMY CLOSURE      DIAGNOSTIC LAPAROSCOPY      GASTRIC BYPASS      failed due to adhesions    HERNIA REPAIR      abdominal    NH BIOPSY OF PROSTATE,NEEDLE/PUNCH N/A 5/8/2020    Procedure: TRANSRECTAL NEEDLE BIOPSY PROSTATE (TRNBP) WITH TRANSRECTAL ULTRASOUND GUIDANCE;  Surgeon: Ariel Higuera MD;  [facility];  Service: Urology    NH CYSTOSCOPY,DIR VIS INT URETHROTOMY N/A 5/8/2020    Procedure: DIRECT VISUAL INTERAL URETHROTOMY (DVIU), dilation of urethral stricture;  Surgeon: Ariel Higuera MD;  [facility];  Service: Urology    NH CYSTOURETHRO W/IMPLANT N/A 3/8/2019 Procedure: CYSTOSCOPY WITH INSERTION Lisa Sanz;  Surgeon: Jeferson Lawrence MD;  [facility];  Service: Urology    SC CYSTOURETHRO W/IMPLANT N/A 2020    Procedure: CYSTOSCOPY WITH INSERTION Lisa Sanz;  Surgeon: Jeferson Lawrence MD;  [facility];  Service: Urology     [facility] STOM DUODENUM/JEJUNUM N/A 10/25/2018    Procedure: LINEAR ENDOSCOPIC U/S;  Surgeon: Soy Lemon MD;  [facility]: [facility]; Service: Gastroenterology    SC ESOPHAGOGASTRODUODENOSCOPY TRANSORAL DIAGNOSTIC N/A 2016    Procedure: EGD AND COLONOSCOPY;  Surgeon: Nolberto Osorio MD;  [facility];   Service: Gastroenterology    SC LAP, ANEESH RESTRICT PROC, LONGITUDINAL GASTRECTOMY N/A 2016    Procedure: GASTRECTOMY SLEEVE LAPAROSCOPIC;  Surgeon: Gracia Jain MD;  [facility];  Service: [address] [facility] BIOPSY  2020       Family History   Problem Relation Age of Onset    Heart disease Mother     Breast cancer Mother 80    Diabetes Father     Colon cancer Neg Hx     Liver disease Neg Hx        Social History     Socioeconomic History    Marital status: Single     Spouse name: None    Number of children: None    Years of education: None    Highest education level: None   Occupational History    None   Tobacco Use    Smoking status: Former Smoker     Packs/day: 0 25     Quit date: 11/10/2001     Years since quittin 8    Smokeless tobacco: Former User   Vaping Use    Vaping Use: Never used   Substance and Sexual Activity    Alcohol use: Not Currently    Drug use: Not Currently     Comment: RSO    Sexual activity: Yes     Partners: Female   Other Topics Concern    None   Social History Narrative    None     Social Determinants of Health     Financial Resource Strain: High Risk    Difficulty of Paying Living Expenses: Hard   Food Insecurity: Food Insecurity Present    Worried About Running Out of Food in the Last Year: Often true    Ran Out of Food in the Last Year: Often true   Transportation Needs: No Transportation Needs    Lack of Transportation (Medical): No    Lack of Transportation (Non-Medical):  No   Physical Activity: Not on file   Stress: Not on file   Social Connections: Not on file   Intimate Partner Violence: Not on file   Housing Stability: Not on file         Current Outpatient Medications:     acetaminophen (TYLENOL) 325 mg tablet, Take 2 tablets (650 mg total) by mouth every 4 (four) hours as needed for mild pain, headaches or fever, Disp: , Rfl: 0    acetaminophen-codeine (TYLENOL with CODEINE #3) 300-30 MG per tablet, Take 1 tablet by mouth every 4 (four) hours as needed for moderate pain, Disp: 30 tablet, Rfl: 0    albuterol (ProAir HFA) 90 mcg/act inhaler, Inhale 2 puffs every 6 (six) hours as needed for wheezing or shortness of breath, Disp: 8 5 g, Rfl: 0    aluminum-magnesium hydroxide-simethicone (MYLANTA) 200-200-20 mg/5 mL suspension, Take 30 mL by mouth every 6 (six) hours as needed for indigestion or heartburn, Disp: 355 mL, Rfl: 0    amoxicillin (AMOXIL) 500 mg capsule, Take 1 capsule (500 mg total) by mouth every 8 (eight) hours for 7 days, Disp: 21 capsule, Rfl: 0    atorvastatin (LIPITOR) 10 mg tablet, Take 1 tablet (10 mg total) by mouth daily, Disp: 100 tablet, Rfl: 3    Biotin 1000 MCG tablet, Take 1 tablet by mouth daily in the early morning , Disp: , Rfl:     Blood Glucose Monitoring Suppl (GLUCOCARD VITAL MONITOR) w/Device KIT, by Does not apply route 2 (two) times a day, Disp: 1 kit, Rfl: 0    calcium carbonate (OS-GILDA) 600 MG tablet, Take 600 mg by mouth daily in the early morning , Disp: , Rfl:     Cholecalciferol (VITAMIN D3) 2000 units capsule, Take 1,000 Units by mouth daily in the early morning , Disp: , Rfl:     Cyanocobalamin (VITAMIN B-12) 5000 MCG TBDP, Take 5,000 mcg by mouth once a week Takes on Mondays, Disp: , Rfl:     docusate sodium (COLACE) 100 mg capsule, Take 100 mg by mouth daily as needed for constipation, Disp: , Rfl:     fluticasone (FLONASE) 50 mcg/act nasal spray, 2 sprays into each nostril daily, Disp: 16 g, Rfl: 0    fluticasone (Flovent HFA) 110 MCG/ACT inhaler, Inhale 2 puffs 2 (two) times a day Rinse mouth after use , Disp: 12 g, Rfl: 0    glipiZIDE (GLUCOTROL XL) 5 mg 24 hr tablet, TAKE ONE TABLET BY MOUTH EVERY DAY AFTER BREAKFAST, Disp: 90 tablet, Rfl: 3    insulin aspart protamine-insulin aspart (NovoLOG 70/30) 100 Units/mL injection pen, Inject 20 Units under the skin 2 (two) times a day with meals, Disp: 36 mL, Rfl: 0    Lancets (LIFESCAN UNISTIK 2) MISC, Lifescan One Touch Ultramini Meter; use as directed; 1; 0; 31-Oct-2016; 31-Oct-2016; Melida Duran;  Active, Disp: , Rfl:     losartan (COZAAR) 25 mg tablet, Take 1 tablet (25 mg total) by mouth daily, Disp: 100 tablet, Rfl: 3    magnesium citrate (CITROMA) 1 745 g/30 mL oral solution, Take 296 mL by mouth once for 1 dose, Disp: 296 mL, Rfl: 0    meloxicam (Mobic) 15 mg tablet, Take 1 tablet (15 mg total) by mouth daily As needed for pain, Disp: 30 tablet, Rfl: 1    Multiple Vitamin (MULTIVITAMIN) capsule, Take 1 capsule by mouth daily in the early morning , Disp: , Rfl:     ondansetron (ZOFRAN-ODT) 4 mg disintegrating tablet, Take 1 tablet (4 mg total) by mouth every 6 (six) hours as needed for nausea or vomiting, Disp: 20 tablet, Rfl: 0    polyethylene glycol (GOLYTELY) 4000 mL solution, Take 4,000 mL by mouth once for 1 dose, Disp: 4000 mL, Rfl: 0    Trulicity 3 OA/5 1MI SOPN, INJECT 0 5ML (3MG) UNDER THE SKIN ONCE A WEEK, Disp: 6 mL, Rfl: 0    guaiFENesin (ROBITUSSIN) 100 MG/5ML oral liquid, Take 10 mL (200 mg total) by mouth 3 (three) times a day as needed for cough, Disp: 120 mL, Rfl: 0    Myrbetriq 50 MG TB24, TAKE ONE TABLET BY MOUTH EVERY DAY (Patient not taking: No sig reported), Disp: 30 tablet, Rfl: 3    tamsulosin (FLOMAX) 0 4 mg, Take 1 capsule (0 4 mg total) by mouth daily with dinner (Patient not taking: No sig reported), Disp: 90 capsule, Rfl: 0    Allergies   Allergen Reactions    Enalapril Anaphylaxis, Throat Swelling and Hives    Metformin Abdominal Pain       Objective:  /74 (BP Location: Left arm, Patient Position: Sitting, Cuff Size: Adult)   Pulse 74   Temp 97 8 °F (36 6 °C) (Temporal)   Resp 14   Ht 5' 7" (1 702 m)   Wt 106 kg (234 lb)   SpO2 98%   BMI 36 65 kg/m²    Physical Exam  Constitutional:       General: He is not in acute distress  Appearance: Normal appearance  He is obese  He is not ill-appearing  HENT:      Head: Atraumatic  Eyes:      Extraocular Movements: Extraocular movements intact  Conjunctiva/sclera: Conjunctivae normal    Cardiovascular:      Rate and Rhythm: Normal rate and regular rhythm  Pulses: Normal pulses  Heart sounds: Normal heart sounds  Pulmonary:      Effort: Pulmonary effort is normal  No respiratory distress  Breath sounds: Normal breath sounds  Abdominal:      General: Bowel sounds are normal       Palpations: Abdomen is soft  Tenderness: There is no abdominal tenderness  Musculoskeletal:         General: Normal range of motion  Cervical back: Normal range of motion  No tenderness  Right lower leg: No edema  Left lower leg: No edema  Lymphadenopathy:      Cervical: No cervical adenopathy  Skin:     General: Skin is warm and dry  Capillary Refill: Capillary refill takes less than 2 seconds  Coloration: Skin is not pale  Findings: No bruising or rash  Neurological:      General: No focal deficit present  Mental Status: He is alert and oriented to person, place, and time  Mental status is at baseline  Motor: No weakness  Gait: Gait normal    Psychiatric:         Mood and Affect: Mood normal          Behavior: Behavior normal          Thought Content:  Thought content normal          Judgment: Judgment normal          Result Review  Labs:  Appointment on 08/23/2022   Component Date Value Ref Range Status    TSH 3RD GENERATON 08/23/2022 2 990  0 450 - 4 500 uIU/mL Final    Adult TSH (3rd generation) reference range follows the recommended guidelines of the American Thyroid Association, January, 2020   Cholesterol 08/23/2022 135  See Comment mg/dL Final    Cholesterol:         Pediatric <18 Years        Desirable          <170 mg/dL      Borderline High    170-199 mg/dL      High               >=200 mg/dL        Adult >=18 Years            Desirable         <200 mg/dL      Borderline High   200-239 mg/dL      High              >239 mg/dL      Triglycerides 08/23/2022 147  See Comment mg/dL Final    Triglyceride:     0-9Y            <75mg/dL     10Y-17Y         <90 mg/dL       >=18Y     Normal          <150 mg/dL     Borderline High 150-199 mg/dL     High            200-499 mg/dL        Very High       >499 mg/dL    Specimen collection should occur prior to N-Acetylcysteine or Metamizole administration due to the potential for falsely depressed results   HDL, Direct 08/23/2022 38 (A) >=40 mg/dL Final    Specimen collection should occur prior to Metamizole administration due to the potential for falsley depressed results   LDL Calculated 08/23/2022 68  0 - 100 mg/dL Final    LDL Cholesterol:     Optimal           <100 mg/dl     Near Optimal      100-129 mg/dl     Above Optimal       Borderline High 130-159 mg/dl       High            160-189 mg/dl       Very High       >189 mg/dl         This screening LDL is a calculated result  It does not have the accuracy of the Direct Measured LDL in the monitoring of patients with hyperlipidemia and/or statin therapy  Direct Measure LDL (TDH750) must be ordered separately in these patients      PSA, Diagnostic 08/23/2022 <0 1  0 0 - 4 0 ng/mL Final    American Urological Association Guidelines define biochemical recurrence of prostate cancer as a detectable or rising PSA value post-radical prostatectomy that is greater than or equal to 0 2 ng/mL with a second confirmatory level of greater than or equal to 0 2 ng/mL   WBC 2022 7 14  4 31 - 10 16 Thousand/uL Final    RBC 2022 4 71  3 88 - 5 62 Million/uL Final    Hemoglobin 2022 14 1  12 0 - 17 0 g/dL Final    Hematocrit 2022 42 9  36 5 - 49 3 % Final    MCV 2022 91  82 - 98 fL Final    MCH 2022 29 9  26 8 - 34 3 pg Final    MCHC 2022 32 9  31 4 - 37 4 g/dL Final    RDW 2022 12 8  11 6 - 15 1 % Final    MPV 2022 12 0  8 9 - 12 7 fL Final    Platelets  157  149 - 390 Thousands/uL Final    nRBC 2022 0  /100 WBCs Final    Segmented Neutrophils Manual 2022 73  45 - 77 % Final    Lymphocytes Manual 2022 17  14 - 44 % Corrected    This is a corrected result  Previous result was 13 % on 2022 at 0931 EDT    Monocytes Manual 2022 10  4 - 12 % Final    Atypical Lymphocytes Relative 2022    Corrected    This is a corrected result  Previous result was 4 % on 2022 at 0931 EDT    Total Counted 2022 100   Final    Poikilocytes 2022 Present   Final    [place] Cells 2022 Present   Final    Pathology Review 2022 Yes (A) No Final    Path Review 2022 Normal cell count  Some platelet clumping noted  Final       Imagin2022: US abdomen complete   Hypoechoic pancreatic lesion corresponding to uncinate process lesion noted on prior CT  Hepatic steatosis  Please note: This report has been generated by a voice recognition software system  Therefore there may be syntax, spelling, and/or grammatical errors  Please call if you have any questions

## 2022-09-14 DIAGNOSIS — K21.9 GASTROESOPHAGEAL REFLUX DISEASE, UNSPECIFIED WHETHER ESOPHAGITIS PRESENT: Primary | ICD-10-CM

## 2022-09-14 DIAGNOSIS — Z79.4 TYPE 2 DIABETES MELLITUS WITH HYPERGLYCEMIA, WITH LONG-TERM CURRENT USE OF INSULIN (HCC): ICD-10-CM

## 2022-09-14 DIAGNOSIS — E11.65 TYPE 2 DIABETES MELLITUS WITH HYPERGLYCEMIA, WITH LONG-TERM CURRENT USE OF INSULIN (HCC): ICD-10-CM

## 2022-09-14 RX ORDER — PANTOPRAZOLE SODIUM 40 MG/1
TABLET, DELAYED RELEASE ORAL
Qty: 30 TABLET | Refills: 0 | Status: SHIPPED | OUTPATIENT
Start: 2022-09-14 | End: 2022-09-23

## 2022-09-14 RX ORDER — DULAGLUTIDE 3 MG/.5ML
INJECTION, SOLUTION SUBCUTANEOUS
Qty: 6 ML | Refills: 0 | Status: SHIPPED | OUTPATIENT
Start: 2022-09-14

## 2022-09-23 ENCOUNTER — OFFICE VISIT (OUTPATIENT)
Dept: FAMILY MEDICINE CLINIC | Facility: CLINIC | Age: 72
End: 2022-09-23
Payer: COMMERCIAL

## 2022-09-23 VITALS
OXYGEN SATURATION: 96 % | BODY MASS INDEX: 36.54 KG/M2 | DIASTOLIC BLOOD PRESSURE: 68 MMHG | TEMPERATURE: 97.1 F | RESPIRATION RATE: 16 BRPM | WEIGHT: 232.8 LBS | HEIGHT: 67 IN | HEART RATE: 61 BPM | SYSTOLIC BLOOD PRESSURE: 128 MMHG

## 2022-09-23 DIAGNOSIS — Z79.4 TYPE 2 DIABETES MELLITUS WITH HYPERGLYCEMIA, WITH LONG-TERM CURRENT USE OF INSULIN (HCC): Primary | Chronic | ICD-10-CM

## 2022-09-23 DIAGNOSIS — E78.00 HYPERCHOLESTEREMIA: ICD-10-CM

## 2022-09-23 DIAGNOSIS — K21.9 GASTROESOPHAGEAL REFLUX DISEASE WITHOUT ESOPHAGITIS: ICD-10-CM

## 2022-09-23 DIAGNOSIS — E66.01 CLASS 2 SEVERE OBESITY DUE TO EXCESS CALORIES WITH SERIOUS COMORBIDITY AND BODY MASS INDEX (BMI) OF 35.0 TO 35.9 IN ADULT (HCC): ICD-10-CM

## 2022-09-23 DIAGNOSIS — E11.65 TYPE 2 DIABETES MELLITUS WITH HYPERGLYCEMIA, WITH LONG-TERM CURRENT USE OF INSULIN (HCC): Primary | Chronic | ICD-10-CM

## 2022-09-23 DIAGNOSIS — Z23 ENCOUNTER FOR IMMUNIZATION: ICD-10-CM

## 2022-09-23 DIAGNOSIS — I10 BENIGN ESSENTIAL HYPERTENSION: ICD-10-CM

## 2022-09-23 PROCEDURE — G0008 ADMIN INFLUENZA VIRUS VAC: HCPCS

## 2022-09-23 PROCEDURE — 99214 OFFICE O/P EST MOD 30 MIN: CPT | Performed by: FAMILY MEDICINE

## 2022-09-23 PROCEDURE — 90662 IIV NO PRSV INCREASED AG IM: CPT

## 2022-09-23 PROCEDURE — 1101F PT FALLS ASSESS-DOCD LE1/YR: CPT | Performed by: FAMILY MEDICINE

## 2022-09-23 PROCEDURE — 3725F SCREEN DEPRESSION PERFORMED: CPT | Performed by: FAMILY MEDICINE

## 2022-09-23 PROCEDURE — 3288F FALL RISK ASSESSMENT DOCD: CPT | Performed by: FAMILY MEDICINE

## 2022-09-23 PROCEDURE — 1160F RVW MEDS BY RX/DR IN RCRD: CPT | Performed by: FAMILY MEDICINE

## 2022-09-23 RX ORDER — PREDNISOLONE ACETATE 10 MG/ML
SUSPENSION/ DROPS OPHTHALMIC
COMMUNITY
Start: 2022-09-12

## 2022-09-23 RX ORDER — BROMFENAC SODIUM 0.7 MG/ML
SOLUTION/ DROPS OPHTHALMIC
COMMUNITY
Start: 2022-08-26

## 2022-09-23 RX ORDER — BESIFLOXACIN 6 MG/ML
SUSPENSION OPHTHALMIC
COMMUNITY
Start: 2022-08-26

## 2022-09-23 NOTE — PROGRESS NOTES
Name: Felicitas Levy      : 1950      MRN: [id number]  Encounter Provider: Sandoval Lundy MD  Encounter Date: 2022   Encounter department: [facility]     1  Type 2 diabetes mellitus with hyperglycemia, with long-term current use of insulin ([facility])  Assessment & Plan:    Lab Results   Component Value Date    HGBA1C 6 7 (H) 2022   improving   Continue current meds    Orders:  -     Comprehensive metabolic panel  -     Hemoglobin A1C  -     Microalbumin / creatinine urine ratio    2  Class 2 severe obesity due to excess calories with serious comorbidity and body mass index (BMI) of 35 0 to 35 9 in adult [facility])  Assessment & Plan:  Diet and exercise encouraged       3  Benign essential hypertension  Assessment & Plan:  Stable on current meds       4  Gastroesophageal reflux disease without esophagitis  Assessment & Plan:  He is taking protonix in the last few days due to acid reflux symptoms  To stop protonix since GI doesn't want him to take it  May take pepcid as needed       5  Encounter for immunization  -     influenza vaccine, high-dose, PF 0 7 mL (FLUZONE HIGH-DOSE)    6  Hypercholesteremia  Assessment & Plan:  Stable on current meds             Subjective      HPI   Here for follow up  Feeling well overall   No side effects from the medication      Review of Systems   Constitutional: Negative  HENT: Negative  Eyes: Negative  Respiratory: Negative  Gastrointestinal: Negative  Genitourinary: Negative  Musculoskeletal: Negative  Neurological: Negative  Hematological: Negative  Psychiatric/Behavioral: Negative          Current Outpatient Medications on File Prior to Visit   Medication Sig    acetaminophen (TYLENOL) 325 mg tablet Take 2 tablets (650 mg total) by mouth every 4 (four) hours as needed for mild pain, headaches or fever    albuterol (ProAir HFA) 90 mcg/act inhaler Inhale 2 puffs every 6 (six) hours as needed for wheezing or shortness of breath    aluminum-magnesium hydroxide-simethicone (MYLANTA) 200-200-20 mg/5 mL suspension Take 30 mL by mouth every 6 (six) hours as needed for indigestion or heartburn    atorvastatin (LIPITOR) 10 mg tablet Take 1 tablet (10 mg total) by mouth daily    Biotin 1000 MCG tablet Take 1 tablet by mouth daily in the early morning     Blood Glucose Monitoring Suppl (GLUCOCARD VITAL MONITOR) w/Device KIT by Does not apply route 2 (two) times a day    calcium carbonate (OS-GILDA) 600 MG tablet Take 600 mg by mouth daily in the early morning     Cholecalciferol (VITAMIN D3) 2000 units capsule Take 1,000 Units by mouth daily in the early morning     Cyanocobalamin (VITAMIN B-12) 5000 MCG TBDP Take 5,000 mcg by mouth once a week Takes on Mondays    fluticasone (FLONASE) 50 mcg/act nasal spray 2 sprays into each nostril daily (Patient taking differently: 2 sprays into each nostril if needed)    fluticasone (Flovent HFA) 110 MCG/ACT inhaler Inhale 2 puffs 2 (two) times a day Rinse mouth after use  (Patient taking differently: Inhale 2 puffs if needed Rinse mouth after use )    glipiZIDE (GLUCOTROL XL) 5 mg 24 hr tablet TAKE ONE TABLET BY MOUTH EVERY DAY AFTER BREAKFAST    insulin aspart protamine-insulin aspart (NovoLOG 70/30) 100 Units/mL injection pen Inject 20 Units under the skin 2 (two) times a day with meals    Lancets (LIFESCAN UNISTIK 2) MISP[employer]ant [employer]can One Touch Ultramini Meter; use as directed; 1; 0; 31-Oct-2016; 31-Oct-2016; Melida Duran;  Active    losartan (COZAAR) 25 mg tablet Take 1 tablet (25 mg total) by mouth daily    magnesium citrate (CITROMA) 1 745 g/30 mL oral solution Take 296 mL by mouth once for 1 dose (Patient taking differently: Take 296 mL by mouth if needed)    Multiple Vitamin (MULTIVITAMIN) capsule Take 1 capsule by mouth daily in the early morning     ondansetron (ZOFRAN-ODT) 4 mg disintegrating tablet Take 1 tablet (4 mg total) by mouth every 6 (six) hours as needed for nausea or vomiting    Trulicity 3 UT/6 1ZC SOPN INJECT 0 5ML (3MG) UNDER THE SKIN ONCE A WEEK    [DISCONTINUED] pantoprazole (PROTONIX) 40 mg tablet TAKE ONE TABLET BY MOUTH EVERY DAY    [DISCONTINUED] polyethylene glycol (GOLYTELY) 4000 mL solution Take 4,000 mL by mouth once for 1 dose (Patient taking differently: Take 4 L by mouth as needed)    Besivance 0 6 % SUSP     docusate sodium (COLACE) 100 mg capsule Take 100 mg by mouth daily as needed for constipation (Patient not taking: Reported on 9/23/2022)    meloxicam (Mobic) 15 mg tablet Take 1 tablet (15 mg total) by mouth daily As needed for pain    prednisoLONE acetate (PRED FORTE) 1 % ophthalmic suspension     Prolensa 0 07 % SOLN     tamsulosin (FLOMAX) 0 4 mg Take 1 capsule (0 4 mg total) by mouth daily with dinner (Patient not taking: No sig reported)    [DISCONTINUED] acetaminophen-codeine (TYLENOL with CODEINE #3) 300-30 MG per tablet Take 1 tablet by mouth every 4 (four) hours as needed for moderate pain (Patient not taking: Reported on 9/23/2022)    [DISCONTINUED] guaiFENesin (ROBITUSSIN) 100 MG/5ML oral liquid Take 10 mL (200 mg total) by mouth 3 (three) times a day as needed for cough    [DISCONTINUED] Myrbetriq 50 MG TB24 TAKE ONE TABLET BY MOUTH EVERY DAY (Patient not taking: No sig reported)       Objective     /68 (BP Location: Left arm, Patient Position: Sitting, Cuff Size: Large)   Pulse 61   Temp (!) 97 1 °F (36 2 °C) (Tympanic)   Resp 16   Ht 5' 7" (1 702 m)   Wt 106 kg (232 lb 12 8 oz)   SpO2 96%   BMI 36 46 kg/m²     Physical Exam  Constitutional:       Appearance: Normal appearance  HENT:      Right Ear: Tympanic membrane normal       Left Ear: Tympanic membrane normal       Nose: Nose normal       Mouth/Throat:      Mouth: Mucous membranes are moist    Eyes:      Extraocular Movements: Extraocular movements intact  Pupils: Pupils are equal, round, and reactive to light     Cardiovascular:      Rate and Rhythm: Normal rate and regular rhythm  Pulses: Normal pulses  Heart sounds: Normal heart sounds  Pulmonary:      Effort: Pulmonary effort is normal    Musculoskeletal:         General: Normal range of motion  Cervical back: Normal range of motion and neck supple  Skin:     Capillary Refill: Capillary refill takes less than 2 seconds  Neurological:      General: No focal deficit present  Mental Status: He is alert and oriented to person, place, and time     Psychiatric:         Mood and Affect: Mood normal          Behavior: Behavior normal        Blank Aguiar MD

## 2022-09-23 NOTE — ASSESSMENT & PLAN NOTE
He is taking protonix in the last few days due to acid reflux symptoms  To stop protonix since GI doesn't want him to take it  May take pepcid as needed

## 2022-09-26 ENCOUNTER — TELEPHONE (OUTPATIENT)
Dept: GASTROENTEROLOGY | Facility: AMBULARY SURGERY CENTER | Age: 72
End: 2022-09-26

## 2022-09-26 NOTE — TELEPHONE ENCOUNTER
Pt called the GI office to reschedule his procedures from 10/20/2022 to 12/19/2022 at [facility] w/ dr Marino Schumacher per pt request to reschedule

## 2022-09-30 ENCOUNTER — TELEPHONE (OUTPATIENT)
Dept: OTHER | Facility: OTHER | Age: 72
End: 2022-09-30

## 2022-09-30 ENCOUNTER — OFFICE VISIT (OUTPATIENT)
Dept: FAMILY MEDICINE CLINIC | Facility: CLINIC | Age: 72
End: 2022-09-30
Payer: COMMERCIAL

## 2022-09-30 ENCOUNTER — TELEPHONE (OUTPATIENT)
Dept: FAMILY MEDICINE CLINIC | Facility: CLINIC | Age: 72
End: 2022-09-30

## 2022-09-30 VITALS
OXYGEN SATURATION: 97 % | SYSTOLIC BLOOD PRESSURE: 126 MMHG | RESPIRATION RATE: 16 BRPM | DIASTOLIC BLOOD PRESSURE: 58 MMHG | WEIGHT: 235.2 LBS | BODY MASS INDEX: 36.91 KG/M2 | TEMPERATURE: 96.5 F | HEIGHT: 67 IN | HEART RATE: 65 BPM

## 2022-09-30 DIAGNOSIS — R30.0 DYSURIA: Primary | ICD-10-CM

## 2022-09-30 DIAGNOSIS — Z79.4 TYPE 2 DIABETES MELLITUS WITH HYPERGLYCEMIA, WITH LONG-TERM CURRENT USE OF INSULIN (HCC): Chronic | ICD-10-CM

## 2022-09-30 DIAGNOSIS — E11.65 TYPE 2 DIABETES MELLITUS WITH HYPERGLYCEMIA, WITH LONG-TERM CURRENT USE OF INSULIN (HCC): Chronic | ICD-10-CM

## 2022-09-30 DIAGNOSIS — I10 BENIGN ESSENTIAL HYPERTENSION: ICD-10-CM

## 2022-09-30 DIAGNOSIS — E66.01 CLASS 2 SEVERE OBESITY DUE TO EXCESS CALORIES WITH SERIOUS COMORBIDITY AND BODY MASS INDEX (BMI) OF 35.0 TO 35.9 IN ADULT (HCC): ICD-10-CM

## 2022-09-30 LAB
SL AMB  POCT GLUCOSE, UA: NORMAL
SL AMB LEUKOCYTE ESTERASE,UA: NORMAL
SL AMB POCT BILIRUBIN,UA: NORMAL
SL AMB POCT BLOOD,UA: NORMAL
SL AMB POCT CLARITY,UA: NORMAL
SL AMB POCT COLOR,UA: YELLOW
SL AMB POCT KETONES,UA: NORMAL
SL AMB POCT NITRITE,UA: NORMAL
SL AMB POCT PH,UA: 7.5
SL AMB POCT SPECIFIC GRAVITY,UA: 1.01
SL AMB POCT URINE PROTEIN: NORMAL
SL AMB POCT UROBILINOGEN: NORMAL

## 2022-09-30 PROCEDURE — 99214 OFFICE O/P EST MOD 30 MIN: CPT | Performed by: FAMILY MEDICINE

## 2022-09-30 PROCEDURE — 87086 URINE CULTURE/COLONY COUNT: CPT | Performed by: FAMILY MEDICINE

## 2022-09-30 PROCEDURE — 81002 URINALYSIS NONAUTO W/O SCOPE: CPT | Performed by: FAMILY MEDICINE

## 2022-09-30 RX ORDER — SULFAMETHOXAZOLE AND TRIMETHOPRIM 800; 160 MG/1; MG/1
1 TABLET ORAL EVERY 12 HOURS SCHEDULED
Qty: 10 TABLET | Refills: 0 | Status: SHIPPED | OUTPATIENT
Start: 2022-09-30 | End: 2022-10-05

## 2022-09-30 RX ORDER — CIPROFLOXACIN 500 MG/1
500 TABLET, FILM COATED ORAL EVERY 12 HOURS SCHEDULED
Qty: 14 TABLET | Refills: 0 | Status: SHIPPED | OUTPATIENT
Start: 2022-09-30 | End: 2022-09-30

## 2022-09-30 NOTE — PROGRESS NOTES
Name: Nargis Colby      : 1950      MRN: [id number]  Encounter Provider: Brigette Baldwin MD  Encounter Date: 2022   Encounter department: [place] [id number]  Dysuria  Comments:  negative urine dip today   will treat based on urinary symptoms as patient is diabetic  will f/u urine culture   Orders:  -     POCT urine dip  -     ciprofloxacin (CIPRO) 500 mg tablet; Take 1 tablet (500 mg total) by mouth every 12 (twelve) hours for 7 days  -     Urine culture    2  Type 2 diabetes mellitus with hyperglycemia, with long-term current use of insulin [facility])  Assessment & Plan:    Lab Results   Component Value Date    HGBA1C 6 7 (H) 2022   doing well on current meds       3  Benign essential hypertension  Assessment & Plan:  Stable  Continue current meds       4  Class 2 severe obesity due to excess calories with serious comorbidity and body mass index (BMI) of 35 0 to 35 9 in adult [facility])  Assessment & Plan:  Diet and exercise              Subjective      Difficulty Urinating   This is a new problem  The current episode started in the past 7 days  The problem has been waxing and waning  The quality of the pain is described as burning  The patient is experiencing no pain  There has been no fever  He is not sexually active  There is no history of pyelonephritis  Associated symptoms include frequency and urgency  Pertinent negatives include no chills, discharge, flank pain, hematuria, hesitancy, nausea, possible pregnancy or sweats  He has tried increased fluids for the symptoms  The treatment provided mild relief  There is no history of catheterization, kidney stones, recurrent UTIs, a single kidney, urinary stasis or a urological procedure  Review of Systems   Constitutional: Negative for chills  Gastrointestinal: Negative for nausea  Genitourinary: Positive for dysuria, frequency and urgency  Negative for flank pain, hematuria and hesitancy         Current Outpatient Medications on File Prior to Visit   Medication Sig    acetaminophen (TYLENOL) 325 mg tablet Take 2 tablets (650 mg total) by mouth every 4 (four) hours as needed for mild pain, headaches or fever    albuterol (ProAir HFA) 90 mcg/act inhaler Inhale 2 puffs every 6 (six) hours as needed for wheezing or shortness of breath    aluminum-magnesium hydroxide-simethicone (MYLANTA) 200-200-20 mg/5 mL suspension Take 30 mL by mouth every 6 (six) hours as needed for indigestion or heartburn    atorvastatin (LIPITOR) 10 mg tablet Take 1 tablet (10 mg total) by mouth daily    Besivance 0 6 % SUSP     Biotin 1000 MCG tablet Take 1 tablet by mouth daily in the early morning     Blood Glucose Monitoring Suppl (GLUCOCARD VITAL MONITOR) w/Device KIT by Does not apply route 2 (two) times a day    calcium carbonate (OS-GILDA) 600 MG tablet Take 600 mg by mouth daily in the early morning     Cholecalciferol (VITAMIN D3) 2000 units capsule Take 1,000 Units by mouth daily in the early morning     Cyanocobalamin (VITAMIN B-12) 5000 MCG TBDP Take 5,000 mcg by mouth once a week Takes on Mondays    fluticasone (FLONASE) 50 mcg/act nasal spray 2 sprays into each nostril daily (Patient taking differently: 2 sprays into each nostril if needed)    glipiZIDE (GLUCOTROL XL) 5 mg 24 hr tablet TAKE ONE TABLET BY MOUTH EVERY DAY AFTER BREAKFAST    insulin aspart protamine-insulin aspart (NovoLOG 70/30) 100 Units/mL injection pen Inject 20 Units under the skin 2 (two) times a day with meals    Lancets (LIFESCAN UNISTIK 2) MISC [employer] Systemscan One Touch Ultramini Meter; use as directed; 1; 0; 31-Oct-2016; 31-Oct-2016; Melida Duran;  Active    losartan (COZAAR) 25 mg tablet Take 1 tablet (25 mg total) by mouth daily    meloxicam (Mobic) 15 mg tablet Take 1 tablet (15 mg total) by mouth daily As needed for pain    Multiple Vitamin (MULTIVITAMIN) capsule Take 1 capsule by mouth daily in the early morning     ondansetron (ZOFRAN-ODT) 4 mg disintegrating tablet Take 1 tablet (4 mg total) by mouth every 6 (six) hours as needed for nausea or vomiting    prednisoLONE acetate (PRED FORTE) 1 % ophthalmic suspension     Prolensa 3 56 % SOLN     Trulicity 3 EF/0 7MJ SOPN INJECT 0 5ML (3MG) UNDER THE SKIN ONCE A WEEK    docusate sodium (COLACE) 100 mg capsule Take 100 mg by mouth daily as needed for constipation (Patient not taking: No sig reported)    fluticasone (Flovent HFA) 110 MCG/ACT inhaler Inhale 2 puffs 2 (two) times a day Rinse mouth after use  (Patient taking differently: Inhale 2 puffs if needed Rinse mouth after use )    magnesium citrate (CITROMA) 1 745 g/30 mL oral solution Take 296 mL by mouth once for 1 dose (Patient taking differently: Take 296 mL by mouth if needed)    tamsulosin (FLOMAX) 0 4 mg Take 1 capsule (0 4 mg total) by mouth daily with dinner (Patient not taking: No sig reported)       Objective     /58 (BP Location: Right arm, Patient Position: Sitting, Cuff Size: Large)   Pulse 65   Temp (!) 96 5 °F (35 8 °C) (Tympanic)   Resp 16   Ht 5' 7" (1 702 m)   Wt 107 kg (235 lb 3 2 oz)   SpO2 97%   BMI 36 84 kg/m²     Physical Exam  Constitutional:       Appearance: Normal appearance  Cardiovascular:      Rate and Rhythm: Normal rate and regular rhythm  Pulses: Normal pulses  Heart sounds: Normal heart sounds  Pulmonary:      Effort: Pulmonary effort is normal       Breath sounds: Normal breath sounds  Neurological:      General: No focal deficit present  Mental Status: He is alert and oriented to person, place, and time     Psychiatric:         Mood and Affect: Mood normal          Behavior: Behavior normal        Jasiel Childs MD

## 2022-09-30 NOTE — TELEPHONE ENCOUNTER
Please call pharmacy back as soon as possible regarding ciprofloxacin and glipizide drug interaction  Pharmacy needs an urgent call back

## 2022-09-30 NOTE — TELEPHONE ENCOUNTER
Patient wants an alternate antibiotic that does not interfere with his glipizide  He is concerned about his sugars being high if holding glipizide      Please advise

## 2022-10-01 LAB — BACTERIA UR CULT: NORMAL

## 2022-10-02 NOTE — TELEPHONE ENCOUNTER
Please see the other task   His urine culture was negative for infection - no need for antibiotics at this time    Dr Akanksha Aparicio

## 2022-10-03 ENCOUNTER — TELEPHONE (OUTPATIENT)
Dept: ADMINISTRATIVE | Facility: OTHER | Age: 72
End: 2022-10-03

## 2022-10-03 NOTE — TELEPHONE ENCOUNTER
Upon review of the In Basket request we were able to locate, review, and update the patient chart as requested for Diabetic Eye Exam     Any additional questions or concerns should be emailed to the Practice Liaisons via [email or web address] email, please do not reply via In Basket      Thank you  Helga Sapp

## 2022-10-03 NOTE — TELEPHONE ENCOUNTER
Please check with the patient to see if he is still taking this dose? Gabapentin Counseling: I discussed with the patient the risks of gabapentin including but not limited to dizziness, somnolence, fatigue and ataxia.

## 2022-10-03 NOTE — TELEPHONE ENCOUNTER
----- Message from María Elena Arshad sent at 9/30/2022  1:03 PM EDT -----  Regarding: care gap request/DM Eye Exam  09/30/22 1:03 PM    Hello, our patient attached above has had Diabetic Eye Exam completed/performed  Please assist in updating the patient chart by making an External outreach to [facility] located in [facility], [facility], [address]  The date of service is 9/2022      Thank you,  María Elena Arshad  [facility] Jayden GERMAN

## 2022-10-03 NOTE — TELEPHONE ENCOUNTER
Pharmacy called again and informed them to cancel antibiotic orders   Also called patient to inform him urine culture negative and no need for an antibiotic at this time

## 2022-10-05 ENCOUNTER — APPOINTMENT (EMERGENCY)
Dept: CT IMAGING | Facility: HOSPITAL | Age: 72
End: 2022-10-05
Payer: COMMERCIAL

## 2022-10-05 ENCOUNTER — HOSPITAL ENCOUNTER (EMERGENCY)
Facility: HOSPITAL | Age: 72
Discharge: HOME | End: 2022-10-05
Attending: EMERGENCY MEDICINE
Payer: COMMERCIAL

## 2022-10-05 VITALS
HEART RATE: 56 BPM | TEMPERATURE: 97.7 F | RESPIRATION RATE: 16 BRPM | HEIGHT: 67 IN | DIASTOLIC BLOOD PRESSURE: 72 MMHG | SYSTOLIC BLOOD PRESSURE: 155 MMHG | BODY MASS INDEX: 36.84 KG/M2 | OXYGEN SATURATION: 96 %

## 2022-10-05 DIAGNOSIS — R11.10 VOMITING: ICD-10-CM

## 2022-10-05 DIAGNOSIS — R10.9 ABDOMINAL PAIN: Primary | ICD-10-CM

## 2022-10-05 LAB
ALBUMIN SERPL BCP-MCNC: 4 G/DL (ref 3.5–5)
ALP SERPL-CCNC: 89 U/L (ref 34–104)
ALT SERPL W P-5'-P-CCNC: 19 U/L (ref 7–52)
AMORPH URATE CRY URNS QL MICRO: ABNORMAL
ANION GAP SERPL CALCULATED.3IONS-SCNC: 7 MMOL/L (ref 4–13)
APTT PPP: 30 SECONDS (ref 23–37)
AST SERPL W P-5'-P-CCNC: 24 U/L (ref 13–39)
ATRIAL RATE: 67 BPM
BACTERIA UR QL AUTO: ABNORMAL /HPF
BASOPHILS # BLD AUTO: 0.04 THOUSANDS/ΜL (ref 0–0.1)
BASOPHILS NFR BLD AUTO: 0 % (ref 0–1)
BILIRUB SERPL-MCNC: 0.6 MG/DL (ref 0.2–1)
BILIRUB UR QL STRIP: NEGATIVE
BUN SERPL-MCNC: 15 MG/DL (ref 5–25)
CALCIUM SERPL-MCNC: 10.1 MG/DL (ref 8.4–10.2)
CARDIAC TROPONIN I PNL SERPL HS: 2 NG/L
CHLORIDE SERPL-SCNC: 103 MMOL/L (ref 96–108)
CLARITY UR: ABNORMAL
CO2 SERPL-SCNC: 28 MMOL/L (ref 21–32)
COLOR UR: YELLOW
CREAT SERPL-MCNC: 0.89 MG/DL (ref 0.6–1.3)
EOSINOPHIL # BLD AUTO: 0.11 THOUSAND/ΜL (ref 0–0.61)
EOSINOPHIL NFR BLD AUTO: 1 % (ref 0–6)
ERYTHROCYTE [DISTWIDTH] IN BLOOD BY AUTOMATED COUNT: 13.1 % (ref 11.6–15.1)
GFR SERPL CREATININE-BSD FRML MDRD: 85 ML/MIN/1.73SQ M
GLUCOSE SERPL-MCNC: 191 MG/DL (ref 65–140)
GLUCOSE UR STRIP-MCNC: NEGATIVE MG/DL
HCT VFR BLD AUTO: 42.6 % (ref 36.5–49.3)
HGB BLD-MCNC: 14 G/DL (ref 12–17)
HGB UR QL STRIP.AUTO: NEGATIVE
IMM GRANULOCYTES # BLD AUTO: 0.07 THOUSAND/UL (ref 0–0.2)
IMM GRANULOCYTES NFR BLD AUTO: 1 % (ref 0–2)
INR PPP: 0.99 (ref 0.84–1.19)
KETONES UR STRIP-MCNC: NEGATIVE MG/DL
LEUKOCYTE ESTERASE UR QL STRIP: NEGATIVE
LIPASE SERPL-CCNC: 89 U/L (ref 11–82)
LYMPHOCYTES # BLD AUTO: 0.68 THOUSANDS/ΜL (ref 0.6–4.47)
LYMPHOCYTES NFR BLD AUTO: 7 % (ref 14–44)
MCH RBC QN AUTO: 29.6 PG (ref 26.8–34.3)
MCHC RBC AUTO-ENTMCNC: 32.9 G/DL (ref 31.4–37.4)
MCV RBC AUTO: 90 FL (ref 82–98)
MONOCYTES # BLD AUTO: 0.77 THOUSAND/ΜL (ref 0.17–1.22)
MONOCYTES NFR BLD AUTO: 8 % (ref 4–12)
MUCOUS THREADS UR QL AUTO: ABNORMAL
NEUTROPHILS # BLD AUTO: 8.22 THOUSANDS/ΜL (ref 1.85–7.62)
NEUTS SEG NFR BLD AUTO: 83 % (ref 43–75)
NITRITE UR QL STRIP: NEGATIVE
NON-SQ EPI CELLS URNS QL MICRO: ABNORMAL /HPF
NRBC BLD AUTO-RTO: 0 /100 WBCS
P AXIS: 43 DEGREES
PH UR STRIP.AUTO: 7.5 [PH]
PLATELET # BLD AUTO: 166 THOUSANDS/UL (ref 149–390)
PMV BLD AUTO: 12 FL (ref 8.9–12.7)
POTASSIUM SERPL-SCNC: 4.9 MMOL/L (ref 3.5–5.3)
PR INTERVAL: 162 MS
PROT SERPL-MCNC: 7.3 G/DL (ref 6.4–8.4)
PROT UR STRIP-MCNC: ABNORMAL MG/DL
PROTHROMBIN TIME: 13.3 SECONDS (ref 11.6–14.5)
QRS AXIS: 54 DEGREES
QRSD INTERVAL: 86 MS
QT INTERVAL: 412 MS
QTC INTERVAL: 435 MS
RBC # BLD AUTO: 4.73 MILLION/UL (ref 3.88–5.62)
RBC #/AREA URNS AUTO: ABNORMAL /HPF
SODIUM SERPL-SCNC: 138 MMOL/L (ref 135–147)
SP GR UR STRIP.AUTO: 1.02 (ref 1–1.03)
T WAVE AXIS: 59 DEGREES
UROBILINOGEN UR STRIP-ACNC: <2 MG/DL
VENTRICULAR RATE: 67 BPM
WBC # BLD AUTO: 9.89 THOUSAND/UL (ref 4.31–10.16)
WBC #/AREA URNS AUTO: ABNORMAL /HPF
WBC CLUMPS # UR AUTO: PRESENT /UL

## 2022-10-05 PROCEDURE — 85610 PROTHROMBIN TIME: CPT | Performed by: PHYSICIAN ASSISTANT

## 2022-10-05 PROCEDURE — 84484 ASSAY OF TROPONIN QUANT: CPT | Performed by: PHYSICIAN ASSISTANT

## 2022-10-05 PROCEDURE — 80053 COMPREHEN METABOLIC PANEL: CPT | Performed by: EMERGENCY MEDICINE

## 2022-10-05 PROCEDURE — 93005 ELECTROCARDIOGRAM TRACING: CPT

## 2022-10-05 PROCEDURE — 96361 HYDRATE IV INFUSION ADD-ON: CPT

## 2022-10-05 PROCEDURE — 74177 CT ABD & PELVIS W/CONTRAST: CPT

## 2022-10-05 PROCEDURE — 93010 ELECTROCARDIOGRAM REPORT: CPT | Performed by: INTERNAL MEDICINE

## 2022-10-05 PROCEDURE — 96374 THER/PROPH/DIAG INJ IV PUSH: CPT

## 2022-10-05 PROCEDURE — 85730 THROMBOPLASTIN TIME PARTIAL: CPT | Performed by: PHYSICIAN ASSISTANT

## 2022-10-05 PROCEDURE — 99284 EMERGENCY DEPT VISIT MOD MDM: CPT

## 2022-10-05 PROCEDURE — 83690 ASSAY OF LIPASE: CPT | Performed by: EMERGENCY MEDICINE

## 2022-10-05 PROCEDURE — 85025 COMPLETE CBC W/AUTO DIFF WBC: CPT | Performed by: EMERGENCY MEDICINE

## 2022-10-05 PROCEDURE — 36415 COLL VENOUS BLD VENIPUNCTURE: CPT

## 2022-10-05 PROCEDURE — 96375 TX/PRO/DX INJ NEW DRUG ADDON: CPT

## 2022-10-05 PROCEDURE — 81001 URINALYSIS AUTO W/SCOPE: CPT

## 2022-10-05 PROCEDURE — 99285 EMERGENCY DEPT VISIT HI MDM: CPT | Performed by: PHYSICIAN ASSISTANT

## 2022-10-05 RX ORDER — SODIUM CHLORIDE 9 MG/ML
125 INJECTION, SOLUTION INTRAVENOUS CONTINUOUS
Status: DISCONTINUED | OUTPATIENT
Start: 2022-10-05 | End: 2022-10-05 | Stop reason: HOSPADM

## 2022-10-05 RX ORDER — MORPHINE SULFATE 4 MG/ML
4 INJECTION, SOLUTION INTRAMUSCULAR; INTRAVENOUS ONCE
Status: COMPLETED | OUTPATIENT
Start: 2022-10-05 | End: 2022-10-05

## 2022-10-05 RX ORDER — ONDANSETRON 2 MG/ML
4 INJECTION INTRAMUSCULAR; INTRAVENOUS ONCE
Status: COMPLETED | OUTPATIENT
Start: 2022-10-05 | End: 2022-10-05

## 2022-10-05 RX ORDER — ONDANSETRON 2 MG/ML
INJECTION INTRAMUSCULAR; INTRAVENOUS
Status: COMPLETED
Start: 2022-10-05 | End: 2022-10-05

## 2022-10-05 RX ADMIN — SODIUM CHLORIDE 125 ML/HR: 0.9 INJECTION, SOLUTION INTRAVENOUS at 10:26

## 2022-10-05 RX ADMIN — ONDANSETRON 4 MG: 2 INJECTION INTRAMUSCULAR; INTRAVENOUS at 10:28

## 2022-10-05 RX ADMIN — IOHEXOL 100 ML: 350 INJECTION, SOLUTION INTRAVENOUS at 11:38

## 2022-10-05 RX ADMIN — MORPHINE SULFATE 4 MG: 4 INJECTION INTRAVENOUS at 10:29

## 2022-10-05 NOTE — ED NOTES
Pt up and walking to the bathroom to provide a urine sample   No difficulties or distress noted at this time     Roberth Aguilar RN  10/05/22 [id number]

## 2022-10-05 NOTE — ED PROVIDER NOTES
History  Chief Complaint   Patient presents with    Abdominal Pain     Pt c/o left lower abd pain that radiates across lower back since lastnight  +n/v  Pt denies hx of kidney issues/kidney stones     Patient presents to emergency room from home  He states last night he had developed crampy abdominal pain that was present over his left side and radiated across his back  He complains of multiple episodes of vomiting  He is presently vomiting up bile  He has vomited 8 times over the past day  He is passing gas from below  He did have a normal formed stool this morning  There was no blood or mucus  He denies any blood or mucus in his emesis  He denies fever chills  He has a past medical history that is positive for diverticulosis and diverticulitis  He had a bowel resection for his diverticulitis with a reversal of his colostomy  He denies any fever chills  He has had a decreased appetite  He has no history of previous small-bowel obstructions  He complains of a 1 minute episode of a heaviness across his chest   He related the pain to the vomiting  The pain has since resolved  Positive family history of his mother having cardiac disease  He is a former smoker  He denies alcohol use  He denies substance abuse  His past medical history is positive for anemia, arthritis, black stools, diabetes mellitus, diverticulitis, enlarged prostate, erectile dysfunction, hyperlipidemia, hypertension, kidney stones, obesity, prostate cancer  Patient wears dentures  Differential diagnosis includes but is not limited to small-bowel obstruction, large-bowel obstruction, volvulus, mass, diverticulitis, colitis, pancreatitis, acute coronary syndrome, AAA        History provided by:  Patient   used: No    Abdominal Pain  Pain location:  LLQ and LUQ  Pain quality: cramping    Pain radiates to:  Back  Pain severity:  Moderate  Onset quality:  Gradual  Duration:  1 day  Timing: Constant  Progression:  Waxing and waning  Chronicity:  New  Context: awakening from sleep and previous surgery    Context: not alcohol use, not diet changes, not eating, not laxative use, not medication withdrawal, not recent illness, not recent sexual activity, not recent travel, not retching, not sick contacts, not suspicious food intake and not trauma    Associated symptoms: chest pain, nausea and vomiting    Associated symptoms: no chills, no cough, no dysuria, no fatigue, no hematuria, no shortness of breath and no sore throat        Prior to Admission Medications   Prescriptions Last Dose Informant Patient Reported? Taking? Besivance 0 6 % SUSP   Yes No   Biotin 1000 MCG tablet  Self Yes No   Sig: Take 1 tablet by mouth daily in the early morning    Blood Glucose Monitoring Suppl (GLUCOCARD VITAL MONITOR) w/Device KIT  Self No No   Sig: by Does not apply route 2 (two) times a day   Cholecalciferol (VITAMIN D3) 2000 units capsule  Self Yes No   Sig: Take 1,000 Units by mouth daily in the early morning    Cyanocobalamin (VITAMIN B-12) 5000 MCG TBDP  Self Yes No   Sig: Take 5,000 mcg by mouth once a week Takes on Mondays   Lancets (LIFESCAN UNISTIK 2) MISC  Self Yes No   Sig: [employer] One Touch Ultramini Meter; use as directed; 1; 0; 31-Oct-2016; 31-Oct-2016; Melida Duran;  Active   Multiple Vitamin (MULTIVITAMIN) capsule  Self Yes No   Sig: Take 1 capsule by mouth daily in the early morning    Prolensa 0 07 % SOLN   Yes No   Trulicity 3 XF/1 2FM SOPN   No No   Sig: INJECT 0 5ML (3MG) UNDER THE SKIN ONCE A WEEK   acetaminophen (TYLENOL) 325 mg tablet  Self No No   Sig: Take 2 tablets (650 mg total) by mouth every 4 (four) hours as needed for mild pain, headaches or fever   albuterol (ProAir HFA) 90 mcg/act inhaler  Self No No   Sig: Inhale 2 puffs every 6 (six) hours as needed for wheezing or shortness of breath   aluminum-magnesium hydroxide-simethicone (MYLANTA) 200-200-20 mg/5 mL suspension  Self No No Sig: Take 30 mL by mouth every 6 (six) hours as needed for indigestion or heartburn   atorvastatin (LIPITOR) 10 mg tablet  Self No No   Sig: Take 1 tablet (10 mg total) by mouth daily   calcium carbonate (OS-GILDA) 600 MG tablet  Self Yes No   Sig: Take 600 mg by mouth daily in the early morning    docusate sodium (COLACE) 100 mg capsule   Yes No   Sig: Take 100 mg by mouth daily as needed for constipation   Patient not taking: No sig reported   fluticasone (FLONASE) 50 mcg/act nasal spray   No No   Si sprays into each nostril daily   Patient taking differently: 2 sprays into each nostril if needed   fluticasone (Flovent HFA) 110 MCG/ACT inhaler  Self No No   Sig: Inhale 2 puffs 2 (two) times a day Rinse mouth after use  Patient taking differently: Inhale 2 puffs if needed Rinse mouth after use     glipiZIDE (GLUCOTROL XL) 5 mg 24 hr tablet  Self No No   Sig: TAKE ONE TABLET BY MOUTH EVERY DAY AFTER BREAKFAST   insulin aspart protamine-insulin aspart (NovoLOG 70/30) 100 Units/mL injection pen  Self No No   Sig: Inject 20 Units under the skin 2 (two) times a day with meals   losartan (COZAAR) 25 mg tablet  Self No No   Sig: Take 1 tablet (25 mg total) by mouth daily   magnesium citrate (CITROMA) 1 745 g/30 mL oral solution  Self No No   Sig: Take 296 mL by mouth once for 1 dose   Patient taking differently: Take 296 mL by mouth if needed   meloxicam (Mobic) 15 mg tablet  Self No No   Sig: Take 1 tablet (15 mg total) by mouth daily As needed for pain   ondansetron (ZOFRAN-ODT) 4 mg disintegrating tablet  Self No No   Sig: Take 1 tablet (4 mg total) by mouth every 6 (six) hours as needed for nausea or vomiting   prednisoLONE acetate (PRED FORTE) 1 % ophthalmic suspension   Yes No   sulfamethoxazole-trimethoprim (BACTRIM DS) 800-160 mg per tablet   No No   Sig: Take 1 tablet by mouth every 12 (twelve) hours for 5 days   tamsulosin (FLOMAX) 0 4 mg   No No   Sig: Take 1 capsule (0 4 mg total) by mouth daily with dinner   Patient not taking: No sig reported      Facility-Administered Medications: None       Past Medical History:   Diagnosis Date    Anemia     Arthritis     Black stool 7/24/2020    Diabetes mellitus ([facility] [address] )     IDDM    Diverticulosis     Enlarged prostate     Erectile dysfunction     Hypercholesteremia     Resolved post weight loss    Hypertension     Resolved post weight loss    Kidney stone     Obesity     Prostate cancer ([facility] [address] [facility]     Wears partial dentures     partial upper and lower       Past Surgical History:   Procedure Laterality Date    ABDOMINAL ADHESION SURGERY N/A 11/28/2016    Procedure: EXTENSIVE LYSIS ADHESIONS;  Surgeon: Blanca Monterroso MD;  [facility];  Service:    Alexy Navarro FRACTURE SURGERY Left     CHOLECYSTECTOMY      COLON SURGERY      colon resection    COLONOSCOPY      COLOSTOMY      COLOSTOMY CLOSURE      DIAGNOSTIC LAPAROSCOPY      GASTRIC BYPASS      failed due to adhesions    HERNIA REPAIR      abdominal    [place] BIOPSY OF PROSTATE,NEEDLE/PUNCH N/A 5/8/2020    Procedure: TRANSRECTAL NEEDLE BIOPSY PROSTATE (TRNBP) WITH TRANSRECTAL ULTRASOUND GUIDANCE;  Surgeon: Saintclair Lah, MD;  [facility];  Service: Urology    WY CYSTOSCOPY,DIR VIS INT URETHROTOMY N/A 5/8/2020    Procedure: DIRECT VISUAL INTERAL URETHROTOMY (DVIU), dilation of urethral stricture;  Surgeon: Saintclair Lah, MD;  [facility];  Service: Urology    WY CYSTOURETHRO W/IMPLANT N/A 3/8/2019    Procedure: CYSTOSCOPY WITH INSERTION Maya Coad;  Surgeon: Saintclair Lah, MD;  [facility];  Service: Urology    WY CYSTOURETHRO W/IMPLANT N/A 5/8/2020    Procedure: CYSTOSCOPY WITH INSERTION Maya Coad;  Surgeon: Saintclair Lah, MD;  [facility];  Service: Urology    [place] [address] N/A 10/25/2018    Procedure: LINEAR ENDOSCOPIC U/S;  Surgeon: Bimal Tao MD;  [facility];   Service: Gastroenterology    WY ESOPHAGOGASTRODUODENOSCOPY TRANSORAL DIAGNOSTIC N/A 2016    Procedure: EGD AND COLONOSCOPY;  Surgeon: Devon Palmer MD;  [facility]; Service: Gastroenterology    [place] LAP, ANEESH RESTRICT PROC, LONGITUDINAL GASTRECTOMY N/A 2016    Procedure: GASTRECTOMY SLEEVE LAPAROSCOPIC;  Surgeon: Areli Alexander MD;  Location: [facility];  Service: [address] [facility] BIOPSY  2020       Family History   Problem Relation Age of Onset    Heart disease Mother     Breast cancer Mother 80    Diabetes Father     Colon cancer Neg Hx     Liver disease Neg Hx      I have reviewed and agree with the history as documented  E-Cigarette/Vaping    E-Cigarette Use Never User      E-Cigarette/Vaping Substances    Nicotine No     THC No     CBD No     Flavoring No     Other No     Unknown No      Social History     Tobacco Use    Smoking status: Former Smoker     Packs/day: 0 25     Quit date: 11/10/2001     Years since quittin 9    Smokeless tobacco: Former User   Vaping Use    Vaping Use: Never used   Substance Use Topics    Alcohol use: Not Currently    Drug use: Not Currently     Comment: RSO       Review of Systems   Constitutional: Negative for activity change, appetite change, chills, diaphoresis and fatigue  HENT: Negative for congestion, ear discharge, ear pain, mouth sores, postnasal drip, rhinorrhea and sore throat  Respiratory: Negative for cough, shortness of breath and wheezing  Cardiovascular: Positive for chest pain  Negative for palpitations and leg swelling  Gastrointestinal: Positive for abdominal pain, nausea and vomiting  Genitourinary: Negative for dysuria, frequency, hematuria and urgency  Musculoskeletal: Negative for gait problem  Skin: Negative for color change, pallor and rash  Psychiatric/Behavioral: Negative for confusion  All other systems reviewed and are negative        Physical Exam  Physical Exam  Vitals and nursing note reviewed  Chaperone present: Vomiting bile  Constitutional:       General: He is not in acute distress  Appearance: He is well-developed  He is obese  He is not ill-appearing, toxic-appearing or diaphoretic  HENT:      Head: Normocephalic and atraumatic  Cardiovascular:      Heart sounds: Normal heart sounds  No murmur heard  No friction rub  No gallop  Pulmonary:      Effort: Pulmonary effort is normal       Breath sounds: Normal breath sounds  Abdominal:      Palpations: Abdomen is soft  There is no hepatomegaly or splenomegaly  Tenderness: There is abdominal tenderness in the epigastric area, left upper quadrant and left lower quadrant  There is no guarding or rebound  Skin:     General: Skin is warm  Capillary Refill: Capillary refill takes less than 2 seconds  Neurological:      General: No focal deficit present  Mental Status: He is alert and oriented to person, place, and time     Psychiatric:         Mood and Affect: Mood normal          Behavior: Behavior normal          Vital Signs  ED Triage Vitals [10/05/22 0851]   Temperature Pulse Respirations Blood Pressure SpO2   97 7 °F (36 5 °C) 66 20 164/67 98 %      Temp Source Heart Rate Source Patient Position - Orthostatic VS BP Location FiO2 (%)   Oral Monitor Sitting Left arm --      Pain Score       8           Vitals:    10/05/22 0851 10/05/22 1115 10/05/22 1300   BP: 164/67 148/72 155/72   Pulse: 66 58 56   Patient Position - Orthostatic VS: Sitting Sitting Sitting         Visual Acuity      ED Medications  Medications   ondansetron (ZOFRAN) injection 4 mg (4 mg Intravenous Given 10/5/22 1028)   morphine injection 4 mg (4 mg Intravenous Given 10/5/22 1029)   iohexol (OMNIPAQUE) 350 MG/ML injection (SINGLE-DOSE) 100 mL (100 mL Intravenous Given 10/5/22 1138)       Diagnostic Studies  Results Reviewed     Procedure Component Value Units Date/Time    HS Troponin 0hr (reflex protocol) [[id number]]  (Normal) Collected: 10/05/22 1338    Lab Status: Final result Specimen: Blood from Arm, Left Updated: 10/05/22 1407     hs TnI 0hr 2 ng/L     Protime-INR [[id number]]  (Normal) Collected: 10/05/22 1028    Lab Status: Final result Specimen: Blood from Arm, Left Updated: 10/05/22 1045     Protime 13 3 seconds      INR 0 99    APTT [[id number]]  (Normal) Collected: 10/05/22 1028    Lab Status: Final result Specimen: Blood from Arm, Left Updated: 10/05/22 1045     PTT 30 seconds     Comprehensive metabolic panel [[id number]]  (Abnormal) Collected: 10/05/22 0859    Lab Status: Final result Specimen: Blood from Arm, Left Updated: 10/05/22 1004     Sodium 138 mmol/L      Potassium 4 9 mmol/L      Chloride 103 mmol/L      CO2 28 mmol/L      ANION GAP 7 mmol/L      BUN 15 mg/dL      Creatinine 0 89 mg/dL      Glucose 191 mg/dL      Calcium 10 1 mg/dL      AST 24 U/L      ALT 19 U/L      Alkaline Phosphatase 89 U/L      Total Protein 7 3 g/dL      Albumin 4 0 g/dL      Total Bilirubin 0 60 mg/dL      eGFR 85 ml/min/1 73sq m     Narrative:      [place] guidelines for Chronic Kidney Disease (CKD):     Stage 1 with normal or high GFR (GFR > 90 mL/min/1 73 square meters)    Stage 2 Mild CKD (GFR = 60-89 mL/min/1 73 square meters)    Stage 3A Moderate CKD (GFR = 45-59 mL/min/1 73 square meters)    Stage 3B Moderate CKD (GFR = 30-44 mL/min/1 73 square meters)    Stage 4 Severe CKD (GFR = 15-29 mL/min/1 73 square meters)    Stage 5 End Stage CKD (GFR <15 mL/min/1 73 square meters)  Note: GFR calculation is accurate only with a steady state creatinine    Lipase [[id number]]  (Abnormal) Collected: 10/05/22 0859    Lab Status: Final result Specimen: Blood from Arm, Left Updated: 10/05/22 1004     Lipase 89 u/L     Urine Microscopic [[id number]]  (Abnormal) Collected: 10/05/22 0930    Lab Status: Final result Specimen: Urine, Clean Catch Updated: 10/05/22 0947     RBC, UA 1-2 /hpf      WBC, UA 2-4 /hpf      Epithelial Cells None Seen /hpf      Bacteria, UA None Seen /hpf      MUCUS THREADS Occasional     Amorphous Crystals, UA Moderate     WBC Clumps Present    UA w Reflex to Microscopic w Reflex to Culture [[id number]]  (Abnormal) Collected: 10/05/22 0930    Lab Status: Final result Specimen: Urine, Clean Catch Updated: 10/05/22 0940     Color, UA Yellow     Clarity, UA Extra Turbid     Specific [place], UA 1 019     pH, UA 7 5     Leukocytes, UA Negative     Nitrite, UA Negative     Protein, UA 30 (1+) mg/dl      Glucose, UA Negative mg/dl      Ketones, UA Negative mg/dl      Urobilinogen, UA <2 0 mg/dl      Bilirubin, UA Negative     Occult Blood, UA Negative    CBC and differential [[id number]]  (Abnormal) Collected: 10/05/22 0859    Lab Status: Final result Specimen: Blood from Arm, Left Updated: 10/05/22 0914     WBC 9 89 Thousand/uL      RBC 4 73 Million/uL      Hemoglobin 14 0 g/dL      Hematocrit 42 6 %      MCV 90 fL      MCH 29 6 pg      MCHC 32 9 g/dL      RDW 13 1 %      MPV 12 0 fL      Platelets 025 Thousands/uL      nRBC 0 /100 WBCs      Neutrophils Relative 83 %      Immat GRANS % 1 %      Lymphocytes Relative 7 %      Monocytes Relative 8 %      Eosinophils Relative 1 %      Basophils Relative 0 %      Neutrophils Absolute 8 22 Thousands/µL      Immature Grans Absolute 0 07 Thousand/uL      Lymphocytes Absolute 0 68 Thousands/µL      Monocytes Absolute 0 77 Thousand/µL      Eosinophils Absolute 0 11 Thousand/µL      Basophils Absolute 0 04 Thousands/µL                  CT abdomen pelvis with contrast   Final Result by Gricelda Resendiz MD (10/05 1300)         1  No CT findings to explain the left sided abdominal pain  2   Stable complex cyst in the pancreatic uncinate process as well as bilateral renal cysts                 Workstation performed: [id number]                    Procedures  ECG 12 Lead Documentation Only    Date/Time: 10/5/2022 11:10 AM  Performed by: Juliet Mcdermott PA-C  Authorized by: Cassandra Smith PA-C     Indications / Diagnosis:  Chest pain  ECG reviewed by me, the ED Provider: yes    Patient location:  ED  Previous ECG:     Previous ECG:  Compared to current    Comparison ECG info:  6/28/21    Similarity:  Changes noted    Comparison to cardiac monitor: Yes    Interpretation:     Interpretation: normal    Rate:     ECG rate:  67    ECG rate assessment: normal    Rhythm:     Rhythm: sinus rhythm    Ectopy:     Ectopy: none    QRS:     QRS axis:  Normal    QRS intervals:  Normal  Conduction:     Conduction: normal    ST segments:     ST segments:  Normal  T waves:     T waves: normal    Comments:      No signs of acute ischemia    Independently interpreted by me             ED Course             HEART Risk Score    Flowsheet Row Most Recent Value   Heart Score Risk Calculator    History 0 Filed at: 10/05/2022 1413   ECG 0 Filed at: 10/05/2022 1413   Age 2 Filed at: 10/05/2022 1413   Risk Factors 1 Filed at: 10/05/2022 1413   Troponin 0 Filed at: 10/05/2022 1413   HEART Score 3 Filed at: 10/05/2022 1413                        SBIRT 20yo+    Flowsheet Row Most Recent Value   SBIRT (25 yo +)    In order to provide better care to our patients, we are screening all of our patients for alcohol and drug use  Would it be okay to ask you these screening questions?  Unable to answer at this time Filed at: 10/05/2022 0908                    MDM  Number of Diagnoses or Management Options  Abdominal pain: new and requires workup  Vomiting: new and requires workup     Amount and/or Complexity of Data Reviewed  Clinical lab tests: ordered and reviewed  Tests in the radiology section of CPT®: ordered and reviewed  Tests in the medicine section of CPT®: ordered and reviewed    Risk of Complications, Morbidity, and/or Mortality  Presenting problems: high  Diagnostic procedures: high  Management options: high    Patient Progress  Patient progress: stable      Disposition  Final diagnoses:   Abdominal pain Vomiting     Time reflects when diagnosis was documented in both MDM as applicable and the Disposition within this note     Time User Action Codes Description Comment    10/5/2022  2:14 PM Saundra  Add [R10 9] Abdominal pain     10/5/2022  2:14 PM Saundra  Add [R11 10] Vomiting       ED Disposition     ED Disposition   Discharge    Condition   Stable    Date/Time   Wed Oct 5, 2022  2:15 PM    Comment   Curtis Doctor discharge to home/self care                 Follow-up Information     Follow up With Specialties Details Why [address] [facility] Emergency Department Emergency Medicine  As needed, If symptoms worsen [address] [facility] [address] [facility] Emergency Department, [address], [place], [place], [address]          Discharge Medication List as of 10/5/2022  2:15 PM      CONTINUE these medications which have NOT CHANGED    Details   acetaminophen (TYLENOL) 325 mg tablet Take 2 tablets (650 mg total) by mouth every 4 (four) hours as needed for mild pain, headaches or fever, Starting Mon 12/14/2020, No Print      albuterol (ProAir HFA) 90 mcg/act inhaler Inhale 2 puffs every 6 (six) hours as needed for wheezing or shortness of breath, Starting Sat 5/14/2022, Normal      aluminum-magnesium hydroxide-simethicone (MYLANTA) 200-200-20 mg/5 mL suspension Take 30 mL by mouth every 6 (six) hours as needed for indigestion or heartburn, Starting Tue 6/29/2021, Normal      atorvastatin (LIPITOR) 10 mg tablet Take 1 tablet (10 mg total) by mouth daily, Starting Wed 8/10/2022, Normal      Besivance 0 6 % SUSP Starting Fri 8/26/2022, Historical Med      Biotin 1000 MCG tablet Take 1 tablet by mouth daily in the early morning , Historical Med      Blood Glucose Monitoring Suppl (GLUCOCARD VITAL MONITOR) w/Device KIT by Does not apply route 2 (two) times a day, Starting Tue 5/14/2019, Normal calcium carbonate (OS-GILDA) 600 MG tablet Take 600 mg by mouth daily in the early morning , Historical Med      Cholecalciferol (VITAMIN D3) 2000 units capsule Take 1,000 Units by mouth daily in the early morning , Historical Med      Cyanocobalamin (VITAMIN B-12) 5000 MCG TBDP Take 5,000 mcg by mouth once a week Takes on Mondays, Historical Med      docusate sodium (COLACE) 100 mg capsule Take 100 mg by mouth daily as needed for constipation, Historical Med      fluticasone (FLONASE) 50 mcg/act nasal spray 2 sprays into each nostril daily, Starting Wed 7/20/2022, Normal      fluticasone (Flovent HFA) 110 MCG/ACT inhaler Inhale 2 puffs 2 (two) times a day Rinse mouth after use , Starting Fri 6/24/2022, Until Fri 9/23/2022, Normal      glipiZIDE (GLUCOTROL XL) 5 mg 24 hr tablet TAKE ONE TABLET BY MOUTH EVERY DAY AFTER BREAKFAST, Normal      insulin aspart protamine-insulin aspart (NovoLOG 70/30) 100 Units/mL injection pen Inject 20 Units under the skin 2 (two) times a day with meals, Starting Tue 8/16/2022, Until Mon 11/14/2022, Normal      Lancets (LIFESCAN UNISTIK 2) MISC [employer] One Touch Ultramini Meter; use as directed; 1; 0; 31-Oct-2016; 31-Oct-2016; Melida Duran;  Active, Historical Med      losartan (COZAAR) 25 mg tablet Take 1 tablet (25 mg total) by mouth daily, Starting Wed 8/10/2022, Until Tue 11/8/2022, Normal      magnesium citrate (CITROMA) 1 745 g/30 mL oral solution Take 296 mL by mouth once for 1 dose, Starting Mon 7/25/2022, Normal      meloxicam (Mobic) 15 mg tablet Take 1 tablet (15 mg total) by mouth daily As needed for pain, Starting Thu 11/11/2021, Normal      Multiple Vitamin (MULTIVITAMIN) capsule Take 1 capsule by mouth daily in the early morning , Historical Med      ondansetron (ZOFRAN-ODT) 4 mg disintegrating tablet Take 1 tablet (4 mg total) by mouth every 6 (six) hours as needed for nausea or vomiting, Starting Tue 6/29/2021, Normal      prednisoLONE acetate (PRED FORTE) 1 % ophthalmic suspension Starting Mon 9/12/2022, Historical Med      Prolensa 0 07 % SOLN Starting Fri 8/26/2022, Historical Med      sulfamethoxazole-trimethoprim (BACTRIM DS) 800-160 mg per tablet Take 1 tablet by mouth every 12 (twelve) hours for 5 days, Starting Fri 9/30/2022, Until Wed 10/5/2022, Normal      tamsulosin (FLOMAX) 0 4 mg Take 1 capsule (0 4 mg total) by mouth daily with dinner, Starting Fri 11/5/2021, Normal      Trulicity 3 AT/3 3UW SOPN INJECT 0 5ML (3MG) UNDER THE SKIN ONCE A WEEK, Normal             No discharge procedures on file      PDMP Review       Value Time User    PDMP Reviewed  Yes 8/17/2022  4:28 PM Lyndsey Soto DDS          ED Provider  Electronically Signed by           Florida Millard PA-C  10/05/22 5996

## 2022-10-10 ENCOUNTER — OFFICE VISIT (OUTPATIENT)
Dept: PODIATRY | Facility: CLINIC | Age: 72
End: 2022-10-10
Payer: COMMERCIAL

## 2022-10-10 VITALS
DIASTOLIC BLOOD PRESSURE: 78 MMHG | SYSTOLIC BLOOD PRESSURE: 136 MMHG | BODY MASS INDEX: 36.79 KG/M2 | HEIGHT: 67 IN | WEIGHT: 234.4 LBS | HEART RATE: 58 BPM

## 2022-10-10 DIAGNOSIS — L84 CORN OR CALLUS: ICD-10-CM

## 2022-10-10 DIAGNOSIS — M72.2 PLANTAR FASCIITIS OF RIGHT FOOT: ICD-10-CM

## 2022-10-10 DIAGNOSIS — Z79.4 TYPE 2 DIABETES MELLITUS WITHOUT COMPLICATION, WITH LONG-TERM CURRENT USE OF INSULIN (HCC): Primary | ICD-10-CM

## 2022-10-10 DIAGNOSIS — E11.9 TYPE 2 DIABETES MELLITUS WITHOUT COMPLICATION, WITH LONG-TERM CURRENT USE OF INSULIN (HCC): Primary | ICD-10-CM

## 2022-10-10 PROCEDURE — 11719 TRIM NAIL(S) ANY NUMBER: CPT | Performed by: PODIATRIST

## 2022-10-10 PROCEDURE — 99203 OFFICE O/P NEW LOW 30 MIN: CPT | Performed by: PODIATRIST

## 2022-10-10 NOTE — PROGRESS NOTES
Podiatry Clinic  Nader Bose 67 y o  male MRN: [id number]  Encounter: [id number]      Assessment/Plan        Diagnoses and all orders for this visit:    Type 2 diabetes mellitus without complication, with long-term current use of insulin ([facility] [address] )    Corn or callus    Plantar fasciitis of right foot       Plan:  • Patient was seen/examined  All questions and concerns addressed  • Elongated toenails x 10 were reduced to normal length using nail nipper with no incident  • Callus of L foot was reduced to normal epithelium with 10 blade with no incident  • Discussed the nature of plantar fasciitis with patient  Instructed patient on specific stretching exercises that he should perform 3x daily to reduce pain associated with plantar fasciitis  • Advised patient to maintain strict glycemic control  Encouraged patient to check feet daily to monitor for any open wounds or lesions  Also emphasized importance of wearing shoes at all times to not only help with plantar fasciitis pain, but also protect feet from any trauma  • Patient will follow up in 1 year for annual diabetic foot exam     Nail removal    Date/Time: 10/10/2022 10:41 AM  Performed by: Jian Manuel DPM  Authorized by: Jian Manuel DPM     Patient location:  [facility] Protocol:  Consent: Verbal consent obtained  Risks and benefits: risks, benefits and alternatives were discussed  Consent given by: patient  Patient understanding: patient states understanding of the procedure being performed  Patient identity confirmed: verbally with patient      Nails trimmed:     Number of nails trimmed:  10  Post-procedure details:     Patient tolerance of procedure: Tolerated well, no immediate complications  Comments:      Elongated toenails x 10 were reduced to normal length using nail nipper with no incident    Lesion Destruction    Date/Time: 10/10/2022 10:42 AM  Performed by: Jian Manuel DPM  Authorized by: ALBERTO Knowles Protocol:  Consent: Verbal consent obtained  Risks and benefits: risks, benefits and alternatives were discussed  Consent given by: patient  Patient understanding: patient states understanding of the procedure being performed  Patient identity confirmed: verbally with patient      Procedure Details - Lesion Destruction:     Number of Lesions:  1  Lesion 1:     Body area:  Lower extremity    Lower extremity location:  L little toe    Malignancy: benign hyperkeratotic lesion      Destruction method comment:  10 blade     Callus of L foot was reduced to normal epithelium with 10 blade with no incident  Dr Margaret Dumont was present during this entire procedure  History of Present Illness     Ramez Garay is a 67year old male patient with history of type 2 diabetes mellitus presenting for annual diabetic foot exam  He states that he has been a diabetic for years, controls his diet, and takes all medications as instructed  He complains of a painful callus to L 5th digit along the medial aspect  He states that he has not tried to do anything to alleviate the pain or remove the callus, but it is consistently painful throughout the day  He mentions that he had toenail fungus previously but after taking terbinafine, it has cleared up  However, he does complain that it is still difficult to trim his toenails at home due to the fact that he cannot bed over to do so and because the toenails are slightly thickened  He also complains of pain to R heel that started 2 months ago but has recently begun to improve  He states that the pain is worst in the morning and gets better as the day goes on  He has not tried anything to alleviate the pain  He does not know why it has started to feel better recently  He denies any numbness and tingling to feet b/l  He denies any N/V/F/CP/SOB  Labs reviewed with patient - HbA1c of 6 7% on 07/25/2022  Review of Systems   Constitutional: Negative  HENT: Negative  Eyes: Negative  Respiratory: Negative  Cardiovascular: Negative  Gastrointestinal: Negative  Musculoskeletal: as noted in HPI  Skin: callus L foot  Neurological: Negative         Historical Information   Past Medical History:   Diagnosis Date   • Anemia    • Arthritis    • Black stool 7/24/2020   • Diabetes mellitus ([facility] )     IDDM   • Diverticulosis    • Enlarged prostate    • Erectile dysfunction    • Hypercholesteremia     Resolved post weight loss   • Hypertension     Resolved post weight loss   • Kidney stone    • Obesity    • Prostate cancer ([facility] )    • Wears partial dentures     partial upper and lower     Past Surgical History:   Procedure Laterality Date   • ABDOMINAL ADHESION SURGERY N/A 11/28/2016    Procedure: EXTENSIVE LYSIS ADHESIONS;  Surgeon: Renee Carr MD;  [facility];  Service:    • ANKLE FRACTURE SURGERY Left    • CHOLECYSTECTOMY     • COLON SURGERY      colon resection   • COLONOSCOPY     • COLOSTOMY     • COLOSTOMY CLOSURE     • DIAGNOSTIC LAPAROSCOPY     • GASTRIC BYPASS      failed due to adhesions   • HERNIA REPAIR      abdominal   • MT BIOPSY OF PROSTATE,NEEDLE/PUNCH N/A 5/8/2020    Procedure: TRANSRECTAL NEEDLE BIOPSY PROSTATE (TRNBP) WITH TRANSRECTAL ULTRASOUND GUIDANCE;  Surgeon: Verner Squires, MD;  Location: [facility];  Service: Urology   • MT CYSTOSCOPY,DIR VIS INT URETHROTOMY N/A 5/8/2020    Procedure: DIRECT VISUAL INTERAL URETHROTOMY (DVIU), dilation of urethral stricture;  Surgeon: Verner Squires, MD;  [facility];  Service: Urology   • MT CYSTOURETHRO W/IMPLANT N/A 3/8/2019    Procedure: CYSTOSCOPY WITH INSERTION Odette Oats;  Surgeon: Verner Squires, MD;  [facility];  Service: Urology   • MT CYSTOURETHRO W/IMPLANT N/A 5/8/2020    Procedure: CYSTOSCOPY WITH INSERTION Odette Oats;  Surgeon: Verner Squires, MD;  [facility];  Service: Urology   • MT EDG US [address] N/A 10/25/2018    Procedure: Chiquis Cruz ENDOSCOPIC U/S;  Surgeon: Radha Zendejas MD;  Location: [facility]; Service: Gastroenterology   • MD ESOPHAGOGASTRODUODENOSCOPY TRANSORAL DIAGNOSTIC N/A 2016    Procedure: EGD AND COLONOSCOPY;  Surgeon: Brittani Ferrer MD;  Location: [facility]; Service: Gastroenterology   • MD LAP, ANEESH RESTRICT PROC, LONGITUDINAL GASTRECTOMY N/A 2016    Procedure: GASTRECTOMY SLEEVE LAPAROSCOPIC;  Surgeon: Naya Thorpe MD;  [facility]: [facility];  Service: Bariatrics   • TONSILLECTOMY     • US GUIDED PROSTATE BIOPSY  2020     Social History   Social History     Substance and Sexual Activity   Alcohol Use Not Currently     Social History     Substance and Sexual Activity   Drug Use Not Currently    Comment: RSO     Social History     Tobacco Use   Smoking Status Former Smoker   • Packs/day: 0 25   • Quit date: 11/10/2001   • Years since quittin 9   Smokeless Tobacco Former User     Family History:   Family History   Problem Relation Age of Onset   • Heart disease Mother    • Breast cancer Mother 80   • Diabetes Father    • Colon cancer Neg Hx    • Liver disease Neg Hx        Meds/Allergies   (Not in a hospital admission)    Allergies   Allergen Reactions   • Enalapril Anaphylaxis, Throat Swelling and Hives   • Metformin Abdominal Pain       Objective     Current Vitals:   Blood Pressure: 136/78 (10/10/22 0951)  Pulse: 58 (10/10/22 0951)  Height: 5' 7" (170 2 cm) (10/10/22 0951)  Weight - Scale: 106 kg (234 lb 6 4 oz) (10/10/22 0951)        /78   Pulse 58   Ht 5' 7" (1 702 m)   Wt 106 kg (234 lb 6 4 oz)   BMI 36 71 kg/m²       Lower Extremity Exam:    Vascular: Right foot DP/PT +2                   Left foot DP/PT +2                   There is trace lower extremity edema bilateral     Musculoskeletal: There is 5/5 strength throughout the bilateral lower extremity  There is  limited ankle range of motion with well maintained subtalar range of motion   There is mild tenderness on palpation over the R heel at medial tubercle  There is also mild tenderness on palpation over callus of L 5th digit  Neurological: Sensation to 5 [address] [place] nylon filament: intact bilaterally  Vibratory sense to distal Foot  intact bilaterally  Sharp/Dull sense is intact bilaterally      Dermatology: Skin Condition:  dryness     There is not evidence of macerated tissue between toe spaces  Nail Exam: normal nails without lesions, slightly thickened     Open ulcerations: No     Calluses: Yes - medial aspect of L 5th digit    Patient's shoes and socks removed  Right Foot/Ankle   Right Foot Inspection  Skin Exam: skin intact and dry skin  No callus, no maceration, no ulcer and no callus  Toe Exam: ROM and strength within normal limits  Sensory   Vibration: intact  Proprioception: intact  Monofilament testing: intact    Vascular  Capillary refills: < 3 seconds  The right DP pulse is 2+  The right PT pulse is 2+  Left Foot/Ankle  Left Foot Inspection  Skin Exam: skin intact, dry skin and callus  No maceration and no ulcer  Toe Exam: ROM and strength within normal limits  Sensory   Vibration: intact  Proprioception: intact  Monofilament testing: intact    Vascular  Capillary refills: < 3 seconds  The left DP pulse is 2+  The left PT pulse is 2+       Assign Risk Category  No deformity present  No loss of protective sensation  No weak pulses  Risk: 0    Biomechanical Exam of LE:  Hip ROM WNL Bilateral, external and internal rotation about equal at 45° b/l  Knee ROM WNL Bilateral, no hyperextension noted b/l, no genu varum/valgum noted b/l  Ankle dorsiflexion with knee extended is limited on right and limited on left  Ankle dorsiflexion with knee flexed is limited on right and limited on left  Malleolar positioning is WNL b/l  STJ ROM WNL b/l, heel inversion is approximately 20° on right and 20° on left; heel eversion is approximately 10° on right and 10° on left; neutral calcaneal stance position is 0°   1st ray ROM limited b/l for dorsiflexion    Tibial varum is 0° b/l, Resting calcaneal stance position is vertical and approximately 0° on right and vertical and approximately 0° on left  Gait analysis: WNL  Gross deformity noted: none, rectus feet b/l

## 2022-10-12 DIAGNOSIS — K21.9 GASTROESOPHAGEAL REFLUX DISEASE WITHOUT ESOPHAGITIS: Primary | ICD-10-CM

## 2022-10-12 RX ORDER — PANTOPRAZOLE SODIUM 40 MG/1
TABLET, DELAYED RELEASE ORAL
Qty: 30 TABLET | Refills: 0 | Status: SHIPPED | OUTPATIENT
Start: 2022-10-12

## 2022-10-24 ENCOUNTER — OFFICE VISIT (OUTPATIENT)
Dept: FAMILY MEDICINE CLINIC | Facility: CLINIC | Age: 72
End: 2022-10-24
Payer: COMMERCIAL

## 2022-10-24 VITALS
DIASTOLIC BLOOD PRESSURE: 66 MMHG | OXYGEN SATURATION: 98 % | RESPIRATION RATE: 16 BRPM | SYSTOLIC BLOOD PRESSURE: 124 MMHG | WEIGHT: 234.4 LBS | TEMPERATURE: 96.6 F | HEART RATE: 63 BPM | BODY MASS INDEX: 36.79 KG/M2 | HEIGHT: 67 IN

## 2022-10-24 DIAGNOSIS — E11.65 TYPE 2 DIABETES MELLITUS WITH HYPERGLYCEMIA, WITH LONG-TERM CURRENT USE OF INSULIN (HCC): ICD-10-CM

## 2022-10-24 DIAGNOSIS — H25.9 AGE-RELATED CATARACT OF BOTH EYES, UNSPECIFIED AGE-RELATED CATARACT TYPE: ICD-10-CM

## 2022-10-24 DIAGNOSIS — D69.6 PLATELETS DECREASED (HCC): ICD-10-CM

## 2022-10-24 DIAGNOSIS — Z79.4 TYPE 2 DIABETES MELLITUS WITH HYPERGLYCEMIA, WITH LONG-TERM CURRENT USE OF INSULIN (HCC): ICD-10-CM

## 2022-10-24 DIAGNOSIS — Z01.818 PREOP EXAMINATION: Primary | ICD-10-CM

## 2022-10-24 PROCEDURE — 99214 OFFICE O/P EST MOD 30 MIN: CPT | Performed by: FAMILY MEDICINE

## 2022-10-24 NOTE — ASSESSMENT & PLAN NOTE
Lab Results   Component Value Date    HGBA1C 6 7 (H) 07/25/2022   In controlled range  Continue current meds as directed

## 2022-10-24 NOTE — PROGRESS NOTES
Subjective:      Addy Coe is a 67 y o  male who presents to the office today for a preoperative consultation at the request of surgeon Dr Corrinne Shackle who plans on performing left cataract surgery 11/1/22 and right cataract surgery on November 15, 2022  This consultation is requested for the specific conditions prompting preoperative evaluation (i e  because of potential affect on operative risk): obesity, diabetes  Planned anesthesia is IV sedation  The patient has the following known anesthesia issues: none  Patient has a bleeding risk of: no recent abnormal bleeding  Patient does not have objections to receiving blood products if needed      The following portions of the patient's history were reviewed and updated as appropriate: allergies, current medications, past family history, past medical history, past social history, past surgical history and problem list     Review of Systems  Constitutional: negative  Eyes: negative  Ears, nose, mouth, throat, and face: negative  Respiratory: negative  Cardiovascular: negative  Gastrointestinal: negative  Genitourinary:negative  Integument/breast: negative  Hematologic/lymphatic: negative  Musculoskeletal:negative  Neurological: negative  Behavioral/Psych: negative  Endocrine: negative  Allergic/Immunologic: negative       Objective:      Physical Exam  /66 (BP Location: Left arm, Patient Position: Sitting, Cuff Size: Large)   Pulse 63   Temp (!) 96 6 °F (35 9 °C) (Tympanic)   Resp 16   Ht 5' 7" (1 702 m)   Wt 106 kg (234 lb 6 4 oz)   SpO2 98%   BMI 36 71 kg/m²     General Appearance:    Alert, cooperative, no distress, appears stated age   Head:    Normocephalic, without obvious abnormality, atraumatic   Eyes:    PERRL, conjunctiva/corneas clear, EOM's intact, fundi     benign, both eyes        Ears:    Normal TM's and external ear canals, both ears   Nose:   Nares normal, septum midline, mucosa normal, no drainage    or sinus tenderness   Throat:   Lips, mucosa, and tongue normal; teeth and gums normal   Neck:   Supple, symmetrical, trachea midline, no adenopathy;        thyroid:  No enlargement/tenderness/nodules; no carotid    bruit or JVD   Back:     Symmetric, no curvature, ROM normal, no CVA tenderness   Lungs:     Clear to auscultation bilaterally, respirations unlabored   Chest wall:    No tenderness or deformity   Heart:    Regular rate and rhythm, S1 and S2 normal, no murmur, rub   or gallop   Abdomen:     Soft, non-tender, bowel sounds active all four quadrants,     no masses, no organomegaly           Extremities:   Extremities normal, atraumatic, no cyanosis or edema   Pulses:   2+ and symmetric all extremities   Skin:   Skin color, texture, turgor normal, no rashes or lesions   Lymph nodes:   Cervical, supraclavicular, and axillary nodes normal   Neurologic:   CNII-XII intact  Normal strength, sensation and reflexes       throughout       Predictors of intubation difficulty:   Morbid obesity? no   Anatomically abnormal facies? no   Prominent incisors? no   Receding mandible? no   Short, thick neck? no   Neck range of motion: normal   Mallampati score: I (soft palate, uvula, fauces, and tonsillar pillars visible)   Thyromental distance: < 6cm     Dentition: No chipped, loose, or missing teeth      Cardiographics  ECG: normal sinus rhythm, no blocks or conduction defects, no ischemic changes 10/5/2022  Echocardiogram: not done    Imaging  Chest x-ray: none     Lab Review   Admission on 10/05/2022, Discharged on 10/05/2022   Component Date Value   • WBC 10/05/2022 9 89    • RBC 10/05/2022 4 73    • Hemoglobin 10/05/2022 14 0    • Hematocrit 10/05/2022 42 6    • MCV 10/05/2022 90    • MCH 10/05/2022 29 6    • MCHC 10/05/2022 32 9    • RDW 10/05/2022 13 1    • MPV 10/05/2022 12 0    • Platelets 43/45/8038 166    • nRBC 10/05/2022 0    • Neutrophils Relative 10/05/2022 83 (A)   • Immat GRANS % 10/05/2022 1    • Lymphocytes Relative 10/05/2022 7 (A)   • Monocytes Relative 10/05/2022 8    • Eosinophils Relative 10/05/2022 1    • Basophils Relative 10/05/2022 0    • Neutrophils Absolute 10/05/2022 8 22 (A)   • Immature Grans Absolute 10/05/2022 0 07    • Lymphocytes Absolute 10/05/2022 0 68    • Monocytes Absolute 10/05/2022 0 77    • Eosinophils Absolute 10/05/2022 0 11    • Basophils Absolute 10/05/2022 0 04    • Sodium 10/05/2022 138    • Potassium 10/05/2022 4 9    • Chloride 10/05/2022 103    • CO2 10/05/2022 28    • ANION GAP 10/05/2022 7    • BUN 10/05/2022 15    • Creatinine 10/05/2022 0 89    • Glucose 10/05/2022 191 (A)   • Calcium 10/05/2022 10 1    • AST 10/05/2022 24    • ALT 10/05/2022 19    • Alkaline Phosphatase 10/05/2022 89    • Total Protein 10/05/2022 7 3    • Albumin 10/05/2022 4 0    • Total Bilirubin 10/05/2022 0 60    • eGFR 10/05/2022 85    • Lipase 10/05/2022 89 (A)   • Color, UA 10/05/2022 Yellow    • Clarity, UA 10/05/2022 Extra Turbid    • Specific Gravity, UA 10/05/2022 1 019    • pH, UA 10/05/2022 7 5    • Leukocytes, UA 10/05/2022 Negative    • Nitrite, UA 10/05/2022 Negative    • Protein, UA 10/05/2022 30 (1+) (A)   • Glucose, UA 10/05/2022 Negative    • Ketones, UA 10/05/2022 Negative    • Urobilinogen, UA 10/05/2022 <2 0    • Bilirubin, UA 10/05/2022 Negative    • Occult Blood, UA 10/05/2022 Negative    • RBC, UA 10/05/2022 1-2    • WBC, UA 10/05/2022 2-4 (A)   • Epithelial Cells 10/05/2022 None Seen    • Bacteria, UA 10/05/2022 None Seen    • MUCUS THREADS 10/05/2022 Occasional (A)   • Amorphous Crystals, UA 10/05/2022 Moderate    • WBC Clumps 10/05/2022 Present    • Protime 10/05/2022 13 3    • INR 10/05/2022 0 99    • PTT 10/05/2022 30    • Ventricular Rate 10/05/2022 67    • Atrial Rate 10/05/2022 67    • KS Interval 10/05/2022 162    • QRSD Interval 10/05/2022 86    • QT Interval 10/05/2022 412    • QTC Interval 10/05/2022 435    • P Axis 10/05/2022 43    • QRS Axis 10/05/2022 54    • T Wave Axis 10/05/2022 59    • hs TnI 0hr 10/05/2022 2    Telephone on 10/03/2022   Component Date Value   • Severity 08/01/2022 Normal    • Right Eye Diabetic Retin* 08/01/2022 None    • Left Eye Diabetic Retino* 08/01/2022 None    Office Visit on 09/30/2022   Component Date Value   • LEUKOCYTE ESTERASE,UA 09/30/2022 -    • [place] 09/30/2022 -    • SL AMB POCT UROBILINOGEN 09/30/2022 -    • POCT URINE PROTEIN 09/30/2022 +-    •  PH,UA 09/30/2022 7 5    • BLOOD,UA 09/30/2022 -    • SPECIFIC GRAVITY,UA 09/30/2022 1 015    • KETONES,UA 09/30/2022 -    • BILIRUBIN,UA 09/30/2022 -    • GLUCOSE, UA 09/30/2022 -    •  COLOR,UA 09/30/2022 yellow    • CLARITY,UA 09/30/2022 cloudy    • Urine Culture 09/30/2022 10,000-19,000 cfu/ml            Assessment:      67 y o  male with planned surgery as above  Known risk factors for perioperative complications: None    Difficulty with intubation is not anticipated  Cardiac Risk Estimation: per the Revised Cardiac Risk Index (Circ  100:1043, 1999), the patient's risk factors for cardiac complications include obesity, diabetes, putting him in: RCI RISK CLASS I (0 risk factors, risk of major cardiac compl  appr  0 5%)    Current medications which may produce withdrawal symptoms if withheld perioperatively: none  Plan:   Brayan Kelly was seen today for pre-op clearance  Diagnoses and all orders for this visit:    Preop examination    Type 2 diabetes mellitus with hyperglycemia, with long-term current use of insulin ([address] )  Comments:  stable on current meds    Age-related cataract of both eyes, unspecified age-related cataract type  Comments:  cleared for surgery as planned     Platelets decreased ([address] )     1  Preoperative workup as follows none  2  Change in medication regimen before surgery: none, continue medication regimen including morning of surgery, with sip of water  3  Prophylaxis for cardiac events with perioperative beta-blockers: not indicated    4  Invasive hemodynamic monitoring perioperatively: not indicated  5  Deep vein thrombosis prophylaxis postoperatively:regimen to be chosen by surgical team   6  Surveillance for postoperative MI with ECG immediately postoperatively and on postoperative days 1 and 2 AND troponin levels 24 hours postoperatively and on day 4 or hospital discharge (whichever comes first): not indicated  7  Other measures: Preoperative alcohol abstention x 30 days

## 2022-11-01 ENCOUNTER — SURGERY/PROCEDURE (OUTPATIENT)
Dept: URBAN - METROPOLITAN AREA SURGICAL CENTER 6 | Facility: SURGICAL CENTER | Age: 72
End: 2022-11-01

## 2022-11-01 DIAGNOSIS — H25.811: ICD-10-CM

## 2022-11-01 PROCEDURE — 66984 XCAPSL CTRC RMVL W/O ECP: CPT

## 2022-11-02 ENCOUNTER — 1 DAY POST-OP (OUTPATIENT)
Dept: URBAN - METROPOLITAN AREA CLINIC 6 | Facility: CLINIC | Age: 72
End: 2022-11-02

## 2022-11-02 DIAGNOSIS — Z96.1: ICD-10-CM

## 2022-11-02 PROCEDURE — 99024 POSTOP FOLLOW-UP VISIT: CPT

## 2022-11-02 ASSESSMENT — KERATOMETRY
OS_K1POWER_DIOPTERS: 44.50
OD_K2POWER_DIOPTERS: 46.00
OS_K2POWER_DIOPTERS: 45.75
OD_K1POWER_DIOPTERS: 43.75
OD_K2POWER_DIOPTERS: 46.00
OD_AXISANGLE_DEGREES: 055
OS_AXISANGLE_DEGREES: 025
OS_AXISANGLE2_DEGREES: 115
OD_K1POWER_DIOPTERS: 43.75
OS_K2POWER_DIOPTERS: 45.75
OS_AXISANGLE2_DEGREES: 115
OS_K1POWER_DIOPTERS: 44.50
OD_AXISANGLE2_DEGREES: 145
OS_AXISANGLE_DEGREES: 025
OD_AXISANGLE2_DEGREES: 145
OD_AXISANGLE_DEGREES: 055

## 2022-11-02 ASSESSMENT — TONOMETRY
OS_IOP_MMHG: 21
OD_IOP_MMHG: 21

## 2022-11-02 ASSESSMENT — VISUAL ACUITY
OD_SC: 20/60
OD_PH: 20/40-1

## 2022-11-10 ENCOUNTER — 1 WEEK POST-OP (OUTPATIENT)
Dept: URBAN - METROPOLITAN AREA CLINIC 6 | Facility: CLINIC | Age: 72
End: 2022-11-10

## 2022-11-10 DIAGNOSIS — H25.812: ICD-10-CM

## 2022-11-10 DIAGNOSIS — Z96.1: ICD-10-CM

## 2022-11-10 PROCEDURE — 99024 POSTOP FOLLOW-UP VISIT: CPT

## 2022-11-10 PROCEDURE — 92136 OPHTHALMIC BIOMETRY: CPT | Mod: 26,LT

## 2022-11-10 ASSESSMENT — KERATOMETRY
OD_K1POWER_DIOPTERS: 43.75
OS_AXISANGLE2_DEGREES: 115
OD_AXISANGLE_DEGREES: 055
OS_AXISANGLE_DEGREES: 025
OS_K2POWER_DIOPTERS: 45.75
OS_K1POWER_DIOPTERS: 44.50
OD_K2POWER_DIOPTERS: 46.00
OD_AXISANGLE2_DEGREES: 145

## 2022-11-10 ASSESSMENT — TONOMETRY
OD_IOP_MMHG: 15
OS_IOP_MMHG: 11

## 2022-11-10 ASSESSMENT — VISUAL ACUITY
OD_SC: 20/50+1
OD_PH: 20/40-1
OS_CC: 20/50
OD_OTHER: 1 WEEK POST OP

## 2022-11-14 ENCOUNTER — OFFICE VISIT (OUTPATIENT)
Dept: DENTISTRY | Facility: CLINIC | Age: 72
End: 2022-11-14

## 2022-11-14 VITALS — HEART RATE: 56 BPM | SYSTOLIC BLOOD PRESSURE: 115 MMHG | DIASTOLIC BLOOD PRESSURE: 70 MMHG

## 2022-11-14 DIAGNOSIS — Z01.21 ENCOUNTER FOR DENTAL EXAMINATION AND CLEANING WITH ABNORMAL FINDINGS: Primary | ICD-10-CM

## 2022-11-14 NOTE — PROGRESS NOTES
PERIO MT     REVIEWED MED HX: meds, allergies, health changes reviewed in EPIC  CHIEF CONCERN:  Pt was unhappy because he thought today was his appt for impressions for his partial dentures  PAIN SCALE:  0  ASA CLASS:  III  Uncontrolled Diabetes  PLAQUE:  moderate  CALCULUS: heavy  BLEEDING:   moderate  STAIN :   none  ORAL HYGIENE:  fair  PERIO:     Hand scaled, polished and flossed  Used Cavitron  Oral Hygiene Instruction:  recommended brushing 2 x daily for 2 minutes MIN, recommended flossing daily, reviewed dietary precautions  Patient uses waterpik at home  Gave proxybrush and handle to clean around UA bridge  Discussed importance of maintaining 4mrcs  Visual and Tactile Intraoral/ Extraoral evaluation: Oral and Oropharyngeal cancer evaluation   No findings     No ex    REFERRALS: no referrals needed        TREATMENT  PLAN :   1) prelim impressions for max and usha RPDs    Next Recall:4Mos, [address] [facility], 2BWX    Last FMX : 11-4-2020

## 2022-11-15 ENCOUNTER — SURGERY/PROCEDURE (OUTPATIENT)
Dept: URBAN - METROPOLITAN AREA SURGICAL CENTER 6 | Facility: SURGICAL CENTER | Age: 72
End: 2022-11-15

## 2022-11-15 DIAGNOSIS — H25.812: ICD-10-CM

## 2022-11-15 PROCEDURE — 66984 XCAPSL CTRC RMVL W/O ECP: CPT | Mod: 79,LT

## 2022-11-16 ENCOUNTER — 1 DAY POST-OP (OUTPATIENT)
Dept: URBAN - METROPOLITAN AREA CLINIC 6 | Facility: CLINIC | Age: 72
End: 2022-11-16

## 2022-11-16 DIAGNOSIS — Z96.1: ICD-10-CM

## 2022-11-16 PROCEDURE — 99024 POSTOP FOLLOW-UP VISIT: CPT

## 2022-11-16 ASSESSMENT — KERATOMETRY
OD_K2POWER_DIOPTERS: 46.00
OD_K2POWER_DIOPTERS: 46.00
OD_AXISANGLE2_DEGREES: 145
OS_K2POWER_DIOPTERS: 45.75
OD_K1POWER_DIOPTERS: 43.75
OS_K1POWER_DIOPTERS: 44.50
OD_K1POWER_DIOPTERS: 43.75
OS_AXISANGLE_DEGREES: 025
OS_AXISANGLE2_DEGREES: 115
OS_K2POWER_DIOPTERS: 45.75
OD_AXISANGLE_DEGREES: 055
OS_AXISANGLE2_DEGREES: 115
OS_AXISANGLE_DEGREES: 025
OD_AXISANGLE_DEGREES: 055
OS_K1POWER_DIOPTERS: 44.50
OD_AXISANGLE2_DEGREES: 145

## 2022-11-16 ASSESSMENT — VISUAL ACUITY
OD_SC: 20/40-2
OD_PH: 20/40-1
OU_SC: J2-2
OS_SC: 20/25+1
OS_PH: 20/25+2

## 2022-11-16 ASSESSMENT — TONOMETRY
OD_IOP_MMHG: 16
OS_IOP_MMHG: 25

## 2022-11-23 ENCOUNTER — 1 WEEK POST-OP (OUTPATIENT)
Dept: URBAN - METROPOLITAN AREA CLINIC 6 | Facility: CLINIC | Age: 72
End: 2022-11-23

## 2022-11-23 DIAGNOSIS — Z96.1: ICD-10-CM

## 2022-11-23 PROCEDURE — 99024 POSTOP FOLLOW-UP VISIT: CPT

## 2022-11-23 ASSESSMENT — KERATOMETRY
OD_AXISANGLE2_DEGREES: 145
OD_K1POWER_DIOPTERS: 43.75
OS_AXISANGLE_DEGREES: 025
OS_AXISANGLE2_DEGREES: 115
OS_K1POWER_DIOPTERS: 44.50
OD_K2POWER_DIOPTERS: 46.00
OS_K2POWER_DIOPTERS: 45.75
OD_AXISANGLE_DEGREES: 055

## 2022-11-23 ASSESSMENT — VISUAL ACUITY
OS_SC: 20/30
OD_SC: 20/40

## 2022-11-23 ASSESSMENT — TONOMETRY
OS_IOP_MMHG: 19
OD_IOP_MMHG: 10

## 2022-12-09 ENCOUNTER — NURSE TRIAGE (OUTPATIENT)
Dept: OTHER | Facility: OTHER | Age: 72
End: 2022-12-09

## 2022-12-09 ENCOUNTER — TELEPHONE (OUTPATIENT)
Dept: GASTROENTEROLOGY | Facility: AMBULARY SURGERY CENTER | Age: 72
End: 2022-12-09

## 2022-12-09 DIAGNOSIS — K21.9 GASTROESOPHAGEAL REFLUX DISEASE WITHOUT ESOPHAGITIS: ICD-10-CM

## 2022-12-09 RX ORDER — PANTOPRAZOLE SODIUM 40 MG/1
TABLET, DELAYED RELEASE ORAL
Qty: 30 TABLET | Refills: 0 | Status: SHIPPED | OUTPATIENT
Start: 2022-12-09

## 2022-12-09 NOTE — TELEPHONE ENCOUNTER
Regarding: Colonoscopy 12/19 prep solution alternative  ----- Message from Willow Henderson sent at 12/9/2022 11:59 AM EST -----  I am scheduled for a colonoscopy on 12/19 and I want to know if there is something else I can take instead of having to drink the gallon of the solution

## 2022-12-09 NOTE — TELEPHONE ENCOUNTER
Pt would like to discuss Alternate prep options for Colonoscopy scheduled on 12/19/22  Please call patient back to advise       Reason for Disposition  • Nursing judgment    Protocols used: INFORMATION ONLY CALL - NO TRIAGE-ADULT-[place]

## 2022-12-09 NOTE — TELEPHONE ENCOUNTER
Spoke with patient, gave new instructions pl;us diabetic instructions    E mailed to patient and also send via regular mail

## 2022-12-13 ENCOUNTER — OFFICE VISIT (OUTPATIENT)
Dept: DENTISTRY | Facility: CLINIC | Age: 72
End: 2022-12-13

## 2022-12-13 VITALS — TEMPERATURE: 97.1 F | SYSTOLIC BLOOD PRESSURE: 124 MMHG | HEART RATE: 55 BPM | DIASTOLIC BLOOD PRESSURE: 71 MMHG

## 2022-12-13 DIAGNOSIS — K08.401 EDENTULISM, PARTIAL, CLASS I: Primary | ICD-10-CM

## 2022-12-13 NOTE — PROGRESS NOTES
Preliminary Impression    Joyce Tamayo presents for maxillary and mandibular partial denture preliminary impression  PMH reviewed  ASA 2, pain 0  Pt understands extent of denture fabrication process and consents to tx today  Stock tray fitted with wax and used to take impression with silgamix       Sent to [place] for custom tray fabrication    [place]: final impressions with custom trays

## 2022-12-14 ENCOUNTER — RX CHECK (OUTPATIENT)
Dept: URBAN - METROPOLITAN AREA CLINIC 6 | Facility: CLINIC | Age: 72
End: 2022-12-14

## 2022-12-14 DIAGNOSIS — H52.13: ICD-10-CM

## 2022-12-14 PROCEDURE — 92015 DETERMINE REFRACTIVE STATE: CPT

## 2022-12-14 ASSESSMENT — KERATOMETRY
OD_K2POWER_DIOPTERS: 46.00
OS_K1POWER_DIOPTERS: 44.50
OD_AXISANGLE_DEGREES: 055
OS_K2POWER_DIOPTERS: 45.75
OS_AXISANGLE_DEGREES: 025
OD_K1POWER_DIOPTERS: 43.75
OD_AXISANGLE2_DEGREES: 145
OS_AXISANGLE2_DEGREES: 115

## 2022-12-14 ASSESSMENT — VISUAL ACUITY
OS_SC: 20/30+1
OD_SC: 20/60+1

## 2022-12-19 ENCOUNTER — HOSPITAL ENCOUNTER (OUTPATIENT)
Dept: GASTROENTEROLOGY | Facility: AMBULARY SURGERY CENTER | Age: 72
Setting detail: OUTPATIENT SURGERY
Discharge: HOME | End: 2022-12-19
Attending: INTERNAL MEDICINE

## 2022-12-19 ENCOUNTER — ANESTHESIA EVENT (OUTPATIENT)
Dept: GASTROENTEROLOGY | Facility: AMBULARY SURGERY CENTER | Age: 72
End: 2022-12-19

## 2022-12-19 ENCOUNTER — ANESTHESIA (OUTPATIENT)
Dept: GASTROENTEROLOGY | Facility: AMBULARY SURGERY CENTER | Age: 72
End: 2022-12-19

## 2022-12-19 VITALS
DIASTOLIC BLOOD PRESSURE: 50 MMHG | TEMPERATURE: 97.1 F | BODY MASS INDEX: 36.57 KG/M2 | WEIGHT: 233 LBS | OXYGEN SATURATION: 97 % | RESPIRATION RATE: 22 BRPM | HEIGHT: 67 IN | HEART RATE: 66 BPM | SYSTOLIC BLOOD PRESSURE: 101 MMHG

## 2022-12-19 DIAGNOSIS — Z86.010 HISTORY OF COLON POLYPS: ICD-10-CM

## 2022-12-19 LAB — GLUCOSE SERPL-MCNC: 126 MG/DL (ref 65–140)

## 2022-12-19 RX ORDER — LIDOCAINE HYDROCHLORIDE 10 MG/ML
INJECTION, SOLUTION EPIDURAL; INFILTRATION; INTRACAUDAL; PERINEURAL AS NEEDED
Status: DISCONTINUED | OUTPATIENT
Start: 2022-12-19 | End: 2022-12-19

## 2022-12-19 RX ORDER — PROPOFOL 10 MG/ML
INJECTION, EMULSION INTRAVENOUS AS NEEDED
Status: DISCONTINUED | OUTPATIENT
Start: 2022-12-19 | End: 2022-12-19

## 2022-12-19 RX ORDER — SODIUM CHLORIDE, SODIUM LACTATE, POTASSIUM CHLORIDE, CALCIUM CHLORIDE 600; 310; 30; 20 MG/100ML; MG/100ML; MG/100ML; MG/100ML
INJECTION, SOLUTION INTRAVENOUS CONTINUOUS PRN
Status: DISCONTINUED | OUTPATIENT
Start: 2022-12-19 | End: 2022-12-19

## 2022-12-19 RX ADMIN — PROPOFOL 50 MG: 10 INJECTION, EMULSION INTRAVENOUS at 08:48

## 2022-12-19 RX ADMIN — PROPOFOL 20 MG: 10 INJECTION, EMULSION INTRAVENOUS at 08:51

## 2022-12-19 RX ADMIN — PROPOFOL 20 MG: 10 INJECTION, EMULSION INTRAVENOUS at 08:55

## 2022-12-19 RX ADMIN — PROPOFOL 20 MG: 10 INJECTION, EMULSION INTRAVENOUS at 09:03

## 2022-12-19 RX ADMIN — LIDOCAINE HYDROCHLORIDE 50 MG: 10 INJECTION, SOLUTION EPIDURAL; INFILTRATION; INTRACAUDAL at 08:44

## 2022-12-19 RX ADMIN — PROPOFOL 40 MG: 10 INJECTION, EMULSION INTRAVENOUS at 08:57

## 2022-12-19 RX ADMIN — PROPOFOL 20 MG: 10 INJECTION, EMULSION INTRAVENOUS at 08:59

## 2022-12-19 RX ADMIN — PROPOFOL 150 MG: 10 INJECTION, EMULSION INTRAVENOUS at 08:44

## 2022-12-19 RX ADMIN — SODIUM CHLORIDE, SODIUM LACTATE, POTASSIUM CHLORIDE, AND CALCIUM CHLORIDE: .6; .31; .03; .02 INJECTION, SOLUTION INTRAVENOUS at 07:12

## 2022-12-19 RX ADMIN — PROPOFOL 20 MG: 10 INJECTION, EMULSION INTRAVENOUS at 09:01

## 2022-12-19 RX ADMIN — PROPOFOL 20 MG: 10 INJECTION, EMULSION INTRAVENOUS at 08:53

## 2022-12-19 NOTE — H&P
History and Physical - [facility]  Sadiq Quiñones 67 y o  male MRN: [id number]        HPI: 66 y/o male was referred fr colonoscopy   Regular bowel movements    Historical Information   Past Medical History:   Diagnosis Date   • Anemia    • Arthritis    • Black stool 07/24/2020   • Cancer ([facility] [address] )    • Colon polyp    • Diabetes mellitus (HCC)     IDDM   • Diverticulosis    • Enlarged prostate    • Erectile dysfunction    • Hypercholesteremia     Resolved post weight loss   • Hypertension     Resolved post weight loss   • Kidney stone    • Obesity    • [facility] )    • Wears partial dentures     partial upper and lower     Past Surgical History:   Procedure Laterality Date   • ABDOMINAL ADHESION SURGERY N/A 11/28/2016    Procedure: EXTENSIVE LYSIS ADHESIONS;  Surgeon: Janet Peters MD;  [facility];  Service:    • ANKLE FRACTURE SURGERY Left    • CHOLECYSTECTOMY     • COLON SURGERY      colon resection   • COLONOSCOPY     • COLOSTOMY     • COLOSTOMY CLOSURE     • DIAGNOSTIC LAPAROSCOPY     • GASTRIC BYPASS      failed due to adhesions   • HERNIA REPAIR      abdominal   • UT BIOPSY OF PROSTATE,NEEDLE/PUNCH N/A 5/8/2020    Procedure: TRANSRECTAL NEEDLE BIOPSY PROSTATE (TRNBP) WITH TRANSRECTAL ULTRASOUND GUIDANCE;  Surgeon: Nancy Saldana MD;  Location: [facility];  Service: Urology   • UT CYSTOSCOPY,DIR VIS INT URETHROTOMY N/A 5/8/2020    Procedure: DIRECT VISUAL INTERAL URETHROTOMY (DVIU), dilation of urethral stricture;  Surgeon: Nancy Saldana MD;  [facility];  Service: Urology   • UT CYSTOURETHRO W/IMPLANT N/A 3/8/2019    Procedure: CYSTOSCOPY WITH INSERTION Valentina Couch;  Surgeon: Nancy Saldana MD;  Location: [facility];  Service: Urology   • UT CYSTOURETHRO W/IMPLANT N/A 5/8/2020    Procedure: CYSTOSCOPY WITH INSERTION Valentina Couch;  Surgeon: Nancy Saldana MD;  [facility];  Service: Urology   • UT EDG  N [place] DUODENUM/JEJUNUM N/A 10/25/2018    Procedure: LINEAR ENDOSCOPIC U/S;  Surgeon: Cole Trujillo MD;  Location: [facility]; Service: Gastroenterology   • ND ESOPHAGOGASTRODUODENOSCOPY TRANSORAL DIAGNOSTIC N/A 2016    Procedure: EGD AND COLONOSCOPY;  Surgeon: Lamar Ludwig MD;  Location: [facility]; Service: Gastroenterology   • ND LAP, ANEESH RESTRICT PROC, LONGITUDINAL GASTRECTOMY N/A 2016    Procedure: GASTRECTOMY SLEEVE LAPAROSCOPIC;  Surgeon: Raissa Beckwith MD;  [facility];  Service: Bariatrics   • TONSILLECTOMY     • US GUIDED PROSTATE BIOPSY  2020     Social History   Social History     Substance and Sexual Activity   Alcohol Use Not Currently     Social History     Substance and Sexual Activity   Drug Use Not Currently    Comment: RSO     Social History     Tobacco Use   Smoking Status Former   • Packs/day: 0 25   • Types: Cigarettes   • Quit date: 11/10/2001   • Years since quittin 1   Smokeless Tobacco Former     Family History   Problem Relation Age of Onset   • Heart disease Mother    • Breast cancer Mother 80   • Diabetes Father    • Colon cancer Neg Hx    • Liver disease Neg Hx        Meds/Allergies     (Not in a hospital admission)      Allergies   Allergen Reactions   • Enalapril Anaphylaxis, Throat Swelling and Hives       Objective     Blood pressure 124/57, pulse 58, temperature (!) 97 °F (36 1 °C), temperature source Temporal, resp  rate 18, height 5' 7" (1 702 m), weight 106 kg (233 lb), SpO2 97 %      PHYSICAL EXAM:    Gen: NAD  CV: S1 & S2 normal, RRR  CHEST: Clear to auscultate  ABD: soft, NT/ND, good bowel sounds  EXT: no edema    ASSESSMENT:     Hx colon polyps    PLAN:    Colonoscopy

## 2022-12-19 NOTE — ANESTHESIA POSTPROCEDURE EVALUATION
Post-Op Assessment Note    CV Status:  Stable  Pain Score: 0    Pain management: adequate     Mental Status:  Awake and alert   Hydration Status:  Stable   PONV Controlled:  None   Airway Patency:  Patent and adequate      Post Op Vitals Reviewed: Yes      Staff: CRNA, Anesthesiologist         No notable events documented      BP   102/52   Temp     Pulse  69   Resp   16   SpO2   99%

## 2022-12-19 NOTE — ANESTHESIA PREPROCEDURE EVALUATION
Procedure:  COLONOSCOPY     - denies any chest pain, palpitations, shortness of breath, syncope, lightheadedness, seizures   - denies any recent infectious symptoms such as fevers, chills, cough   - denies taking any anticoagulation medications or any issues with bleeding, bruising, clotting    Relevant Problems   ANESTHESIA (within normal limits)      CARDIO   (+) Benign essential hypertension   (+) Hypercholesteremia      ENDO   (+) Type 2 diabetes mellitus with hyperglycemia, with long-term current use of insulin (HCC)      GI/HEPATIC   (+) GERD (gastroesophageal reflux disease)   (+) IPMN (intraductal papillary mucinous neoplasm)   (+) Pancreatic cyst      /RENAL   (+) Benign enlargement of prostate   (+) Prostate cancer ([facility] [address] )      GYN (within normal limits)      HEMATOLOGY (within normal limits)      MUSCULOSKELETAL   (+) Primary osteoarthritis of one hip, left      NEURO/PSYCH   (+) History of colon polyps      PULMONARY   (+) Mild asthma exacerbation        Physical Exam    Airway    Mallampati score: II         Dental   No notable dental hx     Cardiovascular  Rhythm: regular, Rate: normal, Cardiovascular exam normal    Pulmonary  Pulmonary exam normal Breath sounds clear to auscultation,     Other Findings        Anesthesia Plan  ASA Score- 2     Anesthesia Type- IV sedation with anesthesia with ASA Monitors  Additional Monitors:   Airway Plan:           Plan Factors-Exercise tolerance (METS): >4 METS  Chart reviewed  EKG reviewed  Imaging results reviewed  Existing labs reviewed  Patient summary reviewed  Patient is not a current smoker  Patient did not smoke on day of surgery  Obstructive sleep apnea risk education given perioperatively  Induction- intravenous  Postoperative Plan-     Informed Consent- Anesthetic plan and risks discussed with patient  I personally reviewed this patient with the CRNA  Discussed and agreed on the Anesthesia Plan with the CRNA  Breanna Pierce

## 2022-12-20 DIAGNOSIS — Z79.4 TYPE 2 DIABETES MELLITUS WITH HYPERGLYCEMIA, WITH LONG-TERM CURRENT USE OF INSULIN (HCC): ICD-10-CM

## 2022-12-20 DIAGNOSIS — E11.65 TYPE 2 DIABETES MELLITUS WITH HYPERGLYCEMIA, WITH LONG-TERM CURRENT USE OF INSULIN (HCC): ICD-10-CM

## 2022-12-20 RX ORDER — DULAGLUTIDE 3 MG/.5ML
INJECTION, SOLUTION SUBCUTANEOUS
Qty: 6 ML | Refills: 0 | Status: SHIPPED | OUTPATIENT
Start: 2022-12-20

## 2023-01-03 DIAGNOSIS — K21.9 GASTROESOPHAGEAL REFLUX DISEASE WITHOUT ESOPHAGITIS: Primary | ICD-10-CM

## 2023-01-03 RX ORDER — PANTOPRAZOLE SODIUM 40 MG/1
TABLET, DELAYED RELEASE ORAL
Qty: 30 TABLET | Refills: 0 | Status: SHIPPED | OUTPATIENT
Start: 2023-01-03

## 2023-01-13 ENCOUNTER — APPOINTMENT (OUTPATIENT)
Dept: LAB | Facility: AMBULARY SURGERY CENTER | Age: 73
End: 2023-01-13

## 2023-01-13 ENCOUNTER — OFFICE VISIT (OUTPATIENT)
Dept: UROLOGY | Facility: CLINIC | Age: 73
End: 2023-01-13

## 2023-01-13 VITALS
HEART RATE: 57 BPM | DIASTOLIC BLOOD PRESSURE: 68 MMHG | BODY MASS INDEX: 36.1 KG/M2 | HEIGHT: 67 IN | SYSTOLIC BLOOD PRESSURE: 126 MMHG | OXYGEN SATURATION: 93 % | WEIGHT: 230 LBS

## 2023-01-13 DIAGNOSIS — C61 PROSTATE CANCER (HCC): ICD-10-CM

## 2023-01-13 DIAGNOSIS — C61 PROSTATE CANCER (HCC): Primary | ICD-10-CM

## 2023-01-13 LAB
ALBUMIN SERPL BCP-MCNC: 3.3 G/DL (ref 3.5–5)
ALP SERPL-CCNC: 113 U/L (ref 46–116)
ALT SERPL W P-5'-P-CCNC: 27 U/L (ref 12–78)
ANION GAP SERPL CALCULATED.3IONS-SCNC: 3 MMOL/L (ref 4–13)
AST SERPL W P-5'-P-CCNC: 15 U/L (ref 5–45)
BILIRUB SERPL-MCNC: 0.33 MG/DL (ref 0.2–1)
BUN SERPL-MCNC: 21 MG/DL (ref 5–25)
CALCIUM ALBUM COR SERPL-MCNC: 9.6 MG/DL (ref 8.3–10.1)
CALCIUM SERPL-MCNC: 9 MG/DL (ref 8.3–10.1)
CHLORIDE SERPL-SCNC: 108 MMOL/L (ref 96–108)
CO2 SERPL-SCNC: 27 MMOL/L (ref 21–32)
CREAT SERPL-MCNC: 0.95 MG/DL (ref 0.6–1.3)
EST. AVERAGE GLUCOSE BLD GHB EST-MCNC: 192 MG/DL
GFR SERPL CREATININE-BSD FRML MDRD: 79 ML/MIN/1.73SQ M
GLUCOSE P FAST SERPL-MCNC: 229 MG/DL (ref 65–99)
HBA1C MFR BLD: 8.3 %
POTASSIUM SERPL-SCNC: 4.2 MMOL/L (ref 3.5–5.3)
PROT SERPL-MCNC: 7.1 G/DL (ref 6.4–8.4)
PSA SERPL-MCNC: <0.1 NG/ML (ref 0–4)
SODIUM SERPL-SCNC: 138 MMOL/L (ref 135–147)

## 2023-01-13 NOTE — PROGRESS NOTES
UROLOGY PROGRESS NOTE   Patient Identifiers: Marleni Delarosa (MRN [id number])  Date of Service: 1/13/2023    Subjective:   77-year-old man history of [place] 9 prostate cancer  He had a redo UroLift in May 2020 and was treated with radiation and completed ADT in July  Is a history of bulbar urethral stricture which was dilated last September  PSA remains<0 1  He complains having to push to urinate  He also has fairly significant incontinence  No burning or hematuria  Reason for visit: Prostate cancer follow-up    Objective:     VITALS:    There were no vitals filed for this visit          LABS:  Lab Results   Component Value Date    HGB 14 0 10/05/2022    HCT 42 6 10/05/2022    WBC 9 89 10/05/2022     10/05/2022   ]    Lab Results   Component Value Date     01/07/2016    K 4 9 10/05/2022     10/05/2022    CO2 28 10/05/2022    BUN 15 10/05/2022    CREATININE 0 89 10/05/2022    CALCIUM 10 1 10/05/2022    GLUCOSE 198 (H) 12/14/2020   ]        INPATIENT MEDS:    Current Outpatient Medications:   •  acetaminophen (TYLENOL) 325 mg tablet, Take 2 tablets (650 mg total) by mouth every 4 (four) hours as needed for mild pain, headaches or fever, Disp: , Rfl: 0  •  albuterol (ProAir HFA) 90 mcg/act inhaler, Inhale 2 puffs every 6 (six) hours as needed for wheezing or shortness of breath, Disp: 8 5 g, Rfl: 0  •  aluminum-magnesium hydroxide-simethicone (MYLANTA) 200-200-20 mg/5 mL suspension, Take 30 mL by mouth every 6 (six) hours as needed for indigestion or heartburn, Disp: 355 mL, Rfl: 0  •  atorvastatin (LIPITOR) 10 mg tablet, Take 1 tablet (10 mg total) by mouth daily, Disp: 100 tablet, Rfl: 3  •  Biotin 1000 MCG tablet, Take 1 tablet by mouth daily in the early morning , Disp: , Rfl:   •  Blood Glucose Monitoring Suppl (GLUCOCARD VITAL MONITOR) w/Device KIT, by Does not apply route 2 (two) times a day, Disp: 1 kit, Rfl: 0  •  Cholecalciferol (VITAMIN D3) 2000 units capsule, Take 1,000 Units by mouth daily in the early morning , Disp: , Rfl:   •  Cyanocobalamin (VITAMIN B-12) 5000 MCG TBDP, Take 5,000 mcg by mouth once a week Takes on Mondays, Disp: , Rfl:   •  docusate sodium (COLACE) 100 mg capsule, Take 100 mg by mouth daily as needed for constipation (Patient not taking: No sig reported), Disp: , Rfl:   •  fluticasone (FLONASE) 50 mcg/act nasal spray, 2 sprays into each nostril daily (Patient taking differently: 2 sprays into each nostril if needed), Disp: 16 g, Rfl: 0  •  fluticasone (Flovent HFA) 110 MCG/ACT inhaler, Inhale 2 puffs 2 (two) times a day Rinse mouth after use  (Patient taking differently: Inhale 2 puffs if needed Rinse mouth after use ), Disp: 12 g, Rfl: 0  •  glipiZIDE (GLUCOTROL XL) 5 mg 24 hr tablet, TAKE ONE TABLET BY MOUTH EVERY DAY AFTER BREAKFAST, Disp: 90 tablet, Rfl: 3  •  insulin aspart protamine-insulin aspart (NovoLOG 70/30) 100 Units/mL injection pen, Inject 20 Units under the skin 2 (two) times a day with meals, Disp: 36 mL, Rfl: 0  •  Lancets (LIFESCAN UNISTIK 2) MISC, Lifescan One Touch Ultramini Meter; use as directed; 1; 0; 31-Oct-2016; 31-Oct-2016; Melida Duran; Active, Disp: , Rfl:   •  losartan (COZAAR) 25 mg tablet, Take 1 tablet (25 mg total) by mouth daily, Disp: 100 tablet, Rfl: 3  •  Multiple Vitamin (MULTIVITAMIN) capsule, Take 1 capsule by mouth daily in the early morning , Disp: , Rfl:   •  pantoprazole (PROTONIX) 40 mg tablet, TAKE ONE TABLET BY MOUTH EVERY DAY, Disp: 30 tablet, Rfl: 0  •  Trulicity 3 AJ/9 4VB SOPN, INJECT 0 5ML (3MG) UNDER THE SKIN ONCE A WEEK, Disp: 6 mL, Rfl: 0      Physical Exam:   There were no vitals taken for this visit  GEN: no acute distress    RESP: breathing comfortably with no accessory muscle use    ABD: soft, non-tender, non-distended   INCISION:    EXT: no significant peripheral edema         RADIOLOGY:   None    Assessment:   #1  Prostate cancer  #2    Urethral stricture    Plan:   -Schedule him for cystoscopy with dilation in the office  -Check PSA now  -  -

## 2023-01-14 LAB
CREAT UR-MCNC: 130 MG/DL
MICROALBUMIN UR-MCNC: 83.6 MG/L (ref 0–20)
MICROALBUMIN/CREAT 24H UR: 64 MG/G CREATININE (ref 0–30)

## 2023-01-18 ENCOUNTER — PROCEDURE VISIT (OUTPATIENT)
Dept: UROLOGY | Facility: CLINIC | Age: 73
End: 2023-01-18

## 2023-01-18 VITALS
HEART RATE: 59 BPM | HEIGHT: 67 IN | SYSTOLIC BLOOD PRESSURE: 140 MMHG | BODY MASS INDEX: 36.73 KG/M2 | OXYGEN SATURATION: 97 % | DIASTOLIC BLOOD PRESSURE: 70 MMHG | WEIGHT: 234 LBS

## 2023-01-18 DIAGNOSIS — N39.41 URGE INCONTINENCE OF URINE: Primary | ICD-10-CM

## 2023-01-18 RX ORDER — CIPROFLOXACIN 500 MG/1
500 TABLET, FILM COATED ORAL EVERY 12 HOURS SCHEDULED
Qty: 6 TABLET | Refills: 0 | Status: SHIPPED | OUTPATIENT
Start: 2023-01-18 | End: 2023-01-19

## 2023-01-18 NOTE — PROGRESS NOTES
Cystoscopy     Date/Time 1/18/2023 10:00 AM     Performed by  Gabino Palacios PA-C     Authorized by Gabino Palacios PA-C      Universal Protocol:  Timeout called at: 1/18/2023 10:00 AM   Patient understanding: patient states understanding of the procedure being performed  Patient identity confirmed: verbally with patient        Procedure Details:  Procedure type: cystoscopy    Additional Procedure Details: 66-year-old man history of [place] 9 prostate cancer  He had a redo UroLift in May 2020 and was treated with radiation and completed ADT in July  Is a history of bulbar urethral stricture which was dilated last September  PSA remains<0 1  He complains having to push to urinate  He also has fairly significant incontinence  No burning or hematuria  The penis is prepped and draped in the usual fashion  2% lidocaine used for local anesthetic  Mild meatal stenosis was encountered and easily dilated  Flexible cystoscope was passed per the meatus  The pendulous urethra showed mild concentric strictures down to the bulbous urethra where a more concentric strictures were encountered  These were gently dilated with the cystoscope without difficulty  The prostate was open post UroLift  The mucosa was pale  Upon entering the bladder and after adequate filling inspection the mucosa revealed some saccules and trabeculation  There was mild bullous reaction at the bladder neck seen at 10:00  This was reviewed by Dr Jelani Thornton as well  The scope was removed at the end of the procedure  Patient was sent prophylactic antibiotics  I put him back on Myrbetriq 50 mg and will see him in the office in 3 months  Check PVR at that time  We discussed the need for urodynamic testing and possible bladder Botox if he is not satisfied when using Myrbetriq's

## 2023-01-19 ENCOUNTER — HOSPITAL ENCOUNTER (EMERGENCY)
Facility: HOSPITAL | Age: 73
Discharge: HOME | End: 2023-01-19
Attending: EMERGENCY MEDICINE

## 2023-01-19 ENCOUNTER — APPOINTMENT (EMERGENCY)
Dept: CT IMAGING | Facility: HOSPITAL | Age: 73
End: 2023-01-19

## 2023-01-19 VITALS
DIASTOLIC BLOOD PRESSURE: 65 MMHG | OXYGEN SATURATION: 95 % | HEART RATE: 59 BPM | SYSTOLIC BLOOD PRESSURE: 137 MMHG | TEMPERATURE: 98.6 F | RESPIRATION RATE: 18 BRPM

## 2023-01-19 DIAGNOSIS — R10.9 ABDOMINAL PAIN: ICD-10-CM

## 2023-01-19 DIAGNOSIS — K52.9 ACUTE COLITIS: Primary | ICD-10-CM

## 2023-01-19 LAB
ALBUMIN SERPL BCP-MCNC: 3.9 G/DL (ref 3.5–5)
ALP SERPL-CCNC: 90 U/L (ref 34–104)
ALT SERPL W P-5'-P-CCNC: 19 U/L (ref 7–52)
ANION GAP SERPL CALCULATED.3IONS-SCNC: 6 MMOL/L (ref 4–13)
AST SERPL W P-5'-P-CCNC: 22 U/L (ref 13–39)
ATRIAL RATE: 68 BPM
BASOPHILS # BLD AUTO: 0.03 THOUSANDS/ÂΜL (ref 0–0.1)
BASOPHILS NFR BLD AUTO: 0 % (ref 0–1)
BILIRUB SERPL-MCNC: 0.55 MG/DL (ref 0.2–1)
BUN SERPL-MCNC: 19 MG/DL (ref 5–25)
CALCIUM SERPL-MCNC: 9.2 MG/DL (ref 8.4–10.2)
CARDIAC TROPONIN I PNL SERPL HS: 3 NG/L (ref 8–18)
CHLORIDE SERPL-SCNC: 103 MMOL/L (ref 96–108)
CO2 SERPL-SCNC: 27 MMOL/L (ref 21–32)
CREAT SERPL-MCNC: 0.93 MG/DL (ref 0.6–1.3)
EOSINOPHIL # BLD AUTO: 0.09 THOUSAND/ÂΜL (ref 0–0.61)
EOSINOPHIL NFR BLD AUTO: 1 % (ref 0–6)
ERYTHROCYTE [DISTWIDTH] IN BLOOD BY AUTOMATED COUNT: 13 % (ref 11.6–15.1)
GFR SERPL CREATININE-BSD FRML MDRD: 81 ML/MIN/1.73SQ M
GLUCOSE SERPL-MCNC: 261 MG/DL (ref 65–140)
HCT VFR BLD AUTO: 40.9 % (ref 36.5–49.3)
HGB BLD-MCNC: 13.4 G/DL (ref 12–17)
IMM GRANULOCYTES # BLD AUTO: 0.06 THOUSAND/UL (ref 0–0.2)
IMM GRANULOCYTES NFR BLD AUTO: 1 % (ref 0–2)
LIPASE SERPL-CCNC: 57 U/L (ref 11–82)
LYMPHOCYTES # BLD AUTO: 0.5 THOUSANDS/ÂΜL (ref 0.6–4.47)
LYMPHOCYTES NFR BLD AUTO: 6 % (ref 14–44)
MCH RBC QN AUTO: 29.6 PG (ref 26.8–34.3)
MCHC RBC AUTO-ENTMCNC: 32.8 G/DL (ref 31.4–37.4)
MCV RBC AUTO: 91 FL (ref 82–98)
MONOCYTES # BLD AUTO: 0.61 THOUSAND/ÂΜL (ref 0.17–1.22)
MONOCYTES NFR BLD AUTO: 7 % (ref 4–12)
NEUTROPHILS # BLD AUTO: 7.46 THOUSANDS/ÂΜL (ref 1.85–7.62)
NEUTS SEG NFR BLD AUTO: 85 % (ref 43–75)
NRBC BLD AUTO-RTO: 0 /100 WBCS
P AXIS: 57 DEGREES
PLATELET # BLD AUTO: 145 THOUSANDS/UL (ref 149–390)
PMV BLD AUTO: 11.8 FL (ref 8.9–12.7)
POTASSIUM SERPL-SCNC: 4.3 MMOL/L (ref 3.5–5.3)
PR INTERVAL: 166 MS
PROT SERPL-MCNC: 7 G/DL (ref 6.4–8.4)
QRS AXIS: 65 DEGREES
QRSD INTERVAL: 88 MS
QT INTERVAL: 414 MS
QTC INTERVAL: 440 MS
RBC # BLD AUTO: 4.52 MILLION/UL (ref 3.88–5.62)
SODIUM SERPL-SCNC: 136 MMOL/L (ref 135–147)
T WAVE AXIS: 67 DEGREES
VENTRICULAR RATE: 68 BPM
WBC # BLD AUTO: 8.75 THOUSAND/UL (ref 4.31–10.16)

## 2023-01-19 RX ORDER — ONDANSETRON 2 MG/ML
4 INJECTION INTRAMUSCULAR; INTRAVENOUS ONCE
Status: COMPLETED | OUTPATIENT
Start: 2023-01-19 | End: 2023-01-19

## 2023-01-19 RX ORDER — MORPHINE SULFATE 10 MG/ML
6 INJECTION, SOLUTION INTRAMUSCULAR; INTRAVENOUS ONCE
Status: COMPLETED | OUTPATIENT
Start: 2023-01-19 | End: 2023-01-19

## 2023-01-19 RX ORDER — CIPROFLOXACIN 500 MG/1
500 TABLET, FILM COATED ORAL 2 TIMES DAILY
Qty: 14 TABLET | Refills: 0 | Status: SHIPPED | OUTPATIENT
Start: 2023-01-19 | End: 2023-01-22

## 2023-01-19 RX ORDER — LORAZEPAM 2 MG/ML
2 INJECTION INTRAMUSCULAR ONCE
Status: COMPLETED | OUTPATIENT
Start: 2023-01-19 | End: 2023-01-19

## 2023-01-19 RX ORDER — METRONIDAZOLE 500 MG/1
500 TABLET ORAL ONCE
Status: COMPLETED | OUTPATIENT
Start: 2023-01-19 | End: 2023-01-19

## 2023-01-19 RX ORDER — METRONIDAZOLE 500 MG/1
500 TABLET ORAL 3 TIMES DAILY
Qty: 21 TABLET | Refills: 0 | Status: SHIPPED | OUTPATIENT
Start: 2023-01-19 | End: 2023-01-22

## 2023-01-19 RX ADMIN — IOHEXOL 100 ML: 350 INJECTION, SOLUTION INTRAVENOUS at 10:12

## 2023-01-19 RX ADMIN — MORPHINE SULFATE 6 MG: 10 INJECTION INTRAVENOUS at 08:49

## 2023-01-19 RX ADMIN — ONDANSETRON 4 MG: 2 INJECTION INTRAMUSCULAR; INTRAVENOUS at 13:08

## 2023-01-19 RX ADMIN — LORAZEPAM 2 MG: 2 INJECTION INTRAMUSCULAR; INTRAVENOUS at 09:47

## 2023-01-19 RX ADMIN — ONDANSETRON 4 MG: 2 INJECTION INTRAMUSCULAR; INTRAVENOUS at 08:49

## 2023-01-19 RX ADMIN — METRONIDAZOLE 500 MG: 500 TABLET ORAL at 13:08

## 2023-01-19 NOTE — ED ATTENDING ATTESTATION
1/19/2023  I, Miguel Chi MD, saw and evaluated the patient  I have discussed the patient with the resident/non-physician practitioner and agree with the resident's/non-physician practitioner's findings, Plan of Care, and MDM as documented in the resident's/non-physician practitioner's note, except where noted  All available labs and Radiology studies were reviewed  I was present for key portions of any procedure(s) performed by the resident/non-physician practitioner and I was immediately available to provide assistance  At this point I agree with the current assessment done in the Emergency Department  I have conducted an independent evaluation of this patient a history and physical is as follows:    S:  Chief Complaint   Patient presents with   • Abdominal Pain     Pt c/o left sided abdominal pain, back pain, and bloating that started last night  Pt states he's been vomiting all night  Diarrhea yesterday as well  Pt reports having bladder procedure yesterday due prostate issues  Took mylanta last night with relief  Jimena Huerta is a 67 y o  male who presents with the chief complaint of abdominal pain  Onset was 2 weeks ago  Location is the left side of the abdomen and includes the flank  Duration is consistent over the past 2 weeks, worsening  The character of the pain is sharp, crampy  No alleviating or aggravating factors  Pain does radiate up into the lower portion of the left chest   Last night he developed associated symptoms of nausea/vomiting and diarrhea  No fevers  He has a history of prostate cancer  O:  ED Triage Vitals [01/19/23 0812]   Temperature Pulse Respirations Blood Pressure SpO2   98 6 °F (37 °C) 62 16 145/70 95 %      Temp Source Heart Rate Source Patient Position - Orthostatic VS BP Location FiO2 (%)   Oral Monitor Lying Right arm --      Pain Score       8         Physical Exam  Vitals and nursing note reviewed     Constitutional:       General: He is in acute distress  Appearance: He is well-developed  He is obese  HENT:      Head: Normocephalic and atraumatic  Eyes:      Pupils: Pupils are equal, round, and reactive to light  Neck:      Vascular: No JVD  Cardiovascular:      Rate and Rhythm: Normal rate and regular rhythm  Heart sounds: Normal heart sounds  No murmur heard  No friction rub  No gallop  Pulmonary:      Effort: Pulmonary effort is normal  No respiratory distress  Breath sounds: Normal breath sounds  No wheezing or rales  Chest:      Chest wall: No tenderness  Abdominal:      Tenderness: There is abdominal tenderness in the left upper quadrant and left lower quadrant  Musculoskeletal:         General: No tenderness  Normal range of motion  Cervical back: Normal range of motion  Skin:     General: Skin is warm and dry  Neurological:      General: No focal deficit present  Mental Status: He is alert and oriented to person, place, and time  Psychiatric:         Behavior: Behavior normal          Thought Content: Thought content normal          Judgment: Judgment normal        A/P:  Background: 67 y o  male presents with chief complaint of left sided abdominal pain  Differential:  diverticulitis, mesenteric adenitis, cystitis, pyelonephritis, ureterolithiasis, constipation, colitis, gastric ulcer, mesenteric ischemia, perforated viscous, non specific abdominal pain, possible metastatic disease    Plan: cbc, cmp, lipase, urinalysis, ct scan, symptom control       ED Course  ED Course as of 01/22/23 2314   Thu Jan 19, 2023   1259 Patient tolerated PO intake, abdominal exam is benign  He is already on ciprofloxacin following a cystoscopy yesterday  We will add metronidazole  Patient to be discharged with RTER precautions         Labs Reviewed   CBC AND DIFFERENTIAL - Abnormal       Result Value Ref Range Status    WBC 8 75  4 31 - 10 16 Thousand/uL Final    RBC 4 52  3 88 - 5 62 Million/uL Final    Hemoglobin 13 4  12 0 - 17 0 g/dL Final    Hematocrit 40 9  36 5 - 49 3 % Final    MCV 91  82 - 98 fL Final    MCH 29 6  26 8 - 34 3 pg Final    MCHC 32 8  31 4 - 37 4 g/dL Final    RDW 13 0  11 6 - 15 1 % Final    MPV 11 8  8 9 - 12 7 fL Final    Platelets 771 (*) 439 - 390 Thousands/uL Final    nRBC 0  /100 WBCs Final    Neutrophils Relative 85 (*) 43 - 75 % Final    Immat GRANS % 1  0 - 2 % Final    Lymphocytes Relative 6 (*) 14 - 44 % Final    Monocytes Relative 7  4 - 12 % Final    Eosinophils Relative 1  0 - 6 % Final    Basophils Relative 0  0 - 1 % Final    Neutrophils Absolute 7 46  1 85 - 7 62 Thousands/µL Final    Immature Grans Absolute 0 06  0 00 - 0 20 Thousand/uL Final    Lymphocytes Absolute 0 50 (*) 0 60 - 4 47 Thousands/µL Final    Monocytes Absolute 0 61  0 17 - 1 22 Thousand/µL Final    Eosinophils Absolute 0 09  0 00 - 0 61 Thousand/µL Final    Basophils Absolute 0 03  0 00 - 0 10 Thousands/µL Final   COMPREHENSIVE METABOLIC PANEL - Abnormal    Sodium 136  135 - 147 mmol/L Final    Potassium 4 3  3 5 - 5 3 mmol/L Final    Chloride 103  96 - 108 mmol/L Final    CO2 27  21 - 32 mmol/L Final    ANION GAP 6  4 - 13 mmol/L Final    BUN 19  5 - 25 mg/dL Final    Creatinine 0 93  0 60 - 1 30 mg/dL Final    Comment: Standardized to IDMS reference method    Glucose 261 (*) 65 - 140 mg/dL Final    Comment: If the patient is fasting, the ADA then defines impaired fasting glucose as > 100 mg/dL and diabetes as > or equal to 123 mg/dL  Specimen collection should occur prior to Sulfasalazine administration due to the potential for falsely depressed results  Specimen collection should occur prior to Sulfapyridine administration due to the potential for falsely elevated results  Calcium 9 2  8 4 - 10 2 mg/dL Final    AST 22  13 - 39 U/L Final    Comment: Specimen collection should occur prior to Sulfasalazine administration due to the potential for falsely depressed results       ALT 19  7 - 52 U/L Final    Comment: Specimen collection should occur prior to Sulfasalazine administration due to the potential for falsely depressed results  Alkaline Phosphatase 90  34 - 104 U/L Final    Total Protein 7 0  6 4 - 8 4 g/dL Final    Albumin 3 9  3 5 - 5 0 g/dL Final    Total Bilirubin 0 55  0 20 - 1 00 mg/dL Final    eGFR 81  ml/min/1 73sq m Final    Narrative:     [place] guidelines for Chronic Kidney Disease (CKD):   •  Stage 1 with normal or high GFR (GFR > 90 mL/min/1 73 square meters)  •  Stage 2 Mild CKD (GFR = 60-89 mL/min/1 73 square meters)  •  Stage 3A Moderate CKD (GFR = 45-59 mL/min/1 73 square meters)  •  Stage 3B Moderate CKD (GFR = 30-44 mL/min/1 73 square meters)  •  Stage 4 Severe CKD (GFR = 15-29 mL/min/1 73 square meters)  •  Stage 5 End Stage CKD (GFR <15 mL/min/1 73 square meters)  Note: GFR calculation is accurate only with a steady state creatinine   HIGH SENSITIVITY TROPONIN I RANDOM - Abnormal    HS TnI random 3 (*) 8 - 18 ng/L Final    Comment:                                              Initial (time 0) result  If >=50 ng/L, Myocardial injury suggested ;  Type of myocardial injury and treatment strategy  to be determined  If 5-49 ng/L, a delta result at 2 hours and or 4 hours will be needed to further evaluate  If <4 ng/L, and chest pain has been >3 hours since onset, patient may qualify for discharge based on the HEART score in the ED  If <5 ng/L and <3hours since onset of chest pain, a delta result at 2 hours will be needed to further evaluate  HS Troponin 99th Percentile URL of a Health Population=12 ng/L with a 95% Confidence Interval of 8-18 ng/L  Second Troponin (time 2 hours)  If calculated delta >= 20 ng/L,  Myocardial injury suggested ; Type of myocardial injury and treatment strategy to be determined  If 5-49 ng/L and the calculated delta is 5-19 ng/L, consult medical service for evaluation    Continue evaluation for ischemia on ecg and other possible etiology and repeat hs troponin at 4 hours  If delta is <5 ng/L at 2 hours, consider discharge based on risk stratification via the HEART score (if in ED), or RAFAEL risk score in IP/Observation  HS Troponin 99th Percentile URL of a Health Population=12 ng/L with a 95% Confidence Interval of 8-18 ng/L  LIPASE - Normal    Lipase 57  11 - 82 u/L Final     CT abdomen pelvis with contrast   Final Result   Addendum (preliminary) 1 of 1   ADDENDUM:      There is a voice recognition and involving impression #2  Impression #2    should read as follows:      2  Small Wan-type hernia involving a loop of small bowel within the    right mid abdomen  No CT evidence of associated COMPLICATION  Final   1  Short segment of circumferential wall thickening involving the sigmoid colon  This is favored to represent pseudothickening secondary to nondistention, however, a mild nonspecific colitis may have a similar appearance  There is diverticulosis    without evidence of focal inflammatory changes to suggest acute diverticulitis  2   Small Wan-type hernia involving a loop of small bowel within the right mid abdomen  No CT evidence of associated consultation  3   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 small hiatal hernia  4   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  Mildly complex exophytic left renal cyst also is stable  The study was marked in [facility] for immediate notification        Workstation performed: [id number]           Medications   morphine injection 6 mg (6 mg Intravenous Given 1/19/23 0849)   ondansetron (ZOFRAN) injection 4 mg (4 mg Intravenous Given 1/19/23 0849)   LORazepam (ATIVAN) injection 2 mg (2 mg Intravenous Given 1/19/23 0947)   iohexol (OMNIPAQUE) 350 MG/ML injection (SINGLE-DOSE) 100 mL (100 mL Intravenous Given 1/19/23 1012)   metroNIDAZOLE (FLAGYL) tablet 500 mg (500 mg Oral Given 1/19/23 1308)   ondansetron (ZOFRAN) injection 4 mg (4 mg Intravenous Given 1/19/23 1308)         Critical Care Time  ECG 12 Lead Documentation Only    Date/Time: 1/19/2023 9:00 AM  Performed by: Wilfrido Ocampo MD  Authorized by: Wilfrido Ocampo MD     Indications / Diagnosis:  Abdominal pain radiating into chest  ECG reviewed by me, the ED Provider: yes    Patient location:  ED  Previous ECG:     Previous ECG:  Compared to current    Comparison ECG info:  10/05/2022    Similarity:  No change  Interpretation:     Interpretation: normal    Rate:     ECG rate:  75    ECG rate assessment: normal    Rhythm:     Rhythm: sinus rhythm    Ectopy:     Ectopy: none    QRS:     QRS axis:  Normal    QRS intervals:  Normal  Conduction:     Conduction: normal    ST segments:     ST segments:  Normal  T waves:     T waves: normal          Time reflects when diagnosis was documented in both MDM as applicable and the Disposition within this note     Time User Action Codes Description Comment    1/19/2023  1:12 PM Tressia Fortune Add [K52 9] Acute colitis     1/19/2023  1:12 PM Tressia Fortune Add [R10 9] Abdominal pain       ED Disposition     ED Disposition   Discharge    Condition   Stable    Date/Time   Thu Jan 19, 2023  1:12 PM    Comment   Sadiq Quiñones discharge to home/self care                 Follow-up Information     Follow up With Specialties Details Why Contact Info    Ministerio Jameson MD Gastroenterology Schedule an appointment as soon as possible for a visit   [facility] [address]  [phone]

## 2023-01-19 NOTE — ED PROVIDER NOTES
History  Chief Complaint   Patient presents with   • Abdominal Pain     Pt c/o left sided abdominal pain, back pain, and bloating that started last night  Pt states he's been vomiting all night  Diarrhea yesterday as well  Pt reports having bladder procedure yesterday due prostate issues  Took mylanta last night with relief  Martin Preston is a 67year old male with past medical hx of [place] 9 prostate CA s/p radiation therapy completed last year, diverticulitis, and remote hx of bowel perforation 25 yrs ago presenting with 2 weeks of LUQ/LLQ abdominal pain, intermittent, 8/10, and progressively worsening  He also complains of left lower back pain radiating across the back and towards the left flank, as well as pain radiating towards the left chest  He states that yesterday he had nausea and vomiting after he tried to eat food  He also had an episode of watery diarrhea and chills  He took some Mylanta at home and his symptoms were mildly relieved  He denies any melena, hematochezia, hematemesis, fever, shortness of breath, and hematuria  Patient states that yesterday he had a cystoscopy tolerated the procedure well  Prior to Admission Medications   Prescriptions Last Dose Informant Patient Reported? Taking? Biotin 1000 MCG tablet   Yes No   Sig: Take 1 tablet by mouth daily in the early morning    Blood Glucose Monitoring Suppl (GLUCOCARD VITAL MONITOR) w/Device KIT   No No   Sig: by Does not apply route 2 (two) times a day   Cholecalciferol (VITAMIN D3) 2000 units capsule   Yes No   Sig: Take 1,000 Units by mouth daily in the early morning    Cyanocobalamin (VITAMIN B-12) 5000 MCG TBDP   Yes No   Sig: Take 5,000 mcg by mouth once a week Takes on Mondays   Lancets (LIFESCAN UNISTIK 2) MISC   Yes No   Sig: Lifescan One Touch Ultramini Meter; use as directed; 1; 0; 31-Oct-2016; 31-Oct-2016; Melida Duran;  Active   Mirabegron ER 50 MG TB24   No No   Sig: Take 1 tablet (50 mg total) by mouth daily at bedtime   Multiple Vitamin (MULTIVITAMIN) capsule   Yes No   Sig: Take 1 capsule by mouth daily in the early morning    Trulicity 3 KZ/6 3EQ SOPN   No No   Sig: INJECT 0 5ML (3MG) UNDER THE SKIN ONCE A WEEK   acetaminophen (TYLENOL) 325 mg tablet   No No   Sig: Take 2 tablets (650 mg total) by mouth every 4 (four) hours as needed for mild pain, headaches or fever   albuterol (ProAir HFA) 90 mcg/act inhaler   No No   Sig: Inhale 2 puffs every 6 (six) hours as needed for wheezing or shortness of breath   aluminum-magnesium hydroxide-simethicone (MYLANTA) 200-200-20 mg/5 mL suspension   No No   Sig: Take 30 mL by mouth every 6 (six) hours as needed for indigestion or heartburn   atorvastatin (LIPITOR) 10 mg tablet   No No   Sig: Take 1 tablet (10 mg total) by mouth daily   docusate sodium (COLACE) 100 mg capsule   Yes No   Sig: Take 100 mg by mouth daily as needed for constipation   fluticasone (FLONASE) 50 mcg/act nasal spray   No No   Si sprays into each nostril daily   Patient taking differently: 2 sprays into each nostril if needed   fluticasone (Flovent HFA) 110 MCG/ACT inhaler   No No   Sig: Inhale 2 puffs 2 (two) times a day Rinse mouth after use  Patient taking differently: Inhale 2 puffs if needed Rinse mouth after use     glipiZIDE (GLUCOTROL XL) 5 mg 24 hr tablet   No No   Sig: TAKE ONE TABLET BY MOUTH EVERY DAY AFTER BREAKFAST   insulin aspart protamine-insulin aspart (NovoLOG 70/30) 100 Units/mL injection pen   No No   Sig: Inject 20 Units under the skin 2 (two) times a day with meals   losartan (COZAAR) 25 mg tablet   No No   Sig: Take 1 tablet (25 mg total) by mouth daily   pantoprazole (PROTONIX) 40 mg tablet   No No   Sig: TAKE ONE TABLET BY MOUTH EVERY DAY      Facility-Administered Medications: None       Past Medical History:   Diagnosis Date   • Anemia    • Arthritis    • Black stool 2020   • Cancer [facility])    • Colon polyp    • Diabetes mellitus (HCC)     IDDM   • Diverticulosis    • Enlarged prostate    • Erectile dysfunction    • Hypercholesteremia     Resolved post weight loss   • Hypertension     Resolved post weight loss   • Kidney stone    • Obesity    • Prostate cancer ([address] )    • Wears partial dentures     partial upper and lower       Past Surgical History:   Procedure Laterality Date   • ABDOMINAL ADHESION SURGERY N/A 11/28/2016    Procedure: EXTENSIVE LYSIS ADHESIONS;  Surgeon: Otis Mantilla MD;  [facility];  Service:    • ANKLE FRACTURE SURGERY Left    • CHOLECYSTECTOMY     • COLON SURGERY      colon resection   • COLONOSCOPY     • COLOSTOMY     • COLOSTOMY CLOSURE     • DIAGNOSTIC LAPAROSCOPY     • GASTRIC BYPASS      failed due to adhesions   • HERNIA REPAIR      abdominal   • OK CYSTO INSERTION TRANSPROSTATIC IMPLANT SINGLE N/A 3/8/2019    Procedure: CYSTOSCOPY WITH INSERTION UROLIFT;  Surgeon: Kathryn Vela MD;  [facility];  Service: Urology   • OK CYSTO INSERTION TRANSPROSTATIC IMPLANT SINGLE N/A 5/8/2020    Procedure: CYSTOSCOPY WITH INSERTION Martene Locket;  Surgeon: Kathryn Vela MD;  [facility];  Service: Urology   • OK CYSTOURETHROSCOPY W/INTERNAL URETHROTOMY N/A 5/8/2020    Procedure: DIRECT VISUAL INTERAL URETHROTOMY (DVIU), dilation of urethral stricture;  Surgeon: Kathryn Vela MD;  [facility];  Service: Urology   • OK EDG US EXAM SURGICAL ALTER STOM DUODENUM/JEJUNUM N/A 10/25/2018    Procedure: LINEAR ENDOSCOPIC U/S;  Surgeon: Antonietta Curling, MD;  [facility]; Service: Gastroenterology   • OK ESOPHAGOGASTRODUODENOSCOPY TRANSORAL DIAGNOSTIC N/A 7/6/2016    Procedure: EGD AND COLONOSCOPY;  Surgeon: Gama Solano MD;  [facility];   Service: Gastroenterology   • OK LAPS [address] [place] [address] LONGITUDINAL GASTRECTOMY N/A 11/28/2016    Procedure: GASTRECTOMY SLEEVE LAPAROSCOPIC;  Surgeon: Otis Mantilla MD;  [facility];  Service: Bariatrics   • OK PROSTATE NEEDLE BIOPSY ANY APPROACH N/A 5/8/2020 Procedure: TRANSRECTAL NEEDLE BIOPSY PROSTATE (TRNBP) WITH TRANSRECTAL ULTRASOUND GUIDANCE;  Surgeon: Harika Lyles MD;  [facility];  Service: Urology   • TONSILLECTOMY     • US GUIDED PROSTATE BIOPSY  2020       Family History   Problem Relation Age of Onset   • Heart disease Mother    • Breast cancer Mother 80   • Diabetes Father    • Colon cancer Neg Hx    • Liver disease Neg Hx      I have reviewed and agree with the history as documented  E-Cigarette/Vaping   • E-Cigarette Use Never User      E-Cigarette/Vaping Substances   • Nicotine No    • THC No    • CBD No    • Flavoring No    • Other No    • Unknown No      Social History     Tobacco Use   • Smoking status: Former     Packs/day: 0 25     Types: Cigarettes     Quit date: 11/10/2001     Years since quittin 2   • Smokeless tobacco: Former   Vaping Use   • Vaping Use: Never used   Substance Use Topics   • Alcohol use: Not Currently   • Drug use: Not Currently     Comment: RSO        Review of Systems   Constitutional: Positive for chills  Negative for diaphoresis, fatigue and fever  Eyes: Negative for visual disturbance  Respiratory: Negative for cough, chest tightness, shortness of breath and wheezing  Cardiovascular: Positive for chest pain (left sided)  Negative for palpitations and leg swelling  Gastrointestinal: Positive for abdominal pain, constipation, diarrhea, nausea and vomiting  Genitourinary: Negative for difficulty urinating, dysuria and hematuria  Musculoskeletal: Positive for back pain  Neurological: Negative for dizziness, syncope, light-headedness, numbness and headaches  Psychiatric/Behavioral: Negative for confusion         Physical Exam  ED Triage Vitals [23 0812]   Temperature Pulse Respirations Blood Pressure SpO2   98 6 °F (37 °C) 62 16 145/70 95 %      Temp Source Heart Rate Source Patient Position - Orthostatic VS BP Location FiO2 (%)   Oral Monitor Lying Right arm --      Pain Score       8             Orthostatic Vital Signs  Vitals:    01/19/23 0947 01/19/23 1000 01/19/23 1130 01/19/23 1145   BP: 162/74 137/65     Pulse: 62 62 58 59   Patient Position - Orthostatic VS: Lying Lying         Physical Exam  Constitutional:       General: He is not in acute distress  Appearance: He is well-developed  He is obese  He is not ill-appearing  HENT:      Head: Normocephalic and atraumatic  Mouth/Throat:      Mouth: Mucous membranes are moist    Eyes:      General: No scleral icterus  Extraocular Movements: Extraocular movements intact  Pupils: Pupils are equal, round, and reactive to light  Cardiovascular:      Rate and Rhythm: Normal rate and regular rhythm  Pulses: Normal pulses  Heart sounds: Normal heart sounds  No murmur heard  No friction rub  No gallop  Pulmonary:      Effort: Pulmonary effort is normal  No respiratory distress  Breath sounds: Normal breath sounds  No wheezing or rales  Abdominal:      General: Bowel sounds are normal  There is no distension  Palpations: Abdomen is soft  There is no mass  Tenderness: There is abdominal tenderness (LUQ, LLQ, epigastric, left flank)  There is guarding  There is no right CVA tenderness or left CVA tenderness  Musculoskeletal:      Right lower leg: No edema  Left lower leg: No edema  Skin:     General: Skin is warm  Capillary Refill: Capillary refill takes less than 2 seconds  Coloration: Skin is not jaundiced  Neurological:      Mental Status: He is alert and oriented to person, place, and time     Psychiatric:         Mood and Affect: Mood normal          Behavior: Behavior normal          ED Medications  Medications   morphine injection 6 mg (6 mg Intravenous Given 1/19/23 0849)   ondansetron (ZOFRAN) injection 4 mg (4 mg Intravenous Given 1/19/23 0849)   LORazepam (ATIVAN) injection 2 mg (2 mg Intravenous Given 1/19/23 0947)   iohexol (OMNIPAQUE) 350 MG/ML injection (SINGLE-DOSE) 100 mL (100 mL Intravenous Given 1/19/23 1012)   metroNIDAZOLE (FLAGYL) tablet 500 mg (500 mg Oral Given 1/19/23 1308)   ondansetron (ZOFRAN) injection 4 mg (4 mg Intravenous Given 1/19/23 1308)       Diagnostic Studies  Results Reviewed     Procedure Component Value Units Date/Time    High Sensitivity Troponin I Random [[id number]]  (Abnormal) Collected: 01/19/23 0846    Lab Status: Final result Specimen: Blood from Arm, Right Updated: 01/19/23 0929     HS TnI random 3 ng/L     Comprehensive metabolic panel [[id number]]  (Abnormal) Collected: 01/19/23 0846    Lab Status: Final result Specimen: Blood from Arm, Right Updated: 01/19/23 0918     Sodium 136 mmol/L      Potassium 4 3 mmol/L      Chloride 103 mmol/L      CO2 27 mmol/L      ANION GAP 6 mmol/L      BUN 19 mg/dL      Creatinine 0 93 mg/dL      Glucose 261 mg/dL      Calcium 9 2 mg/dL      AST 22 U/L      ALT 19 U/L      Alkaline Phosphatase 90 U/L      Total Protein 7 0 g/dL      Albumin 3 9 g/dL      Total Bilirubin 0 55 mg/dL      eGFR 81 ml/min/1 73sq m     Narrative:      [place] guidelines for Chronic Kidney Disease (CKD):   •  Stage 1 with normal or high GFR (GFR > 90 mL/min/1 73 square meters)  •  Stage 2 Mild CKD (GFR = 60-89 mL/min/1 73 square meters)  •  Stage 3A Moderate CKD (GFR = 45-59 mL/min/1 73 square meters)  •  Stage 3B Moderate CKD (GFR = 30-44 mL/min/1 73 square meters)  •  Stage 4 Severe CKD (GFR = 15-29 mL/min/1 73 square meters)  •  Stage 5 End Stage CKD (GFR <15 mL/min/1 73 square meters)  Note: GFR calculation is accurate only with a steady state creatinine    Lipase [[id number]]  (Normal) Collected: 01/19/23 0846    Lab Status: Final result Specimen: Blood from Arm, Right Updated: 01/19/23 0918     Lipase 57 u/L     CBC and differential [[id number]]  (Abnormal) Collected: 01/19/23 0846    Lab Status: Final result Specimen: Blood from Arm, Right Updated: 01/19/23 0855     WBC 8 75 Thousand/uL RBC 4 52 Million/uL      Hemoglobin 13 4 g/dL      Hematocrit 40 9 %      MCV 91 fL      MCH 29 6 pg      MCHC 32 8 g/dL      RDW 13 0 %      MPV 11 8 fL      Platelets 041 Thousands/uL      nRBC 0 /100 WBCs      Neutrophils Relative 85 %      Immat GRANS % 1 %      Lymphocytes Relative 6 %      Monocytes Relative 7 %      Eosinophils Relative 1 %      Basophils Relative 0 %      Neutrophils Absolute 7 46 Thousands/µL      Immature Grans Absolute 0 06 Thousand/uL      Lymphocytes Absolute 0 50 Thousands/µL      Monocytes Absolute 0 61 Thousand/µL      Eosinophils Absolute 0 09 Thousand/µL      Basophils Absolute 0 03 Thousands/µL                  CT abdomen pelvis with contrast   Final Result by Amy Lopez DO (01/19 1132)   Addendum (preliminary) 1 of 1 by Amy Lopez DO (01/19 1132)   ADDENDUM:      There is a voice recognition and involving impression #2  Impression #2    should read as follows:      2  Small Wan-type hernia involving a loop of small bowel within the    right mid abdomen  No CT evidence of associated COMPLICATION  Final   1  Short segment of circumferential wall thickening involving the sigmoid colon  This is favored to represent pseudothickening secondary to nondistention, however, a mild nonspecific colitis may have a similar appearance  There is diverticulosis    without evidence of focal inflammatory changes to suggest acute diverticulitis  2   Small Wan-type hernia involving a loop of small bowel within the right mid abdomen  No CT evidence of associated consultation  3   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 small hiatal hernia  4   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  Mildly complex exophytic left renal cyst also is stable  The study was marked in [facility] for immediate notification        Workstation performed: [id number]               Procedures  ECG 12 Lead Documentation Only    Date/Time: 1/19/2023 1:22 PM  Performed by: Luz Zuniga MD  Authorized by: Luz Zuniga MD     ECG reviewed by me, the ED Provider: yes    Patient location:  ED  Previous ECG:     Previous ECG:  Compared to current    Similarity:  No change    Comparison to cardiac monitor: Yes    Interpretation:     Interpretation: normal    Rate:     ECG rate assessment: normal    Rhythm:     Rhythm: sinus rhythm    Ectopy:     Ectopy: none    QRS:     QRS axis:  Normal  Conduction:     Conduction: normal    ST segments:     ST segments:  Normal  T waves:     T waves: normal            ED Course                                       Medical Decision Making  Abdominal pain: acute illness or injury  Acute colitis: acute illness or injury  Amount and/or Complexity of Data Reviewed  Labs: ordered  Radiology: ordered  Discussion of management or test interpretation with external provider(s): Patient presenting with left sided abdominal, flank, and back pain, as well as N/V/D yesterday  CT abdomen pelvic shows possible nonspecific colitis, diverticulosis but no diverticulitis  Also showed stable pancreatic cyst and left renal cyst  Lab work unremarkable  Will add Flagyl to outpatient antibiotic regimen of Ciprofloxacin  Patient can be discharged home and follow up with Dr Walsh Loss  Instructed to return to the ED if worsening symptoms  Risk  Prescription drug management  Disposition  Final diagnoses:   Acute colitis   Abdominal pain     Time reflects when diagnosis was documented in both MDM as applicable and the Disposition within this note     Time User Action Codes Description Comment    1/19/2023  1:12 PM Kunal Dow Add [K52 9] Acute colitis     1/19/2023  1:12 PM Kunal Dow Add [R10 9] Abdominal pain       ED Disposition     ED Disposition   Discharge    Condition   Stable    Date/Time   Thu Jan 19, 2023  1:12 PM    Comment   Patricia Sheldon discharge to home/self care                 Follow-up Information     Follow up With Specialties Details Why Michael Bennett MD Gastroenterology Schedule an appointment as soon as possible for a visit   [address] [facility]  [address]            Patient's Medications   Discharge Prescriptions    CIPROFLOXACIN (CIPRO) 500 MG TABLET    Take 1 tablet (500 mg total) by mouth 2 (two) times a day for 7 days       Start Date: 1/19/2023 End Date: 1/26/2023       Order Dose: 500 mg       Quantity: 14 tablet    Refills: 0    METRONIDAZOLE (FLAGYL) 500 MG TABLET    Take 1 tablet (500 mg total) by mouth 3 (three) times a day for 7 days       Start Date: 1/19/2023 End Date: 1/26/2023       Order Dose: 500 mg       Quantity: 21 tablet    Refills: 0     No discharge procedures on file  PDMP Review       Value Time User    PDMP Reviewed  Yes 8/17/2022  4:28 PM Eris Mayorga DDS           ED Provider  Attending physically available and evaluated Rachel Buchanan  SHANNON managed the patient along with the ED Attending      Electronically Signed by         Clayton Narayanan MD  01/19/23 2774

## 2023-01-20 ENCOUNTER — HOSPITAL ENCOUNTER (INPATIENT)
Facility: HOSPITAL | Age: 73
LOS: 2 days | Discharge: HOME | End: 2023-01-22
Attending: EMERGENCY MEDICINE | Admitting: INTERNAL MEDICINE

## 2023-01-20 ENCOUNTER — HOSPITAL ENCOUNTER (EMERGENCY)
Dept: RADIOLOGY | Facility: HOSPITAL | Age: 73
Discharge: HOME | End: 2023-01-20

## 2023-01-20 DIAGNOSIS — K52.9 COLITIS: ICD-10-CM

## 2023-01-20 DIAGNOSIS — K52.9 ACUTE COLITIS: Primary | ICD-10-CM

## 2023-01-20 DIAGNOSIS — N40.1 BENIGN PROSTATIC HYPERPLASIA WITH URINARY FREQUENCY: ICD-10-CM

## 2023-01-20 DIAGNOSIS — R11.10 VOMITING: ICD-10-CM

## 2023-01-20 DIAGNOSIS — R35.0 BENIGN PROSTATIC HYPERPLASIA WITH URINARY FREQUENCY: ICD-10-CM

## 2023-01-20 DIAGNOSIS — C61 PROSTATE CANCER (HCC): ICD-10-CM

## 2023-01-20 LAB
ALBUMIN SERPL BCP-MCNC: 4.2 G/DL (ref 3.5–5)
ALP SERPL-CCNC: 92 U/L (ref 34–104)
ALT SERPL W P-5'-P-CCNC: 24 U/L (ref 7–52)
ANION GAP SERPL CALCULATED.3IONS-SCNC: 8 MMOL/L (ref 4–13)
AST SERPL W P-5'-P-CCNC: 23 U/L (ref 13–39)
ATRIAL RATE: 60 BPM
ATRIAL RATE: 75 BPM
BACTERIA UR QL AUTO: ABNORMAL /HPF
BASOPHILS # BLD AUTO: 0.05 THOUSANDS/ÂΜL (ref 0–0.1)
BASOPHILS NFR BLD AUTO: 1 % (ref 0–1)
BILIRUB SERPL-MCNC: 0.51 MG/DL (ref 0.2–1)
BILIRUB UR QL STRIP: NEGATIVE
BUN SERPL-MCNC: 18 MG/DL (ref 5–25)
CALCIUM SERPL-MCNC: 9.4 MG/DL (ref 8.4–10.2)
CHLORIDE SERPL-SCNC: 100 MMOL/L (ref 96–108)
CLARITY UR: CLEAR
CO2 SERPL-SCNC: 29 MMOL/L (ref 21–32)
COLOR UR: YELLOW
CREAT SERPL-MCNC: 0.94 MG/DL (ref 0.6–1.3)
EOSINOPHIL # BLD AUTO: 0.1 THOUSAND/ÂΜL (ref 0–0.61)
EOSINOPHIL NFR BLD AUTO: 1 % (ref 0–6)
ERYTHROCYTE [DISTWIDTH] IN BLOOD BY AUTOMATED COUNT: 13.2 % (ref 11.6–15.1)
GFR SERPL CREATININE-BSD FRML MDRD: 80 ML/MIN/1.73SQ M
GLUCOSE SERPL-MCNC: 127 MG/DL (ref 65–140)
GLUCOSE SERPL-MCNC: 215 MG/DL (ref 65–140)
GLUCOSE UR STRIP-MCNC: NEGATIVE MG/DL
HCT VFR BLD AUTO: 44 % (ref 36.5–49.3)
HGB BLD-MCNC: 14.5 G/DL (ref 12–17)
HGB UR QL STRIP.AUTO: NEGATIVE
IMM GRANULOCYTES # BLD AUTO: 0.05 THOUSAND/UL (ref 0–0.2)
IMM GRANULOCYTES NFR BLD AUTO: 1 % (ref 0–2)
KETONES UR STRIP-MCNC: ABNORMAL MG/DL
LACTATE SERPL-SCNC: 1.4 MMOL/L (ref 0.5–2)
LEUKOCYTE ESTERASE UR QL STRIP: NEGATIVE
LIPASE SERPL-CCNC: 27 U/L (ref 11–82)
LYMPHOCYTES # BLD AUTO: 0.53 THOUSANDS/ÂΜL (ref 0.6–4.47)
LYMPHOCYTES NFR BLD AUTO: 7 % (ref 14–44)
MCH RBC QN AUTO: 30 PG (ref 26.8–34.3)
MCHC RBC AUTO-ENTMCNC: 33 G/DL (ref 31.4–37.4)
MCV RBC AUTO: 91 FL (ref 82–98)
MONOCYTES # BLD AUTO: 0.52 THOUSAND/ÂΜL (ref 0.17–1.22)
MONOCYTES NFR BLD AUTO: 7 % (ref 4–12)
NEUTROPHILS # BLD AUTO: 6.55 THOUSANDS/ÂΜL (ref 1.85–7.62)
NEUTS SEG NFR BLD AUTO: 83 % (ref 43–75)
NITRITE UR QL STRIP: NEGATIVE
NON-SQ EPI CELLS URNS QL MICRO: ABNORMAL /HPF
NRBC BLD AUTO-RTO: 0 /100 WBCS
P AXIS: 41 DEGREES
P AXIS: 64 DEGREES
PH UR STRIP.AUTO: 6.5 [PH]
PLATELET # BLD AUTO: 158 THOUSANDS/UL (ref 149–390)
PMV BLD AUTO: 12.3 FL (ref 8.9–12.7)
POTASSIUM SERPL-SCNC: 4.1 MMOL/L (ref 3.5–5.3)
PR INTERVAL: 154 MS
PR INTERVAL: 168 MS
PROT SERPL-MCNC: 7.5 G/DL (ref 6.4–8.4)
PROT UR STRIP-MCNC: ABNORMAL MG/DL
QRS AXIS: 57 DEGREES
QRS AXIS: 59 DEGREES
QRSD INTERVAL: 82 MS
QRSD INTERVAL: 84 MS
QT INTERVAL: 412 MS
QT INTERVAL: 430 MS
QTC INTERVAL: 430 MS
QTC INTERVAL: 460 MS
RBC # BLD AUTO: 4.84 MILLION/UL (ref 3.88–5.62)
RBC #/AREA URNS AUTO: ABNORMAL /HPF
SODIUM SERPL-SCNC: 137 MMOL/L (ref 135–147)
SP GR UR STRIP.AUTO: 1.02 (ref 1–1.03)
T WAVE AXIS: 41 DEGREES
T WAVE AXIS: 53 DEGREES
UROBILINOGEN UR STRIP-ACNC: <2 MG/DL
VENTRICULAR RATE: 60 BPM
VENTRICULAR RATE: 75 BPM
WBC # BLD AUTO: 7.8 THOUSAND/UL (ref 4.31–10.16)
WBC #/AREA URNS AUTO: ABNORMAL /HPF

## 2023-01-20 RX ORDER — ENOXAPARIN SODIUM 100 MG/ML
40 INJECTION SUBCUTANEOUS DAILY
Status: DISCONTINUED | OUTPATIENT
Start: 2023-01-20 | End: 2023-01-22 | Stop reason: HOSPADM

## 2023-01-20 RX ORDER — OXYCODONE HYDROCHLORIDE 5 MG/1
5 TABLET ORAL EVERY 4 HOURS PRN
Status: DISCONTINUED | OUTPATIENT
Start: 2023-01-20 | End: 2023-01-22 | Stop reason: HOSPADM

## 2023-01-20 RX ORDER — CALCIUM CARBONATE 200(500)MG
1000 TABLET,CHEWABLE ORAL DAILY PRN
Status: DISCONTINUED | OUTPATIENT
Start: 2023-01-20 | End: 2023-01-22 | Stop reason: HOSPADM

## 2023-01-20 RX ORDER — ONDANSETRON 2 MG/ML
4 INJECTION INTRAMUSCULAR; INTRAVENOUS EVERY 6 HOURS PRN
Status: DISCONTINUED | OUTPATIENT
Start: 2023-01-20 | End: 2023-01-21

## 2023-01-20 RX ORDER — PANTOPRAZOLE SODIUM 40 MG/1
40 TABLET, DELAYED RELEASE ORAL
Status: DISCONTINUED | OUTPATIENT
Start: 2023-01-21 | End: 2023-01-22 | Stop reason: HOSPADM

## 2023-01-20 RX ORDER — DOCUSATE SODIUM 100 MG/1
100 CAPSULE, LIQUID FILLED ORAL 2 TIMES DAILY PRN
Status: DISCONTINUED | OUTPATIENT
Start: 2023-01-20 | End: 2023-01-21

## 2023-01-20 RX ORDER — HYDROMORPHONE HCL/PF 1 MG/ML
0.5 SYRINGE (ML) INJECTION EVERY 4 HOURS PRN
Status: DISCONTINUED | OUTPATIENT
Start: 2023-01-20 | End: 2023-01-22 | Stop reason: HOSPADM

## 2023-01-20 RX ORDER — ATORVASTATIN CALCIUM 10 MG/1
10 TABLET, FILM COATED ORAL DAILY
Status: DISCONTINUED | OUTPATIENT
Start: 2023-01-20 | End: 2023-01-22 | Stop reason: HOSPADM

## 2023-01-20 RX ORDER — SODIUM CHLORIDE 9 MG/ML
100 INJECTION, SOLUTION INTRAVENOUS CONTINUOUS
Status: DISCONTINUED | OUTPATIENT
Start: 2023-01-20 | End: 2023-01-21

## 2023-01-20 RX ORDER — INSULIN ASPART 100 [IU]/ML
10 INJECTION, SUSPENSION SUBCUTANEOUS
Status: DISCONTINUED | OUTPATIENT
Start: 2023-01-20 | End: 2023-01-22 | Stop reason: HOSPADM

## 2023-01-20 RX ORDER — FLUTICASONE PROPIONATE 50 MCG
2 SPRAY, SUSPENSION (ML) NASAL DAILY PRN
Status: DISCONTINUED | OUTPATIENT
Start: 2023-01-20 | End: 2023-01-22 | Stop reason: HOSPADM

## 2023-01-20 RX ORDER — METRONIDAZOLE 500 MG/100ML
500 INJECTION, SOLUTION INTRAVENOUS ONCE
Status: COMPLETED | OUTPATIENT
Start: 2023-01-20 | End: 2023-01-20

## 2023-01-20 RX ORDER — ONDANSETRON 2 MG/ML
4 INJECTION INTRAMUSCULAR; INTRAVENOUS ONCE
Status: COMPLETED | OUTPATIENT
Start: 2023-01-20 | End: 2023-01-20

## 2023-01-20 RX ORDER — MAGNESIUM HYDROXIDE/ALUMINUM HYDROXICE/SIMETHICONE 120; 1200; 1200 MG/30ML; MG/30ML; MG/30ML
30 SUSPENSION ORAL EVERY 6 HOURS PRN
Status: DISCONTINUED | OUTPATIENT
Start: 2023-01-20 | End: 2023-01-21

## 2023-01-20 RX ORDER — LOSARTAN POTASSIUM 25 MG/1
25 TABLET ORAL DAILY
Status: DISCONTINUED | OUTPATIENT
Start: 2023-01-20 | End: 2023-01-22 | Stop reason: HOSPADM

## 2023-01-20 RX ORDER — ALBUTEROL SULFATE 90 UG/1
2 AEROSOL, METERED RESPIRATORY (INHALATION) EVERY 6 HOURS PRN
Status: DISCONTINUED | OUTPATIENT
Start: 2023-01-20 | End: 2023-01-22 | Stop reason: HOSPADM

## 2023-01-20 RX ORDER — FLUTICASONE PROPIONATE 50 MCG
2 SPRAY, SUSPENSION (ML) NASAL AS NEEDED
Status: DISCONTINUED | OUTPATIENT
Start: 2023-01-20 | End: 2023-01-20 | Stop reason: SDUPTHER

## 2023-01-20 RX ORDER — HYDROMORPHONE HCL/PF 1 MG/ML
0.5 SYRINGE (ML) INJECTION ONCE
Status: COMPLETED | OUTPATIENT
Start: 2023-01-20 | End: 2023-01-20

## 2023-01-20 RX ORDER — ACETAMINOPHEN 325 MG/1
650 TABLET ORAL EVERY 4 HOURS PRN
Status: DISCONTINUED | OUTPATIENT
Start: 2023-01-20 | End: 2023-01-22 | Stop reason: HOSPADM

## 2023-01-20 RX ORDER — FLUTICASONE PROPIONATE 110 UG/1
2 AEROSOL, METERED RESPIRATORY (INHALATION)
Status: DISCONTINUED | OUTPATIENT
Start: 2023-01-20 | End: 2023-01-22 | Stop reason: HOSPADM

## 2023-01-20 RX ADMIN — HYDROMORPHONE HYDROCHLORIDE 0.5 MG: 1 INJECTION, SOLUTION INTRAMUSCULAR; INTRAVENOUS; SUBCUTANEOUS at 20:31

## 2023-01-20 RX ADMIN — PIPERACILLIN AND TAZOBACTAM 4.5 G: 36; 4.5 INJECTION, POWDER, FOR SOLUTION INTRAVENOUS at 22:10

## 2023-01-20 RX ADMIN — ATORVASTATIN CALCIUM 10 MG: 10 TABLET, FILM COATED ORAL at 14:57

## 2023-01-20 RX ADMIN — SODIUM CHLORIDE 1000 ML: 0.9 INJECTION, SOLUTION INTRAVENOUS at 09:58

## 2023-01-20 RX ADMIN — HYDROMORPHONE HYDROCHLORIDE 0.5 MG: 1 INJECTION, SOLUTION INTRAMUSCULAR; INTRAVENOUS; SUBCUTANEOUS at 10:00

## 2023-01-20 RX ADMIN — INSULIN ASPART 10 UNITS: 100 INJECTION, SUSPENSION SUBCUTANEOUS at 15:52

## 2023-01-20 RX ADMIN — ENOXAPARIN SODIUM 40 MG: 40 INJECTION SUBCUTANEOUS at 14:57

## 2023-01-20 RX ADMIN — LOSARTAN POTASSIUM 25 MG: 25 TABLET, FILM COATED ORAL at 14:58

## 2023-01-20 RX ADMIN — PIPERACILLIN AND TAZOBACTAM 4.5 G: 36; 4.5 INJECTION, POWDER, FOR SOLUTION INTRAVENOUS at 15:52

## 2023-01-20 RX ADMIN — CEFTRIAXONE SODIUM 1000 MG: 10 INJECTION, POWDER, FOR SOLUTION INTRAVENOUS at 10:01

## 2023-01-20 RX ADMIN — METRONIDAZOLE 500 MG: 500 INJECTION, SOLUTION INTRAVENOUS at 11:12

## 2023-01-20 RX ADMIN — ONDANSETRON 4 MG: 2 INJECTION INTRAMUSCULAR; INTRAVENOUS at 09:58

## 2023-01-20 RX ADMIN — SODIUM CHLORIDE 100 ML/HR: 0.9 INJECTION, SOLUTION INTRAVENOUS at 14:58

## 2023-01-20 NOTE — ASSESSMENT & PLAN NOTE
Lab Results   Component Value Date    HGBA1C 8 3 (H) 01/13/2023       No results for input(s): POCGLU in the last 72 hours      Blood Sugar Average: Last 72 hrs:  Hold oral antidiabetics and start insulin sliding scale

## 2023-01-20 NOTE — PLAN OF CARE
Problem: PAIN - ADULT  Goal: Verbalizes/displays adequate comfort level or baseline comfort level  Description: Interventions:  - Encourage patient to monitor pain and request assistance  - Assess pain using appropriate pain scale  - Administer analgesics based on type and severity of pain and evaluate response  - Implement non-pharmacological measures as appropriate and evaluate response  - Consider cultural and social influences on pain and pain management  - Notify physician/advanced practitioner if interventions unsuccessful or patient reports new pain  Outcome: Progressing     Problem: INFECTION - ADULT  Goal: Absence or prevention of progression during hospitalization  Description: INTERVENTIONS:  - Assess and monitor for signs and symptoms of infection  - Monitor lab/diagnostic results  - Monitor all insertion sites, i e  indwelling lines, tubes, and drains  - Monitor endotracheal if appropriate and nasal secretions for changes in amount and color  - [place] appropriate cooling/warming therapies per order  - Administer medications as ordered  - Instruct and encourage patient and family to use good hand hygiene technique  - Identify and instruct in appropriate isolation precautions for identified infection/condition  Outcome: Progressing     Problem: SAFETY ADULT  Goal: Patient will remain free of falls  Description: INTERVENTIONS:  - Educate patient/family on patient safety including physical limitations  - Instruct patient to call for assistance with activity   - Consult OT/PT to assist with strengthening/mobility   - Keep Call bell within reach  - Keep bed low and locked with side rails adjusted as appropriate  - Keep care items and personal belongings within reach  - Initiate and maintain comfort rounds  - Make Fall Risk Sign visible to staff  - Apply yellow socks and bracelet for high fall risk patients  - Consider moving patient to room near nurses station  Outcome: Progressing  Goal: Maintain or return to baseline ADL function  Description: INTERVENTIONS:  -  Assess patient's ability to carry out ADLs; assess patient's baseline for ADL function and identify physical deficits which impact ability to perform ADLs (bathing, care of mouth/teeth, toileting, grooming, dressing, etc )  - Assess/evaluate cause of self-care deficits   - Assess range of motion  - Assess patient's mobility; develop plan if impaired  - Assess patient's need for assistive devices and provide as appropriate  - Encourage maximum independence but intervene and supervise when necessary  - Involve family in performance of ADLs  - Assess for home care needs following discharge   - Consider OT consult to assist with ADL evaluation and planning for discharge  - Provide patient education as appropriate  Outcome: Progressing  Goal: Maintains/Returns to pre admission functional level  Description: INTERVENTIONS:  - Perform BMAT or MOVE assessment daily    - Set and communicate daily mobility goal to care team and patient/family/caregiver     - Collaborate with rehabilitation services on mobility goals if consulted  - Out of bed for toileting  - Record patient progress and toleration of activity level   Outcome: Progressing     Problem: DISCHARGE PLANNING  Goal: Discharge to home or other facility with appropriate resources  Description: INTERVENTIONS:  - Identify barriers to discharge w/patient and caregiver  - Arrange for needed discharge resources and transportation as appropriate  - Identify discharge learning needs (meds, wound care, etc )  - Arrange for interpretive services to assist at discharge as needed  - Refer to Case Management Department for coordinating discharge planning if the patient needs post-hospital services based on physician/advanced practitioner order or complex needs related to functional status, cognitive ability, or social support system  Outcome: Progressing     Problem: Knowledge Deficit  Goal: Patient/family/caregiver demonstrates understanding of disease process, treatment plan, medications, and discharge instructions  Description: Complete learning assessment and assess knowledge base    Interventions:  - Provide teaching at level of understanding  - Provide teaching via preferred learning methods  Outcome: Progressing 1 pair

## 2023-01-20 NOTE — H&P
62 [facility] (formerly [facility]) 1950, 67 y o  male MRN: [id number]  Unit/Bed#: ED-05 Encounter: [id number]  Primary Care Provider: Rosa Elena Love MD   Date and time admitted to hospital: 1/20/2023  8:32 AM    * Colitis  Assessment & Plan  · Abdominal pain nausea vomiting x  2 weeks  Imaging shows work-up showed thickening of sigmoid colon concerning for colitis  Diverticulosis also noted  · Colonoscopy 7/20/2022: Polyp removed  Diverticulosis noted  · He reports 1 episode of diarrhea yesterday  · Check stool enteric panel and C  Difficile  · Liquid diet today  · Received Cipro and Flagyl in ED, will switch to Zosyn  · Outpatient follow-up with GI    Prostate cancer [facility])  Assessment & Plan  S/p radiation therapy  Follows up with urology  Reports complication from radiation therapy including constipation    IPMN (intraductal papillary mucinous neoplasm)  Assessment & Plan  · Patient already aware of this  Follows up with GI  · Imaging today shows stable findings    Benign essential hypertension  Assessment & Plan  · Continue losartan monitor    Type 2 diabetes mellitus with hyperglycemia, with long-term current use of insulin ([facility])  Assessment & Plan  Lab Results   Component Value Date    HGBA1C 8 3 (H) 01/13/2023       No results for input(s): POCGLU in the last 72 hours  Blood Sugar Average: Last 72 hrs:  Hold oral antidiabetics and start insulin sliding scale    Postgastrectomy malabsorption  Assessment & Plan  · Continue vitamin supplementation    GERD (gastroesophageal reflux disease)  Assessment & Plan  Continue pantoprazole 40 mg    VTE Pharmacologic Prophylaxis: VTE Score: 5 High Risk (Score >/= 5) - Pharmacological DVT Prophylaxis Ordered: enoxaparin (Lovenox)  Sequential Compression Devices Ordered  Code Status: Prior full  Discussion with family: Updated  (wife) at bedside      Anticipated Length of Stay: Patient will be admitted on an inpatient basis with an anticipated length of stay of greater than 2 midnights secondary to IV antibiotics  Total Time for Visit, including Counseling / Coordination of Care: 70 minutes Greater than 50% of this total time spent on direct patient counseling and coordination of care  Chief Complaint: Abdominal pain    History of Present Illness:  Patricia Sheldon is a 67 y o  male with a PMH of diabetes mellitus, prostate cancer status postradiation therapy, history of gastric bypass surgery, hypertension, hyperlipidemia and gastroesophageal reflux disease who presents with abdominal pain and nausea for 2 weeks  Yesterday his symptoms have gotten worse he was not tolerating diet and came to the emergency department for evaluation  Pain is mostly located on the left side of the abdomen radiates to up into the chest and down to the groin  he was evaluated with CT abdomen which showed possible diverticulitis or colitis, patient was discharged home with Cipro Floxin and metronidazole  When he got home, he continued to have symptoms and was unable to tolerate any diet  Several episodes of vomiting  Was not able to take his antibiotics  Return to emergency department today for evaluation  He denies any fever chills or rigors  Ports having colonoscopy 6 months ago, a small polyp was removed at that time  Diverticulosis noted      Review of Systems:  Review of Systems  12 point review systems negative except noted above  Past Medical and Surgical History:   Past Medical History:   Diagnosis Date   • Anemia    • Arthritis    • Black stool 07/24/2020   • Cancer ([facility] )    • Colon polyp    • Diabetes mellitus (HCC)     IDDM   • Diverticulosis    • Enlarged prostate    • Erectile dysfunction    • Hypercholesteremia     Resolved post weight loss   • Hypertension     Resolved post weight loss   • Kidney stone    • Obesity    • Prostate [facility] ([facility] )    • Wears partial dentures     partial upper and lower       Past Surgical History: Procedure Laterality Date   • ABDOMINAL ADHESION SURGERY N/A 11/28/2016    Procedure: EXTENSIVE LYSIS ADHESIONS;  Surgeon: Huber Farias MD;  Location: [facility];  Service:    • ANKLE FRACTURE SURGERY Left    • CHOLECYSTECTOMY     • COLON SURGERY      colon resection   • COLONOSCOPY     • COLOSTOMY     • COLOSTOMY CLOSURE     • DIAGNOSTIC LAPAROSCOPY     • GASTRIC BYPASS      failed due to adhesions   • HERNIA REPAIR      abdominal   • GA CYSTO INSERTION TRANSPROSTATIC IMPLANT SINGLE N/A 3/8/2019    Procedure: CYSTOSCOPY WITH INSERTION UROLIFT;  Surgeon: Britta Tong MD;  [facility];  Service: Urology   • GA CYSTO INSERTION TRANSPROSTATIC IMPLANT SINGLE N/A 5/8/2020    Procedure: Lana Reus WITH INSERTION [place];  Surgeon: Britta Tong MD;  [facility];  Service: Urology   • GA CYSTOURETHROSCOPY W/INTERNAL URETHROTOMY N/A 5/8/2020    Procedure: DIRECT VISUAL INTERAL URETHROTOMY (DVIU), dilation of urethral stricture;  Surgeon: Britta Tong MD;  [facility];  Service: Urology   • GA EDG US EXAM SURGICAL ALTER STOM DUODENUM/JEJUNUM N/A 10/25/2018    Procedure: LINEAR ENDOSCOPIC U/S;  Surgeon: Jd Cruz MD;  Location: [facility]; Service: Gastroenterology   • GA ESOPHAGOGASTRODUODENOSCOPY TRANSORAL DIAGNOSTIC N/A 7/6/2016    Procedure: EGD AND COLONOSCOPY;  Surgeon: Dorinda Leon MD;  [facility];   Service: Gastroenterology   • GA [address] LONGITUDINAL GASTRECTOMY N/A 11/28/2016    Procedure: GASTRECTOMY SLEEVE LAPAROSCOPIC;  Surgeon: Huber Farias MD;  [facility];  Service: Bariatrics   • GA PROSTATE NEEDLE BIOPSY ANY APPROACH N/A 5/8/2020    Procedure: TRANSRECTAL NEEDLE BIOPSY PROSTATE (TRNBP) WITH TRANSRECTAL ULTRASOUND GUIDANCE;  Surgeon: Britta Tong MD;  [facility];  Service: Urology   • TONSILLECTOMY     • US GUIDED PROSTATE BIOPSY  5/8/2020       Meds/Allergies:  Prior to Admission medications Medication Sig Start Date End Date Taking? Authorizing Provider   acetaminophen (TYLENOL) 325 mg tablet Take 2 tablets (650 mg total) by mouth every 4 (four) hours as needed for mild pain, headaches or fever 12/14/20   Mary Pedro PA-C   albuterol (ProAir HFA) 90 mcg/act inhaler Inhale 2 puffs every 6 (six) hours as needed for wheezing or shortness of breath 5/14/22   YVONNE Leggett   aluminum-magnesium hydroxide-simethicone (MYLANTA) 200-200-20 mg/5 mL suspension Take 30 mL by mouth every 6 (six) hours as needed for indigestion or heartburn 6/29/21   Tereso Gates PA-C   atorvastatin (LIPITOR) 10 mg tablet Take 1 tablet (10 mg total) by mouth daily 8/10/22   Melida Duran MD   Biotin 1000 MCG tablet Take 1 tablet by mouth daily in the early morning     Historical Provider, MD   Blood Glucose Monitoring Suppl ([address] [place] [address]) w/Device KIT by Does not apply route 2 (two) times a day 5/14/19   Tom Malagon MD   Cholecalciferol (VITAMIN D3) 2000 units capsule Take 1,000 Units by mouth daily in the early morning     Historical Provider, MD   ciprofloxacin (CIPRO) 500 mg tablet Take 1 tablet (500 mg total) by mouth 2 (two) times a day for 7 days 1/19/23 1/26/23  Анна Lee MD   Cyanocobalamin (VITAMIN B-12) 5000 MCG TBDP Take 5,000 mcg by mouth once a week Takes on Mondays    Historical Provider, MD   docusate sodium (COLACE) 100 mg capsule Take 100 mg by mouth daily as needed for constipation    Historical Provider, MD   fluticasone (FLONASE) 50 mcg/act nasal spray 2 sprays into each nostril daily  Patient taking differently: 2 sprays into each nostril if needed 7/20/22   Melida Duran MD   fluticasone (Flovent HFA) 110 MCG/ACT inhaler Inhale 2 puffs 2 (two) times a day Rinse mouth after use    Patient taking differently: Inhale 2 puffs if needed Rinse mouth after use  6/24/22 1/13/23  Melida Duran MD   glipiZIDE (GLUCOTROL XL) 5 mg 24 hr tablet TAKE ONE TABLET BY MOUTH EVERY DAY AFTER BREAKFAST 3/7/22   Melida Duran MD   insulin aspart protamine-insulin aspart (NovoLOG 70/30) 100 Units/mL injection pen Inject 20 Units under the skin 2 (two) times a day with meals 22  Melida Duran MD   Lancets (LIFESCAN UNISTIK 2) MISC Lifescan One Touch Ultramini Meter; use as directed; 1; 0; 31-Oct-2016; 31-Oct-2016; Melida Duran; Active 10/31/16   Historical Provider, MD   losartan (COZAAR) 25 mg tablet Take 1 tablet (25 mg total) by mouth daily 8/10/22 1/13/23  Melida Duran MD   metroNIDAZOLE (Flagyl) 500 mg tablet Take 1 tablet (500 mg total) by mouth 3 (three) times a day for 7 days 23  Jabier Acosta MD   Mirabegron ER 50 MG TB24 Take 1 tablet (50 mg total) by mouth daily at bedtime 23   Celeste Palacios PA-C   Multiple Vitamin (MULTIVITAMIN) capsule Take 1 capsule by mouth daily in the early morning     Historical Provider, MD   pantoprazole (PROTONIX) 40 mg tablet TAKE ONE TABLET BY MOUTH EVERY DAY 1/3/23   Melida Duran MD   Trulicity 3  9FR SOPN INJECT 0 5ML (3MG) UNDER THE SKIN ONCE A WEEK 22   Ronny Cole MD     I have reviewed home medications with patient personally  Allergies:    Allergies   Allergen Reactions   • Enalapril Anaphylaxis, Throat Swelling and Hives       Social History:  Marital Status: Single   Occupation:   Patient Pre-hospital Living Situation: Home  Patient Pre-hospital Level of Mobility: walks  Patient Pre-hospital Diet Restrictions:   Substance Use History:   Social History     Substance and Sexual Activity   Alcohol Use Not Currently     Social History     Tobacco Use   Smoking Status Former   • Packs/day: 0 25   • Types: Cigarettes   • Quit date: 11/10/2001   • Years since quittin 2   Smokeless Tobacco Former     Social History     Substance and Sexual Activity   Drug Use Not Currently    Comment: RSO       Family History:  Family History   Problem Relation Age of Onset   • Heart disease Mother    • Breast cancer Mother 80   • Diabetes Father    • Colon cancer Neg Hx    • Liver disease Neg Hx        Physical Exam:     Vitals:   Blood Pressure: 152/80 (01/20/23 1112)  Pulse: 58 (01/20/23 1112)  Temperature: 97 9 °F (36 6 °C) (01/20/23 0837)  Temp Source: Oral (01/20/23 0837)  Respirations: 17 (01/20/23 1112)  SpO2: 98 % (01/20/23 1112)    Physical Exam     Gen -Patient comfortable at rest  Neck- Supple  No thyromegaly or lymphadenopathy  Lungs-Clear bilaterally without any wheeze or rales   Heart S1-S2, regular rate and rhythm, no murmurs  Abdomen-tenderness left of the abdomen and left thoracic region  Without any rebound or guarding  Bowel sounds present  Extremities-no cyanosi,  clubbing or edema  Skin- no rash  Neuro-nonfocal       Additional Data:     Lab Results:  Results from last 7 days   Lab Units 01/20/23  0944   WBC Thousand/uL 7 80   HEMOGLOBIN g/dL 14 5   HEMATOCRIT % 44 0   PLATELETS Thousands/uL 158   NEUTROS PCT % 83*   LYMPHS PCT % 7*   MONOS PCT % 7   EOS PCT % 1     Results from last 7 days   Lab Units 01/20/23  0944   SODIUM mmol/L 137   POTASSIUM mmol/L 4 1   CHLORIDE mmol/L 100   CO2 mmol/L 29   BUN mg/dL 18   CREATININE mg/dL 0 94   ANION GAP mmol/L 8   CALCIUM mg/dL 9 4   ALBUMIN g/dL 4 2   TOTAL BILIRUBIN mg/dL 0 51   ALK PHOS U/L 92   ALT U/L 24   AST U/L 23   GLUCOSE RANDOM mg/dL 215*                 Results from last 7 days   Lab Units 01/20/23  0944   LACTIC ACID mmol/L 1 4       Lines/Drains:  Invasive Devices     Peripheral Intravenous Line  Duration           Peripheral IV 01/20/23 Left;Proximal;Ventral (anterior) Forearm <1 day                    Imaging: Reviewed radiology reports from this admission including: abdominal/pelvic CT  XR abdomen obstruction series   ED Interpretation by Anirudh Salgado DO (01/20 1040)   No acute abnormalities  Final Result by Mary Fleming MD (01/20 1113)      Nonobstructive bowel gas pattern           Workstation performed: [id number] EKG and Other Studies Reviewed on Admission:   · EKG: NSR  HR 70     ** Please Note: This note has been constructed using a voice recognition system   **

## 2023-01-20 NOTE — ASSESSMENT & PLAN NOTE
· Abdominal pain nausea vomiting x  2 weeks  Imaging shows work-up showed thickening of sigmoid colon concerning for colitis  Diverticulosis also noted  · Colonoscopy 7/20/2022: Polyp removed  Diverticulosis noted  · He reports 1 episode of diarrhea yesterday  · Check stool enteric panel and C   Difficile  · Liquid diet today  · Received Cipro and Flagyl in ED, will switch to Zosyn  · Outpatient follow-up with GI

## 2023-01-20 NOTE — ASSESSMENT & PLAN NOTE
S/p radiation therapy  Follows up with urology  Reports complication from radiation therapy including constipation

## 2023-01-20 NOTE — ED PROVIDER NOTES
History  Chief Complaint   Patient presents with   • Flu Symptoms     Pt states to have been seen in our ED yesterday for same symptoms- pt reports LLQ abdominal pain,N/V, back pain, fatigue, SOB with exertion      80-year-old male coming into the ED for evaluation of generalized abdominal pain, worse on the left lower quadrant  Symptoms ongoing for several days, off and on but gradually worsening  He came into the ER yesterday and was seen and had a CT scan showing signs of inflammation, colitis in his left lower quadrant  He was already taking ciprofloxacin, but his last dose was 2 days ago on Wednesday  He was given Flagyl once in the ED yesterday but was not able to fill his medications yet  He states today his symptoms are worsening  He states he did not want to be discharged yesterday and feels uncomfortable going home at this point given how his symptoms have progressed  He has been vomiting, not eating or drinking well  His pain is increasing  He has chills but no fevers  He has not had a bowel movement today but he is passing flatus  History provided by:  Patient   used: No    Flu Symptoms  Presenting symptoms: fatigue, nausea and vomiting    Associated symptoms: chills        Prior to Admission Medications   Prescriptions Last Dose Informant Patient Reported? Taking? Biotin 1000 MCG tablet   Yes No   Sig: Take 1 tablet by mouth daily in the early morning    Blood Glucose Monitoring Suppl (GLUCOCARD VITAL MONITOR) w/Device KIT   No No   Sig: by Does not apply route 2 (two) times a day   Cholecalciferol (VITAMIN D3) 2000 units capsule   Yes No   Sig: Take 1,000 Units by mouth daily in the early morning    Cyanocobalamin (VITAMIN B-12) 5000 MCG TBDP   Yes No   Sig: Take 5,000 mcg by mouth once a week Takes on Mondays   Lancets (LIFESCAN UNISTIK 2) MISC   Yes No   Sig: Lifescan One Touch Ultramini Meter; use as directed; 1; 0; 31-Oct-2016; 31-Oct-2016; Melida Duran;  Active Mirabegron ER 50 MG TB24   No No   Sig: Take 1 tablet (50 mg total) by mouth daily at bedtime   Multiple Vitamin (MULTIVITAMIN) capsule   Yes No   Sig: Take 1 capsule by mouth daily in the early morning    Trulicity 3 MP/2 7MH SOPN   No No   Sig: INJECT 0 5ML (3MG) UNDER THE SKIN ONCE A WEEK   acetaminophen (TYLENOL) 325 mg tablet   No No   Sig: Take 2 tablets (650 mg total) by mouth every 4 (four) hours as needed for mild pain, headaches or fever   albuterol (ProAir HFA) 90 mcg/act inhaler   No No   Sig: Inhale 2 puffs every 6 (six) hours as needed for wheezing or shortness of breath   aluminum-magnesium hydroxide-simethicone (MYLANTA) 200-200-20 mg/5 mL suspension   No No   Sig: Take 30 mL by mouth every 6 (six) hours as needed for indigestion or heartburn   atorvastatin (LIPITOR) 10 mg tablet   No No   Sig: Take 1 tablet (10 mg total) by mouth daily   ciprofloxacin (CIPRO) 500 mg tablet   No No   Sig: Take 1 tablet (500 mg total) by mouth 2 (two) times a day for 7 days   docusate sodium (COLACE) 100 mg capsule   Yes No   Sig: Take 100 mg by mouth daily as needed for constipation   fluticasone (FLONASE) 50 mcg/act nasal spray   No No   Si sprays into each nostril daily   Patient taking differently: 2 sprays into each nostril if needed   fluticasone (Flovent HFA) 110 MCG/ACT inhaler   No No   Sig: Inhale 2 puffs 2 (two) times a day Rinse mouth after use  Patient taking differently: Inhale 2 puffs if needed Rinse mouth after use     glipiZIDE (GLUCOTROL XL) 5 mg 24 hr tablet   No No   Sig: TAKE ONE TABLET BY MOUTH EVERY DAY AFTER BREAKFAST   insulin aspart protamine-insulin aspart (NovoLOG 70/30) 100 Units/mL injection pen   No No   Sig: Inject 20 Units under the skin 2 (two) times a day with meals   losartan (COZAAR) 25 mg tablet   No No   Sig: Take 1 tablet (25 mg total) by mouth daily   metroNIDAZOLE (Flagyl) 500 mg tablet   No No   Sig: Take 1 tablet (500 mg total) by mouth 3 (three) times a day for 7 days   pantoprazole (PROTONIX) 40 mg tablet   No No   Sig: TAKE ONE TABLET BY MOUTH EVERY DAY      Facility-Administered Medications: None       Past Medical History:   Diagnosis Date   • Anemia    • Arthritis    • Black stool 07/24/2020   • Cancer ([facility] )    • Colon polyp    • Diabetes mellitus (HCC)     IDDM   • Diverticulosis    • Enlarged prostate    • Erectile dysfunction    • Hypercholesteremia     Resolved post weight loss   • Hypertension     Resolved post weight loss   • Kidney stone    • Obesity    • Prostate cancer ([facility] )    • Wears partial dentures     partial upper and lower       Past Surgical History:   Procedure Laterality Date   • ABDOMINAL ADHESION SURGERY N/A 11/28/2016    Procedure: EXTENSIVE LYSIS ADHESIONS;  Surgeon: Mike Mujica MD;  [facility];  Service:    • ANKLE FRACTURE SURGERY Left    • CHOLECYSTECTOMY     • COLON SURGERY      colon resection   • COLONOSCOPY     • COLOSTOMY     • COLOSTOMY CLOSURE     • DIAGNOSTIC LAPAROSCOPY     • GASTRIC BYPASS      failed due to adhesions   • HERNIA REPAIR      abdominal   • KS CYSTO INSERTION TRANSPROSTATIC IMPLANT SINGLE N/A 3/8/2019    Procedure: CYSTOSCOPY WITH INSERTION Rojas Freanne marie;  Surgeon: Leonora Arroyo MD;  [facility];  Service: Urology   • KS CYSTO INSERTION TRANSPROSTATIC IMPLANT SINGLE N/A 5/8/2020    Procedure: Isabella Doyline WITH INSERTION [place];  Surgeon: Leonora Arroyo MD;  [facility];  Service: Urology   • KS CYSTOURETHROSCOPY W/INTERNAL URETHROTOMY N/A 5/8/2020    Procedure: DIRECT VISUAL INTERAL URETHROTOMY (DVIU), dilation of urethral stricture;  Surgeon: Leonora Arroyo MD;  [facility];  Service: Urology   • KS EDG US EXAM SURGICAL ALTER STOM DUODENUM/JEJUNUM N/A 10/25/2018    Procedure: LINEAR ENDOSCOPIC U/S;  Surgeon: Teo Chew MD;  [facility]: [facility];   Service: Gastroenterology   • KS ESOPHAGOGASTRODUODENOSCOPY TRANSORAL DIAGNOSTIC N/A 7/6/2016    Procedure: EGD AND COLONOSCOPY;  Surgeon: Willem Busch MD;  Location: [facility]; Service: Gastroenterology   • NJ LAPS [address] [place] [address] RSTRICTIV [address] [facility] LONGITUDINAL GASTRECTOMY N/A 2016    Procedure: GASTRECTOMY SLEEVE LAPAROSCOPIC;  Surgeon: Nikki Fajardo MD;  [facility];  Service: Bariatrics   • NJ PROSTATE NEEDLE BIOPSY ANY APPROACH N/A 2020    Procedure: TRANSRECTAL NEEDLE BIOPSY PROSTATE (TRNBP) WITH TRANSRECTAL ULTRASOUND GUIDANCE;  Surgeon: Saran Mane MD;  [facility];  Service: Urology   • TONSILLECTOMY     • US GUIDED PROSTATE BIOPSY  2020       Family History   Problem Relation Age of Onset   • Heart disease Mother    • Breast cancer Mother 80   • Diabetes Father    • Colon cancer Neg Hx    • Liver disease Neg Hx      I have reviewed and agree with the history as documented  E-Cigarette/Vaping   • E-Cigarette Use Never User      E-Cigarette/Vaping Substances   • Nicotine No    • THC No    • CBD No    • Flavoring No    • Other No    • Unknown No      Social History     Tobacco Use   • Smoking status: Former     Packs/day: 0 25     Types: Cigarettes     Quit date: 11/10/2001     Years since quittin 2   • Smokeless tobacco: Former   Vaping Use   • Vaping Use: Never used   Substance Use Topics   • Alcohol use: Not Currently   • Drug use: Not Currently     Comment: RSO       Review of Systems   Constitutional: Positive for chills and fatigue  Gastrointestinal: Positive for abdominal pain, nausea and vomiting  All other systems reviewed and are negative  Physical Exam  Physical Exam  Vitals and nursing note reviewed  Constitutional:       General: He is not in acute distress  Appearance: Normal appearance  He is normal weight  He is not ill-appearing, toxic-appearing or diaphoretic  HENT:      Head: Normocephalic and atraumatic  Right Ear: External ear normal       Left Ear: External ear normal       Nose: Nose normal  No congestion or rhinorrhea        Mouth/Throat: Mouth: Mucous membranes are moist       Pharynx: Oropharynx is clear  Eyes:      General: No scleral icterus  Right eye: No discharge  Left eye: No discharge  Extraocular Movements: Extraocular movements intact  Conjunctiva/sclera: Conjunctivae normal       Pupils: Pupils are equal, round, and reactive to light  Cardiovascular:      Rate and Rhythm: Normal rate and regular rhythm  Pulses: Normal pulses  Heart sounds: Normal heart sounds  Pulmonary:      Effort: Pulmonary effort is normal  No respiratory distress  Breath sounds: Normal breath sounds  Abdominal:      General: Abdomen is flat  There is no distension  Palpations: Abdomen is soft  There is no mass  Tenderness: There is abdominal tenderness in the left upper quadrant and left lower quadrant  There is no right CVA tenderness, left CVA tenderness, guarding or rebound  Hernia: No hernia is present  Musculoskeletal:         General: No swelling  Normal range of motion  Cervical back: Normal range of motion and neck supple  Skin:     General: Skin is warm and dry  Capillary Refill: Capillary refill takes less than 2 seconds  Neurological:      General: No focal deficit present  Mental Status: He is alert and oriented to person, place, and time  Mental status is at baseline     Psychiatric:         Mood and Affect: Mood normal          Behavior: Behavior normal          Vital Signs  ED Triage Vitals   Temperature Pulse Respirations Blood Pressure SpO2   01/20/23 0837 01/20/23 0837 01/20/23 0837 01/20/23 0837 01/20/23 0837   97 9 °F (36 6 °C) 63 18 155/83 98 %      Temp Source Heart Rate Source Patient Position - Orthostatic VS BP Location FiO2 (%)   01/20/23 0837 01/20/23 0837 01/20/23 0837 01/20/23 0837 --   Oral Monitor Lying Right arm       Pain Score       01/20/23 1000       8           Vitals:    01/20/23 0837 01/20/23 1112   BP: 155/83 152/80   Pulse: 63 58   Patient Position - Orthostatic VS: Lying Lying         Visual Acuity      ED Medications  Medications   piperacillin-tazobactam (ZOSYN) 3 375 g in sodium chloride 0 9 % 100 mL IVPB (has no administration in time range)   sodium chloride 0 9 % bolus 1,000 mL (0 mL Intravenous Stopped 1/20/23 1059)   HYDROmorphone (DILAUDID) injection 0 5 mg (0 5 mg Intravenous Given 1/20/23 1000)   ondansetron (ZOFRAN) injection 4 mg (4 mg Intravenous Given 1/20/23 0958)   ceftriaxone (ROCEPHIN) 1 g/50 mL in dextrose IVPB (0 mg Intravenous Stopped 1/20/23 1100)   metroNIDAZOLE (FLAGYL) IVPB (premix) 500 mg 100 mL (500 mg Intravenous New Bag 1/20/23 1112)       Diagnostic Studies  Results Reviewed     Procedure Component Value Units Date/Time    Stool Enteric Bacterial Panel by PCR [[id number]]     Lab Status: No result Specimen: Stool from Rectum     Clostridium difficile toxin by PCR with EIA [[id number]]     Lab Status: No result Specimen: Stool from Per Rectum     Comprehensive metabolic panel [[id number]]  (Abnormal) Collected: 01/20/23 0944    Lab Status: Final result Specimen: Blood from Arm, Left Updated: 01/20/23 1027     Sodium 137 mmol/L      Potassium 4 1 mmol/L      Chloride 100 mmol/L      CO2 29 mmol/L      ANION GAP 8 mmol/L      BUN 18 mg/dL      Creatinine 0 94 mg/dL      Glucose 215 mg/dL      Calcium 9 4 mg/dL      AST 23 U/L      ALT 24 U/L      Alkaline Phosphatase 92 U/L      Total Protein 7 5 g/dL      Albumin 4 2 g/dL      Total Bilirubin 0 51 mg/dL      eGFR 80 ml/min/1 73sq m     Narrative:      [place] guidelines for Chronic Kidney Disease (CKD):   •  Stage 1 with normal or high GFR (GFR > 90 mL/min/1 73 square meters)  •  Stage 2 Mild CKD (GFR = 60-89 mL/min/1 73 square meters)  •  Stage 3A Moderate CKD (GFR = 45-59 mL/min/1 73 square meters)  •  Stage 3B Moderate CKD (GFR = 30-44 mL/min/1 73 square meters)  •  Stage 4 Severe CKD (GFR = 15-29 mL/min/1 73 square meters)  •  Stage 5 End Stage CKD (GFR <15 mL/min/1 73 square meters)  Note: GFR calculation is accurate only with a steady state creatinine    Lipase [[id number]]  (Normal) Collected: 01/20/23 0944    Lab Status: Final result Specimen: Blood from Arm, Left Updated: 01/20/23 1027     Lipase 27 u/L     Lactic acid [[id number]]  (Normal) Collected: 01/20/23 0944    Lab Status: Final result Specimen: Blood from Arm, Left Updated: 01/20/23 1025     LACTIC ACID 1 4 mmol/L     Narrative:      Result may be elevated if tourniquet was used during collection      Urine Microscopic [[id number]]  (Abnormal) Collected: 01/20/23 0955    Lab Status: Final result Specimen: Urine, Clean Catch Updated: 01/20/23 1013     RBC, UA 1-2 /hpf      WBC, UA 2-4 /hpf      Epithelial Cells Occasional /hpf      Bacteria, UA Occasional /hpf     UA w Reflex to Microscopic w Reflex to Culture [[id number]]  (Abnormal) Collected: 01/20/23 0955    Lab Status: Final result Specimen: Urine, Clean Catch Updated: 01/20/23 1012     Color, UA Yellow     Clarity, UA Clear     Specific Gravity, UA 1 023     pH, UA 6 5     Leukocytes, UA Negative     Nitrite, UA Negative     Protein, UA Trace mg/dl      Glucose, UA Negative mg/dl      Ketones, UA 10 (1+) mg/dl      Urobilinogen, UA <2 0 mg/dl      Bilirubin, UA Negative     Occult Blood, UA Negative    CBC and differential [[id number]]  (Abnormal) Collected: 01/20/23 0944    Lab Status: Final result Specimen: Blood from Arm, Left Updated: 01/20/23 1000     WBC 7 80 Thousand/uL      RBC 4 84 Million/uL      Hemoglobin 14 5 g/dL      Hematocrit 44 0 %      MCV 91 fL      MCH 30 0 pg      MCHC 33 0 g/dL      RDW 13 2 %      MPV 12 3 fL      Platelets 216 Thousands/uL      nRBC 0 /100 WBCs      Neutrophils Relative 83 %      Immat GRANS % 1 %      Lymphocytes Relative 7 %      Monocytes Relative 7 %      Eosinophils Relative 1 %      Basophils Relative 1 %      Neutrophils Absolute 6 55 Thousands/µL      Immature Grans Absolute 0 05 Thousand/uL Lymphocytes Absolute 0 53 Thousands/µL      Monocytes Absolute 0 52 Thousand/µL      Eosinophils Absolute 0 10 Thousand/µL      Basophils Absolute 0 05 Thousands/µL     Blood culture #2 [[id number]] Collected: 01/20/23 0944    Lab Status: In process Specimen: Blood from Hand, Right Updated: 01/20/23 0954    Blood culture #1 [[id number]] Collected: 01/20/23 0944    Lab Status: In process Specimen: Blood from Arm, Left Updated: 01/20/23 0954                 XR abdomen obstruction series   ED Interpretation by Edgar Rose DO (01/20 1040)   No acute abnormalities  Final Result by Claudeen Rucks, MD (01/20 1113)      Nonobstructive bowel gas pattern  Workstation performed: [id number]                    Procedures  Procedures         ED Course  ED Course as of 01/20/23 1229   Fri Jan 20, 2023   1116 Discussed case with Dr Saundra Mancilla, except to service, inpatient  Acute colitis versus diverticulitis, with associated vomiting and pain, not tolerating oral intake or antibiotics at home, with prior history of diverticulitis, status post hemicolectomy  IV antibiotics given in the ED, ceftriaxone and Flagyl  IV fluids, Zofran and Dilaudid as well  Patient states that he just does not feel comfortable going home considering his vomiting, pain, and not tolerating orals  SBIRT 20yo+    Flowsheet Row Most Recent Value   SBIRT (23 yo +)    In order to provide better care to our patients, we are screening all of our patients for alcohol and drug use  Would it be okay to ask you these screening questions? No Filed at: 01/20/2023 5396                    Medical Decision Making  Acute colitis: acute illness or injury  Vomiting: acute illness or injury  Amount and/or Complexity of Data Reviewed  Labs: ordered  Radiology: ordered and independent interpretation performed  Risk  Prescription drug management  Decision regarding hospitalization            Disposition  Final diagnoses:   Acute colitis - failed outpatient treatment   Vomiting     Time reflects when diagnosis was documented in both MDM as applicable and the Disposition within this note     Time User Action Codes Description Comment    1/20/2023 11:18 AM Lysle Frizzle Add [K52 9] Acute colitis     1/20/2023 11:18 AM Lysle Frizzle Add [R11 10] Vomiting     1/20/2023 11:18 AM Lysle Frizzle Modify [K52 9] Acute colitis failed outpatient treatment      ED Disposition     ED Disposition   Admit    Condition   Stable    Date/Time   Fri Jan 20, 2023 11:18 AM    Comment   Case was discussed with Dr Stanton Velasquez and the patient's admission status was agreed to be Admission Status: inpatient status to the service of Dr Stanton Velasquez   Follow-up Information    None         Patient's Medications   Discharge Prescriptions    No medications on file       No discharge procedures on file      PDMP Review       Value Time User    PDMP Reviewed  Yes 8/17/2022  4:28 PM Asha Wallace DDS          ED Provider  Electronically Signed by           Adam Latham DO  01/20/23 9203

## 2023-01-21 LAB
ANION GAP SERPL CALCULATED.3IONS-SCNC: 7 MMOL/L (ref 4–13)
BUN SERPL-MCNC: 13 MG/DL (ref 5–25)
CALCIUM SERPL-MCNC: 8.3 MG/DL (ref 8.4–10.2)
CHLORIDE SERPL-SCNC: 106 MMOL/L (ref 96–108)
CO2 SERPL-SCNC: 24 MMOL/L (ref 21–32)
CREAT SERPL-MCNC: 0.87 MG/DL (ref 0.6–1.3)
ERYTHROCYTE [DISTWIDTH] IN BLOOD BY AUTOMATED COUNT: 13.1 % (ref 11.6–15.1)
GFR SERPL CREATININE-BSD FRML MDRD: 86 ML/MIN/1.73SQ M
GLUCOSE SERPL-MCNC: 104 MG/DL (ref 65–140)
HCT VFR BLD AUTO: 38.3 % (ref 36.5–49.3)
HGB BLD-MCNC: 12.6 G/DL (ref 12–17)
MAGNESIUM SERPL-MCNC: 2 MG/DL (ref 1.9–2.7)
MCH RBC QN AUTO: 30.5 PG (ref 26.8–34.3)
MCHC RBC AUTO-ENTMCNC: 32.9 G/DL (ref 31.4–37.4)
MCV RBC AUTO: 93 FL (ref 82–98)
PLATELET # BLD AUTO: 134 THOUSANDS/UL (ref 149–390)
PMV BLD AUTO: 12.4 FL (ref 8.9–12.7)
POTASSIUM SERPL-SCNC: 4 MMOL/L (ref 3.5–5.3)
RBC # BLD AUTO: 4.13 MILLION/UL (ref 3.88–5.62)
SODIUM SERPL-SCNC: 137 MMOL/L (ref 135–147)
WBC # BLD AUTO: 5.74 THOUSAND/UL (ref 4.31–10.16)

## 2023-01-21 RX ORDER — ONDANSETRON 4 MG/1
4 TABLET, ORALLY DISINTEGRATING ORAL EVERY 6 HOURS PRN
Status: DISCONTINUED | OUTPATIENT
Start: 2023-01-21 | End: 2023-01-22 | Stop reason: HOSPADM

## 2023-01-21 RX ORDER — LANOLIN ALCOHOL/MO/W.PET/CERES
3 CREAM (GRAM) TOPICAL
Status: DISCONTINUED | OUTPATIENT
Start: 2023-01-21 | End: 2023-01-22 | Stop reason: HOSPADM

## 2023-01-21 RX ORDER — POLYETHYLENE GLYCOL 3350 17 G/17G
17 POWDER, FOR SOLUTION ORAL DAILY PRN
Status: DISCONTINUED | OUTPATIENT
Start: 2023-01-21 | End: 2023-01-21

## 2023-01-21 RX ORDER — MAGNESIUM HYDROXIDE/ALUMINUM HYDROXICE/SIMETHICONE 120; 1200; 1200 MG/30ML; MG/30ML; MG/30ML
30 SUSPENSION ORAL EVERY 6 HOURS
Status: DISCONTINUED | OUTPATIENT
Start: 2023-01-21 | End: 2023-01-21

## 2023-01-21 RX ORDER — AMOXICILLIN 250 MG
1 CAPSULE ORAL 2 TIMES DAILY
Status: DISCONTINUED | OUTPATIENT
Start: 2023-01-21 | End: 2023-01-22 | Stop reason: HOSPADM

## 2023-01-21 RX ORDER — POLYETHYLENE GLYCOL 3350 17 G/17G
17 POWDER, FOR SOLUTION ORAL DAILY
Status: DISCONTINUED | OUTPATIENT
Start: 2023-01-21 | End: 2023-01-22 | Stop reason: HOSPADM

## 2023-01-21 RX ORDER — MAGNESIUM HYDROXIDE/ALUMINUM HYDROXICE/SIMETHICONE 120; 1200; 1200 MG/30ML; MG/30ML; MG/30ML
30 SUSPENSION ORAL
Status: DISCONTINUED | OUTPATIENT
Start: 2023-01-21 | End: 2023-01-22 | Stop reason: HOSPADM

## 2023-01-21 RX ADMIN — LOSARTAN POTASSIUM 25 MG: 25 TABLET, FILM COATED ORAL at 10:32

## 2023-01-21 RX ADMIN — POLYETHYLENE GLYCOL 3350 17 G: 17 POWDER, FOR SOLUTION ORAL at 13:10

## 2023-01-21 RX ADMIN — HYDROMORPHONE HYDROCHLORIDE 0.5 MG: 1 INJECTION, SOLUTION INTRAMUSCULAR; INTRAVENOUS; SUBCUTANEOUS at 00:39

## 2023-01-21 RX ADMIN — FLUTICASONE PROPIONATE 2 PUFF: 110 AEROSOL, METERED RESPIRATORY (INHALATION) at 11:18

## 2023-01-21 RX ADMIN — INSULIN ASPART 10 UNITS: 100 INJECTION, SUSPENSION SUBCUTANEOUS at 17:02

## 2023-01-21 RX ADMIN — ATORVASTATIN CALCIUM 10 MG: 10 TABLET, FILM COATED ORAL at 10:32

## 2023-01-21 RX ADMIN — ONDANSETRON 4 MG: 2 INJECTION INTRAMUSCULAR; INTRAVENOUS at 02:51

## 2023-01-21 RX ADMIN — SENNOSIDES AND DOCUSATE SODIUM 1 TABLET: 8.6; 5 TABLET ORAL at 17:02

## 2023-01-21 RX ADMIN — PANTOPRAZOLE SODIUM 40 MG: 40 TABLET, DELAYED RELEASE ORAL at 05:01

## 2023-01-21 RX ADMIN — PIPERACILLIN AND TAZOBACTAM 4.5 G: 36; 4.5 INJECTION, POWDER, FOR SOLUTION INTRAVENOUS at 10:32

## 2023-01-21 RX ADMIN — Medication 3 MG: at 21:03

## 2023-01-21 RX ADMIN — ALUMINA, MAGNESIA, AND SIMETHICONE ORAL SUSPENSION REGULAR STRENGTH 30 ML: 1200; 1200; 120 SUSPENSION ORAL at 17:02

## 2023-01-21 RX ADMIN — SENNOSIDES AND DOCUSATE SODIUM 1 TABLET: 8.6; 5 TABLET ORAL at 13:10

## 2023-01-21 RX ADMIN — INSULIN ASPART 10 UNITS: 100 INJECTION, SUSPENSION SUBCUTANEOUS at 10:32

## 2023-01-21 RX ADMIN — PIPERACILLIN AND TAZOBACTAM 4.5 G: 36; 4.5 INJECTION, POWDER, FOR SOLUTION INTRAVENOUS at 04:48

## 2023-01-21 RX ADMIN — HYDROMORPHONE HYDROCHLORIDE 0.5 MG: 1 INJECTION, SOLUTION INTRAMUSCULAR; INTRAVENOUS; SUBCUTANEOUS at 10:31

## 2023-01-21 RX ADMIN — FLUTICASONE PROPIONATE 2 PUFF: 110 AEROSOL, METERED RESPIRATORY (INHALATION) at 21:03

## 2023-01-21 RX ADMIN — SODIUM CHLORIDE 100 ML/HR: 0.9 INJECTION, SOLUTION INTRAVENOUS at 00:43

## 2023-01-21 RX ADMIN — ENOXAPARIN SODIUM 40 MG: 40 INJECTION SUBCUTANEOUS at 10:32

## 2023-01-21 NOTE — UTILIZATION REVIEW
Initial Clinical Review    Admission: Date/Time/Statement:   Admission Orders (From admission, onward)     Ordered        01/20/23 1119  INPATIENT ADMISSION  Once                      Orders Placed This Encounter   Procedures   • INPATIENT ADMISSION     Standing Status:   Standing     Number of Occurrences:   1     Order Specific Question:   Level of Care     Answer:   Med Surg [16]     Order Specific Question:   Estimated length of stay     Answer:   More than 2 Midnights     Order Specific Question:   Certification     Answer:   I certify that inpatient services are medically necessary for this patient for a duration of greater than two midnights  See H&P and MD Progress Notes for additional information about the patient's course of treatment  ED Arrival Information     Expected   -    Arrival   1/20/2023 08:31    Acuity   Urgent            Means of arrival   Walk-In    Escorted by   Self    Service   Hospitalist    Admission type   Emergency            Arrival complaint   vomitting/abd pain           Chief Complaint   Patient presents with   • Flu Symptoms     Pt states to have been seen in our ED yesterday for same symptoms- pt reports LLQ abdominal pain,N/V, back pain, fatigue, SOB with exertion        Initial Presentation: 67 y o  male re presents self to ED w ABD pain & nausea  Reported day prior w same stating ongoin symptoms for 2 wks  Since reports intensifying symptoms now not tolerating diet w pain on L side ABD radiates into chest & down to groin, vomiting since first eval when DC w prescribed meds not able to take them   PMH diabetes mellitus, prostate cancer status postradiation therapy, history of gastric bypass surgery, hypertension, hyperlipidemia and gastroesophageal reflux disease   EXAM  CT reveals possible diverticulitis or colitis & had been DCd on Cipro & Metronidazole  Inpatient admission due to Colitis     IVF, pain management, antiemetics Obtain stool enteric panel, CDIFF,  OP GI, cont w IV Zosyn  Cont OP Urology, SSI  Date:  1/21/2023   Day 2:   Provider Reports significant bloating W intermittent nausea wo vomiting; no BM for 4 days; received antibx in ED & DCd  Start bowel regimen Senna barbara BID & daily Bing LAX monitor for BM, Mylata for flatulence; IVF Hydration & PRN anti emetics; no labs, OP GI  SSI   Date:  1/22/2023    Day 3: Has surpassed a 2nd midnight with active treatments and services  IVF DC 1/21 11:57 PM,  Cont glucose checks, SSI & PO meds incl antiemetic, bowel regimen  Afebrile   Nursing notes ABD distended w normoactive BS, no stool, still constipated  ED Triage Vitals   Temperature Pulse Respirations Blood Pressure SpO2   01/20/23 0837 01/20/23 0837 01/20/23 0837 01/20/23 0837 01/20/23 0837   97 9 °F (36 6 °C) 63 18 155/83 98 %      Temp Source Heart Rate Source Patient Position - Orthostatic VS BP Location FiO2 (%)   01/20/23 0837 01/20/23 0837 01/20/23 0837 01/20/23 0837 --   Oral Monitor Lying Right arm       Pain Score       01/20/23 1000       8          Wt Readings from Last 1 Encounters:   01/20/23 106 kg (234 lb 5 6 oz)     Additional Vital Signs:   Date/Time Temp Pulse Resp BP MAP (mmHg) SpO2 O2 Device Patient Position - Orthostatic VS   01/22/23 09:07:49 97 8 °F (36 6 °C) 67 -- 145/68 94 94 % -- --   01/22/23 08:29:09 -- 65 -- 139/75 96 92 % -- --   01/21/23 21:55:34 97 8 °F (36 6 °C) 58 17 128/63 85 93 % -- --   01/21/23 16:44:13 98 2 °F (36 8 °C) 63 -- 123/65 84 96 % --        Date/Time Temp Pulse Resp BP MAP (mmHg) SpO2 O2 Device Patient Position - Orthostatic VS   01/21/23 0900 -- 53 Abnormal  -- -- -- -- -- --   01/21/23 07:53:36 97 5 °F (36 4 °C) 60 18 127/67 87 98 % -- --   01/20/23 21:55:12 97 2 °F (36 2 °C) Abnormal  64 -- 117/50 72 91 % -- --   01/20/23 1500 -- -- -- -- -- -- None (Room air) --   01/20/23 14:14:33 97 7 °F (36 5 °C) 53 Abnormal  -- 127/63 84 97 % -- --   01/20/23 13:08:01 97 3 °F (36 3 °C) Abnormal  56 -- 130/66 87 97 % -- --   01/20/23 1248 98 °F (36 7 °C) 55 18 129/67 -- 96 % None (Room air) Lying   01/20/23 1112 -- 58 17 152/80 -- 98 % None (Room air) Lying   01/20/23 0843 -- -- -- -- -- -- None (Room air) --   01/20/23 0837 97 9 °F (36 6 °C) 63 18 155/83 -- 98 % None (Room air) Lying     Weights (last 14 days)    Date/Time Weight Weight Method Height   01/20/23 1351 106 kg (234 lb 5 6 oz) Bed scale 5' 7" (1 702 m)       Pertinent Labs/Diagnostic Test Results:   XR abdomen obstruction series   ED Interpretation by Davion Perez DO (01/20 1040)   No acute abnormalities  Final Result by Lucas Shaikh MD (01/20 1113)      Nonobstructive bowel gas pattern  1/19/2023 CT ABDOMEN AND PELVIS WITH IV CONTRAST   Short segment of circumferential wall thickening involving the sigmoid colon   This is favored to represent pseudothickening secondary to nondistention, however, a mild nonspecific colitis may have a similar appearance   There is diverticulosis   without evidence of focal inflammatory changes to suggest acute diverticulitis  2   Small Wan-type hernia involving a loop of small bowel within the right mid abdomen   No CT evidence of associated consultation  3   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 small hiatal hernia  4   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   Mildly complex exophytic left renal cyst also is stable         Results from last 7 days   Lab Units 01/21/23  0452 01/20/23  0944 01/19/23  0846   WBC Thousand/uL 5 74 7 80 8 75   HEMOGLOBIN g/dL 12 6 14 5 13 4   HEMATOCRIT % 38 3 44 0 40 9   PLATELETS Thousands/uL 134* 158 145*   NEUTROS ABS Thousands/µL  --  6 55 7 46         Results from last 7 days   Lab Units 01/21/23  0452 01/20/23  0944 01/19/23  0846   SODIUM mmol/L 137 137 136   POTASSIUM mmol/L 4 0 4 1 4 3   CHLORIDE mmol/L 106 100 103   CO2 mmol/L 24 29 27   ANION GAP mmol/L 7 8 6   BUN mg/dL 13 18 19   CREATININE mg/dL 0 87 0 94 0 93   EGFR ml/min/1 73sq m 86 80 81   CALCIUM mg/dL 8 3* 9 4 9 2   MAGNESIUM mg/dL 2 0  --   --      Results from last 7 days   Lab Units 01/20/23  0944 01/19/23  0846   AST U/L 23 22   ALT U/L 24 19   ALK PHOS U/L 92 90   TOTAL PROTEIN g/dL 7 5 7 0   ALBUMIN g/dL 4 2 3 9   TOTAL BILIRUBIN mg/dL 0 51 0 55     Results from last 7 days   Lab Units 01/20/23  1318   POC GLUCOSE mg/dl 127     Results from last 7 days   Lab Units 01/21/23  0452 01/20/23  0944 01/19/23  0846   GLUCOSE RANDOM mg/dL 104 215* 261*             No results found for: BETA-HYDROXYBUTYRATE                                       Results from last 7 days   Lab Units 01/20/23  0944   LACTIC ACID mmol/L 1 4                         Results from last 7 days   Lab Units 01/20/23  0944 01/19/23  0846   LIPASE u/L 27 57                 Results from last 7 days   Lab Units 01/20/23  0955   CLARITY UA  Clear   COLOR UA  Yellow   SPEC GRAV UA  1 023   PH UA  6 5   GLUCOSE UA mg/dl Negative   KETONES UA mg/dl 10 (1+)*   BLOOD UA  Negative   PROTEIN UA mg/dl Trace*   NITRITE UA  Negative   BILIRUBIN UA  Negative   UROBILINOGEN UA (BE) mg/dl <2 0   LEUKOCYTES UA  Negative   WBC UA /hpf 2-4*   RBC UA /hpf 1-2   BACTERIA UA /hpf Occasional   EPITHELIAL CELLS WET PREP /hpf Occasional                                 Results from last 7 days   Lab Units 01/20/23  0944   BLOOD CULTURE  Received in Microbiology Lab  Culture in Progress  Received in Microbiology Lab  Culture in Progress         ED Treatment:   Medication Administration from 01/20/2023 0831 to 01/20/2023 1303       Date/Time Order Dose Route Action     01/20/2023 0958 EST sodium chloride 0 9 % bolus 1,000 mL 1,000 mL Intravenous New Bag     01/20/2023 1000 EST HYDROmorphone (DILAUDID) injection 0 5 mg 0 5 mg Intravenous Given     01/20/2023 0958 EST ondansetron (ZOFRAN) injection 4 mg 4 mg Intravenous Given     01/20/2023 1001 EST ceftriaxone (ROCEPHIN) 1 g/50 mL in dextrose IVPB 1,000 mg Intravenous New Bag     01/20/2023 1112 EST metroNIDAZOLE (FLAGYL) IVPB (premix) 500 mg 100 mL 500 mg Intravenous New Bag        Past Medical History:   Diagnosis Date   • Anemia    • Arthritis    • Black stool 07/24/2020   • Cancer ([place] [address] )    • Colon polyp    • Diabetes mellitus (HCC)     IDDM   • Diverticulosis    • Enlarged prostate    • Erectile dysfunction    • Hypercholesteremia     Resolved post weight loss   • Hypertension     Resolved post weight loss   • Kidney stone    • Obesity    • Prostate cancer ([place] [address] )    • Wears partial dentures     partial upper and lower     Present on Admission:  • Benign essential hypertension  • Prostate cancer ([place] [address] )  • IPMN (intraductal papillary mucinous neoplasm)  • Postgastrectomy malabsorption  • GERD (gastroesophageal reflux disease)      Admitting Diagnosis: Vomiting [R11 10]  Acute colitis [K52 9]  Age/Sex: 67 y o  male  Admission Orders:  Spot pulse OX  Surgical soft diet    Scheduled Medications:  atorvastatin, 10 mg, Oral, Daily  enoxaparin, 40 mg, Subcutaneous, Daily  fluticasone, 2 puff, Inhalation, BID  insulin aspart protamine-insulin aspart, 10 Units, Subcutaneous, BID AC  losartan, 25 mg, Oral, Daily  pantoprazole, 40 mg, Oral, Early Morning  polyethylene glycol, 17 g, Oral, Daily  senna-docusate sodium, 1 tablet, Oral, BID    Continuous IV Infusions:  sodium chloride, 100 mL/hr, Intravenous, Continuous      PRN Meds:  acetaminophen, 650 mg, Oral, Q4H PRN  albuterol, 2 puff, Inhalation, Q6H PRN  aluminum-magnesium hydroxide-simethicone, 30 mL, Oral, Q6H PRN  calcium carbonate, 1,000 mg, Oral, Daily PRN  fluticasone, 2 spray, Each Nare, Daily PRN  HYDROmorphone, 0 5 mg, Intravenous, Q4H PRN 1/20x1; 1/21 x2  ondansetron, 4 mg, Intravenous, Q6H PRN 1/21 x1 & DCd  oxyCODONE, 5 mg, Oral, Q4H PRN 1/22 x1  PO=  ondansetron (ZOFRAN-ODT) dispersible tablet 4 mg 1/22 x1  Dose: 4 mg  Freq: Every 6 hours PRN Route: PO    Network Utilization Review Department  ATTENTION: Please call with any questions or concerns to [phone] and carefully listen to the prompts so that you are directed to the right person  All voicemails are confidential   Masonkavon Mccartyjohn all requests for admission clinical reviews, approved or denied determinations and any other requests to dedicated fax number below belonging to the campus where the patient is receiving treatment   List of dedicated fax numbers for the Facilities:  [address] DENIALS (Administrative/Medical Necessity) [phone]   [address] (Maternity/NICU/Pediatrics) [phone]   3 Dasha Rivera [phone]   [facility] [place] [address] [phone]   [address] [place] [address] [address] Garcia 28 [phone]   1550 [facility] [place] [place] [facility] 134 815 [facility] [phone]

## 2023-01-21 NOTE — PLAN OF CARE
Problem: PAIN - ADULT  Goal: Verbalizes/displays adequate comfort level or baseline comfort level  Description: Interventions:  - Encourage patient to monitor pain and request assistance  - Assess pain using appropriate pain scale  - Administer analgesics based on type and severity of pain and evaluate response  - Implement non-pharmacological measures as appropriate and evaluate response  - Consider cultural and social influences on pain and pain management  - Notify physician/advanced practitioner if interventions unsuccessful or patient reports new pain  Outcome: Progressing     Problem: INFECTION - ADULT  Goal: Absence or prevention of progression during hospitalization  Description: INTERVENTIONS:  - Assess and monitor for signs and symptoms of infection  - Monitor lab/diagnostic results  - Monitor all insertion sites, i e  indwelling lines, tubes, and drains  - Monitor endotracheal if appropriate and nasal secretions for changes in amount and color  - [place] appropriate cooling/warming therapies per order  - Administer medications as ordered  - Instruct and encourage patient and family to use good hand hygiene technique  - Identify and instruct in appropriate isolation precautions for identified infection/condition  Outcome: Progressing     Problem: SAFETY ADULT  Goal: Patient will remain free of falls  Description: INTERVENTIONS:  - Educate patient/family on patient safety including physical limitations  - Instruct patient to call for assistance with activity   - Consult OT/PT to assist with strengthening/mobility   - Keep Call bell within reach  - Keep bed low and locked with side rails adjusted as appropriate  - Keep care items and personal belongings within reach  - Initiate and maintain comfort rounds  - Make Fall Risk Sign visible to staff  - Offer Toileting every  Hours, in advance of need  - Initiate/Maintain alarm  - Obtain necessary fall risk management equipment:   - Apply yellow socks and bracelet for high fall risk patients  - Consider moving patient to room near nurses station  Outcome: Progressing  Goal: Maintain or return to baseline ADL function  Description: INTERVENTIONS:  -  Assess patient's ability to carry out ADLs; assess patient's baseline for ADL function and identify physical deficits which impact ability to perform ADLs (bathing, care of mouth/teeth, toileting, grooming, dressing, etc )  - Assess/evaluate cause of self-care deficits   - Assess range of motion  - Assess patient's mobility; develop plan if impaired  - Assess patient's need for assistive devices and provide as appropriate  - Encourage maximum independence but intervene and supervise when necessary  - Involve family in performance of ADLs  - Assess for home care needs following discharge   - Consider OT consult to assist with ADL evaluation and planning for discharge  - Provide patient education as appropriate  Outcome: Progressing  Goal: Maintains/Returns to pre admission functional level  Description: INTERVENTIONS:  - Perform BMAT or MOVE assessment daily    - Set and communicate daily mobility goal to care team and patient/family/caregiver  - Collaborate with rehabilitation services on mobility goals if consulted  - Perform Range of Motion  times a day  - Reposition patient every  hours    - Dangle patient  times a day  - Stand patient times a day  - Ambulate patient  times a day  - Out of bed to chair times a day   - Out of bed for meals times a day  - Out of bed for toileting  - Record patient progress and toleration of activity level   Outcome: Progressing     Problem: DISCHARGE PLANNING  Goal: Discharge to home or other facility with appropriate resources  Description: INTERVENTIONS:  - Identify barriers to discharge w/patient and caregiver  - Arrange for needed discharge resources and transportation as appropriate  - Identify discharge learning needs (meds, wound care, etc )  - Arrange for interpretive services to assist at discharge as needed  - Refer to Case Management Department for coordinating discharge planning if the patient needs post-hospital services based on physician/advanced practitioner order or complex needs related to functional status, cognitive ability, or social support system  Outcome: Progressing     Problem: Knowledge Deficit  Goal: Patient/family/caregiver demonstrates understanding of disease process, treatment plan, medications, and discharge instructions  Description: Complete learning assessment and assess knowledge base    Interventions:  - Provide teaching at level of understanding  - Provide teaching via preferred learning methods  Outcome: Progressing

## 2023-01-21 NOTE — NURSING NOTE
Pt lost iv access  Nursing unable to achieve new access  Provider aware   Provider: Sudhir Rondon MD

## 2023-01-22 VITALS
SYSTOLIC BLOOD PRESSURE: 145 MMHG | OXYGEN SATURATION: 94 % | HEIGHT: 67 IN | BODY MASS INDEX: 36.78 KG/M2 | DIASTOLIC BLOOD PRESSURE: 68 MMHG | TEMPERATURE: 97.8 F | WEIGHT: 234.35 LBS | RESPIRATION RATE: 17 BRPM | HEART RATE: 67 BPM

## 2023-01-22 LAB — GLUCOSE SERPL-MCNC: 147 MG/DL (ref 65–140)

## 2023-01-22 RX ORDER — BISACODYL 10 MG
10 SUPPOSITORY, RECTAL RECTAL ONCE
Status: COMPLETED | OUTPATIENT
Start: 2023-01-22 | End: 2023-01-22

## 2023-01-22 RX ORDER — POLYETHYLENE GLYCOL 3350 17 G/17G
17 POWDER, FOR SOLUTION ORAL ONCE
Status: COMPLETED | OUTPATIENT
Start: 2023-01-22 | End: 2023-01-22

## 2023-01-22 RX ADMIN — ALUMINA, MAGNESIA, AND SIMETHICONE ORAL SUSPENSION REGULAR STRENGTH 30 ML: 1200; 1200; 120 SUSPENSION ORAL at 12:02

## 2023-01-22 RX ADMIN — POLYETHYLENE GLYCOL 3350 17 G: 17 POWDER, FOR SOLUTION ORAL at 12:02

## 2023-01-22 RX ADMIN — BISACODYL 10 MG: 10 SUPPOSITORY RECTAL at 12:02

## 2023-01-22 RX ADMIN — OXYCODONE HYDROCHLORIDE 5 MG: 5 TABLET ORAL at 07:21

## 2023-01-22 RX ADMIN — SENNOSIDES AND DOCUSATE SODIUM 1 TABLET: 8.6; 5 TABLET ORAL at 08:32

## 2023-01-22 RX ADMIN — PANTOPRAZOLE SODIUM 40 MG: 40 TABLET, DELAYED RELEASE ORAL at 05:32

## 2023-01-22 RX ADMIN — ALUMINA, MAGNESIA, AND SIMETHICONE ORAL SUSPENSION REGULAR STRENGTH 30 ML: 1200; 1200; 120 SUSPENSION ORAL at 07:21

## 2023-01-22 RX ADMIN — POLYETHYLENE GLYCOL 3350 17 G: 17 POWDER, FOR SOLUTION ORAL at 08:32

## 2023-01-22 RX ADMIN — ATORVASTATIN CALCIUM 10 MG: 10 TABLET, FILM COATED ORAL at 08:32

## 2023-01-22 RX ADMIN — FLUTICASONE PROPIONATE 2 PUFF: 110 AEROSOL, METERED RESPIRATORY (INHALATION) at 08:32

## 2023-01-22 RX ADMIN — INSULIN ASPART 10 UNITS: 100 INJECTION, SUSPENSION SUBCUTANEOUS at 08:32

## 2023-01-22 RX ADMIN — LOSARTAN POTASSIUM 25 MG: 25 TABLET, FILM COATED ORAL at 08:37

## 2023-01-22 RX ADMIN — ONDANSETRON 4 MG: 4 TABLET, ORALLY DISINTEGRATING ORAL at 08:32

## 2023-01-22 RX ADMIN — ENOXAPARIN SODIUM 40 MG: 40 INJECTION SUBCUTANEOUS at 08:32

## 2023-01-22 NOTE — DISCHARGE SUMMARY
[facility]  Discharge- Sadiq Quiñones 1950, 67 y o  male MRN: [id number]  Unit/Bed#: W -03 Encounter: [id number]  Primary Care Provider: David Fonseca MD   Date and time admitted to hospital: 1/20/2023  8:32 AM    * Colitis  Assessment & Plan  · Abdominal pain nausea vomiting x  2 weeks  Imaging shows work-up showed thickening of sigmoid colon concerning for colitis  Diverticulosis also noted  · Colonoscopy 7/20/2022: Polyp removed  Diverticulosis noted  · He reports 1 episode of diarrhea 4 days ago but has had no BM since  Nominal x-ray showed nonobstructive gas pattern  - BM on day on discharge  · Discontinued stool enteric panel and C  Difficile ordered given no further diarrhea  · Advance diet to surgical soft/light meal- advance as tolerated    Plan:  · Outpatient follow-up with GI    Prostate cancer [facility])  Assessment & Plan  · S/p radiation therapy  · Follows up with urology    Plan:  · Reports complication from radiation therapy including constipation  · Patient told to use stool softeners as needed  · Follow-up with Urology outpatient    IPMN (intraductal papillary mucinous neoplasm)  Assessment & Plan  · Patient already aware of this   Follows up with GI  · Imaging today shows stable findings    Plan:   · Follow-up with GI outpatient    Benign essential hypertension  Assessment & Plan  · Continue losartan monitor    Type 2 diabetes mellitus with hyperglycemia, with long-term current use of insulin [facility])  Assessment & Plan  Lab Results   Component Value Date    HGBA1C 8 3 (H) 01/13/2023       Recent Labs     01/20/23  1318   POCGLU 127       Blood Sugar Average: Last 72 hrs:  (P) 127     Plan:  · Continue home regimen    Postgastrectomy malabsorption  Assessment & Plan  · Continue vitamin supplementation    GERD (gastroesophageal reflux disease)  Assessment & Plan  Continue pantoprazole 40 mg    Medical Problems     Resolved Problems  Date Reviewed: 1/22/2023   None Discharging Resident: Anais Ruiz DO  Discharging Attending: Jorge Chavez MD  PCP: Bess Baez MD  Admission Date:   Admission Orders (From admission, onward)     Ordered        01/20/23 1119  INPATIENT ADMISSION  Once                      Discharge Date: 01/22/23    Consultations During Hospital Stay:  · None    Procedures Performed:   · None    Significant Findings / Test Results:   XR abdomen obstruction series   ED Interpretation by Chau Oshea DO (01/20 1040)   No acute abnormalities  Final Result by Carmen Steiner MD (01/20 1113)      Nonobstructive bowel gas pattern  Workstation performed: [id number]           CT A/P w contrast done on 1/19/23 -  Short segment of circumferential wall thickening involving the sigmoid colon  This is favored to represent pseudothickening secondary to nondistention, however, a mild nonspecific colitis may have a similar appearance  There is diverticulosis without evidence of focal inflammatory changes to suggest acute diverticulitis  Small Wan-type hernia involving a loop of small bowel within the right mid abdomen  No CT evidence of associated consultation  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 small hiatal hernia  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  Mildly complex exophytic left renal cyst also is stable    Incidental Findings:   · None    Test Results Pending at Discharge (will require follow up): · None     Outpatient Tests Requested:  · None    Complications:  None    Reason for Admission: Colitis    Hospital Course:   Roseanna Anaya is a 67 y o  male patient who originally presented to the hospital on 1/20/2023 due to colitis  Patient has a PMH of diabetes mellitus, prostate cancer status postradiation therapy, history of gastric bypass surgery, hypertension, hyperlipidemia, and gastroesophageal reflux disease who presents with abdominal pain and nausea for 2 weeks   Patient was recently seen in the ED on 1/19/23 for the same complaint  At that time, the CT abdomen/pelvis w/ contrast showed possible colitis or diverticulosis without any complications and he was sent home on Flagyl and Ciprofloxacin  Patient returned to the ED on 1/20/23 with severe pain along with vomiting  During this admission, the patient was feeling much better with pain control but was not having a bowel movement  With a bowel regimen added, the patient was able to have a bowel movement on the day of discharge  Patient advised to use a stool softener as needed at home and to follow up with GI outpatient for the IPMN and constipation  Of note, the patient stated that this constipation was a result of the his radiation therapy  Patient should also follow-up with Urology for further management  Patient should follow up with PCP, GI, and Urology upon discharge  Please see above list of diagnoses and related plan for additional information  Condition at Discharge: stable    Discharge Day Visit / Exam:   Subjective:  Patient is a 67year old male who presented to the ED for possible colitis  Today, patient states that he has some abdominal pain because he has had no BM  Otherwise, he states that he is feeling well  There were no acute events overnight  Vitals: Blood Pressure: 145/68 (01/22/23 0907)  Pulse: 67 (01/22/23 0907)  Temperature: 97 8 °F (36 6 °C) (01/22/23 0907)  Temp Source: Oral (01/20/23 1248)  Respirations: 17 (01/21/23 2155)  Height: 5' 7" (170 2 cm) (01/20/23 1351)  Weight - Scale: 106 kg (234 lb 5 6 oz) (01/20/23 1351)  SpO2: 94 % (01/22/23 0907)    Exam:   Physical Exam  Vitals reviewed  Constitutional:       General: He is not in acute distress  HENT:      Head: Normocephalic and atraumatic  Nose: Nose normal       Mouth/Throat:      Mouth: Mucous membranes are moist    Eyes:      Extraocular Movements: Extraocular movements intact        Conjunctiva/sclera: Conjunctivae normal       Pupils: Pupils are equal, round, and reactive to light  Cardiovascular:      Rate and Rhythm: Normal rate and regular rhythm  Pulses: Normal pulses  Heart sounds: Normal heart sounds  No murmur heard  No gallop  Pulmonary:      Effort: Pulmonary effort is normal  No respiratory distress  Breath sounds: Normal breath sounds  No wheezing or rales  Abdominal:      General: Bowel sounds are decreased  There is distension  Palpations: Abdomen is soft  Tenderness: There is no abdominal tenderness  There is no guarding or rebound  Musculoskeletal:         General: Normal range of motion  Cervical back: Normal range of motion and neck supple  Right lower leg: No edema  Left lower leg: No edema  Skin:     General: Skin is warm  Findings: No lesion or rash  Neurological:      General: No focal deficit present  Mental Status: He is alert  Mental status is at baseline  Psychiatric:         Mood and Affect: Mood normal          Behavior: Behavior normal          Judgment: Judgment normal        Discussion with Family: Updated  (life partner) via phone  Discharge instructions/Information to patient and family:   See after visit summary for information provided to patient and family  Provisions for Follow-Up Care:  See after visit summary for information related to follow-up care and any pertinent home health orders  Disposition:   Home    Planned Readmission: None    Discharge Medications:  See after visit summary for reconciled discharge medications provided to patient and/or family        **Please Note: This note may have been constructed using a voice recognition system**

## 2023-01-22 NOTE — ASSESSMENT & PLAN NOTE
Lab Results   Component Value Date    HGBA1C 8 3 (H) 01/13/2023       Recent Labs     01/20/23  1318   POCGLU 127       Blood Sugar Average: Last 72 hrs:  (P) 127     Plan:  · Continue home regimen

## 2023-01-22 NOTE — ASSESSMENT & PLAN NOTE
· Abdominal pain nausea vomiting x  2 weeks  Imaging shows work-up showed thickening of sigmoid colon concerning for colitis  Diverticulosis also noted  · Colonoscopy 7/20/2022: Polyp removed  Diverticulosis noted  · He reports 1 episode of diarrhea 4 days ago but has had no BM since  Nominal x-ray showed nonobstructive gas pattern  - BM on day on discharge  · Discontinued stool enteric panel and C   Difficile ordered given no further diarrhea  · Advance diet to surgical soft/light meal- advance as tolerated    Plan:  · Outpatient follow-up with GI

## 2023-01-22 NOTE — ASSESSMENT & PLAN NOTE
· Patient already aware of this   Follows up with GI  · Imaging today shows stable findings    Plan:   · Follow-up with GI outpatient

## 2023-01-22 NOTE — PROGRESS NOTES
[facility]   PROGRESS NOTEMawill [place], 1950, 67 y o  male MRN: [id number]   Unit/Bed#: W /W -01 Encounter: [id number]   Primary Care Physician: Mary Tafoya MD   Date and Time Admitted to Hospital: 1/20/2023  8:32 AM     Assessment/Plan:   * Colitis  Assessment & Plan  · Abdominal pain nausea vomiting x  2 weeks  Imaging shows work-up showed thickening of sigmoid colon concerning for colitis  Diverticulosis also noted  · Colonoscopy 7/20/2022: Polyp removed  Diverticulosis noted  · He reports 1 episode of diarrhea 4 days ago but has had no BM since  Nominal x-ray showed nonobstructive gas pattern  · Discontinued stool enteric panel and C  Difficile ordered given no further diarrhea  · Advance diet to surgical soft/light meal- advance as tolerated  · Received Cipro and Flagyl in ED and Zosyn x 1 dose -discontinued antibiotics given low suspicion for infectious cause -suspect possible stercoral colitis in the setting of chronic constipation  No BM since 4 days ago  · Start bowel regimen with Sennakot S bid and daily MiraLax - monitor for BM  · Mylanta 3 times daily AC for flatulence  · Zofran p o  as needed for nausea -given IV infiltrated and unable to obtain new   · IV fluid hydration   · Lab holiday given no IV and CBC/BMP wnl   · Outpatient follow-up with GI    Prostate cancer [facility])  Assessment & Plan  · S/p radiation therapy  · Follows up with urology  · Reports complication from radiation therapy including constipation - bowel regimen as above  · We will need to continue regimen on discharge    IPMN (intraductal papillary mucinous neoplasm)  Assessment & Plan  · Patient already aware of this    Follows up with GI  · Imaging today shows stable findings    Benign essential hypertension  Assessment & Plan  · Continue losartan monitor    Type 2 diabetes mellitus with hyperglycemia, with long-term current use of insulin [facility])  Assessment & Plan  Lab Results Component Value Date    HGBA1C 8 3 (H) 2023       Recent Labs     23  1318   POCGLU 127       Blood Sugar Average: Last 72 hrs:  (P) 127     Plan:  · Hold oral antidiabetics and start insulin sliding scale  · Accu-Cheks 4 times daily with meals and at bedtime  · Hypoglycemic protocol    Postgastrectomy malabsorption  Assessment & Plan  · Continue vitamin supplementation    GERD (gastroesophageal reflux disease)  Assessment & Plan  Continue pantoprazole 40 mg       VTE Pharmacologic Prophylaxis: VTE Score: 5 Moderate Risk (Score 3-4) - Pharmacological DVT Prophylaxis Ordered: enoxaparin (Lovenox)  Patient Centered Rounds:  Performed bedside rounds with nursing staff  Discussions with Specialists or Other Care Team Provider: None  Education and Discussions with Family / Patient: Updated  (wife) at bedside  Current Length of Stay: 1 day(s)  Current Patient Status: Inpatient   Discharge Plan: Anticipate discharge tomorrow to home  Code Status: Level 1 - Full Code    Subjective:   Patient was seen at bedside this a m  in no acute distress  Patient reports feeling somewhat better today but reports to having significant bloating and intermittent nausea without vomiting  No BM since 4 days ago  Denies fever, chills, dizziness/lightheadedness, headache, chest pain, palpitations, SOB, diarrhea, abdominal pain, or urinary symptoms  He is otherwise hemodynamically stable with no acute events overnight  Objective:     Vitals:   Temp (24hrs), Av 8 °F (36 6 °C), Min:97 5 °F (36 4 °C), Max:98 2 °F (36 8 °C)    Temp:  [97 5 °F (36 4 °C)-98 2 °F (36 8 °C)] 97 8 °F (36 6 °C)  HR:  [53-63] 58  Resp:  [17-18] 17  BP: (123-128)/(63-67) 128/63  SpO2:  [93 %-98 %] 93 %  Body mass index is 36 7 kg/m²  Input and Output Summary (last 24 hours):      Intake/Output Summary (Last 24 hours) at 2023 1101  Last data filed at 2023 0841  Gross per 24 hour   Intake 240 ml   Output --   Net 240 ml       Physical Exam  Constitutional:       General: He is not in acute distress  HENT:      Head: Normocephalic and atraumatic  Nose: Nose normal       Mouth/Throat:      Mouth: Mucous membranes are dry  Pharynx: Oropharynx is clear  Eyes:      Pupils: Pupils are equal, round, and reactive to light  Cardiovascular:      Rate and Rhythm: Normal rate and regular rhythm  Pulses: Normal pulses  Heart sounds: Normal heart sounds  Pulmonary:      Effort: Pulmonary effort is normal       Breath sounds: Normal breath sounds  Abdominal:      General: Bowel sounds are decreased  There is distension (Fluctulence)  Palpations: Abdomen is soft  Tenderness: There is no abdominal tenderness  There is no guarding or rebound  Musculoskeletal:         General: No swelling or tenderness  Cervical back: Normal range of motion and neck supple  Skin:     General: Skin is warm and dry  Findings: No lesion or rash  Neurological:      General: No focal deficit present  Mental Status: He is alert  Mental status is at baseline     Psychiatric:         Mood and Affect: Mood normal          Judgment: Judgment normal           Additional Data:     Labs:  Results from last 7 days   Lab Units 01/21/23  0452 01/20/23  0944   WBC Thousand/uL 5 74 7 80   HEMOGLOBIN g/dL 12 6 14 5   HEMATOCRIT % 38 3 44 0   PLATELETS Thousands/uL 134* 158   NEUTROS PCT %  --  83*   LYMPHS PCT %  --  7*   MONOS PCT %  --  7   EOS PCT %  --  1     Results from last 7 days   Lab Units 01/21/23  0452 01/20/23  0944   SODIUM mmol/L 137 137   POTASSIUM mmol/L 4 0 4 1   CHLORIDE mmol/L 106 100   CO2 mmol/L 24 29   BUN mg/dL 13 18   CREATININE mg/dL 0 87 0 94   ANION GAP mmol/L 7 8   CALCIUM mg/dL 8 3* 9 4   ALBUMIN g/dL  --  4 2   TOTAL BILIRUBIN mg/dL  --  0 51   ALK PHOS U/L  --  92   ALT U/L  --  24   AST U/L  --  23   GLUCOSE RANDOM mg/dL 104 215*         Results from last 7 days   Lab Units 01/20/23  1318 POC GLUCOSE mg/dl 127         Results from last 7 days   Lab Units 01/20/23  0944   LACTIC ACID mmol/L 1 4       Lines/Drains:  Invasive Devices     None                     Imaging: Reviewed pertinent radiology reports from this admission  Recent Cultures (last 7 days):   Results from last 7 days   Lab Units 01/20/23  0944   BLOOD CULTURE  No Growth at 24 hrs  No Growth at 24 hrs  Last 24 Hours Medication List:   Current Facility-Administered Medications   Medication Dose Route Frequency Provider Last Rate   • acetaminophen  650 mg Oral Q4H PRN Erin Garza MD     • albuterol  2 puff Inhalation Q6H PRN Erin Garza MD     • aluminum-magnesium hydroxide-simethicone  30 mL Oral TID AC Marla Hough MD     • atorvastatin  10 mg Oral Daily Erin Garza MD     • calcium carbonate  1,000 mg Oral Daily PRN Erin Garza MD     • enoxaparin  40 mg Subcutaneous Daily Erin Garza MD     • fluticasone  2 spray Each Nare Daily PRN Erin Garza MD     • fluticasone  2 puff Inhalation BID Erin Gazra MD     • HYDROmorphone  0 5 mg Intravenous Q4H PRN Erin Garza MD     • insulin aspart protamine-insulin aspart  10 Units Subcutaneous BID AC Erin Garza MD     • losartan  25 mg Oral Daily Erin Garza MD     • melatonin  3 mg Oral HS Marla Hough MD     • ondansetron  4 mg Oral Q6H PRN Marla Hough MD     • oxyCODONE  5 mg Oral Q4H PRN Erin Garza MD     • pantoprazole  40 mg Oral Early Morning Erin Garza MD     • polyethylene glycol  17 g Oral Daily Marla Hough MD     • senna-docusate sodium  1 tablet Oral BID Marla Hough MD     • sodium chloride  100 mL/hr Intravenous Continuous Erin Garza  mL/hr (01/21/23 0043)        Today, Patient Was Seen By: Marla Hough MD    **Please Note: This note may have been constructed using a voice recognition system  **

## 2023-01-22 NOTE — PLAN OF CARE
Problem: PAIN - ADULT  Goal: Verbalizes/displays adequate comfort level or baseline comfort level  Description: Interventions:  - Encourage patient to monitor pain and request assistance  - Assess pain using appropriate pain scale  - Administer analgesics based on type and severity of pain and evaluate response  - Implement non-pharmacological measures as appropriate and evaluate response  - Consider cultural and social influences on pain and pain management  - Notify physician/advanced practitioner if interventions unsuccessful or patient reports new pain  Outcome: Progressing     Problem: INFECTION - ADULT  Goal: Absence or prevention of progression during hospitalization  Description: INTERVENTIONS:  - Assess and monitor for signs and symptoms of infection  - Monitor lab/diagnostic results  - Monitor all insertion sites, i e  indwelling lines, tubes, and drains  - Monitor endotracheal if appropriate and nasal secretions for changes in amount and color  - [place] appropriate cooling/warming therapies per order  - Administer medications as ordered  - Instruct and encourage patient and family to use good hand hygiene technique  - Identify and instruct in appropriate isolation precautions for identified infection/condition  Outcome: Progressing     Problem: SAFETY ADULT  Goal: Patient will remain free of falls  Description: INTERVENTIONS:  - Educate patient/family on patient safety including physical limitations  - Instruct patient to call for assistance with activity   - Consult OT/PT to assist with strengthening/mobility   - Keep Call bell within reach  - Keep bed low and locked with side rails adjusted as appropriate  - Keep care items and personal belongings within reach  - Initiate and maintain comfort rounds  - Make Fall Risk Sign visible to staff  - Offer Toileting every  Hours, in advance of need  - Initiate/Maintain alarm  - Obtain necessary fall risk management equipment:   - Apply yellow socks and bracelet for high fall risk patients  - Consider moving patient to room near nurses station  Outcome: Progressing  Goal: Maintain or return to baseline ADL function  Description: INTERVENTIONS:  -  Assess patient's ability to carry out ADLs; assess patient's baseline for ADL function and identify physical deficits which impact ability to perform ADLs (bathing, care of mouth/teeth, toileting, grooming, dressing, etc )  - Assess/evaluate cause of self-care deficits   - Assess range of motion  - Assess patient's mobility; develop plan if impaired  - Assess patient's need for assistive devices and provide as appropriate  - Encourage maximum independence but intervene and supervise when necessary  - Involve family in performance of ADLs  - Assess for home care needs following discharge   - Consider OT consult to assist with ADL evaluation and planning for discharge  - Provide patient education as appropriate  Outcome: Progressing  Goal: Maintains/Returns to pre admission functional level  Description: INTERVENTIONS:  - Perform BMAT or MOVE assessment daily    - Set and communicate daily mobility goal to care team and patient/family/caregiver  - Collaborate with rehabilitation services on mobility goals if consulted  - Perform Range of Motion  times a day  - Reposition patient every  hours    - Dangle patient  times a day  - Stand patient  times a day  - Ambulate patient  times a day  - Out of bed to chair  times a day   - Out of bed for meals times a day  - Out of bed for toileting  - Record patient progress and toleration of activity level   Outcome: Progressing     Problem: DISCHARGE PLANNING  Goal: Discharge to home or other facility with appropriate resources  Description: INTERVENTIONS:  - Identify barriers to discharge w/patient and caregiver  - Arrange for needed discharge resources and transportation as appropriate  - Identify discharge learning needs (meds, wound care, etc )  - Arrange for interpretive services to assist at discharge as needed  - Refer to Case Management Department for coordinating discharge planning if the patient needs post-hospital services based on physician/advanced practitioner order or complex needs related to functional status, cognitive ability, or social support system  Outcome: Progressing     Problem: Knowledge Deficit  Goal: Patient/family/caregiver demonstrates understanding of disease process, treatment plan, medications, and discharge instructions  Description: Complete learning assessment and assess knowledge base    Interventions:  - Provide teaching at level of understanding  - Provide teaching via preferred learning methods  Outcome: Progressing

## 2023-01-22 NOTE — DISCHARGE INSTR - AVS FIRST PAGE
Dear Leia Andre,     It was our pleasure to care for you here at [facility]  It is our hope that we were always able to exceed the expected standards for your care during your stay  You were hospitalized due to colitis  You were cared for on the 4th floor by Aleena Fabian DO under the service of Brett Giles MD with the [facility] who covers for your primary care physician (PCP), Jin Meyer MD, while you were hospitalized  If you have any questions or concerns related to this hospitalization, you may contact us at [phone]  For follow up as well as any medication refills, we recommend that you follow up with your primary care physician  A registered nurse will reach out to you by phone within a few days after your discharge to answer any additional questions that you may have after going home  However, at this time we provide for you here, the most important instructions / recommendations at discharge:     Notable Medication Adjustments -   Stop taking the ciprofloxacin and metronidazole   Testing Required after Discharge -   None  Important follow up information -   PCP  GI  Urology  Other Instructions -   Take daily stool softeners as needed  Please review this entire after visit summary as additional general instructions including medication list, appointments, activity, diet, any pertinent wound care, and other additional recommendations from your care team that may be provided for you        Sincerely,     Aleena Fabian DO

## 2023-01-22 NOTE — ASSESSMENT & PLAN NOTE
· S/p radiation therapy  · Follows up with urology    Plan:  · Reports complication from radiation therapy including constipation  · Patient told to use stool softeners as needed  · Follow-up with Urology outpatient

## 2023-01-22 NOTE — PLAN OF CARE
Problem: PAIN - ADULT  Goal: Verbalizes/displays adequate comfort level or baseline comfort level  Description: Interventions:  - Encourage patient to monitor pain and request assistance  - Assess pain using appropriate pain scale  - Administer analgesics based on type and severity of pain and evaluate response  - Implement non-pharmacological measures as appropriate and evaluate response  - Consider cultural and social influences on pain and pain management  - Notify physician/advanced practitioner if interventions unsuccessful or patient reports new pain  1/22/2023 1628 by Partha Swanson RN  Outcome: Adequate for Discharge  1/22/2023 0746 by Partha Swanson RN  Outcome: Progressing     Problem: INFECTION - ADULT  Goal: Absence or prevention of progression during hospitalization  Description: INTERVENTIONS:  - Assess and monitor for signs and symptoms of infection  - Monitor lab/diagnostic results  - Monitor all insertion sites, i e  indwelling lines, tubes, and drains  - Monitor endotracheal if appropriate and nasal secretions for changes in amount and color  - [place] appropriate cooling/warming therapies per order  - Administer medications as ordered  - Instruct and encourage patient and family to use good hand hygiene technique  - Identify and instruct in appropriate isolation precautions for identified infection/condition  1/22/2023 1628 by Partha Swanson RN  Outcome: Adequate for Discharge  1/22/2023 0746 by Partha Swanson RN  Outcome: Progressing     Problem: SAFETY ADULT  Goal: Patient will remain free of falls  Description: INTERVENTIONS:  - Educate patient/family on patient safety including physical limitations  - Instruct patient to call for assistance with activity   - Consult OT/PT to assist with strengthening/mobility   - Keep Call bell within reach  - Keep bed low and locked with side rails adjusted as appropriate  - Keep care items and personal belongings within reach  - Initiate and maintain comfort rounds  - Make Fall Risk Sign visible to staff  - Offer Toileting every  Hours, in advance of need  - Initiate/Maintain alarm  - Obtain necessary fall risk management equipment:   - Apply yellow socks and bracelet for high fall risk patients  - Consider moving patient to room near nurses station  1/22/2023 1628 by Hayley Daniels RN  Outcome: Adequate for Discharge  1/22/2023 0746 by Hayley Daniels RN  Outcome: Progressing  Goal: Maintain or return to baseline ADL function  Description: INTERVENTIONS:  -  Assess patient's ability to carry out ADLs; assess patient's baseline for ADL function and identify physical deficits which impact ability to perform ADLs (bathing, care of mouth/teeth, toileting, grooming, dressing, etc )  - Assess/evaluate cause of self-care deficits   - Assess range of motion  - Assess patient's mobility; develop plan if impaired  - Assess patient's need for assistive devices and provide as appropriate  - Encourage maximum independence but intervene and supervise when necessary  - Involve family in performance of ADLs  - Assess for home care needs following discharge   - Consider OT consult to assist with ADL evaluation and planning for discharge  - Provide patient education as appropriate  1/22/2023 1628 by Hayley Daniels RN  Outcome: Adequate for Discharge  1/22/2023 0746 by Hayley Daniels RN  Outcome: Progressing  Goal: Maintains/Returns to pre admission functional level  Description: INTERVENTIONS:  - Perform BMAT or MOVE assessment daily    - Set and communicate daily mobility goal to care team and patient/family/caregiver  - Collaborate with rehabilitation services on mobility goals if consulted  - Perform Range of Motion  times a day  - Reposition patient every  hours    - Dangle patient times a day  - Stand patient  times a day  - Ambulate patient  times a day  - Out of bed to chair  times a day   - Out of bed for meals  times a day  - Out of bed for toileting  - Record patient progress and toleration of activity level   1/22/2023 1628 by Onofre Delgado RN  Outcome: Adequate for Discharge  1/22/2023 0746 by Onofre Delgado RN  Outcome: Progressing     Problem: DISCHARGE PLANNING  Goal: Discharge to home or other facility with appropriate resources  Description: INTERVENTIONS:  - Identify barriers to discharge w/patient and caregiver  - Arrange for needed discharge resources and transportation as appropriate  - Identify discharge learning needs (meds, wound care, etc )  - Arrange for interpretive services to assist at discharge as needed  - Refer to Case Management Department for coordinating discharge planning if the patient needs post-hospital services based on physician/advanced practitioner order or complex needs related to functional status, cognitive ability, or social support system  1/22/2023 1628 by Onofre Delgado RN  Outcome: Adequate for Discharge  1/22/2023 0746 by Onofre Delgado RN  Outcome: Progressing     Problem: Knowledge Deficit  Goal: Patient/family/caregiver demonstrates understanding of disease process, treatment plan, medications, and discharge instructions  Description: Complete learning assessment and assess knowledge base    Interventions:  - Provide teaching at level of understanding  - Provide teaching via preferred learning methods  1/22/2023 1628 by Onofre Delgado RN  Outcome: Adequate for Discharge  1/22/2023 0746 by Onofre Delgado RN  Outcome: Progressing

## 2023-01-23 ENCOUNTER — TRANSITIONAL CARE MANAGEMENT (OUTPATIENT)
Dept: FAMILY MEDICINE CLINIC | Facility: CLINIC | Age: 73
End: 2023-01-23

## 2023-01-23 ENCOUNTER — TELEPHONE (OUTPATIENT)
Dept: UROLOGY | Facility: AMBULATORY SURGERY CENTER | Age: 73
End: 2023-01-23

## 2023-01-23 NOTE — UTILIZATION REVIEW
NOTIFICATION OF ADMISSION DISCHARGE   This is a Notification of Discharge from [facility]  Please be advised that this patient has been discharge from our facility  Below you will find the admission and discharge date and time including the patient’s disposition  UTILIZATION REVIEW CONTACT:  Greer Wilcox  Utilization   Network Utilization Review Department  Phone: [phone] x carefully listen to the prompts  All voicemails are confidential   Email: [email or web address]     ADMISSION INFORMATION  PRESENTATION DATE: 1/20/2023  8:32 AM  OBERVATION ADMISSION DATE:   INPATIENT ADMISSION DATE: 1/20/23 11:19 AM   DISCHARGE DATE: 1/22/2023  4:29 PM   DISPOSITION:Home/Self Care    IMPORTANT INFORMATION:  Send all requests for admission clinical reviews, approved or denied determinations and any other requests to dedicated fax number below belonging to the campus where the patient is receiving treatment   List of dedicated fax numbers:  [address] DENIALS (Administrative/Medical Necessity) [phone]   [address] (Maternity/NICU/Pediatrics) [phone]   [facility] [phone]   [place] [address] [phone]   [address] [phone]   [address] [facility] [phone]   [address] [place] [address] [phone]   [address] [place] [address] 119 [phone]   [facility]  [phone]   [address] [facility] [phone] [address] [facility] [address] [facility] [phone]

## 2023-01-24 ENCOUNTER — OFFICE VISIT (OUTPATIENT)
Dept: FAMILY MEDICINE CLINIC | Facility: CLINIC | Age: 73
End: 2023-01-24

## 2023-01-24 ENCOUNTER — CLINICAL SUPPORT (OUTPATIENT)
Dept: FAMILY MEDICINE CLINIC | Facility: CLINIC | Age: 73
End: 2023-01-24

## 2023-01-24 ENCOUNTER — TELEPHONE (OUTPATIENT)
Dept: GASTROENTEROLOGY | Facility: CLINIC | Age: 73
End: 2023-01-24

## 2023-01-24 VITALS
HEART RATE: 65 BPM | RESPIRATION RATE: 16 BRPM | HEIGHT: 67 IN | OXYGEN SATURATION: 98 % | DIASTOLIC BLOOD PRESSURE: 72 MMHG | BODY MASS INDEX: 36.19 KG/M2 | WEIGHT: 230.6 LBS | SYSTOLIC BLOOD PRESSURE: 138 MMHG | TEMPERATURE: 97.7 F

## 2023-01-24 DIAGNOSIS — Z79.4 TYPE 2 DIABETES MELLITUS WITH HYPERGLYCEMIA, WITH LONG-TERM CURRENT USE OF INSULIN (HCC): Primary | Chronic | ICD-10-CM

## 2023-01-24 DIAGNOSIS — E11.65 TYPE 2 DIABETES MELLITUS WITH HYPERGLYCEMIA, WITH LONG-TERM CURRENT USE OF INSULIN (HCC): ICD-10-CM

## 2023-01-24 DIAGNOSIS — K52.9 COLITIS: ICD-10-CM

## 2023-01-24 DIAGNOSIS — K21.9 GASTROESOPHAGEAL REFLUX DISEASE WITHOUT ESOPHAGITIS: ICD-10-CM

## 2023-01-24 DIAGNOSIS — E66.01 CLASS 2 SEVERE OBESITY DUE TO EXCESS CALORIES WITH SERIOUS COMORBIDITY AND BODY MASS INDEX (BMI) OF 35.0 TO 35.9 IN ADULT (HCC): ICD-10-CM

## 2023-01-24 DIAGNOSIS — C61 PROSTATE CANCER (HCC): ICD-10-CM

## 2023-01-24 DIAGNOSIS — Z09 HOSPITAL DISCHARGE FOLLOW-UP: Primary | ICD-10-CM

## 2023-01-24 DIAGNOSIS — D69.6 PLATELETS DECREASED (HCC): ICD-10-CM

## 2023-01-24 DIAGNOSIS — E11.65 TYPE 2 DIABETES MELLITUS WITH HYPERGLYCEMIA, WITH LONG-TERM CURRENT USE OF INSULIN (HCC): Primary | Chronic | ICD-10-CM

## 2023-01-24 DIAGNOSIS — Z79.4 TYPE 2 DIABETES MELLITUS WITH HYPERGLYCEMIA, WITH LONG-TERM CURRENT USE OF INSULIN (HCC): ICD-10-CM

## 2023-01-24 NOTE — PROGRESS NOTES
Assessment & Plan     1  Hospital discharge follow-up    2  Colitis  Assessment & Plan:  Improved   Continue to monitor  To f/u with GI      3  Type 2 diabetes mellitus with hyperglycemia, with long-term current use of insulin ([facility])  Assessment & Plan:    Lab Results   Component Value Date    HGBA1C 8 3 (H) 01/13/2023   patient is only taking novolog 70/30- 15 U daily instead of twice a day   To increase to 15 U in the am and 10 U at bedtime   Recheck in 3 months    Orders:  -     insulin aspart protamine-insulin aspart (NovoLOG 70/30) 100 Units/mL injection pen; 15 U in AM and 10 U in PM  -     Hemoglobin A1C; Future; Expected date: 04/24/2023  -     Lipid Panel with Direct LDL reflex; Future; Expected date: 04/24/2023  -     CBC and Platelet; Future; Expected date: 04/24/2023  -     Comprehensive metabolic panel; Future; Expected date: 04/24/2023    4  Gastroesophageal reflux disease without esophagitis  Assessment & Plan:  Pantoprazole as directed      5  Class 2 severe obesity due to excess calories with serious comorbidity and body mass index (BMI) of 35 0 to 35 9 in adult [facility])  Assessment & Plan:  Diet and exercise encouraged       6  Prostate cancer [facility])  Assessment & Plan:  S/p radiation therapy   Still having incontinence   Sees urology   mirabegron is not helping  He wants to try botox       7  Platelets decreased ([facility] [address] )  Assessment & Plan:  Stable  Cleared by hematology           Subjective     Transitional Care Management Review:   Gisele Morrell is a 67 y o  male here for TCM follow up  During the TCM phone call patient stated:  TCM Call     Date and time call was made  1/23/2023 10:50 AM    Patient was hospitialized at  [address] [place] [address]    Date of Admission  01/20/23    Date of discharge  01/22/23    Diagnosis  Colitis    Disposition  Home    Were the patients medications reviewed and updated  No      TCM Call     Should patient be enrolled in anticoag monitoring? No    Scheduled for follow up? Yes    Did you obtain your prescribed medications  Yes    Do you need help managing your prescriptions or medications  No    Is transportation to your appointment needed  No    I have advised the patient to call PCP with any new or worsening symptoms  MR Trice    Living Arrangements  Family members    Are you recieving any outpatient services  No    Are you recieving home care services  No    Are you using any community resources  No    Current waiver services  No    Have you fallen in the last 12 months  No    Interperter language line needed  No    Comments  PT WAS GIVING 4 DIFFERENT NEW MEDICATIONS      here for hospital follow up  Was admitted for acute colitis on 1/20/23  Negative stool culture   Was treated with cipro and flagyl as outpatient prior but stopped during the hospital stay  Managed with pain control and sen home on 1/22/23    CT A/P w contrast done on 1/19/23 -  Short segment of circumferential wall thickening involving the sigmoid colon  This is favored to represent pseudothickening secondary to nondistention, however, a mild nonspecific colitis may have a similar appearance  There is diverticulosis without evidence of focal inflammatory changes to suggest acute diverticulitis  Small Wan-type hernia involving a loop of small bowel within the right mid abdomen  No CT evidence of associated consultation  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 small hiatal hernia  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  Mildly complex exophytic left renal cyst also is stable      Feeling better today   No fever/chill/chest pain/shortness of breath       Diabetes  He presents for his follow-up diabetic visit  He has type 2 diabetes mellitus  His disease course has been stable  There are no hypoglycemic associated symptoms  There are no diabetic associated symptoms  There are no hypoglycemic complications  Symptoms are stable       Review of Systems Constitutional: Negative  HENT: Negative  Respiratory: Negative  Cardiovascular: Negative  Objective     /72 (BP Location: Left arm, Patient Position: Sitting, Cuff Size: Large)   Pulse 65   Temp 97 7 °F (36 5 °C) (Tympanic)   Resp 16   Ht 5' 7" (1 702 m)   Wt 105 kg (230 lb 9 6 oz)   SpO2 98%   BMI 36 12 kg/m²      Physical Exam  Vitals and nursing note reviewed  Constitutional:       Appearance: Normal appearance  HENT:      Right Ear: Tympanic membrane normal       Left Ear: Tympanic membrane normal       Nose: Nose normal       Mouth/Throat:      Mouth: Mucous membranes are moist    Eyes:      Extraocular Movements: Extraocular movements intact  Pupils: Pupils are equal, round, and reactive to light  Cardiovascular:      Rate and Rhythm: Normal rate and regular rhythm  Pulses: Normal pulses  Heart sounds: Normal heart sounds  Pulmonary:      Effort: Pulmonary effort is normal       Breath sounds: Normal breath sounds  Abdominal:      General: Bowel sounds are normal  There is no distension  Palpations: Abdomen is soft  There is no mass  Tenderness: There is no abdominal tenderness  Hernia: No hernia is present  Musculoskeletal:         General: Normal range of motion  Cervical back: Normal range of motion and neck supple  Neurological:      General: No focal deficit present  Mental Status: He is alert and oriented to person, place, and time     Psychiatric:         Mood and Affect: Mood normal          Behavior: Behavior normal        Medications have been reviewed by provider in current encounter    Earle Varela MD

## 2023-01-24 NOTE — PROGRESS NOTES
[facility]  Marie Lora, Pharmacist    Sangita Goncalves is a 67 y o  male who was referred to the pharmacist for diabetes education and management, referred by Eb Ramírez MD        Assessment/ Plan       Type 2 Diabetes:  Goal A1c <7 based on ADA AACE/ACE guidelines   Most recent A1c above goal 8 3 January 2023, up from 6 7 2022  Reported Home SMBG  Not discussed today   Complications:  Microvascular complications: none  Macrovascular complications: none  Current Diabetes Regimen:  Trulicity 3mg once weekly  Glipizide XL 5mg once daily with breakfast  Kendra 70/30 - patient was only taking once daily    Changes to Medication Regimen: If PCP is in agreement with plan  Change trulicity to ozempic in light of GI troubles and for improved efficacy    Most recent labs and diabetes goals discussed   Materials Provided: none - Libreview connection discussed  Labs Ordered: none    Patient presents for Lloyd education/training and placement     Patient is aware this is a billable service      Education/training provided on:     • Sensor site selection, rotation and sensor insertion   • Attachment of the transmitter to the sensor, if applicable   • Taping/securing of the sensor/transmitter, if applicable   • Connection of the transmitter to the , if applicable   • Difference between interstitial glucose readings and blood glucose readings   • Understanding CGM data and trends   • Possible interference of products such as acetaminophen, salicylic acid, hydroxyurea, and high dose vitamin C   • Calibration including timing, frequency and importance of accurate meter/fingerstick technique, if applicable   • Education to prevent overcorrection of high glucose • Treatment of hypoglycemia    LibrePersonal CGM successfully placed on patient   Transmitter has been cleaned with alcohol  Skin cleaned with alcohol  Insertion site: left arm  Sensor inserted and started   Communication with phone verified  Connected to [address] [place] [address] Erich Hernandez    Patient will return for data download and interpretation with at least 72 hours of data  2  Need for Additional Therapies:  Statin: on atorvastatin 10mg  ACEI/ARB: on losartan 25mg  ASA: not indicated    3  Health Maintenance:  Vaccine recommendations: check Tdap, annual COVID  Eye Exam: UTD  Foot Exam: UTD  Urine Microalbumin: UTD    4  Medication Reconciliation:   Medication list reviewed with pt at today's visit  Discrepancies as discussed below  Medication list updated to reflect medications pt is currently taking  Renewal request sent to prescribing provider for: none       5  Hyperlipidemia without clinical ASCVD event:   2018 ACC/AHA lipid guidelines recommend high intensity statin  Patient currently on moderate intensity and tolerating well without side effects  Lipid panel/LFTs acceptable  Medications: Consider increasing atorvastatin at next visit  6  HTN:   BP goal less than 130/80 mmHg  In office BP above goal, HR acceptable  Caffeine may be contributing to elevated BP readings today  Home BP readings avg below goal  HR acceptable  Patient with room for lifestyle modifications  Serum K+ WNL  Monitoring: via [id number]    Referrals placed at this visit: none    Follow-up: pending Libreview connection        Subjective   Patient with history GI  Discussed CGM and efficacy of various diabetes medications  Medication Adherence/ Tolerability:  GI problems in general, consider timing/patient to correlate to Trulicity dose  The patient reports missing 0 doses of medication in the past 2 weeks  Medications Tried in Past:  Jardiance  Victoza  Metformin  Ozempic    2  Lifestyle:   Last visit with dietician: n/a  Previously attended Living Well with Diabetes Class: no  Diet Recall:   Breakfast:   Lunch:   Dinner:   Snacks:   Beverages:   Physical Activity: some    3   Home monitoring devices  Glucometer: Yes, Brand: unknown  CGM: Yes, Brand: Dunia 2  BP monitor: No, Brand: n/a        Objective       Current Outpatient Medications   Medication Instructions   • acetaminophen (TYLENOL) 650 mg, Oral, Every 4 hours PRN   • albuterol (ProAir HFA) 90 mcg/act inhaler 2 puffs, Inhalation, Every 6 hours PRN   • aluminum-magnesium hydroxide-simethicone (MYLANTA) 200-200-20 mg/5 mL suspension 30 mL, Oral, Every 6 hours PRN   • atorvastatin (LIPITOR) 10 mg, Oral, Daily   • Biotin 1000 MCG tablet 1 tablet, Oral, Daily (early morning)   • Blood Glucose Monitoring Suppl (GLUCOCARD VITAL MONITOR) w/Device KIT Does not apply, 2 times daily   • docusate sodium (COLACE) 100 mg, Oral, Daily PRN   • fluticasone (FLONASE) 50 mcg/act nasal spray 2 sprays, Nasal, Daily   • fluticasone (Flovent HFA) 110 MCG/ACT inhaler 2 puffs, Inhalation, 2 times daily, Rinse mouth after use  • glipiZIDE (GLUCOTROL XL) 5 mg 24 hr tablet TAKE ONE TABLET BY MOUTH EVERY DAY AFTER BREAKFAST   • insulin aspart protamine-insulin aspart (NovoLOG 70/30) 100 Units/mL injection pen 15 U in AM and 10 U in PM   • Lancets (LIFESCAN UNISTIK 2) MISC Lifescan One Touch Ultramini Meter; use as directed; 1; 0; 31-Oct-2016; 31-Oct-2016; Melida Duran;  Active   • losartan (COZAAR) 25 mg, Oral, Daily   • Mirabegron ER 50 mg, Oral, Daily at bedtime   • Multiple Vitamin (MULTIVITAMIN) capsule 1 capsule, Oral, Daily (early morning)   • pantoprazole (PROTONIX) 40 mg tablet TAKE ONE TABLET BY MOUTH EVERY DAY   • semaglutide (1 mg/dose) (OZEMPIC) 1 mg, Subcutaneous, Every 7 days   • Vitamin B-12 5,000 mcg, Oral, Weekly, Takes on Mondays   • Vitamin D3 1,000 Units, Oral, Daily (early morning)     Allergies   Allergen Reactions   • Enalapril Anaphylaxis, Throat Swelling and Hives       Immunization History   Administered Date(s) Administered   • COVID-19 PFIZER VACCINE 0 3 ML IM 02/23/2021, 03/14/2021, 10/07/2021   • INFLUENZA 10/31/2015, 10/05/2016, 11/10/2017, 09/20/2018   • Influenza Split High Dose Preservative Free IM 10/05/2016, 11/10/2017   • Influenza, high dose seasonal 0 7 mL 09/20/2018, 09/10/2019, 11/12/2020, 09/23/2022   • Influenza, injectable, quadrivalent, preservative free 0 5 mL 10/21/2021   • Influenza, seasonal, injectable 10/31/2015   • Pneumococcal Conjugate 13-Valent 01/05/2016   • Pneumococcal Polysaccharide PPV23 09/05/2013, 04/07/2017       I have reviewed the patient's allergies, medications and history as noted in the electronic medical record and updated as necessary  Lab Results   Component Value Date    HGBA1C 8 3 (H) 01/13/2023    HGBA1C 6 7 (H) 07/25/2022    HGBA1C 6 8 (H) 06/22/2022       Blood Glucose Readings  The patient is currently checking blood glucose via CGM  Patient does not report with SMBG logs  Hypoglycemia: The patient had 0 episodes/symptoms of hypoglycemia  Hyperglycemia: The patient had 0 symptoms of hyperglycemia  ASCVD Risk:  The 10-year ASCVD risk score (Bisi BENÍTEZ, et al , 2019) is: 37 9%    Values used to calculate the score:      Age: 67 years      Sex: Male      Is Non- : No      Diabetic: Yes      Tobacco smoker: No      Systolic Blood Pressure: 014 mmHg      Is BP treated: No      HDL Cholesterol: 38 mg/dL      Total Cholesterol: 135 mg/dL     Vitals: There were no vitals filed for this visit  Labs:    Lab Results   Component Value Date    SODIUM 137 01/21/2023    K 4 0 01/21/2023    EGFR 86 01/21/2023    CREATININE 0 87 01/21/2023    GLUF 229 (H) 01/13/2023    [id number] 610 07/25/2022    MICROALBCRE 64 (H) 01/13/2023         Pharmacist Tracking Tool     Pharmacist Tracking Tool  Reason For Outreach: Embedded Pharmacist  Demographics:  Intervention Method:  In Person  Type of Intervention: New  Topics Addressed: Diabetes, Dyslipidemia and Transitions of Care  Pharmacologic Interventions: Medication Initiation, Medication Discontinuation, Prevent or Manage TRU and Med Rec  Non-Pharmacologic Interventions: Adherence addressed, Care coordination, Cost, Disease state education, Home Monitoring, Medication/Device education and Personal CGM  Time:  Direct Patient Care: 45 mins  Care Coordination: 60 mins  Recommendation Recipient: Patient/Caregiver  Outcome: Accepted

## 2023-01-24 NOTE — ASSESSMENT & PLAN NOTE
Lab Results   Component Value Date    HGBA1C 8 3 (H) 01/13/2023   patient is only taking novolog 70/30- 15 U daily instead of twice a day   To increase to 15 U in the am and 10 U at bedtime   Recheck in 3 months

## 2023-01-25 LAB
BACTERIA BLD CULT: NORMAL
BACTERIA BLD CULT: NORMAL

## 2023-01-27 ENCOUNTER — TELEPHONE (OUTPATIENT)
Dept: UROLOGY | Facility: CLINIC | Age: 73
End: 2023-01-27

## 2023-01-27 NOTE — TELEPHONE ENCOUNTER
Patient is scheduled on 3/22/23 at 9 AM with Dr Leona Abdullahi for Botox  Provider note:    Please schedule Botox with Dr Leona Abdullahi in his next available spot

## 2023-01-31 ENCOUNTER — OFFICE VISIT (OUTPATIENT)
Dept: DENTISTRY | Facility: CLINIC | Age: 73
End: 2023-01-31

## 2023-01-31 DIAGNOSIS — K08.401 EDENTULISM, PARTIAL, CLASS I: Primary | ICD-10-CM

## 2023-01-31 NOTE — PROGRESS NOTES
Partial Denture Final Impression    Vergie Pore presents for maxillary and mandibular partial denture final impression  PMH reviewed  ASA 2, pain 0  Pt understands extent of denture fabrication process and consents to tx today  Custom tray modified to properly fit arch and extend short of vestibule  Tray adhesive applied and dried  Border molding performed with heavy body around vestibules then palatal seal  Light body wash performed  Contours, vestibule and intaglio captured       NV: Wax rim try in

## 2023-02-07 ENCOUNTER — CLINICAL SUPPORT (OUTPATIENT)
Dept: FAMILY MEDICINE CLINIC | Facility: CLINIC | Age: 73
End: 2023-02-07

## 2023-02-07 DIAGNOSIS — E11.65 TYPE 2 DIABETES MELLITUS WITH HYPERGLYCEMIA, WITH LONG-TERM CURRENT USE OF INSULIN (HCC): Primary | Chronic | ICD-10-CM

## 2023-02-07 DIAGNOSIS — Z79.4 TYPE 2 DIABETES MELLITUS WITH HYPERGLYCEMIA, WITH LONG-TERM CURRENT USE OF INSULIN (HCC): Primary | Chronic | ICD-10-CM

## 2023-02-07 RX ORDER — METFORMIN HYDROCHLORIDE 500 MG/1
TABLET, EXTENDED RELEASE ORAL
Qty: 70 TABLET | Refills: 0 | Status: CANCELLED | OUTPATIENT
Start: 2023-02-07

## 2023-02-07 NOTE — PROGRESS NOTES
[facility]  Gila Dietz, Pharmacist    Martin Preston is a 67 y o  male who was referred to the pharmacist for diabetes education and management, referred by Patricia Matt MD        Assessment/ Plan       Type 2 Diabetes:  Goal A1c <7 based on ADA AACE/ACE guidelines   Most recent A1c above goal 8 3 January 2023, up from 6 7 2022  Reported Home SMBG  Not discussed today   Complications:  Microvascular complications: none  Macrovascular complications: none  Current Diabetes Regimen:  Glipizide XL 5mg once daily with breakfast  Kendra 70/30 - 15 units AM, 10 units PM    Changes to Medication Regimen: If PCP is in agreement with plan  Patient self DC all GLP1RA agents due to discussion about common side effects - since he has stopped his GI issues have resolved  He is not interested in trialing a different GLP1RA  Patient does not wish to retrial SGLT2i either  Had severe yeast infection with Jardiance in past    Will propose restart Metformin  Most recent labs and diabetes goals discussed   Materials Provided: none - Libreview connection discussed  Labs Ordered: none      2  Need for Additional Therapies:  Statin: on atorvastatin 10mg  ACEI/ARB: on losartan 25mg  ASA: not indicated    3  Health Maintenance:  Vaccine recommendations: check Tdap, annual COVID  Eye Exam: UTD  Foot Exam: UTD  Urine Microalbumin: UTD    4  Medication Reconciliation:   Medication list reviewed with pt at today's visit  Discrepancies as discussed below  Medication list updated to reflect medications pt is currently taking  Renewal request sent to prescribing provider for: none       5  Hyperlipidemia without clinical ASCVD event:   2018 ACC/AHA lipid guidelines recommend high intensity statin  Patient currently on moderate intensity and tolerating well without side effects  Lipid panel/LFTs acceptable  Medications: Consider increasing atorvastatin at next visit      6  HTN:   BP goal less than 130/80 mmHg  In office BP above goal, HR acceptable  Caffeine may be contributing to elevated BP readings today  Home BP readings avg below goal  HR acceptable  Patient with room for lifestyle modifications  Serum K+ WNL  Monitoring: via [id number] [employer]    Referrals placed at this visit: none    Follow-up: pending Libreview connection        Subjective   Patient with history GI  Discussed CGM and efficacy of various diabetes medications  Diabetes        Medication Adherence/ Tolerability:  GI problems in general, consider timing/patient to correlate to Trulicity dose  The patient reports missing 0 doses of medication in the past 2 weeks  Medications Tried in Past:  Jardiance  Victoza  Metformin  Ozempic  Trulicity    2  Lifestyle:   Last visit with dietician: n/a  Previously attended Living Well with Diabetes Class: no  Diet Recall:   Breakfast:   Lunch:   Dinner:   Snacks:   Beverages:   Physical Activity: some    3  Home monitoring devices  Glucometer: Yes, Brand: unknown  CGM: Yes, Brand: Dunia 2  BP monitor: No, Brand: n/a        Objective       Current Outpatient Medications   Medication Instructions   • acetaminophen (TYLENOL) 650 mg, Oral, Every 4 hours PRN   • albuterol (ProAir HFA) 90 mcg/act inhaler 2 puffs, Inhalation, Every 6 hours PRN   • aluminum-magnesium hydroxide-simethicone (MYLANTA) 200-200-20 mg/5 mL suspension 30 mL, Oral, Every 6 hours PRN   • atorvastatin (LIPITOR) 10 mg, Oral, Daily   • Biotin 1000 MCG tablet 1 tablet, Oral, Daily (early morning)   • Blood Glucose Monitoring Suppl (GLUCOCARD VITAL MONITOR) w/Device KIT Does not apply, 2 times daily   • docusate sodium (COLACE) 100 mg, Oral, Daily PRN   • fluticasone (FLONASE) 50 mcg/act nasal spray 2 sprays, Nasal, Daily   • fluticasone (Flovent HFA) 110 MCG/ACT inhaler 2 puffs, Inhalation, 2 times daily, Rinse mouth after use     • glipiZIDE (GLUCOTROL XL) 5 mg 24 hr tablet TAKE ONE TABLET BY MOUTH EVERY DAY AFTER BREAKFAST • insulin aspart protamine-insulin aspart (NovoLOG 70/30) 100 Units/mL injection pen 15 U in AM and 10 U in PM   • Lancets (LIFESCAN UNISTIK 2) MISC Lifescan One Touch Ultramini Meter; use as directed; 1; 0; 31-Oct-2016; 31-Oct-2016; Melida Duran; Active   • losartan (COZAAR) 25 mg, Oral, Daily   • Mirabegron ER 50 mg, Oral, Daily at bedtime   • Multiple Vitamin (MULTIVITAMIN) capsule 1 capsule, Oral, Daily (early morning)   • pantoprazole (PROTONIX) 40 mg tablet TAKE ONE TABLET BY MOUTH EVERY DAY   • Vitamin B-12 5,000 mcg, Oral, Weekly, Takes on Mondays   • Vitamin D3 1,000 Units, Oral, Daily (early morning)     Allergies   Allergen Reactions   • Enalapril Anaphylaxis, Throat Swelling and Hives       Immunization History   Administered Date(s) Administered   • COVID-19 PFIZER VACCINE 0 3 ML IM 02/23/2021, 03/14/2021, 10/07/2021   • INFLUENZA 10/31/2015, 10/05/2016, 11/10/2017, 09/20/2018   • Influenza Split High Dose Preservative Free IM 10/05/2016, 11/10/2017   • Influenza, high dose seasonal 0 7 mL 09/20/2018, 09/10/2019, 11/12/2020, 09/23/2022   • Influenza, injectable, quadrivalent, preservative free 0 5 mL 10/21/2021   • Influenza, seasonal, injectable 10/31/2015   • Pneumococcal Conjugate 13-Valent 01/05/2016   • Pneumococcal Polysaccharide PPV23 09/05/2013, 04/07/2017       I have reviewed the patient's allergies, medications and history as noted in the electronic medical record and updated as necessary  Lab Results   Component Value Date    HGBA1C 8 3 (H) 01/13/2023    HGBA1C 6 7 (H) 07/25/2022    HGBA1C 6 8 (H) 06/22/2022       Blood Glucose Readings  The patient is currently checking blood glucose via CGM  Patient does not report with SMBG logs  Hypoglycemia: The patient had 0 episodes/symptoms of hypoglycemia  Hyperglycemia: The patient had 0 symptoms of hyperglycemia       ASCVD Risk:  The 10-year ASCVD risk score (Bisi BENÍTEZ, et al , 2019) is: 37 9%    Values used to calculate the score: Age: 67 years      Sex: Male      Is Non- : No      Diabetic: Yes      Tobacco smoker: No      Systolic Blood Pressure: 009 mmHg      Is BP treated: No      HDL Cholesterol: 38 mg/dL      Total Cholesterol: 135 mg/dL     Vitals: There were no vitals filed for this visit      Labs:    Lab Results   Component Value Date    SODIUM 137 01/21/2023    K 4 0 01/21/2023    EGFR 86 01/21/2023    CREATININE 0 87 01/21/2023    GLUF 229 (H) 01/13/2023    [id number] 610 07/25/2022    MICROALBCRE 64 (H) 01/13/2023         Pharmacist Tracking Tool     Pharmacist Tracking Tool  Reason For Outreach: Embedded Pharmacist  Demographics:  Intervention Method: Phone  Type of Intervention: Follow-Up  Topics Addressed: Diabetes  Pharmacologic Interventions: Medication Initiation, Medication Discontinuation, Prevent or Manage TRU and Med Rec  Non-Pharmacologic Interventions: Adherence addressed, Care coordination, Cost, Disease state education, Home Monitoring, Medication/Device education and Personal CGM  Time:  Direct Patient Care: 15 mins  Care Coordination: 30 mins  Recommendation Recipient: Patient/Caregiver  Outcome: Accepted

## 2023-02-15 ENCOUNTER — OFFICE VISIT (OUTPATIENT)
Dept: DENTISTRY | Facility: CLINIC | Age: 73
End: 2023-02-15

## 2023-02-15 VITALS — SYSTOLIC BLOOD PRESSURE: 144 MMHG | HEART RATE: 80 BPM | TEMPERATURE: 97.9 F | DIASTOLIC BLOOD PRESSURE: 70 MMHG

## 2023-02-15 DIAGNOSIS — K08.401 EDENTULISM, PARTIAL, CLASS I: Primary | ICD-10-CM

## 2023-02-15 NOTE — PROGRESS NOTES
Wax Rim for  Resin based RPDs    Pt presents for wax rim try in for partial Dentures  PMH reviewed no changes made  ASA 1, pain 0  Denture seated but patient noted no discomfort  Verified internal and removed any interferences that prevent complete and comfortable seating  Verified and adjusted maxillary plane occlusion to be even and balanced and comply with lip length  Verified interpupillary line and ala-tragus line  Adjusted mandibular rim to be even with maxillary rim and leveled to height of Centric relation  Marked midline, and canine positioning  Pt selected shade A3 5  Discussed expectations, and alternatives (implants) if patient is still not satisfied with long term fit  Pt understood and left in good spirits  Sent to [facility]      NV: Teeth Try-in

## 2023-03-05 DIAGNOSIS — K21.9 GASTROESOPHAGEAL REFLUX DISEASE WITHOUT ESOPHAGITIS: ICD-10-CM

## 2023-03-05 RX ORDER — PANTOPRAZOLE SODIUM 40 MG/1
TABLET, DELAYED RELEASE ORAL
Qty: 30 TABLET | Refills: 0 | Status: SHIPPED | OUTPATIENT
Start: 2023-03-05

## 2023-03-26 ENCOUNTER — APPOINTMENT (EMERGENCY)
Dept: CT IMAGING | Facility: HOSPITAL | Age: 73
End: 2023-03-26

## 2023-03-26 ENCOUNTER — APPOINTMENT (EMERGENCY)
Dept: RADIOLOGY | Facility: HOSPITAL | Age: 73
End: 2023-03-26

## 2023-03-26 ENCOUNTER — HOSPITAL ENCOUNTER (INPATIENT)
Facility: HOSPITAL | Age: 73
LOS: 1 days | Discharge: HOME | End: 2023-03-27
Attending: EMERGENCY MEDICINE | Admitting: INTERNAL MEDICINE

## 2023-03-26 DIAGNOSIS — R07.9 CHEST PAIN: ICD-10-CM

## 2023-03-26 DIAGNOSIS — I10 HTN (HYPERTENSION): ICD-10-CM

## 2023-03-26 DIAGNOSIS — R10.9 ABDOMINAL PAIN: Primary | ICD-10-CM

## 2023-03-26 DIAGNOSIS — J30.89 NON-SEASONAL ALLERGIC RHINITIS, UNSPECIFIED TRIGGER: ICD-10-CM

## 2023-03-26 PROBLEM — R07.89 OTHER CHEST PAIN: Status: ACTIVE | Noted: 2023-03-26

## 2023-03-26 PROBLEM — R10.10 UPPER ABDOMINAL PAIN, UNSPECIFIED: Status: ACTIVE | Noted: 2023-03-26

## 2023-03-26 LAB
2HR DELTA HS TROPONIN: -2 NG/L
4HR DELTA HS TROPONIN: -4 NG/L
ALBUMIN SERPL BCP-MCNC: 3.9 G/DL (ref 3.5–5)
ALP SERPL-CCNC: 66 U/L (ref 34–104)
ALT SERPL W P-5'-P-CCNC: 22 U/L (ref 7–52)
ANION GAP SERPL CALCULATED.3IONS-SCNC: 10 MMOL/L (ref 4–13)
APTT PPP: 27 SECONDS (ref 23–37)
AST SERPL W P-5'-P-CCNC: 27 U/L (ref 13–39)
BASOPHILS # BLD AUTO: 0.04 THOUSANDS/ÂΜL (ref 0–0.1)
BASOPHILS NFR BLD AUTO: 1 % (ref 0–1)
BILIRUB SERPL-MCNC: 0.67 MG/DL (ref 0.2–1)
BUN SERPL-MCNC: 21 MG/DL (ref 5–25)
CALCIUM SERPL-MCNC: 8.9 MG/DL (ref 8.4–10.2)
CARDIAC TROPONIN I PNL SERPL HS: 10 NG/L
CARDIAC TROPONIN I PNL SERPL HS: 12 NG/L
CARDIAC TROPONIN I PNL SERPL HS: 8 NG/L
CHLORIDE SERPL-SCNC: 103 MMOL/L (ref 96–108)
CO2 SERPL-SCNC: 23 MMOL/L (ref 21–32)
CREAT SERPL-MCNC: 0.87 MG/DL (ref 0.6–1.3)
EOSINOPHIL # BLD AUTO: 0.03 THOUSAND/ÂΜL (ref 0–0.61)
EOSINOPHIL NFR BLD AUTO: 0 % (ref 0–6)
ERYTHROCYTE [DISTWIDTH] IN BLOOD BY AUTOMATED COUNT: 13.2 % (ref 11.6–15.1)
GFR SERPL CREATININE-BSD FRML MDRD: 86 ML/MIN/1.73SQ M
GLUCOSE SERPL-MCNC: 198 MG/DL (ref 65–140)
GLUCOSE SERPL-MCNC: 242 MG/DL (ref 65–140)
GLUCOSE SERPL-MCNC: 293 MG/DL (ref 65–140)
HCT VFR BLD AUTO: 38.9 % (ref 36.5–49.3)
HGB BLD-MCNC: 13.1 G/DL (ref 12–17)
IMM GRANULOCYTES # BLD AUTO: 0.05 THOUSAND/UL (ref 0–0.2)
IMM GRANULOCYTES NFR BLD AUTO: 1 % (ref 0–2)
INR PPP: 1.08 (ref 0.84–1.19)
LACTATE SERPL-SCNC: 1.3 MMOL/L (ref 0.5–2)
LIPASE SERPL-CCNC: 23 U/L (ref 11–82)
LYMPHOCYTES # BLD AUTO: 0.59 THOUSANDS/ÂΜL (ref 0.6–4.47)
LYMPHOCYTES NFR BLD AUTO: 7 % (ref 14–44)
MCH RBC QN AUTO: 30 PG (ref 26.8–34.3)
MCHC RBC AUTO-ENTMCNC: 33.7 G/DL (ref 31.4–37.4)
MCV RBC AUTO: 89 FL (ref 82–98)
MONOCYTES # BLD AUTO: 1.3 THOUSAND/ÂΜL (ref 0.17–1.22)
MONOCYTES NFR BLD AUTO: 15 % (ref 4–12)
NEUTROPHILS # BLD AUTO: 6.53 THOUSANDS/ÂΜL (ref 1.85–7.62)
NEUTS SEG NFR BLD AUTO: 76 % (ref 43–75)
NRBC BLD AUTO-RTO: 0 /100 WBCS
PLATELET # BLD AUTO: 157 THOUSANDS/UL (ref 149–390)
PMV BLD AUTO: 12.4 FL (ref 8.9–12.7)
POTASSIUM SERPL-SCNC: 3.9 MMOL/L (ref 3.5–5.3)
PROT SERPL-MCNC: 7 G/DL (ref 6.4–8.4)
PROTHROMBIN TIME: 14.2 SECONDS (ref 11.6–14.5)
RBC # BLD AUTO: 4.36 MILLION/UL (ref 3.88–5.62)
SODIUM SERPL-SCNC: 136 MMOL/L (ref 135–147)
WBC # BLD AUTO: 8.54 THOUSAND/UL (ref 4.31–10.16)

## 2023-03-26 RX ORDER — HYDROMORPHONE HCL/PF 1 MG/ML
0.5 SYRINGE (ML) INJECTION ONCE
Status: COMPLETED | OUTPATIENT
Start: 2023-03-26 | End: 2023-03-26

## 2023-03-26 RX ORDER — MORPHINE SULFATE 4 MG/ML
4 INJECTION, SOLUTION INTRAMUSCULAR; INTRAVENOUS ONCE
Status: COMPLETED | OUTPATIENT
Start: 2023-03-26 | End: 2023-03-26

## 2023-03-26 RX ORDER — POLYETHYLENE GLYCOL 3350 17 G/17G
17 POWDER, FOR SOLUTION ORAL DAILY
Status: DISCONTINUED | OUTPATIENT
Start: 2023-03-26 | End: 2023-03-27 | Stop reason: HOSPADM

## 2023-03-26 RX ORDER — LABETALOL HYDROCHLORIDE 5 MG/ML
10 INJECTION, SOLUTION INTRAVENOUS EVERY 4 HOURS PRN
Status: DISCONTINUED | OUTPATIENT
Start: 2023-03-26 | End: 2023-03-27 | Stop reason: HOSPADM

## 2023-03-26 RX ORDER — ONDANSETRON 2 MG/ML
4 INJECTION INTRAMUSCULAR; INTRAVENOUS EVERY 6 HOURS PRN
Status: DISCONTINUED | OUTPATIENT
Start: 2023-03-26 | End: 2023-03-27 | Stop reason: HOSPADM

## 2023-03-26 RX ORDER — LOSARTAN POTASSIUM 25 MG/1
25 TABLET ORAL DAILY
Status: DISCONTINUED | OUTPATIENT
Start: 2023-03-26 | End: 2023-03-27 | Stop reason: HOSPADM

## 2023-03-26 RX ORDER — OXYBUTYNIN CHLORIDE 5 MG/1
5 TABLET, EXTENDED RELEASE ORAL DAILY
Status: DISCONTINUED | OUTPATIENT
Start: 2023-03-26 | End: 2023-03-27 | Stop reason: HOSPADM

## 2023-03-26 RX ORDER — INSULIN ASPART 100 [IU]/ML
10 INJECTION, SUSPENSION SUBCUTANEOUS
Status: DISCONTINUED | OUTPATIENT
Start: 2023-03-26 | End: 2023-03-27 | Stop reason: HOSPADM

## 2023-03-26 RX ORDER — ATORVASTATIN CALCIUM 10 MG/1
10 TABLET, FILM COATED ORAL DAILY
Status: DISCONTINUED | OUTPATIENT
Start: 2023-03-26 | End: 2023-03-27 | Stop reason: HOSPADM

## 2023-03-26 RX ORDER — PANTOPRAZOLE SODIUM 40 MG/1
40 TABLET, DELAYED RELEASE ORAL
Status: DISCONTINUED | OUTPATIENT
Start: 2023-03-27 | End: 2023-03-27 | Stop reason: HOSPADM

## 2023-03-26 RX ORDER — ENOXAPARIN SODIUM 100 MG/ML
40 INJECTION SUBCUTANEOUS DAILY
Status: DISCONTINUED | OUTPATIENT
Start: 2023-03-26 | End: 2023-03-27 | Stop reason: HOSPADM

## 2023-03-26 RX ORDER — DOCUSATE SODIUM 100 MG/1
100 CAPSULE, LIQUID FILLED ORAL DAILY PRN
Status: DISCONTINUED | OUTPATIENT
Start: 2023-03-26 | End: 2023-03-26 | Stop reason: DRUGHIGH

## 2023-03-26 RX ORDER — HYDRALAZINE HYDROCHLORIDE 20 MG/ML
5 INJECTION INTRAMUSCULAR; INTRAVENOUS ONCE
Status: COMPLETED | OUTPATIENT
Start: 2023-03-26 | End: 2023-03-26

## 2023-03-26 RX ORDER — DOCUSATE SODIUM 100 MG/1
100 CAPSULE, LIQUID FILLED ORAL 2 TIMES DAILY PRN
Status: DISCONTINUED | OUTPATIENT
Start: 2023-03-26 | End: 2023-03-27 | Stop reason: HOSPADM

## 2023-03-26 RX ORDER — INSULIN LISPRO 100 [IU]/ML
1-6 INJECTION, SOLUTION INTRAVENOUS; SUBCUTANEOUS
Status: DISCONTINUED | OUTPATIENT
Start: 2023-03-26 | End: 2023-03-27 | Stop reason: HOSPADM

## 2023-03-26 RX ORDER — PANTOPRAZOLE SODIUM 40 MG/1
40 TABLET, DELAYED RELEASE ORAL
Status: DISCONTINUED | OUTPATIENT
Start: 2023-03-26 | End: 2023-03-26

## 2023-03-26 RX ORDER — SODIUM CHLORIDE 9 MG/ML
100 INJECTION, SOLUTION INTRAVENOUS CONTINUOUS
Status: DISPENSED | OUTPATIENT
Start: 2023-03-26 | End: 2023-03-27

## 2023-03-26 RX ORDER — SIMETHICONE 80 MG
160 TABLET,CHEWABLE ORAL ONCE
Status: COMPLETED | OUTPATIENT
Start: 2023-03-26 | End: 2023-03-26

## 2023-03-26 RX ORDER — ALBUTEROL SULFATE 90 UG/1
2 AEROSOL, METERED RESPIRATORY (INHALATION) EVERY 6 HOURS PRN
Status: DISCONTINUED | OUTPATIENT
Start: 2023-03-26 | End: 2023-03-27 | Stop reason: HOSPADM

## 2023-03-26 RX ORDER — MAGNESIUM HYDROXIDE/ALUMINUM HYDROXICE/SIMETHICONE 120; 1200; 1200 MG/30ML; MG/30ML; MG/30ML
30 SUSPENSION ORAL EVERY 6 HOURS PRN
Status: DISCONTINUED | OUTPATIENT
Start: 2023-03-26 | End: 2023-03-27 | Stop reason: HOSPADM

## 2023-03-26 RX ORDER — ONDANSETRON 2 MG/ML
4 INJECTION INTRAMUSCULAR; INTRAVENOUS ONCE
Status: COMPLETED | OUTPATIENT
Start: 2023-03-26 | End: 2023-03-26

## 2023-03-26 RX ORDER — INSULIN LISPRO 100 [IU]/ML
1-5 INJECTION, SOLUTION INTRAVENOUS; SUBCUTANEOUS
Status: DISCONTINUED | OUTPATIENT
Start: 2023-03-26 | End: 2023-03-27 | Stop reason: HOSPADM

## 2023-03-26 RX ORDER — CALCIUM CARBONATE 200(500)MG
1000 TABLET,CHEWABLE ORAL DAILY PRN
Status: DISCONTINUED | OUTPATIENT
Start: 2023-03-26 | End: 2023-03-27 | Stop reason: HOSPADM

## 2023-03-26 RX ADMIN — INSULIN LISPRO 3 UNITS: 100 INJECTION, SOLUTION INTRAVENOUS; SUBCUTANEOUS at 17:02

## 2023-03-26 RX ADMIN — INSULIN ASPART 10 UNITS: 100 INJECTION, SUSPENSION SUBCUTANEOUS at 17:03

## 2023-03-26 RX ADMIN — POLYETHYLENE GLYCOL 3350 17 G: 17 POWDER, FOR SOLUTION ORAL at 14:15

## 2023-03-26 RX ADMIN — SODIUM CHLORIDE 500 ML: 0.9 INJECTION, SOLUTION INTRAVENOUS at 10:53

## 2023-03-26 RX ADMIN — OXYBUTYNIN CHLORIDE 5 MG: 5 TABLET, EXTENDED RELEASE ORAL at 14:14

## 2023-03-26 RX ADMIN — IOHEXOL 100 ML: 350 INJECTION, SOLUTION INTRAVENOUS at 08:36

## 2023-03-26 RX ADMIN — MORPHINE SULFATE 4 MG: 4 INJECTION INTRAVENOUS at 10:07

## 2023-03-26 RX ADMIN — HYDROMORPHONE HYDROCHLORIDE 0.5 MG: 1 INJECTION, SOLUTION INTRAMUSCULAR; INTRAVENOUS; SUBCUTANEOUS at 07:42

## 2023-03-26 RX ADMIN — ENOXAPARIN SODIUM 40 MG: 40 INJECTION SUBCUTANEOUS at 14:14

## 2023-03-26 RX ADMIN — ONDANSETRON 4 MG: 2 INJECTION INTRAMUSCULAR; INTRAVENOUS at 07:56

## 2023-03-26 RX ADMIN — SIMETHICONE 160 MG: 80 TABLET, CHEWABLE ORAL at 10:53

## 2023-03-26 RX ADMIN — HYDRALAZINE HYDROCHLORIDE 5 MG: 20 INJECTION, SOLUTION INTRAMUSCULAR; INTRAVENOUS at 09:10

## 2023-03-26 RX ADMIN — LOSARTAN POTASSIUM 25 MG: 25 TABLET, FILM COATED ORAL at 14:14

## 2023-03-26 RX ADMIN — SODIUM CHLORIDE 100 ML/HR: 0.9 INJECTION, SOLUTION INTRAVENOUS at 14:05

## 2023-03-26 RX ADMIN — ATORVASTATIN CALCIUM 10 MG: 10 TABLET, FILM COATED ORAL at 14:14

## 2023-03-26 NOTE — ED NOTES
Pt refusing to answer questions, directing answers to wife instead        Arron Ritchie RN  03/26/23 0620

## 2023-03-26 NOTE — ASSESSMENT & PLAN NOTE
· Brief episode of left upper chest pain which radiated to neck and left arm  Symptoms quickly resolved    Denies any exertional chest pain or shortness of breath  · Troponins negative  · KG without any ischemic changes  · Outpatient  stress test recommended

## 2023-03-26 NOTE — ED PROVIDER NOTES
History  Chief Complaint   Patient presents with   • Chest Pain     Pt reports food poisoning on Thursday followed by chest pain  States pain radiates from chest to back +nausea +SOB family denies any cardiac history     77-year-old male with history of anemia, insulin-dependent diabetes, diverticulosis, GERD presents to the emergency department with diffuse and severe chest and abdominal pain that radiates to his back  Significant other at bedside says they just got back from [facility], where he was diagnosed with food poisoning 3 days ago  He was in the hospital there and they did blood work and some imaging but she does not know what they found and patient remains in significant pain  On the first day of illness, he had significant amount of vomiting, however the last 2 days he has only been vomiting small amounts of bile, as he has not had any p o  intake  He says the chest and abdominal pain started at the same time as the vomiting not after  He has not had a fever  He is not vomiting up any blood  He has not had a bowel movement in 3 or 4 days  He denies any dysuria or hematuria  He is not a smoker  History provided by:  Significant other  History limited by:  Acuity of condition  Chest Pain      Prior to Admission Medications   Prescriptions Last Dose Informant Patient Reported? Taking? Biotin 1000 MCG tablet   Yes No   Sig: Take 1 tablet by mouth daily in the early morning    Blood Glucose Monitoring Suppl (GLUCOCARD VITAL MONITOR) w/Device KIT   No No   Sig: by Does not apply route 2 (two) times a day   Cholecalciferol (VITAMIN D3) 2000 units capsule   Yes No   Sig: Take 1,000 Units by mouth daily in the early morning    Cyanocobalamin (VITAMIN B-12) 5000 MCG TBDP   Yes No   Sig: Take 5,000 mcg by mouth once a week Takes on Mondays   Lancets (LIFESCAN UNISTIK 2) MISC   Yes No   Sig: Lifescan One Touch Ultramini Meter; use as directed; 1; 0; 31-Oct-2016; 31-Oct-2016; Melida Duran;  Active Mirabegron ER 50 MG TB24   No No   Sig: Take 1 tablet (50 mg total) by mouth daily at bedtime   Multiple Vitamin (MULTIVITAMIN) capsule   Yes No   Sig: Take 1 capsule by mouth daily in the early morning    acetaminophen (TYLENOL) 325 mg tablet   No No   Sig: Take 2 tablets (650 mg total) by mouth every 4 (four) hours as needed for mild pain, headaches or fever   albuterol (ProAir HFA) 90 mcg/act inhaler   No No   Sig: Inhale 2 puffs every 6 (six) hours as needed for wheezing or shortness of breath   aluminum-magnesium hydroxide-simethicone (MYLANTA) 200-200-20 mg/5 mL suspension   No No   Sig: Take 30 mL by mouth every 6 (six) hours as needed for indigestion or heartburn   atorvastatin (LIPITOR) 10 mg tablet   No No   Sig: Take 1 tablet (10 mg total) by mouth daily   docusate sodium (COLACE) 100 mg capsule   Yes No   Sig: Take 100 mg by mouth daily as needed for constipation   fluticasone (FLONASE) 50 mcg/act nasal spray   No No   Si sprays into each nostril daily   Patient taking differently: 2 sprays into each nostril if needed   fluticasone (Flovent HFA) 110 MCG/ACT inhaler   No No   Sig: Inhale 2 puffs 2 (two) times a day Rinse mouth after use  Patient taking differently: Inhale 2 puffs if needed Rinse mouth after use     glipiZIDE (GLUCOTROL XL) 5 mg 24 hr tablet   No No   Sig: TAKE ONE TABLET BY MOUTH EVERY DAY AFTER BREAKFAST   insulin aspart protamine-insulin aspart (NovoLOG 70/30) 100 Units/mL injection pen   No No   Sig: 15 U in AM and 10 U in PM   losartan (COZAAR) 25 mg tablet   No No   Sig: Take 1 tablet (25 mg total) by mouth daily   metFORMIN (GLUCOPHAGE) 1000 MG tablet   No No   Sig: Take 1 tablet (1,000 mg total) by mouth 2 (two) times a day with meals   pantoprazole (PROTONIX) 40 mg tablet   No No   Sig: TAKE ONE TABLET BY MOUTH EVERY DAY      Facility-Administered Medications: None       Past Medical History:   Diagnosis Date   • Anemia    • Arthritis    • Black stool 2020   • Cancer [facility])    • Colon polyp    • Diabetes mellitus ([facility] [address] )     IDDM   • Diverticulosis    • Enlarged prostate    • Erectile dysfunction    • Hypercholesteremia     Resolved post weight loss   • Hypertension     Resolved post weight loss   • Kidney stone    • Obesity    • Prostate cancer ([facility] [address] )    • Wears partial dentures     partial upper and lower       Past Surgical History:   Procedure Laterality Date   • ABDOMINAL ADHESION SURGERY N/A 11/28/2016    Procedure: EXTENSIVE LYSIS ADHESIONS;  Surgeon: Alex Mills MD;  [facility];  Service:    • ANKLE FRACTURE SURGERY Left    • CHOLECYSTECTOMY     • COLON SURGERY      colon resection   • COLONOSCOPY     • COLOSTOMY     • COLOSTOMY CLOSURE     • DIAGNOSTIC LAPAROSCOPY     • GASTRIC BYPASS      failed due to adhesions   • HERNIA REPAIR      abdominal   • WI CYSTO INSERTION TRANSPROSTATIC IMPLANT SINGLE N/A 3/8/2019    Procedure: CYSTOSCOPY WITH INSERTION Larna Brooms;  Surgeon: Sana Leon MD;  Location: [facility];  Service: Urology   • WI CYSTO INSERTION TRANSPROSTATIC IMPLANT SINGLE N/A 5/8/2020    Procedure: Vito Mathew WITH INSERTION Larna Brooms;  Surgeon: Sana Leon MD;  [facility];  Service: Urology   • WI CYSTOURETHROSCOPY W/INTERNAL URETHROTOMY N/A 5/8/2020    Procedure: DIRECT VISUAL INTERAL URETHROTOMY (DVIU), dilation of urethral stricture;  Surgeon: Sana Leon MD;  Location: [facility];  Service: Urology   • WI EDG  [facility] N/A 10/25/2018    Procedure: LINEAR ENDOSCOPIC U/S;  Surgeon: Merriam Gowers, MD;  Location: [facility]; Service: Gastroenterology   • WI ESOPHAGOGASTRODUODENOSCOPY TRANSORAL DIAGNOSTIC N/A 7/6/2016    Procedure: EGD AND COLONOSCOPY;  Surgeon: Marvetta Simmonds, MD;  Location: [facility];   Service: Gastroenterology   [facility] [address] [place] [address] LONGITUDINAL GASTRECTOMY N/A 11/28/2016    Procedure: GASTRECTOMY SLEEVE LAPAROSCOPIC;  Surgeon: Alex Mills MD;  Location: [facility];  Service: Bariatrics   • NV PROSTATE NEEDLE BIOPSY ANY APPROACH N/A 2020    Procedure: TRANSRECTAL NEEDLE BIOPSY PROSTATE (TRNBP) WITH TRANSRECTAL ULTRASOUND GUIDANCE;  Surgeon: Belkys Marc MD;  Location: [facility];  Service: Urology   • TONSILLECTOMY     • US GUIDED PROSTATE BIOPSY  2020       Family History   Problem Relation Age of Onset   • Heart disease Mother    • Breast cancer Mother 80   • Diabetes Father    • Colon cancer Neg Hx    • Liver disease Neg Hx      I have reviewed and agree with the history as documented  E-Cigarette/Vaping   • E-Cigarette Use Never User      E-Cigarette/Vaping Substances   • Nicotine No    • THC No    • CBD No    • Flavoring No    • Other No    • Unknown No      Social History     Tobacco Use   • Smoking status: Former     Packs/day: 0 25     Types: Cigarettes     Quit date: 11/10/2001     Years since quittin 3   • Smokeless tobacco: Former   Vaping Use   • Vaping Use: Never used   Substance Use Topics   • Alcohol use: Not Currently   • Drug use: Not Currently     Comment: RSO        Review of Systems   Unable to perform ROS: Acuity of condition   Cardiovascular: Positive for chest pain  Physical Exam  ED Triage Vitals   Temperature Pulse Respirations Blood Pressure SpO2   23 0710 23 0711 23 0710 23 0711 23 0712   98 5 °F (36 9 °C) 56 20 (!) 199/87 99 %      Temp src Heart Rate Source Patient Position - Orthostatic VS BP Location FiO2 (%)   -- 23 0710 23 0711 23 0711 --    Monitor Sitting Right arm       Pain Score       23 0839       8             Orthostatic Vital Signs  Vitals:    23 0830 23 1000 23 1414 23 1636   BP: (!) 175/73 166/73 136/64 137/66   Pulse: 56 62 62 65   Patient Position - Orthostatic VS:           Physical Exam  Vitals and nursing note reviewed  Constitutional:       General: He is in acute distress  Appearance: He is well-developed  He is ill-appearing  HENT:      Head: Normocephalic and atraumatic  Eyes:      Extraocular Movements: Extraocular movements intact  Conjunctiva/sclera: Conjunctivae normal    Cardiovascular:      Rate and Rhythm: Regular rhythm  Bradycardia present  Heart sounds: Normal heart sounds  No murmur heard  Pulmonary:      Effort: Pulmonary effort is normal  Tachypnea present  No respiratory distress  Breath sounds: Normal breath sounds  No decreased breath sounds, wheezing or rhonchi  Comments: Increased respiratory rate likely due to pain, lungs sound clear to auscultation bilaterally  Chest:      Comments: Do not appreciate any crepitus in the chest or neck  Abdominal:      Palpations: Abdomen is soft  Tenderness: There is no abdominal tenderness  There is no guarding  Comments: Diffuse abdominal pain, abdomen is soft, nondistended  There is a midline abdominal scar from prior resection after obstruction   Musculoskeletal:         General: No swelling  Cervical back: Neck supple  Right lower leg: No tenderness  Left lower leg: No tenderness  Skin:     General: Skin is warm and dry  Capillary Refill: Capillary refill takes less than 2 seconds  Comments: He has a dark tan over the entire body, significant other says they just got back from [place]   Neurological:      General: No focal deficit present  Mental Status: He is alert and oriented to person, place, and time     Psychiatric:         Mood and Affect: Mood normal          ED Medications  Medications   atorvastatin (LIPITOR) tablet 10 mg (10 mg Oral Given 3/26/23 1414)   insulin aspart protamine-insulin aspart (NovoLOG 70/30) 100 units/mL subcutaneous injection 10 Units (10 Units Subcutaneous Given 3/26/23 1703)   losartan (COZAAR) tablet 25 mg (25 mg Oral Given 3/26/23 1414)   oxybutynin (DITROPAN-XL) 24 hr tablet 5 mg (5 mg Oral Given 3/26/23 1414)   aluminum-magnesium hydroxide-simethicone (MYLANTA) oral suspension 30 mL (has no administration in time range)   albuterol (PROVENTIL HFA,VENTOLIN HFA) inhaler 2 puff (has no administration in time range)   sodium chloride 0 9 % infusion (100 mL/hr Intravenous New Bag 3/26/23 1405)   insulin lispro (HumaLOG) 100 units/mL subcutaneous injection 1-6 Units (3 Units Subcutaneous Given 3/26/23 1702)   insulin lispro (HumaLOG) 100 units/mL subcutaneous injection 1-5 Units (has no administration in time range)   labetalol (NORMODYNE) injection 10 mg (has no administration in time range)   calcium carbonate (TUMS) chewable tablet 1,000 mg (has no administration in time range)   docusate sodium (COLACE) capsule 100 mg (has no administration in time range)   ondansetron (ZOFRAN) injection 4 mg (has no administration in time range)   enoxaparin (LOVENOX) subcutaneous injection 40 mg (40 mg Subcutaneous Given 3/26/23 1414)   polyethylene glycol (MIRALAX) packet 17 g (17 g Oral Given 3/26/23 1415)   pantoprazole (PROTONIX) EC tablet 40 mg (has no administration in time range)   HYDROmorphone (DILAUDID) injection 0 5 mg (0 5 mg Intravenous Given 3/26/23 0742)   ondansetron (ZOFRAN) injection 4 mg (4 mg Intravenous Given 3/26/23 0756)   iohexol (OMNIPAQUE) 350 MG/ML injection (SINGLE-DOSE) 100 mL (100 mL Intravenous Given 3/26/23 0836)   hydrALAZINE (APRESOLINE) injection 5 mg (5 mg Intravenous Given 3/26/23 0910)   morphine injection 4 mg (4 mg Intravenous Given 3/26/23 1007)   simethicone (MYLICON) chewable tablet 160 mg (160 mg Oral Given 3/26/23 1053)   sodium chloride 0 9 % bolus 500 mL (500 mL Intravenous New Bag 3/26/23 1053)       Diagnostic Studies  Results Reviewed     Procedure Component Value Units Date/Time    HS Troponin I 4hr [[id number]]  (Normal) Collected: 03/26/23 1218    Lab Status: Final result Specimen: Blood from Hand, Left Updated: 03/26/23 1251     hs TnI 4hr 8 ng/L      Delta 4hr hsTnI -4 ng/L     HS Troponin I 2hr [[id number]]  (Normal) Collected: 03/26/23 1007    Lab Status: Final result Specimen: Blood from Arm, Right Updated: 03/26/23 1050     hs TnI 2hr 10 ng/L      Delta 2hr hsTnI -2 ng/L     Lactic acid [[id number]]  (Normal) Collected: 03/26/23 0755    Lab Status: Final result Specimen: Blood from Arm, Left Updated: 03/26/23 0825     LACTIC ACID 1 3 mmol/L     Narrative:      Result may be elevated if tourniquet was used during collection      Protime-INR [[id number]]  (Normal) Collected: 03/26/23 0802    Lab Status: Final result Specimen: Blood from Arm, Left Updated: 03/26/23 0823     Protime 14 2 seconds      INR 1 08    APTT [[id number]]  (Normal) Collected: 03/26/23 0802    Lab Status: Final result Specimen: Blood from Arm, Left Updated: 03/26/23 0823     PTT 27 seconds     Comprehensive metabolic panel [[id number]]  (Abnormal) Collected: 03/26/23 0742    Lab Status: Final result Specimen: Blood from Arm, Left Updated: 03/26/23 0820     Sodium 136 mmol/L      Potassium 3 9 mmol/L      Chloride 103 mmol/L      CO2 23 mmol/L      ANION GAP 10 mmol/L      BUN 21 mg/dL      Creatinine 0 87 mg/dL      Glucose 198 mg/dL      Calcium 8 9 mg/dL      AST 27 U/L      ALT 22 U/L      Alkaline Phosphatase 66 U/L      Total Protein 7 0 g/dL      Albumin 3 9 g/dL      Total Bilirubin 0 67 mg/dL      eGFR 86 ml/min/1 73sq m     Narrative:      [place] guidelines for Chronic Kidney Disease (CKD):   •  Stage 1 with normal or high GFR (GFR > 90 mL/min/1 73 square meters)  •  Stage 2 Mild CKD (GFR = 60-89 mL/min/1 73 square meters)  •  Stage 3A Moderate CKD (GFR = 45-59 mL/min/1 73 square meters)  •  Stage 3B Moderate CKD (GFR = 30-44 mL/min/1 73 square meters)  •  Stage 4 Severe CKD (GFR = 15-29 mL/min/1 73 square meters)  •  Stage 5 End Stage CKD (GFR <15 mL/min/1 73 square meters)  Note: GFR calculation is accurate only with a steady state creatinine    Lipase [[id number]]  (Normal) Collected: 03/26/23 0742    Lab Status: Final result Specimen: Blood from Arm, Left Updated: 03/26/23 0820     Lipase 23 u/L     HS Troponin 0hr (reflex protocol) [[id number]]  (Normal) Collected: 03/26/23 0742    Lab Status: Final result Specimen: Blood from Arm, Left Updated: 03/26/23 0812     hs TnI 0hr 12 ng/L     CBC and differential [[id number]]  (Abnormal) Collected: 03/26/23 0742    Lab Status: Final result Specimen: Blood from Arm, Left Updated: 03/26/23 0751     WBC 8 54 Thousand/uL      RBC 4 36 Million/uL      Hemoglobin 13 1 g/dL      Hematocrit 38 9 %      MCV 89 fL      MCH 30 0 pg      MCHC 33 7 g/dL      RDW 13 2 %      MPV 12 4 fL      Platelets 844 Thousands/uL      nRBC 0 /100 WBCs      Neutrophils Relative 76 %      Immat GRANS % 1 %      Lymphocytes Relative 7 %      Monocytes Relative 15 %      Eosinophils Relative 0 %      Basophils Relative 1 %      Neutrophils Absolute 6 53 Thousands/µL      Immature Grans Absolute 0 05 Thousand/uL      Lymphocytes Absolute 0 59 Thousands/µL      Monocytes Absolute 1 30 Thousand/µL      Eosinophils Absolute 0 03 Thousand/µL      Basophils Absolute 0 04 Thousands/µL                  CTA dissection protocol chest abdomen pelvis w wo contrast   Final Result by Stan Lee MD (03/26 2623)         1  No evidence for aortic aneurysm, dissection or intramural hematoma or other acute abnormality in the chest, abdomen, or pelvis  2   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  3   Stable 1 8 cm complex cystic mass lower pole right kidney  4   Additional stable findings as noted  Workstation performed: [id number]         XR chest 1 view portable   Final Result by Miguel Villalobos MD (03/26 1524)      No acute cardiopulmonary disease  Workstation performed: [id number]               Procedures  ECG 12 Lead Documentation Only    Date/Time: 3/26/2023 9:08 AM  Performed by: Nelida Osler, MD  Authorized by:  Nelida Osler, MD     Indications / Diagnosis:  Chest pain  ECG reviewed by me, the ED Provider: yes    Patient location:  ED  Previous ECG:     Previous ECG:  Compared to current  Interpretation:     Interpretation: normal    Rate:     ECG rate:  57    ECG rate assessment: bradycardic    Rhythm:     Rhythm: sinus bradycardia    Ectopy:     Ectopy: none    QRS:     QRS axis:  Normal    QRS intervals:  Normal  Conduction:     Conduction: normal    ST segments:     ST segments:  Normal  T waves:     T waves: normal    Comments:      QTc 434          ED Course  ED Course as of 03/26/23 1853   Sun Mar 26, 2023   0846 On re-evaluation, pain is significantly reduced after 0 5mg dilaudid  Giving 5mg hydralazine for /93   0847 Just returned from [place]  Remains stable   0900 CBC and CMP unremarkable  Normal lipase  Normal lactic   0905 hs TnI 0hr: 12   0905 EKG shows normal sinus rhythm, bradycardic to 57, normal axis without evidence of acute ischemic changes  However based on elevated initial troponin, will get 2-hour                                       Medical Decision Making  42-year-old male with history of anemia, insulin-dependent diabetes, diverticulosis, GERD and past surgical history of cholecystectomy and SBO s/p resection, gastric sleeve presents to the emergency department with diffuse and severe chest and abdominal pain that radiates to his back  Differential diagnosis includes but is not limited to: Dissection, aortic aneurysm, perforated ulcer, bowel obstruction, gastritis, ACS, MI    On arrival to the ED, he is hypertensive to 199/87, afebrile, heart rate in the 50s, increased work of breathing to 30 due to pain, and satting 99% on room air  He is writhing in pain and appears extremely uncomfortable  He is only speaking a couple words at a time, clutching his abdomen and saying 'help me give me something for the pain '  I do not appreciate any crepitus in the neck or chest   He has normal S1-S2 cardiac sounds, no murmur    Lungs sound clear to auscultation bilaterally  His abdomen is soft, diffusely tender, though it is not distended, no guarding or rebound  He is given pain meds and nausea meds which helped to decrease his pain  CBC and CMP are unremarkable  Normal lipase  Normal lactic  Initial troponin is 12, 2-hour delta is -2  His EKG shows normal sinus rhythm with mild bradycardia to 57, without evidence of acute ischemic changes  His x-ray is unremarkable  CT imaging does not show evidence of dissection, obstruction, aneurysm, or perforation  The patient requests additional pain and nausea medications as he is in a lot of discomfort  He does not feel safe to return home in the state  The case is discussed with Dr Derek Leahy, and patient is admitted to [facility] for continued observation and pain control  Amount and/or Complexity of Data Reviewed  Labs: ordered  Decision-making details documented in ED Course  Radiology: ordered  Risk  OTC drugs  Prescription drug management  Decision regarding hospitalization              Disposition  Final diagnoses:   Chest pain   Abdominal pain   HTN (hypertension)     Time reflects when diagnosis was documented in both MDM as applicable and the Disposition within this note     Time User Action Codes Description Comment    3/26/2023 10:36 AM Sushma Bard Add [R07 9] Chest pain     3/26/2023 10:36 AM Sushma Ochoa Add [R10 9] Abdominal pain     3/26/2023 10:36 AM Sushma Ochoa Add [I10] HTN (hypertension)     3/26/2023  2:50 PM Anirudh Davila Modify [R07 9] Chest pain     3/26/2023  2:50 PM Anirudh Davila Modify [R10 9] Abdominal pain     3/26/2023  2:50 PM Ruth Wray HTN (hypertension)       ED Disposition     ED Disposition   Admit    Condition   Stable    Date/Time   Sun Mar 26, 2023 10:37 AM    Comment   Case was discussed with Dr Derek Leahy and the patient's admission status was agreed to be Admission Status: observation status to the service of Dr Scott Schwartz   Follow-up Information    None         Current Discharge Medication List      CONTINUE these medications which have NOT CHANGED    Details   acetaminophen (TYLENOL) 325 mg tablet Take 2 tablets (650 mg total) by mouth every 4 (four) hours as needed for mild pain, headaches or fever  Refills: 0    Associated Diagnoses: Fall, initial encounter      albuterol (ProAir HFA) 90 mcg/act inhaler Inhale 2 puffs every 6 (six) hours as needed for wheezing or shortness of breath  Qty: 8 5 g, Refills: 0    Comments: Substitution to a formulary equivalent within the same pharmaceutical class is authorized    Associated Diagnoses: Mild asthma exacerbation      aluminum-magnesium hydroxide-simethicone (MYLANTA) 200-200-20 mg/5 mL suspension Take 30 mL by mouth every 6 (six) hours as needed for indigestion or heartburn  Qty: 355 mL, Refills: 0    Associated Diagnoses: Acute gastroenteritis      atorvastatin (LIPITOR) 10 mg tablet Take 1 tablet (10 mg total) by mouth daily  Qty: 100 tablet, Refills: 3    Associated Diagnoses: Type 2 diabetes mellitus with insulin therapy ([facility])      Biotin 1000 MCG tablet Take 1 tablet by mouth daily in the early morning       Blood Glucose Monitoring Suppl (GLUCOCARD VITAL MONITOR) w/Device KIT by Does not apply route 2 (two) times a day  Qty: 1 kit, Refills: 0    Associated Diagnoses: Type 2 diabetes mellitus with insulin therapy (HCC)      Cholecalciferol (VITAMIN D3) 2000 units capsule Take 1,000 Units by mouth daily in the early morning       Cyanocobalamin (VITAMIN B-12) 5000 MCG TBDP Take 5,000 mcg by mouth once a week Takes on Mondays      docusate sodium (COLACE) 100 mg capsule Take 100 mg by mouth daily as needed for constipation      fluticasone (FLONASE) 50 mcg/act nasal spray 2 sprays into each nostril daily  Qty: 16 g, Refills: 0    Associated Diagnoses: Non-seasonal allergic rhinitis, unspecified trigger      fluticasone (Flovent HFA) 110 MCG/ACT inhaler Inhale 2 puffs 2 (two) times a day Rinse mouth after use  Qty: 12 g, Refills: 0    Associated Diagnoses: Mild asthma exacerbation      glipiZIDE (GLUCOTROL XL) 5 mg 24 hr tablet TAKE ONE TABLET BY MOUTH EVERY DAY AFTER BREAKFAST  Qty: 90 tablet, Refills: 3    Associated Diagnoses: Type 2 diabetes mellitus with insulin therapy (HCC)      insulin aspart protamine-insulin aspart (NovoLOG 70/30) 100 Units/mL injection pen 15 U in AM and 10 U in PM  Qty: 36 mL, Refills: 0    Associated Diagnoses: Type 2 diabetes mellitus with hyperglycemia, with long-term current use of insulin (HCC)      Lancets (LIFESCAN UNISTIK 2) MISC Lifescan One Touch Ultramini Meter; use as directed; 1; 0; 31-Oct-2016; 31-Oct-2016; Melida Duran; Active      losartan (COZAAR) 25 mg tablet Take 1 tablet (25 mg total) by mouth daily  Qty: 100 tablet, Refills: 3    Associated Diagnoses: Benign essential hypertension      metFORMIN (GLUCOPHAGE) 1000 MG tablet Take 1 tablet (1,000 mg total) by mouth 2 (two) times a day with meals  Qty: 180 tablet, Refills: 0    Associated Diagnoses: Type 2 diabetes mellitus with hyperglycemia, with long-term current use of insulin (HCC)      Mirabegron ER 50 MG TB24 Take 1 tablet (50 mg total) by mouth daily at bedtime  Qty: 90 tablet, Refills: 3    Associated Diagnoses: Urge incontinence of urine      Multiple Vitamin (MULTIVITAMIN) capsule Take 1 capsule by mouth daily in the early morning       pantoprazole (PROTONIX) 40 mg tablet TAKE ONE TABLET BY MOUTH EVERY DAY  Qty: 30 tablet, Refills: 0    Associated Diagnoses: Gastroesophageal reflux disease without esophagitis           No discharge procedures on file  PDMP Review       Value Time User    PDMP Reviewed  Yes 8/17/2022  4:28 PM Hugo Robertson DDS           ED Provider  Attending physically available and evaluated Deysi Short  SHANNON managed the patient along with the ED Attending      Electronically Signed by         Batsheva Dale MD  03/26/23 0238

## 2023-03-26 NOTE — ED ATTENDING ATTESTATION
3/26/2023  IEusebio MD, saw and evaluated the patient  I have discussed the patient with the resident/non-physician practitioner and agree with the resident's/non-physician practitioner's findings, Plan of Care, and MDM as documented in the resident's/non-physician practitioner's note, except where noted  All available labs and Radiology studies were reviewed  I was present for key portions of any procedure(s) performed by the resident/non-physician practitioner and I was immediately available to provide assistance  At this point I agree with the current assessment done in the Emergency Department  I have conducted an independent evaluation of this patient a history and physical is as follows:    61-year-old presenting to the ER with abdominal pain, chest pain, back pain  Distress  States started on Thursday  Getting worse  Short of breath  No headache or neck pain  No fevers  Has been vomiting  No diarrhea  Gallbladder removed previously  Differential includes CAD, retained gallstone, pancreatitis, gastritis, ulcer disease, dissection  Agree with blood work and imaging ordered by resident        ED Course         Critical Care Time  Procedures

## 2023-03-26 NOTE — H&P
[facility]  H&P  Name: Lana Conklin  MRN: [id number]  Unit/Bed#: S -01 I Date of Admission: 3/26/2023   Date of Service: 3/26/2023 I Hospital Day: 0      Assessment/Plan   * Upper abdominal pain, unspecified  Assessment & Plan  · Patient reports upper abdominal discomfort pain to sleep due to nausea since eating crabs on Friday 3/24/2023 while he was in [place] on holiday  Had several episodes of nausea and vomiting but no diarrhea  History of gastrectomy  · ? Food poisoning  · Continue PPI  · GI consult    Other chest pain  Assessment & Plan  · Brief episode of left upper chest pain which radiated to neck and left arm  Symptoms quickly resolved  Denies any exertional chest pain or shortness of breath  · Troponins negative  · KG without any ischemic changes  · Outpatient  stress test recommended    IPMN (intraductal papillary mucinous neoplasm)  Assessment & Plan  · Follows with GI    Benign essential hypertension  Assessment & Plan  · Blood pressure elevated today  Has not taken his medications  · We will resume and monitor    Type 2 diabetes mellitus with hyperglycemia, with long-term current use of insulin (HCC)  Assessment & Plan  Lab Results   Component Value Date    HGBA1C 8 3 (H) 01/13/2023       No results for input(s): POCGLU in the last 72 hours  Blood Sugar Average: Last 72 hrs:  Patient is on glipizide metformin and NovoLog 70/30  We will continue insulin regimen monitor blood sugars  Sliding scale insulin  Hold oral antidiabetics    Class 2 severe obesity due to excess calories with serious comorbidity and body mass index (BMI) of 35 0 to 35 9 in adult [facility])  Assessment & Plan  · Lifestyle modifications discussed    GERD (gastroesophageal reflux disease)  Assessment & Plan  · Continue PPI        VTE Pharmacologic Prophylaxis: VTE Score: 3 Moderate Risk (Score 3-4) - Pharmacological DVT Prophylaxis Ordered: enoxaparin (Lovenox)    Code Status: Prior full  Discussion with family: Updated  (wife) at bedside  Anticipated Length of Stay: Patient will be admitted on an observation basis with an anticipated length of stay of less than 2 midnights secondary to Chest pain and abdominal pain  Total Time Spent on Date of Encounter in care of patient: This time was spent on one or more of the following: performing physical exam; counseling and coordination of care; obtaining or reviewing history; documenting in the medical record; reviewing/ordering tests, medications or procedures; communicating with other healthcare professionals and discussing with patient's family/caregivers  Chief Complaint: Upper abdominal pain nausea    History of Present Illness:  Tamika Martinez is a 67 y o  male with a PMH of type 2 diabetes mellitus, hypertension, hyperlipidemia, history of prostate cancer, IPMN, who presents with upper abdominal pain/ nausea x3 days, symptoms started on Friday after eating crab in [place]   He had several episodes of nausea and vomiting  But denies any diarrhea  No fever or chills  Reports of lower abdominal discomfort since then, today gotten worse  This morning he experienced chest pain which radiated to left arm and neck  Quickly resolved  Denies any exertional chest pain or shortness of breath  No diaphoresis  While in [place] he did go to 2 hospitals for same problem  He did not get proper care and decided to return       Review of Systems:  Review of Systems  12 point review systems negative except noted above  Past Medical and Surgical History:   Past Medical History:   Diagnosis Date   • Anemia    • Arthritis    • Black stool 07/24/2020   • Cancer [facility])    • Colon polyp    • Diabetes mellitus (HCC)     IDDM   • Diverticulosis    • Enlarged prostate    • Erectile dysfunction    • Hypercholesteremia     Resolved post weight loss   • Hypertension     Resolved post weight loss   • Kidney stone    • Obesity    • Prostate cancer [facility])    • Wears partial dentures     partial upper and lower       Past Surgical History:   Procedure Laterality Date   • ABDOMINAL ADHESION SURGERY N/A 11/28/2016    Procedure: EXTENSIVE LYSIS ADHESIONS;  Surgeon: Norris Vazquez MD;  [facility];  Service:    • ANKLE FRACTURE SURGERY Left    • CHOLECYSTECTOMY     • COLON SURGERY      colon resection   • COLONOSCOPY     • COLOSTOMY     • COLOSTOMY CLOSURE     • DIAGNOSTIC LAPAROSCOPY     • GASTRIC BYPASS      failed due to adhesions   • HERNIA REPAIR      abdominal   • NE CYSTO INSERTION TRANSPROSTATIC IMPLANT SINGLE N/A 3/8/2019    Procedure: CYSTOSCOPY WITH INSERTION UROLIFT;  Surgeon: Matheus Nagy MD;  [facility];  Service: Urology   • NE CYSTO INSERTION TRANSPROSTATIC IMPLANT SINGLE N/A 5/8/2020    Procedure: Genetta Nicole WITH INSERTION Bonna Crigler;  Surgeon: Matheus Nagy MD;  [facility];  Service: Urology   • NE CYSTOURETHROSCOPY W/INTERNAL URETHROTOMY N/A 5/8/2020    Procedure: DIRECT VISUAL INTERAL URETHROTOMY (DVIU), dilation of urethral stricture;  Surgeon: Matheus Nagy MD;  [facility];  Service: Urology   • NE EDG US EXAM SURGICAL ALTER STOM DUODENUM/JEJUNUM N/A 10/25/2018    Procedure: LINEAR ENDOSCOPIC U/S;  Surgeon: Daryl Jacome MD;  Location: [facility]; Service: Gastroenterology   • NE ESOPHAGOGASTRODUODENOSCOPY TRANSORAL DIAGNOSTIC N/A 7/6/2016    Procedure: EGD AND COLONOSCOPY;  Surgeon: Gokul Moyer MD;  [facility];   Service: Gastroenterology   • NE LAPS [address] [place] [address] LONGITUDINAL GASTRECTOMY N/A 11/28/2016    Procedure: GASTRECTOMY SLEEVE LAPAROSCOPIC;  Surgeon: Norris Vazquez MD;  [facility];  Service: Bariatrics   • NE PROSTATE NEEDLE BIOPSY ANY APPROACH N/A 5/8/2020    Procedure: TRANSRECTAL NEEDLE BIOPSY PROSTATE (TRNBP) WITH TRANSRECTAL ULTRASOUND GUIDANCE;  Surgeon: Matheus Nagy MD;  [facility];  Service: Urology   • TONSILLECTOMY     • US GUIDED PROSTATE BIOPSY  5/8/2020       Meds/Allergies:  Prior to Admission medications    Medication Sig Start Date End Date Taking? Authorizing Provider   acetaminophen (TYLENOL) 325 mg tablet Take 2 tablets (650 mg total) by mouth every 4 (four) hours as needed for mild pain, headaches or fever 12/14/20   Carlos Mcdaniels PA-C   albuterol (ProAir HFA) 90 mcg/act inhaler Inhale 2 puffs every 6 (six) hours as needed for wheezing or shortness of breath 5/14/22   YVONNE Mccall   aluminum-magnesium hydroxide-simethicone (MYLANTA) 200-200-20 mg/5 mL suspension Take 30 mL by mouth every 6 (six) hours as needed for indigestion or heartburn 6/29/21   Tereso Rose PA-C   atorvastatin (LIPITOR) 10 mg tablet Take 1 tablet (10 mg total) by mouth daily 8/10/22   Melida Duran MD   Biotin 1000 MCG tablet Take 1 tablet by mouth daily in the early morning     Historical Provider, MD   Blood Glucose Monitoring Suppl (GLUCOCARD VITAL MONITOR) w/Device KIT by Does not apply route 2 (two) times a day 5/14/19   Lyric Kam MD   Cholecalciferol (VITAMIN D3) 2000 units capsule Take 1,000 Units by mouth daily in the early morning     Historical Provider, MD   Cyanocobalamin (VITAMIN B-12) 5000 MCG TBDP Take 5,000 mcg by mouth once a week Takes on Mondays    Historical Provider, MD   docusate sodium (COLACE) 100 mg capsule Take 100 mg by mouth daily as needed for constipation    Historical Provider, MD   fluticasone (FLONASE) 50 mcg/act nasal spray 2 sprays into each nostril daily  Patient taking differently: 2 sprays into each nostril if needed 7/20/22   Melida Duran MD   fluticasone (Flovent HFA) 110 MCG/ACT inhaler Inhale 2 puffs 2 (two) times a day Rinse mouth after use    Patient taking differently: Inhale 2 puffs if needed Rinse mouth after use  6/24/22 1/13/23  Melida Duran MD   glipiZIDE (GLUCOTROL XL) 5 mg 24 hr tablet TAKE ONE TABLET BY MOUTH EVERY DAY AFTER BREAKFAST 3/7/22   Osman Perez MD Roger   insulin aspart protamine-insulin aspart (NovoLOG 70/30) 100 Units/mL injection pen 15 U in AM and 10 U in PM 23   Melida Duran MD   Lancets (LIFESCAN UNISTIK 2) MISC Lifescan One Touch Ultramini Meter; use as directed; 1; 0; 31-Oct-2016; 31-Oct-2016; Robby Brewster; Active 10/31/16   Historical Provider, MD   losartan (COZAAR) 25 mg tablet Take 1 tablet (25 mg total) by mouth daily 8/10/22 1/24/23  Melida Duran MD   metFORMIN (GLUCOPHAGE) 1000 MG tablet Take 1 tablet (1,000 mg total) by mouth 2 (two) times a day with meals 23   Melida Duran MD   Mirabegron ER 50 MG TB24 Take 1 tablet (50 mg total) by mouth daily at bedtime 23   Vincenzo Palacios PA-C   Multiple Vitamin (MULTIVITAMIN) capsule Take 1 capsule by mouth daily in the early morning     Historical Provider, MD   pantoprazole (PROTONIX) 40 mg tablet TAKE ONE TABLET BY MOUTH EVERY DAY 3/5/23   Zayra Hayes MD     I have reviewed home medications with patient personally  Allergies:    Allergies   Allergen Reactions   • Enalapril Anaphylaxis, Throat Swelling and Hives       Social History:  Marital Status: Single   Occupation:   Patient Pre-hospital Living Situation: Home  Patient Pre-hospital Level of Mobility: walks  Patient Pre-hospital Diet Restrictions:   Substance Use History:   Social History     Substance and Sexual Activity   Alcohol Use Not Currently     Social History     Tobacco Use   Smoking Status Former   • Packs/day: 0 25   • Types: Cigarettes   • Quit date: 11/10/2001   • Years since quittin 3   Smokeless Tobacco Former     Social History     Substance and Sexual Activity   Drug Use Not Currently    Comment: RSO       Family History:  Family History   Problem Relation Age of Onset   • Heart disease Mother    • Breast cancer Mother 80   • Diabetes Father    • Colon cancer Neg Hx    • Liver disease Neg Hx        Physical Exam:     Vitals:   Blood Pressure: 166/73 (23 1000)  Pulse: 62 (23 "1000)  Temperature: 98 5 °F (36 9 °C) (03/26/23 0710)  Respirations: 16 (03/26/23 1000)  Height: 5' 7\" (170 2 cm) (03/26/23 0710)  Weight - Scale: 104 kg (230 lb 6 1 oz) (03/26/23 0710)  SpO2: 99 % (03/26/23 1000)    Physical Exam     Gen -Patient comfortable   Neck- Supple  No thyromegaly or lymphadenopathy  Lungs-Clear bilaterally without any wheeze or rales   Heart S1-S2, regular rate and rhythm, no murmurs  Abdomen- mild tenderness upper abdomen without any rebound or guarding  bowel sounds active  Extremities no cyanosi,  clubbing or edema  Skin- no rash  Neuro-nonfocal       Additional Data:     Lab Results:  Results from last 7 days   Lab Units 03/26/23  0742   WBC Thousand/uL 8 54   HEMOGLOBIN g/dL 13 1   HEMATOCRIT % 38 9   PLATELETS Thousands/uL 157   NEUTROS PCT % 76*   LYMPHS PCT % 7*   MONOS PCT % 15*   EOS PCT % 0     Results from last 7 days   Lab Units 03/26/23  0742   SODIUM mmol/L 136   POTASSIUM mmol/L 3 9   CHLORIDE mmol/L 103   CO2 mmol/L 23   BUN mg/dL 21   CREATININE mg/dL 0 87   ANION GAP mmol/L 10   CALCIUM mg/dL 8 9   ALBUMIN g/dL 3 9   TOTAL BILIRUBIN mg/dL 0 67   ALK PHOS U/L 66   ALT U/L 22   AST U/L 27   GLUCOSE RANDOM mg/dL 198*     Results from last 7 days   Lab Units 03/26/23  0802   INR  1 08             Results from last 7 days   Lab Units 03/26/23  0755   LACTIC ACID mmol/L 1 3       Lines/Drains:  Invasive Devices     Peripheral Intravenous Line  Duration           Peripheral IV 03/26/23 Left;Ventral (anterior) Forearm <1 day                    Imaging: Reviewed radiology reports from this admission including: chest xray  CTA dissection protocol chest abdomen pelvis w wo contrast   Final Result by Mariajose Mckeon MD (03/26 3089)         1  No evidence for aortic aneurysm, dissection or intramural hematoma or other acute abnormality in the chest, abdomen, or pelvis  2   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     3   Stable 1 8 cm complex cystic mass lower pole " right kidney  4   Additional stable findings as noted  Workstation performed: [id number]         XR chest 1 view portable   Final Result by Chao Zimmerman MD (03/26 5656)      No acute cardiopulmonary disease  Workstation performed: [id number]             EKG and Other Studies Reviewed on Admission:   · EKG: Sinus bradycardia    ** Please Note: This note has been constructed using a voice recognition system   **

## 2023-03-26 NOTE — PLAN OF CARE
Problem: PAIN - ADULT  Goal: Verbalizes/displays adequate comfort level or baseline comfort level  Description: Interventions:  - Encourage patient to monitor pain and request assistance  - Assess pain using appropriate pain scale  - Administer analgesics based on type and severity of pain and evaluate response  - Implement non-pharmacological measures as appropriate and evaluate response  - Consider cultural and social influences on pain and pain management  - Notify physician/advanced practitioner if interventions unsuccessful or patient reports new pain  Outcome: Progressing     Problem: INFECTION - ADULT  Goal: Absence or prevention of progression during hospitalization  Description: INTERVENTIONS:  - Assess and monitor for signs and symptoms of infection  - Monitor lab/diagnostic results  - Monitor all insertion sites, i e  indwelling lines, tubes, and drains  - Monitor endotracheal if appropriate and nasal secretions for changes in amount and color  - [place] appropriate cooling/warming therapies per order  - Administer medications as ordered  - Instruct and encourage patient and family to use good hand hygiene technique  - Identify and instruct in appropriate isolation precautions for identified infection/condition  Outcome: Progressing  Goal: Absence of fever/infection during neutropenic period  Description: INTERVENTIONS:  - Monitor WBC    Outcome: Progressing     Problem: SAFETY ADULT  Goal: Patient will remain free of falls  Description: INTERVENTIONS:  - Educate patient/family on patient safety including physical limitations  - Instruct patient to call for assistance with activity   - Consult OT/PT to assist with strengthening/mobility   - Keep Call bell within reach  - Keep bed low and locked with side rails adjusted as appropriate  - Keep care items and personal belongings within reach  - Initiate and maintain comfort rounds  - Make Fall Risk Sign visible to staff  - Offer Toileting every  Hours, in advance of need  - Initiate/Maintain alarm  - Obtain necessary fall risk management equipment:   - Apply yellow socks and bracelet for high fall risk patients  - Consider moving patient to room near nurses station  Outcome: Progressing  Goal: Maintain or return to baseline ADL function  Description: INTERVENTIONS:  -  Assess patient's ability to carry out ADLs; assess patient's baseline for ADL function and identify physical deficits which impact ability to perform ADLs (bathing, care of mouth/teeth, toileting, grooming, dressing, etc )  - Assess/evaluate cause of self-care deficits   - Assess range of motion  - Assess patient's mobility; develop plan if impaired  - Assess patient's need for assistive devices and provide as appropriate  - Encourage maximum independence but intervene and supervise when necessary  - Involve family in performance of ADLs  - Assess for home care needs following discharge   - Consider OT consult to assist with ADL evaluation and planning for discharge  - Provide patient education as appropriate  Outcome: Progressing  Goal: Maintains/Returns to pre admission functional level  Description: INTERVENTIONS:  - Perform BMAT or MOVE assessment daily    - Set and communicate daily mobility goal to care team and patient/family/caregiver  - Collaborate with rehabilitation services on mobility goals if consulted  - Perform Range of Motion  times a day  - Reposition patient every  hours    - Dangle patient  times a day  - Stand patient  times a day  - Ambulate patient  times a day  - Out of bed to chair  times a day   - Out of bed for meals times a day  - Out of bed for toileting  - Record patient progress and toleration of activity level   Outcome: Progressing     Problem: DISCHARGE PLANNING  Goal: Discharge to home or other facility with appropriate resources  Description: INTERVENTIONS:  - Identify barriers to discharge w/patient and caregiver  - Arrange for needed discharge resources and transportation as appropriate  - Identify discharge learning needs (meds, wound care, etc )  - Arrange for interpretive services to assist at discharge as needed  - Refer to Case Management Department for coordinating discharge planning if the patient needs post-hospital services based on physician/advanced practitioner order or complex needs related to functional status, cognitive ability, or social support system  Outcome: Progressing     Problem: Knowledge Deficit  Goal: Patient/family/caregiver demonstrates understanding of disease process, treatment plan, medications, and discharge instructions  Description: Complete learning assessment and assess knowledge base    Interventions:  - Provide teaching at level of understanding  - Provide teaching via preferred learning methods  Outcome: Progressing

## 2023-03-26 NOTE — ASSESSMENT & PLAN NOTE
· Patient reports upper abdominal discomfort pain to sleep due to nausea since eating crabs on Friday 3/24/2023 while he was in [place] on holiday  Had several episodes of nausea and vomiting but no diarrhea  History of gastrectomy  · ? Food poisoning     · Continue PPI  · GI consult

## 2023-03-26 NOTE — CONSULTS
Consultation -[facility]   Andrewnaylalucrecia Swati 67 y o  male MRN: [id number]    Unit/Bed#: S -01 Encounter: [id number]\      Physician Requesting Consult: Dr Tanja Atkins    Reason for Consult / Principal Problem: epigastric pain      HPI:   This is a 67 y o  male with a PMH of type 2 diabetes mellitus, hypertension, hyperlipidemia, history of prostate cancer, IPMN, who presents with upper abdominal pain/ nausea x3 days, symptoms started on Friday after eating crab in [facility]   He had several episodes of nausea and vomiting, denies any diarrhea  No fever or chills  Reports of lower abdominal discomfort since then, today had gotten worse  This morning he experienced chest pain which radiated to left arm and neck  Quickly resolved  Denies any exertional chest pain or shortness of breath  No diaphoresis      While in [facility] he did go to 2 hospitals for same problem  He did not get proper care and decided to return  Patient reports his blood pressure was running low in [facility] and they would not give him any pain medication and there were no doctors in the hospital   He reports that his wife had eaten a few bites of the food and then she was having diarrhea the next day but he ate all of it and then woke up with excruciating pain the following day  He reports that he was having issues with nausea and vomiting previously and he has been doing well up until recently but he states that the previous nausea and vomiting was related to Trulicity he otherwise reports that he still following with  Reports that he is still following with surgical oncology regarding the pancreatic cystic lesion and everything has been stable  Does have history of hernia on prior imaging but this is not mentioned on most recent CT  He did take some NSAIDs on Friday which help with the pain but generally denies any frequent NSAID use  Patient denies any melena or bright red blood per rectum  Patient was wondering if he had an ulcer  GI history as follows-  EGD was performed in 2020 which showed 2 cm sliding hiatal hernia grade a esophagitis status post sleeve gastrectomy abnormal mucosa in the duodenal bulb, dilated lacteals in the second portion of the duodenum  Duodenal biopsies showed gastric heterotopia and no evidence of H  pylori on biopsies of the stomach  Patient had colonoscopy last year in December and was noted to have polyp which was large and small internal hemorrhoids  Polyp was noted to be tubular adenoma with repeat colonoscopy in 2 years  Patient has had longstanding pancreatic uncinate cystic process lesion and had endoscopic ultrasound in 2018 and followed with surgical oncology  CAT scan does show stable lesion on admission  Allergies:    Allergies   Allergen Reactions   • Enalapril Anaphylaxis, Throat Swelling and Hives       Medications:  Current Facility-Administered Medications:   •  albuterol (PROVENTIL HFA,VENTOLIN HFA) inhaler 2 puff, 2 puff, Inhalation, Q6H PRN, Erin Garza MD  •  aluminum-magnesium hydroxide-simethicone (MYLANTA) oral suspension 30 mL, 30 mL, Oral, Q6H PRN, Erin Garza MD  •  atorvastatin (LIPITOR) tablet 10 mg, 10 mg, Oral, Daily, Erin Garza MD  •  calcium carbonate (TUMS) chewable tablet 1,000 mg, 1,000 mg, Oral, Daily PRN, Erin Garza MD  •  docusate sodium (COLACE) capsule 100 mg, 100 mg, Oral, BID PRN, Erin Garza MD  •  enoxaparin (LOVENOX) subcutaneous injection 40 mg, 40 mg, Subcutaneous, Daily, Erin Garza MD  •  insulin aspart protamine-insulin aspart (NovoLOG 70/30) 100 units/mL subcutaneous injection 10 Units, 10 Units, Subcutaneous, BID AC, Erin Garza MD  •  insulin lispro (HumaLOG) 100 units/mL subcutaneous injection 1-5 Units, 1-5 Units, Subcutaneous, HS, Erin Garza MD  •  insulin lispro (HumaLOG) 100 units/mL subcutaneous injection 1-6 Units, 1-6 Units, Subcutaneous, TID AC **AND** Fingerstick Glucose (POCT), , , TID AC, Erin Garza MD  •  labetalol (NORMODYNE) injection 10 mg, 10 mg, Intravenous, Q4H PRN, Erin Garza MD  •  losartan (COZAAR) tablet 25 mg, 25 mg, Oral, Daily, Erin Garza MD  •  ondansetron (ZOFRAN) injection 4 mg, 4 mg, Intravenous, Q6H PRN, Erin Garza MD  •  oxybutynin (DITROPAN-XL) 24 hr tablet 5 mg, 5 mg, Oral, Daily, Erin Garza MD  •  pantoprazole (PROTONIX) EC tablet 40 mg, 40 mg, Oral, Early Morning, Erin Garza MD  •  polyethylene glycol (MIRALAX) packet 17 g, 17 g, Oral, Daily, Erin Garza MD  •  sodium chloride 0 9 % infusion, 100 mL/hr, Intravenous, Continuous, Erin Garza MD    Past Medical history:  Past Medical History:   Diagnosis Date   • Anemia    • Arthritis    • Black stool 07/24/2020   • Cancer ([facility] )    • Colon polyp    • Diabetes mellitus ([facility] [address] )     IDDM   • Diverticulosis    • Enlarged prostate    • Erectile dysfunction    • Hypercholesteremia     Resolved post weight loss   • Hypertension     Resolved post weight loss   • Kidney stone    • Obesity    • Prostate cancer ([facility] [address] )    • Wears partial dentures     partial upper and lower       Past Surgical History:   Past Surgical History:   Procedure Laterality Date   • ABDOMINAL ADHESION SURGERY N/A 11/28/2016    Procedure: EXTENSIVE LYSIS ADHESIONS;  Surgeon: Mildred Bowens MD;  [facility];  Service:    • ANKLE FRACTURE SURGERY Left    • CHOLECYSTECTOMY     • COLON SURGERY      colon resection   • COLONOSCOPY     • COLOSTOMY     • COLOSTOMY CLOSURE     • DIAGNOSTIC LAPAROSCOPY     • GASTRIC BYPASS      failed due to adhesions   • HERNIA REPAIR      abdominal   • WA CYSTO INSERTION TRANSPROSTATIC IMPLANT SINGLE N/A 3/8/2019    Procedure: CYSTOSCOPY WITH INSERTION [place]ooton Cull;  Surgeon: Lana Guaman MD;  [facility];  Service: Urology   • WA CYSTO INSERTION TRANSPROSTATIC IMPLANT SINGLE N/A 2020    Procedure: CYSTOSCOPY WITH INSERTION Silver Iván;  Surgeon: Stew Avila MD;  [facility];  Service: Urology   • IN CYSTOURETHROSCOPY W/INTERNAL URETHROTOMY N/A 2020    Procedure: DIRECT VISUAL INTERAL URETHROTOMY (DVIU), dilation of urethral stricture;  Surgeon: Stew Avila MD;  [facility];  Service: Urology   • IN EDG US EXAM SURGICAL ALTER STOM DUODENUM/JEJUNUM N/A 10/25/2018    Procedure: LINEAR ENDOSCOPIC U/S;  Surgeon: Edel Damon MD;  [facility]: [facility]; Service: Gastroenterology   • IN ESOPHAGOGASTRODUODENOSCOPY TRANSORAL DIAGNOSTIC N/A 2016    Procedure: EGD AND COLONOSCOPY;  Surgeon: Dilma Rutherford MD;  [facility];   Service: Gastroenterology   • IN LAPS [address] [place] [address] LONGITUDINAL GASTRECTOMY N/A 2016    Procedure: GASTRECTOMY SLEEVE LAPAROSCOPIC;  Surgeon: Aylin Frederick MD;  [facility];  Service: Bariatrics   • IN PROSTATE NEEDLE BIOPSY ANY APPROACH N/A 2020    Procedure: TRANSRECTAL NEEDLE BIOPSY PROSTATE (TRNBP) WITH TRANSRECTAL ULTRASOUND GUIDANCE;  Surgeon: Stew Avila MD;  [facility];  Service: Urology   • TONSILLECTOMY     • US GUIDED PROSTATE BIOPSY  2020       Family History:   Family History   Problem Relation Age of Onset   • Heart disease Mother    • Breast cancer Mother 80   • Diabetes Father    • Colon cancer Neg Hx    • Liver disease Neg Hx        Social history:   Social History     Socioeconomic History   • Marital status: Single     Spouse name: Not on file   • Number of children: Not on file   • Years of education: Not on file   • Highest education level: Not on file   Occupational History   • Not on file   Tobacco Use   • Smoking status: Former     Packs/day: 0 25     Types: Cigarettes     Quit date: 11/10/2001     Years since quittin 3   • Smokeless tobacco: Former   Vaping Use   • Vaping Use: Never used   Substance and Sexual Activity   • Alcohol use: "Not Currently   • Drug use: Not Currently     Comment: RSO   • Sexual activity: Yes     Partners: Female   Other Topics Concern   • Not on file   Social History Narrative   • Not on file     Social Determinants of Health     Financial Resource Strain: High Risk   • Difficulty of Paying Living Expenses: Hard   Food Insecurity: Food Insecurity Present   • Worried About [address] in the Last Year: Often true   • Ran Out of Food in the Last Year: Often true   Transportation Needs: No Transportation Needs   • Lack of Transportation (Medical): No   • Lack of Transportation (Non-Medical):  No   Physical Activity: Not on file   Stress: Not on file   Social Connections: Not on file   Intimate Partner Violence: Not on file   Housing Stability: Not on file       Review of Systems: All other systems were reviewed and were negative, otherwise please refer to HPI    Physical Exam: /73   Pulse 62   Temp 98 5 °F (36 9 °C)   Resp 16   Ht 5' 7\" (1 702 m)   Wt 104 kg (230 lb 6 1 oz)   SpO2 99%   BMI 36 08 kg/m²     General Appearance:    Alert, cooperative, no distress, appears stated age   Head:    Normocephalic, without obvious abnormality, atraumatic   Eyes:    No scleral icterus           Mouth:  Mucosa moist   Neck:   Supple, symmetrical, trachea midline, no thyromegaly       Lungs:     Clear to auscultation bilaterally, respirations unlabored       Heart:    Regular rate and rhythm, S1 and S2 normal, no murmur, rub   or gallop     Abdomen:     Soft, non-tender, bowel sounds active all four quadrants,     no masses, no organomegaly   Genitalia:   deferred   Rectal:   deferred   Extremities:   Extremities normal,no cyanosis or edema       Skin:   Skin color, texture, turgor normal, no rashes or lesions       Neurologic:   Grossly intact, no focal deficit           Lab Results:   Recent Results (from the past 24 hour(s))   ECG 12 lead    Collection Time: 03/26/23  7:14 AM   Result Value Ref Range    " Ventricular Rate 56 BPM    Atrial Rate 56 BPM    NC Interval 142 ms    QRSD Interval 86 ms    QT Interval 446 ms    QTC Interval 430 ms    P Axis 43 degrees    QRS Axis 64 degrees    T Wave Axis 46 degrees   ECG 12 lead    Collection Time: 03/26/23  7:18 AM   Result Value Ref Range    Ventricular Rate 57 BPM    Atrial Rate 57 BPM    NC Interval 142 ms    QRSD Interval 84 ms    QT Interval 446 ms    QTC Interval 434 ms    P Axis 43 degrees    QRS Axis 66 degrees    T Wave Axis 52 degrees   CBC and differential    Collection Time: 03/26/23  7:42 AM   Result Value Ref Range    WBC 8 54 4 31 - 10 16 Thousand/uL    RBC 4 36 3 88 - 5 62 Million/uL    Hemoglobin 13 1 12 0 - 17 0 g/dL    Hematocrit 38 9 36 5 - 49 3 %    MCV 89 82 - 98 fL    MCH 30 0 26 8 - 34 3 pg    MCHC 33 7 31 4 - 37 4 g/dL    RDW 13 2 11 6 - 15 1 %    MPV 12 4 8 9 - 12 7 fL    Platelets 134 232 - 753 Thousands/uL    nRBC 0 /100 WBCs    Neutrophils Relative 76 (H) 43 - 75 %    Immat GRANS % 1 0 - 2 %    Lymphocytes Relative 7 (L) 14 - 44 %    Monocytes Relative 15 (H) 4 - 12 %    Eosinophils Relative 0 0 - 6 %    Basophils Relative 1 0 - 1 %    Neutrophils Absolute 6 53 1 85 - 7 62 Thousands/µL    Immature Grans Absolute 0 05 0 00 - 0 20 Thousand/uL    Lymphocytes Absolute 0 59 (L) 0 60 - 4 47 Thousands/µL    Monocytes Absolute 1 30 (H) 0 17 - 1 22 Thousand/µL    Eosinophils Absolute 0 03 0 00 - 0 61 Thousand/µL    Basophils Absolute 0 04 0 00 - 0 10 Thousands/µL   Comprehensive metabolic panel    Collection Time: 03/26/23  7:42 AM   Result Value Ref Range    Sodium 136 135 - 147 mmol/L    Potassium 3 9 3 5 - 5 3 mmol/L    Chloride 103 96 - 108 mmol/L    CO2 23 21 - 32 mmol/L    ANION GAP 10 4 - 13 mmol/L    BUN 21 5 - 25 mg/dL    Creatinine 0 87 0 60 - 1 30 mg/dL    Glucose 198 (H) 65 - 140 mg/dL    Calcium 8 9 8 4 - 10 2 mg/dL    AST 27 13 - 39 U/L    ALT 22 7 - 52 U/L    Alkaline Phosphatase 66 34 - 104 U/L    Total Protein 7 0 6 4 - 8 4 g/dL "Albumin 3 9 3 5 - 5 0 g/dL    Total Bilirubin 0 67 0 20 - 1 00 mg/dL    eGFR 86 ml/min/1 73sq m   Lipase    Collection Time: 03/26/23  7:42 AM   Result Value Ref Range    Lipase 23 11 - 82 u/L   HS Troponin 0hr (reflex protocol)    Collection Time: 03/26/23  7:42 AM   Result Value Ref Range    hs TnI 0hr 12 \"Refer to ACS Flowchart\"- see link ng/L   Lactic acid    Collection Time: 03/26/23  7:55 AM   Result Value Ref Range    LACTIC ACID 1 3 0 5 - 2 0 mmol/L   Protime-INR    Collection Time: 03/26/23  8:02 AM   Result Value Ref Range    Protime 14 2 11 6 - 14 5 seconds    INR 1 08 0 84 - 1 19   APTT    Collection Time: 03/26/23  8:02 AM   Result Value Ref Range    PTT 27 23 - 37 seconds   HS Troponin I 2hr    Collection Time: 03/26/23 10:07 AM   Result Value Ref Range    hs TnI 2hr 10 \"Refer to ACS Flowchart\"- see link ng/L    Delta 2hr hsTnI -2 <20 ng/L   HS Troponin I 4hr    Collection Time: 03/26/23 12:18 PM   Result Value Ref Range    hs TnI 4hr 8 \"Refer to ACS Flowchart\"- see link ng/L    Delta 4hr hsTnI -4 <20 ng/L       Imaging Studies: XR chest 1 view portable    Result Date: 3/26/2023  Narrative: CHEST INDICATION:   upright cxr  Patient reports food poisoning on Thursday followed by chest pain radiating from chest and back  Nausea, shortness of breath  COMPARISON:  CXR 6/28/2021, chest CT 12/14/2020, abdomen CT 1/19/2023  EXAM PERFORMED/VIEWS:  XR CHEST PORTABLE  FINDINGS: Cardiomediastinal silhouette normal  Lungs clear  No effusion or pneumothorax  Upper abdomen normal  Cholecystectomy  Bones normal for age  Impression: No acute cardiopulmonary disease  Workstation performed: [id number]     CTA dissection protocol chest abdomen pelvis w wo contrast    Result Date: 3/26/2023  Narrative: CTA - CHEST, ABDOMEN AND PELVIS - WITHOUT AND WITH IV CONTRAST INDICATION:   cp, back pain, abd pain, hypertensive, in distress   COMPARISON:  CT 1/19/2023, 10/5/2022 TECHNIQUE: CT examination of the chest, abdomen and " pelvis was performed both prior to and after the administration of intravenous contrast   The noncontrast portion of this examination was performed utilizing low radiation dose technique  Thin section  angiographic arterial phase post contrast technique was used in order to evaluate for aortic dissection  3D reformatted images and volume rendering were performed on an independent workstation  Additionally, axial, sagittal, and coronal 2D reformatted  images were created from the source data and submitted for interpretation  Radiation dose length product (DLP) for this visit:  [id number] mGy-cm   This examination, like all CT scans performed in the [facility], was performed utilizing techniques to minimize radiation dose exposure, including the use of iterative reconstruction and automated exposure control  IV Contrast:  100 mL of iohexol (OMNIPAQUE) Enteric Contrast:  Enteric contrast was not administered  FINDINGS: AORTA:  There is adequate opacification of the systemic arterial vasculature  Thoracic and abdominal aorta are normal in course and caliber without evidence for aneurysm, dissection, or intramural hematoma  Aortic arch great vessels demonstrate bovine anatomy with widely patent vessel origins  The celiac, superior mesenteric, inferior mesenteric and bilateral renal arteries are widely patent  Aortic bifurcation, common, internal, and external iliac and visualized femoral vessels appear unremarkable  CHEST LUNGS:  Small calcified granuloma right lower lobe noted with some linear scarring adjacent to it  Lungs are otherwise clear  Central airways are patent  PLEURA:  Unremarkable  HEART/PULMONARY ARTERIAL TREE:  Unremarkable for patient's age  MEDIASTINUM AND KIKO:  Unremarkable  CHEST WALL AND LOWER NECK:   Unremarkable  ABDOMEN LIVER/BILIARY TREE:  Unremarkable  GALLBLADDER:  Gallbladder is surgically absent  SPLEEN:  Unremarkable   PANCREAS:  Stable cystic lesion in the uncinate measuring 2 4 x 1 6 cm  ADRENAL GLANDS:  Unremarkable  KIDNEYS/URETERS:  Stable indeterminate 1 4 x 1 8 cm slightly lobulated complex cystic mass with punctate calcification, arising from the anteromedial aspect of the lower pole of the left kidney  Punctate 2 mm nonobstructing right lower pole renal calculus noted  Kidneys appear otherwise unremarkable  STOMACH AND BOWEL:  Small hiatal hernia  Status post sleeve gastrectomy  No bowel wall thickening or intestinal obstruction is noted  Mild scattered diverticulosis  APPENDIX:  A normal appendix was visualized  ABDOMINOPELVIC CAVITY:  No ascites or free intraperitoneal air  No lymphadenopathy  PELVIS REPRODUCTIVE ORGANS:  Mild prostatomegaly  Prosthetic brachytherapy radiation implant seeds present  URINARY BLADDER:  Unremarkable  ABDOMINAL WALL/INGUINAL REGIONS:  Ventral hernia repair with mesh  Stable small right paramedian ventral abdominal wall Wan-type hernia involving a loop of small bowel without evidence for strangulation or obstruction  OSSEOUS STRUCTURES:  There are age appropriate degenerative changes  No acute fracture or destructive osseous lesion  Impression: 1  No evidence for aortic aneurysm, dissection or intramural hematoma or other acute abnormality in the chest, abdomen, or pelvis  2   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  3   Stable 1 8 cm complex cystic mass lower pole right kidney  4   Additional stable findings as noted  Workstation performed: [id number]       Assessment/Plan:   Upper abdominal pain, unspecified    • Patient reports upper abdominal discomfort pain to sleep due to nausea since eating crabs on Friday 3/24/2023 while he was in [facility] on holiday  Had several episodes of nausea and vomiting but no diarrhea  History of gastrectomy    • CAT scan showed no acute findings and he was noted to have stable 2 4 cm cystic pancreatic uncinate lesion, has been stable and has been following with surgical oncology  • Does have history of Wan hernia in the right mid abdomen on prior imaging containing a small bowel loop although this is not mentioned on recent CAT scan but could also be contributing to the pain  • Suspect that this is a foodborne illness and likely has gastritis and we will plan for EGD for further evaluation  • Does have some significant tenderness over the xiphoid process which may be related to profuse nausea and vomiting be musculoskeletal in origin  • We will plan for EGD tomorrow and can continue diet in the interim  • Continue PPI and antiemetics we will change Protonix to IV twice daily  • Reports that he had nausea and vomiting before which she attributed to his Trulicity but obviously at risk of underlying gastroparesis due to diabetes    Thank you for the consultation    Case was discussed with Dr Nicolasa Magana

## 2023-03-26 NOTE — ASSESSMENT & PLAN NOTE
Lab Results   Component Value Date    HGBA1C 8 3 (H) 01/13/2023       No results for input(s): POCGLU in the last 72 hours      Blood Sugar Average: Last 72 hrs:  Patient is on glipizide metformin and NovoLog 70/30  We will continue insulin regimen monitor blood sugars  Sliding scale insulin  Hold oral antidiabetics

## 2023-03-26 NOTE — ED NOTES
Pt reports has not taken any of his daily medications since Thursday        Daniela Le RN  03/26/23 [id number]

## 2023-03-26 NOTE — ED NOTES
Pt family refusing for door to be closed  Pt reports room is too warm  Pt wishes to keep all blankets  Informed family that door can remain open but curtain needs to be closed d/t pt privacy and other patients across the pan           Arron Ritchie RN  03/26/23 2403

## 2023-03-27 ENCOUNTER — APPOINTMENT (OUTPATIENT)
Dept: GASTROENTEROLOGY | Facility: HOSPITAL | Age: 73
End: 2023-03-27

## 2023-03-27 ENCOUNTER — ANESTHESIA (OUTPATIENT)
Dept: GASTROENTEROLOGY | Facility: HOSPITAL | Age: 73
End: 2023-03-27

## 2023-03-27 ENCOUNTER — ANESTHESIA EVENT (OUTPATIENT)
Dept: GASTROENTEROLOGY | Facility: HOSPITAL | Age: 73
End: 2023-03-27

## 2023-03-27 VITALS
RESPIRATION RATE: 20 BRPM | HEIGHT: 67 IN | BODY MASS INDEX: 36.16 KG/M2 | TEMPERATURE: 97.3 F | WEIGHT: 230.38 LBS | SYSTOLIC BLOOD PRESSURE: 142 MMHG | DIASTOLIC BLOOD PRESSURE: 65 MMHG | OXYGEN SATURATION: 96 % | HEART RATE: 60 BPM

## 2023-03-27 LAB
ANION GAP SERPL CALCULATED.3IONS-SCNC: 4 MMOL/L (ref 4–13)
ATRIAL RATE: 56 BPM
ATRIAL RATE: 57 BPM
BUN SERPL-MCNC: 21 MG/DL (ref 5–25)
CALCIUM SERPL-MCNC: 8.1 MG/DL (ref 8.4–10.2)
CHLORIDE SERPL-SCNC: 104 MMOL/L (ref 96–108)
CO2 SERPL-SCNC: 27 MMOL/L (ref 21–32)
CREAT SERPL-MCNC: 0.83 MG/DL (ref 0.6–1.3)
ERYTHROCYTE [DISTWIDTH] IN BLOOD BY AUTOMATED COUNT: 13.3 % (ref 11.6–15.1)
GFR SERPL CREATININE-BSD FRML MDRD: 87 ML/MIN/1.73SQ M
GLUCOSE P FAST SERPL-MCNC: 165 MG/DL (ref 65–99)
GLUCOSE SERPL-MCNC: 106 MG/DL (ref 65–140)
GLUCOSE SERPL-MCNC: 163 MG/DL (ref 65–140)
GLUCOSE SERPL-MCNC: 165 MG/DL (ref 65–140)
GLUCOSE SERPL-MCNC: 173 MG/DL (ref 65–140)
GLUCOSE SERPL-MCNC: 182 MG/DL (ref 65–140)
HCT VFR BLD AUTO: 35.4 % (ref 36.5–49.3)
HGB BLD-MCNC: 12 G/DL (ref 12–17)
MAGNESIUM SERPL-MCNC: 2.1 MG/DL (ref 1.9–2.7)
MCH RBC QN AUTO: 30.8 PG (ref 26.8–34.3)
MCHC RBC AUTO-ENTMCNC: 33.9 G/DL (ref 31.4–37.4)
MCV RBC AUTO: 91 FL (ref 82–98)
P AXIS: 43 DEGREES
P AXIS: 43 DEGREES
PLATELET # BLD AUTO: 124 THOUSANDS/UL (ref 149–390)
PMV BLD AUTO: 12.1 FL (ref 8.9–12.7)
POTASSIUM SERPL-SCNC: 4 MMOL/L (ref 3.5–5.3)
PR INTERVAL: 142 MS
PR INTERVAL: 142 MS
QRS AXIS: 64 DEGREES
QRS AXIS: 66 DEGREES
QRSD INTERVAL: 84 MS
QRSD INTERVAL: 86 MS
QT INTERVAL: 446 MS
QT INTERVAL: 446 MS
QTC INTERVAL: 430 MS
QTC INTERVAL: 434 MS
RBC # BLD AUTO: 3.89 MILLION/UL (ref 3.88–5.62)
SODIUM SERPL-SCNC: 135 MMOL/L (ref 135–147)
T WAVE AXIS: 46 DEGREES
T WAVE AXIS: 52 DEGREES
VENTRICULAR RATE: 56 BPM
VENTRICULAR RATE: 57 BPM
WBC # BLD AUTO: 4.78 THOUSAND/UL (ref 4.31–10.16)

## 2023-03-27 PROCEDURE — 0DB68ZX EXCISION OF STOMACH, VIA NATURAL OR ARTIFICIAL OPENING ENDOSCOPIC, DIAGNOSTIC: ICD-10-PCS | Performed by: INTERNAL MEDICINE

## 2023-03-27 RX ORDER — LIDOCAINE HYDROCHLORIDE 10 MG/ML
INJECTION, SOLUTION EPIDURAL; INFILTRATION; INTRACAUDAL; PERINEURAL AS NEEDED
Status: DISCONTINUED | OUTPATIENT
Start: 2023-03-27 | End: 2023-03-27

## 2023-03-27 RX ORDER — SODIUM CHLORIDE, SODIUM LACTATE, POTASSIUM CHLORIDE, CALCIUM CHLORIDE 600; 310; 30; 20 MG/100ML; MG/100ML; MG/100ML; MG/100ML
INJECTION, SOLUTION INTRAVENOUS CONTINUOUS PRN
Status: DISCONTINUED | OUTPATIENT
Start: 2023-03-27 | End: 2023-03-27

## 2023-03-27 RX ORDER — PROPOFOL 10 MG/ML
INJECTION, EMULSION INTRAVENOUS AS NEEDED
Status: DISCONTINUED | OUTPATIENT
Start: 2023-03-27 | End: 2023-03-27

## 2023-03-27 RX ADMIN — PROPOFOL 120 MG: 10 INJECTION, EMULSION INTRAVENOUS at 15:17

## 2023-03-27 RX ADMIN — PANTOPRAZOLE SODIUM 40 MG: 40 TABLET, DELAYED RELEASE ORAL at 16:44

## 2023-03-27 RX ADMIN — PROPOFOL 30 MG: 10 INJECTION, EMULSION INTRAVENOUS at 15:21

## 2023-03-27 RX ADMIN — INSULIN LISPRO 1 UNITS: 100 INJECTION, SOLUTION INTRAVENOUS; SUBCUTANEOUS at 17:30

## 2023-03-27 RX ADMIN — INSULIN ASPART 10 UNITS: 100 INJECTION, SUSPENSION SUBCUTANEOUS at 17:29

## 2023-03-27 RX ADMIN — SODIUM CHLORIDE, SODIUM LACTATE, POTASSIUM CHLORIDE, AND CALCIUM CHLORIDE: .6; .31; .03; .02 INJECTION, SOLUTION INTRAVENOUS at 15:02

## 2023-03-27 RX ADMIN — ATORVASTATIN CALCIUM 10 MG: 10 TABLET, FILM COATED ORAL at 08:55

## 2023-03-27 RX ADMIN — PROPOFOL 30 MG: 10 INJECTION, EMULSION INTRAVENOUS at 15:19

## 2023-03-27 RX ADMIN — OXYBUTYNIN CHLORIDE 5 MG: 5 TABLET, EXTENDED RELEASE ORAL at 08:55

## 2023-03-27 RX ADMIN — LIDOCAINE HYDROCHLORIDE 50 MG: 10 INJECTION, SOLUTION EPIDURAL; INFILTRATION; INTRACAUDAL at 15:17

## 2023-03-27 RX ADMIN — LOSARTAN POTASSIUM 25 MG: 25 TABLET, FILM COATED ORAL at 08:55

## 2023-03-27 NOTE — ANESTHESIA PREPROCEDURE EVALUATION
Procedure:  EGD    Relevant Problems   ANESTHESIA (within normal limits)      CARDIO   (+) Benign essential hypertension   (+) Hypercholesteremia   (+) Other chest pain      ENDO   (+) Type 2 diabetes mellitus with hyperglycemia, with long-term current use of insulin (HCC)   (-) Hyperthyroidism   (-) Hypothyroidism      GI/HEPATIC   (+) GERD (gastroesophageal reflux disease)   (+) IPMN (intraductal papillary mucinous neoplasm)   (+) Pancreatic cyst      /RENAL   (+) Benign enlargement of prostate   (+) Prostate cancer (HCC)      HEMATOLOGY (within normal limits)      MUSCULOSKELETAL   (+) Primary osteoarthritis of one hip, left      NEURO/PSYCH   (+) History of colon polyps      PULMONARY   (+) Mild asthma exacerbation   (-) Sleep apnea        Physical Exam    Airway    Mallampati score: III  TM Distance: >3 FB  Neck ROM: full     Dental   No notable dental hx     Cardiovascular      Pulmonary      Other Findings        Anesthesia Plan  ASA Score- 3     Anesthesia Type- IV sedation with anesthesia with ASA Monitors  Additional Monitors:   Airway Plan:           Plan Factors-Exercise tolerance (METS): >4 METS  Chart reviewed  EKG reviewed  Existing labs reviewed  Patient summary reviewed  Patient is not a current smoker  Induction- intravenous  Postoperative Plan-     Informed Consent- Anesthetic plan and risks discussed with patient  I personally reviewed this patient with the CRNA  Discussed and agreed on the Anesthesia Plan with the CRNA  Mouna Leon

## 2023-03-27 NOTE — UTILIZATION REVIEW
Initial Clinical Review  Observation on 03/26 @ [phone] upgraded to Inpatient on 03/27 @ 1651  Pt requiring continued stay for abdominal pain and nausea, needs to be tolerating diet for safe discharge  Admission: Date/Time/Statement:   Admission Orders (From admission, onward)     Ordered        03/27/23 1651  Inpatient Admission  Once            03/26/23 1039  Place in Observation  Once                      Orders Placed This Encounter   Procedures   • Inpatient Admission     Standing Status:   Standing     Number of Occurrences:   1     Order Specific Question:   Level of Care     Answer:   Med Surg [16]     Order Specific Question:   Estimated length of stay     Answer:   More than 2 Midnights     Order Specific Question:   Certification     Answer:   I certify that inpatient services are medically necessary for this patient for a duration of greater than two midnights  See H&P and MD Progress Notes for additional information about the patient's course of treatment  ED Arrival Information     Expected   -    Arrival   3/26/2023 07:04    Acuity   Emergent            Means of arrival   Wheelchair    Escorted by   Spouse    Service   Hospitalist    Admission type   Emergency            Arrival complaint   chest pain           Chief Complaint   Patient presents with   • Chest Pain     Pt reports food poisoning on Thursday followed by chest pain  States pain radiates from chest to back +nausea +SOB family denies any cardiac history       Initial Presentation: 67 y o  male with PMH of DM2, HTN, HLD h/o prostate ca, IPMN presented to the ED from home w/ abdominal pain/ nausea x3 days, symptoms started on Friday after eating crab in [place]   He had several episodes of nausea and vomiting  Reports chest pain this am w/c radiated to the L arm and neck but quickly resolved  Reports lower abdominal discomfort worse today  In the ED, EKG w/o any ischemic changes, trops neg  BP elevated 199/87   He has not taken his meds  On exam, upper abdomen mildly tender w/o rebound/guarding, bowel sounds active  Given IV Zofran, IV Dilaudid, IV Hydralazine, IV Morphine, po Simethicone, 500 cc IVF bolus  Admit as observation level of care for upper abdominal pain, chest pain: Cont PPI, GI consult, resume BP meds and mon  03/26 GI Consult; Upper abdominal pain: CAT scan showed no acute findings and he was noted to have stable 2 4 cm cystic pancreatic uncinate lesion, has been stable and has been following with surgical oncology  Suspect that this is a foodborne illness and likely has gastritis  Plan for EGD tomorrow  Cont diet  Cont PPI and antiemetics  Change PPI to bid  Date: 03/27  Day 2: Patient was evaluated by GI and was recommended EGD performed 3/27/2023  EGD showed mild gastritis and biopsies were taken  Patient was cleared for discharge from GI perspective once he was tolerating diet without nausea vomiting or pain  Tolerated dinner today  He is cleared for discharge  F/u w/ PCP       ED Triage Vitals   Temperature Pulse Respirations Blood Pressure SpO2   03/26/23 0710 03/26/23 0711 03/26/23 0710 03/26/23 0711 03/26/23 0712   98 5 °F (36 9 °C) 56 20 (!) 199/87 99 %      Temp Source Heart Rate Source Patient Position - Orthostatic VS BP Location FiO2 (%)   03/27/23 0751 03/26/23 0710 03/26/23 0711 03/26/23 0711 --   Oral Monitor Sitting Right arm       Pain Score       03/26/23 0839       8          Wt Readings from Last 1 Encounters:   03/26/23 104 kg (230 lb 6 1 oz)     Additional Vital Signs:     Date/Time Temp Pulse Resp BP MAP (mmHg) SpO2 O2 Device Patient Position - Orthostatic VS   03/27/23 1545 97 3 °F (36 3 °C) Abnormal  60 20 142/65 -- 96 % None (Room air) --   03/27/23 1527 97 3 °F (36 3 °C) Abnormal  60 18 108/59 -- 96 % None (Room air) --   03/27/23 15:24:02 -- 70 -- -- -- 98 % -- --   03/27/23 15:23:02 -- 69 -- -- -- 98 % -- --   03/27/23 15:22:02 -- 68 -- -- -- 98 % -- --   03/27/23 15:21:02 -- 72 -- -- -- 99 % -- --   03/27/23 15:20:02 -- 68 -- -- -- 98 % -- --   03/27/23 15:19:02 -- 72 -- -- -- 99 % -- --   03/27/23 15:18:02 -- 61 -- -- -- 99 % -- --   03/27/23 15:17:02 -- 66 -- -- -- 100 % -- --   03/27/23 15:16:02 -- 62 -- -- -- 98 % -- --   03/27/23 15:15:02 -- 60 -- -- -- 95 % -- --   03/27/23 1417 97 4 °F (36 3 °C) Abnormal  56 18 173/72 Abnormal  -- 97 % None (Room air) --   03/27/23 07:51:40 99 1 °F (37 3 °C) 63 18 108/60 76 94 % None (Room air) Lying   03/26/23 20:51:04 99 3 °F (37 4 °C) 60 18 140/72 95 97 % -- --   03/26/23 16:36:47 98 5 °F (36 9 °C) 65 18 137/66 90 94 % -- --   03/26/23 14:14:23 98 °F (36 7 °C) 62 -- 136/64 88 96 % -- --   03/26/23 1000 -- 62 16 166/73 105 99 % -- --   03/26/23 0830 -- 56 20 175/73 Abnormal  105 99 % -- --   03/26/23 0712 -- -- -- -- -- 99 % None (Room air) --   03/26/23 0711 -- 56 -- 199/87 Abnormal  -- -- -- Sitting       Pertinent Labs/Diagnostic Test Results:   CTA dissection protocol chest abdomen pelvis w wo contrast   Final Result by Abhinav Pina MD (03/26 2530)         1  No evidence for aortic aneurysm, dissection or intramural hematoma or other acute abnormality in the chest, abdomen, or pelvis  2   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  3   Stable 1 8 cm complex cystic mass lower pole right kidney  4   Additional stable findings as noted  Workstation performed: [id number]         XR chest 1 view portable   Final Result by Kira Guillermo MD (03/26 9262)      No acute cardiopulmonary disease                 Workstation performed: [id number]               Results from last 7 days   Lab Units 03/27/23  0550 03/26/23  0742   WBC Thousand/uL 4 78 8 54   HEMOGLOBIN g/dL 12 0 13 1   HEMATOCRIT % 35 4* 38 9   PLATELETS Thousands/uL 124* 157   NEUTROS ABS Thousands/µL  --  6 53         Results from last 7 days   Lab Units 03/27/23  0550 03/26/23  0742   SODIUM mmol/L 135 136   POTASSIUM mmol/L 4 0 3 9   CHLORIDE mmol/L 104 103   CO2 mmol/L 27 23   ANION GAP mmol/L 4 10   BUN mg/dL 21 21   CREATININE mg/dL 0 83 0 87   EGFR ml/min/1 73sq m 87 86   CALCIUM mg/dL 8 1* 8 9   MAGNESIUM mg/dL 2 1  --      Results from last 7 days   Lab Units 03/26/23  0742   AST U/L 27   ALT U/L 22   ALK PHOS U/L 66   TOTAL PROTEIN g/dL 7 0   ALBUMIN g/dL 3 9   TOTAL BILIRUBIN mg/dL 0 67     Results from last 7 days   Lab Units 03/27/23  1639 03/27/23  1047 03/27/23  0749 03/26/23  2048 03/26/23  1654 03/26/23  1429   POC GLUCOSE mg/dl 173* 182* 163* 106 242* 293*     Results from last 7 days   Lab Units 03/27/23  0550 03/26/23  0742   GLUCOSE RANDOM mg/dL 165* 198*           Results from last 7 days   Lab Units 03/26/23  1218 03/26/23  1007 03/26/23  0742   HS TNI 0HR ng/L  --   --  12   HS TNI 2HR ng/L  --  10  --    HSTNI D2 ng/L  --  -2  --    HS TNI 4HR ng/L 8  --   --    HSTNI D4 ng/L -4  --   --          Results from last 7 days   Lab Units 03/26/23  0802   PROTIME seconds 14 2   INR  1 08   PTT seconds 27             Results from last 7 days   Lab Units 03/26/23  0755   LACTIC ACID mmol/L 1 3           Results from last 7 days   Lab Units 03/26/23  0742   LIPASE u/L 23     ED Treatment:   Medication Administration from 03/26/2023 0703 to 03/26/2023 1106       Date/Time Order Dose Route Action     03/26/2023 0742 EDT HYDROmorphone (DILAUDID) injection 0 5 mg 0 5 mg Intravenous Given     03/26/2023 0756 EDT ondansetron (ZOFRAN) injection 4 mg 4 mg Intravenous Given     03/26/2023 0836 EDT iohexol (OMNIPAQUE) 350 MG/ML injection (SINGLE-DOSE) 100 mL 100 mL Intravenous Given     03/26/2023 0910 EDT hydrALAZINE (APRESOLINE) injection 5 mg 5 mg Intravenous Given     03/26/2023 1007 EDT morphine injection 4 mg 4 mg Intravenous Given     03/26/2023 1053 EDT simethicone (MYLICON) chewable tablet 160 mg 160 mg Oral Given     03/26/2023 1053 EDT sodium chloride 0 9 % bolus 500 mL 500 mL Intravenous New Bag        Past Medical History:   Diagnosis Date   • Anemia    • Arthritis    • Black stool 07/24/2020   • Cancer (HCC)    • Colon polyp    • Diabetes mellitus (HCC)     IDDM   • Diverticulosis    • Enlarged prostate    • Erectile dysfunction    • Hypercholesteremia     Resolved post weight loss   • Hypertension     Resolved post weight loss   • Kidney stone    • Obesity    • Prostate cancer ([facility] [address] )    • Wears partial dentures     partial upper and lower     Present on Admission:  • GERD (gastroesophageal reflux disease)  • IPMN (intraductal papillary mucinous neoplasm)  • Benign essential hypertension      Admitting Diagnosis: Chest pain [R07 9]  HTN (hypertension) [I10]  Abdominal pain [R10 9]  Age/Sex: 67 y o  male  Admission Orders:  SCD    Scheduled Medications:  atorvastatin, 10 mg, Oral, Daily  enoxaparin, 40 mg, Subcutaneous, Daily  insulin aspart protamine-insulin aspart, 10 Units, Subcutaneous, BID AC  insulin lispro, 1-5 Units, Subcutaneous, HS  insulin lispro, 1-6 Units, Subcutaneous, TID AC  losartan, 25 mg, Oral, Daily  oxybutynin, 5 mg, Oral, Daily  pantoprazole, 40 mg, Oral, BID AC  polyethylene glycol, 17 g, Oral, Daily    Continuous IV Infusions:  sodium chloride 0 9 % infusion  Rate: 100 mL/hr Dose: 100 mL/hr  Freq: Continuous Route: IV  Indications of Use: IV Hydration  Last Dose: 100 mL/hr (03/26/23 1405)  Start: 03/26/23 1230 End: 03/27/23 0004    PRN Meds:  albuterol, 2 puff, Inhalation, Q6H PRN  aluminum-magnesium hydroxide-simethicone, 30 mL, Oral, Q6H PRN  calcium carbonate, 1,000 mg, Oral, Daily PRN  docusate sodium, 100 mg, Oral, BID PRN  labetalol, 10 mg, Intravenous, Q4H PRN  ondansetron, 4 mg, Intravenous, Q6H PRN      IP CONSULT TO GASTROENTEROLOGY    Network Utilization Review Department  ATTENTION: Please call with any questions or concerns to [phone] and carefully listen to the prompts so that you are directed to the right person   All voicemails are confidential   [place] all requests for admission clinical reviews, approved or denied determinations and any other requests to dedicated fax number below belonging to the campus where the patient is receiving treatment   List of dedicated fax numbers for the Facilities:  [address] [place] [address] DENIALS (Administrative/Medical Necessity) [phone]   [address] (Maternity/NICU/Pediatrics) [phone]   [address] [phone]   [facility] [address] [phone]   [address] [address] [facility] [phone]   [address] [facility] [place] [address] [facility] [phone]

## 2023-03-27 NOTE — ASSESSMENT & PLAN NOTE
· Patient reported upper abdominal discomfort pain, nausea and vomiting since eating crabs on Friday 3/24/2023 while he was in [facility] on holiday   History of gastrectomy  · Nausea and vomiting now resolved  · Likely foodborne illness  · EGD done today impression normal duodenum, mild gastritis biopsies taken, small hiatal hernia, evidence of prior sleeve gastrectomy, normal esophagus  · Continue PPI  · GI consulted cleared for discharge once patient is tolerating regular diet  · Patient tolerated dinner without complaints

## 2023-03-27 NOTE — ASSESSMENT & PLAN NOTE
Lab Results   Component Value Date    HGBA1C 8 3 (H) 01/13/2023       Recent Labs     03/26/23  1654 03/26/23  2048 03/27/23  0749 03/27/23  1047   POCGLU 242* 106 163* 182*       Blood Sugar Average: Last 72 hrs:  (P) 197 2   · Patient is on glipizide metformin and NovoLog 70/30 at home continue at discharge  · Continue NovoLOG 10 units BID and insulin sliding scale  · Hold oral antidiabetics while inpatient

## 2023-03-27 NOTE — PROGRESS NOTES
[facility]  Progress Note  Name: Balta Arzola  MRN: [id number]  Unit/Bed#: S -01 I Date of Admission: 3/26/2023   Date of Service: 3/27/2023 I Hospital Day: 0    Assessment/Plan   * Upper abdominal pain, unspecified  Assessment & Plan  · Patient reported upper abdominal discomfort pain, nausea and vomiting since eating crabs on Friday 3/24/2023 while he was in [place] on holiday  History of gastrectomy  · nausea and vomiting now resolved  · Likely foodborne illness  · EGD done today impression normal duodenum, mild gastritis biopsies taken, small hiatal hernia, evidence of prior sleeve gastrectomy, normal esophagus  · Continue PPI  · GI consulted cleared for discharge once patient is tolerating regular diet    Benign essential hypertension  Assessment & Plan  · Continue losartan 25 mg daily     Type 2 diabetes mellitus with hyperglycemia, with long-term current use of insulin [facility])  Assessment & Plan  Lab Results   Component Value Date    HGBA1C 8 3 (H) 01/13/2023       Recent Labs     03/26/23  1654 03/26/23  2048 03/27/23  0749 03/27/23  1047   POCGLU 242* 106 163* 182*       Blood Sugar Average: Last 72 hrs:  (P) 197 2   · Patient is on glipizide metformin and NovoLog 70/30 at home continue at discharge  · Continue NovoLOG 10 units BID and insulin sliding scale  · Hold oral antidiabetics while inpatient    Other chest pain  Assessment & Plan  · Brief episode of left upper chest pain which radiated to neck and left arm  Symptoms quickly resolved    Denies any exertional chest pain or shortness of breath  · Troponins negative  · EKG without any ischemic changes  · Outpatient  stress test recommended    Class 2 severe obesity due to excess calories with serious comorbidity and body mass index (BMI) of 35 0 to 35 9 in adult [facility])  Assessment & Plan  · Lifestyle modifications recommended    IPMN (intraductal papillary mucinous neoplasm)  Assessment & Plan  · Follows with GI    GERD (gastroesophageal reflux disease)  Assessment & Plan  · Continue PPI         VTE Pharmacologic Prophylaxis: VTE Score: 3 Moderate Risk (Score 3-4) - Pharmacological DVT Prophylaxis Ordered: enoxaparin (Lovenox)  Patient Centered Rounds: I performed bedside rounds with nursing staff today  Discussions with Specialists or Other Care Team Provider: none    Education and Discussions with Family / Patient: Updated  (wife) via phone  Total Time Spent on Date of Encounter in care of patient: 35 minutes This time was spent on one or more of the following: performing physical exam; counseling and coordination of care; obtaining or reviewing history; documenting in the medical record; reviewing/ordering tests, medications or procedures; communicating with other healthcare professionals and discussing with patient's family/caregivers  Current Length of Stay: 0 day(s)  Current Patient Status: Inpatient   Certification Statement: The patient will continue to require additional inpatient hospital stay due to abdominal pain and nausea patient needs to be tolerating diet for safe discharge  Discharge Plan: Anticipate discharge tomorrow to home  Code Status: Level 1 - Full Code    Subjective:   Patient was seen laying in bed today  He reports that he was able to eat a little something last night okay  He states that abdominal pain is much improved and he denies any nausea or vomiting at this time  He denies chest pain, SOB, fever, chills  Objective:     Vitals:   Temp (24hrs), Av 1 °F (36 7 °C), Min:97 3 °F (36 3 °C), Max:99 3 °F (37 4 °C)    Temp:  [97 3 °F (36 3 °C)-99 3 °F (37 4 °C)] 97 3 °F (36 3 °C)  HR:  [56-72] 60  Resp:  [18-20] 20  BP: (108-173)/(59-72) 142/65  SpO2:  [94 %-100 %] 96 %  Body mass index is 36 08 kg/m²       Input and Output Summary (last 24 hours):   No intake or output data in the 24 hours ending 23 1651    Physical Exam:   Physical Exam  Vitals and nursing note reviewed  Constitutional:       Appearance: Normal appearance  HENT:      Head: Normocephalic and atraumatic  Eyes:      General: No scleral icterus  Cardiovascular:      Rate and Rhythm: Normal rate and regular rhythm  Pulmonary:      Effort: Pulmonary effort is normal       Breath sounds: Normal breath sounds  Abdominal:      General: Abdomen is flat  Bowel sounds are normal       Palpations: Abdomen is soft  Tenderness: There is abdominal tenderness (midepigastric)  Musculoskeletal:      Right lower leg: No edema  Left lower leg: No edema  Skin:     General: Skin is warm and dry  Neurological:      Mental Status: He is alert  Mental status is at baseline     Psychiatric:         Mood and Affect: Mood normal          Behavior: Behavior normal         Additional Data:     Labs:  Results from last 7 days   Lab Units 03/27/23  0550 03/26/23  0742   WBC Thousand/uL 4 78 8 54   HEMOGLOBIN g/dL 12 0 13 1   HEMATOCRIT % 35 4* 38 9   PLATELETS Thousands/uL 124* 157   NEUTROS PCT %  --  76*   LYMPHS PCT %  --  7*   MONOS PCT %  --  15*   EOS PCT %  --  0     Results from last 7 days   Lab Units 03/27/23  0550 03/26/23  0742   SODIUM mmol/L 135 136   POTASSIUM mmol/L 4 0 3 9   CHLORIDE mmol/L 104 103   CO2 mmol/L 27 23   BUN mg/dL 21 21   CREATININE mg/dL 0 83 0 87   ANION GAP mmol/L 4 10   CALCIUM mg/dL 8 1* 8 9   ALBUMIN g/dL  --  3 9   TOTAL BILIRUBIN mg/dL  --  0 67   ALK PHOS U/L  --  66   ALT U/L  --  22   AST U/L  --  27   GLUCOSE RANDOM mg/dL 165* 198*     Results from last 7 days   Lab Units 03/26/23  0802   INR  1 08     Results from last 7 days   Lab Units 03/27/23  1639 03/27/23  1047 03/27/23  0749 03/26/23  2048 03/26/23  1654 03/26/23  1429   POC GLUCOSE mg/dl 173* 182* 163* 106 242* 293*         Results from last 7 days   Lab Units 03/26/23  0755   LACTIC ACID mmol/L 1 3       Lines/Drains:  Invasive Devices     Peripheral Intravenous Line  Duration           Peripheral IV 03/26/23 Left;Ventral (anterior) Forearm 1 day                      Imaging: No pertinent imaging reviewed  Recent Cultures (last 7 days):         Last 24 Hours Medication List:   Current Facility-Administered Medications   Medication Dose Route Frequency Provider Last Rate   • albuterol  2 puff Inhalation Q6H PRN Azael Parkinson MD     • aluminum-magnesium hydroxide-simethicone  30 mL Oral Q6H PRN Azael Parkinson MD     • atorvastatin  10 mg Oral Daily Azael Parkinson MD     • calcium carbonate  1,000 mg Oral Daily PRN Azael Parkinson MD     • docusate sodium  100 mg Oral BID PRN Azael Parkinson MD     • enoxaparin  40 mg Subcutaneous Daily Azael Parkinson MD     • insulin aspart protamine-insulin aspart  10 Units Subcutaneous BID AC Azael Parkinson MD     • insulin lispro  1-5 Units Subcutaneous HS Azael Parkinson MD     • insulin lispro  1-6 Units Subcutaneous TID AC Azael Parkinson MD     • labetalol  10 mg Intravenous Q4H PRN Azael Parkinson MD     • losartan  25 mg Oral Daily Azael Parkinson MD     • ondansetron  4 mg Intravenous Q6H PRN Azael Parkinson MD     • oxybutynin  5 mg Oral Daily Azael Parkinson MD     • pantoprazole  40 mg Oral BID AC Azael Parkinson MD     • polyethylene glycol  17 g Oral Daily Azael Parkinson MD          Today, Patient Was Seen By: Hemalatha Lopez PA-C    **Please Note: This note may have been constructed using a voice recognition system  **

## 2023-03-27 NOTE — ASSESSMENT & PLAN NOTE
· Patient reported upper abdominal discomfort pain, nausea and vomiting since eating crabs on Friday 3/24/2023 while he was in [facility] on holiday   History of gastrectomy  · nausea and vomiting now resolved  · Likely foodborne illness  · EGD done today impression normal duodenum, mild gastritis biopsies taken, small hiatal hernia, evidence of prior sleeve gastrectomy, normal esophagus  · Continue PPI  · GI consulted cleared for discharge once patient is tolerating regular diet

## 2023-03-27 NOTE — DISCHARGE INSTR - AVS FIRST PAGE
Internal medicine discharge instructions  -Continue to eat food as tolerated and increase your fluid intake   -Follow up with your family doctor if nausea or vomiting return   If severe abdominal pain returns, you are unable to tolerate food, you experience dark or bloody stools, or blood in your vomit return to the ED

## 2023-03-27 NOTE — PLAN OF CARE
Problem: PAIN - ADULT  Goal: Verbalizes/displays adequate comfort level or baseline comfort level  Description: Interventions:  - Encourage patient to monitor pain and request assistance  - Assess pain using appropriate pain scale  - Administer analgesics based on type and severity of pain and evaluate response  - Implement non-pharmacological measures as appropriate and evaluate response  - Consider cultural and social influences on pain and pain management  - Notify physician/advanced practitioner if interventions unsuccessful or patient reports new pain  Outcome: Completed     Problem: INFECTION - ADULT  Goal: Absence or prevention of progression during hospitalization  Description: INTERVENTIONS:  - Assess and monitor for signs and symptoms of infection  - Monitor lab/diagnostic results  - Monitor all insertion sites, i e  indwelling lines, tubes, and drains  - Monitor endotracheal if appropriate and nasal secretions for changes in amount and color  - [place]ale appropriate cooling/warming therapies per order  - Administer medications as ordered  - Instruct and encourage patient and family to use good hand hygiene technique  - Identify and instruct in appropriate isolation precautions for identified infection/condition  Outcome: Completed  Goal: Absence of fever/infection during neutropenic period  Description: INTERVENTIONS:  - Monitor WBC    Outcome: Completed     Problem: SAFETY ADULT  Goal: Patient will remain free of falls  Description: INTERVENTIONS:  - Educate patient/family on patient safety including physical limitations  - Instruct patient to call for assistance with activity   - Consult OT/PT to assist with strengthening/mobility   - Keep Call bell within reach  - Keep bed low and locked with side rails adjusted as appropriate  - Keep care items and personal belongings within reach  - Initiate and maintain comfort rounds  - Make Fall Risk Sign visible to staff  - Offer Toileting every  Hours, in advance of need  - Initiate/Maintain alarm  - Obtain necessary fall risk management equipment:   - Apply yellow socks and bracelet for high fall risk patients  - Consider moving patient to room near nurses station  Outcome: Completed  Goal: Maintain or return to baseline ADL function  Description: INTERVENTIONS:  -  Assess patient's ability to carry out ADLs; assess patient's baseline for ADL function and identify physical deficits which impact ability to perform ADLs (bathing, care of mouth/teeth, toileting, grooming, dressing, etc )  - Assess/evaluate cause of self-care deficits   - Assess range of motion  - Assess patient's mobility; develop plan if impaired  - Assess patient's need for assistive devices and provide as appropriate  - Encourage maximum independence but intervene and supervise when necessary  - Involve family in performance of ADLs  - Assess for home care needs following discharge   - Consider OT consult to assist with ADL evaluation and planning for discharge  - Provide patient education as appropriate  Outcome: Completed  Goal: Maintains/Returns to pre admission functional level  Description: INTERVENTIONS:  - Perform BMAT or MOVE assessment daily    - Set and communicate daily mobility goal to care team and patient/family/caregiver  - Collaborate with rehabilitation services on mobility goals if consulted  - Perform Range of Motion  times a day  - Reposition patient every  hours    - Dangle patient  times a day  - Stand patient  times a day  - Ambulate patient  times a day  - Out of bed to chair  times a day   - Out of bed for meals  times a day  - Out of bed for toileting  - Record patient progress and toleration of activity level   Outcome: Completed     Problem: DISCHARGE PLANNING  Goal: Discharge to home or other facility with appropriate resources  Description: INTERVENTIONS:  - Identify barriers to discharge w/patient and caregiver  - Arrange for needed discharge resources and transportation as appropriate  - Identify discharge learning needs (meds, wound care, etc )  - Arrange for interpretive services to assist at discharge as needed  - Refer to Case Management Department for coordinating discharge planning if the patient needs post-hospital services based on physician/advanced practitioner order or complex needs related to functional status, cognitive ability, or social support system  Outcome: Completed     Problem: Knowledge Deficit  Goal: Patient/family/caregiver demonstrates understanding of disease process, treatment plan, medications, and discharge instructions  Description: Complete learning assessment and assess knowledge base    Interventions:  - Provide teaching at level of understanding  - Provide teaching via preferred learning methods  Outcome: Completed

## 2023-03-27 NOTE — ANESTHESIA POSTPROCEDURE EVALUATION
Post-Op Assessment Note    CV Status:  Stable  Pain Score: 0    Pain management: adequate     Mental Status:  Alert and awake   Hydration Status:  Euvolemic   PONV Controlled:  Controlled   Airway Patency:  Patent      Post Op Vitals Reviewed: Yes      Staff: CRNA, Anesthesiologist         No notable events documented      BP  110/61   Temp     Pulse 70 (03/27/23 1524)   Resp   12   SpO2 98 % (03/27/23 1524)

## 2023-03-27 NOTE — DISCHARGE SUMMARY
[address]  Discharge- Eric Rashid 1950, 67 y o  male MRN: [id number]  Unit/Bed#: S -01 Encounter: [id number]  Primary Care Provider: Luci Horn MD   Date and time admitted to hospital: 3/26/2023  7:07 AM    * Upper abdominal pain, unspecified  Assessment & Plan  · Patient reported upper abdominal discomfort pain, nausea and vomiting since eating crabs on Friday 3/24/2023 while he was in [place] on holiday  History of gastrectomy  · Nausea and vomiting now resolved  · Likely foodborne illness  · EGD done today impression normal duodenum, mild gastritis biopsies taken, small hiatal hernia, evidence of prior sleeve gastrectomy, normal esophagus  · Continue PPI  · GI consulted cleared for discharge once patient is tolerating regular diet  · Patient tolerated dinner without complaints    Benign essential hypertension  Assessment & Plan  · Continue losartan 25 mg daily     Type 2 diabetes mellitus with hyperglycemia, with long-term current use of insulin [facility])  Assessment & Plan  Lab Results   Component Value Date    HGBA1C 8 3 (H) 01/13/2023       Recent Labs     03/26/23  2048 03/27/23  0749 03/27/23  1047 03/27/23  1639   POCGLU 106 163* 182* 173*       Blood Sugar Average: Last 72 hrs:  (P) 501 [id number]   · Patient is on glipizide metformin and NovoLog 70/30 at home continue at discharge  · Continue NovoLOG 10 units BID and insulin sliding scale  · Oral antidiabetics were held while inpatient    Other chest pain  Assessment & Plan  · Brief episode of left upper chest pain which radiated to neck and left arm  Symptoms quickly resolved    Denies any exertional chest pain or shortness of breath  · Troponins negative  · EKG without any ischemic changes  · Outpatient stress test recommended    Class 2 severe obesity due to excess calories with serious comorbidity and body mass index (BMI) of 35 0 to 35 9 in adult [facility])  Assessment & Plan  · Lifestyle modifications recommended    IPMN (intraductal papillary mucinous neoplasm)  Assessment & Plan  · Follows with GI    GERD (gastroesophageal reflux disease)  Assessment & Plan  · Continue PPI      Medical Problems     Resolved Problems  Date Reviewed: 3/27/2023   None       Discharging Physician / Practitioner: Leeroy Latif PA-C  PCP: Prakash Wallace MD  Admission Date:   Admission Orders (From admission, onward)     Ordered        03/27/23 1651  Inpatient Admission  Once            03/26/23 1039  Place in Observation  Once                      Discharge Date: 03/27/23    Consultations During Hospital Stay:  · gastroenterology    Procedures Performed:   · EGD done 2/27/2023 impression normal duodenum, mild gastritis biopsies taken, small hiatal hernia, evidence of prior sleeve gastrectomy, normal esophagus    Significant Findings / Test Results:   · Chest x-ray impression no acute cardiopulmonary disease  · CTA impression No evidence for aortic aneurysm, dissection or intramural hematoma or other acute abnormality in the chest, abdomen, or pelvis  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  Stable 1 8 cm complex cystic mass lower pole right kidney  Additional stable findings as noted  Incidental Findings:   · None   Test Results Pending at Discharge (will require follow up): · Biopsies     Outpatient Tests Requested:  · none    Complications:  none    Reason for Admission: Upper abdominal pain    Hospital Course:   Cesar Barahona is a 67 y o  male patient who originally presented to the hospital on 3/26/2023 due to upper abdominal discomfort nausea and vomiting since eating crabs on Friday, 3/24/2023 while in [facility]   Patient's symptoms were likely secondary to foodborne illness  Nausea and vomiting resolved on 3/26/2023  Patient was evaluated by GI and was recommended EGD performed 3/27/2023  EGD showed mild gastritis and biopsies were taken    Patient was cleared for discharge from GI perspective once he was tolerating diet without nausea vomiting or pain  Patient did well with dinner today per RN  He is cleared for discharge  He should follow-up with his family doctor for recurrence of nausea and vomiting  He should return to the ED with severe abdominal pain, inability to tolerate diet, high fevers, bloody emesis, bloody stools or dark stools  Please see above list of diagnoses and related plan for additional information  Condition at Discharge: stable    Discharge Day Visit / Exam:   * Please refer to separate progress note for these details *    Discussion with Family: Updated  (wife) at bedside  Discharge instructions/Information to patient and family:   See after visit summary for information provided to patient and family  Provisions for Follow-Up Care:  See after visit summary for information related to follow-up care and any pertinent home health orders  Disposition:   Home    Planned Readmission: none     Discharge Statement:  I spent 60 minutes discharging the patient  This time was spent on the day of discharge  I had direct contact with the patient on the day of discharge  Greater than 50% of the total time was spent examining patient, answering all patient questions, arranging and discussing plan of care with patient as well as directly providing post-discharge instructions  Additional time then spent on discharge activities  Discharge Medications:  See after visit summary for reconciled discharge medications provided to patient and/or family        **Please Note: This note may have been constructed using a voice recognition system**

## 2023-03-27 NOTE — ASSESSMENT & PLAN NOTE
· Brief episode of left upper chest pain which radiated to neck and left arm  Symptoms quickly resolved    Denies any exertional chest pain or shortness of breath  · Troponins negative  · EKG without any ischemic changes  · Outpatient  stress test recommended

## 2023-03-28 ENCOUNTER — TRANSITIONAL CARE MANAGEMENT (OUTPATIENT)
Dept: FAMILY MEDICINE CLINIC | Facility: CLINIC | Age: 73
End: 2023-03-28

## 2023-03-29 ENCOUNTER — OFFICE VISIT (OUTPATIENT)
Dept: FAMILY MEDICINE CLINIC | Facility: CLINIC | Age: 73
End: 2023-03-29

## 2023-03-29 VITALS
WEIGHT: 231.2 LBS | OXYGEN SATURATION: 96 % | RESPIRATION RATE: 18 BRPM | TEMPERATURE: 97.6 F | DIASTOLIC BLOOD PRESSURE: 66 MMHG | HEART RATE: 66 BPM | BODY MASS INDEX: 36.29 KG/M2 | SYSTOLIC BLOOD PRESSURE: 110 MMHG | HEIGHT: 67 IN

## 2023-03-29 DIAGNOSIS — K21.9 GASTROESOPHAGEAL REFLUX DISEASE WITHOUT ESOPHAGITIS: ICD-10-CM

## 2023-03-29 DIAGNOSIS — R10.10 UPPER ABDOMINAL PAIN, UNSPECIFIED: ICD-10-CM

## 2023-03-29 DIAGNOSIS — E11.65 TYPE 2 DIABETES MELLITUS WITH HYPERGLYCEMIA, WITH LONG-TERM CURRENT USE OF INSULIN (HCC): Chronic | ICD-10-CM

## 2023-03-29 DIAGNOSIS — Z79.4 TYPE 2 DIABETES MELLITUS WITH HYPERGLYCEMIA, WITH LONG-TERM CURRENT USE OF INSULIN (HCC): Chronic | ICD-10-CM

## 2023-03-29 DIAGNOSIS — E66.01 CLASS 2 SEVERE OBESITY DUE TO EXCESS CALORIES WITH SERIOUS COMORBIDITY AND BODY MASS INDEX (BMI) OF 35.0 TO 35.9 IN ADULT (HCC): ICD-10-CM

## 2023-03-29 DIAGNOSIS — I10 BENIGN ESSENTIAL HYPERTENSION: ICD-10-CM

## 2023-03-29 DIAGNOSIS — R05.1 ACUTE COUGH: ICD-10-CM

## 2023-03-29 DIAGNOSIS — Z09 HOSPITAL DISCHARGE FOLLOW-UP: Primary | ICD-10-CM

## 2023-03-29 RX ORDER — PANTOPRAZOLE SODIUM 40 MG/1
40 TABLET, DELAYED RELEASE ORAL DAILY
Qty: 90 TABLET | Refills: 0 | Status: SHIPPED | OUTPATIENT
Start: 2023-03-29

## 2023-03-29 NOTE — PROGRESS NOTES
Assessment & Plan     1  Hospital discharge follow-up    2  Upper abdominal pain, unspecified  Assessment & Plan:  Resolved   EGD was overall normal other than mild gastritis  Biopsy results pending as of this appointment   Continue protonix as directed      3  Benign essential hypertension  -     VAS carotid complete study; Future; Expected date: 03/29/2023    4  Type 2 diabetes mellitus with hyperglycemia, with long-term current use of insulin ([facility])  Assessment & Plan:    Lab Results   Component Value Date    HGBA1C 8 3 (H) 01/13/2023   not controlled  Cant tolerate any GLP1   Recheck next month     Orders:  -     VAS carotid complete study; Future; Expected date: 03/29/2023    5  Gastroesophageal reflux disease without esophagitis  -     pantoprazole (PROTONIX) 40 mg tablet; Take 1 tablet (40 mg total) by mouth daily    6  Acute cough  -     Covid/Flu- Office Collect    7  Class 2 severe obesity due to excess calories with serious comorbidity and body mass index (BMI) of 35 0 to 35 9 in adult [facility])  Assessment & Plan:  Diet and exercise encouraged       BMI Counseling: Body mass index is 36 21 kg/m²  The BMI is above normal  Nutrition recommendations include decreasing portion sizes and encouraging healthy choices of fruits and vegetables  Exercise recommendations include moderate physical activity 150 minutes/week  No pharmacotherapy was ordered  Rationale for BMI follow-up plan is due to patient being overweight or obese  Depression Screening and Follow-up Plan: Patient was screened for depression during today's encounter  They screened negative with a PHQ-2 score of 0  Subjective     Transitional Care Management Review:   Jada Saeed is a 67 y o  male here for TCM follow up       During the TCM phone call patient stated:  TCM Call     Date and time call was made  3/28/2023  9:27 AM    Patient was hospitialized at  [address] [place] [address]    Date of Admission  03/26/23    Date of discharge  03/27/23 "Diagnosis  Upper abdominal pain, unspecified    Disposition  Home    Were the patients medications reviewed and updated  No      TCM Call     Should patient be enrolled in anticoag monitoring? No    Scheduled for follow up? Yes    Did you obtain your prescribed medications  Yes    Do you need help managing your prescriptions or medications  No    Is transportation to your appointment needed  No    I have advised the patient to call PCP with any new or worsening symptoms  Aneesh Montero,     Living Arrangements  Family members    Are you recieving any outpatient services  No    Are you recieving home care services  No    Are you using any community resources  No    Current waiver services  No    Have you fallen in the last 12 months  No    Interperter language line needed  No    Comments  PT WAS GIVING 4 DIFFERENT NEW MEDICATIONS        HPI   Here for hospital follow up  Was admitted on 3/24/23 for abdominal pain, nausea and vomiting after eating crab fritters few days prior  EGD showed mild gastritis   Supportive care and sent home when he was able to tolerate PO intake   Feeling well today other than cold symptoms started few days ago   Didn't take covid test at home       Review of Systems   Constitutional: Negative  HENT: Positive for congestion, postnasal drip and sore throat  Eyes: Negative  Respiratory: Positive for cough  Cardiovascular: Negative  Genitourinary: Negative  Musculoskeletal: Negative  Neurological: Negative  Hematological: Negative  Psychiatric/Behavioral: Negative  Objective     /66 (BP Location: Left arm, Patient Position: Sitting, Cuff Size: Large)   Pulse 66   Temp 97 6 °F (36 4 °C) (Tympanic)   Resp 18   Ht 5' 7\" (1 702 m)   Wt 105 kg (231 lb 3 2 oz)   SpO2 96%   BMI 36 21 kg/m²      Physical Exam  Constitutional:       Appearance: Normal appearance     HENT:      Right Ear: Tympanic membrane normal       Left Ear: Tympanic membrane normal  " Mouth/Throat:      Mouth: Mucous membranes are moist    Eyes:      Extraocular Movements: Extraocular movements intact  Pupils: Pupils are equal, round, and reactive to light  Cardiovascular:      Rate and Rhythm: Normal rate and regular rhythm  Pulses: Normal pulses  Heart sounds: Normal heart sounds  Pulmonary:      Effort: Pulmonary effort is normal       Breath sounds: Normal breath sounds  Abdominal:      General: Abdomen is flat  Bowel sounds are normal  There is no distension  Palpations: There is no mass  Tenderness: There is no abdominal tenderness  Hernia: No hernia is present  Musculoskeletal:      Cervical back: Normal range of motion and neck supple  Skin:     Capillary Refill: Capillary refill takes less than 2 seconds  Neurological:      General: No focal deficit present  Mental Status: He is alert and oriented to person, place, and time     Psychiatric:         Mood and Affect: Mood normal          Behavior: Behavior normal        Medications have been reviewed by provider in current encounter    Grant Garica MD

## 2023-03-29 NOTE — ASSESSMENT & PLAN NOTE
Resolved   EGD was overall normal other than mild gastritis  Biopsy results pending as of this appointment   Continue protonix as directed

## 2023-03-29 NOTE — ASSESSMENT & PLAN NOTE
Lab Results   Component Value Date    HGBA1C 8 3 (H) 01/13/2023   not controlled  Cant tolerate any GLP1   Recheck next month

## 2023-03-30 ENCOUNTER — TELEPHONE (OUTPATIENT)
Dept: OTHER | Facility: OTHER | Age: 73
End: 2023-03-30

## 2023-03-30 ENCOUNTER — OFFICE VISIT (OUTPATIENT)
Dept: DENTISTRY | Facility: CLINIC | Age: 73
End: 2023-03-30

## 2023-03-30 ENCOUNTER — TELEPHONE (OUTPATIENT)
Dept: FAMILY MEDICINE CLINIC | Facility: CLINIC | Age: 73
End: 2023-03-30

## 2023-03-30 VITALS — SYSTOLIC BLOOD PRESSURE: 135 MMHG | DIASTOLIC BLOOD PRESSURE: 62 MMHG | TEMPERATURE: 96.9 F | HEART RATE: 51 BPM

## 2023-03-30 DIAGNOSIS — E11.65 TYPE 2 DIABETES MELLITUS WITH HYPERGLYCEMIA, WITH LONG-TERM CURRENT USE OF INSULIN (HCC): ICD-10-CM

## 2023-03-30 DIAGNOSIS — Z79.4 TYPE 2 DIABETES MELLITUS WITH HYPERGLYCEMIA, WITH LONG-TERM CURRENT USE OF INSULIN (HCC): ICD-10-CM

## 2023-03-30 DIAGNOSIS — K08.401 EDENTULISM, PARTIAL, CLASS I: Primary | ICD-10-CM

## 2023-03-30 DIAGNOSIS — U07.1 COVID-19: Primary | ICD-10-CM

## 2023-03-30 LAB
FLUAV RNA RESP QL NAA+PROBE: NEGATIVE
FLUBV RNA RESP QL NAA+PROBE: NEGATIVE
SARS-COV-2 RNA RESP QL NAA+PROBE: POSITIVE

## 2023-03-30 RX ORDER — FLASH GLUCOSE SENSOR
KIT MISCELLANEOUS
Qty: 2 EACH | Refills: 3 | Status: SHIPPED | OUTPATIENT
Start: 2023-03-30 | End: 2023-03-31

## 2023-03-30 RX ORDER — NIRMATRELVIR AND RITONAVIR 300-100 MG
3 KIT ORAL 2 TIMES DAILY
Qty: 30 TABLET | Refills: 0 | Status: SHIPPED | OUTPATIENT
Start: 2023-03-30 | End: 2023-04-04

## 2023-03-30 RX ORDER — FLASH GLUCOSE SCANNING READER
EACH MISCELLANEOUS
Qty: 2 EACH | Refills: 3 | Status: SHIPPED | OUTPATIENT
Start: 2023-03-30 | End: 2023-03-31

## 2023-03-30 NOTE — TELEPHONE ENCOUNTER
Pharmacy stated that [facility] [address] Jona Orellana has now been discontinued  They are requesting a call back to discuss

## 2023-03-30 NOTE — PROGRESS NOTES
Teeth Try-In     Pt presents for Teeth try-in for resin based partial dentures  Denture seated and patient noted it was comfortable  Pt verified satisfaction with shade, shape, and positioning of teeth  Verified occlusion of teeth  Even occlusal contacts throughout denture  Discussed expectations, and alternatives (implants) if patient is still not satisfied with long term fit  Pt understood and left in good spirits  Dentures submitted to NDL  Will ask for white resin clasps for increased retention        NV: PArital Denture delivery

## 2023-03-31 DIAGNOSIS — Z79.4 TYPE 2 DIABETES MELLITUS WITH HYPERGLYCEMIA, WITH LONG-TERM CURRENT USE OF INSULIN (HCC): Primary | Chronic | ICD-10-CM

## 2023-03-31 DIAGNOSIS — E11.9 TYPE 2 DIABETES MELLITUS WITH INSULIN THERAPY (HCC): ICD-10-CM

## 2023-03-31 DIAGNOSIS — Z79.4 TYPE 2 DIABETES MELLITUS WITH INSULIN THERAPY (HCC): ICD-10-CM

## 2023-03-31 DIAGNOSIS — E11.65 TYPE 2 DIABETES MELLITUS WITH HYPERGLYCEMIA, WITH LONG-TERM CURRENT USE OF INSULIN (HCC): Primary | Chronic | ICD-10-CM

## 2023-03-31 RX ORDER — GLIPIZIDE 5 MG/1
TABLET, FILM COATED, EXTENDED RELEASE ORAL
Qty: 90 TABLET | Refills: 3 | Status: SHIPPED | OUTPATIENT
Start: 2023-03-31

## 2023-04-03 LAB
DME PARACHUTE DELIVERY DATE REQUESTED: NORMAL
DME PARACHUTE ITEM DESCRIPTION: NORMAL
DME PARACHUTE ITEM DESCRIPTION: NORMAL
DME PARACHUTE ORDER STATUS: NORMAL
DME PARACHUTE SUPPLIER NAME: NORMAL
DME PARACHUTE SUPPLIER PHONE: NORMAL

## 2023-04-03 NOTE — TELEPHONE ENCOUNTER
FYI
Patient informed and acknowledged 
Patient reports that pharmacy has sent an order to be signed for reading devices on March 12 but have not received anything back  Dunia reader device according to pharmacy is not made anymore, so he needs another device      Please advise
Pharmacy inquiring if patient shoul hold atorvatatin while taking paxlovid  Please call patient yo advise 
To clarify, Tracy from [facility] [phone] called to inform us that his reader, the freestyle ashley 2 is not getting discontinued
Unclear as to where patient was obtaining from / who the prescriber was in the past      Reordered Melony Ped 2 device including reader and sensor from [facility] via [facility]  Awaiting response         Pharmacist Tracking Tool  Reason For Outreach: Embedded Pharmacist  Demographics:  Intervention Method: Chart Review  Type of Intervention: Follow-Up  Topics Addressed: Diabetes and Transitions of Care  Pharmacologic Interventions: Med Rec  Non-Pharmacologic Interventions: Care coordination, Cost, Home Monitoring and Personal CGM  Time:  Direct Patient Care: 0 mins  Care Coordination: 20 mins  Recommendation Recipient: Patient/Caregiver  Outcome: Accepted
Yes he is to hold atorvastatin while taking paxlovid    Dr Jam Ty
79

## 2023-04-20 ENCOUNTER — TELEPHONE (OUTPATIENT)
Dept: UROLOGY | Facility: AMBULATORY SURGERY CENTER | Age: 73
End: 2023-04-20

## 2023-04-20 NOTE — TELEPHONE ENCOUNTER
Patient had alatorre removed this morning with RN Alicja Ledezma in [facility]    Patient calling to let Alicja Ledezma know he is not have any issues and has been urinating fine     No call back needed

## 2023-05-10 ENCOUNTER — OFFICE VISIT (OUTPATIENT)
Dept: DENTISTRY | Facility: CLINIC | Age: 73
End: 2023-05-10

## 2023-05-10 VITALS — DIASTOLIC BLOOD PRESSURE: 78 MMHG | HEART RATE: 60 BPM | SYSTOLIC BLOOD PRESSURE: 147 MMHG

## 2023-05-10 DIAGNOSIS — K08.109 EDENTULISM: Primary | ICD-10-CM

## 2023-05-10 NOTE — PROGRESS NOTES
Resin Partial Denture Delivery    Pt presents for partial denture delivery  Denture picked up and verified prior to appt  Denture seated but patient noted mild discomfort on the lower left ridge  Pt reports having a sore there prior to today  Verified internal and removed any interferences that prevent complete and comfortable seating  Verified and adjusted occlusion to be even and balanced  Pt stated he liked the aesthetics, feel, and comfort  Advised patient to return if he feels any pain or discomfort when wearing dentures for adjustments  Provided home care instructions, discussed expectations, and alternatives (implants) if patient is still not satisfied with long term fit  Pt was given denture care kit  Pt understood and left in good spirits  Pt is interested in fixing spacing on his lower teeth  Advised pt he could come back for txp appt with new residents and discuss possible crowns or bridge  Explained the difficulty of the case due to the size and spacing of the teeth  Pt understood      NV: periodic   NNV: Adjustment if necessary

## 2023-05-16 ENCOUNTER — TELEPHONE (OUTPATIENT)
Dept: PODIATRY | Facility: CLINIC | Age: 73
End: 2023-05-16

## 2023-05-16 NOTE — TELEPHONE ENCOUNTER
Caller: Diane Conteh    Doctor/Office: Dr Leona Arshad    #: [phone]    Escalation: Appointment/Pt is diabetic and stubbed grt toe and nail is turning black  Very worried, but no appts available until 6/5/23  Please ask  if he needs to be seen sooner/force appt? I informed PT  was OOO until Monday 5/22/23  PT verbalized understanding  Please call pt back/advise/schedule   Thanks

## 2023-05-17 ENCOUNTER — CONSULT (OUTPATIENT)
Dept: PODIATRY | Facility: CLINIC | Age: 73
End: 2023-05-17

## 2023-05-17 VITALS
HEART RATE: 72 BPM | BODY MASS INDEX: 36.13 KG/M2 | HEIGHT: 67 IN | DIASTOLIC BLOOD PRESSURE: 84 MMHG | SYSTOLIC BLOOD PRESSURE: 144 MMHG | WEIGHT: 230.2 LBS

## 2023-05-17 DIAGNOSIS — S90.211A CONTUSION OF RIGHT GREAT TOE WITH DAMAGE TO NAIL, INITIAL ENCOUNTER: Primary | ICD-10-CM

## 2023-05-17 NOTE — PROGRESS NOTES
"This patient was seen on 5/17/23  My role is Foot , Ankle, and Wound Specialist    SUBJECTIVE    Chief Complaint:  Right hallux contusion     Patient ID: Diane Conteh is a 67 y o  male  Clemetyahir Benton is here with his wife with a cc of bumping the Right hallux at work when a cart rolled back onto his foot  He relates initial pain that has now mostly subsided  He denies any initial nor current drainage  The following portions of the patient's history were reviewed and updated as appropriate: allergies, current medications, past family history, past medical history, past social history, past surgical history and problem list     Review of Systems   Constitutional: Negative  Respiratory: Negative  Musculoskeletal: Negative for arthralgias  Skin: Positive for color change  OBJECTIVE      /84   Pulse 72   Ht 5' 7\" (1 702 m)   Wt 104 kg (230 lb 3 2 oz)   BMI 36 05 kg/m²   Procedures    Foot/Ankle Musculoskeletal Exam    General    Neurological: alert  General additional comments: The Right hallux is free of deformity, edema, nor pain  Capillary fill is intact to the toe tip  Physical Exam  Vitals and nursing note reviewed  Constitutional:       General: He is not in acute distress  Appearance: Normal appearance  He is not ill-appearing, toxic-appearing or diaphoretic  Musculoskeletal:      Comments: The Right hallux is free of deformity, edema, nor pain  Capillary fill is intact to the toe tip  Skin:     Findings: Bruising present  Comments: I note the Right hallux nail is bruised  The nail is intact without loosening nor drainage  Neurological:      Mental Status: He is alert  ASSESSMENT     Diagnoses and all orders for this visit:    Contusion of right great toe with damage to nail, initial encounter  -     X-ray foot right 3+ views;  Future         Problem List Items Addressed This Visit        Musculoskeletal and Integument    Contusion of right great toe " with damage to nail - Primary    Relevant Orders    X-ray foot right 3+ views           PLAN  I ordered xrays of the Right hallux  I don't see any obvious fractures of the distal phalanx  I feel he has a simple contusion with subungual hematoma  The nail appears stable and intact  I told him to call us if any drainage or nail loosening noted

## 2023-05-31 DIAGNOSIS — Z79.4 TYPE 2 DIABETES MELLITUS WITH HYPERGLYCEMIA, WITH LONG-TERM CURRENT USE OF INSULIN (HCC): Chronic | ICD-10-CM

## 2023-05-31 DIAGNOSIS — E11.65 TYPE 2 DIABETES MELLITUS WITH HYPERGLYCEMIA, WITH LONG-TERM CURRENT USE OF INSULIN (HCC): Chronic | ICD-10-CM

## 2023-06-20 ENCOUNTER — OFFICE VISIT (OUTPATIENT)
Dept: UROLOGY | Facility: CLINIC | Age: 73
End: 2023-06-20
Payer: COMMERCIAL

## 2023-06-20 ENCOUNTER — TELEPHONE (OUTPATIENT)
Dept: UROLOGY | Facility: CLINIC | Age: 73
End: 2023-06-20

## 2023-06-20 VITALS
HEIGHT: 67 IN | BODY MASS INDEX: 35.94 KG/M2 | WEIGHT: 229 LBS | DIASTOLIC BLOOD PRESSURE: 70 MMHG | SYSTOLIC BLOOD PRESSURE: 130 MMHG

## 2023-06-20 DIAGNOSIS — C61 PROSTATE CANCER (HCC): Primary | ICD-10-CM

## 2023-06-20 PROCEDURE — 99213 OFFICE O/P EST LOW 20 MIN: CPT | Performed by: PHYSICIAN ASSISTANT

## 2023-06-20 NOTE — PROGRESS NOTES
UROLOGY PROGRESS NOTE   Patient Identifiers: Nel Arenas (MRN [id number])  Date of Service: 6/20/2023    Subjective:   70-year-old man with history of Rafael 9 prostate cancer  Treated with radiation and ADT 2 years  PSA has been<0 1  He has medically refractory BPH and has had UroLift plus a redo procedure  He is now status post 100 mg Botox with good results  He has minimal urgency and urge leak  He only wears a light pad      Reason for visit: Prostate cancer and overactive bladder follow-up    Objective:     VITALS:    Vitals:    06/20/23 1100   BP: 130/70     AUA SYMPTOM SCORE    Flowsheet Row Most Recent Value   AUA SYMPTOM SCORE    How often have you had a sensation of not emptying your bladder completely after you finished urinating? 1 (P)     How often have you had to urinate again less than two hours after you finished urinating? 1 (P)     How often have you found you stopped and started again several times when you urinate? 1 (P)     How often have you found it difficult to postpone urination? 1 (P)     How often have you had a weak urinary stream? 0 (P)     How often have you had to push or strain to begin urination? 0 (P)     How many times did you most typically get up to urinate from the time you went to bed at night until the time you got up in the morning? 1 (P)     Quality of Life: If you were to spend the rest of your life with your urinary condition just the way it is now, how would you feel about that? 1 (P)     AUA SYMPTOM SCORE 5 (P)             LABS:  Lab Results   Component Value Date    HGB 12 0 03/27/2023    HCT 35 4 (L) 03/27/2023    WBC 4 78 03/27/2023     (L) 03/27/2023   ]    Lab Results   Component Value Date     01/07/2016    K 4 0 03/27/2023     03/27/2023    CO2 27 03/27/2023    BUN 21 03/27/2023    CREATININE 0 83 03/27/2023    CALCIUM 8 1 (L) 03/27/2023    GLUCOSE 198 (H) 12/14/2020   ]        INPATIENT MEDS:    Current Outpatient Medications:   • albuterol (ProAir HFA) 90 mcg/act inhaler, Inhale 2 puffs every 6 (six) hours as needed for wheezing or shortness of breath, Disp: 8 5 g, Rfl: 0  •  aluminum-magnesium hydroxide-simethicone (MYLANTA) 200-200-20 mg/5 mL suspension, Take 30 mL by mouth every 6 (six) hours as needed for indigestion or heartburn, Disp: 355 mL, Rfl: 0  •  atorvastatin (LIPITOR) 10 mg tablet, Take 1 tablet (10 mg total) by mouth daily, Disp: 100 tablet, Rfl: 3  •  Biotin 1000 MCG tablet, Take 1 tablet by mouth daily in the early morning , Disp: , Rfl:   •  Blood Glucose Monitoring Suppl (GLUCOCARD VITAL MONITOR) w/Device KIT, by Does not apply route 2 (two) times a day, Disp: 1 kit, Rfl: 0  •  Cholecalciferol (VITAMIN D3) 2000 units capsule, Take 1,000 Units by mouth daily in the early morning , Disp: , Rfl:   •  Continuous Blood Gluc  (FreeStyle [place] 2 [place]) CHARITY, Check blood sugars multiple times per day, Disp: 1 each, Rfl: 0  •  Continuous Blood Gluc Sensor (FreeStyle Dunia 2 Sensor) MISC, Check blood sugars multiple times per day, Disp: 6 each, Rfl: 3  •  Cyanocobalamin (VITAMIN B-12) 5000 MCG TBDP, Take 5,000 mcg by mouth once a week Takes on Mondays, Disp: , Rfl:   •  docusate sodium (COLACE) 100 mg capsule, Take 100 mg by mouth daily as needed for constipation, Disp: , Rfl:   •  fluticasone (FLONASE) 50 mcg/act nasal spray, 2 sprays into each nostril daily, Disp: 16 g, Rfl: 0  •  insulin aspart protamine-insulin aspart (NovoLOG Mix 70/30 FlexPen) 100 Units/mL injection pen, INJECT 20 UNITS UNDER THE SKIN TWO TIMES A DAY WITH MEALS, Disp: 30 mL, Rfl: 0  •  Lancets (LIFESCAN UNISTIK 2) MISC, Lifescan One Touch Ultramini Meter; use as directed; 1; 0; 31-Oct-2016; 31-Oct-2016; Melida Duran;  Active, Disp: , Rfl:   •  Mirabegron ER 50 MG TB24, Take 1 tablet (50 mg total) by mouth daily at bedtime, Disp: 90 tablet, Rfl: 3  •  pantoprazole (PROTONIX) 40 mg tablet, Take 1 tablet (40 mg total) by mouth daily, Disp: 90 tablet, "Rfl: 0  •  fluticasone (Flovent HFA) 110 MCG/ACT inhaler, Inhale 2 puffs 2 (two) times a day Rinse mouth after use  (Patient taking differently: Inhale 2 puffs if needed Rinse mouth after use ), Disp: 12 g, Rfl: 0  •  losartan (COZAAR) 25 mg tablet, Take 1 tablet (25 mg total) by mouth daily, Disp: 100 tablet, Rfl: 3  •  metFORMIN (GLUCOPHAGE) 1000 MG tablet, TAKE ONE TABLET BY MOUTH TWO TIMES A DAY WITH MEALS, Disp: 180 tablet, Rfl: 0  •  Multiple Vitamin (MULTIVITAMIN) capsule, Take 1 capsule by mouth daily in the early morning , Disp: , Rfl:       Physical Exam:   /70 (BP Location: Left arm, Patient Position: Sitting, Cuff Size: Adult)   Ht 5' 7\" (1 702 m)   Wt 104 kg (229 lb)   BMI 35 87 kg/m²   GEN: no acute distress    RESP: breathing comfortably with no accessory muscle use    ABD: soft, non-tender, non-distended   INCISION:    EXT: no significant peripheral edema     RADIOLOGY:   None    Assessment:   #1  Overactive bladder  2    Prostate cancer    Plan:   -Follow-up in 4 months for his next Botox with PSA prior to visit  -If he still has satisfactory results he may postpone Botox further out  -  -          "

## 2023-06-20 NOTE — TELEPHONE ENCOUNTER
Patient is scheduled for Botox on 10/16 with Dr Dayan Mares  Provider note:      Return in about 4 months (around 10/20/2023) for 100mg Botox with Dayan Mares PSA prior

## 2023-06-27 ENCOUNTER — OFFICE VISIT (OUTPATIENT)
Dept: PODIATRY | Facility: CLINIC | Age: 73
End: 2023-06-27
Payer: COMMERCIAL

## 2023-06-27 VITALS
WEIGHT: 229 LBS | SYSTOLIC BLOOD PRESSURE: 155 MMHG | HEART RATE: 55 BPM | BODY MASS INDEX: 35.94 KG/M2 | DIASTOLIC BLOOD PRESSURE: 65 MMHG | HEIGHT: 67 IN

## 2023-06-27 DIAGNOSIS — M20.42 HAMMER TOE OF LEFT FOOT: ICD-10-CM

## 2023-06-27 DIAGNOSIS — S90.211A SUBUNGUAL HEMATOMA OF GREAT TOE OF RIGHT FOOT, INITIAL ENCOUNTER: ICD-10-CM

## 2023-06-27 DIAGNOSIS — L84 CORN OR CALLUS: Primary | ICD-10-CM

## 2023-06-27 PROCEDURE — 99213 OFFICE O/P EST LOW 20 MIN: CPT | Performed by: PODIATRIST

## 2023-06-27 NOTE — PROGRESS NOTES
Patient presents with 2 concerns  His chief complaint is recurrence of a painful soft corn left fifth toe  Patient had similar problems that were treated at his last visit approximately 8 months ago  He states that palliative care provided minimal relief  A second concern involves a blackened right great toenail  Patient was stepped on approximately 1 month ago  He relates no pain in the nail  Patient is concerned as he is diabetic  I personally reviewed an A1c dated 1/13/2023  It was 8 3  I personally viewed x-rays of the toes of the left foot taken today  They are positive for a hammertoe deformity of the fifth toe  Explained to the patient that the soft corn that is a recurrent lesion due to the hammertoe deformity of the left fifth toe  Treatment consisted of lesion trimming  Silipos pads were dispensed  Explained that derotational arthroplasty is needed for correction  However, blood sugar is currently too high  He is planning to have new labs taken in the next month  He will be reassessed in 10 weeks  Reassured patient that he has a subungual hematoma of the right hallux  This will improve over time as the new nail grows in  No aggressive treatment needed  Patient is rescheduled in 10 weeks

## 2023-07-05 ENCOUNTER — OFFICE VISIT (OUTPATIENT)
Dept: DENTISTRY | Facility: CLINIC | Age: 73
End: 2023-07-05

## 2023-07-05 VITALS — TEMPERATURE: 97.6 F | DIASTOLIC BLOOD PRESSURE: 76 MMHG | SYSTOLIC BLOOD PRESSURE: 151 MMHG | HEART RATE: 59 BPM

## 2023-07-05 DIAGNOSIS — Z01.21 ENCOUNTER FOR DENTAL EXAMINATION AND CLEANING WITH ABNORMAL FINDINGS: Primary | ICD-10-CM

## 2023-07-05 PROCEDURE — D4910 PERIODONTAL MAINTENANCE: HCPCS

## 2023-07-05 NOTE — DENTAL PROCEDURE DETAILS
Periodontal maintenance  Reviewed meds/hxx in Epic. ASA III UC type 2 diabetes    CC: new LPD rubs and he does not wear regularly. Max RPD fits good. He did not bring partial dentures with him today. Perio charting completed  Perio findings: same as prior visits    Cavitron and handscale, polished and flossed. OH: Fair  OHI: reviewed importance of brushing 2x/day, flossing subgingivally daily and using a daily mouthrinse. Discussed importance of regular 3-4 month periodontal maintenance appts. Explained to the patient without good home care and irregular dental visits, this disease can return. Patient understands. Stressed removing max and usha partial dentures every night. NV: LPD adjustment with Resident. Also eval #11 and 12 D for caries. NV: 3-4 month periodontal mt appt.

## 2023-07-21 ENCOUNTER — HOSPITAL ENCOUNTER (OUTPATIENT)
Facility: HOSPITAL | Age: 73
Setting detail: OBSERVATION
Discharge: HOME | End: 2023-07-22
Attending: EMERGENCY MEDICINE | Admitting: INTERNAL MEDICINE
Payer: COMMERCIAL

## 2023-07-21 ENCOUNTER — APPOINTMENT (EMERGENCY)
Dept: CT IMAGING | Facility: HOSPITAL | Age: 73
End: 2023-07-21
Payer: COMMERCIAL

## 2023-07-21 ENCOUNTER — APPOINTMENT (EMERGENCY)
Dept: RADIOLOGY | Facility: HOSPITAL | Age: 73
End: 2023-07-21
Payer: COMMERCIAL

## 2023-07-21 DIAGNOSIS — R11.2 NAUSEA & VOMITING: ICD-10-CM

## 2023-07-21 DIAGNOSIS — Z79.4 TYPE 2 DIABETES MELLITUS WITH HYPERGLYCEMIA, WITH LONG-TERM CURRENT USE OF INSULIN (HCC): Chronic | ICD-10-CM

## 2023-07-21 DIAGNOSIS — R79.89 ELEVATED LACTIC ACID LEVEL: ICD-10-CM

## 2023-07-21 DIAGNOSIS — N39.0 UTI (URINARY TRACT INFECTION): ICD-10-CM

## 2023-07-21 DIAGNOSIS — I10 BENIGN ESSENTIAL HYPERTENSION: ICD-10-CM

## 2023-07-21 DIAGNOSIS — N30.90 CYSTITIS: ICD-10-CM

## 2023-07-21 DIAGNOSIS — R55 NEAR SYNCOPE: Primary | ICD-10-CM

## 2023-07-21 DIAGNOSIS — E11.65 TYPE 2 DIABETES MELLITUS WITH HYPERGLYCEMIA, WITH LONG-TERM CURRENT USE OF INSULIN (HCC): Chronic | ICD-10-CM

## 2023-07-21 LAB
ALBUMIN SERPL BCP-MCNC: 3.9 G/DL (ref 3.5–5)
ALP SERPL-CCNC: 72 U/L (ref 34–104)
ALT SERPL W P-5'-P-CCNC: 13 U/L (ref 7–52)
AMMONIA PLAS-SCNC: 23 UMOL/L (ref 18–72)
ANION GAP SERPL CALCULATED.3IONS-SCNC: 9 MMOL/L
APAP SERPL-MCNC: <10 UG/ML (ref 10–20)
APTT PPP: 31 SECONDS (ref 23–37)
AST SERPL W P-5'-P-CCNC: 16 U/L (ref 13–39)
BASOPHILS # BLD AUTO: 0.06 THOUSANDS/ÂΜL (ref 0–0.1)
BASOPHILS NFR BLD AUTO: 1 % (ref 0–1)
BILIRUB DIRECT SERPL-MCNC: 0.05 MG/DL (ref 0–0.2)
BILIRUB SERPL-MCNC: 0.27 MG/DL (ref 0.2–1)
BUN SERPL-MCNC: 24 MG/DL (ref 5–25)
CALCIUM SERPL-MCNC: 9 MG/DL (ref 8.4–10.2)
CHLORIDE SERPL-SCNC: 108 MMOL/L (ref 96–108)
CO2 SERPL-SCNC: 25 MMOL/L (ref 21–32)
CREAT SERPL-MCNC: 1.09 MG/DL (ref 0.6–1.3)
EOSINOPHIL # BLD AUTO: 0.21 THOUSAND/ÂΜL (ref 0–0.61)
EOSINOPHIL NFR BLD AUTO: 3 % (ref 0–6)
ERYTHROCYTE [DISTWIDTH] IN BLOOD BY AUTOMATED COUNT: 13.3 % (ref 11.6–15.1)
ETHANOL SERPL-MCNC: <10 MG/DL
GFR SERPL CREATININE-BSD FRML MDRD: 66 ML/MIN/1.73SQ M
GLUCOSE SERPL-MCNC: 164 MG/DL (ref 65–140)
HCT VFR BLD AUTO: 36.7 % (ref 36.5–49.3)
HGB BLD-MCNC: 11.9 G/DL (ref 12–17)
IMM GRANULOCYTES # BLD AUTO: 0.09 THOUSAND/UL (ref 0–0.2)
IMM GRANULOCYTES NFR BLD AUTO: 1 % (ref 0–2)
INR PPP: 0.99 (ref 0.84–1.19)
LIPASE SERPL-CCNC: 23 U/L (ref 11–82)
LYMPHOCYTES # BLD AUTO: 0.94 THOUSANDS/ÂΜL (ref 0.6–4.47)
LYMPHOCYTES NFR BLD AUTO: 11 % (ref 14–44)
MAGNESIUM SERPL-MCNC: 1.9 MG/DL (ref 1.9–2.7)
MCH RBC QN AUTO: 30.4 PG (ref 26.8–34.3)
MCHC RBC AUTO-ENTMCNC: 32.4 G/DL (ref 31.4–37.4)
MCV RBC AUTO: 94 FL (ref 82–98)
MONOCYTES # BLD AUTO: 0.79 THOUSAND/ÂΜL (ref 0.17–1.22)
MONOCYTES NFR BLD AUTO: 9 % (ref 4–12)
NEUTROPHILS # BLD AUTO: 6.39 THOUSANDS/ÂΜL (ref 1.85–7.62)
NEUTS SEG NFR BLD AUTO: 75 % (ref 43–75)
NRBC BLD AUTO-RTO: 0 /100 WBCS
PLATELET # BLD AUTO: 161 THOUSANDS/UL (ref 149–390)
PMV BLD AUTO: 12.3 FL (ref 8.9–12.7)
POTASSIUM SERPL-SCNC: 4.3 MMOL/L (ref 3.5–5.3)
PROT SERPL-MCNC: 6.3 G/DL (ref 6.4–8.4)
PROTHROMBIN TIME: 13.3 SECONDS (ref 11.6–14.5)
RBC # BLD AUTO: 3.91 MILLION/UL (ref 3.88–5.62)
SALICYLATES SERPL-MCNC: <5 MG/DL (ref 3–20)
SODIUM SERPL-SCNC: 142 MMOL/L (ref 135–147)
WBC # BLD AUTO: 8.48 THOUSAND/UL (ref 4.31–10.16)

## 2023-07-21 PROCEDURE — 80143 DRUG ASSAY ACETAMINOPHEN: CPT

## 2023-07-21 PROCEDURE — 36415 COLL VENOUS BLD VENIPUNCTURE: CPT

## 2023-07-21 PROCEDURE — 71045 X-RAY EXAM CHEST 1 VIEW: CPT

## 2023-07-21 PROCEDURE — 80179 DRUG ASSAY SALICYLATE: CPT

## 2023-07-21 PROCEDURE — 85610 PROTHROMBIN TIME: CPT

## 2023-07-21 PROCEDURE — 99284 EMERGENCY DEPT VISIT MOD MDM: CPT

## 2023-07-21 PROCEDURE — 84484 ASSAY OF TROPONIN QUANT: CPT

## 2023-07-21 PROCEDURE — 80076 HEPATIC FUNCTION PANEL: CPT

## 2023-07-21 PROCEDURE — 83690 ASSAY OF LIPASE: CPT

## 2023-07-21 PROCEDURE — 93005 ELECTROCARDIOGRAM TRACING: CPT

## 2023-07-21 PROCEDURE — 83735 ASSAY OF MAGNESIUM: CPT

## 2023-07-21 PROCEDURE — 85730 THROMBOPLASTIN TIME PARTIAL: CPT

## 2023-07-21 PROCEDURE — 87040 BLOOD CULTURE FOR BACTERIA: CPT

## 2023-07-21 PROCEDURE — 82140 ASSAY OF AMMONIA: CPT

## 2023-07-21 PROCEDURE — 83605 ASSAY OF LACTIC ACID: CPT

## 2023-07-21 PROCEDURE — 82077 ASSAY SPEC XCP UR&BREATH IA: CPT

## 2023-07-21 PROCEDURE — 83036 HEMOGLOBIN GLYCOSYLATED A1C: CPT | Performed by: PHYSICIAN ASSISTANT

## 2023-07-21 PROCEDURE — 82948 REAGENT STRIP/BLOOD GLUCOSE: CPT

## 2023-07-21 PROCEDURE — 85025 COMPLETE CBC W/AUTO DIFF WBC: CPT

## 2023-07-21 PROCEDURE — 80048 BASIC METABOLIC PNL TOTAL CA: CPT

## 2023-07-21 RX ORDER — ONDANSETRON 2 MG/ML
1 INJECTION INTRAMUSCULAR; INTRAVENOUS ONCE
Status: COMPLETED | OUTPATIENT
Start: 2023-07-21 | End: 2023-07-21

## 2023-07-22 VITALS
RESPIRATION RATE: 18 BRPM | TEMPERATURE: 97.6 F | HEART RATE: 54 BPM | SYSTOLIC BLOOD PRESSURE: 138 MMHG | OXYGEN SATURATION: 97 % | DIASTOLIC BLOOD PRESSURE: 62 MMHG | WEIGHT: 229 LBS | BODY MASS INDEX: 35.87 KG/M2

## 2023-07-22 PROBLEM — N39.0 UTI (URINARY TRACT INFECTION): Status: ACTIVE | Noted: 2023-07-22

## 2023-07-22 PROBLEM — E87.20 LACTIC ACIDOSIS: Status: ACTIVE | Noted: 2023-07-22

## 2023-07-22 PROBLEM — R55 NEAR SYNCOPE: Status: ACTIVE | Noted: 2023-07-22

## 2023-07-22 PROBLEM — E87.20 LACTIC ACIDOSIS: Status: RESOLVED | Noted: 2023-07-22 | Resolved: 2023-07-22

## 2023-07-22 LAB
2HR DELTA HS TROPONIN: 0 NG/L
4HR DELTA HS TROPONIN: 0 NG/L
AMORPH URATE CRY URNS QL MICRO: ABNORMAL
ANION GAP SERPL CALCULATED.3IONS-SCNC: 7 MMOL/L
BACTERIA UR QL AUTO: ABNORMAL /HPF
BASOPHILS # BLD MANUAL: 0 THOUSAND/UL (ref 0–0.1)
BASOPHILS NFR MAR MANUAL: 0 % (ref 0–1)
BILIRUB UR QL STRIP: NEGATIVE
BUN SERPL-MCNC: 24 MG/DL (ref 5–25)
CALCIUM SERPL-MCNC: 8.5 MG/DL (ref 8.4–10.2)
CARDIAC TROPONIN I PNL SERPL HS: 3 NG/L
CHLORIDE SERPL-SCNC: 108 MMOL/L (ref 96–108)
CLARITY UR: ABNORMAL
CO2 SERPL-SCNC: 24 MMOL/L (ref 21–32)
COLOR UR: YELLOW
CREAT SERPL-MCNC: 0.9 MG/DL (ref 0.6–1.3)
EOSINOPHIL # BLD MANUAL: 0.08 THOUSAND/UL (ref 0–0.4)
EOSINOPHIL NFR BLD MANUAL: 1 % (ref 0–6)
ERYTHROCYTE [DISTWIDTH] IN BLOOD BY AUTOMATED COUNT: 13.2 % (ref 11.6–15.1)
EST. AVERAGE GLUCOSE BLD GHB EST-MCNC: 148 MG/DL
FERRITIN SERPL-MCNC: 40 NG/ML (ref 24–336)
GFR SERPL CREATININE-BSD FRML MDRD: 84 ML/MIN/1.73SQ M
GLUCOSE SERPL-MCNC: 166 MG/DL (ref 65–140)
GLUCOSE SERPL-MCNC: 239 MG/DL (ref 65–140)
GLUCOSE SERPL-MCNC: 244 MG/DL (ref 65–140)
GLUCOSE SERPL-MCNC: 84 MG/DL (ref 65–140)
GLUCOSE UR STRIP-MCNC: NEGATIVE MG/DL
HBA1C MFR BLD: 6.8 %
HCT VFR BLD AUTO: 34.7 % (ref 36.5–49.3)
HELMET CELLS BLD QL SMEAR: PRESENT
HGB BLD-MCNC: 11 G/DL (ref 12–17)
HGB UR QL STRIP.AUTO: NEGATIVE
IRON SATN MFR SERPL: 10 % (ref 20–50)
IRON SERPL-MCNC: 26 UG/DL (ref 65–175)
KETONES UR STRIP-MCNC: ABNORMAL MG/DL
LACTATE SERPL-SCNC: 1.4 MMOL/L (ref 0.5–2)
LACTATE SERPL-SCNC: 2.8 MMOL/L (ref 0.5–2)
LACTATE SERPL-SCNC: 2.8 MMOL/L (ref 0.5–2)
LEUKOCYTE ESTERASE UR QL STRIP: ABNORMAL
LG PLATELETS BLD QL SMEAR: PRESENT
LYMPHOCYTES # BLD AUTO: 0.4 THOUSAND/UL (ref 0.6–4.47)
LYMPHOCYTES # BLD AUTO: 3 % (ref 14–44)
MCH RBC QN AUTO: 29.7 PG (ref 26.8–34.3)
MCHC RBC AUTO-ENTMCNC: 31.7 G/DL (ref 31.4–37.4)
MCV RBC AUTO: 94 FL (ref 82–98)
METAMYELOCYTES NFR BLD MANUAL: 1 % (ref 0–1)
MONOCYTES # BLD AUTO: 0.08 THOUSAND/UL (ref 0–1.22)
MONOCYTES NFR BLD: 1 % (ref 4–12)
MUCOUS THREADS UR QL AUTO: ABNORMAL
NEUTROPHILS # BLD MANUAL: 7.36 THOUSAND/UL (ref 1.85–7.62)
NEUTS BAND NFR BLD MANUAL: 4 % (ref 0–8)
NEUTS SEG NFR BLD AUTO: 88 % (ref 43–75)
NITRITE UR QL STRIP: NEGATIVE
NON-SQ EPI CELLS URNS QL MICRO: ABNORMAL /HPF
OVALOCYTES BLD QL SMEAR: PRESENT
PH UR STRIP.AUTO: 5.5 [PH]
PLATELET # BLD AUTO: 132 THOUSANDS/UL (ref 149–390)
PLATELET BLD QL SMEAR: ADEQUATE
PMV BLD AUTO: 12.2 FL (ref 8.9–12.7)
POTASSIUM SERPL-SCNC: 4.5 MMOL/L (ref 3.5–5.3)
PROT UR STRIP-MCNC: ABNORMAL MG/DL
RBC # BLD AUTO: 3.7 MILLION/UL (ref 3.88–5.62)
RBC #/AREA URNS AUTO: ABNORMAL /HPF
RBC MORPH BLD: PRESENT
SMUDGE CELLS BLD QL SMEAR: PRESENT
SODIUM SERPL-SCNC: 139 MMOL/L (ref 135–147)
SP GR UR STRIP.AUTO: 1.03 (ref 1–1.03)
TIBC SERPL-MCNC: 266 UG/DL (ref 250–450)
UROBILINOGEN UR STRIP-ACNC: 3 MG/DL
VARIANT LYMPHS # BLD AUTO: 2 %
WBC # BLD AUTO: 8 THOUSAND/UL (ref 4.31–10.16)
WBC #/AREA URNS AUTO: ABNORMAL /HPF

## 2023-07-22 PROCEDURE — 87086 URINE CULTURE/COLONY COUNT: CPT

## 2023-07-22 PROCEDURE — 83605 ASSAY OF LACTIC ACID: CPT | Performed by: STUDENT IN AN ORGANIZED HEALTH CARE EDUCATION/TRAINING PROGRAM

## 2023-07-22 PROCEDURE — 81001 URINALYSIS AUTO W/SCOPE: CPT

## 2023-07-22 PROCEDURE — 87147 CULTURE TYPE IMMUNOLOGIC: CPT

## 2023-07-22 PROCEDURE — 84484 ASSAY OF TROPONIN QUANT: CPT | Performed by: INTERNAL MEDICINE

## 2023-07-22 PROCEDURE — 99236 HOSP IP/OBS SAME DATE HI 85: CPT | Performed by: INTERNAL MEDICINE

## 2023-07-22 PROCEDURE — 84484 ASSAY OF TROPONIN QUANT: CPT

## 2023-07-22 PROCEDURE — 36415 COLL VENOUS BLD VENIPUNCTURE: CPT

## 2023-07-22 PROCEDURE — 96374 THER/PROPH/DIAG INJ IV PUSH: CPT

## 2023-07-22 PROCEDURE — 83605 ASSAY OF LACTIC ACID: CPT

## 2023-07-22 PROCEDURE — 85027 COMPLETE CBC AUTOMATED: CPT | Performed by: PHYSICIAN ASSISTANT

## 2023-07-22 PROCEDURE — 83540 ASSAY OF IRON: CPT

## 2023-07-22 PROCEDURE — 82948 REAGENT STRIP/BLOOD GLUCOSE: CPT

## 2023-07-22 PROCEDURE — 80048 BASIC METABOLIC PNL TOTAL CA: CPT | Performed by: PHYSICIAN ASSISTANT

## 2023-07-22 PROCEDURE — 87077 CULTURE AEROBIC IDENTIFY: CPT

## 2023-07-22 PROCEDURE — 82728 ASSAY OF FERRITIN: CPT

## 2023-07-22 PROCEDURE — 83550 IRON BINDING TEST: CPT

## 2023-07-22 PROCEDURE — 85007 BL SMEAR W/DIFF WBC COUNT: CPT | Performed by: PHYSICIAN ASSISTANT

## 2023-07-22 RX ORDER — ENOXAPARIN SODIUM 100 MG/ML
40 INJECTION SUBCUTANEOUS DAILY
Status: DISCONTINUED | OUTPATIENT
Start: 2023-07-22 | End: 2023-07-22 | Stop reason: HOSPADM

## 2023-07-22 RX ORDER — PANTOPRAZOLE SODIUM 40 MG/1
40 TABLET, DELAYED RELEASE ORAL DAILY
Status: DISCONTINUED | OUTPATIENT
Start: 2023-07-22 | End: 2023-07-22 | Stop reason: HOSPADM

## 2023-07-22 RX ORDER — INSULIN ASPART 100 [IU]/ML
10 INJECTION, SUSPENSION SUBCUTANEOUS
Status: DISCONTINUED | OUTPATIENT
Start: 2023-07-22 | End: 2023-07-22 | Stop reason: HOSPADM

## 2023-07-22 RX ORDER — ATORVASTATIN CALCIUM 10 MG/1
10 TABLET, FILM COATED ORAL DAILY
Status: DISCONTINUED | OUTPATIENT
Start: 2023-07-22 | End: 2023-07-22 | Stop reason: HOSPADM

## 2023-07-22 RX ORDER — INSULIN ASPART 100 [IU]/ML
15 INJECTION, SUSPENSION SUBCUTANEOUS
Qty: 9 ML | Refills: 0
Start: 2023-07-23 | End: 2023-09-21

## 2023-07-22 RX ORDER — INSULIN ASPART 100 [IU]/ML
10 INJECTION, SUSPENSION SUBCUTANEOUS
Qty: 6 ML | Refills: 0
Start: 2023-07-22 | End: 2023-09-20

## 2023-07-22 RX ORDER — INSULIN LISPRO 100 [IU]/ML
1-6 INJECTION, SOLUTION INTRAVENOUS; SUBCUTANEOUS
Status: DISCONTINUED | OUTPATIENT
Start: 2023-07-22 | End: 2023-07-22 | Stop reason: HOSPADM

## 2023-07-22 RX ORDER — CEPHALEXIN 500 MG/1
500 CAPSULE ORAL EVERY 6 HOURS SCHEDULED
Qty: 22 CAPSULE | Refills: 0 | Status: SHIPPED | OUTPATIENT
Start: 2023-07-22 | End: 2023-07-28

## 2023-07-22 RX ORDER — CEPHALEXIN 500 MG/1
500 CAPSULE ORAL EVERY 6 HOURS SCHEDULED
Status: DISCONTINUED | OUTPATIENT
Start: 2023-07-22 | End: 2023-07-22 | Stop reason: HOSPADM

## 2023-07-22 RX ORDER — ACETAMINOPHEN 325 MG/1
650 TABLET ORAL EVERY 6 HOURS PRN
Status: DISCONTINUED | OUTPATIENT
Start: 2023-07-22 | End: 2023-07-22 | Stop reason: HOSPADM

## 2023-07-22 RX ORDER — ALBUTEROL SULFATE 90 UG/1
2 AEROSOL, METERED RESPIRATORY (INHALATION) EVERY 6 HOURS PRN
Status: DISCONTINUED | OUTPATIENT
Start: 2023-07-22 | End: 2023-07-22 | Stop reason: HOSPADM

## 2023-07-22 RX ORDER — ONDANSETRON 2 MG/ML
4 INJECTION INTRAMUSCULAR; INTRAVENOUS EVERY 6 HOURS PRN
Status: DISCONTINUED | OUTPATIENT
Start: 2023-07-22 | End: 2023-07-22 | Stop reason: HOSPADM

## 2023-07-22 RX ORDER — INSULIN ASPART 100 [IU]/ML
15 INJECTION, SUSPENSION SUBCUTANEOUS
Status: DISCONTINUED | OUTPATIENT
Start: 2023-07-22 | End: 2023-07-22 | Stop reason: HOSPADM

## 2023-07-22 RX ORDER — LOSARTAN POTASSIUM 25 MG/1
25 TABLET ORAL DAILY
Qty: 100 TABLET | Refills: 0
Start: 2023-07-23 | End: 2023-10-21

## 2023-07-22 RX ORDER — ONDANSETRON 2 MG/ML
4 INJECTION INTRAMUSCULAR; INTRAVENOUS ONCE AS NEEDED
Status: DISCONTINUED | OUTPATIENT
Start: 2023-07-22 | End: 2023-07-22

## 2023-07-22 RX ORDER — INSULIN ASPART 100 [IU]/ML
15 INJECTION, SUSPENSION SUBCUTANEOUS
Qty: 9 ML | Refills: 0 | Status: SHIPPED | OUTPATIENT
Start: 2023-07-23 | End: 2023-07-22 | Stop reason: SDUPTHER

## 2023-07-22 RX ORDER — SODIUM CHLORIDE 9 MG/ML
100 INJECTION, SOLUTION INTRAVENOUS CONTINUOUS
Status: DISCONTINUED | OUTPATIENT
Start: 2023-07-22 | End: 2023-07-22

## 2023-07-22 RX ORDER — INSULIN ASPART 100 [IU]/ML
10 INJECTION, SUSPENSION SUBCUTANEOUS
Qty: 6 ML | Refills: 0 | Status: SHIPPED | OUTPATIENT
Start: 2023-07-22 | End: 2023-07-22 | Stop reason: SDUPTHER

## 2023-07-22 RX ADMIN — SODIUM CHLORIDE 1000 ML: 0.9 INJECTION, SOLUTION INTRAVENOUS at 05:41

## 2023-07-22 RX ADMIN — ENOXAPARIN SODIUM 40 MG: 40 INJECTION SUBCUTANEOUS at 08:49

## 2023-07-22 RX ADMIN — SODIUM CHLORIDE 100 ML/HR: 0.9 INJECTION, SOLUTION INTRAVENOUS at 03:10

## 2023-07-22 RX ADMIN — CEPHALEXIN 500 MG: 500 CAPSULE ORAL at 11:50

## 2023-07-22 RX ADMIN — SODIUM CHLORIDE 1000 ML: 0.9 INJECTION, SOLUTION INTRAVENOUS at 01:34

## 2023-07-22 RX ADMIN — INSULIN ASPART 15 UNITS: 100 INJECTION, SUSPENSION SUBCUTANEOUS at 08:49

## 2023-07-22 RX ADMIN — CEFTRIAXONE SODIUM 1000 MG: 10 INJECTION, POWDER, FOR SOLUTION INTRAVENOUS at 01:34

## 2023-07-22 RX ADMIN — CEPHALEXIN 500 MG: 500 CAPSULE ORAL at 04:42

## 2023-07-22 RX ADMIN — INSULIN LISPRO 3 UNITS: 100 INJECTION, SOLUTION INTRAVENOUS; SUBCUTANEOUS at 08:49

## 2023-07-22 RX ADMIN — PANTOPRAZOLE SODIUM 40 MG: 40 TABLET, DELAYED RELEASE ORAL at 08:49

## 2023-07-22 RX ADMIN — ATORVASTATIN CALCIUM 10 MG: 10 TABLET, FILM COATED ORAL at 08:49

## 2023-07-22 RX ADMIN — SODIUM CHLORIDE 100 ML/HR: 0.9 INJECTION, SOLUTION INTRAVENOUS at 08:53

## 2023-07-22 NOTE — PLAN OF CARE
Problem: PAIN - ADULT  Goal: Verbalizes/displays adequate comfort level or baseline comfort level  Description: Interventions:  - Encourage patient to monitor pain and request assistance  - Assess pain using appropriate pain scale  - Administer analgesics based on type and severity of pain and evaluate response  - Implement non-pharmacological measures as appropriate and evaluate response  - Consider cultural and social influences on pain and pain management  - Notify physician/advanced practitioner if interventions unsuccessful or patient reports new pain  Outcome: Adequate for Discharge     Problem: INFECTION - ADULT  Goal: Absence or prevention of progression during hospitalization  Description: INTERVENTIONS:  - Assess and monitor for signs and symptoms of infection  - Monitor lab/diagnostic results  - Monitor all insertion sites, i.e. indwelling lines, tubes, and drains  - Monitor endotracheal if appropriate and nasal secretions for changes in amount and color  - [place] appropriate cooling/warming therapies per order  - Administer medications as ordered  - Instruct and encourage patient and family to use good hand hygiene technique  - Identify and instruct in appropriate isolation precautions for identified infection/condition  Outcome: Adequate for Discharge  Goal: Absence of fever/infection during neutropenic period  Description: INTERVENTIONS:  - Monitor WBC    Outcome: Adequate for Discharge     Problem: SAFETY ADULT  Goal: Patient will remain free of falls  Description: INTERVENTIONS:  - Educate patient/family on patient safety including physical limitations  - Instruct patient to call for assistance with activity   - Consult OT/PT to assist with strengthening/mobility   - Keep Call bell within reach  - Keep bed low and locked with side rails adjusted as appropriate  - Keep care items and personal belongings within reach  - Initiate and maintain comfort rounds  - Make Fall Risk Sign visible to staff  - Offer Toileting every 2 Hours, in advance of need  - Initiate/Maintain alarm  - Obtain necessary fall risk management equipment:   - Apply yellow socks and bracelet for high fall risk patients  - Consider moving patient to room near nurses station  Outcome: Adequate for Discharge  Goal: Maintain or return to baseline ADL function  Description: INTERVENTIONS:  -  Assess patient's ability to carry out ADLs; assess patient's baseline for ADL function and identify physical deficits which impact ability to perform ADLs (bathing, care of mouth/teeth, toileting, grooming, dressing, etc.)  - Assess/evaluate cause of self-care deficits   - Assess range of motion  - Assess patient's mobility; develop plan if impaired  - Assess patient's need for assistive devices and provide as appropriate  - Encourage maximum independence but intervene and supervise when necessary  - Involve family in performance of ADLs  - Assess for home care needs following discharge   - Consider OT consult to assist with ADL evaluation and planning for discharge  - Provide patient education as appropriate  Outcome: Adequate for Discharge  Goal: Maintains/Returns to pre admission functional level  Description: INTERVENTIONS:  - Perform BMAT or MOVE assessment daily.   - Set and communicate daily mobility goal to care team and patient/family/caregiver. - Collaborate with rehabilitation services on mobility goals if consulted  - Perform Range of Motion 2 times a day. - Reposition patient every 2 hours.   - Dangle patient 2 times a day  - Stand patient 2 times a day  - Ambulate patient 2 times a day  - Out of bed to chair 2 times a day   - Out of bed for meals 2 times a day  - Out of bed for toileting  - Record patient progress and toleration of activity level   Outcome: Adequate for Discharge     Problem: DISCHARGE PLANNING  Goal: Discharge to home or other facility with appropriate resources  Description: INTERVENTIONS:  - Identify barriers to discharge w/patient and caregiver  - Arrange for needed discharge resources and transportation as appropriate  - Identify discharge learning needs (meds, wound care, etc.)  - Arrange for interpretive services to assist at discharge as needed  - Refer to Case Management Department for coordinating discharge planning if the patient needs post-hospital services based on physician/advanced practitioner order or complex needs related to functional status, cognitive ability, or social support system  Outcome: Adequate for Discharge     Problem: Knowledge Deficit  Goal: Patient/family/caregiver demonstrates understanding of disease process, treatment plan, medications, and discharge instructions  Description: Complete learning assessment and assess knowledge base.   Interventions:  - Provide teaching at level of understanding  - Provide teaching via preferred learning methods  Outcome: Adequate for Discharge

## 2023-07-22 NOTE — ASSESSMENT & PLAN NOTE
· Lactic 2.8   · Does not meet sepsis criteria   · Likely 2/2 dehydration   · IVF   · Repeat q2h lactic acid until normalized

## 2023-07-22 NOTE — ED PROVIDER NOTES
History  Chief Complaint   Patient presents with   • Syncope     Pt states he was walking in his living room when he began to feel dizzy. Pt states there was no LOC pt has shauna vomiting since      51-year-old male with history of DM, HTN, HLD, obesity presents with nausea and vomiting. Patient states that he had been doing yard work all day and just had dinner and upon getting out of bed from lying for some time he began to develop a cute onset of diaphoresis, near syncope, nausea, NBNB vomiting. Patient was given Zofran by EMS with relief of vomiting but patient continued to endorse generalized weakness. Denies previous history of similar event. Denies cardiac history. Previous history of gastric bypass approximately a decade ago. Denies fevers, chills, chest pain, shortness of breath, abdominal pain, dysuria, frequency, urgency. Prior to Admission Medications   Prescriptions Last Dose Informant Patient Reported? Taking? Biotin 1000 MCG tablet  Self Yes No   Sig: Take 1 tablet by mouth daily in the early morning    Blood Glucose Monitoring Suppl (GLUCOCARD VITAL MONITOR) w/Device KIT  Self No No   Sig: by Does not apply route 2 (two) times a day   Cholecalciferol (VITAMIN D3) 2000 units capsule  Self Yes No   Sig: Take 1,000 Units by mouth daily in the early morning    Continuous Blood Gluc  (FreeStyle [place] 2 [place]) [facility]  Self No No   Sig: Check blood sugars multiple times per day   Continuous Blood Gluc Sensor (FreeStyle [place] 2 Sensor) MISC  Self No No   Sig: Check blood sugars multiple times per day   Cyanocobalamin (VITAMIN B-12) 5000 MCG TBDP  Self Yes No   Sig: Take 5,000 mcg by mouth once a week Takes on Mondays   Lancets (LIFESCAN UNISTIK 2) MISC  Self Yes No   Sig: Lifescan One Touch Ultramini Meter; use as directed; 1; 0; 31-Oct-2016; 31-Oct-2016; Melida Duran;  Active   Mirabegron ER 50 MG TB24  Self No No   Sig: Take 1 tablet (50 mg total) by mouth daily at bedtime   Multiple Vitamin (MULTIVITAMIN) capsule  Self Yes No   Sig: Take 1 capsule by mouth daily in the early morning    albuterol (ProAir HFA) 90 mcg/act inhaler  Self No No   Sig: Inhale 2 puffs every 6 (six) hours as needed for wheezing or shortness of breath   aluminum-magnesium hydroxide-simethicone (MYLANTA) 200-200-20 mg/5 mL suspension  Self No No   Sig: Take 30 mL by mouth every 6 (six) hours as needed for indigestion or heartburn   atorvastatin (LIPITOR) 10 mg tablet  Self No No   Sig: Take 1 tablet (10 mg total) by mouth daily   docusate sodium (COLACE) 100 mg capsule  Self Yes No   Sig: Take 100 mg by mouth daily as needed for constipation   fluticasone (FLONASE) 50 mcg/act nasal spray  Self No No   Si sprays into each nostril daily   fluticasone (Flovent HFA) 110 MCG/ACT inhaler   No No   Sig: Inhale 2 puffs 2 (two) times a day Rinse mouth after use. Patient taking differently: Inhale 2 puffs if needed Rinse mouth after use.    insulin aspart protamine-insulin aspart (NovoLOG Mix 70/30 FlexPen) 100 Units/mL injection pen  Self No No   Sig: INJECT 20 UNITS UNDER THE SKIN TWO TIMES A DAY WITH MEALS   losartan (COZAAR) 25 mg tablet  Self No No   Sig: Take 1 tablet (25 mg total) by mouth daily   metFORMIN (GLUCOPHAGE) 1000 MG tablet  Self No No   Sig: TAKE ONE TABLET BY MOUTH TWO TIMES A DAY WITH MEALS   pantoprazole (PROTONIX) 40 mg tablet  Self No No   Sig: Take 1 tablet (40 mg total) by mouth daily      Facility-Administered Medications: None       Past Medical History:   Diagnosis Date   • Anemia    • Arthritis    • Black stool 2020   • Cancer ([address] [facility])    • Colon polyp    • Diabetes mellitus (HCC)     IDDM   • Diverticulosis    • Enlarged prostate    • Erectile dysfunction    • Hypercholesteremia     Resolved post weight loss   • Hypertension     Resolved post weight loss   • Kidney stone    • Obesity    • Prostate cancer ([address])    • Wears partial dentures     partial upper and lower       Past Surgical History: Procedure Laterality Date   • ABDOMINAL ADHESION SURGERY N/A 11/28/2016    Procedure: EXTENSIVE LYSIS ADHESIONS;  Surgeon: Luis Ludwig MD;  [facility];  Service:    • ANKLE FRACTURE SURGERY Left    • CHOLECYSTECTOMY     • COLON SURGERY      colon resection   • COLONOSCOPY     • COLOSTOMY     • COLOSTOMY CLOSURE     • DIAGNOSTIC LAPAROSCOPY     • GASTRIC BYPASS      failed due to adhesions   • HERNIA REPAIR      abdominal   • SC CYSTO INSERTION TRANSPROSTATIC IMPLANT SINGLE N/A 3/8/2019    Procedure: CYSTOSCOPY WITH INSERTION UROLIFT;  Surgeon: Ac Herrera MD;  [facility];  Service: Urology   • SC CYSTO INSERTION TRANSPROSTATIC IMPLANT SINGLE N/A 5/8/2020    Procedure: Eugene Filomenagis WITH INSERTION Enrrique Knee;  Surgeon: Ac Herrera MD;  [facility];  Service: Urology   • SC CYSTOURETHROSCOPY W/INTERNAL URETHROTOMY N/A 5/8/2020    Procedure: DIRECT VISUAL INTERAL URETHROTOMY (DVIU), dilation of urethral stricture;  Surgeon: Ac Herrera MD;  [facility];  Service: Urology   • SC EDG US EXAM SURGICAL ALTER STOM DUODENUM/JEJUNUM N/A 10/25/2018    Procedure: LINEAR ENDOSCOPIC U/S;  Surgeon: Heath Hawkins MD;  Location: [facility]; Service: Gastroenterology   • SC ESOPHAGOGASTRODUODENOSCOPY TRANSORAL DIAGNOSTIC N/A 7/6/2016    Procedure: EGD AND COLONOSCOPY;  Surgeon: Neo Danielson MD;  [facility];   Service: Gastroenterology   • SC LAPS [address] Ramirez RSTRICTIV [address] [place] [address] LONGITUDINAL GASTRECTOMY N/A 11/28/2016    Procedure: GASTRECTOMY SLEEVE LAPAROSCOPIC;  Surgeon: Luis Ludwig MD;  [facility];  Service: Bariatrics   • SC PROSTATE NEEDLE BIOPSY ANY APPROACH N/A 5/8/2020    Procedure: TRANSRECTAL NEEDLE BIOPSY PROSTATE (TRNBP) WITH TRANSRECTAL ULTRASOUND GUIDANCE;  Surgeon: Ac Hererra MD;  [facility];  Service: Urology   • TONSILLECTOMY     • US GUIDED PROSTATE BIOPSY  5/8/2020       Family History   Problem Relation Age of Onset • Heart disease Mother    • Breast cancer Mother 80   • Diabetes Father    • Colon cancer Neg Hx    • Liver disease Neg Hx      I have reviewed and agree with the history as documented. E-Cigarette/Vaping   • E-Cigarette Use Never User      E-Cigarette/Vaping Substances   • Nicotine No    • THC No    • CBD No    • Flavoring No    • Other No    • Unknown No      Social History     Tobacco Use   • Smoking status: Former     Packs/day: 0.25     Types: Cigarettes     Quit date: 11/10/2001     Years since quittin.7     Passive exposure: Past   • Smokeless tobacco: Former   Vaping Use   • Vaping Use: Never used   Substance Use Topics   • Alcohol use: Not Currently   • Drug use: Not Currently     Comment: RSO        Review of Systems   All other systems reviewed and are negative. Physical Exam  ED Triage Vitals   Temperature Pulse Respirations Blood Pressure SpO2   23   97.9 °F (36.6 °C) 58 18 118/57 94 %      Temp Source Heart Rate Source Patient Position - Orthostatic VS BP Location FiO2 (%)   236 236 23 --   Oral Monitor Lying Right arm       Pain Score       23 0200       No Pain             Orthostatic Vital Signs  Vitals:    23 0015 23 0045 23 0145 23 0233   BP: 110/53 110/55 102/51 129/54   Pulse: (!) 54 55 61 67   Patient Position - Orthostatic VS:    Lying       Physical Exam  Vitals and nursing note reviewed. Constitutional:       General: He is in acute distress. Appearance: Normal appearance. He is obese. He is ill-appearing. He is not toxic-appearing or diaphoretic. Comments: On arrival patient is hard to arouse and only responding to painful stimuli with short responses prior to falling back asleep. Airway patent with vomitus noted on perioral region and chin. Decreased shallow respirations. HENT:      Head: Normocephalic and atraumatic. Right Ear: External ear normal.      Left Ear: External ear normal.      Nose: Nose normal.      Mouth/Throat:      Mouth: Mucous membranes are moist.      Pharynx: Oropharynx is clear. Eyes:      General: No visual field deficit or scleral icterus. Right eye: No discharge. Left eye: No discharge. Extraocular Movements: Extraocular movements intact. Cardiovascular:      Rate and Rhythm: Normal rate and regular rhythm. Pulses: Normal pulses. Heart sounds: Normal heart sounds. No murmur heard. No friction rub. No gallop. Pulmonary:      Effort: Pulmonary effort is normal. No respiratory distress. Breath sounds: Normal breath sounds. No stridor. No wheezing, rhonchi or rales. Chest:      Chest wall: No tenderness. Abdominal:      General: There is no distension. Palpations: Abdomen is soft. There is no mass. Tenderness: There is no abdominal tenderness. There is no guarding or rebound. Hernia: No hernia is present. Musculoskeletal:         General: No swelling, tenderness, deformity or signs of injury. Normal range of motion. Cervical back: Normal range of motion and neck supple. No rigidity or tenderness. Right lower leg: Edema present. Left lower leg: Edema present. Lymphadenopathy:      Cervical: No cervical adenopathy. Skin:     General: Skin is warm and dry. Capillary Refill: Capillary refill takes less than 2 seconds. Coloration: Skin is not cyanotic, jaundiced or pale. Findings: No bruising, erythema, lesion, petechiae or rash. Neurological:      General: No focal deficit present. Mental Status: He is oriented to person, place, and time. Mental status is at baseline. He is lethargic. GCS: GCS eye subscore is 4. GCS verbal subscore is 5. GCS motor subscore is 6. Cranial Nerves: Cranial nerves 2-12 are intact. No cranial nerve deficit, dysarthria or facial asymmetry.       Sensory: Sensation is intact. Motor: Motor function is intact. No weakness.          ED Medications  Medications   atorvastatin (LIPITOR) tablet 10 mg (has no administration in time range)   pantoprazole (PROTONIX) EC tablet 40 mg (has no administration in time range)   albuterol (PROVENTIL HFA,VENTOLIN HFA) inhaler 2 puff (has no administration in time range)   insulin aspart protamine-insulin aspart (NovoLOG 70/30) 100 units/mL subcutaneous injection 15 Units (has no administration in time range)   insulin aspart protamine-insulin aspart (NovoLOG 70/30) 100 units/mL subcutaneous injection 10 Units (has no administration in time range)   sodium chloride 0.9 % infusion (100 mL/hr Intravenous New Bag 7/22/23 0310)   acetaminophen (TYLENOL) tablet 650 mg (has no administration in time range)   ondansetron (ZOFRAN) injection 4 mg (has no administration in time range)   enoxaparin (LOVENOX) subcutaneous injection 40 mg (has no administration in time range)   insulin lispro (HumaLOG) 100 units/mL subcutaneous injection 1-6 Units (has no administration in time range)   insulin lispro (HumaLOG) 100 units/mL subcutaneous injection 1-6 Units (has no administration in time range)   cephalexin (KEFLEX) capsule 500 mg (500 mg Oral Not Given 7/22/23 0251)   ondansetron (FOR EMS ONLY) (ZOFRAN) 4 mg/2 mL injection 4 mg (0 mg Does not apply Given to EMS 7/21/23 2342)   ceftriaxone (ROCEPHIN) 1 g/50 mL in dextrose IVPB (0 mg Intravenous Stopped 7/22/23 0233)   sodium chloride 0.9 % bolus 1,000 mL (0 mL Intravenous Stopped 7/22/23 0239)       Diagnostic Studies  Results Reviewed     Procedure Component Value Units Date/Time    HS Troponin I 2hr [[id number]]  (Normal) Collected: 07/22/23 0140    Lab Status: Final result Specimen: Blood from Arm, Left Updated: 07/22/23 0221     hs TnI 2hr 3 ng/L      Delta 2hr hsTnI 0 ng/L     Urine Microscopic [[id number]]  (Abnormal) Collected: 07/22/23 0034    Lab Status: Final result Specimen: Urine, Clean Catch Updated: 07/22/23 0050     RBC, UA 1-2 /hpf      WBC, UA Innumerable /hpf      Epithelial Cells Occasional /hpf      Bacteria, UA Innumerable /hpf      MUCUS THREADS Moderate     Amorphous Crystals, UA Moderate    Urine culture [[id number]] Collected: 07/22/23 0034    Lab Status: In process Specimen: Urine, Clean Catch Updated: 07/22/23 0050    UA w Reflex to Microscopic w Reflex to Culture [[id number]]  (Abnormal) Collected: 07/22/23 0034    Lab Status: Final result Specimen: Urine, Clean Catch Updated: 07/22/23 0044     Color, UA Yellow     Clarity, UA Turbid     Specific Gravity, UA 1.032     pH, UA 5.5     Leukocytes, UA Large     Nitrite, UA Negative     Protein, UA 50 (1+) mg/dl      Glucose, UA Negative mg/dl      Ketones, UA Trace mg/dl      Urobilinogen, UA 3.0 mg/dl      Bilirubin, UA Negative     Occult Blood, UA Negative    HS Troponin 0hr (reflex protocol) [[id number]]  (Normal) Collected: 07/21/23 2323    Lab Status: Final result Specimen: Blood from Arm, Right Updated: 07/22/23 0002     hs TnI 0hr 3 ng/L     Lactic acid, plasma (w/reflex if result > 2.0) [[id number]]  (Abnormal) Collected: 07/21/23 2323    Lab Status: Final result Specimen: Blood from Arm, Right Updated: 07/22/23 0000     LACTIC ACID 2.8 mmol/L     Narrative:      Result may be elevated if tourniquet was used during collection.     Lactic acid 2 Hours [[id number]]     Lab Status: No result Specimen: Blood     Basic metabolic panel [[id number]]  (Abnormal) Collected: 07/21/23 2323    Lab Status: Final result Specimen: Blood from Arm, Right Updated: 07/21/23 2359     Sodium 142 mmol/L      Potassium 4.3 mmol/L      Chloride 108 mmol/L      CO2 25 mmol/L      ANION GAP 9 mmol/L      BUN 24 mg/dL      Creatinine 1.09 mg/dL      Glucose 164 mg/dL      Calcium 9.0 mg/dL      eGFR 66 ml/min/1.73sq m     Narrative:      [place] guidelines for Chronic Kidney Disease (CKD):   •  Stage 1 with normal or high GFR (GFR > 90 mL/min/1.73 square meters)  •  Stage 2 Mild CKD (GFR = 60-89 mL/min/1.73 square meters)  •  Stage 3A Moderate CKD (GFR = 45-59 mL/min/1.73 square meters)  •  Stage 3B Moderate CKD (GFR = 30-44 mL/min/1.73 square meters)  •  Stage 4 Severe CKD (GFR = 15-29 mL/min/1.73 square meters)  •  Stage 5 End Stage CKD (GFR <15 mL/min/1.73 square meters)  Note: GFR calculation is accurate only with a steady state creatinine    Hepatic function panel [[id number]]  (Abnormal) Collected: 07/21/23 2323    Lab Status: Final result Specimen: Blood from Arm, Right Updated: 07/21/23 2359     Total Bilirubin 0.27 mg/dL      Bilirubin, Direct 0.05 mg/dL      Alkaline Phosphatase 72 U/L      AST 16 U/L      ALT 13 U/L      Total Protein 6.3 g/dL      Albumin 3.9 g/dL     Lipase [[id number]]  (Normal) Collected: 07/21/23 2323    Lab Status: Final result Specimen: Blood from Arm, Right Updated: 07/21/23 2359     Lipase 23 u/L     Magnesium [[id number]]  (Normal) Collected: 07/21/23 2323    Lab Status: Final result Specimen: Blood from Arm, Right Updated: 07/21/23 2359     Magnesium 1.9 mg/dL     Salicylate level [[id number]]  (Normal) Collected: 07/21/23 2323    Lab Status: Final result Specimen: Blood from Arm, Right Updated: 72/99/44 5071     Salicylate Lvl <5 mg/dL     Acetaminophen level-If concentration is detectable, please discuss with medical  on call.  [[id number]]  (Abnormal) Collected: 07/21/23 2323    Lab Status: Final result Specimen: Blood from Arm, Right Updated: 07/21/23 2359     Acetaminophen Level <10 ug/mL     Ammonia [[id number]]  (Normal) Collected: 07/21/23 2323    Lab Status: Final result Specimen: Blood from Arm, Right Updated: 07/21/23 2353     Ammonia 23 umol/L     Ethanol [[id number]]  (Normal) Collected: 07/21/23 2323    Lab Status: Final result Specimen: Blood from Arm, Right Updated: 07/21/23 2352     Ethanol Lvl <10 mg/dL     Protime-INR [[id number]]  (Normal) Collected: 07/21/23 4686    Lab Status: Final result Specimen: Blood from Arm, Right Updated: 07/21/23 2351     Protime 13.3 seconds      INR 0.99    APTT [[id number]]  (Normal) Collected: 07/21/23 2323    Lab Status: Final result Specimen: Blood from Arm, Right Updated: 07/21/23 2351     PTT 31 seconds     CBC and differential [[id number]]  (Abnormal) Collected: 07/21/23 2323    Lab Status: Final result Specimen: Blood from Arm, Right Updated: 07/21/23 2336     WBC 8.48 Thousand/uL      RBC 3.91 Million/uL      Hemoglobin 11.9 g/dL      Hematocrit 36.7 %      MCV 94 fL      MCH 30.4 pg      MCHC 32.4 g/dL      RDW 13.3 %      MPV 12.3 fL      Platelets 771 Thousands/uL      nRBC 0 /100 WBCs      Neutrophils Relative 75 %      Immat GRANS % 1 %      Lymphocytes Relative 11 %      Monocytes Relative 9 %      Eosinophils Relative 3 %      Basophils Relative 1 %      Neutrophils Absolute 6.39 Thousands/µL      Immature Grans Absolute 0.09 Thousand/uL      Lymphocytes Absolute 0.94 Thousands/µL      Monocytes Absolute 0.79 Thousand/µL      Eosinophils Absolute 0.21 Thousand/µL      Basophils Absolute 0.06 Thousands/µL     Blood culture #2 [[id number]] Collected: 07/21/23 2323    Lab Status: In process Specimen: Blood from Arm, Left Updated: 07/21/23 2331    Blood culture #1 [[id number]] Collected: 07/21/23 2323    Lab Status: In process Specimen: Blood from Arm, Left Updated: 07/21/23 2331                 XR chest 1 view portable   ED Interpretation by Brittni Lawrence MD (07/22 0129)   No acute cardiopulmonary process noted. Procedures  Procedures      ED Course  ED Course as of 07/22/23 0310   Fri Jul 21, 2023   2324 EKG personally interpreted by me, sinus bradycardia rate of 56, normal RI interval, normal QRS interval, QTc 436, normal axis, no ST elevations, no ST depressions, no pathological Q waves, no STEMI, when compared to previous EKG on March 26, 2023 no significant changes were noted.    Sat Jul 22, 2023   0121 Leukocytes, UA(!): Large   6368 WBC, UA(!): Innumerable   0122 Bacteria, UA(!): Innumerable             HEART Risk Score    Flowsheet Row Most Recent Value   Heart Score Risk Calculator    History 2 Filed at: 07/22/2023 0130   ECG 0 Filed at: 07/22/2023 0130   Age 2 Filed at: 07/22/2023 0130   Risk Factors 2 Filed at: 07/22/2023 0130   Troponin 0 Filed at: 07/22/2023 0130   HEART Score 6 Filed at: 07/22/2023 0130                   Initial Sepsis Screening     Row Name 07/22/23 0124                Is the patient's history suggestive of a new or worsening infection? Yes (Proceed)  -AB        Suspected source of infection urinary tract infection  -AB        Indicate SIRS criteria --        Are two or more of the above signs & symptoms of infection both present and new to the patient? No  -AB              User Key  (r) = Recorded By, (t) = Taken By, (c) = Cosigned By    [facility] Name Provider Type    Dariel Severino MD Resident                          Medical Decision Making  61-year-old male presents with nausea and vomiting and lethargy. Differential includes but not limited to ACS, arrhythmia, hepatic encephalopathy, overdose, dehydration, electrolyte abnormality, gastroenteritis, gastritis    Labs, imaging, findings as above in ED course. Patient noted to have elevated lactic acid and UA positive for infectious process. Started on Rocephin and fluids. Reached out to internal medicine regarding UTI, pronounced lethargy, severe presentation and agreed to observation admission for further evaluation. Amount and/or Complexity of Data Reviewed  Labs: ordered. Decision-making details documented in ED Course. Radiology: ordered and independent interpretation performed. Risk  Prescription drug management. Decision regarding hospitalization.             Disposition  Final diagnoses:   Near syncope   Nausea & vomiting   Elevated lactic acid level   Cystitis     Time reflects when diagnosis was documented in both MDM as applicable and the Disposition within this note     Time User Action Codes Description Comment    7/22/2023  1:39 AM Génesis Ochoa Add [R55] Near syncope     7/22/2023  1:39 AM Génesis Ochoa Add [R11.2] Nausea & vomiting     7/22/2023  1:39 AM Génesis Ochoa Add [R79.89] Elevated lactic acid level     7/22/2023  1:39 AM Génesis Ochoa Add [N30.90] Cystitis       ED Disposition     ED Disposition   Admit    Condition   Stable    Date/Time   Sat Jul 22, 2023  1:39 AM    Comment   Case was discussed with Awais Gates and the patient's admission status was agreed to be Admission Status: observation status to the service of Dr. Ayaka Zheng . Follow-up Information    None         Current Discharge Medication List      CONTINUE these medications which have NOT CHANGED    Details   albuterol (ProAir HFA) 90 mcg/act inhaler Inhale 2 puffs every 6 (six) hours as needed for wheezing or shortness of breath  Qty: 8.5 g, Refills: 0    Comments: Substitution to a formulary equivalent within the same pharmaceutical class is authorized.   Associated Diagnoses: Mild asthma exacerbation      aluminum-magnesium hydroxide-simethicone (MYLANTA) 200-200-20 mg/5 mL suspension Take 30 mL by mouth every 6 (six) hours as needed for indigestion or heartburn  Qty: 355 mL, Refills: 0    Associated Diagnoses: Acute gastroenteritis      atorvastatin (LIPITOR) 10 mg tablet Take 1 tablet (10 mg total) by mouth daily  Qty: 100 tablet, Refills: 3    Associated Diagnoses: Type 2 diabetes mellitus with insulin therapy (HCC)      Biotin 1000 MCG tablet Take 1 tablet by mouth daily in the early morning       Blood Glucose Monitoring Suppl (GLUCOCARD VITAL MONITOR) w/Device KIT by Does not apply route 2 (two) times a day  Qty: 1 kit, Refills: 0    Associated Diagnoses: Type 2 diabetes mellitus with insulin therapy (HCC)      Cholecalciferol (VITAMIN D3) 2000 units capsule Take 1,000 Units by mouth daily in the early morning       Continuous Blood Gluc  (FreeStyle [place] 2 [place]) CHARITY Check blood sugars multiple times per day  Qty: 1 each, Refills: 0    Associated Diagnoses: Type 2 diabetes mellitus with hyperglycemia, with long-term current use of insulin ([facility])      Continuous Blood Gluc Sensor (FreeStyle Dunia 2 Sensor) MISC Check blood sugars multiple times per day  Qty: 6 each, Refills: 3    Associated Diagnoses: Type 2 diabetes mellitus with hyperglycemia, with long-term current use of insulin ([facility])      Cyanocobalamin (VITAMIN B-12) 5000 MCG TBDP Take 5,000 mcg by mouth once a week Takes on Mondays      docusate sodium (COLACE) 100 mg capsule Take 100 mg by mouth daily as needed for constipation      fluticasone (FLONASE) 50 mcg/act nasal spray 2 sprays into each nostril daily  Qty: 16 g, Refills: 0    Associated Diagnoses: Non-seasonal allergic rhinitis, unspecified trigger      fluticasone (Flovent HFA) 110 MCG/ACT inhaler Inhale 2 puffs 2 (two) times a day Rinse mouth after use. Qty: 12 g, Refills: 0    Associated Diagnoses: Mild asthma exacerbation      insulin aspart protamine-insulin aspart (NovoLOG Mix 70/30 FlexPen) 100 Units/mL injection pen INJECT 20 UNITS UNDER THE SKIN TWO TIMES A DAY WITH MEALS  Qty: 30 mL, Refills: 0    Associated Diagnoses: Type 2 diabetes mellitus with hyperglycemia, with long-term current use of insulin ([facility])      Lancets (LIFESCAN UNISTIK 2) MISC Lifescan One Touch Ultramini Meter; use as directed; 1; 0; 31-Oct-2016; 31-Oct-2016; Melida Duran;  Active      losartan (COZAAR) 25 mg tablet Take 1 tablet (25 mg total) by mouth daily  Qty: 100 tablet, Refills: 3    Associated Diagnoses: Benign essential hypertension      metFORMIN (GLUCOPHAGE) 1000 MG tablet TAKE ONE TABLET BY MOUTH TWO TIMES A DAY WITH MEALS  Qty: 180 tablet, Refills: 0    Associated Diagnoses: Type 2 diabetes mellitus with hyperglycemia, with long-term current use of insulin ([facility])      Mirabegron ER 50 MG TB24 Take 1 tablet (50 mg total) by mouth daily at bedtime  Qty: 90 tablet, Refills: 3    Associated Diagnoses: Urge incontinence of urine      Multiple Vitamin (MULTIVITAMIN) capsule Take 1 capsule by mouth daily in the early morning       pantoprazole (PROTONIX) 40 mg tablet Take 1 tablet (40 mg total) by mouth daily  Qty: 90 tablet, Refills: 0    Associated Diagnoses: Gastroesophageal reflux disease without esophagitis           No discharge procedures on file. PDMP Review       Value Time User    PDMP Reviewed  Yes 8/17/2022  4:28 PM Karlie Lake DDS           ED Provider  Attending physically available and evaluated Dixie Ritter. I managed the patient along with the ED Attending.     Electronically Signed by         Henry Nair MD  07/22/23 6973

## 2023-07-22 NOTE — ASSESSMENT & PLAN NOTE
· Patient presents with acute onset diaphoresis, near syncope, nausea/vomiting from lying to standing position after doing yard work outside all day.     · Blood pressure is low normal in the ED   · Troponin so far negative, EKG sinus bradycardia; patient without chest pain   · Suspect likely dehydration contributing to patient's symptoms, evaluate for orthostatic hypotension   · Hold antihypertensives   · Check orthostatic VS      · PLAN   · Negative orthostatics, stable for discharge  · Can resume antihypertensives tomorrow 7/23  · Encouraged to keep hydrated s

## 2023-07-22 NOTE — ED ATTENDING ATTESTATION
7/21/2023  I, Jan Flowers MD, saw and evaluated the patient. I have discussed the patient with the resident/non-physician practitioner and agree with the resident's/non-physician practitioner's findings, Plan of Care, and MDM as documented in the resident's/non-physician practitioner's note, except where noted. All available labs and Radiology studies were reviewed. I was present for key portions of any procedure(s) performed by the resident/non-physician practitioner and I was immediately available to provide assistance. At this point I agree with the current assessment done in the Emergency Department. I have conducted an independent evaluation of this patient a history and physical is as follows:    59-year-old male, presents with nausea and vomiting. Patient initially somnolent on arrival, awakens to painful stimuli, normal respiratory effort. ED Course     Results reviewed, patient to be placed on medical service for further monitoring and evaluation.     Critical Care Time  Procedures

## 2023-07-22 NOTE — ASSESSMENT & PLAN NOTE
· UA +large leuks, innumerable WBC and bacteria   · Patient endorses some mild dysuria   · Received 1x dose IV Ceftriaxone in the ED, will order keflex   · Follow up urine cx

## 2023-07-22 NOTE — DISCHARGE INSTR - AVS FIRST PAGE
Dear Kendell Emanuel,     It was our pleasure to care for you here at [facility]. It is our hope that we were always able to exceed the expected standards for your care during your stay. You were hospitalized due to dehydration and urinary tract infection. You were cared for on the [facility] 3rd floor by Trevon Arana MD under the service of Lupe Cloud MD with the [facility] who covers for your primary care physician (PCP), Katie Sainz MD, while you were hospitalized. If you have any questions or concerns related to this hospitalization, you may contact us at [phone]. For follow up as well as any medication refills, we recommend that you follow up with your primary care physician. A registered nurse will reach out to you by phone within a few days after your discharge to answer any additional questions that you may have after going home. However, at this time we provide for you here, the most important instructions / recommendations at discharge:     Notable Medication Adjustments -   Keflex 500 mg 4 times a day for 6 days  Restart Losartan 25 mg tomorrow. Measure your blood pressure prior to taking.    Testing Required after Discharge -   none  Important follow up information -   Please follow-up with your primary care physician in 1 week  Please follow-up with urology    Other Instructions -   Please endeavor to stay hydrated drink 2 L of water daily  Please measure your blood pressure at least once daily, please call your doctor if your blood pressure is below 110/50  Discussed with the ED worsening burning with urination bloody bleeding, fever and abdominal pain or vomiting  Please review this entire after visit summary as additional general instructions including medication list, appointments, activity, diet, any pertinent wound care, and other additional recommendations from your care team that may be provided for you.      Sincerely,     Vivek Ocampo MD

## 2023-07-22 NOTE — ASSESSMENT & PLAN NOTE
· Patient presents with acute onset diaphoresis, near syncope, nausea/vomiting after doing yard work all day, lying down after eating dinner, and standing up.     · Blood pressure is low normal in the ED   · Troponin so far negative, EKG sinus bradycardia; patient without chest pain   · Suspect likely dehydration contributing to patient's symptoms, evaluate for orthostatic hypotension   · Hold antihypertensives   · Check orthostatic VS   · IVF   · Monitor telemetry   · PRN antiemetics

## 2023-07-22 NOTE — ASSESSMENT & PLAN NOTE
Lab Results   Component Value Date    HGBA1C 8.3 (H) 01/13/2023       No results for input(s): "POCGLU" in the last 72 hours.     Blood Sugar Average: Last 72 hrs:     · Check updated HgbA1c   · Hold metformin   · Continue Novolog 70/30 15U with breakfast, 10U with dinner and SSI coverage   · Avoid hypoglycemia

## 2023-07-22 NOTE — PLAN OF CARE
Problem: PAIN - ADULT  Goal: Verbalizes/displays adequate comfort level or baseline comfort level  Description: Interventions:  - Encourage patient to monitor pain and request assistance  - Assess pain using appropriate pain scale  - Administer analgesics based on type and severity of pain and evaluate response  - Implement non-pharmacological measures as appropriate and evaluate response  - Consider cultural and social influences on pain and pain management  - Notify physician/advanced practitioner if interventions unsuccessful or patient reports new pain  Outcome: Progressing     Problem: INFECTION - ADULT  Goal: Absence or prevention of progression during hospitalization  Description: INTERVENTIONS:  - Assess and monitor for signs and symptoms of infection  - Monitor lab/diagnostic results  - Monitor all insertion sites, i.e. indwelling lines, tubes, and drains  - Monitor endotracheal if appropriate and nasal secretions for changes in amount and color  - Osteen appropriate cooling/warming therapies per order  - Administer medications as ordered  - Instruct and encourage patient and family to use good hand hygiene technique  - Identify and instruct in appropriate isolation precautions for identified infection/condition  Outcome: Progressing  Goal: Absence of fever/infection during neutropenic period  Description: INTERVENTIONS:  - Monitor WBC    Outcome: Progressing     Problem: SAFETY ADULT  Goal: Patient will remain free of falls  Description: INTERVENTIONS:  - Educate patient/family on patient safety including physical limitations  - Instruct patient to call for assistance with activity   - Consult OT/PT to assist with strengthening/mobility   - Keep Call bell within reach  - Keep bed low and locked with side rails adjusted as appropriate  - Keep care items and personal belongings within reach  - Initiate and maintain comfort rounds  - Make Fall Risk Sign visible to staff  - Offer Toileting every  Hours, in advance of need  - Initiate/Maintain alarm  - Obtain necessary fall risk management equipment:   - Apply yellow socks and bracelet for high fall risk patients  - Consider moving patient to room near nurses station  Outcome: Progressing  Goal: Maintain or return to baseline ADL function  Description: INTERVENTIONS:  -  Assess patient's ability to carry out ADLs; assess patient's baseline for ADL function and identify physical deficits which impact ability to perform ADLs (bathing, care of mouth/teeth, toileting, grooming, dressing, etc.)  - Assess/evaluate cause of self-care deficits   - Assess range of motion  - Assess patient's mobility; develop plan if impaired  - Assess patient's need for assistive devices and provide as appropriate  - Encourage maximum independence but intervene and supervise when necessary  - Involve family in performance of ADLs  - Assess for home care needs following discharge   - Consider OT consult to assist with ADL evaluation and planning for discharge  - Provide patient education as appropriate  Outcome: Progressing  Goal: Maintains/Returns to pre admission functional level  Description: INTERVENTIONS:  - Perform BMAT or MOVE assessment daily.   - Set and communicate daily mobility goal to care team and patient/family/caregiver. - Collaborate with rehabilitation services on mobility goals if consulted  - Perform Range of Motion  times a day. - Reposition patient every  hours.   - Dangle patient  times a day  - Stand patient  times a day  - Ambulate patient  times a day  - Out of bed to chair  times a day   - Out of bed for meal times a day  - Out of bed for toileting  - Record patient progress and toleration of activity level   Outcome: Progressing     Problem: DISCHARGE PLANNING  Goal: Discharge to home or other facility with appropriate resources  Description: INTERVENTIONS:  - Identify barriers to discharge w/patient and caregiver  - Arrange for needed discharge resources and transportation as appropriate  - Identify discharge learning needs (meds, wound care, etc.)  - Arrange for interpretive services to assist at discharge as needed  - Refer to Case Management Department for coordinating discharge planning if the patient needs post-hospital services based on physician/advanced practitioner order or complex needs related to functional status, cognitive ability, or social support system  Outcome: Progressing     Problem: Knowledge Deficit  Goal: Patient/family/caregiver demonstrates understanding of disease process, treatment plan, medications, and discharge instructions  Description: Complete learning assessment and assess knowledge base.   Interventions:  - Provide teaching at level of understanding  - Provide teaching via preferred learning methods  Outcome: Progressing

## 2023-07-22 NOTE — H&P
[facility]  H&P  Name: Randy Mathew 68 y.o. male I MRN: [id number]  Unit/Bed#: S -01 I Date of Admission: 7/21/2023   Date of Service: 7/22/2023 I Hospital Day: 0      Assessment/Plan   * Near syncope  Assessment & Plan  · Patient presents with acute onset diaphoresis, near syncope, nausea/vomiting from lying to standing position after doing yard work outside all day. · Blood pressure is low normal in the ED   · Troponin so far negative, EKG sinus bradycardia; patient without chest pain   · Suspect likely dehydration contributing to patient's symptoms, evaluate for orthostatic hypotension   · Hold antihypertensives   · Check orthostatic VS   · IVF   · Monitor telemetry   · PRN antiemetics     Lactic acidosis  Assessment & Plan  · Lactic 2.8   · Does not meet sepsis criteria   · Likely 2/2 dehydration   · IVF   · Repeat q2h lactic acid until normalized    UTI (urinary tract infection)  Assessment & Plan  · UA +large leuks, innumerable WBC and bacteria   · Patient endorses some mild dysuria   · Received 1x dose IV Ceftriaxone in the ED, will order keflex   · Follow up urine cx    Benign essential hypertension  Assessment & Plan  · Hold losartan 2/2 soft BP     Type 2 diabetes mellitus with hyperglycemia, with long-term current use of insulin (HCC)  Assessment & Plan  Lab Results   Component Value Date    HGBA1C 8.3 (H) 01/13/2023       No results for input(s): "POCGLU" in the last 72 hours. Blood Sugar Average: Last 72 hrs:     · Check updated HgbA1c   · Hold metformin   · Continue Novolog 70/30 15U with breakfast, 10U with dinner and SSI coverage   · Avoid hypoglycemia     Hypercholesteremia  Assessment & Plan  · Continue statin therapy        VTE Pharmacologic Prophylaxis: VTE Score: 3 Moderate Risk (Score 3-4) - Pharmacological DVT Prophylaxis Ordered: enoxaparin (Lovenox).   Code Status: Level 3 - DNAR and DNI     Anticipated Length of Stay: Patient will be admitted on an observation basis with an anticipated length of stay of less than 2 midnights secondary to presyncopal episode . Total Time Spent on Date of Encounter in care of patient: 40 minutes This time was spent on one or more of the following: performing physical exam; counseling and coordination of care; obtaining or reviewing history; documenting in the medical record; reviewing/ordering tests, medications or procedures; communicating with other healthcare professionals and discussing with patient's family/caregivers. Chief Complaint: sudden onset diaphoresis, nausea/vomiting     History of Present Illness:  Remedios Plascencia is a 68 y.o. male with a PMH of HTN, HLD, T2DM, bariatric surgery who presents with sudden onset nausea/vomiting and diaphoresis. Patient reports that he was in his usual state of health today and was outside doing yard work all day. He states that he went to eat dinner, felt fine, and went to go lay down for a while to watch the news. He states that he got up from his bed to go talk to his wife and suddenly broke into a cold sweat and felt nauseous, lightheaded, and short of breath. States that he felt like he was going to pass out so he sat back down. He did not lose consciousness. No chest pain or palpitations. He states that he checked his sugar during this time and it was in the 130s. He did not check his blood pressure and does not regularly check his blood pressure at home. He is compliant with his medications. States that something similar to this happened a few months ago which he attributed to [address] [place] [address] which he is no longer taking for his diabetes. On my exam, patient reports feeling a lot better after getting IVF. He does however still feel a little weak. Review of Systems:  Review of Systems   Constitutional: Positive for diaphoresis. Negative for chills and fever. HENT: Negative for sore throat. Eyes: Negative for visual disturbance.    Respiratory: Positive for shortness of breath. Cardiovascular: Negative for chest pain and palpitations. Gastrointestinal: Positive for nausea and vomiting. Negative for abdominal pain. Genitourinary: Positive for dysuria. Neurological: Positive for weakness and light-headedness. Negative for tremors, syncope, facial asymmetry, speech difficulty and numbness. Psychiatric/Behavioral: Negative for confusion.        Past Medical and Surgical History:   Past Medical History:   Diagnosis Date   • Anemia    • Arthritis    • Black stool 07/24/2020   • Cancer ([address])    • Colon polyp    • Diabetes mellitus (HCC)     IDDM   • Diverticulosis    • Enlarged prostate    • Erectile dysfunction    • Hypercholesteremia     Resolved post weight loss   • Hypertension     Resolved post weight loss   • Kidney stone    • Obesity    • Prostate cancer ([address])    • Wears partial dentures     partial upper and lower       Past Surgical History:   Procedure Laterality Date   • ABDOMINAL ADHESION SURGERY N/A 11/28/2016    Procedure: EXTENSIVE LYSIS ADHESIONS;  Surgeon: Oskar Hopkins MD;  [facility];  Service:    • ANKLE FRACTURE SURGERY Left    • CHOLECYSTECTOMY     • COLON SURGERY      colon resection   • COLONOSCOPY     • COLOSTOMY     • COLOSTOMY CLOSURE     • DIAGNOSTIC LAPAROSCOPY     • GASTRIC BYPASS      failed due to adhesions   • HERNIA REPAIR      abdominal   • FL CYSTO INSERTION TRANSPROSTATIC IMPLANT SINGLE N/A 3/8/2019    Procedure: CYSTOSCOPY WITH INSERTION Andrew Sprmarcia;  Surgeon: Nikita Eddy MD;  [facility];  Service: Urology   • FL CYSTO INSERTION TRANSPROSTATIC IMPLANT SINGLE N/A 5/8/2020    Procedure: Sammuel Clamp WITH INSERTION Andrew Spruce;  Surgeon: Nikita Eddy MD;  [facility];  Service: Urology   • FL CYSTOURETHROSCOPY W/INTERNAL URETHROTOMY N/A 5/8/2020    Procedure: DIRECT VISUAL INTERAL URETHROTOMY (DVIU), dilation of urethral stricture;  Surgeon: Nikita Eddy MD;  [facility];  Service: Urology   • UT EDG US EXAM SURGICAL ALTER STOM DUODENUM/JEJUNUM N/A 10/25/2018    Procedure: LINEAR ENDOSCOPIC U/S;  Surgeon: Kratik Diaz MD;  [facility]: [facility]; Service: Gastroenterology   • UT ESOPHAGOGASTRODUODENOSCOPY TRANSORAL DIAGNOSTIC N/A 7/6/2016    Procedure: EGD AND COLONOSCOPY;  Surgeon: Dirk Montenegro MD;  [facility]; Service: Gastroenterology   • UT LAPS [address] RSTRICTIV [facility] LONGITUDINAL GASTRECTOMY N/A 11/28/2016    Procedure: GASTRECTOMY SLEEVE LAPAROSCOPIC;  Surgeon: Raji Kebede MD;  [facility];  Service: Bariatrics   • UT PROSTATE NEEDLE BIOPSY ANY APPROACH N/A 5/8/2020    Procedure: TRANSRECTAL NEEDLE BIOPSY PROSTATE (TRNBP) WITH TRANSRECTAL ULTRASOUND GUIDANCE;  Surgeon: Katina Davalos MD;  [facility];  Service: Urology   • TONSILLECTOMY     • US GUIDED PROSTATE BIOPSY  5/8/2020       Meds/Allergies:  Prior to Admission medications    Medication Sig Start Date End Date Taking?  Authorizing Provider   albuterol (ProAir HFA) 90 mcg/act inhaler Inhale 2 puffs every 6 (six) hours as needed for wheezing or shortness of breath 5/14/22   YVONNE Martin   aluminum-magnesium hydroxide-simethicone (MYLANTA) 200-200-20 mg/5 mL suspension Take 30 mL by mouth every 6 (six) hours as needed for indigestion or heartburn 6/29/21   Tereso Doan PA-C   atorvastatin (LIPITOR) 10 mg tablet Take 1 tablet (10 mg total) by mouth daily 8/10/22   Melida Duran MD   Biotin 1000 MCG tablet Take 1 tablet by mouth daily in the early morning     Historical Provider, MD   Blood Glucose Monitoring Suppl ([address] [place] [address]) w/Device KIT by Does not apply route 2 (two) times a day 5/14/19   Yolande Lake MD   Cholecalciferol (VITAMIN D3) 2000 units capsule Take 1,000 Units by mouth daily in the early morning     Historical Provider, MD   Continuous Blood Gluc  (FreeStyle [place] 2 [place]) CHARITY Check blood sugars multiple times per day 3/31/23   Yolande Lake MD Continuous Blood Gluc Sensor (FreeStyle [place] 2 Sensor) MISC Check blood sugars multiple times per day 3/31/23   Radha Corrales MD   Cyanocobalamin (VITAMIN B-12) 5000 MCG TBDP Take 5,000 mcg by mouth once a week Takes on Mondays    Historical Provider, MD   docusate sodium (COLACE) 100 mg capsule Take 100 mg by mouth daily as needed for constipation    Historical Provider, MD   fluticasone (FLONASE) 50 mcg/act nasal spray 2 sprays into each nostril daily 7/20/22   Melida Duran MD   fluticasone (Flovent HFA) 110 MCG/ACT inhaler Inhale 2 puffs 2 (two) times a day Rinse mouth after use. Patient taking differently: Inhale 2 puffs if needed Rinse mouth after use. 6/24/22 3/29/23  Melida Duran MD   insulin aspart protamine-insulin aspart (NovoLOG Mix 70/30 FlexPen) 100 Units/mL injection pen INJECT 20 UNITS UNDER THE SKIN TWO TIMES A DAY WITH MEALS 4/13/23   Tracy Hooper MD   Lancets (LIFESCAN UNISTIK 2) MISC Lifescan One Touch Ultramini Meter; use as directed; 1; 0; 31-Oct-2016; 31-Oct-2016; Radha Corrales; Active 10/31/16   Historical Provider, MD   losartan (COZAAR) 25 mg tablet Take 1 tablet (25 mg total) by mouth daily 8/10/22 3/29/23  Melida Duran MD   metFORMIN (GLUCOPHAGE) 1000 MG tablet TAKE ONE TABLET BY MOUTH TWO TIMES A DAY WITH MEALS 5/31/23   Melida Duran MD   Mirabegron ER 50 MG TB24 Take 1 tablet (50 mg total) by mouth daily at bedtime 1/18/23   Jane Palacios PA-C   Multiple Vitamin (MULTIVITAMIN) capsule Take 1 capsule by mouth daily in the early morning     Historical Provider, MD   pantoprazole (PROTONIX) 40 mg tablet Take 1 tablet (40 mg total) by mouth daily 3/29/23   Radha Corrales MD     I have reviewed home medications with patient personally. Allergies:    Allergies   Allergen Reactions   • Enalapril Anaphylaxis, Throat Swelling and Hives       Social History:  Marital Status: Single   Patient Pre-hospital Living Situation: Home  Patient Pre-hospital Level of Mobility: walks  Patient Pre-hospital Diet Restrictions: diabetic   Substance Use History:   Social History     Substance and Sexual Activity   Alcohol Use Not Currently     Social History     Tobacco Use   Smoking Status Former   • Packs/day: 0.25   • Types: Cigarettes   • Quit date: 11/10/2001   • Years since quittin.7   • Passive exposure: Past   Smokeless Tobacco Former     Social History     Substance and Sexual Activity   Drug Use Not Currently    Comment: RSO       Family History:  Family History   Problem Relation Age of Onset   • Heart disease Mother    • Breast cancer Mother 80   • Diabetes Father    • Colon cancer Neg Hx    • Liver disease Neg Hx        Physical Exam:     Vitals:   Blood Pressure: 129/54 (23)  Pulse: 67 (23)  Temperature: (!) 97.2 °F (36.2 °C) (23)  Temp Source: Oral (23)  Respirations: 17 (23)  Weight - Scale: 104 kg (229 lb) (23)  SpO2: 96 % (23)    Physical Exam  Constitutional:       General: He is not in acute distress. Appearance: He is not ill-appearing, toxic-appearing or diaphoretic. HENT:      Mouth/Throat:      Mouth: Mucous membranes are dry. Eyes:      General: No scleral icterus. Cardiovascular:      Rate and Rhythm: Normal rate and regular rhythm. Heart sounds: Normal heart sounds. Pulmonary:      Effort: No respiratory distress. Breath sounds: Normal breath sounds. No wheezing, rhonchi or rales. Abdominal:      General: Abdomen is flat. Bowel sounds are normal. There is no distension. Palpations: Abdomen is soft. Tenderness: There is no abdominal tenderness. Musculoskeletal:      Right lower leg: No edema. Left lower leg: No edema. Skin:     General: Skin is warm and dry. Neurological:      General: No focal deficit present. Mental Status: He is alert and oriented to person, place, and time.    Psychiatric:         Mood and Affect: Mood normal. Additional Data:     Lab Results:  Results from last 7 days   Lab Units 07/21/23  2323   WBC Thousand/uL 8.48   HEMOGLOBIN g/dL 11.9*   HEMATOCRIT % 36.7   PLATELETS Thousands/uL 161   NEUTROS PCT % 75   LYMPHS PCT % 11*   MONOS PCT % 9   EOS PCT % 3     Results from last 7 days   Lab Units 07/21/23  2323   SODIUM mmol/L 142   POTASSIUM mmol/L 4.3   CHLORIDE mmol/L 108   CO2 mmol/L 25   BUN mg/dL 24   CREATININE mg/dL 1.09   ANION GAP mmol/L 9   CALCIUM mg/dL 9.0   ALBUMIN g/dL 3.9   TOTAL BILIRUBIN mg/dL 0.27   ALK PHOS U/L 72   ALT U/L 13   AST U/L 16   GLUCOSE RANDOM mg/dL 164*     Results from last 7 days   Lab Units 07/21/23  2323   INR  0.99             Results from last 7 days   Lab Units 07/21/23  2323   LACTIC ACID mmol/L 2.8*       Lines/Drains:  Invasive Devices     Peripheral Intravenous Line  Duration           Peripheral IV 07/21/23 Distal;Right;Upper;Ventral (anterior) Antecubital <1 day                    Imaging: Personally reviewed the following imaging: chest xray  XR chest 1 view portable   ED Interpretation by Aleja Prabhakar MD (07/22 0129)   No acute cardiopulmonary process noted. EKG and Other Studies Reviewed on Admission:   · EKG: sinus bradycardia HR 56.    ** Please Note: This note has been constructed using a voice recognition system.  **

## 2023-07-22 NOTE — UTILIZATION REVIEW
Initial Clinical Review  ED CARE 7/21/2023  OBS ORDER 7/22/2023 0141      Admission: Date/Time/Statement:   Admission Orders (From admission, onward)     Ordered        07/22/23 0141  Place in Observation  Once                      Orders Placed This Encounter   Procedures   • Place in Observation     Standing Status:   Standing     Number of Occurrences:   1     Order Specific Question:   Level of Care     Answer:   Med Surg [16]     ED Arrival Information     Expected   -    Arrival   7/21/2023 23:02    Acuity   Emergent            Means of arrival   Ambulance    Escorted by   [facility]    Service   Hospitalist    Admission type   Emergency            Arrival complaint   n/v             Chief Complaint   Patient presents with   • Syncope     Pt states he was walking in his living room when he began to feel dizzy. Pt states there was no LOC pt has shauna vomiting since        Initial Presentation: 68 y.o. male PMH HTN, HLD, T2DM, bariatric surgery to ED by EMS  with sudden onset nausea/vomiting and diaphoresis. Patient reports in baseline health today, outside doing yard work all day. States that he went to eat dinner, felt fine, and went to go lay down for a while to watch the news. He states that he got up from his bed to go talk to his wife and suddenly broke into a cold sweat and felt nauseous, lightheaded, and short of breath &  felt like he was going to pass out so he sat back down. He did not lose consciousness. Checked blood sugar during this time = 130s. Reports compliant with his medications. States that something similar to this happened a few months ago which he attributed to [address] [place] [address] which he is no longer taking for his diabetes    EXAM  Acute distress; hard to arouse & only responding to painful stimuli w short responses prior to falling back to sleep.  Airway patient w vomitus noted on perioral region, chin, shallow respirations; Sugars 160s, elevated LA,  abn UA; EKG bradycardia rate   Date: 7/22/2023 Day 2:   Observation admission due to near syncope, UTI, LA;   Hold all antiHTN, obtain orthostatic VS, tele, prn antiemetics; IVFs, repeat LA until normal; follow urine CX, s/p IV antibx cont Keflex, SSI        ED Triage Vitals   Temperature Pulse Respirations Blood Pressure SpO2   07/21/23 2321 07/21/23 2316 07/21/23 2316 07/21/23 2316 07/21/23 2316   97.9 °F (36.6 °C) 58 18 118/57 94 %      Temp Source Heart Rate Source Patient Position - Orthostatic VS BP Location FiO2 (%)   07/21/23 2321 07/21/23 2316 07/21/23 2316 07/21/23 2316 --   Oral Monitor Lying Right arm       Pain Score       07/22/23 0200       No Pain          Wt Readings from Last 1 Encounters:   07/22/23 104 kg (229 lb)     Additional Vital Signs:   Date/Time Temp Pulse Resp BP MAP (mmHg) SpO2 O2 Device Patient Position - Orthostatic VS   07/22/23 08:18:39 -- 54 Abnormal  -- 113/56 75 97 % -- --   07/22/23 07:38:35 97.6 °F (36.4 °C) 68 18 101/39 Abnormal  60 Abnormal  96 % -- --   07/22/23 0316 -- 63 -- 119/53 -- -- -- Standing for 3 minutes - Orthostatic VS   07/22/23 0313 -- 66 -- 117/53 -- -- -- Standing - Orthostatic VS   07/22/23 0312 -- 66 -- 108/50 -- -- -- Sitting - Orthostatic VS   07/22/23 0310 -- 61 -- 115/55 -- -- -- Lying - Orthostatic VS   07/22/23 0233 97.2 °F (36.2 °C) Abnormal  67 17 129/54 80 96 % None (Room air) Lying   07/22/23 0145 -- 61 18 102/51 73 94 % -- --   07/22/23 0045 -- 55 18 110/55 79 92 % -- --   07/22/23 0025 -- -- 18 -- -- -- -- --   07/22/23 0015 -- 54 Abnormal  18 110/53 76 92 % -- --   07/21/23 2345 -- 56 18 103/53 76 93 % -- --   07/21/23 2321 97.9 °F (36.6 °C) -- -- -- -- -- -- --   07/21/23 2316 -- 58 18 118/57 82 94 % None (Room air) Lying     Weights (last 14 days)    Date/Time Weight Weight Method   07/22/23 0233 104 kg (229 lb) Standing scale       Pertinent Labs/Diagnostic Test Results:   XR chest 1 view portable   ED Interpretation by Nilda Ceballso MD (07/22 0129)   No acute cardiopulmonary process noted.              Results from last 7 days   Lab Units 07/22/23  0428 07/21/23  2323   WBC Thousand/uL 8.00 8.48   HEMOGLOBIN g/dL 11.0* 11.9*   HEMATOCRIT % 34.7* 36.7   PLATELETS Thousands/uL 132* 161   NEUTROS ABS Thousands/µL  --  6.39   BANDS PCT % 4  --          Results from last 7 days   Lab Units 07/22/23  0428 07/21/23  2323   SODIUM mmol/L 139 142   POTASSIUM mmol/L 4.5 4.3   CHLORIDE mmol/L 108 108   CO2 mmol/L 24 25   ANION GAP mmol/L 7 9   BUN mg/dL 24 24   CREATININE mg/dL 0.90 1.09   EGFR ml/min/1.73sq m 84 66   CALCIUM mg/dL 8.5 9.0   MAGNESIUM mg/dL  --  1.9     Results from last 7 days   Lab Units 07/21/23  2323   AST U/L 16   ALT U/L 13   ALK PHOS U/L 72   TOTAL PROTEIN g/dL 6.3*   ALBUMIN g/dL 3.9   TOTAL BILIRUBIN mg/dL 0.27   BILIRUBIN DIRECT mg/dL 0.05   AMMONIA umol/L 23     Results from last 7 days   Lab Units 07/22/23  0736 07/21/23  2339   POC GLUCOSE mg/dl 239* 166*     Results from last 7 days   Lab Units 07/22/23  0428 07/21/23  2323   GLUCOSE RANDOM mg/dL 244* 164*             No results found for: "BETA-HYDROXYBUTYRATE"                   Results from last 7 days   Lab Units 07/22/23  0424 07/22/23  0140 07/21/23  2323   HS TNI 0HR ng/L  --   --  3   HS TNI 2HR ng/L  --  3  --    HSTNI D2 ng/L  --  0  --    HS TNI 4HR ng/L 3  --   --    HSTNI D4 ng/L 0  --   --          Results from last 7 days   Lab Units 07/21/23  2323   PROTIME seconds 13.3   INR  0.99   PTT seconds 31             Results from last 7 days   Lab Units 07/22/23  0424 07/21/23  2323   LACTIC ACID mmol/L 2.8* 2.8*                                 Results from last 7 days   Lab Units 07/21/23  2323   LIPASE u/L 23                 Results from last 7 days   Lab Units 07/22/23  0034   CLARITY UA  Turbid   COLOR UA  Yellow   SPEC GRAV UA  1.032*   PH UA  5.5   GLUCOSE UA mg/dl Negative   KETONES UA mg/dl Trace*   BLOOD UA  Negative   PROTEIN UA mg/dl 50 (1+)*   NITRITE UA  Negative   BILIRUBIN UA  Negative UROBILINOGEN UA (BE) mg/dl 3.0*   LEUKOCYTES UA  Large*   WBC UA /hpf Innumerable*   RBC UA /hpf 1-2   BACTERIA UA /hpf Innumerable*   EPITHELIAL CELLS WET PREP /hpf Occasional   MUCUS THREADS  Moderate*                 Results from last 7 days   Lab Units 07/21/23  2323   ETHANOL LVL mg/dL <10   ACETAMINOPHEN LVL ug/mL <06*   SALICYLATE LVL mg/dL <5        7/21/2023 ecg=     Component Ref Range & Units 7/21/23 2322   Ventricular Rate BPM 56    Atrial Rate BPM 56    NV Interval ms 166    QRSD Interval ms 90    QT Interval ms 452    QTC Interval ms 436    P [place] degrees 77    QRS Axis degrees 46    T Wave [place] degrees 45      ED MD read:  EKG personally interpreted by me, sinus bradycardia rate of 56, normal NV interval, normal QRS interval, QTc 436, normal axis, no ST elevations, no ST depressions, no pathological Q waves, no STEMI, when compared to previous EKG on March 26, 2023     ED Treatment:   Medication Administration from 07/21/2023 2302 to 07/22/2023 0216       Date/Time Order Dose Route Action     07/21/2023 2342 EDT ondansetron (FOR EMS ONLY) (ZOFRAN) 4 mg/2 mL injection 4 mg 0 mg Does not apply Given to EMS     07/22/2023 0134 EDT ceftriaxone (ROCEPHIN) 1 g/50 mL in dextrose IVPB 1,000 mg Intravenous New Bag     07/22/2023 0134 EDT sodium chloride 0.9 % bolus 1,000 mL 1,000 mL Intravenous New Bag        Past Medical History:   Diagnosis Date   • Anemia    • Arthritis    • Black stool 07/24/2020   • Cancer ([address])    • Colon polyp    • Diabetes mellitus (HCC)     IDDM   • Diverticulosis    • Enlarged prostate    • Erectile dysfunction    • Hypercholesteremia     Resolved post weight loss   • Hypertension     Resolved post weight loss   • Kidney stone    • Obesity    • Prostate cancer ([address])    • Wears partial dentures     partial upper and lower     Present on Admission:  • Benign essential hypertension  • Hypercholesteremia      Admitting Diagnosis: Syncope [R55]  Cystitis [N30.90]  Nausea & vomiting [R11.2]  Near syncope [R55]  Elevated lactic acid level [R79.89]  Age/Sex: 68 y.o. male  Admission Orders:  Tele  Orthostatic vitals  SCD    Scheduled Medications:  atorvastatin, 10 mg, Oral, Daily  cephalexin, 500 mg, Oral, Q6H PEGGY  enoxaparin, 40 mg, Subcutaneous, Daily  insulin aspart protamine-insulin aspart, 10 Units, Subcutaneous, Before Dinner  insulin aspart protamine-insulin aspart, 15 Units, Subcutaneous, Daily Before Breakfast  insulin lispro, 1-6 Units, Subcutaneous, TID AC  insulin lispro, 1-6 Units, Subcutaneous, HS  pantoprazole, 40 mg, Oral, Daily      Continuous IV Infusions:  sodium chloride, 100 mL/hr, Intravenous, Continuous      PRN Meds:  acetaminophen, 650 mg, Oral, Q6H PRN  albuterol, 2 puff, Inhalation, Q6H PRN  ondansetron, 4 mg, Intravenous, Q6H PRN        Network Utilization Review Department  ATTENTION: Please call with any questions or concerns to [phone] and carefully listen to the prompts so that you are directed to the right person. All voicemails are confidential.  Bharti Bush all requests for admission clinical reviews, approved or denied determinations and any other requests to dedicated fax number below belonging to the campus where the patient is receiving treatment.  List of dedicated fax numbers for the Facilities:  [place] DENIALS (Administrative/Medical Necessity) [phone] [address] [facility] (Maternity/NICU/Pediatrics) [phone]   [address] [place] [address] Drive [phone]   [facility] [phone]   [address] [place], [place] [address]e N [phone]   [address] [facility] [address] [phone]

## 2023-07-22 NOTE — ASSESSMENT & PLAN NOTE
Lab Results   Component Value Date    HGBA1C 6.8 (H) 07/21/2023       Recent Labs     07/21/23  2339 07/22/23  0736 07/22/23  1149   POCGLU 166* 239* 84       Blood Sugar Average: Last 72 hrs:  (P) 163   · Stable discharge  · Continue Novolog 70/30 15U with breakfast, 10U with dinner and SSI coverage   · Avoid hypoglycemia

## 2023-07-22 NOTE — DISCHARGE SUMMARY
[address]  Discharge- Sarita Del Rosario 1950, 68 y.o. male MRN: [id number]  Unit/Bed#: S -01 Encounter: [id number]  Primary Care Provider: Vicki Garner MD   Date and time admitted to hospital: 7/21/2023 11:03 PM    * Near syncope  Assessment & Plan  · Patient presents with acute onset diaphoresis, near syncope, nausea/vomiting from lying to standing position after doing yard work outside all day.     · Blood pressure is low normal in the ED   · Troponin so far negative, EKG sinus bradycardia; patient without chest pain   · Suspect likely dehydration contributing to patient's symptoms, evaluate for orthostatic hypotension   · Hold antihypertensives   · Check orthostatic VS      · PLAN   · Negative orthostatics, stable for discharge  · Can resume antihypertensives tomorrow 7/23  · Encouraged to keep hydrated s     Benign essential hypertension  Assessment & Plan  · Can resume losartan tomorrow 7/23  · Measure blood pressure daily and call PCP if SBP < 110    Type 2 diabetes mellitus with hyperglycemia, with long-term current use of insulin [facility])  Assessment & Plan  Lab Results   Component Value Date    HGBA1C 6.8 (H) 07/21/2023       Recent Labs     07/21/23  2339 07/22/23  0736 07/22/23  1149   POCGLU 166* 239* 84       Blood Sugar Average: Last 72 hrs:  (P) 163   · Stable discharge  · Continue Novolog 70/30 15U with breakfast, 10U with dinner and SSI coverage   · Avoid hypoglycemia     Hypercholesteremia  Assessment & Plan  · Continue statin therapy     Lactic acidosis-resolved as of 7/22/2023  Assessment & Plan  · Lactic 2.8   · Repeat 1.4 after IV fluid  ·     Medical Problems     Resolved Problems  Date Reviewed: 7/22/2023          Resolved    Lactic acidosis 7/22/2023     Resolved by  Luis Escobedo MD        Discharging Resident: Luis Escobedo MD  Discharging Attending: Mary Metz MD  PCP: Vicki Garner MD  Admission Date:   Admission Orders (From admission, onward)     Ordered        07/22/23 0141  Place in Observation  Once                      Discharge Date: 07/22/23    Consultations During Hospital Stay:  · NONE     Procedures Performed:   · NONE    Significant Findings / Test Results:   · CBC normal, BMP  ·  normal troponins normal   · UA leukocytes and bacteria  · Lipase normal, ammonia normal  · EKG bradycardia        Incidental Findings:   · NONE    Test Results Pending at Discharge (will require follow up): · None     Outpatient Tests Requested:  · None    Complications: None    Reason for Admission: Dizziness with nausea    Hospital Course:   Ale Calderon is a 68 y.o. male patient with medical history type 2 diabetes, hypertension, hyperlipidemia, gastric sleeve surgery 4 years ago who originally presented to the hospital on 7/21/2023 due to diaphoresis and feeling dizzy. Patient had acute onset of symptoms about 10 minutes after eating, clammy with diaphoresis shortness of breath and had repeated episodes of nonbloody bilious vomiting. Denied abdominal pain chest pain palpitations or fever. At the ED he was hemodynamically stable, low normal blood pressures, bradycardic. Work-up was negative for cardiac origin, lactic acid 2.8, UA with numerous leukocytes and bacteria. Patient was admitted for presyncope likely due to dehydration and urinary tract infection. His antihypertensive losartan was held. He was given IV fluids until normalization of lactic acid and also 1 dose of ceftriaxone. He has now been switched to oral Keflex. Patient is stable, tolerating diet, no nausea, with no dizziness. He has negative orthostatics. He will continue antibiotics to complete a total of 7 days. Encouraged to keep hydrated. Can restart blood pressure medications tomorrow and will follow-up with PCP.   Patient knows to go to the ED if recurrence of symptoms, low blood pressure        Please see above list of diagnoses and related plan for additional information. Condition at Discharge: stable    Discharge Day Visit / Exam:   Subjective:  Patient seen at bedsude  Vitals: Blood Pressure: 138/62 (07/22/23 1147)  Pulse: (!) 54 (07/22/23 1147)  Temperature: 97.6 °F (36.4 °C) (07/22/23 0738)  Temp Source: Oral (07/22/23 0233)  Respirations: 18 (07/22/23 0738)  Weight - Scale: 104 kg (229 lb) (07/22/23 0233)  SpO2: 97 % (07/22/23 0818)  Exam:   Physical Exam  Vitals and nursing note reviewed. Constitutional:       General: He is not in acute distress. Appearance: He is well-developed. HENT:      Head: Normocephalic and atraumatic. Eyes:      Conjunctiva/sclera: Conjunctivae normal.   Cardiovascular:      Rate and Rhythm: Normal rate and regular rhythm. Heart sounds: No murmur heard. Pulmonary:      Effort: Pulmonary effort is normal. No respiratory distress. Breath sounds: Normal breath sounds. Abdominal:      Palpations: Abdomen is soft. Tenderness: There is no abdominal tenderness. Musculoskeletal:         General: No swelling. Cervical back: Neck supple. Skin:     General: Skin is warm and dry. Capillary Refill: Capillary refill takes less than 2 seconds. Neurological:      Mental Status: He is alert. Psychiatric:         Mood and Affect: Mood normal.         Discussion with Family: Updated  (wife) via phone. Discharge instructions/Information to patient and family:   See after visit summary for information provided to patient and family. Provisions for Follow-Up Care:  See after visit summary for information related to follow-up care and any pertinent home health orders. Disposition:   Home    Planned Readmission: no    Discharge Medications:  See after visit summary for reconciled discharge medications provided to patient and/or family.       **Please Note: This note may have been constructed using a voice recognition system**

## 2023-07-23 LAB
ATRIAL RATE: 56 BPM
BACTERIA UR CULT: ABNORMAL
P AXIS: 77 DEGREES
PR INTERVAL: 166 MS
QRS AXIS: 46 DEGREES
QRSD INTERVAL: 90 MS
QT INTERVAL: 452 MS
QTC INTERVAL: 436 MS
T WAVE AXIS: 45 DEGREES
VENTRICULAR RATE: 56 BPM

## 2023-07-23 PROCEDURE — 93010 ELECTROCARDIOGRAM REPORT: CPT | Performed by: INTERNAL MEDICINE

## 2023-07-24 ENCOUNTER — OFFICE VISIT (OUTPATIENT)
Dept: DENTISTRY | Facility: CLINIC | Age: 73
End: 2023-07-24

## 2023-07-24 ENCOUNTER — TRANSITIONAL CARE MANAGEMENT (OUTPATIENT)
Dept: FAMILY MEDICINE CLINIC | Facility: CLINIC | Age: 73
End: 2023-07-24

## 2023-07-24 VITALS — HEART RATE: 55 BPM | SYSTOLIC BLOOD PRESSURE: 149 MMHG | DIASTOLIC BLOOD PRESSURE: 68 MMHG

## 2023-07-24 DIAGNOSIS — K06.2 DENTURE IRRITATION: Primary | ICD-10-CM

## 2023-07-24 PROCEDURE — WIS5009 POST-OP DENTURE ADJUSTMENT

## 2023-07-24 NOTE — PROGRESS NOTES
LPD Adjustment    Shahzad Collins, 69 y/o male, presents to clinic today with chief complaint "my lower partial is rubbing against my cheek."    ASA II    Pain: 3    EOE: WNL  IOE: WNL, minor sore spot identified on alveolar ridge and mild erythema in buccal vestibule in area of site #32 where partial denture sits      Used Meyer stick on alveolar ridge to transfer sore spot to partial denture. Adjusted as needed. Used disclosing wax to identify overextended buccal flange. Reduced as needed without compromising retention. Patient was satisfied with adjustments. Clinical and radiographic evaluation of #11-D, #12-M. Both teeth exhibit caries. For future resident, consider prepping #11-DL first to obtain distal access to #12-M. Existing #12 amalgam is tact and does not require replacement. #12-M restoration can be prepped and restored independent of existing amalgam. Patient understands he will have 2 fillings completed at next appointment. Patient left comfortable and ambulatory.      Attending: Dr. Will Perez    NV: #11-D, #12-M Resins

## 2023-07-27 LAB
BACTERIA BLD CULT: NORMAL
BACTERIA BLD CULT: NORMAL

## 2023-07-30 ENCOUNTER — APPOINTMENT (OUTPATIENT)
Dept: LAB | Facility: CLINIC | Age: 73
End: 2023-07-30
Payer: COMMERCIAL

## 2023-07-30 DIAGNOSIS — E11.65 TYPE 2 DIABETES MELLITUS WITH HYPERGLYCEMIA, WITH LONG-TERM CURRENT USE OF INSULIN (HCC): ICD-10-CM

## 2023-07-30 DIAGNOSIS — Z79.4 TYPE 2 DIABETES MELLITUS WITH HYPERGLYCEMIA, WITH LONG-TERM CURRENT USE OF INSULIN (HCC): ICD-10-CM

## 2023-07-30 LAB
ALBUMIN SERPL BCP-MCNC: 3.8 G/DL (ref 3.5–5)
ALP SERPL-CCNC: 73 U/L (ref 34–104)
ALT SERPL W P-5'-P-CCNC: 14 U/L (ref 7–52)
ANION GAP SERPL CALCULATED.3IONS-SCNC: 5 MMOL/L
AST SERPL W P-5'-P-CCNC: 14 U/L (ref 13–39)
BILIRUB SERPL-MCNC: 0.38 MG/DL (ref 0.2–1)
BUN SERPL-MCNC: 24 MG/DL (ref 5–25)
CALCIUM SERPL-MCNC: 9 MG/DL (ref 8.4–10.2)
CHLORIDE SERPL-SCNC: 106 MMOL/L (ref 96–108)
CHOLEST SERPL-MCNC: 125 MG/DL
CO2 SERPL-SCNC: 28 MMOL/L (ref 21–32)
CREAT SERPL-MCNC: 0.78 MG/DL (ref 0.6–1.3)
CREAT UR-MCNC: 91.1 MG/DL
ERYTHROCYTE [DISTWIDTH] IN BLOOD BY AUTOMATED COUNT: 13.2 % (ref 11.6–15.1)
EST. AVERAGE GLUCOSE BLD GHB EST-MCNC: 151 MG/DL
GFR SERPL CREATININE-BSD FRML MDRD: 89 ML/MIN/1.73SQ M
GLUCOSE P FAST SERPL-MCNC: 143 MG/DL (ref 65–99)
HBA1C MFR BLD: 6.9 %
HCT VFR BLD AUTO: 36.9 % (ref 36.5–49.3)
HDLC SERPL-MCNC: 39 MG/DL
HGB BLD-MCNC: 11.7 G/DL (ref 12–17)
LDLC SERPL CALC-MCNC: 71 MG/DL (ref 0–100)
MCH RBC QN AUTO: 29.3 PG (ref 26.8–34.3)
MCHC RBC AUTO-ENTMCNC: 31.7 G/DL (ref 31.4–37.4)
MCV RBC AUTO: 93 FL (ref 82–98)
MICROALBUMIN UR-MCNC: 47.8 MG/L (ref 0–20)
MICROALBUMIN/CREAT 24H UR: 52 MG/G CREATININE (ref 0–30)
PLATELET # BLD AUTO: 145 THOUSANDS/UL (ref 149–390)
PMV BLD AUTO: 12.6 FL (ref 8.9–12.7)
POTASSIUM SERPL-SCNC: 4.4 MMOL/L (ref 3.5–5.3)
PROT SERPL-MCNC: 6.7 G/DL (ref 6.4–8.4)
RBC # BLD AUTO: 3.99 MILLION/UL (ref 3.88–5.62)
SODIUM SERPL-SCNC: 139 MMOL/L (ref 135–147)
TRIGL SERPL-MCNC: 75 MG/DL
WBC # BLD AUTO: 6.26 THOUSAND/UL (ref 4.31–10.16)

## 2023-07-30 PROCEDURE — 80053 COMPREHEN METABOLIC PANEL: CPT

## 2023-07-30 PROCEDURE — 82570 ASSAY OF URINE CREATININE: CPT

## 2023-07-30 PROCEDURE — 3044F HG A1C LEVEL LT 7.0%: CPT | Performed by: PODIATRIST

## 2023-07-30 PROCEDURE — 85027 COMPLETE CBC AUTOMATED: CPT

## 2023-07-30 PROCEDURE — 36415 COLL VENOUS BLD VENIPUNCTURE: CPT

## 2023-07-30 PROCEDURE — 83036 HEMOGLOBIN GLYCOSYLATED A1C: CPT

## 2023-07-30 PROCEDURE — 82043 UR ALBUMIN QUANTITATIVE: CPT

## 2023-07-30 PROCEDURE — 80061 LIPID PANEL: CPT

## 2023-07-31 ENCOUNTER — OFFICE VISIT (OUTPATIENT)
Dept: ENDOCRINOLOGY | Facility: CLINIC | Age: 73
End: 2023-07-31
Payer: COMMERCIAL

## 2023-07-31 VITALS
WEIGHT: 230.2 LBS | OXYGEN SATURATION: 97 % | DIASTOLIC BLOOD PRESSURE: 58 MMHG | HEART RATE: 52 BPM | HEIGHT: 67 IN | SYSTOLIC BLOOD PRESSURE: 132 MMHG | BODY MASS INDEX: 36.13 KG/M2

## 2023-07-31 DIAGNOSIS — Z79.4 TYPE 2 DIABETES MELLITUS WITH HYPERGLYCEMIA, WITH LONG-TERM CURRENT USE OF INSULIN (HCC): Primary | Chronic | ICD-10-CM

## 2023-07-31 DIAGNOSIS — I10 BENIGN ESSENTIAL HYPERTENSION: ICD-10-CM

## 2023-07-31 DIAGNOSIS — E78.00 HYPERCHOLESTEREMIA: ICD-10-CM

## 2023-07-31 DIAGNOSIS — E11.65 TYPE 2 DIABETES MELLITUS WITH HYPERGLYCEMIA, WITH LONG-TERM CURRENT USE OF INSULIN (HCC): Primary | Chronic | ICD-10-CM

## 2023-07-31 PROCEDURE — 99214 OFFICE O/P EST MOD 30 MIN: CPT | Performed by: PHYSICIAN ASSISTANT

## 2023-07-31 PROCEDURE — 1111F DSCHRG MED/CURRENT MED MERGE: CPT | Performed by: PHYSICIAN ASSISTANT

## 2023-07-31 RX ORDER — GLIPIZIDE 5 MG/1
5 TABLET, FILM COATED, EXTENDED RELEASE ORAL DAILY
COMMUNITY
Start: 2023-06-29

## 2023-07-31 NOTE — PATIENT INSTRUCTIONS
Hypoglycemia instructions   Samira Valdez  7/31/2023  [id number]    Low Blood Sugar    Steps to treat low blood sugar. 1. Test blood sugar if you have symptoms of low blood sugar:   Low Blood Sugar Symptoms:  o Sweaty  o Dizzy  o Rapid heartbeat  o Shaky  o Bad mood  o Hungry      2. Treat blood sugar less than 70 with 15 grams of fast-acting carbohydrate:   Examples of 15 grams Fast-Acting Carbohydrate:  o 4 oz juice  o 4 oz regular soda  o 3-4 glucose tablets (chew)  o 3-4 hard candies (chew)          3. Wait 15 minutes and test your blood sugar again     4.  If blood sugar is less than 100, repeat steps 2-3.    5. When your blood sugar is 100 or more, eat a snack if it will be longer than one hour until your next meal. The snack should be 15 grams of carbohydrate and a protein:   Examples of snacks:  o ½ sandwich  o 6 crackers with cheese  o Piece of fruit with cheese or peanut butter  o 6 crackers with peanut butter

## 2023-07-31 NOTE — PROGRESS NOTES
Patient Progress Note      CC: DM      Referring Provider  Chan Maddox, [address] [facility],  [address]     History of Present Illness:   Quentin Sheridan is a 68 y.o. male with a history of type 2 diabetes with long term use of insulin. Diabetes course has been stable. He was hospitalized in July 2023 for a near syncopal episode and treated for possible UTI. Denies recent severe hypoglycemic or severe hyperglycemic episodes. Denies any issues with his current regimen. Home glucose monitoring: are performed regularly, using the [employer] log or meter today   He did not bring his John Ten[employer]  so we are unable to download a report today  Home blood glucose readings: this morning was 143 mg/dl. He reports most readings are in the 100s mg/dl. Current regimen: Novolog 70/30 15 units with breakfast and 10 units with dinner, metformin 1000 mg BID, glipizide 5 mg daily  His glipizide was temporarily stopped and insulin was increased but he stated that did not help him control his BG levels; he does better with the current regimen. compliant most of the time, denies any side effects from medications. Injects in: abdomen. Rotates sites: Yes  Hypoglycemic episodes: No, rare  H/o of hypoglycemia causing hospitalization or Intervention such as glucagon injection  or ambulance call :  No  Hypoglycemia symptoms: sweating  Treatment of hypoglycemia: discussed treatment     Medic alert tag: recommended: Yes    Diabetes education: Yes  Diet: 3 meals per day, 1 snack per day. Timing of meals is predictable. Diabetic diet compliance:  noncompliant some of the time  Activity: Daily activity is predictable: Yes. No routine exercise but tries to stay active with yard work. [facility] Ophthamology: August 2022  Podiatry: Oct 2022    Has hypertension: on ACE inhibitor/ARB, compliant most of the time  Has hyperlipidemia: on statin - tolerating well, no myalgias.  compliant most of the time, denies any side effects from medications.   Thyroid disorders: No  History of pancreatitis: No    Patient Active Problem List   Diagnosis   • GERD (gastroesophageal reflux disease)   • Class 2 severe obesity due to excess calories with serious comorbidity and body mass index (BMI) of 35.0 to 35.9 in adult [facility])   • Hypercholesteremia   • Postgastrectomy malabsorption   • Type 2 diabetes mellitus with hyperglycemia, with long-term current use of insulin (HCC)   • Benign essential hypertension   • Benign enlargement of prostate   • Pancreatic cyst   • History of colon polyps   • IPMN (intraductal papillary mucinous neoplasm)   • ED (erectile dysfunction) of organic origin   • Benign prostatic hyperplasia with urinary frequency   • Bruxism   • Prostate cancer ([address])   • Greater trochanteric bursitis of left hip   • Primary osteoarthritis of one hip, left   • Colitis   • Mild asthma exacerbation   • Platelets decreased (HCC)   • Upper abdominal pain, unspecified   • Other chest pain   • Contusion of right great toe with damage to nail   • Near syncope   • UTI (urinary tract infection)      Past Medical History:   Diagnosis Date   • Anemia    • Arthritis    • Black stool 07/24/2020   • Cancer ([address])    • Colon polyp    • Diabetes mellitus ([address])     IDDM   • Diverticulosis    • Enlarged prostate    • Erectile dysfunction    • Hypercholesteremia     Resolved post weight loss   • Hypertension     Resolved post weight loss   • Kidney stone    • Obesity    • Prostate cancer ([address])    • Wears partial dentures     partial upper and lower      Past Surgical History:   Procedure Laterality Date   • ABDOMINAL ADHESION SURGERY N/A 11/28/2016    Procedure: EXTENSIVE LYSIS ADHESIONS;  Surgeon: Manisha Taylor MD;  Location[facility];  Service:    • ANKLE FRACTURE SURGERY Left    • CHOLECYSTECTOMY     • COLON SURGERY      colon resection   • COLONOSCOPY     • COLOSTOMY     • COLOSTOMY CLOSURE     • DIAGNOSTIC LAPAROSCOPY     • GASTRIC BYPASS failed due to adhesions   • HERNIA REPAIR      abdominal   • MA CYSTO INSERTION TRANSPROSTATIC IMPLANT SINGLE N/A 3/8/2019    Procedure: CYSTOSCOPY WITH INSERTION UROLIFT;  Surgeon: Oma Lomeli MD;  [facility];  Service: Urology   • MA CYSTO INSERTION TRANSPROSTATIC IMPLANT SINGLE N/A 2020    Procedure: [place] WITH INSERTION Isauro Code;  Surgeon: Oma Lomeli MD;  [facility];  Service: Urology   • MA CYSTOURETHROSCOPY W/INTERNAL URETHROTOMY N/A 2020    Procedure: DIRECT VISUAL INTERAL URETHROTOMY (DVIU), dilation of urethral stricture;  Surgeon: Oma Lomeli MD;  [facility];  Service: Urology   • MA EDG US EXAM SURGICAL ALTER STOM DUODENUM/JEJUNUM N/A 10/25/2018    Procedure: LINEAR ENDOSCOPIC U/S;  Surgeon: Dale Crooks MD;  [facility]: [facility]; Service: Gastroenterology   • MA ESOPHAGOGASTRODUODENOSCOPY TRANSORAL DIAGNOSTIC N/A 2016    Procedure: EGD AND COLONOSCOPY;  Surgeon: Yvonne Waterman MD;  [facility];   Service: Gastroenterology   • MA LAPS [address] [place] [address] LONGITUDINAL GASTRECTOMY N/A 2016    Procedure: GASTRECTOMY SLEEVE LAPAROSCOPIC;  Surgeon: Yessenia Tavarez MD;  [facility];  Service: Bariatrics   • MA PROSTATE NEEDLE BIOPSY ANY APPROACH N/A 2020    Procedure: TRANSRECTAL NEEDLE BIOPSY PROSTATE (TRNBP) WITH TRANSRECTAL ULTRASOUND GUIDANCE;  Surgeon: Oma Lomeli MD;  [facility];  Service: Urology   • TONSILLECTOMY     • US GUIDED PROSTATE BIOPSY  2020      Family History   Problem Relation Age of Onset   • Heart disease Mother    • Breast cancer Mother 80   • Diabetes Father    • Colon cancer Neg Hx    • Liver disease Neg Hx      Social History     Tobacco Use   • Smoking status: Former     Packs/day: 0.25     Types: Cigarettes     Quit date: 11/10/2001     Years since quittin.7     Passive exposure: Past   • Smokeless tobacco: Former   Substance Use Topics   • Alcohol use: Not Currently     Allergies   Allergen Reactions   • Enalapril Anaphylaxis, Throat Swelling and Hives         Current Outpatient Medications:   •  albuterol (ProAir HFA) 90 mcg/act inhaler, Inhale 2 puffs every 6 (six) hours as needed for wheezing or shortness of breath, Disp: 8.5 g, Rfl: 0  •  aluminum-magnesium hydroxide-simethicone (MYLANTA) 200-200-20 mg/5 mL suspension, Take 30 mL by mouth every 6 (six) hours as needed for indigestion or heartburn, Disp: 355 mL, Rfl: 0  •  atorvastatin (LIPITOR) 10 mg tablet, Take 1 tablet (10 mg total) by mouth daily, Disp: 100 tablet, Rfl: 3  •  Biotin 1000 MCG tablet, Take 1 tablet by mouth daily in the early morning , Disp: , Rfl:   •  Blood Glucose Monitoring Suppl (GLUCOCARD VITAL MONITOR) w/Device KIT, by Does not apply route 2 (two) times a day, Disp: 1 kit, Rfl: 0  •  Cholecalciferol (VITAMIN D3) 2000 units capsule, Take 1,000 Units by mouth daily in the early morning , Disp: , Rfl:   •  Continuous Blood Gluc  (FreeStyle Dunia 2 Dearing) CHARITY, Check blood sugars multiple times per day, Disp: 1 each, Rfl: 0  •  Continuous Blood Gluc Sensor (FreeStyle Dunia 2 Sensor) MISC, Check blood sugars multiple times per day, Disp: 6 each, Rfl: 3  •  Cyanocobalamin (VITAMIN B-12) 5000 MCG TBDP, Take 5,000 mcg by mouth once a week Takes on Mondays, Disp: , Rfl:   •  docusate sodium (COLACE) 100 mg capsule, Take 100 mg by mouth daily as needed for constipation, Disp: , Rfl:   •  fluticasone (FLONASE) 50 mcg/act nasal spray, 2 sprays into each nostril daily (Patient taking differently: 2 sprays into each nostril if needed), Disp: 16 g, Rfl: 0  •  fluticasone (Flovent HFA) 110 MCG/ACT inhaler, Inhale 2 puffs 2 (two) times a day Rinse mouth after use.  (Patient taking differently: Inhale 2 puffs if needed Rinse mouth after use.), Disp: 12 g, Rfl: 0  •  insulin aspart protamine-insulin aspart (NovoLOG 70/30) 100 units/mL injection, Inject 15 Units under the skin daily before breakfast Do not start before July 23, 2023., Disp: 9 mL, Rfl: 0  •  insulin aspart protamine-insulin aspart (NovoLOG 70/30) 100 units/mL injection, Inject 10 Units under the skin daily before dinner, Disp: 6 mL, Rfl: 0  •  Lancets (LIFESCAN UNISTIK 2) MISC, Lifescan One Touch Ultramini Meter; use as directed; 1; 0; 31-Oct-2016; 31-Oct-2016; Melida Duran; Active, Disp: , Rfl:   •  losartan (COZAAR) 25 mg tablet, Take 1 tablet (25 mg total) by mouth daily Do not start before July 23, 2023., Disp: 100 tablet, Rfl: 0  •  metFORMIN (GLUCOPHAGE) 1000 MG tablet, TAKE ONE TABLET BY MOUTH TWO TIMES A DAY WITH MEALS, Disp: 180 tablet, Rfl: 0  •  Mirabegron ER 50 MG TB24, Take 1 tablet (50 mg total) by mouth daily at bedtime, Disp: 90 tablet, Rfl: 3  •  Multiple Vitamin (MULTIVITAMIN) capsule, Take 1 capsule by mouth daily in the early morning , Disp: , Rfl:   •  pantoprazole (PROTONIX) 40 mg tablet, Take 1 tablet (40 mg total) by mouth daily, Disp: 90 tablet, Rfl: 0  •  glipiZIDE (GLUCOTROL XL) 5 mg 24 hr tablet, Take 5 mg by mouth in the morning, Disp: , Rfl:   Review of Systems   Constitutional: Negative for activity change, appetite change, fatigue and unexpected weight change. HENT: Negative for trouble swallowing. Eyes: Negative for visual disturbance. Respiratory: Negative for shortness of breath. Cardiovascular: Negative for chest pain and palpitations. Gastrointestinal: Negative for constipation and diarrhea. Endocrine: Negative for polydipsia and polyuria. Musculoskeletal: Positive for arthralgias (arthritis). Skin: Negative for wound. Neurological: Negative for numbness. Psychiatric/Behavioral: Negative. Physical Exam:  Body mass index is 36.05 kg/m².   /58 (BP Location: Right arm, Patient Position: Sitting, Cuff Size: Large)   Pulse (!) 52   Ht 5' 7" (1.702 m)   Wt 104 kg (230 lb 3.2 oz)   SpO2 97%   BMI 36.05 kg/m²    Wt Readings from Last 3 Encounters:   07/31/23 104 kg (230 lb 3.2 oz)   07/22/23 104 kg (229 lb)   06/27/23 104 kg (229 lb)       Physical Exam  Vitals and nursing note reviewed. Constitutional:       Appearance: He is well-developed. HENT:      Head: Normocephalic. Eyes:      General: No scleral icterus. Pupils: Pupils are equal, round, and reactive to light. Neck:      Thyroid: No thyromegaly. Cardiovascular:      Rate and Rhythm: Normal rate and regular rhythm. Pulses:           Radial pulses are 2+ on the right side and 2+ on the left side. Heart sounds: No murmur heard. Pulmonary:      Effort: Pulmonary effort is normal. No respiratory distress. Breath sounds: Normal breath sounds. No wheezing. Musculoskeletal:      Cervical back: Neck supple. Skin:     General: Skin is warm and dry. Neurological:      Mental Status: He is alert. Patient's shoes and socks were not removed. Labs:   Component      Latest Ref Rng 1/13/2023   Sodium      135 - 147 mmol/L 138    Potassium      3.5 - 5.3 mmol/L 4.2    Chloride      96 - 108 mmol/L 108    CO2      21 - 32 mmol/L 27    Anion Gap      mmol/L 3 (L)    BUN      5 - 25 mg/dL 21    Creatinine      0.60 - 1.30 mg/dL 0.95    GLUCOSE FASTING      65 - 99 mg/dL 229 (H)    Calcium      8.4 - 10.2 mg/dL 9.0    CORRECTED CALCIUM      8.3 - 10.1 mg/dL 9.6    AST      13 - 39 U/L 15    ALT      7 - 52 U/L 27    Alkaline Phosphatase      34 - 104 U/L 113    Total Protein      6.4 - 8.4 g/dL 7.1    Albumin      3.5 - 5.0 g/dL 3.3 (L)    TOTAL BILIRUBIN      0.20 - 1.00 mg/dL 0.33    eGFR      ml/min/1.73sq m 79    Cholesterol      See Comment mg/dL    Triglycerides      See Comment mg/dL    HDL      >=40 mg/dL    LDL Calculated      0 - 100 mg/dL    EXT Creatinine Urine      mg/dL 130.0    MICROALBUM.,U,RANDOM      0.0 - 20.0 mg/L 83.6 (H)    MICROALBUMIN/CREATININE RATIO      0 - 30 mg/g creatinine 64 (H)    Hemoglobin A1C      Normal 4.0-5.6%; PreDiabetic 5.7-6.4%;  Diabetic >=6.5%; Glycemic control for adults with diabetes <7.0% % 8.3 (H)    eAG, EST AVG Glucose      mg/dl 192      Component      Latest Ref Rng 7/30/2023   Sodium      135 - 147 mmol/L 139    Potassium      3.5 - 5.3 mmol/L 4.4    Chloride      96 - 108 mmol/L 106    CO2      21 - 32 mmol/L 28    Anion Gap      mmol/L 5    BUN      5 - 25 mg/dL 24    Creatinine      0.60 - 1.30 mg/dL 0.78    GLUCOSE FASTING      65 - 99 mg/dL 143 (H)    Calcium      8.4 - 10.2 mg/dL 9.0    CORRECTED CALCIUM      8.3 - 10.1 mg/dL    AST      13 - 39 U/L 14    ALT      7 - 52 U/L 14    Alkaline Phosphatase      34 - 104 U/L 73    Total Protein      6.4 - 8.4 g/dL 6.7    Albumin      3.5 - 5.0 g/dL 3.8    TOTAL BILIRUBIN      0.20 - 1.00 mg/dL 0.38    eGFR      ml/min/1.73sq m 89    Cholesterol      See Comment mg/dL 125    Triglycerides      See Comment mg/dL 75    HDL      >=40 mg/dL 39 (L)    LDL Calculated      0 - 100 mg/dL 71    EXT Creatinine Urine      mg/dL 91.1    MICROALBUM.,U,RANDOM      0.0 - 20.0 mg/L 47.8 (H)    MICROALBUMIN/CREATININE RATIO      0 - 30 mg/g creatinine 52 (H)    Hemoglobin A1C      Normal 4.0-5.6%; PreDiabetic 5.7-6.4%; Diabetic >=6.5%; Glycemic control for adults with diabetes <7.0% % 6.9 (H)    eAG, EST AVG Glucose      mg/dl 151       Legend:  (L) Low  (H) High      Plan:    Diagnoses and all orders for this visit:    Type 2 diabetes mellitus with hyperglycemia, with long-term current use of insulin (HCC)  HGA1C 6.9%. Improved. Treatment regimen: continue current treatment  Discussed intensive insulin regimen does increase risk for hypoglycemia. Episodes of hypoglycemia can lead to permanent disability and death. Discussed risks/complications associated with uncontrolled diabetes. Advised to adhere to diabetic diet, and recommended staying active/exercising routinely as tolerated. Keep carbohydrates consistent to limit blood glucose fluctuations. Advised to call if blood sugars less than 70 mg/dl or over 300 mg/dl. Check blood glucose 2+ times a day  Discussed symptoms and treatment of hypoglycemia. Discussed use of CGM to collect additional blood glucose data to reveal trends and patterns that can be used to optimize treatment plan. Recommended routine follow-up with podiatry and ophthalmology. Ordered blood work to complete prior to next visit. -     Hemoglobin A1C; Future  -     Basic metabolic panel; Future    Benign essential hypertension  Blood pressure well controlled, continue current treatment   -     Basic metabolic panel; Future    Hypercholesteremia  LDL 71  Continue statin therapy         Discussed with the patient diagnosis and treatment and all questions fully answered. He will call me if any problems arise. Counseled patient on diagnostic results, prognosis, risk and benefit of treatment options, instruction for management, importance of treatment compliance, risk factor reduction and impressions.       Kimber Singh PA-C

## 2023-09-08 DIAGNOSIS — Z79.4 TYPE 2 DIABETES MELLITUS WITH INSULIN THERAPY (HCC): ICD-10-CM

## 2023-09-08 DIAGNOSIS — K21.9 GASTROESOPHAGEAL REFLUX DISEASE WITHOUT ESOPHAGITIS: ICD-10-CM

## 2023-09-08 DIAGNOSIS — E11.9 TYPE 2 DIABETES MELLITUS WITH INSULIN THERAPY (HCC): ICD-10-CM

## 2023-09-08 DIAGNOSIS — E11.65 TYPE 2 DIABETES MELLITUS WITH HYPERGLYCEMIA, WITH LONG-TERM CURRENT USE OF INSULIN (HCC): Chronic | ICD-10-CM

## 2023-09-08 DIAGNOSIS — Z79.4 TYPE 2 DIABETES MELLITUS WITH HYPERGLYCEMIA, WITH LONG-TERM CURRENT USE OF INSULIN (HCC): Chronic | ICD-10-CM

## 2023-09-08 RX ORDER — PANTOPRAZOLE SODIUM 40 MG/1
40 TABLET, DELAYED RELEASE ORAL DAILY
Qty: 90 TABLET | Refills: 0 | Status: SHIPPED | OUTPATIENT
Start: 2023-09-08

## 2023-09-08 RX ORDER — ATORVASTATIN CALCIUM 10 MG/1
10 TABLET, FILM COATED ORAL DAILY
Qty: 100 TABLET | Refills: 3 | Status: SHIPPED | OUTPATIENT
Start: 2023-09-08

## 2023-09-20 PROBLEM — N39.0 UTI (URINARY TRACT INFECTION): Status: RESOLVED | Noted: 2023-07-22 | Resolved: 2023-09-20

## 2023-10-03 ENCOUNTER — OFFICE VISIT (OUTPATIENT)
Dept: FAMILY MEDICINE CLINIC | Facility: CLINIC | Age: 73
End: 2023-10-03
Payer: COMMERCIAL

## 2023-10-03 VITALS
WEIGHT: 230 LBS | SYSTOLIC BLOOD PRESSURE: 120 MMHG | HEART RATE: 82 BPM | BODY MASS INDEX: 36.1 KG/M2 | OXYGEN SATURATION: 96 % | TEMPERATURE: 98.1 F | DIASTOLIC BLOOD PRESSURE: 70 MMHG | RESPIRATION RATE: 17 BRPM | HEIGHT: 67 IN

## 2023-10-03 DIAGNOSIS — I10 BENIGN ESSENTIAL HYPERTENSION: ICD-10-CM

## 2023-10-03 DIAGNOSIS — Z00.00 ANNUAL PHYSICAL EXAM: Primary | ICD-10-CM

## 2023-10-03 DIAGNOSIS — K21.9 GASTROESOPHAGEAL REFLUX DISEASE WITHOUT ESOPHAGITIS: ICD-10-CM

## 2023-10-03 DIAGNOSIS — C61 PROSTATE CANCER (HCC): ICD-10-CM

## 2023-10-03 DIAGNOSIS — Z23 FLU VACCINE NEED: ICD-10-CM

## 2023-10-03 DIAGNOSIS — E11.65 TYPE 2 DIABETES MELLITUS WITH HYPERGLYCEMIA, WITH LONG-TERM CURRENT USE OF INSULIN (HCC): Chronic | ICD-10-CM

## 2023-10-03 DIAGNOSIS — Z79.4 TYPE 2 DIABETES MELLITUS WITH HYPERGLYCEMIA, WITH LONG-TERM CURRENT USE OF INSULIN (HCC): Chronic | ICD-10-CM

## 2023-10-03 PROBLEM — R10.10 UPPER ABDOMINAL PAIN, UNSPECIFIED: Status: RESOLVED | Noted: 2023-03-26 | Resolved: 2023-10-03

## 2023-10-03 PROCEDURE — 99214 OFFICE O/P EST MOD 30 MIN: CPT | Performed by: FAMILY MEDICINE

## 2023-10-03 PROCEDURE — 90662 IIV NO PRSV INCREASED AG IM: CPT

## 2023-10-03 PROCEDURE — G0008 ADMIN INFLUENZA VIRUS VAC: HCPCS

## 2023-10-03 PROCEDURE — 99397 PER PM REEVAL EST PAT 65+ YR: CPT | Performed by: FAMILY MEDICINE

## 2023-10-03 NOTE — PROGRESS NOTES
[facility] PRACTICE    NAME: Janet Suárez  AGE: 68 y.o. SEX: male  : 1950     DATE: 10/3/2023     Assessment and Plan:     Problem List Items Addressed This Visit        Digestive    GERD (gastroesophageal reflux disease)     Stable  Not on any meds            Endocrine    Type 2 diabetes mellitus with hyperglycemia, with long-term current use of insulin (HCC) (Chronic)       Lab Results   Component Value Date    HGBA1C 6.9 (H) 2023   stable           Relevant Orders    CBC and differential    Comprehensive metabolic panel    Lipid panel    Hemoglobin A1C    Albumin / creatinine urine ratio       Cardiovascular and Mediastinum    Benign essential hypertension     Stable on current meds            Genitourinary    Prostate cancer ([address])    Relevant Orders    PSA Total, Diagnostic   Other Visit Diagnoses     Annual physical exam    -  Primary    Flu vaccine need        Relevant Orders    influenza vaccine, high-dose, PF 0.7 mL (FLUZONE HIGH-DOSE) (Completed)          Immunizations and preventive care screenings were discussed with patient today. Appropriate education was printed on patient's after visit summary. Discussed risks and benefits of prostate cancer screening. We discussed the controversial history of PSA screening for prostate cancer in the [facility] as well as the risk of over detection and over treatment of prostate cancer by way of PSA screening. The patient understands that PSA blood testing is an imperfect way to screen for prostate cancer and that elevated PSA levels in the blood may also be caused by infection, inflammation, prostatic trauma or manipulation, urological procedures, or by benign prostatic enlargement.     The role of the digital rectal examination in prostate cancer screening was also discussed and I discussed with him that there is large interobserver variability in the findings of digital rectal examination. Counseling:  Alcohol/drug use: discussed moderation in alcohol intake, the recommendations for healthy alcohol use, and avoidance of illicit drug use. Dental Health: discussed importance of regular tooth brushing, flossing, and dental visits. Injury prevention: discussed safety/seat belts, safety helmets, smoke detectors, carbon dioxide detectors, and smoking near bedding or upholstery. Sexual health: discussed sexually transmitted diseases, partner selection, use of condoms, avoidance of unintended pregnancy, and contraceptive alternatives. Exercise: the importance of regular exercise/physical activity was discussed. Recommend exercise 3-5 times per week for at least 30 minutes. BMI Counseling: Body mass index is 36.02 kg/m². The BMI is above normal. Nutrition recommendations include decreasing portion sizes and encouraging healthy choices of fruits and vegetables. Exercise recommendations include moderate physical activity 150 minutes/week. No pharmacotherapy was ordered. Rationale for BMI follow-up plan is due to patient being overweight or obese. Depression Screening and Follow-up Plan: Patient was screened for depression during today's encounter. They screened negative with a PHQ-2 score of 0. No follow-ups on file. Chief Complaint:     Chief Complaint   Patient presents with   • Follow-up     Patient being seen for 3 month follow up-Diabetes      History of Present Illness:     Adult Annual Physical   Patient here for a comprehensive physical exam. The patient reports no problems. Diet and Physical Activity  Diet/Nutrition: well balanced diet and consuming 3-5 servings of fruits/vegetables daily. Exercise: walking.       Depression Screening  PHQ-2/9 Depression Screening    Little interest or pleasure in doing things: 0 - not at all  Feeling down, depressed, or hopeless: 0 - not at all  PHQ-2 Score: 0  PHQ-2 Interpretation: Negative depression screen       General Health  Sleep: sleeps well and gets 7-8 hours of sleep on average. Hearing: normal - bilateral.  Vision: goes for regular eye exams. Dental: regular dental visits and brushes teeth twice daily.  Health  Symptoms include: none     Review of Systems:     Review of Systems   Constitutional: Negative. HENT: Negative. Eyes: Negative. Respiratory: Negative. Cardiovascular: Negative. Gastrointestinal: Negative. Endocrine: Negative. Genitourinary: Negative. Musculoskeletal: Negative. Skin: Negative. Allergic/Immunologic: Negative. Neurological: Negative. Hematological: Negative. Psychiatric/Behavioral: Negative.        Past Medical History:     Past Medical History:   Diagnosis Date   • Anemia    • Arthritis    • Black stool 07/24/2020   • Cancer ([address])    • Colon polyp    • Diabetes mellitus (HCC)     IDDM   • Diverticulosis    • Enlarged prostate    • Erectile dysfunction    • Hypercholesteremia     Resolved post weight loss   • Hypertension     Resolved post weight loss   • Kidney stone    • Obesity    • Prostate cancer ([address])    • Wears partial dentures     partial upper and lower      Past Surgical History:     Past Surgical History:   Procedure Laterality Date   • ABDOMINAL ADHESION SURGERY N/A 11/28/2016    Procedure: EXTENSIVE LYSIS ADHESIONS;  Surgeon: Fox Wakefield MD;  [facility];  Service:    • ANKLE FRACTURE SURGERY Left    • CHOLECYSTECTOMY     • COLON SURGERY      colon resection   • COLONOSCOPY     • COLOSTOMY     • COLOSTOMY CLOSURE     • DIAGNOSTIC LAPAROSCOPY     • GASTRIC BYPASS      failed due to adhesions   • HERNIA REPAIR      abdominal   • IL CYSTO INSERTION TRANSPROSTATIC IMPLANT SINGLE N/A 3/8/2019    Procedure: CYSTOSCOPY WITH INSERTION Lili Lapping;  Surgeon: Yehuda Castillo MD;  [facility];  Service: Urology   • IL CYSTO INSERTION TRANSPROSTATIC IMPLANT SINGLE N/A 5/8/2020    Procedure: CYSTOSCOPY WITH INSERTION Lili Lapping; Surgeon: Dani Perrin MD;  [facility];  Service: Urology   • IA CYSTOURETHROSCOPY W/INTERNAL URETHROTOMY N/A 2020    Procedure: DIRECT VISUAL INTERAL URETHROTOMY (DVIU), dilation of urethral stricture;  Surgeon: Dani Perrin MD;  [facility];  Service: Urology   • IA EDG US EXAM SURGICAL ALTER STOM DUODENUM/JEJUNUM N/A 10/25/2018    Procedure: LINEAR ENDOSCOPIC U/S;  Surgeon: Juliann Corcoran MD;  Location: [facility]; Service: Gastroenterology   • IA ESOPHAGOGASTRODUODENOSCOPY TRANSORAL DIAGNOSTIC N/A 2016    Procedure: EGD AND COLONOSCOPY;  Surgeon: Siobhan Cagle MD;  [facility];   Service: Gastroenterology   • IA LAPS [address] [place] [address] LONGITUDINAL GASTRECTOMY N/A 2016    Procedure: GASTRECTOMY SLEEVE LAPAROSCOPIC;  Surgeon: Vincent Alarcon MD;  [facility];  Service: Bariatrics   • IA PROSTATE NEEDLE BIOPSY ANY APPROACH N/A 2020    Procedure: TRANSRECTAL NEEDLE BIOPSY PROSTATE (TRNBP) WITH TRANSRECTAL ULTRASOUND GUIDANCE;  Surgeon: Dani Perrin MD;  [facility];  Service: Urology   • TONSILLECTOMY     • US GUIDED PROSTATE BIOPSY  2020      Family History:     Family History   Problem Relation Age of Onset   • Heart disease Mother    • Breast cancer Mother 80   • Diabetes Father    • Colon cancer Neg Hx    • Liver disease Neg Hx       Social History:     Social History     Socioeconomic History   • Marital status: Single     Spouse name: None   • Number of children: None   • Years of education: None   • Highest education level: None   Occupational History   • None   Tobacco Use   • Smoking status: Former     Packs/day: 0.25     Types: Cigarettes     Quit date: 11/10/2001     Years since quittin.9     Passive exposure: Past   • Smokeless tobacco: Former   Vaping Use   • Vaping Use: Never used   Substance and Sexual Activity   • Alcohol use: Not Currently   • Drug use: Not Currently     Comment: RSO   • Sexual activity: Yes Partners: Female   Other Topics Concern   • None   Social History Narrative   • None     Social Determinants of Health     Financial Resource Strain: High Risk (6/29/2022)    Overall Financial Resource Strain (CARDIA)    • Difficulty of Paying Living Expenses: Hard   Food Insecurity: Food Insecurity Present (6/29/2022)    Hunger Vital Sign    • Worried About Running Out of Food in the Last Year: Often true    • Ran Out of Food in the Last Year: Often true   Transportation Needs: No Transportation Needs (1/20/2023)    PRAPARE - Transportation    • Lack of Transportation (Medical): No    • Lack of Transportation (Non-Medical):  No   Physical Activity: Not on file   Stress: Not on file   Social Connections: Not on file   Intimate Partner Violence: Not on file   Housing Stability: Not on file      Current Medications:     Current Outpatient Medications   Medication Sig Dispense Refill   • albuterol (ProAir HFA) 90 mcg/act inhaler Inhale 2 puffs every 6 (six) hours as needed for wheezing or shortness of breath 8.5 g 0   • aluminum-magnesium hydroxide-simethicone (MYLANTA) 200-200-20 mg/5 mL suspension Take 30 mL by mouth every 6 (six) hours as needed for indigestion or heartburn 355 mL 0   • atorvastatin (LIPITOR) 10 mg tablet TAKE ONE TABLET BY MOUTH EVERY  tablet 3   • Blood Glucose Monitoring Suppl (GLUCOCARD VITAL MONITOR) w/Device KIT by Does not apply route 2 (two) times a day 1 kit 0   • Cholecalciferol (VITAMIN D3) 2000 units capsule Take 1,000 Units by mouth daily in the early morning      • Continuous Blood Gluc  (FreeStyle [place] 2 [place]) CHARITY Check blood sugars multiple times per day 1 each 0   • Continuous Blood Gluc Sensor (FreeStyle [place] 2 Sensor) MISC Check blood sugars multiple times per day 6 each 3   • Cyanocobalamin (VITAMIN B-12) 5000 MCG TBDP Take 5,000 mcg by mouth once a week Takes on Mondays     • fluticasone (FLONASE) 50 mcg/act nasal spray 2 sprays into each nostril daily (Patient taking differently: 2 sprays into each nostril if needed) 16 g 0   • fluticasone (Flovent HFA) 110 MCG/ACT inhaler Inhale 2 puffs 2 (two) times a day Rinse mouth after use. (Patient taking differently: Inhale 2 puffs if needed Rinse mouth after use.) 12 g 0   • glipiZIDE (GLUCOTROL XL) 5 mg 24 hr tablet Take 5 mg by mouth in the morning     • insulin aspart protamine-insulin aspart (NovoLOG 70/30) 100 units/mL injection Inject 15 Units under the skin daily before breakfast Do not start before July 23, 2023. (Patient taking differently: Inject 10 Units under the skin daily before breakfast) 9 mL 0   • insulin aspart protamine-insulin aspart (NovoLOG 70/30) 100 units/mL injection Inject 10 Units under the skin daily before dinner 6 mL 0   • Lancets (LIFESCAN UNISTIK 2) MISILink [employer] One Touch Ultramini Meter; use as directed; 1; 0; 31-Oct-2016; 31-Oct-2016; Melida Druan; Active     • losartan (COZAAR) 25 mg tablet Take 1 tablet (25 mg total) by mouth daily Do not start before July 23, 2023. 100 tablet 0   • metFORMIN (GLUCOPHAGE) 1000 MG tablet TAKE ONE TABLET BY MOUTH TWICE A DAY WITH MEALS 180 tablet 0   • Mirabegron ER 50 MG TB24 Take 1 tablet (50 mg total) by mouth daily at bedtime 90 tablet 3   • Multiple Vitamin (MULTIVITAMIN) capsule Take 1 capsule by mouth daily in the early morning      • pantoprazole (PROTONIX) 40 mg tablet TAKE ONE TABLET BY MOUTH EVERY DAY 90 tablet 0   • Biotin 1000 MCG tablet Take 1 tablet by mouth daily in the early morning  (Patient not taking: Reported on 10/3/2023)     • docusate sodium (COLACE) 100 mg capsule Take 100 mg by mouth daily as needed for constipation (Patient not taking: Reported on 10/3/2023)       No current facility-administered medications for this visit. Allergies:      Allergies   Allergen Reactions   • Enalapril Anaphylaxis, Throat Swelling and Hives      Physical Exam:     /70 (BP Location: Left arm, Patient Position: Sitting, Cuff Size: Standard)   Pulse 82   Temp 98.1 °F (36.7 °C) (Tympanic)   Resp 17   Ht 5' 7" (1.702 m)   Wt 104 kg (230 lb)   SpO2 96%   BMI 36.02 kg/m²     Physical Exam  Vitals and nursing note reviewed. Constitutional:       General: He is not in acute distress. Appearance: Normal appearance. He is well-developed. HENT:      Head: Normocephalic and atraumatic. Right Ear: Tympanic membrane normal.      Left Ear: Tympanic membrane normal.      Nose: Nose normal.      Mouth/Throat:      Mouth: Mucous membranes are moist.   Eyes:      Conjunctiva/sclera: Conjunctivae normal.   Cardiovascular:      Rate and Rhythm: Normal rate and regular rhythm. Pulses: Normal pulses. Heart sounds: Normal heart sounds. No murmur heard. Pulmonary:      Effort: Pulmonary effort is normal. No respiratory distress. Breath sounds: Normal breath sounds. Abdominal:      Palpations: Abdomen is soft. Tenderness: There is no abdominal tenderness. Musculoskeletal:         General: No swelling. Cervical back: Normal range of motion and neck supple. Skin:     General: Skin is warm and dry. Capillary Refill: Capillary refill takes less than 2 seconds. Neurological:      General: No focal deficit present. Mental Status: He is alert and oriented to person, place, and time.    Psychiatric:         Mood and Affect: Mood normal.          Edna Maldonado MD  [address] [place] [address]

## 2023-10-13 ENCOUNTER — APPOINTMENT (OUTPATIENT)
Dept: LAB | Facility: AMBULARY SURGERY CENTER | Age: 73
End: 2023-10-13
Payer: COMMERCIAL

## 2023-10-13 DIAGNOSIS — C61 PROSTATE CANCER (HCC): ICD-10-CM

## 2023-10-13 LAB — PSA SERPL-MCNC: 0.02 NG/ML (ref 0–4)

## 2023-10-13 PROCEDURE — 84153 ASSAY OF PSA TOTAL: CPT

## 2023-10-13 PROCEDURE — 36415 COLL VENOUS BLD VENIPUNCTURE: CPT

## 2023-10-16 ENCOUNTER — PROCEDURE VISIT (OUTPATIENT)
Dept: UROLOGY | Facility: CLINIC | Age: 73
End: 2023-10-16
Payer: COMMERCIAL

## 2023-10-16 VITALS
HEART RATE: 53 BPM | BODY MASS INDEX: 36.18 KG/M2 | DIASTOLIC BLOOD PRESSURE: 62 MMHG | WEIGHT: 231 LBS | OXYGEN SATURATION: 98 % | SYSTOLIC BLOOD PRESSURE: 128 MMHG

## 2023-10-16 DIAGNOSIS — C61 PROSTATE CANCER (HCC): Primary | ICD-10-CM

## 2023-10-16 DIAGNOSIS — N39.41 URGE INCONTINENCE OF URINE: ICD-10-CM

## 2023-10-16 DIAGNOSIS — R39.9 LOWER URINARY TRACT SYMPTOMS (LUTS): ICD-10-CM

## 2023-10-16 PROCEDURE — 99213 OFFICE O/P EST LOW 20 MIN: CPT | Performed by: UROLOGY

## 2023-10-16 NOTE — PROGRESS NOTES
Cystoscopy     Date/Time  10/16/2023 10:00 AM     Performed by  Sarahi Wallace MD   Authorized by  Sarahi Wallace MD         Procedure Details:  Procedure type: cystoscopy, injection for chemodenervation       Office Cystoscopy Procedure Note    Indication:    Medically refractory overactive bladder    Patient had a marked improvement in his urgency and urge urinary incontinence with 100 units of bladder Botox injected previously. Informed consent   The risks, benefits, complications, treatment options, and expected outcomes were discussed with the patient. The patient concurred with the proposed plan and provided informed consent. Anesthesia  Lidocaine jelly 2%    Procedure  The patient was placed in the supineposition, was prepped and draped in the usual manner using sterile technique, and 2% lidocaine jelly instilled into the urethra. A 17 F flexible cystoscope was then inserted into the urethra and the urethra and bladder carefully examined. The following findings were noted:    Findings:  Urethra:  Mild bulbar urethral stricture, able to advance with the cystoscope and subsequently placed a 16 [place] tip Bhardwaj catheter to instill lidocaine  Prostate:  Normal  Bladder:  Normal  Ureteral orifices:  Normal  Other findings:  None       Using the preplaced Bhardwaj catheter, 20 cc 2% plain lidocaine was instilled in the bladder for 15 minutes. Bladder was then drained and the Bhardwaj removed. Cystoscope reintroduced. 100 units of bladder Botox was first admixed in injectable saline according to  instructions and delivered using a supratrigonal template throughout the bladder. I elected to place a Bhardwaj catheter for 24 hours postoperatively given the dilation of the mild stricture    Specimens: None                 Complications:    None; patient tolerated the procedure well           Disposition: To home after 30 minute observation.            Condition: Stable    Plan:   Mr. Nidia Raphael will return tomorrow for a Bhardwaj removal    We will follow-up with advanced practitioner in 2 months to assess his response to Botox and plan the timing of his next injection. We can titrate up to 200 units if he experienced a partial or short-lived response to today's procedure.

## 2023-10-16 NOTE — PROGRESS NOTES
Referring Physician: Gemma Varela MD  A copy of this note was sent to the referring physician. Diagnoses and all orders for this visit:    Prostate cancer ([address])  -     PSA Total, Diagnostic; Future    Urge incontinence of urine  -     Cancel: Cystoscopy  -     Discontinue: onabotulinumtoxin A (BOTOX) injection 100 Units  -     Discontinue: onabotulinumtoxin A (BOTOX) injection 100 Units  -     Mirabegron ER 50 MG TB24; Take 1 tablet (50 mg total) by mouth daily at bedtime    Lower urinary tract symptoms (LUTS)  -     Discontinue: onabotulinumtoxin A (BOTOX) injection 100 Units            Assessment and plan:       High risk Rafael 9 prostate cancer status post excellent beam radiation therapy and androgen deprivation  -cT1c,  Pretreatment PSA 5.3, [place] 4+5=9, 1 of 12 cores, 5% total core volume ( pathology reviewed by Dr. Jaylyn Horvath)  - negative staging evaluation (CT and bone scan)  -DENISSE, undetectable PSA    2. Medically refractory BPH  -status post Uro lift, and subsequent redo procedure     3.  Urethral stricture  - status post prior dilation    4 complex voiding dysfunction with bladder overactivity and urge incontinence  - Patient has had a remarkable symptom improvement to a one-time injection of 100 unit bladder Botox  - Repeat injection was offered today but the patient is doing so well we will hold off unless his incontinence worsens in the future    At this point he will follow-up annually with a PSA prior to the visit    I asked him to call and schedule repeat Botox injection with 100 units if his incontinence symptoms worsen prior to his next appointment    Vanda Westfall reviewed for the next year    Hazel Payne MD      Chief Complaint     Follow-up prostate cancer, complex voiding dysfunction      History of Present Illness     Baljit Robledo is a 68 y.o. returns in follow-up for prostate cancer and complex voiding dysfunction    Detailed Urologic History     - please refer to HPI    Review of Systems     Review of Systems   Constitutional: Negative for activity change and fatigue. HENT: Negative for congestion. Eyes: Negative for visual disturbance. Respiratory: Negative for shortness of breath and wheezing. Cardiovascular: Negative for chest pain and leg swelling. Gastrointestinal: Negative for abdominal pain. Endocrine: Negative for polyuria. Genitourinary: Negative for dysuria, flank pain, hematuria and urgency. Musculoskeletal: Negative for back pain. Allergic/Immunologic: Negative for immunocompromised state. Neurological: Negative for dizziness and numbness. Psychiatric/Behavioral: Negative for dysphoric mood. All other systems reviewed and are negative. Allergies     Allergies   Allergen Reactions    Enalapril Anaphylaxis, Throat Swelling and Hives       Physical Exam       Physical Exam  Constitutional:       General: He is not in acute distress. Appearance: He is well-developed. HENT:      Head: Normocephalic and atraumatic. Cardiovascular:      Comments: Negative lower extremity edema  Pulmonary:      Effort: Pulmonary effort is normal.      Breath sounds: Normal breath sounds. Abdominal:      Palpations: Abdomen is soft. Musculoskeletal:         General: Normal range of motion. Cervical back: Normal range of motion. Skin:     General: Skin is warm. Neurological:      Mental Status: He is alert and oriented to person, place, and time.    Psychiatric:         Behavior: Behavior normal.           Vital Signs  Vitals:    10/16/23 1004   BP: 128/62   BP Location: Left arm   Patient Position: Sitting   Cuff Size: Large   Pulse: (!) 53   SpO2: 98%   Weight: 105 kg (231 lb)         Current Medications       Current Outpatient Medications:     albuterol (ProAir HFA) 90 mcg/act inhaler, Inhale 2 puffs every 6 (six) hours as needed for wheezing or shortness of breath, Disp: 8.5 g, Rfl: 0    aluminum-magnesium hydroxide-simethicone (MYLANTA) 200-200-20 mg/5 mL suspension, Take 30 mL by mouth every 6 (six) hours as needed for indigestion or heartburn, Disp: 355 mL, Rfl: 0    atorvastatin (LIPITOR) 10 mg tablet, TAKE ONE TABLET BY MOUTH EVERY DAY, Disp: 100 tablet, Rfl: 3    Biotin 1000 MCG tablet, Take 1 tablet by mouth if needed Per pt takes it "once in awhile", Disp: , Rfl:     Blood Glucose Monitoring Suppl ([address] [place] [address]) w/Device KIT, by Does not apply route 2 (two) times a day, Disp: 1 kit, Rfl: 0    Cholecalciferol (VITAMIN D3) 2000 units capsule, Take 1,000 Units by mouth daily in the early morning , Disp: , Rfl:     Continuous Blood Gluc  (FreeStyle [place] 2 [place]) CHARITY, Check blood sugars multiple times per day, Disp: 1 each, Rfl: 0    Continuous Blood Gluc Sensor (FreeStyle Dunia 2 Sensor) MISC, Check blood sugars multiple times per day, Disp: 6 each, Rfl: 3    Cyanocobalamin (VITAMIN B-12) 5000 MCG TBDP, Take 5,000 mcg by mouth once a week Takes on Mondays, Disp: , Rfl:     fluticasone (FLONASE) 50 mcg/act nasal spray, 2 sprays into each nostril daily (Patient taking differently: 2 sprays into each nostril if needed), Disp: 16 g, Rfl: 0    glipiZIDE (GLUCOTROL XL) 5 mg 24 hr tablet, Take 5 mg by mouth in the morning, Disp: , Rfl:     insulin aspart protamine-insulin aspart (NovoLOG 70/30) 100 units/mL injection, Inject 15 Units under the skin daily before breakfast Do not start before July 23, 2023. (Patient taking differently: Inject 10 Units under the skin daily before breakfast), Disp: 9 mL, Rfl: 0    insulin aspart protamine-insulin aspart (NovoLOG 70/30) 100 units/mL injection, Inject 10 Units under the skin daily before dinner, Disp: 6 mL, Rfl: 0    Lancets (LIFESCAN UNISTIK 2) MISC, Lifescan One Touch Ultramini Meter; use as directed; 1; 0; 31-Oct-2016; 31-Oct-2016; Melida Duran;  Active, Disp: , Rfl:     losartan (COZAAR) 25 mg tablet, Take 1 tablet (25 mg total) by mouth daily Do not start before July 23, 2023., Disp: 100 tablet, Rfl: 0    metFORMIN (GLUCOPHAGE) 1000 MG tablet, TAKE ONE TABLET BY MOUTH TWICE A DAY WITH MEALS, Disp: 180 tablet, Rfl: 0    Mirabegron ER 50 MG TB24, Take 1 tablet (50 mg total) by mouth daily at bedtime, Disp: 90 tablet, Rfl: 3    Multiple Vitamin (MULTIVITAMIN) capsule, Take 1 capsule by mouth daily in the early morning , Disp: , Rfl:     pantoprazole (PROTONIX) 40 mg tablet, TAKE ONE TABLET BY MOUTH EVERY DAY, Disp: 90 tablet, Rfl: 0    docusate sodium (COLACE) 100 mg capsule, Take 100 mg by mouth daily as needed for constipation (Patient not taking: Reported on 10/3/2023), Disp: , Rfl:     fluticasone (Flovent HFA) 110 MCG/ACT inhaler, Inhale 2 puffs 2 (two) times a day Rinse mouth after use. (Patient taking differently: Inhale 2 puffs if needed Rinse mouth after use.), Disp: 12 g, Rfl: 0  No current facility-administered medications for this visit.       Active Problems     Patient Active Problem List   Diagnosis    GERD (gastroesophageal reflux disease)    Class 2 severe obesity due to excess calories with serious comorbidity and body mass index (BMI) of 35.0 to 35.9 in adult     Hypercholesteremia    Postgastrectomy malabsorption    Type 2 diabetes mellitus with hyperglycemia, with long-term current use of insulin (HCC)    Benign essential hypertension    Benign enlargement of prostate    Pancreatic cyst    History of colon polyps    IPMN (intraductal papillary mucinous neoplasm)    ED (erectile dysfunction) of organic origin    Benign prostatic hyperplasia with urinary frequency    Bruxism    Prostate cancer (HCC)    Greater trochanteric bursitis of left hip    Primary osteoarthritis of one hip, left    Colitis    Mild asthma exacerbation    Platelets decreased ([address])    Other chest pain    Contusion of right great toe with damage to nail    Near syncope         Past Medical History     Past Medical History:   Diagnosis Date    Anemia     Arthritis     Black stool 07/24/2020 Cancer ([address])     Colon polyp     Diabetes mellitus ([address])     IDDM    Diverticulosis     Enlarged prostate     Erectile dysfunction     Hypercholesteremia     Resolved post weight loss    Hypertension     Resolved post weight loss    Kidney stone     Obesity     Prostate cancer ([address])     Wears partial dentures     partial upper and lower         Surgical History     Past Surgical History:   Procedure Laterality Date    ABDOMINAL ADHESION SURGERY N/A 11/28/2016    Procedure: EXTENSIVE LYSIS ADHESIONS;  Surgeon: Bailey Chamberlain MD;  [facility];  Service:     ANKLE FRACTURE SURGERY Left     CHOLECYSTECTOMY      COLON SURGERY      colon resection    COLONOSCOPY      COLOSTOMY      COLOSTOMY CLOSURE      DIAGNOSTIC LAPAROSCOPY      GASTRIC BYPASS      failed due to adhesions    HERNIA REPAIR      abdominal    KY CYSTO INSERTION TRANSPROSTATIC IMPLANT SINGLE N/A 3/8/2019    Procedure: CYSTOSCOPY WITH INSERTION Nelly Dietrich;  Surgeon: Tracy Maza MD;  [facility];  Service: Urology    [place] CYSTO INSERTION TRANSPROSTATIC IMPLANT SINGLE N/A 5/8/2020    Procedure: [place] WITH INSERTION Nelly Dietrich;  Surgeon: Tracy Maza MD;  [facility];  Service: Urology    [place] CYSTOURETHROSCOPY W/INTERNAL URETHROTOMY N/A 5/8/2020    Procedure: DIRECT VISUAL INTERAL URETHROTOMY (DVIU), dilation of urethral stricture;  Surgeon: Tracy Maza MD;  [facility];  Service: Urology    [place] EDG US [address] N/A 10/25/2018    Procedure: LINEAR ENDOSCOPIC U/S;  Surgeon: Radha Severino MD;  [facility]: [facility]; Service: Gastroenterology    [place] ESOPHAGOGASTRODUODENOSCOPY TRANSORAL DIAGNOSTIC N/A 7/6/2016    Procedure: EGD AND COLONOSCOPY;  Surgeon: Marion Cantu MD;  [facility];   Service: Gastroenterology    [place] [address] RSTRICTIV [address] [place] [address] LONGITUDINAL GASTRECTOMY N/A 11/28/2016    Procedure: GASTRECTOMY SLEEVE LAPAROSCOPIC;  Surgeon: Bailey Chamberlain MD;  [facility];  Service: Bariatrics    AL PROSTATE NEEDLE BIOPSY ANY APPROACH N/A 2020    Procedure: TRANSRECTAL NEEDLE BIOPSY PROSTATE (TRNBP) WITH TRANSRECTAL ULTRASOUND GUIDANCE;  Surgeon: Kip Regalado MD;  [facility];  Service: Urology    TONSILLECTOMY      US GUIDED PROSTATE BIOPSY  2020         Family History     Family History   Problem Relation Age of Onset    Heart disease Mother     Breast cancer Mother 80    Diabetes Father     Colon cancer Neg Hx     Liver disease Neg Hx          Social History     Social History     Social History     Tobacco Use   Smoking Status Former    Packs/day: 0.25    Types: Cigarettes    Quit date: 11/10/2001    Years since quittin.9    Passive exposure: Past   Smokeless Tobacco Former         Pertinent Lab Values     Lab Results   Component Value Date    CREATININE 0.78 2023       Lab Results   Component Value Date    PSA 0.02 10/13/2023    PSA <0.1 2023    PSA <0.1 2022       @RESULTRCNT(1H])@      Pertinent Imaging       No results found. Portions of the record may have been created with voice recognition software. Occasional wrong word or "sound a like" substitutions may have occurred due to the inherent limitations of voice recognition software. In addition some of the content generated from this outpatient encounter includes information designed for patient education and/or communication back to the referring provider. Read the chart carefully and recognize, using context, where substitutions have occurred.

## 2023-10-26 ENCOUNTER — OFFICE VISIT (OUTPATIENT)
Dept: PODIATRY | Facility: CLINIC | Age: 73
End: 2023-10-26
Payer: COMMERCIAL

## 2023-10-26 VITALS
DIASTOLIC BLOOD PRESSURE: 74 MMHG | HEART RATE: 63 BPM | BODY MASS INDEX: 36.18 KG/M2 | HEIGHT: 67 IN | RESPIRATION RATE: 18 BRPM | SYSTOLIC BLOOD PRESSURE: 127 MMHG

## 2023-10-26 DIAGNOSIS — E11.9 TYPE 2 DIABETES MELLITUS WITHOUT COMPLICATION, WITH LONG-TERM CURRENT USE OF INSULIN (HCC): Primary | ICD-10-CM

## 2023-10-26 DIAGNOSIS — M20.42 HAMMER TOE OF LEFT FOOT: ICD-10-CM

## 2023-10-26 DIAGNOSIS — Z79.4 TYPE 2 DIABETES MELLITUS WITHOUT COMPLICATION, WITH LONG-TERM CURRENT USE OF INSULIN (HCC): Primary | ICD-10-CM

## 2023-10-26 DIAGNOSIS — L84 CORN OR CALLUS: ICD-10-CM

## 2023-10-26 PROCEDURE — 99213 OFFICE O/P EST LOW 20 MIN: CPT | Performed by: PODIATRIST

## 2023-10-26 NOTE — PROGRESS NOTES
Assessment/Plan:    Discussed principles of diabetic foot care. Vascular status is within normal limits and sensorium is intact. Patient has a soft corn due to a malaligned hammertoe of the left fifth toe. Treatment consisted of trimming of the lesion but this has never been successful for this patient. Recommended hammertoe correction via derotational arthroplasty left fourth toe. The procedure was discussed including pre and postoperative course. Patient will be rescheduled for consent signing and x-rays. It has been scheduled for December 1 at [facility].  No problem-specific Assessment & Plan notes found for this encounter. Diagnoses and all orders for this visit:    Type 2 diabetes mellitus without complication, with long-term current use of insulin (HCC)    Hammer toe of left foot    Corn or callus          Subjective:      Patient ID: Thien Wallis is a 68 y.o. male. HPI    Patient, a 70-year-old type II diabetic male presents for yearly diabetic foot exam and to discuss treatment options for his painful soft corn left fifth toe. Patient was treated for for this painful soft corn last year with palliative care but it was not successful. He is in chronic pain. It is noted that his blood sugar is much improved and he is a candidate for surgical intervention. The patient denies numbness and tingling in his feet. I personally reviewed an A1c dated 7/30/2023. It was 6.9. I personally reviewed an A1c dated 7/21/2023. It was 6.8. The following portions of the patient's history were reviewed and updated as appropriate: allergies, current medications, past family history, past medical history, past social history, past surgical history, and problem list.    Review of Systems   Musculoskeletal:  Positive for arthralgias. Neurological:  Negative for numbness. Psychiatric/Behavioral: Negative.                Objective:      /74   Pulse 63   Resp 18   Ht 5' 7" (1.702 m)   BMI 36.18 kg/m²          Physical Exam  Cardiovascular:      Pulses: no weak pulses  Musculoskeletal:      Left foot: Bony tenderness present. Feet:      Right foot:      Skin integrity: No ulcer, skin breakdown, erythema, warmth, callus or dry skin. Left foot:      Skin integrity: Callus present. Diabetic Foot Exam    Patient's shoes and socks removed. Right Foot/Ankle   Right Foot Inspection  Skin Exam: skin normal and skin intact. No dry skin, no warmth, no callus, no erythema, no maceration, no abnormal color, no pre-ulcer, no ulcer and no callus. Toe Exam: ROM and strength within normal limits. Sensory   Vibration: intact  Proprioception: intact  Monofilament testing: intact    Vascular  Capillary refills: < 3 seconds      Right Toe  - Comprehensive Exam  Ecchymosis: none  Arch: normal  Hammertoes: absent  Claw Toes: absent  Swelling: none   Tenderness: none       Left Foot/Ankle  Left Foot Inspection  Skin Exam: skin intact and callus. Toe Exam: left toe deformity.      Sensory   Vibration: intact  Proprioception: intact  Monofilament testing: intact    Vascular  Capillary refills: < 3 seconds      Left Toe  - Comprehensive Exam  Ecchymosis: none  Arch: normal  Hammertoes: fifth toe  Claw toes: absent  Swelling: none   Tenderness: bony tenderness       Assign Risk Category  Deformity present  No loss of protective sensation  No weak pulses  Risk: 0

## 2023-10-29 DIAGNOSIS — I10 BENIGN ESSENTIAL HYPERTENSION: ICD-10-CM

## 2023-10-30 RX ORDER — LOSARTAN POTASSIUM 25 MG/1
25 TABLET ORAL DAILY
Qty: 100 TABLET | Refills: 1 | Status: SHIPPED | OUTPATIENT
Start: 2023-10-30

## 2023-11-01 DIAGNOSIS — E11.65 TYPE 2 DIABETES MELLITUS WITH HYPERGLYCEMIA, WITH LONG-TERM CURRENT USE OF INSULIN (HCC): ICD-10-CM

## 2023-11-01 DIAGNOSIS — Z79.4 TYPE 2 DIABETES MELLITUS WITH HYPERGLYCEMIA, WITH LONG-TERM CURRENT USE OF INSULIN (HCC): ICD-10-CM

## 2023-11-01 RX ORDER — INSULIN ASPART 100 [IU]/ML
INJECTION, SUSPENSION SUBCUTANEOUS
Qty: 30 ML | Refills: 0 | Status: SHIPPED | OUTPATIENT
Start: 2023-11-01

## 2023-11-02 ENCOUNTER — OFFICE VISIT (OUTPATIENT)
Dept: PODIATRY | Facility: CLINIC | Age: 73
End: 2023-11-02
Payer: COMMERCIAL

## 2023-11-02 DIAGNOSIS — Z01.818 PRE-OP EVALUATION: Primary | ICD-10-CM

## 2023-11-02 DIAGNOSIS — M20.42 HAMMER TOE OF LEFT FOOT: Primary | ICD-10-CM

## 2023-11-02 DIAGNOSIS — Z79.4 TYPE 2 DIABETES MELLITUS WITHOUT COMPLICATION, WITH LONG-TERM CURRENT USE OF INSULIN (HCC): ICD-10-CM

## 2023-11-02 DIAGNOSIS — E11.9 TYPE 2 DIABETES MELLITUS WITHOUT COMPLICATION, WITH LONG-TERM CURRENT USE OF INSULIN (HCC): ICD-10-CM

## 2023-11-02 PROCEDURE — 99212 OFFICE O/P EST SF 10 MIN: CPT | Performed by: PODIATRIST

## 2023-11-02 NOTE — PROGRESS NOTES
Patient presents for consent signing. He is scheduled for a derotational arthroplasty left fifth toe on December 1, 2023 at [facility].    The procedure was explained including pre and postoperative course, risk and possible complications. Consent form was signed. We will utilize Tylenol 3 for pain relief.

## 2023-11-17 ENCOUNTER — CONSULT (OUTPATIENT)
Dept: FAMILY MEDICINE CLINIC | Facility: CLINIC | Age: 73
End: 2023-11-17
Payer: COMMERCIAL

## 2023-11-17 VITALS
HEIGHT: 67 IN | TEMPERATURE: 96.1 F | SYSTOLIC BLOOD PRESSURE: 128 MMHG | HEART RATE: 58 BPM | DIASTOLIC BLOOD PRESSURE: 64 MMHG | WEIGHT: 233 LBS | OXYGEN SATURATION: 97 % | BODY MASS INDEX: 36.57 KG/M2 | RESPIRATION RATE: 18 BRPM

## 2023-11-17 DIAGNOSIS — Z79.4 TYPE 2 DIABETES MELLITUS WITH HYPERGLYCEMIA, WITH LONG-TERM CURRENT USE OF INSULIN (HCC): Primary | Chronic | ICD-10-CM

## 2023-11-17 DIAGNOSIS — M20.42 HAMMER TOE OF LEFT FOOT: ICD-10-CM

## 2023-11-17 DIAGNOSIS — Z01.818 PREOPERATIVE CLEARANCE: ICD-10-CM

## 2023-11-17 DIAGNOSIS — E11.65 TYPE 2 DIABETES MELLITUS WITH HYPERGLYCEMIA, WITH LONG-TERM CURRENT USE OF INSULIN (HCC): Primary | Chronic | ICD-10-CM

## 2023-11-17 LAB — SL AMB POCT HEMOGLOBIN AIC: 7.1 (ref ?–6.5)

## 2023-11-17 PROCEDURE — 99214 OFFICE O/P EST MOD 30 MIN: CPT | Performed by: FAMILY MEDICINE

## 2023-11-17 PROCEDURE — 93000 ELECTROCARDIOGRAM COMPLETE: CPT | Performed by: FAMILY MEDICINE

## 2023-11-17 PROCEDURE — 83036 HEMOGLOBIN GLYCOSYLATED A1C: CPT | Performed by: FAMILY MEDICINE

## 2023-11-17 RX ORDER — INSULIN ASPART 100 [IU]/ML
10 INJECTION, SUSPENSION SUBCUTANEOUS
Qty: 6 ML | Refills: 0
Start: 2023-11-17 | End: 2024-01-16

## 2023-11-17 NOTE — PROGRESS NOTES
PRE-OPERATIVE EXAMINATION  Ata Gomes  1950    Ata Gomes is a 68 y.o. male with T2DM, asthma, prostate cancer, HTN, GERD, arthritis who is planning to undergo REPAIR HAMMERTOE / MALLET TOE / CLAW TOE (Left: Foot) under IV sedation by Dr. Jennifer Tolentino on 12/1/23. Patient has not  had complications with anesthesia in the past.    ROS:   Chest pain: no   Shortness of breath: no  Shortness of breath with exertion: no  Orthopnea: no  Dizziness: no  Unexplained weight change: no    PMH:  CAD: no  HTN: yes  CKD: no, GFR 89  DM: yes on insulin: yes  History of CVA: no     reports that he quit smoking about 22 years ago. His smoking use included cigarettes. He smoked an average of .25 packs per day. He has been exposed to tobacco smoke. He has quit using smokeless tobacco. He reports that he does not currently use alcohol. He reports that he does not currently use drugs. /64 (BP Location: Left arm, Patient Position: Sitting, Cuff Size: Large)   Pulse 58   Temp (!) 96.1 °F (35.6 °C) (Tympanic)   Resp 18   Ht 5' 7" (1.702 m)   Wt 106 kg (233 lb)   SpO2 97%   BMI 36.49 kg/m²   Physical Exam  Vitals reviewed. Constitutional:       General: He is not in acute distress. Appearance: Normal appearance. He is not toxic-appearing. HENT:      Head: Normocephalic and atraumatic. Right Ear: Tympanic membrane normal.      Left Ear: Tympanic membrane normal.   Eyes:      Extraocular Movements: Extraocular movements intact. Cardiovascular:      Rate and Rhythm: Normal rate and regular rhythm. Heart sounds: No murmur heard. Pulmonary:      Effort: Pulmonary effort is normal. No respiratory distress. Breath sounds: Normal breath sounds. No wheezing or rales. Abdominal:      General: There is no distension. Palpations: Abdomen is soft. There is no mass. Tenderness: There is no abdominal tenderness. There is no guarding or rebound. Hernia: No hernia is present.    Musculoskeletal: Right lower leg: No edema. Left lower leg: No edema. Skin:     General: Skin is warm. Neurological:      Mental Status: He is alert and oriented to person, place, and time. Psychiatric:         Mood and Affect: Mood normal.         Behavior: Behavior normal.         Revised Cardiac Risk Index (RCRI) for Pre-Operative Risk   (estimates risk of cardiac complications after noncardiac surgery)    High-risk surgery: No 0   History of ischemic heart disease: No 0  History of CHF: No 0   History of cerebrovascular disease: No 0   Pre-operative treatment with insulin: Yes +1  Pre-operative creatinine >2 mg/dL: No    RCRI [place] point: Class II Risk, 6.0 % Risk of Major Cardiac Event      Functional Capacity Levels    Good (7-10 METs):   - Does a lot of yard work   - climbs 2 flights of stairs   - Walk a city block   - Do heavy work I.e scrubbing floors, lifting or moving furniture  - Moderate recreational activities I.e golf,bowling,dancing, doubles tennis          Lab Results   Component Value Date    CREATININE 0.78 2023       Adriana Andrew was seen today for pre-op exam.    Diagnoses and all orders for this visit:    Type 2 diabetes mellitus with hyperglycemia, with long-term current use of insulin ([facility])  -     Cancel: POCT hemoglobin A1c  -     insulin aspart protamine-insulin aspart (NovoLOG /30) 100 units/mL injection; Inject 10 Units under the skin 2 (two) times a day before meals  -     POCT hemoglobin A1c    Preoperative clearance  -     POCT ECG    Hammer toe of left foot        Recommendations:  Thien Wallis is undergoing an elective Low Risk surgery, REPAIR HAMMERTOE / MALLET TOE / CLAW TOE (Left: Foot) . History of diabetes. A1c today 7.1 up from 6.9. On Novolog  10 units BID, metformin 100 mg BID, and glipizide 5 mg daily. Continue current regimen. He is RCRI class II risk (1 points for DM) with 6.9% risk for major adverse cardiac event (MACE).          1. Preoperative workup as follows ECG. Sinus bradycardia ( on BB) without ischemic findings  2. Change in medication regimen before surgery:  hold metformin, insulin, and glipizide morning of surgery . 3. Prophylaxis for cardiac events with perioperative beta-blockers: not indicated. 4. Invasive hemodynamic monitoring perioperatively: at the discretion of anesthesiologist.  5. Deep vein thrombosis prophylaxis postoperatively:regimen to be chosen by surgical team.  6. Surveillance for postoperative MI with ECG immediately postoperatively and on postoperative days 1 and 2 AND troponin levels 24 hours postoperatively and on day 4 or hospital discharge (whichever comes first): not indicated. He may proceed with surgery as planned without further workup.         Tasia Westbrook M.D.

## 2023-11-24 ENCOUNTER — APPOINTMENT (OUTPATIENT)
Dept: LAB | Facility: AMBULARY SURGERY CENTER | Age: 73
End: 2023-11-24
Payer: COMMERCIAL

## 2023-11-24 ENCOUNTER — TELEPHONE (OUTPATIENT)
Dept: OTHER | Facility: OTHER | Age: 73
End: 2023-11-24

## 2023-11-24 DIAGNOSIS — I10 BENIGN ESSENTIAL HYPERTENSION: ICD-10-CM

## 2023-11-24 DIAGNOSIS — Z79.4 TYPE 2 DIABETES MELLITUS WITH HYPERGLYCEMIA, WITH LONG-TERM CURRENT USE OF INSULIN (HCC): Chronic | ICD-10-CM

## 2023-11-24 DIAGNOSIS — E11.65 TYPE 2 DIABETES MELLITUS WITH HYPERGLYCEMIA, WITH LONG-TERM CURRENT USE OF INSULIN (HCC): Chronic | ICD-10-CM

## 2023-11-24 DIAGNOSIS — Z01.818 PRE-OP EVALUATION: ICD-10-CM

## 2023-11-24 DIAGNOSIS — C61 PROSTATE CANCER (HCC): ICD-10-CM

## 2023-11-24 LAB
ANION GAP SERPL CALCULATED.3IONS-SCNC: 11 MMOL/L
BASOPHILS # BLD AUTO: 0.06 THOUSANDS/ÂΜL (ref 0–0.1)
BASOPHILS NFR BLD AUTO: 1 % (ref 0–1)
BUN SERPL-MCNC: 19 MG/DL (ref 5–25)
CALCIUM SERPL-MCNC: 9 MG/DL (ref 8.4–10.2)
CHLORIDE SERPL-SCNC: 104 MMOL/L (ref 96–108)
CO2 SERPL-SCNC: 25 MMOL/L (ref 21–32)
CREAT SERPL-MCNC: 0.8 MG/DL (ref 0.6–1.3)
EOSINOPHIL # BLD AUTO: 0.19 THOUSAND/ÂΜL (ref 0–0.61)
EOSINOPHIL NFR BLD AUTO: 3 % (ref 0–6)
ERYTHROCYTE [DISTWIDTH] IN BLOOD BY AUTOMATED COUNT: 13.4 % (ref 11.6–15.1)
GFR SERPL CREATININE-BSD FRML MDRD: 88 ML/MIN/1.73SQ M
GLUCOSE SERPL-MCNC: 115 MG/DL (ref 65–140)
HCT VFR BLD AUTO: 42.1 % (ref 36.5–49.3)
HGB BLD-MCNC: 13.4 G/DL (ref 12–17)
IMM GRANULOCYTES # BLD AUTO: 0.03 THOUSAND/UL (ref 0–0.2)
IMM GRANULOCYTES NFR BLD AUTO: 0 % (ref 0–2)
LYMPHOCYTES # BLD AUTO: 0.77 THOUSANDS/ÂΜL (ref 0.6–4.47)
LYMPHOCYTES NFR BLD AUTO: 12 % (ref 14–44)
MCH RBC QN AUTO: 30 PG (ref 26.8–34.3)
MCHC RBC AUTO-ENTMCNC: 31.8 G/DL (ref 31.4–37.4)
MCV RBC AUTO: 94 FL (ref 82–98)
MONOCYTES # BLD AUTO: 0.71 THOUSAND/ÂΜL (ref 0.17–1.22)
MONOCYTES NFR BLD AUTO: 11 % (ref 4–12)
NEUTROPHILS # BLD AUTO: 4.91 THOUSANDS/ÂΜL (ref 1.85–7.62)
NEUTS SEG NFR BLD AUTO: 73 % (ref 43–75)
NRBC BLD AUTO-RTO: 0 /100 WBCS
PLATELET # BLD AUTO: 173 THOUSANDS/UL (ref 149–390)
PMV BLD AUTO: 12.8 FL (ref 8.9–12.7)
POTASSIUM SERPL-SCNC: 4.6 MMOL/L (ref 3.5–5.3)
RBC # BLD AUTO: 4.47 MILLION/UL (ref 3.88–5.62)
SODIUM SERPL-SCNC: 140 MMOL/L (ref 135–147)
WBC # BLD AUTO: 6.67 THOUSAND/UL (ref 4.31–10.16)

## 2023-11-24 PROCEDURE — 36415 COLL VENOUS BLD VENIPUNCTURE: CPT

## 2023-11-24 PROCEDURE — 80048 BASIC METABOLIC PNL TOTAL CA: CPT

## 2023-11-24 PROCEDURE — 85025 COMPLETE CBC W/AUTO DIFF WBC: CPT

## 2023-11-24 NOTE — PRE-PROCEDURE INSTRUCTIONS
Pre-Surgery Instructions:   Medication Instructions    albuterol (ProAir HFA) 90 mcg/act inhaler Uses PRN- OK to take day of surgery    aluminum-magnesium hydroxide-simethicone (MYLANTA) 200-200-20 mg/5 mL suspension Hold day of surgery. atorvastatin (LIPITOR) 10 mg tablet Take day of surgery. Biotin 1000 MCG tablet Stop taking 7 days prior to surgery. Cholecalciferol (VITAMIN D3) 2000 units capsule Stop taking 7 days prior to surgery. Cyanocobalamin (VITAMIN B-12) 5000 MCG TBDP Stop taking 7 days prior to surgery. docusate sodium (COLACE) 100 mg capsule Hold day of surgery. fluticasone (FLONASE) 50 mcg/act nasal spray Uses PRN- OK to take day of surgery    fluticasone (Flovent HFA) 110 MCG/ACT inhaler Uses PRN- OK to take day of surgery    glipiZIDE (GLUCOTROL XL) 5 mg 24 hr tablet Hold day of surgery. insulin aspart protamine-insulin aspart (NovoLOG 70/30) 100 units/mL injection Instructions provided by MD--half dose night before surgery    Lancets (LIFESCAN UNISTIK 2) MISC Hold day of surgery. losartan (COZAAR) 25 mg tablet Hold day of surgery. metFORMIN (GLUCOPHAGE) 1000 MG tablet Hold day of surgery. Mirabegron ER 50 MG TB24 Take night before surgery    Multiple Vitamin (MULTIVITAMIN) capsule Stop taking 7 days prior to surgery. pantoprazole (PROTONIX) 40 mg tablet Uses PRN- OK to take day of surgery      . Medication instructions for day surgery reviewed. Please use only a sip of water to take your instructed medications. Avoid all over the counter vitamins, supplements and NSAIDS for one week prior to surgery per anesthesia guidelines. Tylenol is ok to take as needed. You will receive a call one business day prior to surgery with an arrival time and hospital directions. If your surgery is scheduled on a Monday, the hospital will be calling you on the Friday prior to your surgery.  If you have not heard from anyone by 8pm, please call the hospital supervisor through the hospital  at [phone]. Jessie Adamson [phone]). Do not eat or drink anything after midnight the night before your surgery, including candy, mints, lifesavers, or chewing gum. Do not drink alcohol 24hrs before your surgery. Try not to smoke at least 24hrs before your surgery. Follow the pre surgery showering instructions as listed in the [facility] Surgical Experience Booklet” or otherwise provided by your surgeon's office. Do not use a blade to shave the surgical area 1 week before surgery. It is okay to use a clean electric clippers up to 24 hours before surgery. Do not apply any lotions, creams, including makeup, cologne, deodorant, or perfumes after showering on the day of your surgery. Do not use dry shampoo, hair spray, hair gel, or any type of hair products. No contact lenses, eye make-up, or artificial eyelashes. Remove nail polish, including gel polish, and any artificial, gel, or acrylic nails if possible. Remove all jewelry including rings and body piercing jewelry. Wear causal clothing that is easy to take on and off. Consider your type of surgery. Keep any valuables, jewelry, piercings at home. Please bring any specially ordered equipment (sling, braces) if indicated. Arrange for a responsible person to drive you to and from the hospital on the day of your surgery. Visitor Guidelines discussed. Call the surgeon's office with any new illnesses, exposures, or additional questions prior to surgery. Please reference your [facility] Surgical Experience Booklet” for additional information to prepare for your upcoming surgery.

## 2023-11-24 NOTE — TELEPHONE ENCOUNTER
Please fax recent EKG done in the office on 11/17/23 to [phone]    They do not have MUSE to be able to view the EKG done in the office

## 2023-11-28 ENCOUNTER — OFFICE VISIT (OUTPATIENT)
Dept: FAMILY MEDICINE CLINIC | Facility: CLINIC | Age: 73
End: 2023-11-28
Payer: COMMERCIAL

## 2023-11-28 VITALS
BODY MASS INDEX: 36.26 KG/M2 | SYSTOLIC BLOOD PRESSURE: 130 MMHG | HEART RATE: 58 BPM | OXYGEN SATURATION: 98 % | DIASTOLIC BLOOD PRESSURE: 70 MMHG | HEIGHT: 67 IN | TEMPERATURE: 95.6 F | RESPIRATION RATE: 18 BRPM | WEIGHT: 231 LBS

## 2023-11-28 DIAGNOSIS — J45.901 MILD ASTHMA EXACERBATION: ICD-10-CM

## 2023-11-28 DIAGNOSIS — M70.21 OLECRANON BURSITIS OF RIGHT ELBOW: Primary | ICD-10-CM

## 2023-11-28 PROBLEM — M70.31 BURSITIS OF RIGHT ELBOW: Status: ACTIVE | Noted: 2023-11-28

## 2023-11-28 PROCEDURE — 99213 OFFICE O/P EST LOW 20 MIN: CPT | Performed by: STUDENT IN AN ORGANIZED HEALTH CARE EDUCATION/TRAINING PROGRAM

## 2023-11-28 RX ORDER — ALBUTEROL SULFATE 90 UG/1
2 AEROSOL, METERED RESPIRATORY (INHALATION) EVERY 6 HOURS PRN
Qty: 8.5 G | Refills: 0 | Status: SHIPPED | OUTPATIENT
Start: 2023-11-28

## 2023-11-28 NOTE — ASSESSMENT & PLAN NOTE
Erythema, swelling, and point tenderness of olecranon bursa. Patient is afebrile. Advise rest, ice, elevation and compression. Additional information provided in AVS. ED precautions provided.

## 2023-11-28 NOTE — PATIENT INSTRUCTIONS
Elbow Bursitis   WHAT YOU NEED TO KNOW:   Elbow bursitis is inflammation of the bursa in your elbow. The bursa is a fluid-filled sac that acts as a cushion between a bone and a tendon. A tendon is a cord of strong tissue that connects muscles to bones. The bursa is located right under the point of your elbow. DISCHARGE INSTRUCTIONS:   Call your doctor if:   Your pain and swelling increase. Your symptoms do not improve after 10 days of treatment. You have a fever. You have questions or concerns about your condition or care. Medicines: You may need any of the following:  NSAIDs , such as ibuprofen, help decrease swelling, pain, and fever. NSAIDs can cause stomach bleeding or kidney problems in certain people. If you take blood thinner medicine, always ask your healthcare provider if NSAIDs are safe for you. Always read the medicine label and follow directions. Aspirin  helps relieve pain and swelling. Take aspirin exactly as directed by your healthcare provider. Antibiotics  help fight an infection caused by bacteria. Steroids  help decrease pain and swelling. Steroids may be given for a short time to relieve acute pain. Take your medicine as directed. Contact your healthcare provider if you think your medicine is not helping or if you have side effects. Tell your provider if you are allergic to any medicine. Keep a list of the medicines, vitamins, and herbs you take. Include the amounts, and when and why you take them. Bring the list or the pill bottles to follow-up visits. Carry your medicine list with you in case of an emergency. Manage elbow bursitis:   Rest your elbow as much as possible to decrease pain and swelling. Slowly start to do more each day. Return to your daily activities as directed. Do not  bend your elbow all the way or lift anything heavy while your elbow is healing.  An occupational therapist can help you find ways to keep your elbow rested that will help with the type of work you do. For example, arm rests that do not touch the elbow can make it easier to work at a computer. Use an elbow pad while your elbow heals. An elbow pad will cushion and protect your elbow. Your healthcare provider can tell you the kind of elbow pad to use. He or she can also tell you how long to wear it each day, and for how many days to use it. Apply ice to help decrease swelling and pain. Use an ice pack, or put crushed ice in a plastic bag. Cover the bag with a towel before you place it on your elbow. Apply ice for 15 to 20 minutes, 3 to 4 times each day, as directed. Use compression to help decrease swelling. Healthcare providers may wrap your arm with tape or an elastic bandage. Loosen the elastic bandage if you start to lose feeling in your fingers. Elevate (raise) your elbow above the level of your heart as often as you can. This will help decrease swelling and pain. Prop your elbow on pillows or blankets to keep it elevated comfortably. Go to physical therapy, if directed. A physical therapist teaches you exercises to help improve movement and strength, and to decrease pain. Prevent elbow bursitis:   Avoid injury and pressure to your elbows. Wear elbow pads or protectors when you play sports. Do not lean on your elbows or clench your fists. Do not tightly  small items, such as tools or pens. Stretch, warm up, and cool down. Always stretch and do warmup and cool-down exercises before and after you exercise. This will help loosen your muscles and decrease stress on your elbow. Rest between workouts. Follow up with your doctor as directed:  Write down your questions so you remember to ask them during your visits. © Copyright Ruthie Morin 2023 Information is for End User's use only and may not be sold, redistributed or otherwise used for commercial purposes. The above information is an  only.  It is not intended as medical advice for individual conditions or treatments. Talk to your doctor, nurse or pharmacist before following any medical regimen to see if it is safe and effective for you.

## 2023-11-28 NOTE — PROGRESS NOTES
Name: Ariel Garsia      : 1950      MRN: [id number]  Encounter Provider: Marcell Hull MD  Encounter Date: 2023   Encounter department: [facility]     1. Olecranon bursitis of right elbow  Assessment & Plan:  Erythema, swelling, and point tenderness of olecranon bursa. Patient is afebrile. Advise rest, ice, elevation and compression. Additional information provided in AVS. ED precautions provided. Orders:  -     Diclofenac Sodium (VOLTAREN) 1 %; Apply 2 g topically 4 (four) times a day    2. Mild asthma exacerbation  -     albuterol (ProAir HFA) 90 mcg/act inhaler; Inhale 2 puffs every 6 (six) hours as needed for wheezing or shortness of breath           Subjective     HPI  Elbow Pain: Patient complains of right elbow pain. Onset of the symptoms was a week ago. Inciting event:  itching, scaling, scabbing . Current symptoms include point tenderness at lateral elbow, redness, and swelling. Pain is aggravated by:  direct contact . Patient's overall course: gradually worsening. Patient has had no prior elbow problems. Evaluation to date: none. Treatment to date: OTC analgesics (Tylenol). Review of Systems   Constitutional:  Negative for chills and fever. HENT:  Negative for congestion, rhinorrhea and sinus pressure. Respiratory:  Negative for chest tightness, shortness of breath and wheezing. Cardiovascular:  Negative for chest pain and palpitations. Gastrointestinal:  Negative for abdominal pain, nausea and vomiting. Endocrine: Negative for polyuria. Genitourinary:  Negative for difficulty urinating, dysuria, frequency and urgency. Musculoskeletal:  Negative for myalgias. Neurological:  Negative for dizziness, syncope and light-headedness. Psychiatric/Behavioral:  Negative for dysphoric mood.         Past Medical History:   Diagnosis Date    Anemia     Arthritis     Black stool 2020    Cancer [facility])     Colon polyp     Diabetes mellitus ([address])     IDDM    Diverticulosis     Enlarged prostate     Erectile dysfunction     Hypercholesteremia     Resolved post weight loss    Hypertension     Resolved post weight loss    Kidney stone     Obesity     Prostate cancer ([address])     Sleep apnea     resolved with weight loss    Wears partial dentures     partial upper and lower     Past Surgical History:   Procedure Laterality Date    ABDOMINAL ADHESION SURGERY N/A 11/28/2016    Procedure: EXTENSIVE LYSIS ADHESIONS;  Surgeon: Lesley Tony MD;  [facility];  Service:     ANKLE FRACTURE SURGERY Left     CHOLECYSTECTOMY      COLON SURGERY      colon resection    COLONOSCOPY      COLOSTOMY      COLOSTOMY CLOSURE      DIAGNOSTIC LAPAROSCOPY      GASTRIC BYPASS      failed due to adhesions    HERNIA REPAIR      abdominal    AL CYSTO INSERTION TRANSPROSTATIC IMPLANT SINGLE N/A 3/8/2019    Procedure: CYSTOSCOPY WITH INSERTION Gallant Cesar;  Surgeon: Lorenzo Monterroso MD;  [facility];  Service: Urology    AL CYSTO INSERTION TRANSPROSTATIC IMPLANT SINGLE N/A 5/8/2020    Procedure: [place] WITH INSERTION Gallant Cesar;  Surgeon: Lorenzo Monterroso MD;  [facility];  Service: Urology    AL CYSTOURETHROSCOPY W/INTERNAL URETHROTOMY N/A 5/8/2020    Procedure: DIRECT VISUAL INTERAL URETHROTOMY (DVIU), dilation of urethral stricture;  Surgeon: Lorenzo Monterroso MD;  [facility];  Service: Urology    AL EDG US [address] N/A 10/25/2018    Procedure: LINEAR ENDOSCOPIC U/S;  Surgeon: Andre Colmenares MD;  [facility]: [facility]; Service: Gastroenterology    AL ESOPHAGOGASTRODUODENOSCOPY TRANSORAL DIAGNOSTIC N/A 7/6/2016    Procedure: EGD AND COLONOSCOPY;  Surgeon: Curt Dill MD;  [facility];   Service: Gastroenterology    AL [address] LONGITUDINAL GASTRECTOMY N/A 11/28/2016    Procedure: GASTRECTOMY SLEEVE LAPAROSCOPIC;  Surgeon: Lesley Tony MD;  [facility];  Service: Bariatrics    WA PROSTATE NEEDLE BIOPSY ANY APPROACH N/A 2020    Procedure: TRANSRECTAL NEEDLE BIOPSY PROSTATE (TRNBP) WITH TRANSRECTAL ULTRASOUND GUIDANCE;  Surgeon: Brett Owen MD;  [facility];  Service: Urology    [address] [facility] BIOPSY  2020     Family History   Problem Relation Age of Onset    Heart disease Mother     Breast cancer Mother 80    Diabetes Father     Colon cancer Neg Hx     Liver disease Neg Hx      Social History     Socioeconomic History    Marital status: Single     Spouse name: None    Number of children: None    Years of education: None    Highest education level: None   Occupational History    None   Tobacco Use    Smoking status: Former     Packs/day: 0.25     Types: Cigarettes     Quit date: 11/10/2001     Years since quittin.0     Passive exposure: Past    Smokeless tobacco: Former   Vaping Use    Vaping Use: Never used   Substance and Sexual Activity    Alcohol use: Not Currently    Drug use: Not Currently     Comment: RSO    Sexual activity: Yes     Partners: Female   Other Topics Concern    None   Social History Narrative    None     Social Determinants of Health     Financial Resource Strain: High Risk (2022)    Overall Financial Resource Strain (CARDIA)     Difficulty of Paying Living Expenses: Hard   Food Insecurity: Food Insecurity Present (2022)    Hunger Vital Sign     Worried About Running Out of Food in the Last Year: Often true     Ran Out of Food in the Last Year: Often true   Transportation Needs: No Transportation Needs (2023)    PRAPARE - Transportation     Lack of Transportation (Medical): No     Lack of Transportation (Non-Medical):  No   Physical Activity: Not on file   Stress: Not on file   Social Connections: Not on file   Intimate Partner Violence: Not on file   Housing Stability: Not on file     Current Outpatient Medications on File Prior to Visit   Medication Sig    aluminum-magnesium hydroxide-simethicone (MYLANTA) 200-200-20 mg/5 mL suspension Take 30 mL by mouth every 6 (six) hours as needed for indigestion or heartburn    atorvastatin (LIPITOR) 10 mg tablet TAKE ONE TABLET BY MOUTH EVERY DAY    Biotin 1000 MCG tablet Take 1 tablet by mouth if needed Per pt takes it "once in awhile"    Blood Glucose Monitoring Suppl (GLUCOCARD VITAL MONITOR) w/Device KIT by Does not apply route 2 (two) times a day    Cholecalciferol (VITAMIN D3) 2000 units capsule Take 1,000 Units by mouth daily in the early morning     Continuous Blood Gluc  (FreeStyle [place] 2 [place]) CHARITY Check blood sugars multiple times per day    Continuous Blood Gluc Sensor (FreeStyle [place] 2 Sensor) MISC Check blood sugars multiple times per day    Cyanocobalamin (VITAMIN B-12) 5000 MCG TBDP Take 5,000 mcg by mouth once a week Takes on Mondays    docusate sodium (COLACE) 100 mg capsule Take 100 mg by mouth daily as needed for constipation    fluticasone (Flovent HFA) 110 MCG/ACT inhaler Inhale 2 puffs 2 (two) times a day Rinse mouth after use. (Patient taking differently: Inhale 2 puffs if needed Rinse mouth after use.)    glipiZIDE (GLUCOTROL XL) 5 mg 24 hr tablet Take 5 mg by mouth in the morning    insulin aspart protamine-insulin aspart (NovoLOG 70/30) 100 units/mL injection Inject 10 Units under the skin 2 (two) times a day before meals    Lancets (LIFESCAN UNISTIK 2) MISC Lifescan One Touch Ultramini Meter; use as directed; 1; 0; 31-Oct-2016; 31-Oct-2016; Melida Duran;  Active    losartan (COZAAR) 25 mg tablet TAKE ONE TABLET BY MOUTH EVERY DAY    metFORMIN (GLUCOPHAGE) 1000 MG tablet TAKE ONE TABLET BY MOUTH TWICE A DAY WITH MEALS    Mirabegron ER 50 MG TB24 Take 1 tablet (50 mg total) by mouth daily at bedtime    Multiple Vitamin (MULTIVITAMIN) capsule Take 1 capsule by mouth daily in the early morning     NovoLOG Mix 70/30 FlexPen (70-30) 100 units/mL injection pen INJECT 20 UNITS UNDER THE SKIN TWO TIMES A DAY WITH MEALS    pantoprazole (PROTONIX) 40 mg tablet TAKE ONE TABLET BY MOUTH EVERY DAY (Patient taking differently: Take 40 mg by mouth every other day)    [DISCONTINUED] albuterol (ProAir HFA) 90 mcg/act inhaler Inhale 2 puffs every 6 (six) hours as needed for wheezing or shortness of breath    fluticasone (FLONASE) 50 mcg/act nasal spray 2 sprays into each nostril daily (Patient not taking: Reported on 11/28/2023)     Allergies   Allergen Reactions    Enalapril Anaphylaxis, Throat Swelling and Hives     Immunization History   Administered Date(s) Administered    COVID-19 PFIZER VACCINE 0.3 ML IM 02/23/2021, 03/14/2021, 10/07/2021    INFLUENZA 10/31/2015, 10/05/2016, 11/10/2017, 09/20/2018, 10/03/2023    Influenza Split High Dose Preservative Free IM 10/05/2016, 11/10/2017    Influenza, high dose seasonal 0.7 mL 09/20/2018, 09/10/2019, 11/12/2020, 09/23/2022, 10/03/2023    Influenza, injectable, quadrivalent, preservative free 0.5 mL 10/21/2021    Influenza, seasonal, injectable 10/31/2015    Pneumococcal Conjugate 13-Valent 01/05/2016    Pneumococcal Polysaccharide PPV23 09/05/2013, 04/07/2017       Objective     /70 (BP Location: Left arm, Patient Position: Sitting, Cuff Size: Standard)   Pulse 58   Temp (!) 95.6 °F (35.3 °C) (Temporal)   Resp 18   Ht 5' 7" (1.702 m)   Wt 105 kg (231 lb)   SpO2 98%   BMI 36.18 kg/m²     Physical Exam  Constitutional:       Appearance: Normal appearance. HENT:      Head: Normocephalic and atraumatic. Eyes:      Conjunctiva/sclera: Conjunctivae normal.   Cardiovascular:      Rate and Rhythm: Normal rate and regular rhythm. Pulses: Normal pulses. Heart sounds: Normal heart sounds. Pulmonary:      Effort: Pulmonary effort is normal.      Breath sounds: Normal breath sounds. Abdominal:      General: There is no distension. Musculoskeletal:         General: Normal range of motion. Right elbow: Swelling (of elbow bursa) present. Normal range of motion. Tenderness (warmth, and erythema) present in lateral epicondyle. Cervical back: Normal range of motion and neck supple. Neurological:      Mental Status: He is alert and oriented to person, place, and time. Psychiatric:         Behavior: Behavior normal.         Thought Content:  Thought content normal.       Ramez Ayala MD

## 2023-11-29 ENCOUNTER — HOSPITAL ENCOUNTER (EMERGENCY)
Facility: HOSPITAL | Age: 73
Discharge: HOME | End: 2023-11-29
Attending: EMERGENCY MEDICINE
Payer: COMMERCIAL

## 2023-11-29 VITALS
HEART RATE: 65 BPM | OXYGEN SATURATION: 99 % | SYSTOLIC BLOOD PRESSURE: 157 MMHG | DIASTOLIC BLOOD PRESSURE: 66 MMHG | RESPIRATION RATE: 18 BRPM | TEMPERATURE: 97.9 F

## 2023-11-29 DIAGNOSIS — M71.121 SEPTIC BURSITIS OF ELBOW, RIGHT: Primary | ICD-10-CM

## 2023-11-29 PROCEDURE — 99283 EMERGENCY DEPT VISIT LOW MDM: CPT

## 2023-11-29 PROCEDURE — 99284 EMERGENCY DEPT VISIT MOD MDM: CPT

## 2023-11-29 RX ORDER — CEPHALEXIN 500 MG/1
500 CAPSULE ORAL EVERY 12 HOURS SCHEDULED
Qty: 20 CAPSULE | Refills: 0 | Status: SHIPPED | OUTPATIENT
Start: 2023-11-29 | End: 2023-12-09

## 2023-11-29 RX ORDER — SULFAMETHOXAZOLE AND TRIMETHOPRIM 800; 160 MG/1; MG/1
1 TABLET ORAL 2 TIMES DAILY
Qty: 20 TABLET | Refills: 0 | Status: SHIPPED | OUTPATIENT
Start: 2023-11-29 | End: 2023-12-09

## 2023-11-29 NOTE — DISCHARGE INSTRUCTIONS
Take your antibiotics for Keep compression on the elbow to help the swelling go down. Can take tylenol for pain. Apply ice to the elbow to help the swelling go down, can try heat too if that feels better for you. Follow up with orthopedics. Return to the ER if you are unable to move your elbow, develop redness over the area, your pain increases or you develop fever and chills.

## 2023-11-29 NOTE — ED PROVIDER NOTES
History  Chief Complaint   Patient presents with    Elbow Swelling     Pt to ED with c/o swelling to right elbow, sent by pcp to be drained     Ulysses Record is a 68 y.o. male who is presenting to the emergency department on November 29, 2023 with c/o swelling to right elbow, sent by pcp to be drained. He states that he noticed his elbow swelling 7 days ago and saw his PCP yesterday who told him to ice it and gave him ER precautions. He denies any trauma to the joint or overuse. He endorses pain to palpation, but has a full ROM. Strength of his R UE is in tact and symmetric to strength of L UE. He is concerned because he is scheduled to have a hammertoe surgery in 2 days. Patient denies fever, chills, nausea, vomiting, chest pain, SOB, abdominal pain or any other complaint at this time. Elbow Swelling  Associated symptoms: no fatigue and no fever        Prior to Admission Medications   Prescriptions Last Dose Informant Patient Reported? Taking?    Biotin 1000 MCG tablet  Self Yes No   Sig: Take 1 tablet by mouth if needed Per pt takes it "once in awhile"   Blood Glucose Monitoring Suppl (GLUCOCARD VITAL MONITOR) w/Device KIT  Self No No   Sig: by Does not apply route 2 (two) times a day   Cholecalciferol (VITAMIN D3) 2000 units capsule  Self Yes No   Sig: Take 1,000 Units by mouth daily in the early morning    Continuous Blood Gluc  (FreeStyle [place] 2 [place]) CHARITY  Self No No   Sig: Check blood sugars multiple times per day   Continuous Blood Gluc Sensor (FreeStyle [place] 2 Sensor) MISC  Self No No   Sig: Check blood sugars multiple times per day   Cyanocobalamin (VITAMIN B-12) 5000 MCG TBDP  Self Yes No   Sig: Take 5,000 mcg by mouth once a week Takes on Mondays   Diclofenac Sodium (VOLTAREN) 1 %   No No   Sig: Apply 2 g topically 4 (four) times a day   Lancets (LIFESCAN UNISTIK 2) MISC  Self Yes No   Sig: Lifescan One Touch Ultramini Meter; use as directed; 1; 0; 31-Oct-2016; 31-Oct-2016; Roger Jeferson Formica; Active   Mirabegron ER 50 MG TB24   No No   Sig: Take 1 tablet (50 mg total) by mouth daily at bedtime   Multiple Vitamin (MULTIVITAMIN) capsule  Self Yes No   Sig: Take 1 capsule by mouth daily in the early morning    NovoLOG Mix 70/30 FlexPen (70-30) 100 units/mL injection pen   No No   Sig: INJECT 20 UNITS UNDER THE SKIN TWO TIMES A DAY WITH MEALS   albuterol (ProAir HFA) 90 mcg/act inhaler   No No   Sig: Inhale 2 puffs every 6 (six) hours as needed for wheezing or shortness of breath   aluminum-magnesium hydroxide-simethicone (MYLANTA) 200-200-20 mg/5 mL suspension  Self No No   Sig: Take 30 mL by mouth every 6 (six) hours as needed for indigestion or heartburn   atorvastatin (LIPITOR) 10 mg tablet  Self No No   Sig: TAKE ONE TABLET BY MOUTH EVERY DAY   docusate sodium (COLACE) 100 mg capsule  Self Yes No   Sig: Take 100 mg by mouth daily as needed for constipation   fluticasone (FLONASE) 50 mcg/act nasal spray  Self No No   Si sprays into each nostril daily   Patient not taking: Reported on 2023   fluticasone (Flovent HFA) 110 MCG/ACT inhaler   No No   Sig: Inhale 2 puffs 2 (two) times a day Rinse mouth after use. Patient taking differently: Inhale 2 puffs if needed Rinse mouth after use.    glipiZIDE (GLUCOTROL XL) 5 mg 24 hr tablet  Self Yes No   Sig: Take 5 mg by mouth in the morning   insulin aspart protamine-insulin aspart (NovoLOG 70/30) 100 units/mL injection   No No   Sig: Inject 10 Units under the skin 2 (two) times a day before meals   losartan (COZAAR) 25 mg tablet   No No   Sig: TAKE ONE TABLET BY MOUTH EVERY DAY   metFORMIN (GLUCOPHAGE) 1000 MG tablet  Self No No   Sig: TAKE ONE TABLET BY MOUTH TWICE A DAY WITH MEALS   pantoprazole (PROTONIX) 40 mg tablet  Self No No   Sig: TAKE ONE TABLET BY MOUTH EVERY DAY   Patient taking differently: Take 40 mg by mouth every other day      Facility-Administered Medications: None       Past Medical History:   Diagnosis Date    Anemia Arthritis     Black stool 07/24/2020    Cancer ([address])     Colon polyp     Diabetes mellitus ([address])     IDDM    Diverticulosis     Enlarged prostate     Erectile dysfunction     Hypercholesteremia     Resolved post weight loss    Hypertension     Resolved post weight loss    Kidney stone     Obesity     Prostate cancer ([address])     Sleep apnea     resolved with weight loss    Wears partial dentures     partial upper and lower       Past Surgical History:   Procedure Laterality Date    ABDOMINAL ADHESION SURGERY N/A 11/28/2016    Procedure: EXTENSIVE LYSIS ADHESIONS;  Surgeon: Mari Heard MD;  [facility];  Service:     ANKLE FRACTURE SURGERY Left     CHOLECYSTECTOMY      COLON SURGERY      colon resection    COLONOSCOPY      COLOSTOMY      COLOSTOMY CLOSURE      DIAGNOSTIC LAPAROSCOPY      GASTRIC BYPASS      failed due to adhesions    HERNIA REPAIR      abdominal    [place] CYSTO INSERTION TRANSPROSTATIC IMPLANT SINGLE N/A 3/8/2019    Procedure: CYSTOSCOPY WITH INSERTION Jerrol Boron;  Surgeon: Marco Antonio Romero MD;  [facility];  Service: Urology    [place] CYSTO INSERTION TRANSPROSTATIC IMPLANT SINGLE N/A 5/8/2020    Procedure: Jamse Barb WITH INSERTION Jerrol Boron;  Surgeon: Marco Antonio Romero MD;  [facility];  Service: Urology    [place] CYSTOURETHROSCOPY W/INTERNAL URETHROTOMY N/A 5/8/2020    Procedure: DIRECT VISUAL INTERAL URETHROTOMY (DVIU), dilation of urethral stricture;  Surgeon: Marco Antonio Romero MD;  [facility];  Service: Urology    AZ EDG  [address] N/A 10/25/2018    Procedure: LINEAR ENDOSCOPIC U/S;  Surgeon: Bismark Fernández MD;  [facility]; Service: Gastroenterology    [place] ESOPHAGOGASTRODUODENOSCOPY TRANSORAL DIAGNOSTIC N/A 7/6/2016    Procedure: EGD AND COLONOSCOPY;  Surgeon: Khoa Grimes MD;  [facility];   Service: Gastroenterology    AZ [address] RSTRICTIV [address] [place] [address] LONGITUDINAL GASTRECTOMY N/A 11/28/2016    Procedure: GASTRECTOMY SLEEVE LAPAROSCOPIC;  Surgeon: Delmy Barrow MD;  [facility];  Service: Bariatrics    SC PROSTATE NEEDLE BIOPSY ANY APPROACH N/A 2020    Procedure: TRANSRECTAL NEEDLE BIOPSY PROSTATE (TRNBP) WITH TRANSRECTAL ULTRASOUND GUIDANCE;  Surgeon: Mai Pickens MD;  [facility];  Service: Urology    TONSILLECTOMY      US GUIDED PROSTATE BIOPSY  2020       Family History   Problem Relation Age of Onset    Heart disease Mother     Breast cancer Mother 80    Diabetes Father     Colon cancer Neg Hx     Liver disease Neg Hx      I have reviewed and agree with the history as documented. E-Cigarette/Vaping    E-Cigarette Use Never User      E-Cigarette/Vaping Substances    Nicotine No     THC No     CBD No     Flavoring No     Other No     Unknown No      Social History     Tobacco Use    Smoking status: Former     Packs/day: 0.25     Types: Cigarettes     Quit date: 11/10/2001     Years since quittin.0     Passive exposure: Past    Smokeless tobacco: Former   Vaping Use    Vaping Use: Never used   Substance Use Topics    Alcohol use: Not Currently    Drug use: Not Currently     Comment: RSO       Review of Systems   Constitutional:  Negative for activity change, appetite change, chills, fatigue and fever. Respiratory:  Negative for chest tightness, shortness of breath and wheezing. Cardiovascular:  Negative for chest pain and palpitations. Gastrointestinal:  Negative for abdominal pain. Musculoskeletal:  Positive for arthralgias and joint swelling. R elbow is swollen and painful to palpation. Skin:  Positive for color change. Negative for pallor, rash and wound. Mild erythema to the R elbow, warm to the touch compared to L elbow. Neurological:  Negative for weakness and light-headedness. Physical Exam  Physical Exam  Vitals and nursing note reviewed. Constitutional:       General: He is not in acute distress. Appearance: Normal appearance.  He is normal weight. He is not ill-appearing, toxic-appearing or diaphoretic. HENT:      Head: Normocephalic and atraumatic. Right Ear: External ear normal.      Left Ear: External ear normal.      Nose: Nose normal.      Mouth/Throat:      Mouth: Mucous membranes are moist.   Eyes:      General: No scleral icterus. Right eye: No discharge. Left eye: No discharge. Extraocular Movements: Extraocular movements intact. Conjunctiva/sclera: Conjunctivae normal.   Cardiovascular:      Rate and Rhythm: Normal rate and regular rhythm. Pulses: Normal pulses. Heart sounds: Normal heart sounds. No murmur heard. No friction rub. No gallop. Comments: Right radial pulse is 2+  Pulmonary:      Effort: No respiratory distress. Breath sounds: Normal breath sounds. No wheezing, rhonchi or rales. Musculoskeletal:         General: Swelling and tenderness present. No signs of injury. Normal range of motion. Right elbow: Swelling and effusion present. No lacerations. Normal range of motion. Tenderness present in olecranon process. Left elbow: Normal.      Cervical back: Normal range of motion. Skin:     General: Skin is warm. Coloration: Skin is not pale. Findings: Erythema present. No bruising, lesion or rash. Neurological:      General: No focal deficit present. Mental Status: He is alert and oriented to person, place, and time. Motor: No weakness.    Psychiatric:         Mood and Affect: Mood normal.         Behavior: Behavior normal.         Vital Signs  ED Triage Vitals [11/29/23 1157]   Temperature Pulse Respirations Blood Pressure SpO2   97.9 °F (36.6 °C) 65 18 157/66 99 %      Temp Source Heart Rate Source Patient Position - Orthostatic VS BP Location FiO2 (%)   Oral Monitor Sitting Right arm --      Pain Score       --           Vitals:    11/29/23 1157   BP: 157/66   Pulse: 65   Patient Position - Orthostatic VS: Sitting         Visual Acuity      ED Medications  Medications - No data to display    Diagnostic Studies  Results Reviewed       None                   No orders to display              Procedures  Procedures         ED Course  ED Course as of 11/29/23 1309   Wed Nov 29, 2023   1236 Patient sent here by PCP to get olecranon bursitis drained, vitals are stable, it was atraumatic and there is no erythema over the area. Full ROM without pain                                             Medical Decision Making  Feliberto Magana is a 68 y.o. male presenting to the emergency department on November 29, 2023 with c/o swelling to right elbow, sent by pcp to be drained. On exam he has a 2+ radial pulse, full ROM of joint, tenderness over the area, + effusion, + erythema, + warmth over the area. No fevers, chills, tachycardia or n/v. Patient seen and examined noted to have septic bursitis of R elbow. Differential diagnosis includes but is not limited to bursitis, septic bursitis, arthritis, septic arthritis. Patient appears well, is nontoxic appearing, he expresses understanding and agrees with plan of care at this time. In light of this patient would benefit from outpatient management. Told him to call his podiatrist regarding his septic bursitis and management in case they will adjust surgical plans. Patient was rx'd: bactrim and keflex            Risk  OTC drugs. Prescription drug management.              Disposition  Final diagnoses:   Septic bursitis of elbow, right     Time reflects when diagnosis was documented in both MDM as applicable and the Disposition within this note       Time User Action Codes Description Comment    11/29/2023 12:28 PM Toy Jamila Add [M70.21] Olecranon bursitis of right elbow     11/29/2023 12:45 PM Toy Jamila Remove [M70.21] Olecranon bursitis of right elbow     11/29/2023 12:45 PM Toy Jamila Add [M71.121] Septic bursitis of elbow, right           ED Disposition       ED Disposition Discharge    Condition   Stable    Date/Time   Wed Nov 29, 2023 12:28 PM    Comment   Augustus Beltran discharge to home/self care.                    Follow-up Information       Follow up With Specialties Details Why Contact Info Additional Information    Render MD [facility]   [address] [phone] [facility] Emergency Medicine  If symptoms worsen, As needed [address] 027 [facility], [place], [place], [address]            Discharge Medication List as of 11/29/2023 12:46 PM        START taking these medications    Details   cephalexin (KEFLEX) 500 mg capsule Take 1 capsule (500 mg total) by mouth every 12 (twelve) hours for 10 days, Starting Wed 11/29/2023, Until Sat 12/9/2023, Normal      sulfamethoxazole-trimethoprim (BACTRIM DS) 800-160 mg per tablet Take 1 tablet by mouth 2 (two) times a day for 10 days smx-tmp DS (BACTRIM) 800-160 mg tabs (1tab q12 D10), Starting Wed 11/29/2023, Until Sat 12/9/2023, Normal           CONTINUE these medications which have NOT CHANGED    Details   albuterol (ProAir HFA) 90 mcg/act inhaler Inhale 2 puffs every 6 (six) hours as needed for wheezing or shortness of breath, Starting Tue 11/28/2023, Normal      aluminum-magnesium hydroxide-simethicone (MYLANTA) 200-200-20 mg/5 mL suspension Take 30 mL by mouth every 6 (six) hours as needed for indigestion or heartburn, Starting Tue 6/29/2021, Normal      atorvastatin (LIPITOR) 10 mg tablet TAKE ONE TABLET BY MOUTH EVERY DAY, Starting Fri 9/8/2023, Normal      Biotin 1000 MCG tablet Take 1 tablet by mouth if needed Per pt takes it "once in awhile", Historical Med      Blood Glucose Monitoring Suppl (GLUCOCARD VITAL MONITOR) w/Device KIT by Does not apply route 2 (two) times a day, Starting Tue 5/14/2019, Normal Cholecalciferol (VITAMIN D3) 2000 units capsule Take 1,000 Units by mouth daily in the early morning , Historical Med      Continuous Blood Gluc  (FreeStyle [place] 2 [place]) CHARITY Check blood sugars multiple times per day, Normal      Continuous Blood Gluc Sensor (FreeStyle Dunia 2 Sensor) MISC Check blood sugars multiple times per day, Normal      Cyanocobalamin (VITAMIN B-12) 5000 MCG TBDP Take 5,000 mcg by mouth once a week Takes on Mondays, Historical Med      Diclofenac Sodium (VOLTAREN) 1 % Apply 2 g topically 4 (four) times a day, Starting Tue 11/28/2023, Normal      docusate sodium (COLACE) 100 mg capsule Take 100 mg by mouth daily as needed for constipation, Historical Med      fluticasone (FLONASE) 50 mcg/act nasal spray 2 sprays into each nostril daily, Starting Wed 7/20/2022, Normal      fluticasone (Flovent HFA) 110 MCG/ACT inhaler Inhale 2 puffs 2 (two) times a day Rinse mouth after use., Starting Fri 6/24/2022, Until Tue 11/28/2023, Normal      glipiZIDE (GLUCOTROL XL) 5 mg 24 hr tablet Take 5 mg by mouth in the morning, Starting Thu 6/29/2023, Historical Med      insulin aspart protamine-insulin aspart (NovoLOG 70/30) 100 units/mL injection Inject 10 Units under the skin 2 (two) times a day before meals, Starting Fri 11/17/2023, Until Tue 1/16/2024, No Print      Lancets (LIFESCAN UNISTIK 2) MISC Lifescan One Touch Ultramini Meter; use as directed; 1; 0; 31-Oct-2016; 31-Oct-2016; Melida Duran;  Active, Historical Med      losartan (COZAAR) 25 mg tablet TAKE ONE TABLET BY MOUTH EVERY DAY, Starting Mon 10/30/2023, Normal      metFORMIN (GLUCOPHAGE) 1000 MG tablet TAKE ONE TABLET BY MOUTH TWICE A DAY WITH MEALS, Starting Fri 9/8/2023, Normal      Mirabegron ER 50 MG TB24 Take 1 tablet (50 mg total) by mouth daily at bedtime, Starting Mon 10/16/2023, Normal      Multiple Vitamin (MULTIVITAMIN) capsule Take 1 capsule by mouth daily in the early morning , Historical Med      NovoLOG Mix 70/30 FlexPen (70-30) 100 units/mL injection pen INJECT 20 UNITS UNDER THE SKIN TWO TIMES A DAY WITH MEALS, Normal      pantoprazole (PROTONIX) 40 mg tablet TAKE ONE TABLET BY MOUTH EVERY DAY, Starting Fri 9/8/2023, Normal             No discharge procedures on file.     PDMP Review         Value Time User    PDMP Reviewed  Yes 8/17/2022  4:28 PM Hattie Darby DDS            ED Provider  Electronically Signed by             Ronny Stewart PA-C  11/29/23 7603

## 2023-11-30 ENCOUNTER — ANESTHESIA EVENT (OUTPATIENT)
Dept: PERIOP | Facility: HOSPITAL | Age: 73
End: 2023-11-30
Payer: COMMERCIAL

## 2023-12-01 ENCOUNTER — TELEPHONE (OUTPATIENT)
Age: 73
End: 2023-12-01

## 2023-12-01 ENCOUNTER — ANESTHESIA (OUTPATIENT)
Dept: PERIOP | Facility: HOSPITAL | Age: 73
End: 2023-12-01
Payer: COMMERCIAL

## 2023-12-01 ENCOUNTER — APPOINTMENT (OUTPATIENT)
Dept: RADIOLOGY | Facility: HOSPITAL | Age: 73
End: 2023-12-01
Payer: COMMERCIAL

## 2023-12-01 ENCOUNTER — VBI (OUTPATIENT)
Dept: ADMINISTRATIVE | Facility: OTHER | Age: 73
End: 2023-12-01

## 2023-12-01 ENCOUNTER — HOSPITAL ENCOUNTER (OUTPATIENT)
Facility: HOSPITAL | Age: 73
Setting detail: OUTPATIENT SURGERY
Discharge: HOME | End: 2023-12-01
Attending: PODIATRIST | Admitting: PODIATRIST
Payer: COMMERCIAL

## 2023-12-01 VITALS
TEMPERATURE: 96.9 F | WEIGHT: 231 LBS | SYSTOLIC BLOOD PRESSURE: 132 MMHG | DIASTOLIC BLOOD PRESSURE: 59 MMHG | RESPIRATION RATE: 18 BRPM | OXYGEN SATURATION: 93 % | HEIGHT: 67 IN | HEART RATE: 57 BPM | BODY MASS INDEX: 36.26 KG/M2

## 2023-12-01 DIAGNOSIS — G89.18 ACUTE POST-OPERATIVE PAIN: Primary | ICD-10-CM

## 2023-12-01 LAB
GLUCOSE SERPL-MCNC: 158 MG/DL (ref 65–140)
GLUCOSE SERPL-MCNC: 165 MG/DL (ref 65–140)

## 2023-12-01 PROCEDURE — 73630 X-RAY EXAM OF FOOT: CPT

## 2023-12-01 PROCEDURE — 99024 POSTOP FOLLOW-UP VISIT: CPT | Performed by: PODIATRIST

## 2023-12-01 PROCEDURE — 28285 REPAIR OF HAMMERTOE: CPT | Performed by: PODIATRIST

## 2023-12-01 PROCEDURE — NC001 PR NO CHARGE: Performed by: ANESTHESIOLOGY

## 2023-12-01 PROCEDURE — 82948 REAGENT STRIP/BLOOD GLUCOSE: CPT

## 2023-12-01 RX ORDER — ONDANSETRON 2 MG/ML
4 INJECTION INTRAMUSCULAR; INTRAVENOUS ONCE AS NEEDED
Status: DISCONTINUED | OUTPATIENT
Start: 2023-12-01 | End: 2023-12-01 | Stop reason: HOSPADM

## 2023-12-01 RX ORDER — SODIUM CHLORIDE, SODIUM LACTATE, POTASSIUM CHLORIDE, CALCIUM CHLORIDE 600; 310; 30; 20 MG/100ML; MG/100ML; MG/100ML; MG/100ML
125 INJECTION, SOLUTION INTRAVENOUS CONTINUOUS
Status: DISCONTINUED | OUTPATIENT
Start: 2023-12-01 | End: 2023-12-01 | Stop reason: HOSPADM

## 2023-12-01 RX ORDER — FENTANYL CITRATE/PF 50 MCG/ML
25 SYRINGE (ML) INJECTION
Status: DISCONTINUED | OUTPATIENT
Start: 2023-12-01 | End: 2023-12-01 | Stop reason: HOSPADM

## 2023-12-01 RX ORDER — CEFAZOLIN SODIUM 2 G/50ML
2000 SOLUTION INTRAVENOUS ONCE
Status: COMPLETED | OUTPATIENT
Start: 2023-12-01 | End: 2023-12-01

## 2023-12-01 RX ORDER — ACETAMINOPHEN AND CODEINE PHOSPHATE 300; 30 MG/1; MG/1
1 TABLET ORAL EVERY 4 HOURS PRN
Qty: 15 TABLET | Refills: 0 | Status: SHIPPED | OUTPATIENT
Start: 2023-12-01 | End: 2023-12-01

## 2023-12-01 RX ORDER — BUPIVACAINE HYDROCHLORIDE 5 MG/ML
INJECTION, SOLUTION EPIDURAL; INTRACAUDAL AS NEEDED
Status: DISCONTINUED | OUTPATIENT
Start: 2023-12-01 | End: 2023-12-01 | Stop reason: HOSPADM

## 2023-12-01 RX ORDER — FENTANYL CITRATE 50 UG/ML
INJECTION, SOLUTION INTRAMUSCULAR; INTRAVENOUS AS NEEDED
Status: DISCONTINUED | OUTPATIENT
Start: 2023-12-01 | End: 2023-12-01

## 2023-12-01 RX ORDER — OXYCODONE AND ACETAMINOPHEN 2.5; 325 MG/1; MG/1
1 TABLET ORAL EVERY 4 HOURS PRN
Qty: 10 TABLET | Refills: 0 | Status: SHIPPED | OUTPATIENT
Start: 2023-12-01 | End: 2023-12-06

## 2023-12-01 RX ORDER — OXYCODONE HYDROCHLORIDE 10 MG/1
5 TABLET ORAL ONCE AS NEEDED
Status: DISCONTINUED | OUTPATIENT
Start: 2023-12-01 | End: 2023-12-01 | Stop reason: HOSPADM

## 2023-12-01 RX ORDER — PROPOFOL 10 MG/ML
INJECTION, EMULSION INTRAVENOUS AS NEEDED
Status: DISCONTINUED | OUTPATIENT
Start: 2023-12-01 | End: 2023-12-01

## 2023-12-01 RX ORDER — MAGNESIUM HYDROXIDE 1200 MG/15ML
LIQUID ORAL AS NEEDED
Status: DISCONTINUED | OUTPATIENT
Start: 2023-12-01 | End: 2023-12-01 | Stop reason: HOSPADM

## 2023-12-01 RX ORDER — LIDOCAINE HYDROCHLORIDE 10 MG/ML
INJECTION, SOLUTION EPIDURAL; INFILTRATION; INTRACAUDAL; PERINEURAL AS NEEDED
Status: DISCONTINUED | OUTPATIENT
Start: 2023-12-01 | End: 2023-12-01

## 2023-12-01 RX ORDER — PROPOFOL 10 MG/ML
INJECTION, EMULSION INTRAVENOUS CONTINUOUS PRN
Status: DISCONTINUED | OUTPATIENT
Start: 2023-12-01 | End: 2023-12-01

## 2023-12-01 RX ORDER — HYDROMORPHONE HCL/PF 1 MG/ML
0.5 SYRINGE (ML) INJECTION
Status: DISCONTINUED | OUTPATIENT
Start: 2023-12-01 | End: 2023-12-01 | Stop reason: HOSPADM

## 2023-12-01 RX ADMIN — SODIUM CHLORIDE, SODIUM LACTATE, POTASSIUM CHLORIDE, AND CALCIUM CHLORIDE: .6; .31; .03; .02 INJECTION, SOLUTION INTRAVENOUS at 10:13

## 2023-12-01 RX ADMIN — FENTANYL CITRATE 25 MCG: 50 INJECTION, SOLUTION INTRAMUSCULAR; INTRAVENOUS at 10:18

## 2023-12-01 RX ADMIN — PROPOFOL 60 MG: 10 INJECTION, EMULSION INTRAVENOUS at 10:17

## 2023-12-01 RX ADMIN — FENTANYL CITRATE 25 MCG: 50 INJECTION, SOLUTION INTRAMUSCULAR; INTRAVENOUS at 10:45

## 2023-12-01 RX ADMIN — LIDOCAINE HYDROCHLORIDE 50 MG: 10 INJECTION, SOLUTION EPIDURAL; INFILTRATION; INTRACAUDAL; PERINEURAL at 10:17

## 2023-12-01 RX ADMIN — FENTANYL CITRATE 50 MCG: 50 INJECTION, SOLUTION INTRAMUSCULAR; INTRAVENOUS at 10:13

## 2023-12-01 RX ADMIN — CEFAZOLIN SODIUM 2000 MG: 2 SOLUTION INTRAVENOUS at 10:11

## 2023-12-01 RX ADMIN — PROPOFOL 100 MCG/KG/MIN: 10 INJECTION, EMULSION INTRAVENOUS at 10:17

## 2023-12-01 NOTE — QUICK NOTE
Podiatry Quick Note  -Patient is status post left fifth hammertoe repair with derotational arthroplasty, date of surgery 12/1/2023  -Prescription for Tylenol 3 was sent  to patients pharmacy upon discharge. Patient's pharmacy noted that they are unable to fill the prescription as they are out of stock of the above medication. An additional prescription for Percocet 5/325 was dispensed to the same pharmacy and again pharmacy notify provider that they were unable to fill the prescription due to supply issues. By this time patient has been discharged to home. -Given the localized area patient may utilize Tylenol or ibuprofen for pain management. And additionally if pain is not manage she may reach out to the office to receive an additional prescription.

## 2023-12-01 NOTE — H&P
H&P Exam - General Surgery   Attalla Fran 68 y.o. male MRN: [id number]  Unit/Bed#: OR POOL Encounter: [id number]    Assessment/Plan     Assessment/Plan:  77yo M with PMHx of arthritis, prostate cancer, HTN, and AUNG who presents for left foot 5th toe hammertoe repair. History of Present Illness   77yo M with PMHx of arthritis, prostate cancer, HTN, and AUNG who presents for left foot 5th toe hammertoe repair. Review of Systems   All other systems reviewed and are negative.       Historical Information   Past Medical History:   Diagnosis Date    Anemia     Arthritis     Black stool 07/24/2020    Cancer ([address])     Colon polyp     Diabetes mellitus ([address])     IDDM    Diverticulosis     Enlarged prostate     Erectile dysfunction     Hypercholesteremia     Resolved post weight loss    Hypertension     Resolved post weight loss    Kidney stone     Obesity     Prostate cancer ([address])     Sleep apnea     resolved with weight loss    Wears partial dentures     partial upper and lower     Past Surgical History:   Procedure Laterality Date    ABDOMINAL ADHESION SURGERY N/A 11/28/2016    Procedure: EXTENSIVE LYSIS ADHESIONS;  Surgeon: Юлия Posey MD;  [facility];  Service:     ANKLE FRACTURE SURGERY Left     CHOLECYSTECTOMY      COLON SURGERY      colon resection    COLONOSCOPY      COLOSTOMY      COLOSTOMY CLOSURE      DIAGNOSTIC LAPAROSCOPY      GASTRIC BYPASS      failed due to adhesions    HERNIA REPAIR      abdominal    [place] CYSTO INSERTION TRANSPROSTATIC IMPLANT SINGLE N/A 3/8/2019    Procedure: CYSTOSCOPY WITH INSERTION [facility];  Surgeon: Chris Walker MD;  [facility];  Service: Urology    MN CYSTO INSERTION TRANSPROSTATIC IMPLANT SINGLE N/A 5/8/2020    Procedure: Ana Briggs WITH INSERTION [facility];  Surgeon: Chris Walker MD;  [facility];  Service: Urology    MN CYSTOURETHROSCOPY W/INTERNAL URETHROTOMY N/A 5/8/2020    Procedure: DIRECT VISUAL INTERAL URETHROTOMY (DVIU), dilation of urethral stricture;  Surgeon: Estefanía Berman MD;  [facility];  Service: Urology    [place] [address] DUODENUM/JEJUNUM N/A 10/25/2018    Procedure: LINEAR ENDOSCOPIC U/S;  Surgeon: Martha Byrne MD;  [facility]; Service: Gastroenterology    [place] ESOPHAGOGASTRODUODENOSCOPY TRANSORAL DIAGNOSTIC N/A 2016    Procedure: EGD AND COLONOSCOPY;  Surgeon: Ruddy Carpenter MD;  [facility];   Service: Gastroenterology    [place] [address] RSTRICTIV [address] [place] [address] LONGITUDINAL GASTRECTOMY N/A 2016    Procedure: GASTRECTOMY SLEEVE LAPAROSCOPIC;  Surgeon: Lou London MD;  [facility];  Service: Bariatrics    [place] PROSTATE NEEDLE BIOPSY ANY APPROACH N/A 2020    Procedure: TRANSRECTAL NEEDLE BIOPSY PROSTATE (TRNBP) WITH TRANSRECTAL ULTRASOUND GUIDANCE;  Surgeon: Estefanía Berman MD;  [facility];  Service: Urology    [facility]  2020     Social History   Social History     Substance and Sexual Activity   Alcohol Use Not Currently     Social History     Substance and Sexual Activity   Drug Use Not Currently    Comment: RSO     Social History     Tobacco Use   Smoking Status Former    Packs/day: 0.25    Types: Cigarettes    Quit date: 11/10/2001    Years since quittin.0    Passive exposure: Past   Smokeless Tobacco Former     E-Cigarette/Vaping    E-Cigarette Use Never User      E-Cigarette/Vaping Substances    Nicotine No     THC No     CBD No     Flavoring No     Other No     Unknown No      Family History: non-contributory    Meds/Allergies   all medications and allergies reviewed  Allergies   Allergen Reactions    Enalapril Anaphylaxis, Throat Swelling and Hives       Objective   First Vitals:   Blood Pressure: 147/67 (23)  Pulse: 66 (23)  Temperature: (!) 97.1 °F (36.2 °C) (23)  Temp Source: Temporal (23)  Respirations: 18 (23)  Height: 5' 7" (170.2 cm) (12/01/23 0856)  Weight - Scale: 105 kg (231 lb) (12/01/23 0858)  SpO2: 95 % (12/01/23 0851)    Current Vitals:   Blood Pressure: 147/67 (12/01/23 0851)  Pulse: 66 (12/01/23 0851)  Temperature: (!) 97.1 °F (36.2 °C) (12/01/23 0851)  Temp Source: Temporal (12/01/23 0851)  Respirations: 18 (12/01/23 0851)  Height: 5' 7" (170.2 cm) (12/01/23 0858)  Weight - Scale: 105 kg (231 lb) (12/01/23 0858)  SpO2: 95 % (12/01/23 0851)    No intake or output data in the 24 hours ending 12/01/23 0903    Invasive Devices       None                   Physical Exam  Vitals and nursing note reviewed. Constitutional:       Appearance: Normal appearance. HENT:      Head: Normocephalic and atraumatic. Nose: Nose normal.      Mouth/Throat:      Mouth: Mucous membranes are moist.      Pharynx: Oropharynx is clear. Eyes:      General: No scleral icterus. Conjunctiva/sclera: Conjunctivae normal.   Cardiovascular:      Rate and Rhythm: Normal rate and regular rhythm. Pulses: Normal pulses. Heart sounds: Normal heart sounds. Pulmonary:      Effort: Pulmonary effort is normal. No respiratory distress. Breath sounds: Normal breath sounds. No wheezing. Abdominal:      General: Abdomen is flat. There is no distension. Palpations: Abdomen is soft. Tenderness: There is no abdominal tenderness. Musculoskeletal:      Cervical back: Normal range of motion and neck supple. No rigidity or tenderness. Skin:     General: Skin is warm and dry. Coloration: Skin is not jaundiced or pale. Neurological:      General: No focal deficit present. Mental Status: He is alert and oriented to person, place, and time. Mental status is at baseline. Sensory: No sensory deficit. Motor: No weakness. Psychiatric:         Mood and Affect: Mood normal.         Behavior: Behavior normal.         Thought Content:  Thought content normal.         Judgment: Judgment normal.         Lab Results:   Lab Results Component Value Date    WBC 6.67 11/24/2023    HGB 13.4 11/24/2023    HCT 42.1 11/24/2023    MCV 94 11/24/2023     11/24/2023     Lab Results   Component Value Date     01/07/2016    SODIUM 140 11/24/2023    K 4.6 11/24/2023     11/24/2023    CO2 25 11/24/2023    ANIONGAP 9 01/07/2016    AGAP 11 11/24/2023    BUN 19 11/24/2023    CREATININE 0.80 11/24/2023    GLUC 115 11/24/2023    GLUF 143 (H) 07/30/2023    CALCIUM 9.0 11/24/2023    AST 14 07/30/2023    ALT 14 07/30/2023    ALKPHOS 73 07/30/2023    PROT 7.2 01/07/2016    TP 6.7 07/30/2023    BILITOT 0.36 01/07/2016    TBILI 0.38 07/30/2023    EGFR 88 11/24/2023     Lab Results   Component Value Date    PTT 31 07/21/2023     Lab Results   Component Value Date    INR 0.99 07/21/2023    INR 1.08 03/26/2023    INR 0.99 10/05/2022    PROTIME 13.3 07/21/2023    PROTIME 14.2 03/26/2023    PROTIME 13.3 10/05/2022       Imaging: I have personally reviewed pertinent reports. EKG, Pathology, and Other Studies: I have personally reviewed pertinent reports.

## 2023-12-01 NOTE — TELEPHONE ENCOUNTER
Please see below messages. [facility] called again. They would like to know what Dr. Allyson Lala would like prescribed for patient. Please return call asap.  Thank you    Call Back [phone] Willie Ma

## 2023-12-01 NOTE — ANESTHESIA POSTPROCEDURE EVALUATION
Post-Op Assessment Note    CV Status:  Stable  Pain Score: 0    Pain management: adequate       Mental Status:  Alert and awake   Hydration Status:  Euvolemic   PONV Controlled:  Controlled   Airway Patency:  Patent     Post Op Vitals Reviewed: Yes    No anethesia notable event occurred.     Staff: Anesthesiologist, CRNA               /63 (12/01/23 1054)    Temp (!) 97.3 °F (36.3 °C) (12/01/23 1054)    Pulse 65 (12/01/23 1054)   Resp 16 (12/01/23 1054)    SpO2 97 % (12/01/23 1054)

## 2023-12-01 NOTE — DISCHARGE SUMMARY
PODIATRY DISCHARGE SUMMARY     Patient Name: Lester Ramos   Age & Sex: 68 y.o. male   MRN: [id number]  Unit/Bed#: OR POOL   Encounter: [id number]  Length of Stay: 0 days    Active Problems: There are no active Hospital Problems. HPI from Admission:  Patient to be admitted for same-day surgery of left fifth hammertoe repair with derotational arthroplasty    [address] [place] [address],[address] is a 68 y.o. male who was admitted on 12/1/2023 for left fifth hammertoe repair with derotational arthroplasty. Patient underwent procedure as advertised and was discharged home following meeting PACU criteria    DISCHARGE INFORMATION     PCP at Discharge: Davy Gayle MD    Admitting Provider: Dr. Delmy Hopkins  Admission Date: 12/1/2023     Discharge Provider: Dr. Delmy Hopkins  Discharge Date: 12/01/23    Discharge Disposition: Home  Discharge Condition: Good  Discharge with Lines: No  Discharge Diet: Diabetic  Activity Restrictions: WBAT in surgical shoe  Test Results Pending at Discharge: None  Medications at Discharge: See after visit summary for reconciled discharge medications provided to patient and family. Discharge Diagnoses: Active Problems: There are no active Hospital Problems. Consulting Providers:  none    Diagnostic Imaging Performed:  No results found. Procedures Performed:  Procedure(s):  REPAIR HAMMERTOE / MALLET TOE / CLAW TOE      Code Status: Prior  Advance Directive and Living Will:      Power of :    POLST:      FOLLOW-UP     PCP Outpatient Follow-up:  Yes    Consulting Providers Follow-up:  none    Active Issues Requiring Follow-Up:  none    Discharge Statement:  I spent 25 minutes discharging the patient. This time was spent on the day of discharge. I had direct contact with the patient on the day of discharge. Additional documentation is required if more than 30 minutes were spent on discharge.        Portions of the record may have been created with voice recognition software. Occasional wrong word or "sound a like" substitutions may have occurred due to the inherent limitations of voice recognition software.   Read the chart carefully and recognize, using context, where substitutions have occurred.  ==  Gila Limon, [facility]  Podiatric Medicine & Surgery

## 2023-12-01 NOTE — DISCHARGE INSTR - AVS FIRST PAGE
Marisol Sandoval  Post-Operative Instructions    1. Take your prescribed medication as needed. You can take ibuprofen in between doses of the narcotic if needed. 2. Upon arrival at home, lie down and elevate your surgical foot on 2 pillows. 3. Remain quiet, off your feet as much as possible, for the first 24-48 hours. This is when your feet first swell and may become painful. After 48 hours you may begin limited walking following these restrictions: Weight bearing as tolerated in a surgical shoe      4. Drink large quantities of water. Consume no alcohol. Continue a well-balanced diet. 5. Report any unusual discomfort or fever to this office. 6. A limited amount of discomfort and swelling is to be expected. In some cases the skin may take on a bruised appearance. The surgical solution that was applied to your foot prior to the operation is dark in color and the operation site may appear to be oozing when it actually is not. 7. A slight amount of blood is to be expected, and is no cause for alarm. Do not remove the dressings. If there is active bleeding and if the bleeding persists, add additional gauze to the bandage, apply direct pressure, elevate your feet and call this office. 8. Do not get the dressings wet. As regular bathing may be inconvenient, sponge baths are recommended. If you shower, make sure the dressing stays dry. 9. When anesthesia wears off and if any discomfort should be present, apply an ice pack directly over the operated area for 15 minute intervals for several hours or until the pain leaves. (USE IN EXCESS OF 15 MINUTES COULD CAUSE FROSTBITE). Do not use hot water bags or electric pads. A convenient icepack can be made by placing ice cubes in a plastic bag and covering this with a towel. 10. If necessary, take a mild laxative before retiring. 11. Wear your special open shoes anytime you put weight on your foot, even if it is just to walk to the bathroom and back.  It will probably be 2 or 3 weeks before you will be permitted to try regular shoes. 12. Having performed the operation, we are interested in a prompt recovery. Please cooperate by following the above instructions. 13. Please call to confirm your post-op appointment or call with any other questions.

## 2023-12-01 NOTE — OP NOTE
OPERATIVE REPORT  PATIENT NAME: Bonny Palafox    :  1950  MRN: [id number]  Pt Location:  OR ROOM 12    SURGERY DATE: 2023    Surgeon(s) and Role:     * Eugenie Porras DPM - Primary     * Nathalie Amaya DPM - Assisting    Preop Diagnosis:  Hammer toe of left foot [M20.42]    Post-Op Diagnosis Codes:     * Hammer toe of left foot [M20.42]    Procedure(s):  Left - REPAIR HAMMERTOE / MALLET TOE / CLAW TOE    Specimen(s):  * No specimens in log *    Estimated Blood Loss:   Minimal    Drains:  * No LDAs found *    Anesthesia Type:   IV Sedation with Anesthesia    Operative Indications:  Hammer toe of left foot [M20.42]      Operative Findings:  Derotational arthoplasty of the left 5th toe     Complications:   None    Procedure and Technique:  Patient was brought back to the operating room under light sedation and transferred to the operating room table in the supine position. After anesthesia was administered a preinjection timeout was complete with all parties agreement. 5 cc of 1% lidocaine 0.5% Marcaine in a one-to-one mix was injected in the left foot. A tourniquet was then placed around the patient's left lower extremity with ample Webril padding. The left lower extremity was then prepped and draped in the normal sterile manner. A preincision timeout was then completed with all parties agreement. Utilizing Esmarch bandage the left lower extremity exsanguinated and the tourniquet was inflated to 250 mmHg    Attention was then directed to the left 5th digit. Hammertoe deformity was present. A  dorsal teardrop incision in the distal medial to proximal lateral orientation was made across the PIPJ. The incision was then deepened via sharp dissection through the subcutaneous tissues, ligating all venous vessels as necessary. Dissection was carried to the level of the EDL tendon. The tendon was then transected at that level.  The PIPJ was then exposed and the medial and lateral collateral ligaments were incised to expose the head of the proximal phalanx. By use of sagittal saw, the head of the proximal phalanx was resected on 5th digit. Push test showed that the contracture was resolved. The surgical incision was irrigated with copious amounts of normal sterile saline. Tendon approximation where was obtained utilizing 4-0 vicryl. Skin edges were reapproximated and closure was obtained utilizing 4-0 nylon. The tourniquet was then deflated for total time of 17 minutes with immediate hyperemic flush noted to all digits. A dressing consisting of Betadine soaked Adaptic and a dry sterile dressing was then applied to the left foot. Patient tolerated the procedure well with no immediate complications     Dr. Isela Mosley was present for the entire procedure.     Patient Disposition:  PACU         SIGNATURE: Patricia Pierre DPM  DATE: December 1, 2023  TIME: 10:59 AM

## 2023-12-01 NOTE — TELEPHONE ENCOUNTER
Caller: Darío    Doctor/Office: LB Heredia CB#: [phone]    Escalation: [facility] only has percocet  5/325 in stock. Can patients script be changed? Please return call and advise.  Thank you

## 2023-12-01 NOTE — TELEPHONE ENCOUNTER
Caller: Latisha Mckeon    Doctor/Office: Dr. Kathleen Eric    #: [phone]    Escalation: Medication/PT called saying both RX for pain are not in stock at his pharmacy/asking for Dr to send another med/in severe pain and it is getting worse and is awaiting call back from  About this issue. Please call pt back and advise.  Thanks

## 2023-12-01 NOTE — ANESTHESIA PREPROCEDURE EVALUATION
Discussion/Summary   Your  blood  tests  are in  the  same  general  range.  No  direct  cause of  memory  loss  identified.  CAILIN Hand MD        Verified Results  VITAMIN B12 [id number] 12:01AM [facility]     Test Name Result Flag Reference   VITAMIN B12 973 pg/ml H 211-911     HEMOGLOBIN A1C GLYCOSYLATED [id number] 12:01AM [facility]     Test Name Result Flag Reference   HEMOGLOBIN A1C GLYH 7.6 % H 4.5-5.6   ----DIABETIC SCREENING---  NON DIABETIC                 <5.7%  INCREASED RISK                5.7-6.4%  DIAGNOSTIC FOR DIABETES      >6.4%     ----DIABETIC CONTROL---     A1C%           eAG mg/dL  6.0            126  6.5            140  7.0            154  7.5            169  8.0            183  8.5            197  9.0            212  9.5            226  10.0           240     TSH WITH REFLEX [id number] 12:01AM [facility]     Test Name Result Flag Reference   TSH 1.133 mcUnits/mL  0.350-5.000   Findings most consistent with euthyroid state, no additional testing suggested. TSH may be normal in patients with thyroid dysfunction and pituitary disease. Clinical correlation recommended.  (Reflex TSH algorithm is not recommended in hospitalized patients. A variety of drugs, as well as serious acute and chronic illnesses may alter thyroid function tests. Commonly implicated drugs include glucocorticoids, dopamine, carbamazepine, iodine, amiodarone, lithium and heparin.)     CBC WITH AUTOMATED DIFFERENTIAL [id number] 12:01AM [facility]     Test Name Result Flag Reference   WHITE BLOOD COUNT 6.1 K/mcL  4.2-11.0   RED CELL COUNT 4.50 mil/mcL  4.00-5.20   HEMOGLOBIN 13.6 g/dl  12.0-15.5   HEMATOCRIT 43.2 %  36.0-46.5   MEAN CORPUSCULAR VOLUME 96.0 fL  78.0-100.0   MEAN CORPUSCULAR HEMOGLOBIN 30.2 pg  26.0-34.0   MEAN CORPUSCULAR HGB CONC 31.5 g/dl L 32.0-36.5   RDW-CV 14.6 %  11.0-15.0   PLATELET COUNT 298 K/mcL  140-450   DIFF TYPE      AUTOMATED DIFFERENTIAL   BRETT% 70 %     LYM% 21 %     MON% 8 %     EOS% 1 %   Procedure:  REPAIR HAMMERTOE / MALLET TOE / CLAW TOE (Left: Foot)    EKG (11/17/23):  sinus bradycardia, HR 56, Qtc 436    Relevant Problems   ANESTHESIA (within normal limits)      CARDIO   (+) Benign essential hypertension   (+) Hypercholesteremia      ENDO   (+) Type 2 diabetes mellitus with hyperglycemia, with long-term current use of insulin (HCC)      GI/HEPATIC   (+) GERD (gastroesophageal reflux disease)   (+) IPMN (intraductal papillary mucinous neoplasm)   (+) Pancreatic cyst      /RENAL   (+) Benign enlargement of prostate   (+) Prostate cancer ([address])      GYN (within normal limits)      HEMATOLOGY (within normal limits)      MUSCULOSKELETAL   (+) Primary osteoarthritis of one hip, left      NEURO/PSYCH (within normal limits)      PULMONARY   (+) Mild asthma exacerbation      Lab Results   Component Value Date    WBC 6.67 11/24/2023    HGB 13.4 11/24/2023    HCT 42.1 11/24/2023    MCV 94 11/24/2023     11/24/2023     Lab Results   Component Value Date     01/07/2016    SODIUM 140 11/24/2023    K 4.6 11/24/2023     11/24/2023    CO2 25 11/24/2023    ANIONGAP 9 01/07/2016    AGAP 11 11/24/2023    BUN 19 11/24/2023    CREATININE 0.80 11/24/2023    GLUC 115 11/24/2023    GLUF 143 (H) 07/30/2023    CALCIUM 9.0 11/24/2023    AST 14 07/30/2023    ALT 14 07/30/2023    ALKPHOS 73 07/30/2023    PROT 7.2 01/07/2016    TP 6.7 07/30/2023    BILITOT 0.36 01/07/2016    TBILI 0.38 07/30/2023    EGFR 88 11/24/2023     Lab Results   Component Value Date    PTT 31 07/21/2023     Lab Results   Component Value Date    INR 0.99 07/21/2023    INR 1.08 03/26/2023    INR 0.99 10/05/2022    PROTIME 13.3 07/21/2023    PROTIME 14.2 03/26/2023    PROTIME 13.3 10/05/2022       Physical Exam    Airway    Mallampati score: II  TM Distance: >3 FB  Neck ROM: full     Dental   No notable dental hx     Cardiovascular  Rhythm: regular, Rate: normal, Cardiovascular exam normal    Pulmonary  Pulmonary exam normal Breath    BASO% 0 %     BRETT ABS 4.3 K/mcL  1.8-7.7   LYM ABS 1.3 K/mcL  1.0-4.0   MON ABS 0.5 K/mcL  0.3-0.9   EOS ABS 0.1 K/mcL  0.1-0.5   BASO ABS 0.0 K/mcL  0.0-0.3     COMP METABOLIC PNL [id number] 12:01AM [facility]     Test Name Result Flag Reference   FASTING STATUS UNKNOWN hrs     SODIUM 136 mmol/L  135-145   POTASSIUM 4.4 mmol/L  3.4-5.1   CHLORIDE 100 mmol/L     CARBON DIOXIDE 27 mmol/L  21-32   ANION GAP 13 mmol/L  10-20   GLUCOSE 215 mg/dl H 65-99   BUN 14 mg/dl  6-20   CREATININE 0.46 mg/dl L 0.51-0.95   GFR EST.AFRICAN AMER >90     eGFR results = or >90 mL/min/1.73m2 = Normal kidney function.   GFR EST.NONAFRI AMER >90     eGFR results = or >90 mL/min/1.73m2 = Normal kidney function.   BUN/CREATININE RATIO 30 H 7-25   CALCIUM 10.8 mg/dl H 8.4-10.2   BILIRUBIN TOTAL 0.4 mg/dl  0.2-1.0   GOT/AST 16 Units/L  <38   GPT/ALT 25 Units/L  <79   ALKALINE PHOSPHATASE 53 Units/L     TOTAL PROTEIN 7.3 g/dl  6.4-8.2   ALBUMIN 3.9 g/dl  3.6-5.1   GLOBULIN (CALCULATED) 3.4 g/dl  2.0-4.0   A/G RATIO 1.1  1.0-2.4     Protein Electrophoresis with Reflex to Immunofixation, Serum [id number] 12:01AM [facility]     Test Name Result Flag Reference   PROTEIN,TOTAL 7.2 g/dl  6.4-8.2   PATHOLOGIST INTERPRETATION      SEE PATHOLOGY ELECTROPHORESIS REPORT   M PROTEIN 0.2 g/dl H 0.0     IMMUNOFIXATION ELECTRO, SERUM w/IGG,A,M [id number] 12:01AM [facility]     Test Name Result Flag Reference   IGG-QUANT 757 mg/dl  751-1560   IMMUNOGLOBULIN A,  mg/dl     IGM-QUANT 56 mg/dl     KAPPA QUANTITATIVE FREE LIGHT CHAIN 1.10 mg/dl  0.33-1.94   LAMBDA QUANTITATIVE FREE LIGHT CHAIN 1.41 mg/dl  0.57-2.63   KAPPA/LAMBDA FREE LIGHT CHAIN RATIO 0.78  0.26-1.65   PATHOLOGIST INTERPRETATION      SEE PATHOLOGY ELECTROPHORESIS REPORT     ELECTROPHORESIS [id number] 12:01AM [facility]     Test Name Result Flag Reference   ELECTROPHORESIS (Report) O    Name: BUDDY LIRonda         MRN:   [id number]   : 1942          sounds clear to auscultation    Other Findings        Anesthesia Plan  ASA Score- 2     Anesthesia Type- IV sedation with anesthesia with ASA Monitors. Additional Monitors:     Airway Plan:            Plan Factors-Exercise tolerance (METS): >4 METS. Chart reviewed. EKG reviewed. Imaging results reviewed. Existing labs reviewed. Patient summary reviewed. Obstructive sleep apnea risk education given perioperatively. Induction- intravenous. Postoperative Plan- Plan for postoperative opioid use. Informed Consent- Anesthetic plan and risks discussed with patient. I personally reviewed this patient with the CRNA. Discussed and agreed on the Anesthesia Plan with the CRNA. Minerva Thompson   Visit#: [id number]                Pathology Report      Client:  [facility] [address]      Date Specimen Collected:11/15/17     Accession #: [id number]   Date Specimen Received: 11/16/17    Requisition #:[id number]   Date Reported:     11/17/2017 14:53     Other Lab #:        [id number]                        Location:   [facility]   Submitting Physician: Fitz Hand MD      ______________________________________________________________________________   Interpretation:      A. SERUM PROTEIN ELECTROPHORESIS:    Abnormal band detected equals 0.23 g/dL of the gamma region. Refer to   immunofixation for further identification. Previous monoclonal measured 0.19   g/dL on 10/17/16.         B. Serum Immunofixation and Quantitative Immunoglobulins with free light   chains by nephelometry:                 Results    Reference Range    IgG         757 mg/dL    694-1618 mg/dL             IgA         105 mg/dL      mg/dL    IgM          56 mg/dL      mg/dL    Free Kappa     1.10 mg/dL    0.33-1.94 mg/dL    Free Lambda     1.41 mg/dL    0.57-2.63 mg/dL    Kappa/Lambda Ratio 0.78       0.26-1.65         Interpretation:      IgG kappa monoclonal protein is present.      ______________________________________________________________________________      Farhat Kerr M.D.   ** Electronic Signature (GLK) 11/17/2017 14:53 **         Specimen(s) Submitted:    Serum Protein Electrophoresis Reflex         Fee Codes:    A: [id number]      Performing Lab(Unless otherwise specified):   [facility],[address] [place] [address]. [phone]

## 2023-12-02 ENCOUNTER — APPOINTMENT (EMERGENCY)
Dept: RADIOLOGY | Facility: HOSPITAL | Age: 73
DRG: 392 | End: 2023-12-02
Payer: COMMERCIAL

## 2023-12-02 ENCOUNTER — APPOINTMENT (EMERGENCY)
Dept: CT IMAGING | Facility: HOSPITAL | Age: 73
DRG: 392 | End: 2023-12-02
Payer: COMMERCIAL

## 2023-12-02 ENCOUNTER — HOSPITAL ENCOUNTER (INPATIENT)
Facility: HOSPITAL | Age: 73
LOS: 1 days | Discharge: HOME | DRG: 392 | End: 2023-12-04
Attending: EMERGENCY MEDICINE | Admitting: INTERNAL MEDICINE
Payer: COMMERCIAL

## 2023-12-02 DIAGNOSIS — R11.2 INTRACTABLE NAUSEA AND VOMITING: ICD-10-CM

## 2023-12-02 DIAGNOSIS — R11.2 POSTOPERATIVE NAUSEA AND VOMITING: ICD-10-CM

## 2023-12-02 DIAGNOSIS — Z98.890 POSTOPERATIVE NAUSEA AND VOMITING: ICD-10-CM

## 2023-12-02 DIAGNOSIS — G89.18 POSTOPERATIVE PAIN: Primary | ICD-10-CM

## 2023-12-02 LAB
2HR DELTA HS TROPONIN: -1 NG/L
4HR DELTA HS TROPONIN: 0 NG/L
ALBUMIN SERPL BCP-MCNC: 3.8 G/DL (ref 3.5–5)
ALP SERPL-CCNC: 62 U/L (ref 34–104)
ALT SERPL W P-5'-P-CCNC: 11 U/L (ref 7–52)
ANION GAP SERPL CALCULATED.3IONS-SCNC: 10 MMOL/L
AST SERPL W P-5'-P-CCNC: 14 U/L (ref 13–39)
BASOPHILS # BLD AUTO: 0.03 THOUSANDS/ÂΜL (ref 0–0.1)
BASOPHILS NFR BLD AUTO: 0 % (ref 0–1)
BILIRUB SERPL-MCNC: 0.55 MG/DL (ref 0.2–1)
BUN SERPL-MCNC: 23 MG/DL (ref 5–25)
CALCIUM SERPL-MCNC: 9.1 MG/DL (ref 8.4–10.2)
CARDIAC TROPONIN I PNL SERPL HS: 2 NG/L
CARDIAC TROPONIN I PNL SERPL HS: 3 NG/L
CARDIAC TROPONIN I PNL SERPL HS: 3 NG/L
CHLORIDE SERPL-SCNC: 102 MMOL/L (ref 96–108)
CO2 SERPL-SCNC: 25 MMOL/L (ref 21–32)
CREAT SERPL-MCNC: 0.94 MG/DL (ref 0.6–1.3)
D DIMER PPP FEU-MCNC: 1.11 UG/ML FEU
EOSINOPHIL # BLD AUTO: 0.07 THOUSAND/ÂΜL (ref 0–0.61)
EOSINOPHIL NFR BLD AUTO: 1 % (ref 0–6)
ERYTHROCYTE [DISTWIDTH] IN BLOOD BY AUTOMATED COUNT: 13.5 % (ref 11.6–15.1)
GFR SERPL CREATININE-BSD FRML MDRD: 80 ML/MIN/1.73SQ M
GLUCOSE SERPL-MCNC: 193 MG/DL (ref 65–140)
GLUCOSE SERPL-MCNC: 219 MG/DL (ref 65–140)
GLUCOSE SERPL-MCNC: 237 MG/DL (ref 65–140)
GLUCOSE SERPL-MCNC: 302 MG/DL (ref 65–140)
HCT VFR BLD AUTO: 39 % (ref 36.5–49.3)
HGB BLD-MCNC: 12.7 G/DL (ref 12–17)
IMM GRANULOCYTES # BLD AUTO: 0.06 THOUSAND/UL (ref 0–0.2)
IMM GRANULOCYTES NFR BLD AUTO: 1 % (ref 0–2)
LYMPHOCYTES # BLD AUTO: 0.5 THOUSANDS/ÂΜL (ref 0.6–4.47)
LYMPHOCYTES NFR BLD AUTO: 6 % (ref 14–44)
MCH RBC QN AUTO: 29.3 PG (ref 26.8–34.3)
MCHC RBC AUTO-ENTMCNC: 32.6 G/DL (ref 31.4–37.4)
MCV RBC AUTO: 90 FL (ref 82–98)
MONOCYTES # BLD AUTO: 0.7 THOUSAND/ÂΜL (ref 0.17–1.22)
MONOCYTES NFR BLD AUTO: 8 % (ref 4–12)
NEUTROPHILS # BLD AUTO: 7.12 THOUSANDS/ÂΜL (ref 1.85–7.62)
NEUTS SEG NFR BLD AUTO: 84 % (ref 43–75)
NRBC BLD AUTO-RTO: 0 /100 WBCS
PLATELET # BLD AUTO: 192 THOUSANDS/UL (ref 149–390)
PMV BLD AUTO: 11.9 FL (ref 8.9–12.7)
POTASSIUM SERPL-SCNC: 4.5 MMOL/L (ref 3.5–5.3)
PROT SERPL-MCNC: 7.2 G/DL (ref 6.4–8.4)
RBC # BLD AUTO: 4.33 MILLION/UL (ref 3.88–5.62)
SODIUM SERPL-SCNC: 137 MMOL/L (ref 135–147)
WBC # BLD AUTO: 8.48 THOUSAND/UL (ref 4.31–10.16)

## 2023-12-02 PROCEDURE — 71045 X-RAY EXAM CHEST 1 VIEW: CPT

## 2023-12-02 PROCEDURE — G1004 CDSM NDSC: HCPCS

## 2023-12-02 PROCEDURE — 96374 THER/PROPH/DIAG INJ IV PUSH: CPT

## 2023-12-02 PROCEDURE — 96375 TX/PRO/DX INJ NEW DRUG ADDON: CPT

## 2023-12-02 PROCEDURE — 85379 FIBRIN DEGRADATION QUANT: CPT | Performed by: EMERGENCY MEDICINE

## 2023-12-02 PROCEDURE — 99284 EMERGENCY DEPT VISIT MOD MDM: CPT

## 2023-12-02 PROCEDURE — 36415 COLL VENOUS BLD VENIPUNCTURE: CPT | Performed by: EMERGENCY MEDICINE

## 2023-12-02 PROCEDURE — 96361 HYDRATE IV INFUSION ADD-ON: CPT

## 2023-12-02 PROCEDURE — C9113 INJ PANTOPRAZOLE SODIUM, VIA: HCPCS | Performed by: INTERNAL MEDICINE

## 2023-12-02 PROCEDURE — 99285 EMERGENCY DEPT VISIT HI MDM: CPT | Performed by: EMERGENCY MEDICINE

## 2023-12-02 PROCEDURE — 96376 TX/PRO/DX INJ SAME DRUG ADON: CPT

## 2023-12-02 PROCEDURE — 99223 1ST HOSP IP/OBS HIGH 75: CPT | Performed by: INTERNAL MEDICINE

## 2023-12-02 PROCEDURE — 84484 ASSAY OF TROPONIN QUANT: CPT | Performed by: EMERGENCY MEDICINE

## 2023-12-02 PROCEDURE — 71275 CT ANGIOGRAPHY CHEST: CPT

## 2023-12-02 PROCEDURE — 85025 COMPLETE CBC W/AUTO DIFF WBC: CPT | Performed by: EMERGENCY MEDICINE

## 2023-12-02 PROCEDURE — 74177 CT ABD & PELVIS W/CONTRAST: CPT

## 2023-12-02 PROCEDURE — 82948 REAGENT STRIP/BLOOD GLUCOSE: CPT

## 2023-12-02 PROCEDURE — 93005 ELECTROCARDIOGRAM TRACING: CPT

## 2023-12-02 PROCEDURE — 80053 COMPREHEN METABOLIC PANEL: CPT | Performed by: EMERGENCY MEDICINE

## 2023-12-02 RX ORDER — CEPHALEXIN 500 MG/1
500 CAPSULE ORAL EVERY 12 HOURS SCHEDULED
Status: DISCONTINUED | OUTPATIENT
Start: 2023-12-02 | End: 2023-12-04 | Stop reason: HOSPADM

## 2023-12-02 RX ORDER — SUCRALFATE ORAL 1 G/10ML
1000 SUSPENSION ORAL 4 TIMES DAILY
Status: DISCONTINUED | OUTPATIENT
Start: 2023-12-02 | End: 2023-12-02

## 2023-12-02 RX ORDER — ATORVASTATIN CALCIUM 10 MG/1
10 TABLET, FILM COATED ORAL DAILY
Status: DISCONTINUED | OUTPATIENT
Start: 2023-12-02 | End: 2023-12-04 | Stop reason: HOSPADM

## 2023-12-02 RX ORDER — ONDANSETRON 2 MG/ML
4 INJECTION INTRAMUSCULAR; INTRAVENOUS ONCE
Status: COMPLETED | OUTPATIENT
Start: 2023-12-02 | End: 2023-12-02

## 2023-12-02 RX ORDER — HYDROMORPHONE HCL IN WATER/PF 6 MG/30 ML
0.2 PATIENT CONTROLLED ANALGESIA SYRINGE INTRAVENOUS ONCE
Status: COMPLETED | OUTPATIENT
Start: 2023-12-02 | End: 2023-12-02

## 2023-12-02 RX ORDER — SODIUM CHLORIDE 9 MG/ML
75 INJECTION, SOLUTION INTRAVENOUS CONTINUOUS
Status: DISCONTINUED | OUTPATIENT
Start: 2023-12-02 | End: 2023-12-03

## 2023-12-02 RX ORDER — MAGNESIUM HYDROXIDE/ALUMINUM HYDROXICE/SIMETHICONE 120; 1200; 1200 MG/30ML; MG/30ML; MG/30ML
30 SUSPENSION ORAL ONCE
Status: COMPLETED | OUTPATIENT
Start: 2023-12-02 | End: 2023-12-02

## 2023-12-02 RX ORDER — LOSARTAN POTASSIUM 25 MG/1
25 TABLET ORAL DAILY
Status: DISCONTINUED | OUTPATIENT
Start: 2023-12-03 | End: 2023-12-04 | Stop reason: HOSPADM

## 2023-12-02 RX ORDER — INSULIN LISPRO 100 [IU]/ML
1-6 INJECTION, SOLUTION INTRAVENOUS; SUBCUTANEOUS
Status: DISCONTINUED | OUTPATIENT
Start: 2023-12-02 | End: 2023-12-04 | Stop reason: HOSPADM

## 2023-12-02 RX ORDER — METOCLOPRAMIDE HYDROCHLORIDE 5 MG/ML
10 INJECTION INTRAMUSCULAR; INTRAVENOUS ONCE
Status: COMPLETED | OUTPATIENT
Start: 2023-12-02 | End: 2023-12-02

## 2023-12-02 RX ORDER — PANTOPRAZOLE SODIUM 40 MG/10ML
40 INJECTION, POWDER, LYOPHILIZED, FOR SOLUTION INTRAVENOUS EVERY 12 HOURS SCHEDULED
Status: DISCONTINUED | OUTPATIENT
Start: 2023-12-02 | End: 2023-12-04 | Stop reason: HOSPADM

## 2023-12-02 RX ORDER — SODIUM CHLORIDE 9 MG/ML
100 INJECTION, SOLUTION INTRAVENOUS CONTINUOUS
Status: DISCONTINUED | OUTPATIENT
Start: 2023-12-02 | End: 2023-12-04

## 2023-12-02 RX ORDER — LORAZEPAM 2 MG/ML
0.5 INJECTION INTRAMUSCULAR ONCE
Status: COMPLETED | OUTPATIENT
Start: 2023-12-02 | End: 2023-12-02

## 2023-12-02 RX ORDER — HYDROMORPHONE HCL/PF 1 MG/ML
0.5 SYRINGE (ML) INJECTION ONCE
Status: COMPLETED | OUTPATIENT
Start: 2023-12-02 | End: 2023-12-02

## 2023-12-02 RX ORDER — ALBUTEROL SULFATE 90 UG/1
2 AEROSOL, METERED RESPIRATORY (INHALATION) EVERY 6 HOURS PRN
Status: DISCONTINUED | OUTPATIENT
Start: 2023-12-02 | End: 2023-12-04 | Stop reason: HOSPADM

## 2023-12-02 RX ORDER — ENOXAPARIN SODIUM 100 MG/ML
40 INJECTION SUBCUTANEOUS DAILY
Status: DISCONTINUED | OUTPATIENT
Start: 2023-12-02 | End: 2023-12-04 | Stop reason: HOSPADM

## 2023-12-02 RX ORDER — ONDANSETRON 2 MG/ML
4 INJECTION INTRAMUSCULAR; INTRAVENOUS EVERY 6 HOURS PRN
Status: DISCONTINUED | OUTPATIENT
Start: 2023-12-02 | End: 2023-12-04 | Stop reason: HOSPADM

## 2023-12-02 RX ORDER — SUCRALFATE 1 G/1
1 TABLET ORAL 4 TIMES DAILY
Status: DISCONTINUED | OUTPATIENT
Start: 2023-12-02 | End: 2023-12-04

## 2023-12-02 RX ORDER — ACETAMINOPHEN 325 MG/1
975 TABLET ORAL EVERY 8 HOURS SCHEDULED
Status: DISCONTINUED | OUTPATIENT
Start: 2023-12-02 | End: 2023-12-04 | Stop reason: HOSPADM

## 2023-12-02 RX ADMIN — ONDANSETRON 4 MG: 2 INJECTION INTRAMUSCULAR; INTRAVENOUS at 18:54

## 2023-12-02 RX ADMIN — HYDROMORPHONE HYDROCHLORIDE 0.5 MG: 1 INJECTION, SOLUTION INTRAMUSCULAR; INTRAVENOUS; SUBCUTANEOUS at 09:16

## 2023-12-02 RX ADMIN — ALUMINUM HYDROXIDE, MAGNESIUM HYDROXIDE, AND DIMETHICONE 30 ML: 200; 20; 200 SUSPENSION ORAL at 10:40

## 2023-12-02 RX ADMIN — CEPHALEXIN 500 MG: 500 CAPSULE ORAL at 23:24

## 2023-12-02 RX ADMIN — INSULIN LISPRO 3 UNITS: 100 INJECTION, SOLUTION INTRAVENOUS; SUBCUTANEOUS at 18:55

## 2023-12-02 RX ADMIN — SODIUM CHLORIDE 100 ML/HR: 0.9 INJECTION, SOLUTION INTRAVENOUS at 15:35

## 2023-12-02 RX ADMIN — METOCLOPRAMIDE 10 MG: 5 INJECTION, SOLUTION INTRAMUSCULAR; INTRAVENOUS at 14:51

## 2023-12-02 RX ADMIN — LORAZEPAM 0.5 MG: 2 INJECTION INTRAMUSCULAR; INTRAVENOUS at 10:41

## 2023-12-02 RX ADMIN — PANTOPRAZOLE SODIUM 40 MG: 40 INJECTION, POWDER, FOR SOLUTION INTRAVENOUS at 15:35

## 2023-12-02 RX ADMIN — IOHEXOL 85 ML: 350 INJECTION, SOLUTION INTRAVENOUS at 11:24

## 2023-12-02 RX ADMIN — ONDANSETRON 4 MG: 2 INJECTION INTRAMUSCULAR; INTRAVENOUS at 13:38

## 2023-12-02 RX ADMIN — ENOXAPARIN SODIUM 40 MG: 40 INJECTION SUBCUTANEOUS at 18:54

## 2023-12-02 RX ADMIN — ONDANSETRON 4 MG: 2 INJECTION INTRAMUSCULAR; INTRAVENOUS at 09:16

## 2023-12-02 RX ADMIN — HYDROMORPHONE HYDROCHLORIDE 0.5 MG: 1 INJECTION, SOLUTION INTRAMUSCULAR; INTRAVENOUS; SUBCUTANEOUS at 19:10

## 2023-12-02 RX ADMIN — HYDROMORPHONE HYDROCHLORIDE 0.5 MG: 1 INJECTION, SOLUTION INTRAMUSCULAR; INTRAVENOUS; SUBCUTANEOUS at 10:28

## 2023-12-02 RX ADMIN — SODIUM CHLORIDE 500 ML: 0.9 INJECTION, SOLUTION INTRAVENOUS at 09:17

## 2023-12-02 RX ADMIN — HYDROMORPHONE HYDROCHLORIDE 0.2 MG: 0.2 INJECTION, SOLUTION INTRAMUSCULAR; INTRAVENOUS; SUBCUTANEOUS at 14:54

## 2023-12-02 RX ADMIN — SUCRALFATE 1 G: 1 TABLET ORAL at 23:24

## 2023-12-02 NOTE — H&P
[facility]  H&P  Name: Michell Thornton 68 y.o. male I MRN: [id number]  Unit/Bed#: ED-31 I Date of Admission: 12/2/2023   Date of Service: 12/2/2023 I Hospital Day: 0      Assessment/Plan   * Intractable nausea and vomiting  Assessment & Plan  Since toe surgery yesterday. Multiple episode of nonbloody vomiting. Improved in the ED although still feels intermittently nauseous with decreased appetite  Suspect most likely from stress from surgery/anesthesia/Percocet. We will start the patient on Protonix 40 IV twice daily. Also try Carafate 1 g 4 times daily  As needed Zofran. Clear liquid diet. Advance as tolerated    H/O toe surgery  Assessment & Plan  Had toe surgery yesterday. Currently denies any pain. Hold narcotics. As needed Tylenol for pain. Continue postop Keflex as prescribed. Benign essential hypertension  Assessment & Plan  Blood pressure stable. Continue losartan    Type 2 diabetes mellitus with hyperglycemia, with long-term current use of insulin [facility])  Assessment & Plan  Lab Results   Component Value Date    HGBA1C 7.1 (A) 11/17/2023       Recent Labs     12/01/23  0857 12/01/23  1057 12/02/23  1019   POCGLU 165* 158* 219*       Blood Sugar Average: Last 72 hrs:  (P) 219    Patient with minimal oral intake. Sliding scale for now. Adjust accordingly. VTE Prophylaxis: Enoxaparin (Lovenox)  / sequential compression device   Code Status: full  POLST:   Discussion with family: pt    Anticipated Length of Stay:  Patient will be admitted on an Observation basis with an anticipated length of stay of  less than 2 midnights. Justification for Hospital Stay: above    Chief Complaint:   Multiple episodes of vomiting since yesterday    History of Present Illness:    Michell Thornton is a 68 y.o. male who presents with multiple episodes of vomiting since yesterday. Patient had toe surgery yesterday.   Was given Percocet for pain after he took it started having nausea and vomiting. Had multiple episodes overnight. Nonbloody. Had dinner last night but threw all that up. After coming to ED, he was given multiple doses of Zofran. Reports that his nausea has improved but still intermittently feels nauseous. Feels very weak. Does not feel like eating at all. Review of Systems:    Review of Systems   Constitutional:  Negative for chills and fever. HENT:  Negative for congestion and sore throat. Respiratory:  Negative for cough and shortness of breath. Cardiovascular:  Negative for chest pain and palpitations. Gastrointestinal:  Positive for abdominal pain, nausea and vomiting. Negative for constipation and diarrhea. Genitourinary:  Negative for difficulty urinating, dysuria, flank pain, frequency and urgency. Musculoskeletal:  Negative for arthralgias and myalgias. Skin:  Negative for color change and rash. Neurological:  Negative for dizziness and light-headedness. Psychiatric/Behavioral:  Negative for agitation, behavioral problems and confusion.         Past Medical and Surgical History:     Past Medical History:   Diagnosis Date    Anemia     Arthritis     Black stool 07/24/2020    Cancer ([address])     Colon polyp     Diabetes mellitus ([address])     IDDM    Diverticulosis     Enlarged prostate     Erectile dysfunction     Hypercholesteremia     Resolved post weight loss    Hypertension     Resolved post weight loss    Kidney stone     Obesity     Prostate cancer ([address])     Sleep apnea     resolved with weight loss    Wears partial dentures     partial upper and lower       Past Surgical History:   Procedure Laterality Date    ABDOMINAL ADHESION SURGERY N/A 11/28/2016    Procedure: EXTENSIVE LYSIS ADHESIONS;  Surgeon: Jose Rangel MD;  Location: [facility] OR;  Service:     ANKLE FRACTURE SURGERY Left     CHOLECYSTECTOMY      COLON SURGERY      colon resection    COLONOSCOPY      COLOSTOMY      COLOSTOMY CLOSURE      DIAGNOSTIC LAPAROSCOPY      GASTRIC BYPASS failed due to adhesions    HERNIA REPAIR      abdominal    ID CYSTO INSERTION TRANSPROSTATIC IMPLANT SINGLE N/A 3/8/2019    Procedure: CYSTOSCOPY WITH INSERTION Chan Dunk;  Surgeon: Akira Doe MD;  [facility];  Service: Urology    ID CYSTO INSERTION TRANSPROSTATIC IMPLANT SINGLE N/A 5/8/2020    Procedure: Donnal Level WITH INSERTION Chan Dunk;  Surgeon: Akira Doe MD;  [facility];  Service: Urology    ID CYSTOURETHROSCOPY W/INTERNAL URETHROTOMY N/A 5/8/2020    Procedure: DIRECT VISUAL INTERAL URETHROTOMY (DVIU), dilation of urethral stricture;  Surgeon: Akira Doe MD;  [facility];  Service: Urology    ID EDG US EXAM SURGICAL ALTER STOM DUODENUM/JEJUNUM N/A 10/25/2018    Procedure: LINEAR ENDOSCOPIC U/S;  Surgeon: Jose Villafuerte MD;  Location: [facility]; Service: Gastroenterology    ID ESOPHAGOGASTRODUODENOSCOPY TRANSORAL DIAGNOSTIC N/A 7/6/2016    Procedure: EGD AND COLONOSCOPY;  Surgeon: Nga Mac MD;  Location: [facility]; Service: Gastroenterology    ID LAPS [facility]  RSTRICTIV [address] [place] [address] LONGITUDINAL GASTRECTOMY N/A 11/28/2016    Procedure: GASTRECTOMY SLEEVE LAPAROSCOPIC;  Surgeon: Tom Nava MD;  Location: [facility];  Service: Bariatrics    ID PROSTATE NEEDLE BIOPSY ANY APPROACH N/A 5/8/2020    Procedure: TRANSRECTAL NEEDLE BIOPSY PROSTATE (TRNBP) WITH TRANSRECTAL ULTRASOUND GUIDANCE;  Surgeon: Akira Doe MD;  [facility];  Service: Urology    TONSILLECTOMY      US GUIDED PROSTATE BIOPSY  5/8/2020       Meds/Allergies:    Prior to Admission medications    Medication Sig Start Date End Date Taking?  Authorizing Provider   albuterol (ProAir HFA) 90 mcg/act inhaler Inhale 2 puffs every 6 (six) hours as needed for wheezing or shortness of breath 11/28/23   Mattie Jones MD   aluminum-magnesium hydroxide-simethicone (MYLANTA) 200-200-20 mg/5 mL suspension Take 30 mL by mouth every 6 (six) hours as needed for indigestion or heartburn 6/29/21 Tereso Esparza PA-C   atorvastatin (LIPITOR) 10 mg tablet TAKE ONE TABLET BY MOUTH EVERY DAY 9/8/23   Melida Duran MD   Biotin 1000 MCG tablet Take 1 tablet by mouth if needed Per pt takes it "once in awhile"    Historical Provider, MD   Blood Glucose Monitoring Suppl ([address] [place] [address]) w/Device KIT by Does not apply route 2 (two) times a day 5/14/19   Melida Duran MD   cephalexin (KEFLEX) 500 mg capsule Take 1 capsule (500 mg total) by mouth every 12 (twelve) hours for 10 days 11/29/23 12/9/23  Melia Flores PA-C   Cholecalciferol (VITAMIN D3) 2000 units capsule Take 1,000 Units by mouth daily in the early morning     Historical Provider, MD   Continuous Blood Gluc  (FreeStyle [place] 2 [place]) CHARITY Check blood sugars multiple times per day 3/31/23   Issa Cheema MD   Continuous Blood Gluc Sensor (FreeStyle [place] 2 Sensor) MISC Check blood sugars multiple times per day 3/31/23   Issa Cheema MD   Cyanocobalamin (VITAMIN B-12) 5000 MCG TBDP Take 5,000 mcg by mouth once a week Takes on Mondays    Historical Provider, MD   Diclofenac Sodium (VOLTAREN) 1 % Apply 2 g topically 4 (four) times a day 11/28/23   Onelia Solano MD   docusate sodium (COLACE) 100 mg capsule Take 100 mg by mouth daily as needed for constipation    Historical Provider, MD   fluticasone (FLONASE) 50 mcg/act nasal spray 2 sprays into each nostril daily  Patient not taking: Reported on 11/28/2023 7/20/22   Melida Duran MD   fluticasone (Flovent HFA) 110 MCG/ACT inhaler Inhale 2 puffs 2 (two) times a day Rinse mouth after use.   Patient taking differently: Inhale 2 puffs if needed Rinse mouth after use. 6/24/22 11/28/23  Melida Duran MD   glipiZIDE (GLUCOTROL XL) 5 mg 24 hr tablet Take 5 mg by mouth in the morning 6/29/23   Historical Provider, MD   insulin aspart protamine-insulin aspart (NovoLOG 70/30) 100 units/mL injection Inject 10 Units under the skin 2 (two) times a day before meals 11/17/23 1/16/24  Gardia Germinal, MD   Lancets (LIFESCAN UNISTIK 2) MISC Lifescan One Touch Ultramini Meter; use as directed; 1; 0; 31-Oct-2016; 31-Oct-2016; Faviola Lee; Active 10/31/16   Historical Provider, MD   losartan (COZAAR) 25 mg tablet TAKE ONE TABLET BY MOUTH EVERY DAY 10/30/23   Melida Duran MD   metFORMIN (GLUCOPHAGE) 1000 MG tablet TAKE ONE TABLET BY MOUTH TWICE A DAY WITH MEALS 23   Melida Duran MD   Mirabegron ER 50 MG TB24 Take 1 tablet (50 mg total) by mouth daily at bedtime 10/16/23   Sarahi Wallace MD   Multiple Vitamin (MULTIVITAMIN) capsule Take 1 capsule by mouth daily in the early morning     Historical Provider, MD   NovoLOG Mix 70/30 FlexPen (70-30) 100 units/mL injection pen INJECT 20 UNITS UNDER THE SKIN TWO TIMES A DAY WITH MEALS 23   Marvin Stephen MD   oxyCODONE-acetaminophen (Percocet) 2.5-325 MG per tablet Take 1 tablet by mouth every 4 (four) hours as needed for moderate pain for up to 10 days Max Daily Amount: 6 tablets 23  Carolyn Amaya DPM   pantoprazole (PROTONIX) 40 mg tablet TAKE ONE TABLET BY MOUTH EVERY DAY  Patient taking differently: Take 40 mg by mouth every other day 23   Faviola Lee MD   sulfamethoxazole-trimethoprim (BACTRIM DS) 800-160 mg per tablet Take 1 tablet by mouth 2 (two) times a day for 10 days smx-tmp DS (BACTRIM) 800-160 mg tabs (1tab q12 D10) 23  Pablo Marino PA-C         Allergies:    Allergies   Allergen Reactions    Enalapril Anaphylaxis, Throat Swelling and Hives       Social History:     Marital Status: Single   Occupation:   Patient Pre-hospital Living Situation:   Patient Pre-hospital Level of Mobility:   Patient Pre-hospital Diet Restrictions:   Substance Use History:   Social History     Substance and Sexual Activity   Alcohol Use Not Currently     Social History     Tobacco Use   Smoking Status Former    Packs/day: 0.25    Types: Cigarettes    Quit date: 11/10/2001    Years since quittin.0    Passive exposure: Past   Smokeless Tobacco Former     Social History     Substance and Sexual Activity   Drug Use Not Currently    Comment: RSO       Family History:    Family History   Problem Relation Age of Onset    Heart disease Mother     Breast cancer Mother 80    Diabetes Father     Colon cancer Neg Hx     Liver disease Neg Hx        Physical Exam:     Vitals:   Blood Pressure: 140/65 (12/02/23 1525)  Pulse: 59 (12/02/23 1525)  Temperature: 98.4 °F (36.9 °C) (12/02/23 0800)  Temp Source: Oral (12/02/23 0800)  Respirations: 19 (12/02/23 1525)  Height: 5' 7" (170.2 cm) (12/02/23 1525)  Weight - Scale: 106 kg (233 lb 0.4 oz) (12/02/23 1525)  SpO2: 96 % (12/02/23 1525)    Physical Exam    Constitutional: Pt appears comfortable. Not in any acute distress. HENT:   Head: Normocephalic and atraumatic. Eyes: EOM are normal.   Neck: Neck supple. Cardiovascular: Normal rate, regular rhythm, normal heart sounds. No murmur heard. Pulmonary/Chest: Effort normal, air entry b/l equal. No respiratory distress. Pt has no wheezes or crackles. Abdominal: Soft. Non-distended, Non-tender. Bowel sounds are normal.   Musculoskeletal: Normal range of motion. Neurological: awake, alert. Moving all extremities spontaneously. Psychiatric: normal mood and affect. Additional Data:     Lab Results: I have personally reviewed pertinent reports.       Results from last 7 days   Lab Units 12/02/23  0915   WBC Thousand/uL 8.48   HEMOGLOBIN g/dL 12.7   HEMATOCRIT % 39.0   PLATELETS Thousands/uL 192   NEUTROS PCT % 84*   LYMPHS PCT % 6*   MONOS PCT % 8   EOS PCT % 1     Results from last 7 days   Lab Units 12/02/23  0915   SODIUM mmol/L 137   POTASSIUM mmol/L 4.5   CHLORIDE mmol/L 102   CO2 mmol/L 25   BUN mg/dL 23   CREATININE mg/dL 0.94   ANION GAP mmol/L 10   CALCIUM mg/dL 9.1   ALBUMIN g/dL 3.8   TOTAL BILIRUBIN mg/dL 0.55   ALK PHOS U/L 62   ALT U/L 11   AST U/L 14   GLUCOSE RANDOM mg/dL 193*         Results from last 7 days   Lab Units 12/02/23  1019 12/01/23  1057 12/01/23  0857   POC GLUCOSE mg/dl 219* 158* 165*               Imaging: I have personally reviewed pertinent reports. PE Study with CT Abdomen and Pelvis with contrast   Final Result by Zak Mo MD (12/02 1315)      No acute pulmonary embolism or intra-abdominal process. Stable postsurgical changes in the anterior abdominal wall with possible Wan type hernia without obstruction      Small hiatal hernia with a few secretions in the lower esophagus, which puts the patient at high risk for aspiration pneumonitis      A 2.2 cm pancreatic cystic lesion, stable to minimally increased in size since 12/14/2020. MRI MRCP abdomen with and without contrast in 1 year is recommended to assess for stability and for better characterization. Grossly stable indeterminate 1.7 cm complex cystic exophytic left renal lower pole lesion, attention on follow-up  MRI MRCP abdomen with and without contrast is recommended. The study was marked in [facility] for immediate notification. Workstation performed: [id number]         XR chest 1 view portable   ED Interpretation by Rosario Esparza DO (12/02 1002)   No acute disease          EKG, Pathology, and Other Studies Reviewed on Admission:   EKG:     Allscripts / Epic Records Reviewed: Yes     ** Please Note: This note has been constructed using a voice recognition system.  **

## 2023-12-02 NOTE — LETTER
Thank you for allowing us to participate in the care of your patient, Haydee Lou, who was hospitalized from 12/2/2023 through 12/4/2023 with the admitting diagnosis of intractable nausea and vomiting. On CT imaging, incidental findings of a left complex renal cyst with MRI MRCP was recommended as soon as possible. Please schedule this for the patient. A pancreatic cyst was also incidentally found on CT imaging and an MRI MRCP was recommended in one year from now. Please follow up with patient in regard to these imaging tests. Thank you! If you have any additional questions or would like to discuss further, please feel free to contact me.     Samson Valente DO  [facility] [place], [place] Internal Medicine, Hospitalist  [phone]

## 2023-12-02 NOTE — ASSESSMENT & PLAN NOTE
Had toe surgery yesterday. Currently denies any pain. Hold narcotics. As needed Tylenol for pain. Continue postop Keflex as prescribed.

## 2023-12-02 NOTE — ASSESSMENT & PLAN NOTE
Lab Results   Component Value Date    HGBA1C 7.1 (A) 11/17/2023       Recent Labs     12/01/23  0857 12/01/23  1057 12/02/23  1019   POCGLU 165* 158* 219*       Blood Sugar Average: Last 72 hrs:  (P) 219    Patient with minimal oral intake. Sliding scale for now. Adjust accordingly.

## 2023-12-02 NOTE — ED NOTES
Ronnie texted provider Mg Austin MD and made aware of patients pain, requesting something for prn pain. Provider aware. No new orders.       Natanael Smith RN  12/02/23 5859

## 2023-12-02 NOTE — ED PROVIDER NOTES
History  Chief Complaint   Patient presents with    Vomiting     Pt reports vomiting since last night, reports had surgery on foot and was given Percocet, has taken 3 doses of medication. 77-year-old male presents emergency department accompanied by his wife for evaluation of nausea vomiting and abdominal pain. Patient reports that he is postop day 1 from a hammertoe repair with Dr. Danielle Newell at [facility].  His immediate postoperative period was uncomplicated and he was discharged. He returned home and had severe foot pain. He took Percocet and began vomiting. He has had several episodes of nonbloody, nonbilious vomiting. He continues to have severe 10 out of 10 left foot pain. Patient denies fevers or chills. He does have epigastric discomfort and feels short of breath. Patient is nonweightbearing on the left foot until cleared by podiatry. History provided by:  Patient, medical records and spouse   used: No    Vomiting  Severity:  Severe  Duration:  1 day  Timing:  Constant  Quality:  Stomach contents  Progression:  Unchanged  Chronicity:  New  Recent urination:  Normal  Context: not post-tussive and not self-induced    Relieved by:  Nothing  Worsened by:  Nothing  Ineffective treatments:  None tried  Associated symptoms: abdominal pain and headaches    Associated symptoms: no diarrhea, no fever and no sore throat    Risk factors: prior abdominal surgery    Risk factors: no sick contacts        Prior to Admission Medications   Prescriptions Last Dose Informant Patient Reported? Taking?    Biotin 1000 MCG tablet  Self Yes No   Sig: Take 1 tablet by mouth if needed Per pt takes it "once in awhile"   Blood Glucose Monitoring Suppl (GLUCOCARD VITAL MONITOR) w/Device KIT  Self No No   Sig: by Does not apply route 2 (two) times a day   Cholecalciferol (VITAMIN D3) 2000 units capsule  Self Yes No   Sig: Take 1,000 Units by mouth daily in the early morning    Continuous Blood Gluc  (FreeStyle [place] 2 [place]) CHARITY  Self No No   Sig: Check blood sugars multiple times per day   Continuous Blood Gluc Sensor (FreeStyle [place] 2 Sensor) MISC  Self No No   Sig: Check blood sugars multiple times per day   Cyanocobalamin (VITAMIN B-12) 5000 MCG TBDP  Self Yes No   Sig: Take 5,000 mcg by mouth once a week Takes on    Diclofenac Sodium (VOLTAREN) 1 %   No No   Sig: Apply 2 g topically 4 (four) times a day   Lancets (Growing StarsCAN UNISTIK 2) MISC  Self Yes No   Sig: Lifescan One Touch Ultramini Meter; use as directed; 1; 0; 31-Oct-2016; 31-Oct-2016; Melida Duran; Active   Mirabegron ER 50 MG TB24   No No   Sig: Take 1 tablet (50 mg total) by mouth daily at bedtime   Multiple Vitamin (MULTIVITAMIN) capsule  Self Yes No   Sig: Take 1 capsule by mouth daily in the early morning    NovoLOG Mix 70/30 FlexPen (70-30) 100 units/mL injection pen   No No   Sig: INJECT 20 UNITS UNDER THE SKIN TWO TIMES A DAY WITH MEALS   albuterol (ProAir HFA) 90 mcg/act inhaler   No No   Sig: Inhale 2 puffs every 6 (six) hours as needed for wheezing or shortness of breath   aluminum-magnesium hydroxide-simethicone (MYLANTA) 200-200-20 mg/5 mL suspension  Self No No   Sig: Take 30 mL by mouth every 6 (six) hours as needed for indigestion or heartburn   atorvastatin (LIPITOR) 10 mg tablet  Self No No   Sig: TAKE ONE TABLET BY MOUTH EVERY DAY   cephalexin (KEFLEX) 500 mg capsule   No No   Sig: Take 1 capsule (500 mg total) by mouth every 12 (twelve) hours for 10 days   docusate sodium (COLACE) 100 mg capsule  Self Yes No   Sig: Take 100 mg by mouth daily as needed for constipation   fluticasone (FLONASE) 50 mcg/act nasal spray  Self No No   Si sprays into each nostril daily   Patient not taking: Reported on 2023   fluticasone (Flovent HFA) 110 MCG/ACT inhaler   No No   Sig: Inhale 2 puffs 2 (two) times a day Rinse mouth after use. Patient taking differently: Inhale 2 puffs if needed Rinse mouth after use. glipiZIDE (GLUCOTROL XL) 5 mg 24 hr tablet  Self Yes No   Sig: Take 5 mg by mouth in the morning   insulin aspart protamine-insulin aspart (NovoLOG 70/30) 100 units/mL injection   No No   Sig: Inject 10 Units under the skin 2 (two) times a day before meals   losartan (COZAAR) 25 mg tablet   No No   Sig: TAKE ONE TABLET BY MOUTH EVERY DAY   metFORMIN (GLUCOPHAGE) 1000 MG tablet  Self No No   Sig: TAKE ONE TABLET BY MOUTH TWICE A DAY WITH MEALS   oxyCODONE-acetaminophen (Percocet) 2.5-325 MG per tablet   No No   Sig: Take 1 tablet by mouth every 4 (four) hours as needed for moderate pain for up to 10 days Max Daily Amount: 6 tablets   pantoprazole (PROTONIX) 40 mg tablet  Self No No   Sig: TAKE ONE TABLET BY MOUTH EVERY DAY   Patient taking differently: Take 40 mg by mouth every other day   sulfamethoxazole-trimethoprim (BACTRIM DS) 800-160 mg per tablet   No No   Sig: Take 1 tablet by mouth 2 (two) times a day for 10 days smx-tmp DS (BACTRIM) 800-160 mg tabs (1tab q12 D10)      Facility-Administered Medications: None       Past Medical History:   Diagnosis Date    Anemia     Arthritis     Black stool 07/24/2020    Cancer ([address])     Colon polyp     Diabetes mellitus (HCC)     IDDM    Diverticulosis     Enlarged prostate     Erectile dysfunction     Hypercholesteremia     Resolved post weight loss    Hypertension     Resolved post weight loss    Kidney stone     Obesity     Prostate cancer ([address])     Sleep apnea     resolved with weight loss    Wears partial dentures     partial upper and lower       Past Surgical History:   Procedure Laterality Date    ABDOMINAL ADHESION SURGERY N/A 11/28/2016    Procedure: EXTENSIVE LYSIS ADHESIONS;  Surgeon: Yaritza Rodriguez MD;  [facility];  Service:     ANKLE FRACTURE SURGERY Left     CHOLECYSTECTOMY      COLON SURGERY      colon resection    COLONOSCOPY      COLOSTOMY      COLOSTOMY CLOSURE      DIAGNOSTIC LAPAROSCOPY      GASTRIC BYPASS      failed due to adhesions HERNIA REPAIR      abdominal    KY CYSTO INSERTION TRANSPROSTATIC IMPLANT SINGLE N/A 3/8/2019    Procedure: CYSTOSCOPY WITH INSERTION Gordy Fernandes;  Surgeon: Addy Menjivar MD;  [facility];  Service: Urology    KY CYSTO INSERTION TRANSPROSTATIC IMPLANT SINGLE N/A 5/8/2020    Procedure: Ta Maid WITH INSERTION Gordy Fernandes;  Surgeon: Addy Menjivar MD;  [facility];  Service: Urology    KY CYSTOURETHROSCOPY W/INTERNAL URETHROTOMY N/A 5/8/2020    Procedure: DIRECT VISUAL INTERAL URETHROTOMY (DVIU), dilation of urethral stricture;  Surgeon: Addy Menjivar MD;  [facility];  Service: Urology    KY EDG US EXAM SURGICAL ALTER STOM DUODENUM/JEJUNUM N/A 10/25/2018    Procedure: LINEAR ENDOSCOPIC U/S;  Surgeon: Trish Lan MD;  Location: [facility]; Service: Gastroenterology    KY ESOPHAGOGASTRODUODENOSCOPY TRANSORAL DIAGNOSTIC N/A 7/6/2016    Procedure: EGD AND COLONOSCOPY;  Surgeon: Nancy Martins MD;  [facility]; Service: Gastroenterology    KY LAPS [address] [place] [address] Dr RSTRICTIV [address] [place] [address] LONGITUDINAL GASTRECTOMY N/A 11/28/2016    Procedure: GASTRECTOMY SLEEVE LAPAROSCOPIC;  Surgeon: Trish Raines MD;  [facility];  Service: Bariatrics    KY PROSTATE NEEDLE BIOPSY ANY APPROACH N/A 5/8/2020    Procedure: TRANSRECTAL NEEDLE BIOPSY PROSTATE (TRNBP) WITH TRANSRECTAL ULTRASOUND GUIDANCE;  Surgeon: Addy Menjivar MD;  [facility];  Service: Urology    TONSILLECTOMY      US GUIDED PROSTATE BIOPSY  5/8/2020       Family History   Problem Relation Age of Onset    Heart disease Mother     Breast cancer Mother 80    Diabetes Father     Colon cancer Neg Hx     Liver disease Neg Hx      I have reviewed and agree with the history as documented.     E-Cigarette/Vaping    E-Cigarette Use Never User      E-Cigarette/Vaping Substances    Nicotine No     THC No     CBD No     Flavoring No     Other No     Unknown No      Social History     Tobacco Use    Smoking status: Former Packs/day: 0.25     Types: Cigarettes     Quit date: 11/10/2001     Years since quittin.0     Passive exposure: Past    Smokeless tobacco: Former   Vaping Use    Vaping Use: Never used   Substance Use Topics    Alcohol use: Not Currently    Drug use: Not Currently     Comment: RSO       Review of Systems   Constitutional:  Positive for appetite change. Negative for fever. HENT:  Negative for sore throat. Respiratory:  Positive for shortness of breath. Cardiovascular:  Positive for chest pain. Gastrointestinal:  Positive for abdominal pain, nausea and vomiting. Negative for diarrhea. Musculoskeletal:  Positive for gait problem. Neurological:  Positive for headaches. All other systems reviewed and are negative. Physical Exam  Physical Exam  Vitals reviewed. Constitutional:       General: He is in acute distress. Appearance: Normal appearance. He is well-developed. He is not ill-appearing. HENT:      Head: Normocephalic and atraumatic. Eyes:      Conjunctiva/sclera: Conjunctivae normal.      Pupils: Pupils are equal, round, and reactive to light. Cardiovascular:      Rate and Rhythm: Normal rate and regular rhythm. Heart sounds: Normal heart sounds. Pulmonary:      Effort: Pulmonary effort is normal. No accessory muscle usage or respiratory distress. Breath sounds: Normal breath sounds. Chest:      Chest wall: No tenderness. Abdominal:      General: Bowel sounds are normal. There is no distension. Palpations: Abdomen is soft. Tenderness: There is abdominal tenderness. There is no guarding or rebound. Musculoskeletal:         General: No tenderness or deformity. Normal range of motion. Cervical back: Normal range of motion and neck supple. Feet:      Comments: Left foot is in a surgical shoe with surgical dressing intact clean and dry  Lymphadenopathy:      Cervical: No cervical adenopathy. Skin:     General: Skin is warm and dry. Findings: No rash. Neurological:      General: No focal deficit present. Mental Status: He is alert and oriented to person, place, and time. Coordination: Coordination normal.      Deep Tendon Reflexes: Reflexes are normal and symmetric. Psychiatric:         Behavior: Behavior normal.         Thought Content:  Thought content normal.         Judgment: Judgment normal.         Vital Signs  ED Triage Vitals   Temperature Pulse Respirations Blood Pressure SpO2   12/02/23 0800 12/02/23 0800 12/02/23 0800 12/02/23 0800 12/02/23 0800   98.4 °F (36.9 °C) (!) 52 16 161/67 100 %      Temp Source Heart Rate Source Patient Position - Orthostatic VS BP Location FiO2 (%)   12/02/23 0800 12/02/23 0800 12/02/23 1020 12/02/23 1020 --   Oral Monitor Sitting Right arm       Pain Score       12/02/23 1020       10 - Worst Possible Pain           Vitals:    12/02/23 1319 12/02/23 1448 12/02/23 1525 12/02/23 1600   BP: 148/67 165/77 140/65 138/63   Pulse: 62 60 59 62   Patient Position - Orthostatic VS: Lying Lying           Visual Acuity      ED Medications  Medications   sodium chloride 0.9 % infusion (100 mL/hr Intravenous New Bag 12/2/23 1535)   pantoprazole (PROTONIX) injection 40 mg (40 mg Intravenous Given 12/2/23 1535)   albuterol (PROVENTIL HFA,VENTOLIN HFA) inhaler 2 puff (has no administration in time range)   atorvastatin (LIPITOR) tablet 10 mg (has no administration in time range)   cephalexin (KEFLEX) capsule 500 mg (has no administration in time range)   fluticasone (ARNUITY ELLIPTA) 100 MCG/ACT inhaler 1 puff (has no administration in time range)   losartan (COZAAR) tablet 25 mg (has no administration in time range)   enoxaparin (LOVENOX) subcutaneous injection 40 mg (has no administration in time range)   insulin lispro (HumaLOG) 100 units/mL subcutaneous injection 1-6 Units (has no administration in time range)   ondansetron (ZOFRAN) injection 4 mg (has no administration in time range)   sodium chloride 0.9 % infusion (has no administration in time range)   sucralfate (CARAFATE) tablet 1 g (has no administration in time range)   ondansetron (ZOFRAN) injection 4 mg (4 mg Intravenous Given 12/2/23 0916)   HYDROmorphone (DILAUDID) injection 0.5 mg (0.5 mg Intravenous Given 12/2/23 0916)   sodium chloride 0.9 % bolus 500 mL (0 mL Intravenous Stopped 12/2/23 1021)   HYDROmorphone (DILAUDID) injection 0.5 mg (0.5 mg Intravenous Given 12/2/23 1028)   aluminum-magnesium hydroxide-simethicone (MAALOX) oral suspension 30 mL (30 mL Oral Given 12/2/23 1040)   LORazepam (ATIVAN) injection 0.5 mg (0.5 mg Intravenous Given 12/2/23 1041)   iohexol (OMNIPAQUE) 350 MG/ML injection (MULTI-DOSE) 85 mL (85 mL Intravenous Given 12/2/23 1124)   ondansetron (ZOFRAN) injection 4 mg (4 mg Intravenous Given 12/2/23 1338)   metoclopramide (REGLAN) injection 10 mg (10 mg Intravenous Given 12/2/23 1451)   HYDROmorphone HCl (DILAUDID) injection 0.2 mg (0.2 mg Intravenous Given 12/2/23 1454)       Diagnostic Studies  Results Reviewed       Procedure Component Value Units Date/Time    HS Troponin I 4hr [[id number]]  (Normal) Collected: 12/02/23 1531    Lab Status: Final result Specimen: Blood from Arm, Left Updated: 12/02/23 1603     hs TnI 4hr 3 ng/L      Delta 4hr hsTnI 0 ng/L     HS Troponin I 2hr [[id number]]  (Normal) Collected: 12/02/23 1317    Lab Status: Final result Specimen: Blood from Arm, Left Updated: 12/02/23 1351     hs TnI 2hr 2 ng/L      Delta 2hr hsTnI -1 ng/L     Fingerstick Glucose (POCT) [[id number]]  (Abnormal) Collected: 12/02/23 1019    Lab Status: Final result Updated: 12/02/23 1023     POC Glucose 219 mg/dl     D-Dimer [[id number]]  (Abnormal) Collected: 12/02/23 0915    Lab Status: Final result Specimen: Blood from Arm, Right Updated: 12/02/23 0959     D-Dimer, Quant 1.11 ug/ml FEU     Narrative:       In the evaluation for possible pulmonary embolism, in the appropriate (Well's Score of 4 or less) patient, the age adjusted d-dimer cutoff for this patient can be calculated as:    Age x 0.01 (in ug/mL) for Age-adjusted D-dimer exclusion threshold for a patient over 50 years.     HS Troponin 0hr (reflex protocol) [[id number]]  (Normal) Collected: 12/02/23 0915    Lab Status: Final result Specimen: Blood from Arm, Right Updated: 12/02/23 0946     hs TnI 0hr 3 ng/L     Comprehensive metabolic panel [[id number]]  (Abnormal) Collected: 12/02/23 0915    Lab Status: Final result Specimen: Blood from Arm, Right Updated: 12/02/23 0944     Sodium 137 mmol/L      Potassium 4.5 mmol/L      Chloride 102 mmol/L      CO2 25 mmol/L      ANION GAP 10 mmol/L      BUN 23 mg/dL      Creatinine 0.94 mg/dL      Glucose 193 mg/dL      Calcium 9.1 mg/dL      AST 14 U/L      ALT 11 U/L      Alkaline Phosphatase 62 U/L      Total Protein 7.2 g/dL      Albumin 3.8 g/dL      Total Bilirubin 0.55 mg/dL      eGFR 80 ml/min/1.73sq m     Narrative:      National Kidney Disease Foundation guidelines for Chronic Kidney Disease (CKD):     Stage 1 with normal or high GFR (GFR > 90 mL/min/1.73 square meters)    Stage 2 Mild CKD (GFR = 60-89 mL/min/1.73 square meters)    Stage 3A Moderate CKD (GFR = 45-59 mL/min/1.73 square meters)    Stage 3B Moderate CKD (GFR = 30-44 mL/min/1.73 square meters)    Stage 4 Severe CKD (GFR = 15-29 mL/min/1.73 square meters)    Stage 5 End Stage CKD (GFR <15 mL/min/1.73 square meters)  Note: GFR calculation is accurate only with a steady state creatinine    CBC and differential [[id number]]  (Abnormal) Collected: 12/02/23 0915    Lab Status: Final result Specimen: Blood from Arm, Right Updated: 12/02/23 0932     WBC 8.48 Thousand/uL      RBC 4.33 Million/uL      Hemoglobin 12.7 g/dL      Hematocrit 39.0 %      MCV 90 fL      MCH 29.3 pg      MCHC 32.6 g/dL      RDW 13.5 %      MPV 11.9 fL      Platelets 267 Thousands/uL      nRBC 0 /100 WBCs      Neutrophils Relative 84 %      Immat GRANS % 1 %      Lymphocytes Relative 6 %      Monocytes Relative 8 %      Eosinophils Relative 1 %      Basophils Relative 0 %      Neutrophils Absolute 7.12 Thousands/µL      Immature Grans Absolute 0.06 Thousand/uL      Lymphocytes Absolute 0.50 Thousands/µL      Monocytes Absolute 0.70 Thousand/µL      Eosinophils Absolute 0.07 Thousand/µL      Basophils Absolute 0.03 Thousands/µL                    PE Study with CT Abdomen and Pelvis with contrast   Final Result by Rubia Donovan MD (12/02 1315)      No acute pulmonary embolism or intra-abdominal process. Stable postsurgical changes in the anterior abdominal wall with possible Wan type hernia without obstruction      Small hiatal hernia with a few secretions in the lower esophagus, which puts the patient at high risk for aspiration pneumonitis      A 2.2 cm pancreatic cystic lesion, stable to minimally increased in size since 12/14/2020. MRI MRCP abdomen with and without contrast in 1 year is recommended to assess for stability and for better characterization. Grossly stable indeterminate 1.7 cm complex cystic exophytic left renal lower pole lesion, attention on follow-up  MRI MRCP abdomen with and without contrast is recommended. The study was marked in [facility] for immediate notification.                      Workstation performed: [id number]         XR chest 1 view portable   ED Interpretation by Tere Godfrey DO (12/02 1002)   No acute disease                 Procedures  ECG 12 Lead Documentation Only    Date/Time: 12/2/2023 10:49 AM    Performed by: Tere Godfrey DO  Authorized by: Tere Godfrey DO    Indications / Diagnosis:  Epigastric pain and vomiting  ECG reviewed by me, the ED Provider: yes    Patient location:  ED  Previous ECG:     Previous ECG:  Compared to current    Comparison ECG info:  7/2023    Similarity:  No change  Interpretation:     Interpretation: normal    Rate:     ECG rate:  51    ECG rate assessment: bradycardic    Rhythm:     Rhythm: sinus bradycardia    Ectopy:     Ectopy: none    QRS:     QRS axis:  Normal  Conduction:     Conduction: normal    ST segments:     ST segments:  Normal  T waves:     T waves: normal             ED Course  ED Course as of 12/02/23 1633   Sat Dec 02, 2023   1436 Patient reevaluated by me. He continues with nausea. He has not been able to tolerate oral intake. He reports continued upper abdominal pain. PERC Rule for PE      Flowsheet Row Most Recent Value   PERC Rule for PE    Age >=50 1 Filed at: 12/02/2023 0829   HR >=100 0 Filed at: 12/02/2023 0829   O2 Sat on room air < 95% 0 Filed at: 12/02/2023 2645   History of PE or DVT 0 Filed at: 12/02/2023 1911   Recent trauma or surgery 1 Filed at: 12/02/2023 4170   Hemoptysis 0 Filed at: 12/02/2023 7658   Exogenous estrogen 0 Filed at: 12/02/2023 7835   Unilateral leg swelling 0 Filed at: 12/02/2023 6465   PERC Rule for PE Results 2 Filed at: 12/02/2023 5431                SBIRT 20yo+      Flowsheet Row Most Recent Value   Initial Alcohol Screen: US AUDIT-C     1. How often do you have a drink containing alcohol? 0 Filed at: 12/02/2023 0800   2. How many drinks containing alcohol do you have on a typical day you are drinking? 0 Filed at: 12/02/2023 0800   3a. Male UNDER 65: How often do you have five or more drinks on one occasion? 0 Filed at: 12/02/2023 0800   3b. FEMALE Any Age, or MALE 65+: How often do you have 4 or more drinks on one occassion? 0 Filed at: 12/02/2023 0800   Audit-C Score 0 Filed at: 12/02/2023 0800   LORRAINE: How many times in the past year have you. .. Used an illegal drug or used a prescription medication for non-medical reasons? Never Filed at: 12/02/2023 0800                      Medical Decision Making  63-year-old male presents with nausea and vomiting 1 day postop from left foot surgery.   Differential diagnosis includes was not limited to side effect of anesthesia and/or pain medication, acute intra-abdominal pathology, gastritis, peptic ulcer disease, pancreatitis, dyspnea secondary to pneumonia, aspiration pneumonitis, pulmonary embolism, cardiac dysrhythmia    Problems Addressed:  Postoperative nausea and vomiting: acute illness or injury  Postoperative pain: acute illness or injury    Amount and/or Complexity of Data Reviewed  Independent Historian: spouse     Details: Patient's spouse at bedside to help provide history  External Data Reviewed: notes. Details: Operative and anesthesia notes from yesterday reviewed by me. Labs: ordered. Details: Labs ordered and independently interpreted by me, patient's lab work is unremarkable except for slight hyperglycemia and elevated D-dimer which prompted CT scan. Radiology: ordered and independent interpretation performed. Details: CT chest abdomen pelvis ordered by me and interpreted by radiology. Incidental findings appear stable and were  discussed with patient and his wife. ECG/medicine tests: ordered and independent interpretation performed. Discussion of management or test interpretation with external provider(s): Case discussed with Dr. Trudy Chandra who agrees with plan to admit the patient for observation, pain control and hydration. Risk  OTC drugs. Prescription drug management. Decision regarding hospitalization.              Disposition  Final diagnoses:   Postoperative pain   Postoperative nausea and vomiting     Time reflects when diagnosis was documented in both MDM as applicable and the Disposition within this note       Time User Action Codes Description Comment    12/2/2023  3:32 PM Gilford Polio Add [G89.18] Postoperative pain     12/2/2023  3:33 PM Gilford Polio Add [R11.2,  Z98.890] Postoperative nausea and vomiting           ED Disposition       ED Disposition   Admit    Condition   Stable    Date/Time   Sat Dec 2, 2023 1531    Comment   Case was discussed with Dr Trudy Chandra and the patient's admission status was agreed to be Admission Status: observation status to the service of  [address]. Follow-up Information    None         Patient's Medications   Discharge Prescriptions    No medications on file       No discharge procedures on file.     PDMP Review         Value Time User    PDMP Reviewed  Yes 8/17/2022  4:28 PM Silvio Jarrett DDS            ED Provider  Electronically Signed by             Kory Ma DO  12/02/23 8776

## 2023-12-02 NOTE — ASSESSMENT & PLAN NOTE
Since toe surgery yesterday. Multiple episode of nonbloody vomiting. Improved in the ED although still feels intermittently nauseous with decreased appetite  Suspect most likely from stress from surgery/anesthesia/Percocet. We will start the patient on Protonix 40 IV twice daily. Also try Carafate 1 g 4 times daily  As needed Zofran. Clear liquid diet.   Advance as tolerated

## 2023-12-03 LAB
ALBUMIN SERPL BCP-MCNC: 3.5 G/DL (ref 3.5–5)
ALP SERPL-CCNC: 55 U/L (ref 34–104)
ALT SERPL W P-5'-P-CCNC: 8 U/L (ref 7–52)
ANION GAP SERPL CALCULATED.3IONS-SCNC: 6 MMOL/L
AST SERPL W P-5'-P-CCNC: 12 U/L (ref 13–39)
ATRIAL RATE: 51 BPM
BASOPHILS # BLD AUTO: 0.04 THOUSANDS/ÂΜL (ref 0–0.1)
BASOPHILS NFR BLD AUTO: 0 % (ref 0–1)
BILIRUB SERPL-MCNC: 0.35 MG/DL (ref 0.2–1)
BUN SERPL-MCNC: 27 MG/DL (ref 5–25)
CALCIUM SERPL-MCNC: 8.4 MG/DL (ref 8.4–10.2)
CHLORIDE SERPL-SCNC: 104 MMOL/L (ref 96–108)
CO2 SERPL-SCNC: 25 MMOL/L (ref 21–32)
CREAT SERPL-MCNC: 0.88 MG/DL (ref 0.6–1.3)
EOSINOPHIL # BLD AUTO: 0.05 THOUSAND/ÂΜL (ref 0–0.61)
EOSINOPHIL NFR BLD AUTO: 1 % (ref 0–6)
ERYTHROCYTE [DISTWIDTH] IN BLOOD BY AUTOMATED COUNT: 13.4 % (ref 11.6–15.1)
GFR SERPL CREATININE-BSD FRML MDRD: 85 ML/MIN/1.73SQ M
GLUCOSE SERPL-MCNC: 157 MG/DL (ref 65–140)
GLUCOSE SERPL-MCNC: 185 MG/DL (ref 65–140)
GLUCOSE SERPL-MCNC: 215 MG/DL (ref 65–140)
GLUCOSE SERPL-MCNC: 223 MG/DL (ref 65–140)
HCT VFR BLD AUTO: 34.4 % (ref 36.5–49.3)
HGB BLD-MCNC: 11.3 G/DL (ref 12–17)
IMM GRANULOCYTES # BLD AUTO: 0.06 THOUSAND/UL (ref 0–0.2)
IMM GRANULOCYTES NFR BLD AUTO: 1 % (ref 0–2)
LYMPHOCYTES # BLD AUTO: 0.72 THOUSANDS/ÂΜL (ref 0.6–4.47)
LYMPHOCYTES NFR BLD AUTO: 8 % (ref 14–44)
MCH RBC QN AUTO: 30.1 PG (ref 26.8–34.3)
MCHC RBC AUTO-ENTMCNC: 32.8 G/DL (ref 31.4–37.4)
MCV RBC AUTO: 92 FL (ref 82–98)
MONOCYTES # BLD AUTO: 1.11 THOUSAND/ÂΜL (ref 0.17–1.22)
MONOCYTES NFR BLD AUTO: 12 % (ref 4–12)
NEUTROPHILS # BLD AUTO: 7.43 THOUSANDS/ÂΜL (ref 1.85–7.62)
NEUTS SEG NFR BLD AUTO: 78 % (ref 43–75)
NRBC BLD AUTO-RTO: 0 /100 WBCS
P AXIS: 61 DEGREES
PLATELET # BLD AUTO: 154 THOUSANDS/UL (ref 149–390)
PMV BLD AUTO: 12.4 FL (ref 8.9–12.7)
POTASSIUM SERPL-SCNC: 4.3 MMOL/L (ref 3.5–5.3)
PR INTERVAL: 154 MS
PROT SERPL-MCNC: 6.2 G/DL (ref 6.4–8.4)
QRS AXIS: 58 DEGREES
QRSD INTERVAL: 84 MS
QT INTERVAL: 474 MS
QTC INTERVAL: 436 MS
RBC # BLD AUTO: 3.76 MILLION/UL (ref 3.88–5.62)
SODIUM SERPL-SCNC: 135 MMOL/L (ref 135–147)
T WAVE AXIS: 51 DEGREES
VENTRICULAR RATE: 51 BPM
WBC # BLD AUTO: 9.41 THOUSAND/UL (ref 4.31–10.16)

## 2023-12-03 PROCEDURE — 85025 COMPLETE CBC W/AUTO DIFF WBC: CPT | Performed by: INTERNAL MEDICINE

## 2023-12-03 PROCEDURE — 82948 REAGENT STRIP/BLOOD GLUCOSE: CPT

## 2023-12-03 PROCEDURE — 99232 SBSQ HOSP IP/OBS MODERATE 35: CPT | Performed by: INTERNAL MEDICINE

## 2023-12-03 PROCEDURE — C9113 INJ PANTOPRAZOLE SODIUM, VIA: HCPCS | Performed by: INTERNAL MEDICINE

## 2023-12-03 PROCEDURE — 80053 COMPREHEN METABOLIC PANEL: CPT | Performed by: INTERNAL MEDICINE

## 2023-12-03 RX ORDER — HYDROMORPHONE HCL/PF 1 MG/ML
0.5 SYRINGE (ML) INJECTION EVERY 4 HOURS PRN
Status: DISCONTINUED | OUTPATIENT
Start: 2023-12-03 | End: 2023-12-04 | Stop reason: HOSPADM

## 2023-12-03 RX ORDER — LANOLIN ALCOHOL/MO/W.PET/CERES
3 CREAM (GRAM) TOPICAL
Status: DISCONTINUED | OUTPATIENT
Start: 2023-12-03 | End: 2023-12-04 | Stop reason: HOSPADM

## 2023-12-03 RX ORDER — METOCLOPRAMIDE HYDROCHLORIDE 5 MG/ML
10 INJECTION INTRAMUSCULAR; INTRAVENOUS EVERY 6 HOURS PRN
Status: DISCONTINUED | OUTPATIENT
Start: 2023-12-03 | End: 2023-12-04 | Stop reason: HOSPADM

## 2023-12-03 RX ORDER — MAGNESIUM HYDROXIDE/ALUMINUM HYDROXICE/SIMETHICONE 120; 1200; 1200 MG/30ML; MG/30ML; MG/30ML
30 SUSPENSION ORAL EVERY 4 HOURS PRN
Status: DISCONTINUED | OUTPATIENT
Start: 2023-12-03 | End: 2023-12-04 | Stop reason: HOSPADM

## 2023-12-03 RX ORDER — LORAZEPAM 1 MG/1
1 TABLET ORAL EVERY 8 HOURS PRN
Status: DISCONTINUED | OUTPATIENT
Start: 2023-12-03 | End: 2023-12-04

## 2023-12-03 RX ADMIN — LORAZEPAM 1 MG: 1 TABLET ORAL at 12:45

## 2023-12-03 RX ADMIN — ONDANSETRON 4 MG: 2 INJECTION INTRAMUSCULAR; INTRAVENOUS at 21:55

## 2023-12-03 RX ADMIN — INSULIN LISPRO 2 UNITS: 100 INJECTION, SOLUTION INTRAVENOUS; SUBCUTANEOUS at 16:49

## 2023-12-03 RX ADMIN — HYDROMORPHONE HYDROCHLORIDE 0.5 MG: 1 INJECTION, SOLUTION INTRAMUSCULAR; INTRAVENOUS; SUBCUTANEOUS at 17:55

## 2023-12-03 RX ADMIN — SODIUM CHLORIDE 100 ML/HR: 0.9 INJECTION, SOLUTION INTRAVENOUS at 12:22

## 2023-12-03 RX ADMIN — PANTOPRAZOLE SODIUM 40 MG: 40 INJECTION, POWDER, FOR SOLUTION INTRAVENOUS at 21:45

## 2023-12-03 RX ADMIN — INSULIN LISPRO 2 UNITS: 100 INJECTION, SOLUTION INTRAVENOUS; SUBCUTANEOUS at 12:28

## 2023-12-03 RX ADMIN — SUCRALFATE 1 G: 1 TABLET ORAL at 12:25

## 2023-12-03 RX ADMIN — ENOXAPARIN SODIUM 40 MG: 40 INJECTION SUBCUTANEOUS at 08:55

## 2023-12-03 RX ADMIN — MELATONIN 3 MG: at 00:26

## 2023-12-03 RX ADMIN — ACETAMINOPHEN 975 MG: 325 TABLET, FILM COATED ORAL at 06:40

## 2023-12-03 RX ADMIN — HYDROMORPHONE HYDROCHLORIDE 0.5 MG: 1 INJECTION, SOLUTION INTRAMUSCULAR; INTRAVENOUS; SUBCUTANEOUS at 09:44

## 2023-12-03 RX ADMIN — PANTOPRAZOLE SODIUM 40 MG: 40 INJECTION, POWDER, FOR SOLUTION INTRAVENOUS at 08:53

## 2023-12-03 RX ADMIN — MELATONIN 3 MG: at 21:45

## 2023-12-03 RX ADMIN — SUCRALFATE 1 G: 1 TABLET ORAL at 17:50

## 2023-12-03 RX ADMIN — INSULIN LISPRO 1 UNITS: 100 INJECTION, SOLUTION INTRAVENOUS; SUBCUTANEOUS at 08:56

## 2023-12-03 RX ADMIN — ALUMINUM HYDROXIDE, MAGNESIUM HYDROXIDE, AND DIMETHICONE 30 ML: 200; 20; 200 SUSPENSION ORAL at 21:55

## 2023-12-03 RX ADMIN — ONDANSETRON 4 MG: 2 INJECTION INTRAMUSCULAR; INTRAVENOUS at 08:53

## 2023-12-03 RX ADMIN — LORAZEPAM 1 MG: 1 TABLET ORAL at 21:55

## 2023-12-03 NOTE — UTILIZATION REVIEW
Initial Clinical Review    Observation 12/2/23 @  converted to inpatient admission 12/3/23 @ 35 67 15 for continued care & tx for intractable N&V. Admission: Date/Time/Statement:   Admission Orders (From admission, onward)       Ordered        12/03/23 1232  Inpatient Admission  Once            12/02/23 1532  Place in Observation  Once                          Orders Placed This Encounter   Procedures    Inpatient Admission     Standing Status:   Standing     Number of Occurrences:   1     Order Specific Question:   Level of Care     Answer:   Med Surg [16]     Order Specific Question:   Estimated length of stay     Answer:   More than 2 Midnights     Order Specific Question:   Certification     Answer:   I certify that inpatient services are medically necessary for this patient for a duration of greater than two midnights. See H&P and MD Progress Notes for additional information about the patient's course of treatment. ED Arrival Information       Expected   -    Arrival   12/2/2023 07:55    Acuity   Urgent              Means of arrival   Wheelchair    Escorted by   Family Member    Service   Hospitalist    Admission type   Emergency              Arrival complaint   Vomiting/SOB/Medication Reaction             Chief Complaint   Patient presents with    Vomiting     Pt reports vomiting since last night, reports had surgery on foot and was given Percocet, has taken 3 doses of medication. Initial Presentation:  68 yom to ER from home c/o N&Vm, abd pain. Reports reports that he is postop day 1 from a hammertoe repair, sever foot pain after d/c'd home, taking Percocet & began vomiting. Also reports epigastric discomfort, SOB. Hx IDDM, diverticulosis, kidney stone, prostate ca, anemia. Presents bradycardic, s/s as above, abd tenderness, L foot surgical shoe & dressing in place. Placed in observation status for intractable N&V.     Observation to IP admission 12/3/23:  Persistent adb pain, N&V after taking meds. IV Dilaudid & IV Zofran required. Continue IV PPI, diet as tolerated, IVF maintained. . Podiatry consulted for ongoing toe/foot pain. Date: 12/4/23    Day 3: Has surpassed a 2nd midnight with active treatments and services, which include IV PPI, IVF, pain mgt.  Monitor VS, follow labs    ED Triage Vitals   Temperature Pulse Respirations Blood Pressure SpO2   12/02/23 0800 12/02/23 0800 12/02/23 0800 12/02/23 0800 12/02/23 0800   98.4 °F (36.9 °C) (!) 52 16 161/67 100 %      Temp Source Heart Rate Source Patient Position - Orthostatic VS BP Location FiO2 (%)   12/02/23 0800 12/02/23 0800 12/02/23 1020 12/02/23 1020 --   Oral Monitor Sitting Right arm       Pain Score       12/02/23 1020       10 - Worst Possible Pain          Wt Readings from Last 1 Encounters:   12/02/23 106 kg (233 lb 0.4 oz)     Additional Vital Signs:   Date/Time Temp Pulse Resp BP MAP (mmHg) SpO2 O2 Device Patient Position - Orthostatic VS   12/03/23 07:53:32 97.8 °F (36.6 °C) 59 18 153/66 95 95 % -- --   12/03/23 0016 -- -- -- -- -- 91 % -- --   12/02/23 2319 -- -- -- -- -- 91 % -- --   12/02/23 2300 -- -- -- -- -- 91 % -- --   12/02/23 20:12:49 98.3 °F (36.8 °C) 70 -- 130/57 81 93 % -- --   12/02/23 1900 98.4 °F (36.9 °C) 69 17 157/70 101 94 % None (Room air) --   12/02/23 1800 -- 64 16 139/63 91 95 % None (Room air) --   12/02/23 1700 -- 61 17 140/63 91 96 % None (Room air) --   12/02/23 1600 -- 62 18 138/63 90 97 % None (Room air) --   12/02/23 1525 -- 59 19 140/65 94 96 % None (Room air) --   12/02/23 1448 -- 60 20 165/77 110 100 % None (Room air) Lying   12/02/23 1319 -- 62 18 148/67 97 95 % None (Room air) Lying   12/02/23 1020 -- 48 Abnormal  16 133/60 86 97 % None (Room air) Sitting   12/02/23 0800 98.4 °F (36.9 °C) 52 Abnormal  16 161/67 -- 100 % -- --     Pertinent Labs/Diagnostic Test Results:   PE Study with CT Abdomen and Pelvis with contrast   Final Result by Riya Albert MD (12/02 1315)      No acute pulmonary embolism or intra-abdominal process. Stable postsurgical changes in the anterior abdominal wall with possible Wan type hernia without obstruction      Small hiatal hernia with a few secretions in the lower esophagus, which puts the patient at high risk for aspiration pneumonitis      A 2.2 cm pancreatic cystic lesion, stable to minimally increased in size since 12/14/2020. MRI MRCP abdomen with and without contrast in 1 year is recommended to assess for stability and for better characterization. Grossly stable indeterminate 1.7 cm complex cystic exophytic left renal lower pole lesion, attention on follow-up  MRI MRCP abdomen with and without contrast is recommended. The study was marked in [facility] for immediate notification. Workstation performed: [id number]         XR chest 1 view portable   ED Interpretation by Randi Gerard DO (12/02 1002)   No acute disease      Final Result by Mylnee Jarvis MD (12/03 0664)      No acute cardiopulmonary disease.                Workstation performed: [id number]               Results from last 7 days   Lab Units 12/04/23  0513 12/03/23  0548 12/02/23  0915   WBC Thousand/uL 7.31 9.41 8.48   HEMOGLOBIN g/dL 11.3* 11.3* 12.7   HEMATOCRIT % 34.5* 34.4* 39.0   PLATELETS Thousands/uL 166 154 192   NEUTROS ABS Thousands/µL 5.79 7.43 7.12         Results from last 7 days   Lab Units 12/04/23  0513 12/03/23  0548 12/02/23  0915   SODIUM mmol/L 133* 135 137   POTASSIUM mmol/L 4.2 4.3 4.5   CHLORIDE mmol/L 102 104 102   CO2 mmol/L 25 25 25   ANION GAP mmol/L 6 6 10   BUN mg/dL 16 27* 23   CREATININE mg/dL 0.74 0.88 0.94   EGFR ml/min/1.73sq m 91 85 80   CALCIUM mg/dL 8.3* 8.4 9.1     Results from last 7 days   Lab Units 12/03/23  0548 12/02/23  0915   AST U/L 12* 14   ALT U/L 8 11   ALK PHOS U/L 55 62   TOTAL PROTEIN g/dL 6.2* 7.2   ALBUMIN g/dL 3.5 3.8   TOTAL BILIRUBIN mg/dL 0.35 0.55     Results from last 7 days   Lab Units 12/04/23  0737 12/03/23  1551 12/03/23  1135 12/03/23  0750 12/02/23  1859 12/02/23  1703 12/02/23  1019 12/01/23  1057 12/01/23  0857   POC GLUCOSE mg/dl 190* 215* 223* 157* 237* 302* 219* 158* 165*     Results from last 7 days   Lab Units 12/04/23  0513 12/03/23  0548 12/02/23  0915   GLUCOSE RANDOM mg/dL 185* 185* 193*             No results found for: "BETA-HYDROXYBUTYRATE"                   Results from last 7 days   Lab Units 12/02/23  1531 12/02/23  1317 12/02/23  0915   HS TNI 0HR ng/L  --   --  3   HS TNI 2HR ng/L  --  2  --    HSTNI D2 ng/L  --  -1  --    HS TNI 4HR ng/L 3  --   --    HSTNI D4 ng/L 0  --   --      Results from last 7 days   Lab Units 12/02/23  0915   D-DIMER QUANTITATIVE ug/ml FEU 1.11*                                                                                                                   ED Treatment:   Medication Administration from 12/02/2023 0755 to 12/02/2023 2006         Date/Time Order Dose Route Action     12/02/2023 0916 EST ondansetron (ZOFRAN) injection 4 mg 4 mg Intravenous Given     12/02/2023 0916 EST HYDROmorphone (DILAUDID) injection 0.5 mg 0.5 mg Intravenous Given     12/02/2023 0917 EST sodium chloride 0.9 % bolus 500 mL 500 mL Intravenous New Bag     12/02/2023 1028 EST HYDROmorphone (DILAUDID) injection 0.5 mg 0.5 mg Intravenous Given     12/02/2023 1040 EST aluminum-magnesium hydroxide-simethicone (MAALOX) oral suspension 30 mL 30 mL Oral Given     12/02/2023 1041 EST LORazepam (ATIVAN) injection 0.5 mg 0.5 mg Intravenous Given     12/02/2023 1124 EST iohexol (OMNIPAQUE) 350 MG/ML injection (MULTI-DOSE) 85 mL 85 mL Intravenous Given     12/02/2023 1338 EST ondansetron (ZOFRAN) injection 4 mg 4 mg Intravenous Given     12/02/2023 1451 EST metoclopramide (REGLAN) injection 10 mg 10 mg Intravenous Given     12/02/2023 1454 EST HYDROmorphone HCl (DILAUDID) injection 0.2 mg 0.2 mg Intravenous Given     12/02/2023 1535 EST sodium chloride 0.9 % infusion 100 mL/hr Intravenous New Bag     12/02/2023 1535 EST pantoprazole (PROTONIX) injection 40 mg 40 mg Intravenous Given     12/02/2023 1854 EST enoxaparin (LOVENOX) subcutaneous injection 40 mg 40 mg Subcutaneous Given     12/02/2023 1855 EST insulin lispro (HumaLOG) 100 units/mL subcutaneous injection 1-6 Units 3 Units Subcutaneous Given     12/02/2023 1854 EST ondansetron (ZOFRAN) injection 4 mg 4 mg Intravenous Given     12/02/2023 1824 EST sodium chloride 0.9 % infusion 75 mL/hr Intravenous Restarted     12/02/2023 1910 EST HYDROmorphone (DILAUDID) injection 0.5 mg 0.5 mg Intravenous Given          Past Medical History:   Diagnosis Date    Anemia     Arthritis     Black stool 07/24/2020    Cancer ([facility])     Colon polyp     Diabetes mellitus (HCC)     IDDM    Diverticulosis     Enlarged prostate     Erectile dysfunction     Hypercholesteremia     Resolved post weight loss    Hypertension     Resolved post weight loss    Kidney stone     Obesity     Prostate cancer ([facility])     Sleep apnea     resolved with weight loss    Wears partial dentures     partial upper and lower     Present on Admission:   Benign essential hypertension      Admitting Diagnosis: Vomiting [R11.10]  Postoperative pain [G89.18]  Postoperative nausea and vomiting [R11.2, Z98.890]  Age/Sex: 68 y.o. male  Admission Orders:  Scd/foot pumps  Accuchecks    Scheduled Medications:  acetaminophen, 975 mg, Oral, Q8H [address]  atorvastatin, 10 mg, Oral, Daily  cephalexin, 500 mg, Oral, Q12H PEGGY  enoxaparin, 40 mg, Subcutaneous, Daily  fluticasone, 1 puff, Inhalation, Daily  insulin lispro, 1-6 Units, Subcutaneous, TID AC  losartan, 25 mg, Oral, Daily  melatonin, 3 mg, Oral, HS  pantoprazole, 40 mg, Intravenous, Q12H PEGGY  sucralfate, 1 g, Oral, 4x Daily    Continuous IV Infusions:  sodium chloride, 100 mL/hr, Intravenous, Continuous    PRN Meds:  albuterol, 2 puff, Inhalation, Q6H PRN  ondansetron, 4 mg, Intravenous, Q6H PRN    Network Utilization Review Department  ATTENTION: Please call with any questions or concerns to [phone] and carefully listen to the prompts so that you are directed to the right person. All voicemails are confidential.   For Discharge needs, contact Care Management DC Support Team at [phone] opt. [phone]  Send all requests for admission clinical reviews, approved or denied determinations and any other requests to dedicated fax number below belonging to the campus where the patient is receiving treatment.  List of dedicated fax numbers for the Facilities:  [place] DENIALS (Administrative/Medical Necessity) [phone]   DISCHARGE SUPPORT TEAM (NETWORK) [address] [facility] (Maternity/NICU/Pediatrics) [phone]   [address] 1521 [facility] 1000 [facility] [phone]   [address] [place] [address] [address] [place] [address] [address] [address] [facility] [address] 1300 [facility] [address] [place] [address] [phone]

## 2023-12-03 NOTE — ED NOTES
Transported patient to [id number] [place] [address], receiving PCA was at bedside and receiving RN Pawel Valadez) was notified of arrival face to face.      Hui Alcantara  12/02/23 2009

## 2023-12-03 NOTE — ASSESSMENT & PLAN NOTE
Had toe surgery yesterday. Currently denies any pain. Hold narcotics. As needed Tylenol for pain. Continue postop Keflex as prescribed.   Consult Podiatry

## 2023-12-03 NOTE — ASSESSMENT & PLAN NOTE
Since toe surgery yesterday. Multiple episode of nonbloody vomiting. Improved in the ED although still feels intermittently nauseous with decreased appetite  Suspect most likely from stress from surgery/anesthesia/Percocet vs GE  Continue  Protonix 40 IV twice daily. Carafate 1 g 4 times daily  As needed Zofran.   Diet as tolerated

## 2023-12-03 NOTE — PROGRESS NOTES
[facility]  Progress Note  Name: Maia Osborne  MRN: [id number]  Unit/Bed#: W -81 I Date of Admission: 12/2/2023   Date of Service: 12/3/2023 I Hospital Day: 0    Assessment/Plan   * Intractable nausea and vomiting  Assessment & Plan  Since toe surgery yesterday. Multiple episode of nonbloody vomiting. Improved in the ED although still feels intermittently nauseous with decreased appetite  Suspect most likely from stress from surgery/anesthesia/Percocet vs GE  Continue  Protonix 40 IV twice daily. Carafate 1 g 4 times daily  As needed Zofran. Diet as tolerated    H/O toe surgery  Assessment & Plan  Had toe surgery yesterday. Currently denies any pain. Hold narcotics. As needed Tylenol for pain. Continue postop Keflex as prescribed. Consult Podiatry     Benign essential hypertension  Assessment & Plan  Blood pressure stable. Continue losartan    Type 2 diabetes mellitus with hyperglycemia, with long-term current use of insulin [facility])  Assessment & Plan  Lab Results   Component Value Date    HGBA1C 7.1 (A) 11/17/2023       Recent Labs     12/02/23  1703 12/02/23  1859 12/03/23  0750 12/03/23  1135   POCGLU 302* 237* 157* 223*         Blood Sugar Average: Last 72 hrs:  (P) 227.6    Patient with minimal oral intake. Sliding scale for now. Adjust accordingly. No evidence of DKA      VTE Pharmacologic Prophylaxis:   Moderate Risk (Score 3-4) - Pharmacological DVT Prophylaxis Ordered: enoxaparin (Lovenox). Mobility:   JH-HLM Achieved: 8: Walk 250 feet ot more  HLM Goal achieved. Continue to encourage appropriate mobility. Patient Centered Rounds: I performed bedside rounds with nursing staff today. Discussions with Specialists or Other Care Team Provider:     Education and Discussions with Family / Patient: Patient declined call to . Total Time Spent on Date of Encounter in care of patient:  mins.  This time was spent on one or more of the following: performing physical exam; counseling and coordination of care; obtaining or reviewing history; documenting in the medical record; reviewing/ordering tests, medications or procedures; communicating with other healthcare professionals and discussing with patient's family/caregivers. Current Length of Stay: 0 day(s)  Current Patient Status: Inpatient   Certification Statement: The patient will continue to require additional inpatient hospital stay due to nausea vomiting and pain  Discharge Plan: Anticipate discharge in 24-48 hrs to home. Code Status: Level 1 - Full Code    Subjective:     Patient was seen at bedside. He felt better when he woke up. However after taking his medications he started having severe abdominal pain nausea and vomited. He still feels nauseous. He is also complaining of pain in his toes. Otherwise no fever chills or rigors. Objective:     Vitals:   Temp (24hrs), Av.2 °F (36.8 °C), Min:97.8 °F (36.6 °C), Max:98.4 °F (36.9 °C)    Temp:  [97.8 °F (36.6 °C)-98.4 °F (36.9 °C)] 97.8 °F (36.6 °C)  HR:  [59-70] 59  Resp:  [16-20] 18  BP: (130-165)/(57-77) 153/66  SpO2:  [91 %-100 %] 95 %  Body mass index is 36.5 kg/m². Input and Output Summary (last 24 hours): Intake/Output Summary (Last 24 hours) at 12/3/2023 1236  Last data filed at 12/3/2023 0401  Gross per 24 hour   Intake 640 ml   Output --   Net 640 ml       Physical Exam:   Physical Exam     Gen.-Patient appears uncomfortable  Neck- Supple. No thyromegaly or lymphadenopathy  Lungs-Clear bilaterally without any wheeze or rales   Heart S1-S2, regular rate and rhythm, no murmurs  Abdomen-mild tenderness upper abdomen without any rebound or guarding. Bowel sounds are active.   Extremities-no cyanosi,  clubbing or edema  Skin- no rash  Neuro-awake alert and oriented       Additional Data:     Labs:  Results from last 7 days   Lab Units 23  0548   WBC Thousand/uL 9.41   HEMOGLOBIN g/dL 11.3*   HEMATOCRIT % 34.4* PLATELETS Thousands/uL 154   NEUTROS PCT % 78*   LYMPHS PCT % 8*   MONOS PCT % 12   EOS PCT % 1     Results from last 7 days   Lab Units 12/03/23  0548   SODIUM mmol/L 135   POTASSIUM mmol/L 4.3   CHLORIDE mmol/L 104   CO2 mmol/L 25   BUN mg/dL 27*   CREATININE mg/dL 0.88   ANION GAP mmol/L 6   CALCIUM mg/dL 8.4   ALBUMIN g/dL 3.5   TOTAL BILIRUBIN mg/dL 0.35   ALK PHOS U/L 55   ALT U/L 8   AST U/L 12*   GLUCOSE RANDOM mg/dL 185*         Results from last 7 days   Lab Units 12/03/23  1135 12/03/23  0750 12/02/23  1859 12/02/23  1703 12/02/23  1019 12/01/23  1057 12/01/23  0857   POC GLUCOSE mg/dl 223* 157* 237* 302* 219* 158* 165*               Lines/Drains:  Invasive Devices       Peripheral Intravenous Line  Duration             Peripheral IV 12/02/23 Left;Upper;Ventral (anterior) Arm 1 day                          Imaging:     Recent Cultures (last 7 days):         Last 24 Hours Medication List:   Current Facility-Administered Medications   Medication Dose Route Frequency Provider Last Rate    acetaminophen  975 mg Oral Q8H [facility] Cindy Cummings MD      albuterol  2 puff Inhalation Q6H PRN Cindy Cummings MD      atorvastatin  10 mg Oral Daily Cindy Cummings MD      cephalexin  500 mg Oral Q12H [facility] Cindy Cummings MD      enoxaparin  40 mg Subcutaneous Daily Cindy Cummings MD      fluticasone  1 puff Inhalation Daily Cindy Cummings MD      HYDROmorphone  0.5 mg Intravenous Q4H PRN Erin Garza MD      insulin lispro  1-6 Units Subcutaneous TID AC Cindy Cummings MD      LORazepam  1 mg Oral Q8H PRN Erin Garza MD      losartan  25 mg Oral Daily Cindy Cummings MD      melatonin  3 mg Oral HS YVONNE Garber      metoclopramide  10 mg Intravenous Q6H PRN Erin Garza MD      ondansetron  4 mg Intravenous Q6H PRN Cindy Cummings MD      pantoprazole  40 mg Intravenous Q12H [facility] Cindy Cummings MD      sodium chloride  100 mL/hr Intravenous Continuous Kerry Harden  mL/hr (12/03/23 1222)    sucralfate  1 g Oral 4x Daily Brissa Trujillo MD          Today, Patient Was Seen By: Joseph Rowe MD    **Please Note: This note may have been constructed using a voice recognition system. **

## 2023-12-03 NOTE — PLAN OF CARE
Problem: PAIN - ADULT  Goal: Verbalizes/displays adequate comfort level or baseline comfort level  Description: Interventions:  - Encourage patient to monitor pain and request assistance  - Assess pain using appropriate pain scale  - Administer analgesics based on type and severity of pain and evaluate response  - Implement non-pharmacological measures as appropriate and evaluate response  - Consider cultural and social influences on pain and pain management  - Notify physician/advanced practitioner if interventions unsuccessful or patient reports new pain  Outcome: Progressing     Problem: INFECTION - ADULT  Goal: Absence or prevention of progression during hospitalization  Description: INTERVENTIONS:  - Assess and monitor for signs and symptoms of infection  - Monitor lab/diagnostic results  - Monitor all insertion sites, i.e. indwelling lines, tubes, and drains  - Monitor endotracheal if appropriate and nasal secretions for changes in amount and color  - [place] appropriate cooling/warming therapies per order  - Administer medications as ordered  - Instruct and encourage patient and family to use good hand hygiene technique  - Identify and instruct in appropriate isolation precautions for identified infection/condition  Outcome: Progressing  Goal: Absence of fever/infection during neutropenic period  Description: INTERVENTIONS:  - Monitor WBC    Outcome: Progressing     Problem: SAFETY ADULT  Goal: Patient will remain free of falls  Description: INTERVENTIONS:  - Educate patient/family on patient safety including physical limitations  - Instruct patient to call for assistance with activity   - Consult OT/PT to assist with strengthening/mobility   - Keep Call bell within reach  - Keep bed low and locked with side rails adjusted as appropriate  - Keep care items and personal belongings within reach  - Initiate and maintain comfort rounds  - Make Fall Risk Sign visible to staff  - Offer Toileting every  Hours, in advance of need  - Initiate/Maintain alarm  - Obtain necessary fall risk management equipment:   - Apply yellow socks and bracelet for high fall risk patients  - Consider moving patient to room near nurses station  Outcome: Progressing  Goal: Maintain or return to baseline ADL function  Description: INTERVENTIONS:  -  Assess patient's ability to carry out ADLs; assess patient's baseline for ADL function and identify physical deficits which impact ability to perform ADLs (bathing, care of mouth/teeth, toileting, grooming, dressing, etc.)  - Assess/evaluate cause of self-care deficits   - Assess range of motion  - Assess patient's mobility; develop plan if impaired  - Assess patient's need for assistive devices and provide as appropriate  - Encourage maximum independence but intervene and supervise when necessary  - Involve family in performance of ADLs  - Assess for home care needs following discharge   - Consider OT consult to assist with ADL evaluation and planning for discharge  - Provide patient education as appropriate  Outcome: Progressing  Goal: Maintains/Returns to pre admission functional level  Description: INTERVENTIONS:  - Perform AM-PAC 6 Click Basic Mobility/ Daily Activity assessment daily.  - Set and communicate daily mobility goal to care team and patient/family/caregiver. - Collaborate with rehabilitation services on mobility goals if consulted  - Perform Range of Motion  times a day. - Reposition patient every  hours.   - Dangle patient  times a day  - Stand patient  times a day  - Ambulate patient  times a day  - Out of bed to chair  times a day   - Out of bed for meals  times a day  - Out of bed for toileting  - Record patient progress and toleration of activity level   Outcome: Progressing

## 2023-12-03 NOTE — ASSESSMENT & PLAN NOTE
Lab Results   Component Value Date    HGBA1C 7.1 (A) 11/17/2023       Recent Labs     12/02/23  1703 12/02/23  1859 12/03/23  0750 12/03/23  1135   POCGLU 302* 237* 157* 223*         Blood Sugar Average: Last 72 hrs:  (P) 227.6    Patient with minimal oral intake. Sliding scale for now. Adjust accordingly.   No evidence of DKA

## 2023-12-04 VITALS
OXYGEN SATURATION: 96 % | TEMPERATURE: 97.4 F | HEIGHT: 67 IN | HEART RATE: 56 BPM | BODY MASS INDEX: 36.57 KG/M2 | WEIGHT: 233.03 LBS | SYSTOLIC BLOOD PRESSURE: 156 MMHG | RESPIRATION RATE: 16 BRPM | DIASTOLIC BLOOD PRESSURE: 78 MMHG

## 2023-12-04 LAB
ANION GAP SERPL CALCULATED.3IONS-SCNC: 6 MMOL/L
BASOPHILS # BLD AUTO: 0.03 THOUSANDS/ÂΜL (ref 0–0.1)
BASOPHILS NFR BLD AUTO: 0 % (ref 0–1)
BUN SERPL-MCNC: 16 MG/DL (ref 5–25)
CALCIUM SERPL-MCNC: 8.3 MG/DL (ref 8.4–10.2)
CHLORIDE SERPL-SCNC: 102 MMOL/L (ref 96–108)
CO2 SERPL-SCNC: 25 MMOL/L (ref 21–32)
CREAT SERPL-MCNC: 0.74 MG/DL (ref 0.6–1.3)
EOSINOPHIL # BLD AUTO: 0.02 THOUSAND/ÂΜL (ref 0–0.61)
EOSINOPHIL NFR BLD AUTO: 0 % (ref 0–6)
ERYTHROCYTE [DISTWIDTH] IN BLOOD BY AUTOMATED COUNT: 13.2 % (ref 11.6–15.1)
GFR SERPL CREATININE-BSD FRML MDRD: 91 ML/MIN/1.73SQ M
GLUCOSE SERPL-MCNC: 161 MG/DL (ref 65–140)
GLUCOSE SERPL-MCNC: 166 MG/DL (ref 65–140)
GLUCOSE SERPL-MCNC: 185 MG/DL (ref 65–140)
GLUCOSE SERPL-MCNC: 190 MG/DL (ref 65–140)
HCT VFR BLD AUTO: 34.5 % (ref 36.5–49.3)
HGB BLD-MCNC: 11.3 G/DL (ref 12–17)
IMM GRANULOCYTES # BLD AUTO: 0.06 THOUSAND/UL (ref 0–0.2)
IMM GRANULOCYTES NFR BLD AUTO: 1 % (ref 0–2)
LYMPHOCYTES # BLD AUTO: 0.64 THOUSANDS/ÂΜL (ref 0.6–4.47)
LYMPHOCYTES NFR BLD AUTO: 9 % (ref 14–44)
MCH RBC QN AUTO: 29 PG (ref 26.8–34.3)
MCHC RBC AUTO-ENTMCNC: 32.8 G/DL (ref 31.4–37.4)
MCV RBC AUTO: 89 FL (ref 82–98)
MONOCYTES # BLD AUTO: 0.77 THOUSAND/ÂΜL (ref 0.17–1.22)
MONOCYTES NFR BLD AUTO: 11 % (ref 4–12)
NEUTROPHILS # BLD AUTO: 5.79 THOUSANDS/ÂΜL (ref 1.85–7.62)
NEUTS SEG NFR BLD AUTO: 79 % (ref 43–75)
NRBC BLD AUTO-RTO: 0 /100 WBCS
PLATELET # BLD AUTO: 166 THOUSANDS/UL (ref 149–390)
PMV BLD AUTO: 11.8 FL (ref 8.9–12.7)
POTASSIUM SERPL-SCNC: 4.2 MMOL/L (ref 3.5–5.3)
RBC # BLD AUTO: 3.89 MILLION/UL (ref 3.88–5.62)
SODIUM SERPL-SCNC: 133 MMOL/L (ref 135–147)
WBC # BLD AUTO: 7.31 THOUSAND/UL (ref 4.31–10.16)

## 2023-12-04 PROCEDURE — 80048 BASIC METABOLIC PNL TOTAL CA: CPT | Performed by: INTERNAL MEDICINE

## 2023-12-04 PROCEDURE — 85025 COMPLETE CBC W/AUTO DIFF WBC: CPT | Performed by: INTERNAL MEDICINE

## 2023-12-04 PROCEDURE — C9113 INJ PANTOPRAZOLE SODIUM, VIA: HCPCS | Performed by: INTERNAL MEDICINE

## 2023-12-04 PROCEDURE — 82948 REAGENT STRIP/BLOOD GLUCOSE: CPT

## 2023-12-04 PROCEDURE — 99239 HOSP IP/OBS DSCHRG MGMT >30: CPT | Performed by: INTERNAL MEDICINE

## 2023-12-04 RX ORDER — ONDANSETRON 4 MG/1
4 TABLET, FILM COATED ORAL EVERY 8 HOURS PRN
Qty: 30 TABLET | Refills: 0 | Status: SHIPPED | OUTPATIENT
Start: 2023-12-04

## 2023-12-04 RX ADMIN — PANTOPRAZOLE SODIUM 40 MG: 40 INJECTION, POWDER, FOR SOLUTION INTRAVENOUS at 08:25

## 2023-12-04 RX ADMIN — ENOXAPARIN SODIUM 40 MG: 40 INJECTION SUBCUTANEOUS at 08:26

## 2023-12-04 RX ADMIN — INSULIN LISPRO 1 UNITS: 100 INJECTION, SOLUTION INTRAVENOUS; SUBCUTANEOUS at 12:29

## 2023-12-04 RX ADMIN — INSULIN LISPRO 2 UNITS: 100 INJECTION, SOLUTION INTRAVENOUS; SUBCUTANEOUS at 08:24

## 2023-12-04 RX ADMIN — LOSARTAN POTASSIUM 25 MG: 25 TABLET, FILM COATED ORAL at 08:26

## 2023-12-04 RX ADMIN — LORAZEPAM 1 MG: 1 TABLET ORAL at 08:26

## 2023-12-04 NOTE — DISCHARGE SUMMARY
[address] [facility]  Progress Note  Name: Lin Mejia  MRN: [id number]  Unit/Bed#: W -50 I Date of Admission: 12/2/2023   Date of Service: 12/4/2023 I Hospital Day: 1    Assessment/Plan   * Intractable nausea and vomiting  Assessment & Plan  On 12/1/2023, patient had a left fifth hammertoe repair with derotational arthroplasty. After surgery once patient was home, patient had multiple episodes of nonbloody vomiting. Improved in the ED, although was still intermittently nauseous with decreased appetite  Nausea and vomiting thought to be associated to stress from surgery/anesthesia/Percocet vs GE  Zofran as needed ordered, can follow up with PCP as needed or return to ED if intractable nausea and vomiting. Diet as tolerated; po challenged with nurse 12/4 and was tolerating food well. H/O toe surgery  Assessment & Plan  - On 12/1/2023, patient had a left fifth hammertoe repair with derotational arthroplasty. - Denied pain on 12/3 and 12/4.   - Can take tylenol as needed for pain. - Continue postop keflex as prescribed (total of 10 days). - Continue narcotics as recommended by surgeon. Type 2 diabetes mellitus with hyperglycemia, with long-term current use of insulin [facility])  Assessment & Plan  Lab Results   Component Value Date    HGBA1C 7.1 (A) 11/17/2023       Recent Labs     12/03/23  1551 12/04/23  0737 12/04/23  1057 12/04/23  1717   POCGLU 215* 190* 166* 161*       Blood Sugar Average: Last 72 hrs:  (P) [id number]    - Patient previously with poor oral intake due to nausea and vomiting. On 12/4, po challenge and tolerating food well. - No evidence of DKA on admission.   - Can continue home regimen. Benign essential hypertension  Assessment & Plan  - Blood pressure stable in hospital.   - Continue losartan.           Medical Problems       Resolved Problems  Date Reviewed: 12/4/2023   None       Discharging Physician / Practitioner: Dre Herrera   PCP: Mary Richards MD  Admission Date:   Admission Orders (From admission, onward)       Ordered        12/03/23 1232  Inpatient Admission  Once            12/02/23 1532  Place in Observation  Once                          Discharge Date: 12/04/23    Consultations During Hospital Stay:  Podiatry, did not see patient in hospital, can follow up with outpatient    Procedures Performed:   none    Significant Findings / Test Results:   PE Study with CT Abdomen and Pelvis with contrast   Final Result by Valente Vargas MD (12/02 1315)      No acute pulmonary embolism or intra-abdominal process. Stable postsurgical changes in the anterior abdominal wall with possible Wan type hernia without obstruction      Small hiatal hernia with a few secretions in the lower esophagus, which puts the patient at high risk for aspiration pneumonitis      A 2.2 cm pancreatic cystic lesion, stable to minimally increased in size since 12/14/2020. MRI MRCP abdomen with and without contrast in 1 year is recommended to assess for stability and for better characterization. Grossly stable indeterminate 1.7 cm complex cystic exophytic left renal lower pole lesion, attention on follow-up  MRI MRCP abdomen with and without contrast is recommended. The study was marked in [facility] for immediate notification. Workstation performed: [id number]         XR chest 1 view portable   ED Interpretation by Bertha Lima DO (12/02 1002)   No acute disease      Final Result by Sean Dumas MD (12/03 8390)      No acute cardiopulmonary disease. 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 MRI MRCP abdomen with and without contrast in 1 year is recommended to assess for stability and for better characterization.   Grossly stable indeterminate 1.7 cm complex cystic exophytic left renal lower pole lesion, attention on follow-up  MRI MRCP abdomen with and without contrast is recommended. I reviewed the above mentioned incidental findings with the patient and/or family and they expressed understanding. Test Results Pending at Discharge (will require follow up):   none     Outpatient Tests Requested:  MRI MRCP abdomen with and without contrast as soon as possible for complex left renal cyst  MRI MRCP abdomen with and without contrast in 1 year for pancreatic cyst    Complications:  None    Reason for Admission: No    Hospital Course:   Lester Ramos is a 68 y.o. male patient who originally presented to the hospital on 12/2/2023 due to intractable nausea and vomiting after toe surgery on 12/1. Nausea and vomiting thought to be associated to stress response from surgery vs anesthesia. Patient given Maalox, reglan, zofran, protonix, and carafate in hospital. Patient po challenged on 12/4 and tolerating foods well with resolution of previous symptoms. Patient able to walk around on his own. He uses foot boot that was ordered after his toe surgery on 12/1. Patient discharged home to self without further complaints or issues. Patient educated on important of taking medications and not on an empty stomach as this may have contributed to his symptoms. The patient, initially admitted to the hospital as inpatient, was discharged earlier than expected given the following: improvement of intractable nausea and vomiting thought to be associated to anesthesia from toe surgery on 12/1. Please see above list of diagnoses and related plan for additional information. Condition at Discharge: good    Discharge Day Visit / Exam:   Subjective:  some mild abdominal discomfort am on 12/4 but resolved in afternoon of 12/4 upon reexamination and tolerating po well. Patient would like to go home today.      Vitals: Blood Pressure: 156/78 (12/04/23 1524)  Pulse: 56 (12/04/23 1524)  Temperature: (!) 97.4 °F (36.3 °C) (12/04/23 0739)  Temp Source: Oral (12/02/23 1900)  Respirations: 16 (12/04/23 0739)  Height: 5' 7" (170.2 cm) (12/02/23 1525)  Weight - Scale: 106 kg (233 lb 0.4 oz) (12/02/23 1525)  SpO2: 96 % (12/04/23 1524)  Exam:   Physical Exam  Vitals reviewed. Constitutional:       General: He is not in acute distress. Appearance: Normal appearance. He is well-developed. He is not ill-appearing, toxic-appearing or diaphoretic. HENT:      Head: Normocephalic and atraumatic. Right Ear: External ear normal.      Left Ear: External ear normal.      Nose: Nose normal.      Mouth/Throat:      Mouth: Mucous membranes are moist.      Pharynx: Oropharynx is clear. Eyes:      Extraocular Movements: Extraocular movements intact. Conjunctiva/sclera: Conjunctivae normal.      Pupils: Pupils are equal, round, and reactive to light. Cardiovascular:      Rate and Rhythm: Normal rate and regular rhythm. Pulses: Normal pulses. Heart sounds: Normal heart sounds. No murmur heard. Pulmonary:      Effort: Pulmonary effort is normal. No respiratory distress. Breath sounds: Normal breath sounds. Abdominal:      General: Abdomen is flat. Palpations: Abdomen is soft. Tenderness: There is no abdominal tenderness. Comments: Some abdominal comfort upon palpation of abdomen in am of 12/4 but on repeat exam in afternoon of 12/4, resolved   Musculoskeletal:         General: No swelling. Normal range of motion. Cervical back: Normal range of motion and neck supple. Right lower leg: No edema. Left lower leg: No edema. Skin:     General: Skin is warm and dry. Capillary Refill: Capillary refill takes less than 2 seconds. Comments: Left fifth toe appears to be healing well post op, no erythema or signs of infection   Neurological:      General: No focal deficit present. Mental Status: He is alert and oriented to person, place, and time. Mental status is at baseline. Psychiatric:         Mood and Affect: Mood normal.         Behavior: Behavior normal.          Discussion with Family: Updated  (significant other) via phone. Discharge instructions/Information to patient and family:   See after visit summary for information provided to patient and family. Provisions for Follow-Up Care:  See after visit summary for information related to follow-up care and any pertinent home health orders. Mobility at time of Discharge:   Basic Mobility Inpatient Raw Score: 24  JH-HLM Goal: 8: Walk 250 feet or more  JH-HLM Achieved: 8: Walk 250 feet ot more  HLM Goal NOT achieved. Continue to encourage mobility in post discharge setting. Disposition:   Home    Planned Readmission: No     Discharge Statement:  I spent 45 minutes discharging the patient. This time was spent on the day of discharge. I had direct contact with the patient on the day of discharge. Greater than 50% of the total time was spent examining patient, answering all patient questions, arranging and discussing plan of care with patient as well as directly providing post-discharge instructions. Additional time then spent on discharge activities. Discharge Medications:  See after visit summary for reconciled discharge medications provided to patient and/or family.       **Please Note: This note may have been constructed using a voice recognition system**

## 2023-12-04 NOTE — ASSESSMENT & PLAN NOTE
- On 12/1/2023, patient had a left fifth hammertoe repair with derotational arthroplasty. - Denied pain on 12/3 and 12/4.   - Can take tylenol as needed for pain. - Continue postop keflex as prescribed (total of 10 days). - Continue narcotics as recommended by surgeon.

## 2023-12-04 NOTE — DISCHARGE INSTR - AVS FIRST PAGE
Dear Ulysses Record,     It was our pleasure to care for you here at [facility]. It is our hope that we were always able to exceed the expected standards for your care during your stay. You were hospitalized due to intractable nausea and vomiting. You were cared for on the 4th floor by Jamison Ward DO under the service of Johnny Orellana MD with the [facility] who covers for your primary care physician (PCP), Issa Cheema MD, while you were hospitalized. If you have any questions or concerns related to this hospitalization, you may contact us at [phone]. For follow up as well as any medication refills, we recommend that you follow up with your primary care physician. A registered nurse will reach out to you by phone within a few days after your discharge to answer any additional questions that you may have after going home. However, at this time we provide for you here, the most important instructions / recommendations at discharge:     Notable Medication Adjustments -   Continue home medications  Prescribed zofran as needed for nausea   Testing Required after Discharge -   None  Important follow up information -   Please follow up with your PCP as soon as possible, MRI MRCP was recommended for renal cysts found on CT imaging during your hospital stay  Please follow up with your PCP for MRI MRCP of pancreas that is recommended to be completed in 1 year from now  Follow up with podiatry outpatient for toe.    Other Instructions -   Use your boot and ambulate as you can tolerate  Use zofran as needed for nausea  If persistent nausea or vomiting, come back to the ED or call your PCP  Please review this entire after visit summary as additional general instructions including medication list, appointments, activity, diet, any pertinent wound care, and other additional recommendations from your care team that may be provided for you.      Sincerely,     Chito Padilla, DO

## 2023-12-04 NOTE — PLAN OF CARE
Problem: PAIN - ADULT  Goal: Verbalizes/displays adequate comfort level or baseline comfort level  Description: Interventions:  - Encourage patient to monitor pain and request assistance  - Assess pain using appropriate pain scale  - Administer analgesics based on type and severity of pain and evaluate response  - Implement non-pharmacological measures as appropriate and evaluate response  - Consider cultural and social influences on pain and pain management  - Notify physician/advanced practitioner if interventions unsuccessful or patient reports new pain  Outcome: Progressing     Problem: INFECTION - ADULT  Goal: Absence or prevention of progression during hospitalization  Description: INTERVENTIONS:  - Assess and monitor for signs and symptoms of infection  - Monitor lab/diagnostic results  - Monitor all insertion sites, i.e. indwelling lines, tubes, and drains  - Monitor endotracheal if appropriate and nasal secretions for changes in amount and color  - [place] appropriate cooling/warming therapies per order  - Administer medications as ordered  - Instruct and encourage patient and family to use good hand hygiene technique  - Identify and instruct in appropriate isolation precautions for identified infection/condition  Outcome: Progressing  Goal: Absence of fever/infection during neutropenic period  Description: INTERVENTIONS:  - Monitor WBC    Outcome: Progressing     Problem: SAFETY ADULT  Goal: Patient will remain free of falls  Description: INTERVENTIONS:  - Educate patient/family on patient safety including physical limitations  - Instruct patient to call for assistance with activity   - Consult OT/PT to assist with strengthening/mobility   - Keep Call bell within reach  - Keep bed low and locked with side rails adjusted as appropriate  - Keep care items and personal belongings within reach  - Initiate and maintain comfort rounds  - Make Fall Risk Sign visible to staff  - Apply yellow socks and bracelet for high fall risk patients  - Consider moving patient to room near nurses station  Outcome: Progressing  Goal: Maintain or return to baseline ADL function  Description: INTERVENTIONS:  -  Assess patient's ability to carry out ADLs; assess patient's baseline for ADL function and identify physical deficits which impact ability to perform ADLs (bathing, care of mouth/teeth, toileting, grooming, dressing, etc.)  - Assess/evaluate cause of self-care deficits   - Assess range of motion  - Assess patient's mobility; develop plan if impaired  - Assess patient's need for assistive devices and provide as appropriate  - Encourage maximum independence but intervene and supervise when necessary  - Involve family in performance of ADLs  - Assess for home care needs following discharge   - Consider OT consult to assist with ADL evaluation and planning for discharge  - Provide patient education as appropriate  Outcome: Progressing  Goal: Maintains/Returns to pre admission functional level  Description: INTERVENTIONS:  - Perform AM-PAC 6 Click Basic Mobility/ Daily Activity assessment daily.  - Set and communicate daily mobility goal to care team and patient/family/caregiver.    - Collaborate with rehabilitation services on mobility goals if consulted  - Out of bed for toileting  - Record patient progress and toleration of activity level   Outcome: Progressing     Problem: DISCHARGE PLANNING  Goal: Discharge to home or other facility with appropriate resources  Description: INTERVENTIONS:  - Identify barriers to discharge w/patient and caregiver  - Arrange for needed discharge resources and transportation as appropriate  - Identify discharge learning needs (meds, wound care, etc.)  - Arrange for interpretive services to assist at discharge as needed  - Refer to Case Management Department for coordinating discharge planning if the patient needs post-hospital services based on physician/advanced practitioner order or complex needs related to functional status, cognitive ability, or social support system  Outcome: Progressing     Problem: Knowledge Deficit  Goal: Patient/family/caregiver demonstrates understanding of disease process, treatment plan, medications, and discharge instructions  Description: Complete learning assessment and assess knowledge base. Interventions:  - Provide teaching at level of understanding  - Provide teaching via preferred learning methods  Outcome: Progressing     Problem: Nutrition/Hydration-ADULT  Goal: Nutrient/Hydration intake appropriate for improving, restoring or maintaining nutritional needs  Description: Monitor and assess patient's nutrition/hydration status for malnutrition. Collaborate with interdisciplinary team and initiate plan and interventions as ordered. Monitor patient's weight and dietary intake as ordered or per policy. Utilize nutrition screening tool and intervene as necessary. Determine patient's food preferences and provide high-protein, high-caloric foods as appropriate.      INTERVENTIONS:  - Monitor oral intake, urinary output, labs, and treatment plans  - Assess nutrition and hydration status and recommend course of action  - Evaluate amount of meals eaten  - Assist patient with eating if necessary   - Allow adequate time for meals  - Recommend/ encourage appropriate diets, oral nutritional supplements, and vitamin/mineral supplements  - Order, calculate, and assess calorie counts as needed  - Recommend, monitor, and adjust tube feedings and TPN/PPN based on assessed needs  - Assess need for intravenous fluids  - Provide specific nutrition/hydration education as appropriate  - Include patient/family/caregiver in decisions related to nutrition  Outcome: Progressing     Problem: GASTROINTESTINAL - ADULT  Goal: Minimal or absence of nausea and/or vomiting  Description: INTERVENTIONS:  - Administer IV fluids if ordered to ensure adequate hydration  - Maintain NPO status until nausea and vomiting are resolved  - Nasogastric tube if ordered  - Administer ordered antiemetic medications as needed  - Provide nonpharmacologic comfort measures as appropriate  - Advance diet as tolerated, if ordered  - Consider nutrition services referral to assist patient with adequate nutrition and appropriate food choices  Outcome: Progressing  Goal: Maintains adequate nutritional intake  Description: INTERVENTIONS:  - Monitor percentage of each meal consumed  - Identify factors contributing to decreased intake, treat as appropriate  - Assist with meals as needed  - Monitor I&O, weight, and lab values if indicated  - Obtain nutrition services referral as needed  Outcome: Progressing

## 2023-12-04 NOTE — ASSESSMENT & PLAN NOTE
On 12/1/2023, patient had a left fifth hammertoe repair with derotational arthroplasty. After surgery once patient was home, patient had multiple episodes of nonbloody vomiting. Improved in the ED, although was still intermittently nauseous with decreased appetite  Nausea and vomiting thought to be associated to stress from surgery/anesthesia/Percocet vs GE  Zofran as needed ordered, can follow up with PCP as needed or return to ED if intractable nausea and vomiting. Diet as tolerated; po challenged with nurse 12/4 and was tolerating food well.

## 2023-12-04 NOTE — ASSESSMENT & PLAN NOTE
Lab Results   Component Value Date    HGBA1C 7.1 (A) 11/17/2023       Recent Labs     12/03/23  1551 12/04/23  0737 12/04/23  1057 12/04/23  1717   POCGLU 215* 190* 166* 161*       Blood Sugar Average: Last 72 hrs:  (P) [id number]    - Patient previously with poor oral intake due to nausea and vomiting. On 12/4, po challenge and tolerating food well. - No evidence of DKA on admission.   - Can continue home regimen.

## 2023-12-04 NOTE — PROGRESS NOTES
[facility]  Progress Note  Name: Jose Payment  MRN: [id number]  Unit/Bed#: W -19 I Date of Admission: 12/2/2023   Date of Service: 12/4/2023 I Hospital Day: 1    Assessment/Plan   H/O toe surgery  Assessment & Plan  Had toe surgery yesterday. Currently denies any pain. Hold narcotics. As needed Tylenol for pain. Continue postop Keflex as prescribed. Consult Podiatry     Benign essential hypertension  Assessment & Plan  Blood pressure stable. Continue losartan    Type 2 diabetes mellitus with hyperglycemia, with long-term current use of insulin [facility])  Assessment & Plan  Lab Results   Component Value Date    HGBA1C 7.1 (A) 11/17/2023       Recent Labs     12/03/23  0750 12/03/23  1135 12/03/23  1551 12/04/23  0737   POCGLU 157* 223* 215* 190*       Blood Sugar Average: Last 72 hrs:  (P) [id number]    Patient with minimal oral intake. Sliding scale for now. Adjust accordingly. No evidence of DKA    * Intractable nausea and vomiting  Assessment & Plan  Since toe surgery yesterday. Multiple episode of nonbloody vomiting. Improved in the ED although still feels intermittently nauseous with decreased appetite  Suspect most likely from stress from surgery/anesthesia/Percocet vs GE  Continue  Protonix 40 IV twice daily. Carafate 1 g 4 times daily  As needed Zofran. Diet as tolerated           VTE Pharmacologic Prophylaxis: VTE Score: 3 Moderate Risk (Score 3-4) - Pharmacological DVT Prophylaxis Ordered: enoxaparin (Lovenox). Mobility:   Basic Mobility Inpatient Raw Score: 24  JH-HLM Goal: 8: Walk 250 feet or more  JH-HLM Achieved: 8: Walk 250 feet ot more  HLM Goal achieved. Continue to encourage appropriate mobility. Patient Centered Rounds: I performed bedside rounds with nursing staff today.    Discussions with Specialists or Other Care Team Provider: podiatry    Education and Discussions with Family / Patient: {Family Communication:14543}    Total Time Spent on Date of Encounter in care of patient: 45 mins. This time was spent on one or more of the following: performing physical exam; counseling and coordination of care; obtaining or reviewing history; documenting in the medical record; reviewing/ordering tests, medications or procedures; communicating with other healthcare professionals and discussing with patient's family/caregivers. Current Length of Stay: 1 day(s)  Current Patient Status: Inpatient   Certification Statement: {Certification [email or web address]:35710}  Discharge Plan: {Discharge AAVI:03821}    Code Status: Level 1 - Full Code    Subjective:   ***    Objective:     Vitals:   Temp (24hrs), Av °F (36.7 °C), Min:97.4 °F (36.3 °C), Max:98.4 °F (36.9 °C)    Temp:  [97.4 °F (36.3 °C)-98.4 °F (36.9 °C)] 97.4 °F (36.3 °C)  HR:  [57-66] 57  Resp:  [15-18] 16  BP: (102-163)/(41-82) 149/62  SpO2:  [94 %-95 %] 94 %  Body mass index is 36.5 kg/m².      Input and Output Summary (last 24 hours):   No intake or output data in the 24 hours ending 23 0756    Physical Exam:   Physical Exam ***    Additional Data:     Labs:  Results from last 7 days   Lab Units 23  0513   WBC Thousand/uL 7.31   HEMOGLOBIN g/dL 11.3*   HEMATOCRIT % 34.5*   PLATELETS Thousands/uL 166   NEUTROS PCT % 79*   LYMPHS PCT % 9*   MONOS PCT % 11   EOS PCT % 0     Results from last 7 days   Lab Units 23  0513 23  0548   SODIUM mmol/L 133* 135   POTASSIUM mmol/L 4.2 4.3   CHLORIDE mmol/L 102 104   CO2 mmol/L 25 25   BUN mg/dL 16 27*   CREATININE mg/dL 0.74 0.88   ANION GAP mmol/L 6 6   CALCIUM mg/dL 8.3* 8.4   ALBUMIN g/dL  --  3.5   TOTAL BILIRUBIN mg/dL  --  0.35   ALK PHOS U/L  --  55   ALT U/L  --  8   AST U/L  --  12*   GLUCOSE RANDOM mg/dL 185* 185*         Results from last 7 days   Lab Units 23  0737 23  1551 23  1135 23  0750 23  1859 23  1703 23  1019 23  1057 23  0857   POC GLUCOSE mg/dl 190* 215* 223* 157* 237* 302* 219* 158* 165*               Lines/Drains:  Invasive Devices       Peripheral Intravenous Line  Duration             Peripheral IV 12/02/23 Left;Upper;Ventral (anterior) Arm 1 day                          Imaging: Reviewed radiology reports from this admission including: chest xray, chest CT scan, and abdominal/pelvic CT    Recent Cultures (last 7 days):         Last 24 Hours Medication List:   Current Facility-Administered Medications   Medication Dose Route Frequency Provider Last Rate    acetaminophen  975 mg Oral Q8H [facility] Denise Pearson MD      albuterol  2 puff Inhalation Q6H PRN Denise Pearson MD      aluminum-magnesium hydroxide-simethicone  30 mL Oral Q4H PRN Trudy MedMD deb      atorvastatin  10 mg Oral Daily Denise Pearson MD      cephalexin  500 mg Oral Q12H [facility] Denise Pearson MD      enoxaparin  40 mg Subcutaneous Daily Denise Pearson MD      fluticasone  1 puff Inhalation Daily Denise Pearson MD      HYDROmorphone  0.5 mg Intravenous Q4H PRN Erin Garza MD      insulin lispro  1-6 Units Subcutaneous TID AC Denise Pearson MD      LORazepam  1 mg Oral Q8H PRN Erin Garza MD      losartan  25 mg Oral Daily Denise Pearson MD      melatonin  3 mg Oral HS YVONNE Garber      metoclopramide  10 mg Intravenous Q6H PRN Erin Garza MD      ondansetron  4 mg Intravenous Q6H PRN Denise Pearson MD      pantoprazole  40 mg Intravenous Q12H [facility] Denise Pearson MD      sodium chloride  100 mL/hr Intravenous Continuous Kerry Harden  mL/hr (12/03/23 1222)    sucralfate  1 g Oral 4x Daily Denise Pearson MD          Today, Patient Was Seen By: Dre Herrera DO    **Please Note: This note may have been constructed using a voice recognition system. **

## 2023-12-04 NOTE — INCIDENTAL FINDINGS
The following findings require follow up:  Radiographic finding   Finding: A 2.2 cm pancreatic cystic lesion, stable to minimally increased in size since 12/14/2020. MRI MRCP abdomen with and without contrast in 1 year is recommended to assess for stability and for better characterization. Grossly stable indeterminate 1.7 cm complex cystic exophytic left renal lower pole lesion, attention on follow-up  MRI MRCP abdomen with and without contrast is recommended.    Follow up required: MRI MRCP adomen with and without contrast as soon as possible for complex left renal cyst and MRI MRCP abdomen with and without contrast in 1 year for pancreatic cyst   Follow up should be done as soon as possible for renal cyst and within one year for pancreatic cyst    Please notify the following clinician to assist with the follow up:   Dr. Faviola Lee MD

## 2023-12-04 NOTE — PLAN OF CARE
Problem: PAIN - ADULT  Goal: Verbalizes/displays adequate comfort level or baseline comfort level  Description: Interventions:  - Encourage patient to monitor pain and request assistance  - Assess pain using appropriate pain scale  - Administer analgesics based on type and severity of pain and evaluate response  - Implement non-pharmacological measures as appropriate and evaluate response  - Consider cultural and social influences on pain and pain management  - Notify physician/advanced practitioner if interventions unsuccessful or patient reports new pain  Outcome: Progressing     Problem: INFECTION - ADULT  Goal: Absence or prevention of progression during hospitalization  Description: INTERVENTIONS:  - Assess and monitor for signs and symptoms of infection  - Monitor lab/diagnostic results  - Monitor all insertion sites, i.e. indwelling lines, tubes, and drains  - Monitor endotracheal if appropriate and nasal secretions for changes in amount and color  - [place] appropriate cooling/warming therapies per order  - Administer medications as ordered  - Instruct and encourage patient and family to use good hand hygiene technique  - Identify and instruct in appropriate isolation precautions for identified infection/condition  Outcome: Progressing  Goal: Absence of fever/infection during neutropenic period  Description: INTERVENTIONS:  - Monitor WBC    Outcome: Progressing     Problem: SAFETY ADULT  Goal: Patient will remain free of falls  Description: INTERVENTIONS:  - Educate patient/family on patient safety including physical limitations  - Instruct patient to call for assistance with activity   - Consult OT/PT to assist with strengthening/mobility   - Keep Call bell within reach  - Keep bed low and locked with side rails adjusted as appropriate  - Keep care items and personal belongings within reach  - Initiate and maintain comfort rounds  - Make Fall Risk Sign visible to staff  - Initiate/Maintain bed alarm  - Apply yellow socks and bracelet for high fall risk patients  - Consider moving patient to room near nurses station  Outcome: Progressing  Goal: Maintain or return to baseline ADL function  Description: INTERVENTIONS:  -  Assess patient's ability to carry out ADLs; assess patient's baseline for ADL function and identify physical deficits which impact ability to perform ADLs (bathing, care of mouth/teeth, toileting, grooming, dressing, etc.)  - Assess/evaluate cause of self-care deficits   - Assess range of motion  - Assess patient's mobility; develop plan if impaired  - Assess patient's need for assistive devices and provide as appropriate  - Encourage maximum independence but intervene and supervise when necessary  - Involve family in performance of ADLs  - Assess for home care needs following discharge   - Consider OT consult to assist with ADL evaluation and planning for discharge  - Provide patient education as appropriate  Outcome: Progressing  Goal: Maintains/Returns to pre admission functional level  Description: INTERVENTIONS:  - Perform AM-PAC 6 Click Basic Mobility/ Daily Activity assessment daily.  - Set and communicate daily mobility goal to care team and patient/family/caregiver.    - Collaborate with rehabilitation services on mobility goals if consulted  - Out of bed for toileting  - Record patient progress and toleration of activity level   Outcome: Progressing     Problem: DISCHARGE PLANNING  Goal: Discharge to home or other facility with appropriate resources  Description: INTERVENTIONS:  - Identify barriers to discharge w/patient and caregiver  - Arrange for needed discharge resources and transportation as appropriate  - Identify discharge learning needs (meds, wound care, etc.)  - Arrange for interpretive services to assist at discharge as needed  - Refer to Case Management Department for coordinating discharge planning if the patient needs post-hospital services based on physician/advanced practitioner order or complex needs related to functional status, cognitive ability, or social support system  Outcome: Progressing     Problem: Knowledge Deficit  Goal: Patient/family/caregiver demonstrates understanding of disease process, treatment plan, medications, and discharge instructions  Description: Complete learning assessment and assess knowledge base. Interventions:  - Provide teaching at level of understanding  - Provide teaching via preferred learning methods  Outcome: Progressing     Problem: Nutrition/Hydration-ADULT  Goal: Nutrient/Hydration intake appropriate for improving, restoring or maintaining nutritional needs  Description: Monitor and assess patient's nutrition/hydration status for malnutrition. Collaborate with interdisciplinary team and initiate plan and interventions as ordered. Monitor patient's weight and dietary intake as ordered or per policy. Utilize nutrition screening tool and intervene as necessary. Determine patient's food preferences and provide high-protein, high-caloric foods as appropriate.      INTERVENTIONS:  - Monitor oral intake, urinary output, labs, and treatment plans  - Assess nutrition and hydration status and recommend course of action  - Evaluate amount of meals eaten  - Assist patient with eating if necessary   - Allow adequate time for meals  - Recommend/ encourage appropriate diets, oral nutritional supplements, and vitamin/mineral supplements  - Order, calculate, and assess calorie counts as needed  - Recommend, monitor, and adjust tube feedings and TPN/PPN based on assessed needs  - Assess need for intravenous fluids  - Provide specific nutrition/hydration education as appropriate  - Include patient/family/caregiver in decisions related to nutrition  Outcome: Progressing     Problem: GASTROINTESTINAL - ADULT  Goal: Minimal or absence of nausea and/or vomiting  Description: INTERVENTIONS:  - Administer IV fluids if ordered to ensure adequate hydration  - Maintain NPO status until nausea and vomiting are resolved  - Nasogastric tube if ordered  - Administer ordered antiemetic medications as needed  - Provide nonpharmacologic comfort measures as appropriate  - Advance diet as tolerated, if ordered  - Consider nutrition services referral to assist patient with adequate nutrition and appropriate food choices  Outcome: Progressing  Goal: Maintains adequate nutritional intake  Description: INTERVENTIONS:  - Monitor percentage of each meal consumed  - Identify factors contributing to decreased intake, treat as appropriate  - Assist with meals as needed  - Monitor I&O, weight, and lab values if indicated  - Obtain nutrition services referral as needed  Outcome: Progressing

## 2023-12-04 NOTE — PLAN OF CARE
Problem: PAIN - ADULT  Goal: Verbalizes/displays adequate comfort level or baseline comfort level  Description: Interventions:  - Encourage patient to monitor pain and request assistance  - Assess pain using appropriate pain scale  - Administer analgesics based on type and severity of pain and evaluate response  - Implement non-pharmacological measures as appropriate and evaluate response  - Consider cultural and social influences on pain and pain management  - Notify physician/advanced practitioner if interventions unsuccessful or patient reports new pain  12/4/2023 1824 by Brittani Alonzo RN  Outcome: Adequate for Discharge  12/4/2023 1221 by Brittani Alonzo RN  Outcome: Progressing     Problem: INFECTION - ADULT  Goal: Absence or prevention of progression during hospitalization  Description: INTERVENTIONS:  - Assess and monitor for signs and symptoms of infection  - Monitor lab/diagnostic results  - Monitor all insertion sites, i.e. indwelling lines, tubes, and drains  - Monitor endotracheal if appropriate and nasal secretions for changes in amount and color  - [place] appropriate cooling/warming therapies per order  - Administer medications as ordered  - Instruct and encourage patient and family to use good hand hygiene technique  - Identify and instruct in appropriate isolation precautions for identified infection/condition  12/4/2023 1824 by Brittani Alonzo RN  Outcome: Adequate for Discharge  12/4/2023 1221 by Brittani Alonzo RN  Outcome: Progressing  Goal: Absence of fever/infection during neutropenic period  Description: INTERVENTIONS:  - Monitor WBC    12/4/2023 1824 by Brittani Alonzo RN  Outcome: Adequate for Discharge  12/4/2023 1221 by Brittani Alonzo RN  Outcome: Progressing     Problem: SAFETY ADULT  Goal: Patient will remain free of falls  Description: INTERVENTIONS:  - Educate patient/family on patient safety including physical limitations  - Instruct patient to call for assistance with activity   - Consult OT/PT to assist with strengthening/mobility   - Keep Call bell within reach  - Keep bed low and locked with side rails adjusted as appropriate  - Keep care items and personal belongings within reach  - Initiate and maintain comfort rounds  - Make Fall Risk Sign visible to staff  - Apply yellow socks and bracelet for high fall risk patients  - Consider moving patient to room near nurses station  12/4/2023 1824 by Nathaniel Aquino RN  Outcome: Adequate for Discharge  12/4/2023 1221 by Nathaniel Aquino RN  Outcome: Progressing  Goal: Maintain or return to baseline ADL function  Description: INTERVENTIONS:  -  Assess patient's ability to carry out ADLs; assess patient's baseline for ADL function and identify physical deficits which impact ability to perform ADLs (bathing, care of mouth/teeth, toileting, grooming, dressing, etc.)  - Assess/evaluate cause of self-care deficits   - Assess range of motion  - Assess patient's mobility; develop plan if impaired  - Assess patient's need for assistive devices and provide as appropriate  - Encourage maximum independence but intervene and supervise when necessary  - Involve family in performance of ADLs  - Assess for home care needs following discharge   - Consider OT consult to assist with ADL evaluation and planning for discharge  - Provide patient education as appropriate  12/4/2023 1824 by Nathaniel Aquino RN  Outcome: Adequate for Discharge  12/4/2023 1221 by Nathaniel Aquino RN  Outcome: Progressing  Goal: Maintains/Returns to pre admission functional level  Description: INTERVENTIONS:  - Perform AM-PAC 6 Click Basic Mobility/ Daily Activity assessment daily.  - Set and communicate daily mobility goal to care team and patient/family/caregiver.    - Collaborate with rehabilitation services on mobility goals if consulted  - Out of bed for toileting  - Record patient progress and toleration of activity level   12/4/2023 1824 by Nathaniel Aquino RN  Outcome: Adequate for Discharge  12/4/2023 1221 by Giorgi Ortiz RN  Outcome: Progressing     Problem: DISCHARGE PLANNING  Goal: Discharge to home or other facility with appropriate resources  Description: INTERVENTIONS:  - Identify barriers to discharge w/patient and caregiver  - Arrange for needed discharge resources and transportation as appropriate  - Identify discharge learning needs (meds, wound care, etc.)  - Arrange for interpretive services to assist at discharge as needed  - Refer to Case Management Department for coordinating discharge planning if the patient needs post-hospital services based on physician/advanced practitioner order or complex needs related to functional status, cognitive ability, or social support system  12/4/2023 1824 by Giorgi Ortiz RN  Outcome: Adequate for Discharge  12/4/2023 1221 by Giorgi Ortiz RN  Outcome: Progressing     Problem: Knowledge Deficit  Goal: Patient/family/caregiver demonstrates understanding of disease process, treatment plan, medications, and discharge instructions  Description: Complete learning assessment and assess knowledge base. Interventions:  - Provide teaching at level of understanding  - Provide teaching via preferred learning methods  12/4/2023 1824 by Giorgi Ortiz RN  Outcome: Adequate for Discharge  12/4/2023 1221 by Giorgi Ortiz RN  Outcome: Progressing     Problem: Nutrition/Hydration-ADULT  Goal: Nutrient/Hydration intake appropriate for improving, restoring or maintaining nutritional needs  Description: Monitor and assess patient's nutrition/hydration status for malnutrition. Collaborate with interdisciplinary team and initiate plan and interventions as ordered. Monitor patient's weight and dietary intake as ordered or per policy. Utilize nutrition screening tool and intervene as necessary. Determine patient's food preferences and provide high-protein, high-caloric foods as appropriate.      INTERVENTIONS:  - Monitor oral intake, urinary output, labs, and treatment plans  - Assess nutrition and hydration status and recommend course of action  - Evaluate amount of meals eaten  - Assist patient with eating if necessary   - Allow adequate time for meals  - Recommend/ encourage appropriate diets, oral nutritional supplements, and vitamin/mineral supplements  - Order, calculate, and assess calorie counts as needed  - Recommend, monitor, and adjust tube feedings and TPN/PPN based on assessed needs  - Assess need for intravenous fluids  - Provide specific nutrition/hydration education as appropriate  - Include patient/family/caregiver in decisions related to nutrition  12/4/2023 1824 by Adela Goodell, RN  Outcome: Adequate for Discharge  12/4/2023 1221 by Adela Goodell, RN  Outcome: Progressing     Problem: GASTROINTESTINAL - ADULT  Goal: Minimal or absence of nausea and/or vomiting  Description: INTERVENTIONS:  - Administer IV fluids if ordered to ensure adequate hydration  - Maintain NPO status until nausea and vomiting are resolved  - Nasogastric tube if ordered  - Administer ordered antiemetic medications as needed  - Provide nonpharmacologic comfort measures as appropriate  - Advance diet as tolerated, if ordered  - Consider nutrition services referral to assist patient with adequate nutrition and appropriate food choices  12/4/2023 1824 by Adela Goodell, RN  Outcome: Adequate for Discharge  12/4/2023 1221 by Adela Goodell, RN  Outcome: Progressing  Goal: Maintains adequate nutritional intake  Description: INTERVENTIONS:  - Monitor percentage of each meal consumed  - Identify factors contributing to decreased intake, treat as appropriate  - Assist with meals as needed  - Monitor I&O, weight, and lab values if indicated  - Obtain nutrition services referral as needed  12/4/2023 1824 by Adela Goodell, RN  Outcome: Adequate for Discharge  12/4/2023 1221 by Adela Goodell, RN  Outcome: Progressing

## 2023-12-05 ENCOUNTER — TRANSITIONAL CARE MANAGEMENT (OUTPATIENT)
Dept: FAMILY MEDICINE CLINIC | Facility: CLINIC | Age: 73
End: 2023-12-05

## 2023-12-05 ENCOUNTER — PATIENT OUTREACH (OUTPATIENT)
Dept: FAMILY MEDICINE CLINIC | Facility: CLINIC | Age: 73
End: 2023-12-05

## 2023-12-05 DIAGNOSIS — Z71.89 COORDINATION OF COMPLEX CARE: Primary | ICD-10-CM

## 2023-12-05 NOTE — PROGRESS NOTES
Received referral via in basket message. Chart reviewed. Pt had toe surgery 12/1/23. Discharged to home. Had experienced nausea and vomiting and returned to ED 12/2/23. Pt is now home. Denies nausea/vomiting. Denies fever or chills. He attributes his symptoms to reaction to anesthesia. Appetite is good. Left toe dressing was removed and inspected in ED and was informed surgical site without redness, warmth or drainage. Pt is scheduled to see PCP tomorrow. He reports he is independent with medications and transportation. Declines complex medical needs at this time.

## 2023-12-05 NOTE — UTILIZATION REVIEW
NOTIFICATION OF ADMISSION DISCHARGE   This is a Notification of Discharge from [address] [facility]. Please be advised that this patient has been discharge from our facility. Below you will find the admission and discharge date and time including the patient’s disposition. UTILIZATION REVIEW CONTACT:  Kana Wheeler MA  Utilization   Network Utilization Review Department  Phone: [phone] x carefully listen to the prompts. All voicemails are confidential.  Email: [email or web address]     ADMISSION INFORMATION  PRESENTATION DATE: 12/2/2023  8:01 AM  OBERVATION ADMISSION DATE:   INPATIENT ADMISSION DATE: 12/3/23 12:32 PM   DISCHARGE DATE: 12/4/2023  6:00 PM   DISPOSITION:Home/Self Care    Network Utilization Review Department  ATTENTION: Please call with any questions or concerns to [phone] and carefully listen to the prompts so that you are directed to the right person. All voicemails are confidential.   For Discharge needs, contact Care Management DC Support Team at [phone] opt. 2  Send all requests for admission clinical reviews, approved or denied determinations and any other requests to dedicated fax number below belonging to the campus where the patient is receiving treatment.  List of dedicated fax numbers for the Facilities:  [place] DENIALS (Administrative/Medical Necessity) [phone]   DISCHARGE SUPPORT TEAM (Network) [phone] [address] [facility] (Maternity/NICU/Pediatrics) [phone] [address] [facility] [address] [place] [address] [address]  [place] [facility] [phone]

## 2023-12-06 ENCOUNTER — OFFICE VISIT (OUTPATIENT)
Dept: FAMILY MEDICINE CLINIC | Facility: CLINIC | Age: 73
End: 2023-12-06
Payer: COMMERCIAL

## 2023-12-06 VITALS
WEIGHT: 228 LBS | OXYGEN SATURATION: 97 % | TEMPERATURE: 96.2 F | DIASTOLIC BLOOD PRESSURE: 60 MMHG | HEART RATE: 71 BPM | RESPIRATION RATE: 16 BRPM | HEIGHT: 67 IN | SYSTOLIC BLOOD PRESSURE: 128 MMHG | BODY MASS INDEX: 35.79 KG/M2

## 2023-12-06 DIAGNOSIS — M20.42 HAMMER TOE OF LEFT FOOT: ICD-10-CM

## 2023-12-06 DIAGNOSIS — Z09 HOSPITAL DISCHARGE FOLLOW-UP: Primary | ICD-10-CM

## 2023-12-06 DIAGNOSIS — R11.2 INTRACTABLE NAUSEA AND VOMITING: ICD-10-CM

## 2023-12-06 DIAGNOSIS — F41.9 ANXIETY: ICD-10-CM

## 2023-12-06 PROCEDURE — 99496 TRANSJ CARE MGMT HIGH F2F 7D: CPT | Performed by: FAMILY MEDICINE

## 2023-12-06 RX ORDER — ACETAMINOPHEN AND CODEINE PHOSPHATE 300; 30 MG/1; MG/1
1 TABLET ORAL EVERY 6 HOURS PRN
Qty: 6 TABLET | Refills: 0 | Status: SHIPPED | OUTPATIENT
Start: 2023-12-06

## 2023-12-06 RX ORDER — SERTRALINE HYDROCHLORIDE 25 MG/1
25 TABLET, FILM COATED ORAL DAILY
Qty: 30 TABLET | Refills: 0 | Status: SHIPPED | OUTPATIENT
Start: 2023-12-06 | End: 2024-06-03

## 2023-12-06 NOTE — PROGRESS NOTES
Assessment & Plan     Here for TCM. Doing well. No longer vomiting. Does have pain in the left foot and refusing to take percocet. States tylenol with codeine worked in the past. I suggested tylenol alone but said it didn't help. Short course provided for pain relief. Also discussed anxiety and tx options. States lorazepam helped well for claustrophobia in the hospital. Agreed to start Zoloft 25 mg daily. Aware of potential side effects and to wait several weeks. CTA findings also reviewed which included a pancreatic and renal lesion. He has MRI and CT A/P in the past and showed stability. MRI recommended but would like to defer for now. Has f/u with podiatrist tomorrow. 1. Hospital discharge follow-up    2. Anxiety  -     sertraline (ZOLOFT) 25 mg tablet; Take 1 tablet (25 mg total) by mouth daily    3. Intractable nausea and vomiting    4. Hammer toe of left foot  -     acetaminophen-codeine (TYLENOL with CODEINE #3) 300-30 MG per tablet; Take 1 tablet by mouth every 6 (six) hours as needed for moderate pain       Subjective     Transitional Care Management Review:   Baljit Robledo is a 68 y.o. male here for TCM follow up. During the TCM phone call patient stated:  TCM Call     Date and time call was made  12/5/2023 11:50 AM    Patient was hospitialized at  [address] [place] [address]    Date of Admission  12/02/23    Date of discharge  12/04/23    Diagnosis  Intractable nausea and vomiting    Disposition  Home    Were the patients medications reviewed and updated  No      TCM Call     Should patient be enrolled in anticoag monitoring? No    Scheduled for follow up?   Yes    Did you obtain your prescribed medications  Yes    Do you need help managing your prescriptions or medications  No    Is transportation to your appointment needed  No    I have advised the patient to call PCP with any new or worsening symptoms  Rina Stanley, MR    Living Arrangements  Spouse or Significiant other    Are you recieving any outpatient services  No    Are you recieving home care services  No    Are you using any community resources  No    Current waiver services  No    Have you fallen in the last 12 months  No    Interperter language line needed  No    Comments  PT WAS GIVING 4 DIFFERENT NEW MEDICATIONS        Had the surgery on the left foot on Friday and the next morning he woke up  with nausea, vomiting, and back pain. CT AP showed old pancreatic and renal lesions along with small hiatal hernia. The next day he felt better but then developed pain and vomiting after taking abx on an empty stomach   Today, he feels well   Pt has been experiencing a lot of anxiety   Also restless and irritable  Was given lorazepam in the hospital which helped. He would like to be put on a similar medication  Has not tried any medications in the past  Right elbow swelling is improving and less tender. Currently completing a course of bactrim for septic bursitis           Review of Systems    Objective     /60 (BP Location: Left arm, Patient Position: Sitting, Cuff Size: Large)   Pulse 71   Temp (!) 96.2 °F (35.7 °C) (Tympanic)   Resp 16   Ht 5' 7" (1.702 m)   Wt 103 kg (228 lb)   SpO2 97%   BMI 35.71 kg/m²      Physical Exam  Constitutional:       General: He is not in acute distress. Appearance: Normal appearance. He is not ill-appearing or toxic-appearing. HENT:      Head: Normocephalic and atraumatic. Eyes:      Extraocular Movements: Extraocular movements intact. Cardiovascular:      Rate and Rhythm: Normal rate and regular rhythm. Heart sounds: No murmur heard. Pulmonary:      Effort: Pulmonary effort is normal. No respiratory distress. Breath sounds: Normal breath sounds. No stridor. No wheezing, rhonchi or rales. Abdominal:      General: There is no distension. Palpations: Abdomen is soft. There is no mass. Tenderness: There is no abdominal tenderness. There is no guarding or rebound.       Hernia: No hernia is present. Musculoskeletal:      Right elbow: Swelling present. Normal range of motion. Tenderness present in olecranon process. Comments: Mildy warm and erythematous   Boot on the left foot   Neurological:      Mental Status: He is alert and oriented to person, place, and time. Psychiatric:         Mood and Affect: Mood normal.         Behavior: Behavior normal.         Thought Content:  Thought content normal.     Medications have been reviewed by provider in current encounter    Agus Allan MD

## 2023-12-07 ENCOUNTER — OFFICE VISIT (OUTPATIENT)
Dept: PODIATRY | Facility: CLINIC | Age: 73
End: 2023-12-07

## 2023-12-07 ENCOUNTER — TELEPHONE (OUTPATIENT)
Age: 73
End: 2023-12-07

## 2023-12-07 VITALS
DIASTOLIC BLOOD PRESSURE: 81 MMHG | BODY MASS INDEX: 35.71 KG/M2 | HEART RATE: 53 BPM | SYSTOLIC BLOOD PRESSURE: 133 MMHG | WEIGHT: 228 LBS

## 2023-12-07 DIAGNOSIS — M20.42 HAMMER TOE OF LEFT FOOT: ICD-10-CM

## 2023-12-07 DIAGNOSIS — M20.42 HAMMER TOE OF LEFT FOOT: Primary | ICD-10-CM

## 2023-12-07 PROCEDURE — 99024 POSTOP FOLLOW-UP VISIT: CPT | Performed by: PODIATRIST

## 2023-12-07 RX ORDER — ACETAMINOPHEN AND CODEINE PHOSPHATE 300; 30 MG/1; MG/1
1 TABLET ORAL EVERY 6 HOURS PRN
Qty: 6 TABLET | Refills: 0 | OUTPATIENT
Start: 2023-12-07

## 2023-12-07 NOTE — PROGRESS NOTES
Patient presents 6 days post derotational arthroplasty left fifth toe. Patient is doing very well. He relates no pain and no need for analgesics. Surgical site healing with no evidence of infection or wound dehiscence. No edema noted. Patient to continue with current orders.   Reappoint 1 week for suture removal.

## 2023-12-07 NOTE — TELEPHONE ENCOUNTER
[facility] did not have it. Please send to [facility]    Reason for call:   [x] Refill   [] Prior Auth  [] Other:     Office:   [x] PCP/Provider -   [] Specialty/Provider -     Medication: Tylenol with codeine    Dose/Frequency: 300-30    Quantity: #6    Pharmacy: [facility]    Does the patient have enough for 3 days?    [] Yes   [] No - Send as HP to POD

## 2023-12-07 NOTE — TELEPHONE ENCOUNTER
Ed at [address] [place] [address] called and states out of stock on a manufacture back order (unable to get from anywhere) Tylenol # 3 needs to be changed to another therapeutic medication.  If there are any questions [phone]

## 2023-12-07 NOTE — TELEPHONE ENCOUNTER
Please call patient and let him know tylenol is no longer available in the pharmacies and would contact podiatry regarding pain management for foot pain

## 2023-12-14 ENCOUNTER — OFFICE VISIT (OUTPATIENT)
Dept: PODIATRY | Facility: CLINIC | Age: 73
End: 2023-12-14

## 2023-12-14 VITALS
SYSTOLIC BLOOD PRESSURE: 129 MMHG | DIASTOLIC BLOOD PRESSURE: 79 MMHG | HEART RATE: 60 BPM | BODY MASS INDEX: 35.71 KG/M2 | RESPIRATION RATE: 18 BRPM | HEIGHT: 67 IN

## 2023-12-14 DIAGNOSIS — M20.42 HAMMER TOE OF LEFT FOOT: Primary | ICD-10-CM

## 2023-12-14 PROCEDURE — 99024 POSTOP FOLLOW-UP VISIT: CPT | Performed by: PODIATRIST

## 2023-12-14 NOTE — PROGRESS NOTES
Patient presents 2 weeks post derotational arthroplasty left fifth toe. No pain related. Surgical site is healing uneventfully. All sutures were removed. It is noted the patient return to [facility] and is walking comfortably. No fifth toe edema noted. Patient told that he may stay in crocs due to paucity of symptoms. He may get the left foot wet. Reappoint 1 week.

## 2023-12-15 ENCOUNTER — OFFICE VISIT (OUTPATIENT)
Dept: DENTISTRY | Facility: CLINIC | Age: 73
End: 2023-12-15

## 2023-12-15 VITALS — DIASTOLIC BLOOD PRESSURE: 75 MMHG | HEART RATE: 58 BPM | SYSTOLIC BLOOD PRESSURE: 136 MMHG

## 2023-12-15 DIAGNOSIS — Z01.21 ENCOUNTER FOR DENTAL EXAMINATION AND CLEANING WITH ABNORMAL FINDINGS: Primary | ICD-10-CM

## 2023-12-15 PROCEDURE — D0120 PERIODIC ORAL EVALUATION - ESTABLISHED PATIENT: HCPCS

## 2023-12-15 PROCEDURE — D4910 PERIODONTAL MAINTENANCE: HCPCS

## 2023-12-15 NOTE — DENTAL PROCEDURE DETAILS
3 MTH PERIO MAINTENANCE with PERIODIC EXAM     REVIEWED MED HX: meds, allergies, health changes reviewed in EPIC  CHIEF CONCERN: Patient had Max and Bety RPD made less then 1 yr ago in the clinic and is unable to wear them. Patient feels as if they are very bulky and a lot of plastic in his mouth. Patient states that they are much different then his old ones. Dr advised patient to bring them in on one of his restorative appoints to have them evaluated. PAIN SCALE:  0  ASA CLASS:  II  PLAQUE:  moderate     CALCULUS:  moderate   BLEEDING: light    STAIN :   light     ORAL HYGIENE: fair    PERIO: Bridge #7-#10 has +1 mobility    Hand scaled, polished and flossed. Used Cavitron. Oral Hygiene Instruction:  recommended brushing 2 x daily for 2 minutes MIN, recommended flossing daily, reviewed dietary precautions.  completed Exam    Visual and Tactile Intraoral/ Extraoral evaluation: Oral and Oropharyngeal cancer evaluation.  No findings     REFERRALS: no referrals provided    CARIES FINDINGS:   #5-DO  #11-DL  #12-MOD  W #28-D    Next Recall: 3 month perio maintenance    Last V BWX: 4/11/2023  Last Panorex/ FMX :

## 2023-12-28 ENCOUNTER — OFFICE VISIT (OUTPATIENT)
Dept: PODIATRY | Facility: CLINIC | Age: 73
End: 2023-12-28

## 2023-12-28 VITALS
DIASTOLIC BLOOD PRESSURE: 74 MMHG | RESPIRATION RATE: 18 BRPM | BODY MASS INDEX: 36.1 KG/M2 | WEIGHT: 230 LBS | HEIGHT: 67 IN | SYSTOLIC BLOOD PRESSURE: 163 MMHG | HEART RATE: 59 BPM

## 2023-12-28 DIAGNOSIS — M20.42 HAMMER TOE OF LEFT FOOT: Primary | ICD-10-CM

## 2023-12-28 PROCEDURE — 99024 POSTOP FOLLOW-UP VISIT: CPT | Performed by: PODIATRIST

## 2023-12-28 NOTE — PROGRESS NOTES
Patient presents for assessment of derotational arthroplasty performed approximately 1 month ago.  He is currently wearing crocs and was told to continue to wear them for another 2 weeks.  He may then return to any comfortable shoe.    Surgical site healing without evidence of infection.  Soft corn remains at distal medial aspect of toe and was trimmed.  We will reassess in 6 weeks.

## 2024-01-01 DIAGNOSIS — Z79.4 TYPE 2 DIABETES MELLITUS WITH HYPERGLYCEMIA, WITH LONG-TERM CURRENT USE OF INSULIN (HCC): Chronic | ICD-10-CM

## 2024-01-01 DIAGNOSIS — E11.65 TYPE 2 DIABETES MELLITUS WITH HYPERGLYCEMIA, WITH LONG-TERM CURRENT USE OF INSULIN (HCC): Chronic | ICD-10-CM

## 2024-01-01 DIAGNOSIS — K21.9 GASTROESOPHAGEAL REFLUX DISEASE WITHOUT ESOPHAGITIS: ICD-10-CM

## 2024-01-01 DIAGNOSIS — F41.9 ANXIETY: ICD-10-CM

## 2024-01-02 RX ORDER — PANTOPRAZOLE SODIUM 40 MG/1
40 TABLET, DELAYED RELEASE ORAL DAILY
Qty: 90 TABLET | Refills: 1 | Status: SHIPPED | OUTPATIENT
Start: 2024-01-02

## 2024-01-02 NOTE — TELEPHONE ENCOUNTER
Patient called to check the status of his metformin refill He is out of the medication and would like it sent to his pharmacy as soon as possible. I informed him that it is pending approval

## 2024-01-03 ENCOUNTER — OFFICE VISIT (OUTPATIENT)
Dept: DENTISTRY | Facility: CLINIC | Age: 74
End: 2024-01-03

## 2024-01-03 VITALS — SYSTOLIC BLOOD PRESSURE: 162 MMHG | HEART RATE: 52 BPM | DIASTOLIC BLOOD PRESSURE: 78 MMHG

## 2024-01-03 DIAGNOSIS — K02.9 DENTAL CARIES: Primary | ICD-10-CM

## 2024-01-03 PROCEDURE — D2331 RESIN-BASED COMPOSITE - 2 SURFACES, ANTERIOR: HCPCS

## 2024-01-03 RX ORDER — SERTRALINE HYDROCHLORIDE 25 MG/1
25 TABLET, FILM COATED ORAL DAILY
Qty: 30 TABLET | Refills: 0 | Status: SHIPPED | OUTPATIENT
Start: 2024-01-03

## 2024-01-03 NOTE — DENTAL PROCEDURE DETAILS
Pt presents for a dental restoration and verbally consents for treatment:    Reviewed health history-  Pt is ASA type 3  Treatment consents signed: Yes   Perio: Periodontal maintenance   Pain Scale: 0   Caries Assessment: High  Radiographs: Films are current    Pt  agree with the diagnosis of caries and the  proposed treatment plan for the resin restoration:  Tooth #11 Surface: DL  Dental Anesthesia:  1.7 ml 2% Lidocaine w/ 1:100k epi given infiltration.    Dry shield and cotton roll isolation  Prepared ideal class 3 prep with 245 bur on high speed. Extended prep to remove decay. Prep goes about 2mm subgingival on distal margin. Used slow speed round bur and spoon excavator to remove remaining decay.    Attempted to isolate with mylar strip and wedges. Unable to obtain good isolation for composite restoration. Decided to restore with GI Ionostar. Packed GI and removed excess with explorer.  Checked contact (wide open before prepping, left open for cleanseablility) and occlusion with articulating paper.    Informed pt of need to use GI due to moisture sensitivity and poor isolation conditions. Pt understood. Pt then states his lower denture is bothering him and his bridge is loose. Given many pt needs and concerns, decided to bring pt back for treatment planning appt to determine comprehensive care plan.    Prognosis is Guarded  Referrals Needed: No  Next visit: treatment planning

## 2024-01-04 ENCOUNTER — VBI (OUTPATIENT)
Dept: ADMINISTRATIVE | Facility: OTHER | Age: 74
End: 2024-01-04

## 2024-01-22 ENCOUNTER — OFFICE VISIT (OUTPATIENT)
Dept: DENTISTRY | Facility: CLINIC | Age: 74
End: 2024-01-22

## 2024-01-22 DIAGNOSIS — K02.9 DENTAL CARIES: Primary | ICD-10-CM

## 2024-01-22 DIAGNOSIS — K06.2 DENTURE IRRITATION: ICD-10-CM

## 2024-01-22 PROCEDURE — D2393 RESIN-BASED COMPOSITE - 3 SURFACES, POSTERIOR: HCPCS

## 2024-01-22 PROCEDURE — WIS5009 POST-OP DENTURE ADJUSTMENT

## 2024-01-22 NOTE — DENTAL PROCEDURE DETAILS
Pt presents for a denture adjustment and verbally consents for treatment:    Reviewed health history-  Pt is ASA type 3  Treatment consents signed: Yes   Perio: Perio maintenance   Pain Scale: 0   Caries Assessment: High  Radiographs: Films are current  Hygiene recall visits recommended to the pt.    Pt pointed to mandibular partial denture in lower right and says there is a sore spot that bothers him. Removed partial denture and noted irritated mucosa on alveolar ridge in edentulous distal extension. Placed gamez stick on sore spot and re-inserted denture, then adjusted with acrylic bur to pt's satisfaction. Encouraged pt to try it out for a few days to see if there is improvement.     Pt informed that he has outstanding caries to treat. Pt understood and consented to treatment today.    Pt  agree with the diagnosis of caries and the  proposed treatment plan for the resin restoration:  Tooth #12 MOD  Dental Anesthesia:  1.7 ml 2% Lidocaine w/1:100k epi given infiltration on buccal and palatal.    Dry shield and cotton roll isolation  Removed existing amalgam and prepared a proximal box on M surface using 245 bur on high speed. Dropped box to remove M caries and excavated decay using large slow speed round bur. Refined prep for retention and resistance form.     Placed 2 wooden wedges and DME performed. Ivoclar mahajan scrubbed in deepest portion of M box and cured, followed by a layer of flowable Tetric composite and cured.   Tofflemire matrix band placed with wedge. Acid etch and rinse, Ivoclar mahajan scrubbed in and cured, and Tetric bulk regina resin packed and cured in 2mm increments    Shade: A2    Checked occlusion and refined with finishing bur. Confirmed seating of maxillary partial denture.   Pt informed that we will need to leave #11 as a GI restoration as the root caries are too deep to perform DME and use composite.     Pt then asked what will we do about his loose bridge, informed pt that the teeth are  loose, not the bridge and we can only slow the progression of mobility with good hygiene and perio maintenance visits. Informed pt if it continues to bother him, we can discuss options of implants or moving to complete denture on maxilla. Pt understood.     Prognosis is Fair.  Referrals Needed: No  Next visit: #5 DO resin

## 2024-01-23 ENCOUNTER — TREATMENT (OUTPATIENT)
Dept: ENDOCRINOLOGY | Facility: CLINIC | Age: 74
End: 2024-01-23

## 2024-01-23 ENCOUNTER — TELEPHONE (OUTPATIENT)
Dept: ENDOCRINOLOGY | Facility: CLINIC | Age: 74
End: 2024-01-23

## 2024-01-23 DIAGNOSIS — E11.65 TYPE 2 DIABETES MELLITUS WITH HYPERGLYCEMIA, WITH LONG-TERM CURRENT USE OF INSULIN (HCC): Primary | ICD-10-CM

## 2024-01-23 DIAGNOSIS — Z79.4 TYPE 2 DIABETES MELLITUS WITH HYPERGLYCEMIA, WITH LONG-TERM CURRENT USE OF INSULIN (HCC): Primary | ICD-10-CM

## 2024-01-23 PROCEDURE — 95251 CONT GLUC MNTR ANALYSIS I&R: CPT | Performed by: INTERNAL MEDICINE

## 2024-01-23 NOTE — TELEPHONE ENCOUNTER
Reviewed continuous glucose monitor sensor for.Jimmy Reyes     2 weeks ago review from January 20 January 23, 2024 reviewed, more than 72 hours of data reviewed.  Average glucose is 164 mg per DL, GMI 7.2%, glucose variability is 31.2%  59% blood sugars are in target range, 36% blood sugars are high, 4% blood sugars are very high, 1% blood sugars are low and 0% blood sugars are very low  And has pattern of elevated blood sugars usually after 9 AM to 12 PM and again from 7 PM to 12 AM    Plan-   Will increase NovoLog 70/30 insulin to 22 units before breakfast and 22 units before dinner  Continue metformin 1000 mg twice a day    Saurabh Veras

## 2024-01-23 NOTE — PROGRESS NOTES
Reviewed continuous glucose monitor sensor by the style ashley 2 for.Jimmy Reyes   Uses continuous glucose monitor sensor for type 2 diabetes management      2 weeks review from January 20 January 23, 2024 reviewed, more than 72 hours of data reviewed.  Average glucose is 164 mg per DL, GMI 7.2%, glucose variability is 31.2%  59% blood sugars are in target range, 36% blood sugars are high, 4% blood sugars are very high, 1% blood sugars are low and 0% blood sugars are very low  And has pattern of elevated blood sugars usually after 9 AM to 12 PM and again from 7 PM to 12 AM    Plan-   Will increase NovoLog 70/30 insulin to 22 units before breakfast and 22 units before dinner  Continue metformin 1000 mg twice a day    Saurabh Veras

## 2024-02-12 ENCOUNTER — OFFICE VISIT (OUTPATIENT)
Dept: DENTISTRY | Facility: CLINIC | Age: 74
End: 2024-02-12

## 2024-02-12 VITALS — SYSTOLIC BLOOD PRESSURE: 146 MMHG | DIASTOLIC BLOOD PRESSURE: 81 MMHG | HEART RATE: 57 BPM

## 2024-02-12 DIAGNOSIS — K02.9 CARIES: Primary | ICD-10-CM

## 2024-02-12 PROCEDURE — D2394 RESIN-BASED COMPOSITE - 4 OR MORE SURFACES, POSTERIOR: HCPCS

## 2024-02-13 NOTE — DENTAL PROCEDURE DETAILS
Jimmy Reyes is a 73 y.o. male who presents for #5 MODL composite restoration. Pt pain score: 0 out of 10.    Reviewed past medical history, allergies, and medications. Pt denies any changes. Pt significant/pertinent medical history includes: type 2 diabetes, pt states it is well controlled . Pt is ASA II.    Examined #5 to verify prescribed tx is appropriate. Decay was detectable radiographically on D surface. Anticipated prognosis: fair, decay extended subgingivally. Original prescribed tx was DO composite, however when prepping tooth, it was determined best possible outcome would be achieved by removing and replacing entire existing composite restoration.    Discussed risks, benefits, and alternatives including no tx. Risks highlighted include: pulpal exposure associated with all restorative procedures (anticipated risk: low to moderate), need to revise tx plan based on extent of decay, damage to adjacent tooth and/or restoration, difficulty of restoring subgingival decay. Risks of ANY dental procedure include: post procedural pain or sensitivity, local anesthetic side effects, allergic reaction to dental materials and medications, breakage of local anesthetic needle, aspiration of small dental tools, injury to nearby hard and soft tissues and anatomical structures. Benefits include: prevent further breakdown of tooth, prevent future infection. Alternatives include: no tx. Pt given opportunity to ask questions, questions were answered to degree of medical and dental certainty. Pt understood and gave verbal consent.    Applied 20% benzocaine topical anesthetic to injection site(s) for > 1 min. Local anesthesia administered:  1 carpule(s) of 2% lidocaine with 1:100k epi via buccal infiltration #5 .    Accessed decay and removed existing composite restoration with high speed handpiece(s) and #330 carbide bur(s). Removed decay with slow speed handpiece(s) and #4 round bur(s). Verified removal of decay with caries  explorer.    Etched with 37% phosphoric acid etch, rinsed and gently air dried. Applied Vivapen bond, gently air dried and light cured. Restored with tofflemire matrix, wedge, Tetric flowable A2 composite, and Tetric packable A2 composite, light cured. Polished with ball shaped white stone, flame shaped white stone bur(s). Checked occlusion with articulating paper, interproximal contacts with floss, margins with explorer.    Pt stated he was recommended in the past to receive a crown on the tooth. Upon evaluation, pt was informed a crown could benefit him long term given the number of surfaces the restoration covered. Not urgent at the moment, but whenever pt decides he is ready.    Pt left ambulatory and alert.    NV: perio maintenance.    Attending: Dr. Ramos examined prep before restoration.

## 2024-02-24 DIAGNOSIS — F41.9 ANXIETY: ICD-10-CM

## 2024-02-24 RX ORDER — SERTRALINE HYDROCHLORIDE 25 MG/1
25 TABLET, FILM COATED ORAL DAILY
Qty: 30 TABLET | Refills: 0 | Status: SHIPPED | OUTPATIENT
Start: 2024-02-24

## 2024-02-28 ENCOUNTER — OFFICE VISIT (OUTPATIENT)
Dept: DENTISTRY | Facility: CLINIC | Age: 74
End: 2024-02-28

## 2024-02-28 VITALS — HEART RATE: 69 BPM | DIASTOLIC BLOOD PRESSURE: 71 MMHG | SYSTOLIC BLOOD PRESSURE: 178 MMHG

## 2024-02-28 DIAGNOSIS — K08.50 DEFECTIVE DENTAL RESTORATION: Primary | ICD-10-CM

## 2024-02-28 PROCEDURE — WIS2002 PR RESTORE REDO <1YR

## 2024-02-28 NOTE — DENTAL PROCEDURE DETAILS
ER Visit #5    PMH Reviewed, no changes. Patient ate bagel and coffee this morning.     Patient previously had #5 MODL completed on 02/12/24. After three days, patient has felt a sharp pain. Upon clinical exam, percussion positive. Patient is biting normally on bite paper. Palp and cold test WNL. Flash found on mesial and distal of tooth. After flash was removed, the mesial has a stick. PA and BW taken. Radiographic examination shows open margin on distal and caries on mesial. Discussed with patient new restoration needs to be completed and there is a possibility for endodontic treatment and crown in the future.    Pt understands and consents.    Dx:  #5 MODL caries underneath composite    Tx:  Applied topical benzocaine, administered 0.5 carpule 4% Septocaine 1:100k epi via B Infiltration    #5 MODL:  Prepped tooth #5 MO box and began prepping #DO box. Patient stopped procedure because he felt faint. Patient sat up and allowed to rest. Patient asked to reschedule remainder of appointment. Tofflemire with wings placed, and ionostar GI placed to temporize.    Verified occlusion. Pt left satisfied and ambulatory.    Attending: Dr Díaz    NV:  Complete #5 MODL composite (do not charge patient, use WIS 2002 code)

## 2024-03-04 ENCOUNTER — OFFICE VISIT (OUTPATIENT)
Dept: DENTISTRY | Facility: CLINIC | Age: 74
End: 2024-03-04

## 2024-03-04 VITALS — DIASTOLIC BLOOD PRESSURE: 80 MMHG | HEART RATE: 71 BPM | SYSTOLIC BLOOD PRESSURE: 135 MMHG

## 2024-03-04 DIAGNOSIS — K08.50 DEFECTIVE DENTAL RESTORATION: Primary | ICD-10-CM

## 2024-03-04 PROCEDURE — WIS5005 REMAKE

## 2024-03-04 NOTE — PROGRESS NOTES
Composite Filling    Jimmy Reyes presents for composite filling. PMH reviewed, no changes. ASA2    Discussed with patient need for RCT if pulp exposure occurs or in future if pulp is inflamed. Pt understands and consents.    Applied topical benzocaine, administered 0.5 carps 4% articaine 1:100k epi via maxillary infiltration    Prepped tooth #5MODL with 245 carbide on high speed. Placed tofflemire and wedge matrix. Isolation with cotton rolls and dri-angles    Etch with 37% H2PO4, rinse, dry. Applied Adhese with 20 second scrub once, gentle air dry and light cured for 10s. Restored with Tetric bulk regina shade A3 and light cured.    Refined with finishing burs, polished with enhance point. Verified occlusion and contacts. Pt left satisfied.    Pt reports the #7-10 PFM bridge (25+years old) is not esthetically pleasing and loose. Pt would like to know if we can make a new #7-10 bridge. Informed pt that tx planning appt will be needed to eval perio, take impressions, etc. Pt understood.     NV: tx planning #7-10 bridge 45 min any resident

## 2024-03-05 ENCOUNTER — OFFICE VISIT (OUTPATIENT)
Dept: DENTISTRY | Facility: CLINIC | Age: 74
End: 2024-03-05

## 2024-03-05 VITALS — SYSTOLIC BLOOD PRESSURE: 157 MMHG | HEART RATE: 59 BPM | DIASTOLIC BLOOD PRESSURE: 71 MMHG

## 2024-03-05 DIAGNOSIS — K08.109 TEETH MISSING: Primary | ICD-10-CM

## 2024-03-05 PROCEDURE — D0191 ASSESSMENT OF A PATIENT: HCPCS

## 2024-03-07 NOTE — DENTAL PROCEDURE DETAILS
Tx Planning Appointment    Jimmy Reyes arrives for treatment planning appointment. Confirmed with patient that he ate breakfast.    Patient says restoration completed last time is doing well and not hurting him.     Patient CC: My bridge from #7-#10 is loose and discolored. I also get food stuck underneath the right side of my lower partial denture.    Upon examination patient has a PFM FPD from #7- #10. #8 and #9 are pontics. #7 and #10 are the abutments. There is ~50% bone loss. Class 1 mobility noted. On the MB of tooth #11 there is a 5 mm pocket. Perio is otherwise normal throughout. FPD has lasted patient for 25 years. Preaut sent out    Dr Del Cid consulted for a second opinion. Discussed with patient that the prognosis of a new FPD would be questionable, but can be done from tooth #6 to tooth #11. Alginate impressions taken. FPD will be started after next 3 month perio recall.    Also examined lower partial denture. Dr Del Cid discussed with patient that it appears current denture is a hybrid of acrylic with valoplast clasps. Discussed with patient that if it was all acrylic some soft reline material could be placed on LR buccal area since it is not flush with his tissue. Since the denture is not all acrylic it cannot be modified.    Attending: Dr Del Cid    NV: 3 month Prophy  NV2: FPD #6-#11 Dr Ortiz 3 hours (preauth must be returned)

## 2024-03-08 DIAGNOSIS — I10 BENIGN ESSENTIAL HYPERTENSION: ICD-10-CM

## 2024-03-08 RX ORDER — LOSARTAN POTASSIUM 25 MG/1
25 TABLET ORAL DAILY
Qty: 100 TABLET | Refills: 1 | Status: SHIPPED | OUTPATIENT
Start: 2024-03-08

## 2024-03-25 ENCOUNTER — TELEPHONE (OUTPATIENT)
Age: 74
End: 2024-03-25

## 2024-03-25 NOTE — TELEPHONE ENCOUNTER
He may have the letter. Does he need me to send over freestyle ashley to his pharmacy too?    Dr cummins

## 2024-03-25 NOTE — TELEPHONE ENCOUNTER
Pt stated please have PCP fax a  letter to [phone] stating pt is using Freestyle Dunia 3 using it and under Dr. Duran's  care.         Please inform pt when this is done. Thank you for your help.

## 2024-03-28 ENCOUNTER — OFFICE VISIT (OUTPATIENT)
Dept: DENTISTRY | Facility: CLINIC | Age: 74
End: 2024-03-28

## 2024-03-28 VITALS — DIASTOLIC BLOOD PRESSURE: 76 MMHG | HEART RATE: 59 BPM | SYSTOLIC BLOOD PRESSURE: 150 MMHG

## 2024-03-28 DIAGNOSIS — Z01.21 ENCOUNTER FOR DENTAL EXAMINATION AND CLEANING WITH ABNORMAL FINDINGS: Primary | ICD-10-CM

## 2024-03-28 PROCEDURE — D4910 PERIODONTAL MAINTENANCE: HCPCS

## 2024-03-28 NOTE — PROGRESS NOTES
PERIODONTAL MT   REVIEWED MED HX: meds, allergies, health changes reviewed in [facility]. All consents signed.  CHIEF CONCERN:  none   PAIN SCALE:  0  ASA CLASS:  III. UC type 2 diabetes  PLAQUE:    moderate      CALCULUS:    moderate    BLEEDING:   light   STAIN :   none      ORAL HYGIENE:      fair    PERIO: 4B    Only wearing Max RPD today  Hand scaled, polished and flossed. Used Cavitron.     Oral Hygiene Instruction:  recommended brushing 2 x daily for 2 minutes MIN, recommended flossing daily, reviewed dietary precautions.  Dispensed: toothbrush, toothpaste and floss    Visual and Tactile Intraoral/ Extraoral evaluation: Oral and Oropharyngeal cancer evaluation. No findings     No exam    REFERRALS: no referrals needed       TREATMENT  PLAN :   1) FPD #6-#11 Dr Ortiz 3 hours (preauth must be returned)     Next Recall: 3 month recall w/ exam, 2BWX    Last BWX: 4-11-23  Last Panorex/ FMX :

## 2024-03-30 ENCOUNTER — HOSPITAL ENCOUNTER (EMERGENCY)
Facility: HOSPITAL | Age: 74
Discharge: HOME | DRG: 660 | End: 2024-03-30
Attending: EMERGENCY MEDICINE
Payer: COMMERCIAL

## 2024-03-30 ENCOUNTER — APPOINTMENT (EMERGENCY)
Dept: RADIOLOGY | Facility: HOSPITAL | Age: 74
DRG: 660 | End: 2024-03-30
Payer: COMMERCIAL

## 2024-03-30 ENCOUNTER — APPOINTMENT (EMERGENCY)
Dept: CT IMAGING | Facility: HOSPITAL | Age: 74
DRG: 660 | End: 2024-03-30
Payer: COMMERCIAL

## 2024-03-30 VITALS
DIASTOLIC BLOOD PRESSURE: 64 MMHG | TEMPERATURE: 97.7 F | OXYGEN SATURATION: 93 % | SYSTOLIC BLOOD PRESSURE: 134 MMHG | RESPIRATION RATE: 20 BRPM | HEART RATE: 54 BPM

## 2024-03-30 DIAGNOSIS — R10.9 FLANK PAIN: Primary | ICD-10-CM

## 2024-03-30 DIAGNOSIS — R10.9 ABDOMINAL PAIN: ICD-10-CM

## 2024-03-30 DIAGNOSIS — N20.1 URETEROLITHIASIS: ICD-10-CM

## 2024-03-30 DIAGNOSIS — R11.2 NAUSEA AND VOMITING: ICD-10-CM

## 2024-03-30 DIAGNOSIS — N13.30 HYDRONEPHROSIS: ICD-10-CM

## 2024-03-30 LAB
2HR DELTA HS TROPONIN: -3 NG/L
ALBUMIN SERPL BCP-MCNC: 3.9 G/DL (ref 3.5–5)
ALP SERPL-CCNC: 73 U/L (ref 34–104)
ALT SERPL W P-5'-P-CCNC: 12 U/L (ref 7–52)
ANION GAP SERPL CALCULATED.3IONS-SCNC: 9 MMOL/L (ref 4–13)
AST SERPL W P-5'-P-CCNC: 15 U/L (ref 13–39)
BACTERIA UR QL AUTO: ABNORMAL /HPF
BASOPHILS # BLD AUTO: 0.07 THOUSANDS/ÂΜL (ref 0–0.1)
BASOPHILS NFR BLD AUTO: 1 % (ref 0–1)
BILIRUB SERPL-MCNC: 0.34 MG/DL (ref 0.2–1)
BILIRUB UR QL STRIP: ABNORMAL
BUN SERPL-MCNC: 24 MG/DL (ref 5–25)
CALCIUM SERPL-MCNC: 9.1 MG/DL (ref 8.4–10.2)
CARDIAC TROPONIN I PNL SERPL HS: 5 NG/L
CARDIAC TROPONIN I PNL SERPL HS: 8 NG/L
CHLORIDE SERPL-SCNC: 106 MMOL/L (ref 96–108)
CK SERPL-CCNC: 121 U/L (ref 39–308)
CLARITY UR: CLEAR
CO2 SERPL-SCNC: 24 MMOL/L (ref 21–32)
COLOR UR: ABNORMAL
CREAT SERPL-MCNC: 0.98 MG/DL (ref 0.6–1.3)
EOSINOPHIL # BLD AUTO: 0.19 THOUSAND/ÂΜL (ref 0–0.61)
EOSINOPHIL NFR BLD AUTO: 2 % (ref 0–6)
ERYTHROCYTE [DISTWIDTH] IN BLOOD BY AUTOMATED COUNT: 14 % (ref 11.6–15.1)
GFR SERPL CREATININE-BSD FRML MDRD: 76 ML/MIN/1.73SQ M
GLUCOSE SERPL-MCNC: 162 MG/DL (ref 65–140)
GLUCOSE UR STRIP-MCNC: ABNORMAL MG/DL
HCT VFR BLD AUTO: 41 % (ref 36.5–49.3)
HGB BLD-MCNC: 13.5 G/DL (ref 12–17)
HGB UR QL STRIP.AUTO: ABNORMAL
IMM GRANULOCYTES # BLD AUTO: 0.11 THOUSAND/UL (ref 0–0.2)
IMM GRANULOCYTES NFR BLD AUTO: 1 % (ref 0–2)
KETONES UR STRIP-MCNC: NEGATIVE MG/DL
LEUKOCYTE ESTERASE UR QL STRIP: NEGATIVE
LIPASE SERPL-CCNC: 30 U/L (ref 11–82)
LYMPHOCYTES # BLD AUTO: 1.32 THOUSANDS/ÂΜL (ref 0.6–4.47)
LYMPHOCYTES NFR BLD AUTO: 15 % (ref 14–44)
MCH RBC QN AUTO: 29.8 PG (ref 26.8–34.3)
MCHC RBC AUTO-ENTMCNC: 32.9 G/DL (ref 31.4–37.4)
MCV RBC AUTO: 91 FL (ref 82–98)
MONOCYTES # BLD AUTO: 1.14 THOUSAND/ÂΜL (ref 0.17–1.22)
MONOCYTES NFR BLD AUTO: 13 % (ref 4–12)
MUCOUS THREADS UR QL AUTO: ABNORMAL
NEUTROPHILS # BLD AUTO: 6.18 THOUSANDS/ÂΜL (ref 1.85–7.62)
NEUTS SEG NFR BLD AUTO: 68 % (ref 43–75)
NITRITE UR QL STRIP: POSITIVE
NON-SQ EPI CELLS URNS QL MICRO: ABNORMAL /HPF
NRBC BLD AUTO-RTO: 0 /100 WBCS
PH UR STRIP.AUTO: 5.5 [PH]
PLATELET # BLD AUTO: 165 THOUSANDS/UL (ref 149–390)
PMV BLD AUTO: 12.4 FL (ref 8.9–12.7)
POTASSIUM SERPL-SCNC: 4 MMOL/L (ref 3.5–5.3)
PROT SERPL-MCNC: 6.7 G/DL (ref 6.4–8.4)
PROT UR STRIP-MCNC: ABNORMAL MG/DL
RBC # BLD AUTO: 4.53 MILLION/UL (ref 3.88–5.62)
RBC #/AREA URNS AUTO: ABNORMAL /HPF
SODIUM SERPL-SCNC: 139 MMOL/L (ref 135–147)
SP GR UR STRIP.AUTO: >=1.05 (ref 1–1.03)
UROBILINOGEN UR STRIP-ACNC: 3 MG/DL
WBC # BLD AUTO: 9.01 THOUSAND/UL (ref 4.31–10.16)
WBC #/AREA URNS AUTO: ABNORMAL /HPF

## 2024-03-30 PROCEDURE — 74177 CT ABD & PELVIS W/CONTRAST: CPT

## 2024-03-30 PROCEDURE — 99284 EMERGENCY DEPT VISIT MOD MDM: CPT

## 2024-03-30 PROCEDURE — 81001 URINALYSIS AUTO W/SCOPE: CPT

## 2024-03-30 PROCEDURE — 84484 ASSAY OF TROPONIN QUANT: CPT

## 2024-03-30 PROCEDURE — 36415 COLL VENOUS BLD VENIPUNCTURE: CPT

## 2024-03-30 PROCEDURE — 99285 EMERGENCY DEPT VISIT HI MDM: CPT | Performed by: EMERGENCY MEDICINE

## 2024-03-30 PROCEDURE — 82550 ASSAY OF CK (CPK): CPT | Performed by: EMERGENCY MEDICINE

## 2024-03-30 PROCEDURE — 83690 ASSAY OF LIPASE: CPT

## 2024-03-30 PROCEDURE — 71045 X-RAY EXAM CHEST 1 VIEW: CPT

## 2024-03-30 PROCEDURE — 96375 TX/PRO/DX INJ NEW DRUG ADDON: CPT

## 2024-03-30 PROCEDURE — 96365 THER/PROPH/DIAG IV INF INIT: CPT

## 2024-03-30 PROCEDURE — 85025 COMPLETE CBC W/AUTO DIFF WBC: CPT

## 2024-03-30 PROCEDURE — 80053 COMPREHEN METABOLIC PANEL: CPT

## 2024-03-30 PROCEDURE — 96361 HYDRATE IV INFUSION ADD-ON: CPT

## 2024-03-30 PROCEDURE — 87086 URINE CULTURE/COLONY COUNT: CPT | Performed by: EMERGENCY MEDICINE

## 2024-03-30 PROCEDURE — 93005 ELECTROCARDIOGRAM TRACING: CPT

## 2024-03-30 PROCEDURE — 96376 TX/PRO/DX INJ SAME DRUG ADON: CPT

## 2024-03-30 RX ORDER — OXYCODONE HYDROCHLORIDE AND ACETAMINOPHEN 5; 325 MG/1; MG/1
1 TABLET ORAL ONCE
Status: COMPLETED | OUTPATIENT
Start: 2024-03-30 | End: 2024-03-30

## 2024-03-30 RX ORDER — HYDROCODONE BITARTRATE AND ACETAMINOPHEN 5; 325 MG/1; MG/1
1 TABLET ORAL EVERY 6 HOURS PRN
Qty: 15 TABLET | Refills: 0 | Status: ON HOLD | OUTPATIENT
Start: 2024-03-30 | End: 2024-04-03

## 2024-03-30 RX ORDER — KETOROLAC TROMETHAMINE 30 MG/ML
15 INJECTION, SOLUTION INTRAMUSCULAR; INTRAVENOUS ONCE
Status: DISCONTINUED | OUTPATIENT
Start: 2024-03-30 | End: 2024-03-30

## 2024-03-30 RX ORDER — TAMSULOSIN HYDROCHLORIDE 0.4 MG/1
0.4 CAPSULE ORAL
Qty: 7 CAPSULE | Refills: 0 | Status: SHIPPED | OUTPATIENT
Start: 2024-03-30 | End: 2024-04-09

## 2024-03-30 RX ORDER — HYDROMORPHONE HCL/PF 1 MG/ML
0.5 SYRINGE (ML) INJECTION ONCE
Status: COMPLETED | OUTPATIENT
Start: 2024-03-30 | End: 2024-03-30

## 2024-03-30 RX ORDER — LORAZEPAM 2 MG/ML
1 INJECTION INTRAMUSCULAR ONCE
Status: COMPLETED | OUTPATIENT
Start: 2024-03-30 | End: 2024-03-30

## 2024-03-30 RX ORDER — LORAZEPAM 2 MG/ML
0.5 INJECTION INTRAMUSCULAR ONCE
Status: COMPLETED | OUTPATIENT
Start: 2024-03-30 | End: 2024-03-30

## 2024-03-30 RX ORDER — ONDANSETRON 2 MG/ML
4 INJECTION INTRAMUSCULAR; INTRAVENOUS ONCE
Status: COMPLETED | OUTPATIENT
Start: 2024-03-30 | End: 2024-03-30

## 2024-03-30 RX ORDER — CEPHALEXIN 500 MG/1
500 CAPSULE ORAL EVERY 6 HOURS SCHEDULED
Qty: 28 CAPSULE | Refills: 0 | Status: SHIPPED | OUTPATIENT
Start: 2024-03-30 | End: 2024-04-06

## 2024-03-30 RX ORDER — OXYCODONE HYDROCHLORIDE 5 MG/1
5 TABLET ORAL EVERY 4 HOURS PRN
Qty: 15 TABLET | Refills: 0 | Status: SHIPPED | OUTPATIENT
Start: 2024-03-30 | End: 2024-03-30 | Stop reason: ALTCHOICE

## 2024-03-30 RX ORDER — ONDANSETRON 4 MG/1
4 TABLET, FILM COATED ORAL EVERY 8 HOURS PRN
Qty: 12 TABLET | Refills: 0 | Status: SHIPPED | OUTPATIENT
Start: 2024-03-30

## 2024-03-30 RX ADMIN — ONDANSETRON 4 MG: 2 INJECTION INTRAMUSCULAR; INTRAVENOUS at 04:55

## 2024-03-30 RX ADMIN — LORAZEPAM 1 MG: 2 INJECTION INTRAMUSCULAR; INTRAVENOUS at 06:55

## 2024-03-30 RX ADMIN — KETOROLAC TROMETHAMINE 15 MG: 30 INJECTION, SOLUTION INTRAMUSCULAR; INTRAVENOUS at 04:52

## 2024-03-30 RX ADMIN — OXYCODONE HYDROCHLORIDE AND ACETAMINOPHEN 1 TABLET: 5; 325 TABLET ORAL at 11:28

## 2024-03-30 RX ADMIN — SODIUM CHLORIDE 1000 ML: 0.9 INJECTION, SOLUTION INTRAVENOUS at 04:51

## 2024-03-30 RX ADMIN — LORAZEPAM 0.5 MG: 2 INJECTION INTRAMUSCULAR; INTRAVENOUS at 06:43

## 2024-03-30 RX ADMIN — IOHEXOL 50 ML: 240 INJECTION, SOLUTION INTRATHECAL; INTRAVASCULAR; INTRAVENOUS; ORAL at 05:03

## 2024-03-30 RX ADMIN — ONDANSETRON 4 MG: 2 INJECTION INTRAMUSCULAR; INTRAVENOUS at 05:59

## 2024-03-30 RX ADMIN — CEFTRIAXONE SODIUM 1000 MG: 10 INJECTION, POWDER, FOR SOLUTION INTRAVENOUS at 10:22

## 2024-03-30 RX ADMIN — IOHEXOL 100 ML: 350 INJECTION, SOLUTION INTRAVENOUS at 07:00

## 2024-03-30 RX ADMIN — HYDROMORPHONE HYDROCHLORIDE 0.5 MG: 1 INJECTION, SOLUTION INTRAMUSCULAR; INTRAVENOUS; SUBCUTANEOUS at 05:52

## 2024-03-30 NOTE — ED PROCEDURE NOTE
PROCEDURE  ECG 12 Lead Documentation Only    Date/Time: 3/30/2024 7:57 AM    Performed by: Josué Calderon MD  Authorized by: Josué Calderon MD    Indications / Diagnosis:  Abd pain  ECG reviewed by me, the ED Provider: yes    Patient location:  ED  Previous ECG:     Previous ECG:  Compared to current    Comparison ECG info:  12/2/23    Similarity:  No change  Rate:     ECG rate:  54    ECG rate assessment: bradycardic    Rhythm:     Rhythm: sinus bradycardia    Ectopy:     Ectopy: none    QRS:     QRS axis:  Normal    QRS intervals:  Normal  Conduction:     Conduction: normal    ST segments:     ST segments:  Normal  T waves:     T waves: normal         Josué Calderon MD  03/30/24 0758

## 2024-03-30 NOTE — ED CARE HANDOFF
Emergency Department Sign Out Note        Sign out and transfer of care from JENNIFER Cadena. See Separate Emergency Department note.     The patient, Jimmy Reyes, was evaluated by the previous provider for  nausea, vomiting, abd pain.  + freq/dysuria yesterday, pain woke him from sleep at 3 am.  Remote R-Y-B, hernia repair.   CT scan pending.    Workup Completed:  Blood work unremarkable    ED Course / Workup Pending (followup):  EKG, chest xray, trop  CT scan a/p  UA                  Patient found to have a distal ureteral stone on CT scan.                ED Course as of 03/30/24 1531   Sat Mar 30, 2024   0907 Patient reevaluated by me.  He is currently resting in no acute distress.  I updated him with his CT scan results.  Patient has a 2 to 3 mm distal right ureteral stone.  Pain is currently well-controlled.  Plan to discharge home pain control/expectant management after urinalysis is reviewed.   1205 Reports that he is unable to tolerate oxycodone.  Will switch prescription to hydrocodone     Procedures  Medical Decision Making  Amount and/or Complexity of Data Reviewed  Labs: ordered.  Radiology: ordered.    Risk  Prescription drug management.            Disposition  Final diagnoses:   None     ED Disposition       None          Follow-up Information    None       Patient's Medications   Discharge Prescriptions    No medications on file     No discharge procedures on file.       ED Provider  Electronically Signed by     Kerry Harden DO  03/30/24 1534

## 2024-03-30 NOTE — ED PROVIDER NOTES
"History  Chief Complaint   Patient presents with    Vomiting     Pt reports vomiting, nausea, abdominal pain, and right flank pain starting around 300 this am.      Jimmy Reyes is a 73 y.o. male with a PMH of diabetes, prostate cancer, gastric bypass, hernia repair presenting to the ED on March 30, 2024 with vomiting. Patient endorses that he woke up from sleep around 3am this morning due to vomiting, right flank pain and abdominal pain. He has significant nausea. Patient also endorses dysuria and frequency yesterday. Patient has had a kidney stone in the past and states that this feels similar. He is also endorsing that he feels like he needs to have a bowel movement, however has been unable to since he woke up. He is unsure if he has passed gas since his pain started. His last bowel movement was yesterday and he states that it was normal for him. Patient denies fevers, chills, hematuria, cough, SOB, chest pain or any other symptoms at this time.         Vomiting  Associated symptoms: abdominal pain    Associated symptoms: no chills, no cough, no diarrhea, no fever and no sore throat        Prior to Admission Medications   Prescriptions Last Dose Informant Patient Reported? Taking?   Biotin 1000 MCG tablet  Self Yes No   Sig: Take 1 tablet by mouth if needed Per pt takes it \"once in awhile\"   Blood Glucose Monitoring Suppl (GLUCOCARD VITAL MONITOR) w/Device KIT  Self No No   Sig: by Does not apply route 2 (two) times a day   Cholecalciferol (VITAMIN D3) 2000 units capsule  Self Yes No   Sig: Take 1,000 Units by mouth daily in the early morning    Continuous Blood Gluc  (FreeStyle Dunia 2 [place]) CHARITY  Self No No   Sig: Check blood sugars multiple times per day   Continuous Blood Gluc Sensor (FreeStyle Dunia 2 Sensor) MISC  Self No No   Sig: Check blood sugars multiple times per day   Cyanocobalamin (VITAMIN B-12) 5000 MCG TBDP  Self Yes No   Sig: Take 5,000 mcg by mouth once a week Takes on Mondays "   Diclofenac Sodium (VOLTAREN) 1 %   No No   Sig: Apply 2 g topically 4 (four) times a day   Lancets (LIFESCAN UNISTIK 2) MISC  Self Yes No   Sig: Lifescan One Touch Ultramini Meter; use as directed; 1; 0; 31-Oct-2016; 31-Oct-2016; Roger Melida; Active   Mirabegron ER 50 MG TB24   No No   Sig: Take 1 tablet (50 mg total) by mouth daily at bedtime   Multiple Vitamin (MULTIVITAMIN) capsule  Self Yes No   Sig: Take 1 capsule by mouth daily in the early morning    NovoLOG Mix 70/30 FlexPen (70-30) 100 units/mL injection pen   No No   Sig: INJECT 20 UNITS UNDER THE SKIN TWO TIMES A DAY WITH MEALS   acetaminophen-codeine (TYLENOL with CODEINE #3) 300-30 MG per tablet   No No   Sig: Take 1 tablet by mouth every 6 (six) hours as needed for moderate pain   albuterol (ProAir HFA) 90 mcg/act inhaler   No No   Sig: Inhale 2 puffs every 6 (six) hours as needed for wheezing or shortness of breath   aluminum-magnesium hydroxide-simethicone (MYLANTA) 200-200-20 mg/5 mL suspension  Self No No   Sig: Take 30 mL by mouth every 6 (six) hours as needed for indigestion or heartburn   atorvastatin (LIPITOR) 10 mg tablet  Self No No   Sig: TAKE ONE TABLET BY MOUTH EVERY DAY   docusate sodium (COLACE) 100 mg capsule  Self Yes No   Sig: Take 100 mg by mouth daily as needed for constipation   fluticasone (Flovent HFA) 110 MCG/ACT inhaler   No No   Sig: Inhale 2 puffs 2 (two) times a day Rinse mouth after use.   Patient taking differently: Inhale 2 puffs if needed Rinse mouth after use.   glipiZIDE (GLUCOTROL XL) 5 mg 24 hr tablet  Self Yes No   Sig: Take 5 mg by mouth in the morning   insulin aspart protamine-insulin aspart (NovoLOG 70/30) 100 units/mL injection   No No   Sig: Inject 10 Units under the skin 2 (two) times a day before meals   losartan (COZAAR) 25 mg tablet   No No   Sig: TAKE ONE TABLET BY MOUTH EVERY DAY   metFORMIN (GLUCOPHAGE) 1000 MG tablet   No No   Sig: TAKE ONE TABLET BY MOUTH TWICE A DAY WITH MEALS   ondansetron  (ZOFRAN) 4 mg tablet   No No   Sig: Take 1 tablet (4 mg total) by mouth every 8 (eight) hours as needed for nausea or vomiting   pantoprazole (PROTONIX) 40 mg tablet   No No   Sig: TAKE ONE TABLET BY MOUTH EVERY DAY   sertraline (ZOLOFT) 25 mg tablet   No No   Sig: TAKE ONE TABLET BY MOUTH EVERY DAY      Facility-Administered Medications: None       Past Medical History:   Diagnosis Date    Anemia     Arthritis     Black stool 07/24/2020    Cancer (HCC)     Colon polyp     Diabetes mellitus (HCC)     IDDM    Diverticulosis     Enlarged prostate     Erectile dysfunction     Hypercholesteremia     Resolved post weight loss    Hypertension     Resolved post weight loss    Kidney stone     Obesity     Prostate cancer (HCC)     Sleep apnea     resolved with weight loss    Wears partial dentures     partial upper and lower       Past Surgical History:   Procedure Laterality Date    ABDOMINAL ADHESION SURGERY N/A 11/28/2016    Procedure: EXTENSIVE LYSIS ADHESIONS;  Surgeon: Abilio Plummer MD;  Location: [facility];  Service:     ANKLE FRACTURE SURGERY Left     CHOLECYSTECTOMY      COLON SURGERY      colon resection    COLONOSCOPY      COLOSTOMY      COLOSTOMY CLOSURE      DIAGNOSTIC LAPAROSCOPY      GASTRIC BYPASS      failed due to adhesions    HERNIA REPAIR      abdominal    TN CORRECTION HAMMERTOE Left 12/1/2023    Procedure: REPAIR HAMMERTOE / MALLET TOE / CLAW TOE;  Surgeon: David Daley DPM;  Location:  MAIN OR;  Service: Podiatry    TN CYSTO INSERTION TRANSPROSTATIC IMPLANT SINGLE N/A 3/8/2019    Procedure: CYSTOSCOPY WITH INSERTION UROLIFT;  Surgeon: Migel Sullivna MD;  [facility];  Service: Urology    TN CYSTO INSERTION TRANSPROSTATIC IMPLANT SINGLE N/A 5/8/2020    Procedure: CYSTOSCOPY WITH INSERTION UROLIFT;  Surgeon: Migel Sullivan MD;  [facility];  Service: Urology    TN CYSTOURETHROSCOPY W/INTERNAL URETHROTOMY N/A 5/8/2020    Procedure: DIRECT VISUAL INTERAL  URETHROTOMY (DVIU), dilation of urethral stricture;  Surgeon: Migel Sullivan MD;  [facility];  Service: Urology    SD EDG US EXAM SURGICAL ALTER STOM DUODENUM/JEJUNUM N/A 10/25/2018    Procedure: LINEAR ENDOSCOPIC U/S;  Surgeon: Brody Isaacs MD;  [facility]: [facility];  Service: Gastroenterology    SD ESOPHAGOGASTRODUODENOSCOPY TRANSORAL DIAGNOSTIC N/A 2016    Procedure: EGD AND COLONOSCOPY;  Surgeon: Ana Wong MD;  [facility];  Service: Gastroenterology    SD LAPS GSTRC RSTRICTIV PX LONGITUDINAL GASTRECTOMY N/A 2016    Procedure: GASTRECTOMY SLEEVE LAPAROSCOPIC;  Surgeon: Abilio Plummer MD;  [facility]: [facility];  Service: Bariatrics    SD PROSTATE NEEDLE BIOPSY ANY APPROACH N/A 2020    Procedure: TRANSRECTAL NEEDLE BIOPSY PROSTATE (TRNBP) WITH TRANSRECTAL ULTRASOUND GUIDANCE;  Surgeon: Migel Sullivan MD;  [facility];  Service: Urology    TONSILLECTOMY      US GUIDED PROSTATE BIOPSY  2020       Family History   Problem Relation Age of Onset    Heart disease Mother     Breast cancer Mother 84    Diabetes Father     Colon cancer Neg Hx     Liver disease Neg Hx      I have reviewed and agree with the history as documented.    E-Cigarette/Vaping    E-Cigarette Use Never User      E-Cigarette/Vaping Substances    Nicotine No     THC No     CBD No     Flavoring No     Other No     Unknown No      Social History     Tobacco Use    Smoking status: Former     Current packs/day: 0.00     Types: Cigarettes     Quit date: 11/10/2001     Years since quittin.4     Passive exposure: Past    Smokeless tobacco: Former   Vaping Use    Vaping status: Never Used   Substance Use Topics    Alcohol use: Not Currently    Drug use: Not Currently     Comment: RSO       Review of Systems   Constitutional:  Negative for chills and fever.   HENT:  Negative for congestion and sore throat.    Eyes:  Negative for discharge and redness.   Respiratory:  Negative for cough and  shortness of breath.    Cardiovascular:  Negative for chest pain and palpitations.   Gastrointestinal:  Positive for abdominal pain, nausea and vomiting. Negative for constipation and diarrhea.   Genitourinary:  Positive for dysuria, flank pain and frequency. Negative for difficulty urinating, hematuria, testicular pain and urgency.        Right flank pain   Skin:  Negative for color change, pallor, rash and wound.   Neurological:  Negative for seizures and syncope.   All other systems reviewed and are negative.      Physical Exam  Physical Exam  Vitals and nursing note reviewed.   Constitutional:       General: He is not in acute distress.     Appearance: Normal appearance. He is normal weight. He is not ill-appearing, toxic-appearing or diaphoretic.   HENT:      Head: Normocephalic and atraumatic.      Right Ear: External ear normal.      Left Ear: External ear normal.      Nose: Nose normal. No congestion or rhinorrhea.      Mouth/Throat:      Mouth: Mucous membranes are moist.   Eyes:      General: No scleral icterus.        Right eye: No discharge.         Left eye: No discharge.      Extraocular Movements: Extraocular movements intact.      Conjunctiva/sclera: Conjunctivae normal.   Cardiovascular:      Rate and Rhythm: Normal rate and regular rhythm.      Heart sounds: Normal heart sounds. No murmur heard.     No friction rub. No gallop.   Pulmonary:      Effort: Pulmonary effort is normal. No respiratory distress.      Breath sounds: Normal breath sounds. No wheezing, rhonchi or rales.   Abdominal:      General: Bowel sounds are normal. There is distension.      Palpations: Abdomen is soft.      Tenderness: There is abdominal tenderness. There is right CVA tenderness. There is no left CVA tenderness, guarding or rebound.   Musculoskeletal:         General: No signs of injury. Normal range of motion.      Cervical back: Normal range of motion and neck supple. No rigidity.   Skin:     General: Skin is warm.       Capillary Refill: Capillary refill takes less than 2 seconds.      Coloration: Skin is not pale.      Findings: No erythema.   Neurological:      General: No focal deficit present.      Mental Status: He is alert and oriented to person, place, and time. Mental status is at baseline.      Motor: No weakness.      Gait: Gait normal.   Psychiatric:         Mood and Affect: Mood normal.         Behavior: Behavior normal.         Vital Signs  ED Triage Vitals   Temperature Pulse Respirations Blood Pressure SpO2   03/30/24 0431 03/30/24 0424 03/30/24 0424 03/30/24 0431 03/30/24 0424   97.7 °F (36.5 °C) 58 22 (!) 196/81 96 %      Temp Source Heart Rate Source Patient Position - Orthostatic VS BP Location FiO2 (%)   03/30/24 0431 03/30/24 0424 -- 03/30/24 0424 --   Oral Monitor  Left arm       Pain Score       --                  Vitals:    03/30/24 0424 03/30/24 0431   BP:  (!) 196/81   Pulse: 58          Visual Acuity      ED Medications  Medications   sodium chloride 0.9 % bolus 1,000 mL (0 mL Intravenous Stopped 3/30/24 0551)   ondansetron (ZOFRAN) injection 4 mg (4 mg Intravenous Given 3/30/24 0455)   iohexol (OMNIPAQUE) 240 MG/ML solution 50 mL (50 mL Oral Given 3/30/24 0503)   LORazepam (ATIVAN) injection 0.5 mg (0.5 mg Intravenous Given 3/30/24 0643)   HYDROmorphone (DILAUDID) injection 0.5 mg (0.5 mg Intravenous Given 3/30/24 0552)   ondansetron (ZOFRAN) injection 4 mg (4 mg Intravenous Given 3/30/24 0559)   LORazepam (ATIVAN) injection 1 mg (1 mg Intravenous Given 3/30/24 0655)   iohexol (OMNIPAQUE) 350 MG/ML injection (MULTI-DOSE) 100 mL (100 mL Intravenous Given 3/30/24 0700)       Diagnostic Studies  Results Reviewed       Procedure Component Value Units Date/Time    HS Troponin 0hr (reflex protocol) [[id number]]     Lab Status: No result Specimen: Blood     Comprehensive metabolic panel [[id number]]  (Abnormal) Collected: 03/30/24 0550    Lab Status: Final result Specimen: Blood from Arm, Left Updated:  03/30/24 0627     Sodium 139 mmol/L      Potassium 4.0 mmol/L      Chloride 106 mmol/L      CO2 24 mmol/L      ANION GAP 9 mmol/L      BUN 24 mg/dL      Creatinine 0.98 mg/dL      Glucose 162 mg/dL      Calcium 9.1 mg/dL      AST 15 U/L      ALT 12 U/L      Alkaline Phosphatase 73 U/L      Total Protein 6.7 g/dL      Albumin 3.9 g/dL      Total Bilirubin 0.34 mg/dL      eGFR 76 ml/min/1.73sq m     Narrative:      [employer] guidelines for Chronic Kidney Disease (CKD):     Stage 1 with normal or high GFR (GFR > 90 mL/min/1.73 square meters)    Stage 2 Mild CKD (GFR = 60-89 mL/min/1.73 square meters)    Stage 3A Moderate CKD (GFR = 45-59 mL/min/1.73 square meters)    Stage 3B Moderate CKD (GFR = 30-44 mL/min/1.73 square meters)    Stage 4 Severe CKD (GFR = 15-29 mL/min/1.73 square meters)    Stage 5 End Stage CKD (GFR <15 mL/min/1.73 square meters)  Note: GFR calculation is accurate only with a steady state creatinine    Lipase [[id number]]  (Normal) Collected: 03/30/24 0550    Lab Status: Final result Specimen: Blood from Arm, Left Updated: 03/30/24 0627     Lipase 30 u/L     CK [[id number]]  (Normal) Collected: 03/30/24 0550    Lab Status: Final result Specimen: Blood from Arm, Left Updated: 03/30/24 0627     Total  U/L     CBC and differential [[id number]]  (Abnormal) Collected: 03/30/24 0437    Lab Status: Final result Specimen: Blood from Arm, Left Updated: 03/30/24 0451     WBC 9.01 Thousand/uL      RBC 4.53 Million/uL      Hemoglobin 13.5 g/dL      Hematocrit 41.0 %      MCV 91 fL      MCH 29.8 pg      MCHC 32.9 g/dL      RDW 14.0 %      MPV 12.4 fL      Platelets 165 Thousands/uL      nRBC 0 /100 WBCs      Neutrophils Relative 68 %      Immature Grans % 1 %      Lymphocytes Relative 15 %      Monocytes Relative 13 %      Eosinophils Relative 2 %      Basophils Relative 1 %      Neutrophils Absolute 6.18 Thousands/µL      Absolute Immature Grans 0.11 Thousand/uL      Absolute  Lymphocytes 1.32 Thousands/µL      Absolute Monocytes 1.14 Thousand/µL      Eosinophils Absolute 0.19 Thousand/µL      Basophils Absolute 0.07 Thousands/µL     UA w Reflex to Microscopic w Reflex to Culture [[id number]]     Lab Status: No result Specimen: Urine                    CT abdomen pelvis with contrast    (Results Pending)   XR chest 1 view portable    (Results Pending)              Procedures  Procedures         ED Course  ED Course as of 03/30/24 0720   Sat Mar 30, 2024   0512 Pt requires ativan prior to CT due to claustrophobia   0542 Labs hemolyzed. Will redraw   0613 Pt attempting to drink contrast, nausea and pain controlled presently                                             Medical Decision Making  Patient seen and examined noted to have flank pain, abdominal pain, nausea and vomiting.  Patient is coming in with sudden onset nausea vomiting abdominal pain and flank pain that all began at 3 AM and woke him out of sleep.  He also is endorsing some urinary symptoms for 1 day.  His labs revealed a CK of 121, lipase of 30, a CMP that was unremarkable except a glucose of 162 and a CBC that was unrevealing.  Notably white blood cell count was normal.  Due to patient's history of gastric bypass ordered his CT scan with p.o. and IV contrast.  Patient required multiple doses of Zofran to control his nausea.  Patient also notes that he gets very claustrophobic and CT scans so ordered 0.5 of Ativan for him as that is what he has had with previous CT scans.  Patient was unable to complete the CAT scan with this dose so ordered another milligram of Ativan for him to be able to get the CT scan.  CT was pending at the time of signout to Dr. Kerry Harden.    Differential diagnosis includes but is not limited to appendicitis, gastroenteritis, gastritis, PUD, GERD, gastroparesis, hepatitis, pancreatitis, colitis, enteritis, diverticulitis, food poisoning, epiploic appendagitis, mesenteric adenitis, mesenteric  "panniculitis, mesenteric ischemia, IBD, IBS, ileus, bowel obstruction, volvulus, internal hernia, cholecystitis, biliary colic, choledocholithiasis, perforated viscus, tumor, splenic etiology, constipation, AAA, testicular torsion, renal colic, pyelonephritis, UTI; doubt cardiac etiology.      Patient's labs notable for: mentioned above    Imaging revealed:   Pending at the time of sign out    All labs reviewed and utilized in the medical decision making process (if labs were ordered). Portions of the record may have been created with voice recognition software.  Occasional wrong word or \"sound a like\" substitutions may have occurred due to the inherent limitations of voice recognition software.  Read the chart carefully and recognize, using context, where substitutions have occurred.      Amount and/or Complexity of Data Reviewed  Labs: ordered.  Radiology: ordered.    Risk  Prescription drug management.             Disposition  Final diagnoses:   Flank pain   Abdominal pain   Nausea and vomiting     Time reflects when diagnosis was documented in both MDM as applicable and the Disposition within this note       Time User Action Codes Description Comment    3/30/2024  7:18 AM Elena Ortiz Add [R10.9] Flank pain     3/30/2024  7:18 AM Elena Ortiz Add [R10.9] Abdominal pain     3/30/2024  7:18 AM Elena Ortiz Add [R11.2] Nausea and vomiting           ED Disposition       None          Follow-up Information    None         Patient's Medications   Discharge Prescriptions    No medications on file       No discharge procedures on file.    PDMP Review         Value Time User    PDMP Reviewed  Yes 3/30/2024  4:18 AM Josué Calderon MD            ED Provider  Electronically Signed by             Elena Ortiz PA-C  03/30/24 0714       Elena Ortiz PA-C  03/30/24 0720    "

## 2024-03-31 LAB
ATRIAL RATE: 54 BPM
BACTERIA UR CULT: NORMAL
P AXIS: 52 DEGREES
PR INTERVAL: 158 MS
QRS AXIS: 41 DEGREES
QRSD INTERVAL: 92 MS
QT INTERVAL: 432 MS
QTC INTERVAL: 409 MS
T WAVE AXIS: 21 DEGREES
VENTRICULAR RATE: 54 BPM

## 2024-03-31 PROCEDURE — 93010 ELECTROCARDIOGRAM REPORT: CPT | Performed by: INTERNAL MEDICINE

## 2024-04-01 ENCOUNTER — TELEPHONE (OUTPATIENT)
Dept: ENDOCRINOLOGY | Facility: CLINIC | Age: 74
End: 2024-04-01

## 2024-04-01 ENCOUNTER — HOSPITAL ENCOUNTER (INPATIENT)
Facility: HOSPITAL | Age: 74
LOS: 1 days | Discharge: HOME | DRG: 660 | End: 2024-04-03
Attending: EMERGENCY MEDICINE | Admitting: INTERNAL MEDICINE
Payer: COMMERCIAL

## 2024-04-01 DIAGNOSIS — R11.0 NAUSEA: ICD-10-CM

## 2024-04-01 DIAGNOSIS — Z79.4 TYPE 2 DIABETES MELLITUS WITH HYPERGLYCEMIA, WITH LONG-TERM CURRENT USE OF INSULIN (HCC): Primary | Chronic | ICD-10-CM

## 2024-04-01 DIAGNOSIS — R10.9 RIGHT FLANK PAIN: ICD-10-CM

## 2024-04-01 DIAGNOSIS — N20.1 RIGHT DISTAL URETERAL CALCULUS: Primary | ICD-10-CM

## 2024-04-01 DIAGNOSIS — E11.65 TYPE 2 DIABETES MELLITUS WITH HYPERGLYCEMIA, WITH LONG-TERM CURRENT USE OF INSULIN (HCC): Primary | Chronic | ICD-10-CM

## 2024-04-01 DIAGNOSIS — N20.1 URETEROLITHIASIS: ICD-10-CM

## 2024-04-01 LAB
ALBUMIN SERPL BCP-MCNC: 4.3 G/DL (ref 3.5–5)
ALP SERPL-CCNC: 74 U/L (ref 34–104)
ALT SERPL W P-5'-P-CCNC: 18 U/L (ref 7–52)
ANION GAP SERPL CALCULATED.3IONS-SCNC: 7 MMOL/L (ref 4–13)
AST SERPL W P-5'-P-CCNC: 26 U/L (ref 13–39)
BASOPHILS # BLD AUTO: 0.07 THOUSANDS/ÂΜL (ref 0–0.1)
BASOPHILS NFR BLD AUTO: 1 % (ref 0–1)
BILIRUB SERPL-MCNC: 0.4 MG/DL (ref 0.2–1)
BUN SERPL-MCNC: 22 MG/DL (ref 5–25)
CALCIUM SERPL-MCNC: 9.1 MG/DL (ref 8.4–10.2)
CHLORIDE SERPL-SCNC: 103 MMOL/L (ref 96–108)
CO2 SERPL-SCNC: 26 MMOL/L (ref 21–32)
CREAT SERPL-MCNC: 0.95 MG/DL (ref 0.6–1.3)
EOSINOPHIL # BLD AUTO: 0.13 THOUSAND/ÂΜL (ref 0–0.61)
EOSINOPHIL NFR BLD AUTO: 1 % (ref 0–6)
ERYTHROCYTE [DISTWIDTH] IN BLOOD BY AUTOMATED COUNT: 13.9 % (ref 11.6–15.1)
GFR SERPL CREATININE-BSD FRML MDRD: 79 ML/MIN/1.73SQ M
GLUCOSE SERPL-MCNC: 177 MG/DL (ref 65–140)
HCT VFR BLD AUTO: 40.1 % (ref 36.5–49.3)
HGB BLD-MCNC: 13 G/DL (ref 12–17)
IMM GRANULOCYTES # BLD AUTO: 0.06 THOUSAND/UL (ref 0–0.2)
IMM GRANULOCYTES NFR BLD AUTO: 1 % (ref 0–2)
LIPASE SERPL-CCNC: 23 U/L (ref 11–82)
LYMPHOCYTES # BLD AUTO: 0.76 THOUSANDS/ÂΜL (ref 0.6–4.47)
LYMPHOCYTES NFR BLD AUTO: 8 % (ref 14–44)
MCH RBC QN AUTO: 29.6 PG (ref 26.8–34.3)
MCHC RBC AUTO-ENTMCNC: 32.4 G/DL (ref 31.4–37.4)
MCV RBC AUTO: 91 FL (ref 82–98)
MONOCYTES # BLD AUTO: 0.84 THOUSAND/ÂΜL (ref 0.17–1.22)
MONOCYTES NFR BLD AUTO: 9 % (ref 4–12)
NEUTROPHILS # BLD AUTO: 7.61 THOUSANDS/ÂΜL (ref 1.85–7.62)
NEUTS SEG NFR BLD AUTO: 80 % (ref 43–75)
NRBC BLD AUTO-RTO: 0 /100 WBCS
PLATELET # BLD AUTO: 165 THOUSANDS/UL (ref 149–390)
PMV BLD AUTO: 13.3 FL (ref 8.9–12.7)
POTASSIUM SERPL-SCNC: 4.8 MMOL/L (ref 3.5–5.3)
PROT SERPL-MCNC: 7.1 G/DL (ref 6.4–8.4)
RBC # BLD AUTO: 4.39 MILLION/UL (ref 3.88–5.62)
SODIUM SERPL-SCNC: 136 MMOL/L (ref 135–147)
WBC # BLD AUTO: 9.47 THOUSAND/UL (ref 4.31–10.16)

## 2024-04-01 PROCEDURE — 80053 COMPREHEN METABOLIC PANEL: CPT | Performed by: EMERGENCY MEDICINE

## 2024-04-01 PROCEDURE — 99285 EMERGENCY DEPT VISIT HI MDM: CPT | Performed by: EMERGENCY MEDICINE

## 2024-04-01 PROCEDURE — 85025 COMPLETE CBC W/AUTO DIFF WBC: CPT | Performed by: EMERGENCY MEDICINE

## 2024-04-01 PROCEDURE — 99284 EMERGENCY DEPT VISIT MOD MDM: CPT

## 2024-04-01 PROCEDURE — TCMPR: Performed by: FAMILY MEDICINE

## 2024-04-01 PROCEDURE — 36415 COLL VENOUS BLD VENIPUNCTURE: CPT

## 2024-04-01 PROCEDURE — 83690 ASSAY OF LIPASE: CPT | Performed by: EMERGENCY MEDICINE

## 2024-04-01 RX ORDER — HYDROMORPHONE HCL/PF 1 MG/ML
0.5 SYRINGE (ML) INJECTION ONCE
Status: COMPLETED | OUTPATIENT
Start: 2024-04-02 | End: 2024-04-02

## 2024-04-01 RX ORDER — GLIPIZIDE 5 MG/1
5 TABLET, FILM COATED, EXTENDED RELEASE ORAL
Qty: 90 TABLET | Refills: 3 | Status: SHIPPED | OUTPATIENT
Start: 2024-04-01 | End: 2024-04-05 | Stop reason: DRUGHIGH

## 2024-04-01 RX ORDER — ONDANSETRON 2 MG/ML
4 INJECTION INTRAMUSCULAR; INTRAVENOUS ONCE
Status: COMPLETED | OUTPATIENT
Start: 2024-04-02 | End: 2024-04-02

## 2024-04-01 NOTE — Clinical Note
Case was discussed with Elena Acosta and the patient's admission status was agreed to be Admission Status: observation status to the service of Dr. Acosta

## 2024-04-02 ENCOUNTER — APPOINTMENT (OUTPATIENT)
Dept: RADIOLOGY | Facility: HOSPITAL | Age: 74
DRG: 660 | End: 2024-04-02
Payer: COMMERCIAL

## 2024-04-02 ENCOUNTER — APPOINTMENT (OUTPATIENT)
Dept: CT IMAGING | Facility: HOSPITAL | Age: 74
DRG: 660 | End: 2024-04-02
Payer: COMMERCIAL

## 2024-04-02 ENCOUNTER — ANESTHESIA (OUTPATIENT)
Dept: PERIOP | Facility: HOSPITAL | Age: 74
DRG: 660 | End: 2024-04-02
Payer: COMMERCIAL

## 2024-04-02 ENCOUNTER — ANESTHESIA EVENT (OUTPATIENT)
Dept: PERIOP | Facility: HOSPITAL | Age: 74
DRG: 660 | End: 2024-04-02
Payer: COMMERCIAL

## 2024-04-02 ENCOUNTER — HOSPITAL ENCOUNTER (OUTPATIENT)
Facility: HOSPITAL | Age: 74
Setting detail: SURGERY ADMIT
DRG: 660 | End: 2024-04-02
Attending: UROLOGY | Admitting: UROLOGY
Payer: COMMERCIAL

## 2024-04-02 PROBLEM — N20.1 RIGHT URETERAL STONE: Status: ACTIVE | Noted: 2024-04-02

## 2024-04-02 LAB
BACTERIA UR QL AUTO: ABNORMAL /HPF
BILIRUB UR QL STRIP: NEGATIVE
CLARITY UR: CLEAR
COLOR UR: ABNORMAL
GLUCOSE SERPL-MCNC: 125 MG/DL (ref 65–140)
GLUCOSE SERPL-MCNC: 164 MG/DL (ref 65–140)
GLUCOSE SERPL-MCNC: 187 MG/DL (ref 65–140)
GLUCOSE SERPL-MCNC: 202 MG/DL (ref 65–140)
GLUCOSE SERPL-MCNC: 277 MG/DL (ref 65–140)
GLUCOSE SERPL-MCNC: 95 MG/DL (ref 65–140)
GLUCOSE UR STRIP-MCNC: NEGATIVE MG/DL
HGB UR QL STRIP.AUTO: ABNORMAL
KETONES UR STRIP-MCNC: NEGATIVE MG/DL
LEUKOCYTE ESTERASE UR QL STRIP: NEGATIVE
NITRITE UR QL STRIP: NEGATIVE
NON-SQ EPI CELLS URNS QL MICRO: ABNORMAL /HPF
PH UR STRIP.AUTO: 7 [PH]
PROT UR STRIP-MCNC: ABNORMAL MG/DL
RBC #/AREA URNS AUTO: ABNORMAL /HPF
SP GR UR STRIP.AUTO: 1.02 (ref 1–1.03)
UROBILINOGEN UR STRIP-ACNC: 2 MG/DL
WBC #/AREA URNS AUTO: ABNORMAL /HPF

## 2024-04-02 PROCEDURE — 52332 CYSTOSCOPY AND TREATMENT: CPT | Performed by: UROLOGY

## 2024-04-02 PROCEDURE — BT1D1ZZ FLUOROSCOPY OF RIGHT KIDNEY, URETER AND BLADDER USING LOW OSMOLAR CONTRAST: ICD-10-PCS | Performed by: UROLOGY

## 2024-04-02 PROCEDURE — 81001 URINALYSIS AUTO W/SCOPE: CPT

## 2024-04-02 PROCEDURE — C1769 GUIDE WIRE: HCPCS | Performed by: UROLOGY

## 2024-04-02 PROCEDURE — 0TC68ZZ EXTIRPATION OF MATTER FROM RIGHT URETER, VIA NATURAL OR ARTIFICIAL OPENING ENDOSCOPIC: ICD-10-PCS | Performed by: UROLOGY

## 2024-04-02 PROCEDURE — 99223 1ST HOSP IP/OBS HIGH 75: CPT | Performed by: UROLOGY

## 2024-04-02 PROCEDURE — C2617 STENT, NON-COR, TEM W/O DEL: HCPCS | Performed by: UROLOGY

## 2024-04-02 PROCEDURE — 0T768DZ DILATION OF RIGHT URETER WITH INTRALUMINAL DEVICE, VIA NATURAL OR ARTIFICIAL OPENING ENDOSCOPIC: ICD-10-PCS | Performed by: UROLOGY

## 2024-04-02 PROCEDURE — 96361 HYDRATE IV INFUSION ADD-ON: CPT

## 2024-04-02 PROCEDURE — 96375 TX/PRO/DX INJ NEW DRUG ADDON: CPT

## 2024-04-02 PROCEDURE — 82360 CALCULUS ASSAY QUANT: CPT | Performed by: UROLOGY

## 2024-04-02 PROCEDURE — 96374 THER/PROPH/DIAG INJ IV PUSH: CPT

## 2024-04-02 PROCEDURE — 82948 REAGENT STRIP/BLOOD GLUCOSE: CPT

## 2024-04-02 PROCEDURE — 52352 CYSTOURETERO W/STONE REMOVE: CPT | Performed by: UROLOGY

## 2024-04-02 PROCEDURE — 88300 SURGICAL PATH GROSS: CPT | Performed by: PATHOLOGY

## 2024-04-02 PROCEDURE — 99223 1ST HOSP IP/OBS HIGH 75: CPT | Performed by: PHYSICIAN ASSISTANT

## 2024-04-02 PROCEDURE — 74176 CT ABD & PELVIS W/O CONTRAST: CPT

## 2024-04-02 PROCEDURE — C1758 CATHETER, URETERAL: HCPCS | Performed by: UROLOGY

## 2024-04-02 PROCEDURE — 74420 UROGRAPHY RTRGR +-KUB: CPT

## 2024-04-02 DEVICE — INLAY OPTIMA URETERAL STENT W/O GUIDEWIRE
Type: IMPLANTABLE DEVICE | Site: URETER | Status: FUNCTIONAL
Brand: BARD® INLAY OPTIMA® URETERAL STENT

## 2024-04-02 RX ORDER — INSULIN LISPRO 100 [IU]/ML
1-5 INJECTION, SOLUTION INTRAVENOUS; SUBCUTANEOUS
Status: DISCONTINUED | OUTPATIENT
Start: 2024-04-02 | End: 2024-04-03 | Stop reason: HOSPADM

## 2024-04-02 RX ORDER — LIDOCAINE HYDROCHLORIDE 10 MG/ML
INJECTION, SOLUTION EPIDURAL; INFILTRATION; INTRACAUDAL; PERINEURAL AS NEEDED
Status: DISCONTINUED | OUTPATIENT
Start: 2024-04-02 | End: 2024-04-02

## 2024-04-02 RX ORDER — GLYCOPYRROLATE 0.2 MG/ML
INJECTION INTRAMUSCULAR; INTRAVENOUS AS NEEDED
Status: DISCONTINUED | OUTPATIENT
Start: 2024-04-02 | End: 2024-04-02

## 2024-04-02 RX ORDER — INSULIN LISPRO 100 [IU]/ML
1-6 INJECTION, SOLUTION INTRAVENOUS; SUBCUTANEOUS
Status: DISCONTINUED | OUTPATIENT
Start: 2024-04-03 | End: 2024-04-03 | Stop reason: HOSPADM

## 2024-04-02 RX ORDER — OXYCODONE HYDROCHLORIDE 5 MG/1
5 TABLET ORAL EVERY 4 HOURS PRN
Status: DISCONTINUED | OUTPATIENT
Start: 2024-04-02 | End: 2024-04-03 | Stop reason: HOSPADM

## 2024-04-02 RX ORDER — SERTRALINE HYDROCHLORIDE 25 MG/1
25 TABLET, FILM COATED ORAL DAILY
Status: DISCONTINUED | OUTPATIENT
Start: 2024-04-02 | End: 2024-04-03 | Stop reason: HOSPADM

## 2024-04-02 RX ORDER — INSULIN LISPRO 100 [IU]/ML
1-6 INJECTION, SOLUTION INTRAVENOUS; SUBCUTANEOUS EVERY 6 HOURS SCHEDULED
Status: DISCONTINUED | OUTPATIENT
Start: 2024-04-02 | End: 2024-04-02

## 2024-04-02 RX ORDER — ATORVASTATIN CALCIUM 10 MG/1
10 TABLET, FILM COATED ORAL DAILY
Status: DISCONTINUED | OUTPATIENT
Start: 2024-04-02 | End: 2024-04-03 | Stop reason: HOSPADM

## 2024-04-02 RX ORDER — LANOLIN ALCOHOL/MO/W.PET/CERES
3 CREAM (GRAM) TOPICAL
Status: DISCONTINUED | OUTPATIENT
Start: 2024-04-02 | End: 2024-04-03 | Stop reason: HOSPADM

## 2024-04-02 RX ORDER — TAMSULOSIN HYDROCHLORIDE 0.4 MG/1
0.4 CAPSULE ORAL
Status: DISCONTINUED | OUTPATIENT
Start: 2024-04-02 | End: 2024-04-03 | Stop reason: HOSPADM

## 2024-04-02 RX ORDER — HYDROMORPHONE HCL/PF 1 MG/ML
0.5 SYRINGE (ML) INJECTION ONCE
Status: COMPLETED | OUTPATIENT
Start: 2024-04-02 | End: 2024-04-02

## 2024-04-02 RX ORDER — FENTANYL CITRATE/PF 50 MCG/ML
25 SYRINGE (ML) INJECTION
Status: DISCONTINUED | OUTPATIENT
Start: 2024-04-02 | End: 2024-04-02 | Stop reason: HOSPADM

## 2024-04-02 RX ORDER — HYDROMORPHONE HCL IN WATER/PF 6 MG/30 ML
0.2 PATIENT CONTROLLED ANALGESIA SYRINGE INTRAVENOUS
Status: DISCONTINUED | OUTPATIENT
Start: 2024-04-02 | End: 2024-04-02 | Stop reason: HOSPADM

## 2024-04-02 RX ORDER — PROPOFOL 10 MG/ML
INJECTION, EMULSION INTRAVENOUS AS NEEDED
Status: DISCONTINUED | OUTPATIENT
Start: 2024-04-02 | End: 2024-04-02

## 2024-04-02 RX ORDER — FENTANYL CITRATE 50 UG/ML
INJECTION, SOLUTION INTRAMUSCULAR; INTRAVENOUS AS NEEDED
Status: DISCONTINUED | OUTPATIENT
Start: 2024-04-02 | End: 2024-04-02

## 2024-04-02 RX ORDER — DEXAMETHASONE SODIUM PHOSPHATE 10 MG/ML
INJECTION, SOLUTION INTRAMUSCULAR; INTRAVENOUS AS NEEDED
Status: DISCONTINUED | OUTPATIENT
Start: 2024-04-02 | End: 2024-04-02

## 2024-04-02 RX ORDER — MAGNESIUM HYDROXIDE 1200 MG/15ML
LIQUID ORAL AS NEEDED
Status: DISCONTINUED | OUTPATIENT
Start: 2024-04-02 | End: 2024-04-02 | Stop reason: HOSPADM

## 2024-04-02 RX ORDER — ONDANSETRON 2 MG/ML
4 INJECTION INTRAMUSCULAR; INTRAVENOUS ONCE AS NEEDED
Status: DISCONTINUED | OUTPATIENT
Start: 2024-04-02 | End: 2024-04-02 | Stop reason: HOSPADM

## 2024-04-02 RX ORDER — KETOROLAC TROMETHAMINE 30 MG/ML
15 INJECTION, SOLUTION INTRAMUSCULAR; INTRAVENOUS ONCE
Status: COMPLETED | OUTPATIENT
Start: 2024-04-02 | End: 2024-04-02

## 2024-04-02 RX ORDER — CEFAZOLIN SODIUM 2 G/50ML
2000 SOLUTION INTRAVENOUS ONCE
Status: COMPLETED | OUTPATIENT
Start: 2024-04-02 | End: 2024-04-02

## 2024-04-02 RX ORDER — EPHEDRINE SULFATE 50 MG/ML
INJECTION INTRAVENOUS AS NEEDED
Status: DISCONTINUED | OUTPATIENT
Start: 2024-04-02 | End: 2024-04-02

## 2024-04-02 RX ORDER — HYDROMORPHONE HCL/PF 1 MG/ML
0.5 SYRINGE (ML) INJECTION EVERY 4 HOURS PRN
Status: DISCONTINUED | OUTPATIENT
Start: 2024-04-02 | End: 2024-04-03 | Stop reason: HOSPADM

## 2024-04-02 RX ORDER — ONDANSETRON 2 MG/ML
INJECTION INTRAMUSCULAR; INTRAVENOUS AS NEEDED
Status: DISCONTINUED | OUTPATIENT
Start: 2024-04-02 | End: 2024-04-02

## 2024-04-02 RX ORDER — SODIUM CHLORIDE, SODIUM LACTATE, POTASSIUM CHLORIDE, CALCIUM CHLORIDE 600; 310; 30; 20 MG/100ML; MG/100ML; MG/100ML; MG/100ML
100 INJECTION, SOLUTION INTRAVENOUS CONTINUOUS
Status: DISCONTINUED | OUTPATIENT
Start: 2024-04-02 | End: 2024-04-03 | Stop reason: HOSPADM

## 2024-04-02 RX ORDER — HYDROMORPHONE HCL IN WATER/PF 6 MG/30 ML
0.2 PATIENT CONTROLLED ANALGESIA SYRINGE INTRAVENOUS EVERY 4 HOURS PRN
Status: DISCONTINUED | OUTPATIENT
Start: 2024-04-02 | End: 2024-04-03 | Stop reason: HOSPADM

## 2024-04-02 RX ORDER — INSULIN LISPRO 100 [IU]/ML
1-6 INJECTION, SOLUTION INTRAVENOUS; SUBCUTANEOUS
Status: DISCONTINUED | OUTPATIENT
Start: 2024-04-02 | End: 2024-04-02

## 2024-04-02 RX ORDER — LOSARTAN POTASSIUM 25 MG/1
25 TABLET ORAL DAILY
Status: DISCONTINUED | OUTPATIENT
Start: 2024-04-02 | End: 2024-04-03 | Stop reason: HOSPADM

## 2024-04-02 RX ORDER — ACETAMINOPHEN 325 MG/1
650 TABLET ORAL EVERY 6 HOURS PRN
Status: DISCONTINUED | OUTPATIENT
Start: 2024-04-02 | End: 2024-04-03 | Stop reason: HOSPADM

## 2024-04-02 RX ORDER — ALPRAZOLAM 0.25 MG/1
0.25 TABLET ORAL ONCE
Status: COMPLETED | OUTPATIENT
Start: 2024-04-02 | End: 2024-04-02

## 2024-04-02 RX ORDER — HEPARIN SODIUM 5000 [USP'U]/ML
5000 INJECTION, SOLUTION INTRAVENOUS; SUBCUTANEOUS EVERY 8 HOURS SCHEDULED
Status: DISCONTINUED | OUTPATIENT
Start: 2024-04-02 | End: 2024-04-03 | Stop reason: HOSPADM

## 2024-04-02 RX ORDER — LORAZEPAM 2 MG/ML
0.5 INJECTION INTRAMUSCULAR ONCE
Status: COMPLETED | OUTPATIENT
Start: 2024-04-02 | End: 2024-04-02

## 2024-04-02 RX ORDER — SODIUM CHLORIDE, SODIUM LACTATE, POTASSIUM CHLORIDE, CALCIUM CHLORIDE 600; 310; 30; 20 MG/100ML; MG/100ML; MG/100ML; MG/100ML
INJECTION, SOLUTION INTRAVENOUS CONTINUOUS PRN
Status: DISCONTINUED | OUTPATIENT
Start: 2024-04-02 | End: 2024-04-02

## 2024-04-02 RX ORDER — ONDANSETRON 2 MG/ML
4 INJECTION INTRAMUSCULAR; INTRAVENOUS ONCE
Status: COMPLETED | OUTPATIENT
Start: 2024-04-02 | End: 2024-04-02

## 2024-04-02 RX ORDER — CEFAZOLIN SODIUM 2 G/50ML
2000 SOLUTION INTRAVENOUS
Status: DISCONTINUED | OUTPATIENT
Start: 2024-04-03 | End: 2024-04-02 | Stop reason: HOSPADM

## 2024-04-02 RX ORDER — INSULIN ASPART 100 [IU]/ML
15 INJECTION, SUSPENSION SUBCUTANEOUS
Status: DISCONTINUED | OUTPATIENT
Start: 2024-04-02 | End: 2024-04-03 | Stop reason: HOSPADM

## 2024-04-02 RX ORDER — SODIUM CHLORIDE 9 MG/ML
75 INJECTION, SOLUTION INTRAVENOUS CONTINUOUS
Status: DISCONTINUED | OUTPATIENT
Start: 2024-04-02 | End: 2024-04-03 | Stop reason: HOSPADM

## 2024-04-02 RX ADMIN — PROPOFOL 170 MG: 10 INJECTION, EMULSION INTRAVENOUS at 14:33

## 2024-04-02 RX ADMIN — OXYCODONE HYDROCHLORIDE 5 MG: 5 TABLET ORAL at 05:10

## 2024-04-02 RX ADMIN — TAMSULOSIN HYDROCHLORIDE 0.4 MG: 0.4 CAPSULE ORAL at 21:19

## 2024-04-02 RX ADMIN — KETOROLAC TROMETHAMINE 15 MG: 30 INJECTION, SOLUTION INTRAMUSCULAR; INTRAVENOUS at 00:43

## 2024-04-02 RX ADMIN — HEPARIN SODIUM 5000 UNITS: 5000 INJECTION INTRAVENOUS; SUBCUTANEOUS at 05:07

## 2024-04-02 RX ADMIN — ONDANSETRON 4 MG: 2 INJECTION INTRAMUSCULAR; INTRAVENOUS at 00:43

## 2024-04-02 RX ADMIN — SODIUM CHLORIDE, SODIUM LACTATE, POTASSIUM CHLORIDE, AND CALCIUM CHLORIDE: .6; .31; .03; .02 INJECTION, SOLUTION INTRAVENOUS at 14:29

## 2024-04-02 RX ADMIN — ONDANSETRON 4 MG: 2 INJECTION INTRAMUSCULAR; INTRAVENOUS at 14:36

## 2024-04-02 RX ADMIN — LORAZEPAM 0.5 MG: 2 INJECTION INTRAMUSCULAR; INTRAVENOUS at 11:42

## 2024-04-02 RX ADMIN — HYDROMORPHONE HYDROCHLORIDE 0.5 MG: 1 INJECTION, SOLUTION INTRAMUSCULAR; INTRAVENOUS; SUBCUTANEOUS at 13:37

## 2024-04-02 RX ADMIN — Medication 3 MG: at 21:15

## 2024-04-02 RX ADMIN — FENTANYL CITRATE 25 MCG: 50 INJECTION INTRAMUSCULAR; INTRAVENOUS at 14:55

## 2024-04-02 RX ADMIN — ATORVASTATIN CALCIUM 10 MG: 10 TABLET, FILM COATED ORAL at 07:38

## 2024-04-02 RX ADMIN — SODIUM CHLORIDE, SODIUM LACTATE, POTASSIUM CHLORIDE, AND CALCIUM CHLORIDE 100 ML/HR: .6; .31; .03; .02 INJECTION, SOLUTION INTRAVENOUS at 19:40

## 2024-04-02 RX ADMIN — ALPRAZOLAM 0.25 MG: 0.25 TABLET ORAL at 09:14

## 2024-04-02 RX ADMIN — LOSARTAN POTASSIUM 25 MG: 25 TABLET, FILM COATED ORAL at 07:38

## 2024-04-02 RX ADMIN — HEPARIN SODIUM 5000 UNITS: 5000 INJECTION INTRAVENOUS; SUBCUTANEOUS at 21:15

## 2024-04-02 RX ADMIN — SODIUM CHLORIDE 1000 ML: 0.9 INJECTION, SOLUTION INTRAVENOUS at 00:43

## 2024-04-02 RX ADMIN — HYDROMORPHONE HYDROCHLORIDE 0.5 MG: 1 INJECTION, SOLUTION INTRAMUSCULAR; INTRAVENOUS; SUBCUTANEOUS at 02:27

## 2024-04-02 RX ADMIN — INSULIN LISPRO 3 UNITS: 100 INJECTION, SOLUTION INTRAVENOUS; SUBCUTANEOUS at 23:12

## 2024-04-02 RX ADMIN — GLYCOPYRROLATE 0.2 MG: 0.2 INJECTION INTRAMUSCULAR; INTRAVENOUS at 14:36

## 2024-04-02 RX ADMIN — LIDOCAINE HYDROCHLORIDE 50 MG: 10 INJECTION, SOLUTION EPIDURAL; INFILTRATION; INTRACAUDAL; PERINEURAL at 14:33

## 2024-04-02 RX ADMIN — OXYCODONE HYDROCHLORIDE 5 MG: 5 TABLET ORAL at 12:42

## 2024-04-02 RX ADMIN — HYDROMORPHONE HYDROCHLORIDE 0.2 MG: 0.2 INJECTION, SOLUTION INTRAMUSCULAR; INTRAVENOUS; SUBCUTANEOUS at 07:38

## 2024-04-02 RX ADMIN — SODIUM CHLORIDE 75 ML/HR: 0.9 INJECTION, SOLUTION INTRAVENOUS at 04:29

## 2024-04-02 RX ADMIN — FENTANYL CITRATE 25 MCG: 50 INJECTION INTRAMUSCULAR; INTRAVENOUS at 15:10

## 2024-04-02 RX ADMIN — SERTRALINE HYDROCHLORIDE 25 MG: 25 TABLET ORAL at 07:38

## 2024-04-02 RX ADMIN — DEXAMETHASONE SODIUM PHOSPHATE 10 MG: 10 INJECTION, SOLUTION INTRAMUSCULAR; INTRAVENOUS at 14:36

## 2024-04-02 RX ADMIN — FENTANYL CITRATE 25 MCG: 50 INJECTION INTRAMUSCULAR; INTRAVENOUS at 15:04

## 2024-04-02 RX ADMIN — FENTANYL CITRATE 25 MCG: 50 INJECTION INTRAMUSCULAR; INTRAVENOUS at 14:48

## 2024-04-02 RX ADMIN — CEFAZOLIN SODIUM 2000 MG: 2 SOLUTION INTRAVENOUS at 14:35

## 2024-04-02 RX ADMIN — ONDANSETRON 4 MG: 2 INJECTION INTRAMUSCULAR; INTRAVENOUS at 09:28

## 2024-04-02 RX ADMIN — EPHEDRINE SULFATE 10 MG: 50 INJECTION INTRAVENOUS at 14:40

## 2024-04-02 RX ADMIN — HYDROMORPHONE HYDROCHLORIDE 0.5 MG: 1 INJECTION, SOLUTION INTRAMUSCULAR; INTRAVENOUS; SUBCUTANEOUS at 00:41

## 2024-04-02 NOTE — TELEPHONE ENCOUNTER
Lab Results   Component Value Date    HGBA1C 7.1 (A) 11/17/2023   Patient was no-show today    Reviewed continuous glucose monitor sensor from March 19 to April 1, 2024.  Average glucose 151 mg per DL, patient is having low blood sugars usually in the morning, during the day blood sugars are acceptable.    Will have to reduce the dose of insulin at dinnertime.  Please inform to reduce NovoLog Mix 70/30 insulin to 18 units at dinner, continue 20 units with breakfast  Patient should discontinue glipizide    Please update the med list  Saurabh Veras

## 2024-04-02 NOTE — ANESTHESIA POSTPROCEDURE EVALUATION
Post-Op Assessment Note    CV Status:  Stable    Pain management: adequate       Mental Status:  Sleepy and unresponsive   Hydration Status:  Euvolemic   PONV Controlled:  Controlled   Airway Patency:  Patent     Post Op Vitals Reviewed: Yes    No anethesia notable event occurred.    Staff: CRNA               BP   114/56   Temp      Pulse  77   Resp      SpO2   98 FM 6l

## 2024-04-02 NOTE — ED PROVIDER NOTES
"History  Chief Complaint   Patient presents with    Flank Pain     Reports worsening right sided flank pain, reports seen here Sunday, diagnosed with kidney stones, sent home with oxycodone.      Mario is a 73 year old male with history of kidney stones, hypertension, hyperlipidemia, diabetes, anemia, presenting with worsening right flank pain in setting of recent diagnosis of obstructing right ureteral stone.  Patient was evaluated 2 days ago for flank tenderness where a 2 to 3 mm ureteral stone was partially obstructing at the right UVJ.  Patient was discharged with Norco and follow-up with urology, he has been taking medication as prescribed at home but states that his pain is worsened prompting him to come to the emergency department.  He denies any new fevers, still does have some nausea and occasional vomiting.  Mild right-sided abdominal tenderness though his pain is most severe in the right flank.  He has always successfully passed renal stones without surgical intervention.      History provided by:  Patient and medical records   used: No    Flank Pain  Associated symptoms: nausea and vomiting    Associated symptoms: no chest pain, no chills, no cough, no dysuria, no fever, no hematuria, no shortness of breath and no sore throat        Prior to Admission Medications   Prescriptions Last Dose Informant Patient Reported? Taking?   Biotin 1000 MCG tablet  Self Yes No   Sig: Take 1 tablet by mouth if needed Per pt takes it \"once in awhile\"   Blood Glucose Monitoring Suppl (GLUCOCARD VITAL MONITOR) w/Device KIT  Self No No   Sig: by Does not apply route 2 (two) times a day   Cholecalciferol (VITAMIN D3) 2000 units capsule  Self Yes No   Sig: Take 1,000 Units by mouth daily in the early morning    Continuous Blood Gluc  (FreeStyle Dunia 2 Tridell) CHARITY  Self No No   Sig: Check blood sugars multiple times per day   Continuous Blood Gluc Sensor (FreeStyle Dunia 2 Sensor) MISC  Self No No "   Sig: Check blood sugars multiple times per day   Cyanocobalamin (VITAMIN B-12) 5000 MCG TBDP  Self Yes No   Sig: Take 5,000 mcg by mouth once a week Takes on Mondays   Diclofenac Sodium (VOLTAREN) 1 %   No No   Sig: Apply 2 g topically 4 (four) times a day   HYDROcodone-acetaminophen (NORCO) 5-325 mg per tablet   No No   Sig: Take 1 tablet by mouth every 6 (six) hours as needed for pain for up to 10 days Max Daily Amount: 4 tablets   Lancets (ZextitCAN UNISTIK 2) MISC  Self Yes No   Sig: [employer] One Touch Ultramini Meter; use as directed; 1; 0; 31-Oct-2016; 31-Oct-2016; Melida Duran; Active   Mirabegron ER 50 MG TB24   No No   Sig: Take 1 tablet (50 mg total) by mouth daily at bedtime   Multiple Vitamin (MULTIVITAMIN) capsule  Self Yes No   Sig: Take 1 capsule by mouth daily in the early morning    NovoLOG Mix 70/30 FlexPen (70-30) 100 units/mL injection pen   No No   Sig: INJECT 20 UNITS UNDER THE SKIN TWO TIMES A DAY WITH MEALS   acetaminophen-codeine (TYLENOL with CODEINE #3) 300-30 MG per tablet   No No   Sig: Take 1 tablet by mouth every 6 (six) hours as needed for moderate pain   albuterol (ProAir HFA) 90 mcg/act inhaler   No No   Sig: Inhale 2 puffs every 6 (six) hours as needed for wheezing or shortness of breath   aluminum-magnesium hydroxide-simethicone (MYLANTA) 200-200-20 mg/5 mL suspension  Self No No   Sig: Take 30 mL by mouth every 6 (six) hours as needed for indigestion or heartburn   atorvastatin (LIPITOR) 10 mg tablet  Self No No   Sig: TAKE ONE TABLET BY MOUTH EVERY DAY   cephalexin (KEFLEX) 500 mg capsule   No No   Sig: Take 1 capsule (500 mg total) by mouth every 6 (six) hours for 7 days   docusate sodium (COLACE) 100 mg capsule  Self Yes No   Sig: Take 100 mg by mouth daily as needed for constipation   fluticasone (Flovent HFA) 110 MCG/ACT inhaler   No No   Sig: Inhale 2 puffs 2 (two) times a day Rinse mouth after use.   Patient taking differently: Inhale 2 puffs if needed Rinse mouth after  use.   glipiZIDE (GLUCOTROL XL) 5 mg 24 hr tablet   No No   Sig: TAKE ONE TABLET BY MOUTH EVERY DAY AFTER BREAKFAST   insulin aspart protamine-insulin aspart (NovoLOG 70/30) 100 units/mL injection   No No   Sig: Inject 10 Units under the skin 2 (two) times a day before meals   losartan (COZAAR) 25 mg tablet   No No   Sig: TAKE ONE TABLET BY MOUTH EVERY DAY   metFORMIN (GLUCOPHAGE) 1000 MG tablet   No No   Sig: TAKE ONE TABLET BY MOUTH TWICE A DAY WITH MEALS   ondansetron (ZOFRAN) 4 mg tablet   No No   Sig: Take 1 tablet (4 mg total) by mouth every 8 (eight) hours as needed for nausea or vomiting   ondansetron (ZOFRAN) 4 mg tablet   No No   Sig: Take 1 tablet (4 mg total) by mouth every 8 (eight) hours as needed for nausea or vomiting   pantoprazole (PROTONIX) 40 mg tablet   No No   Sig: TAKE ONE TABLET BY MOUTH EVERY DAY   sertraline (ZOLOFT) 25 mg tablet   No No   Sig: TAKE ONE TABLET BY MOUTH EVERY DAY   tamsulosin (FLOMAX) 0.4 mg   No No   Sig: Take 1 capsule (0.4 mg total) by mouth daily with dinner for 10 days      Facility-Administered Medications: None       Past Medical History:   Diagnosis Date    Anemia     Arthritis     Black stool 07/24/2020    Cancer (HCC)     Colon polyp     Diabetes mellitus (HCC)     IDDM    Diverticulosis     Enlarged prostate     Erectile dysfunction     Hypercholesteremia     Resolved post weight loss    Hypertension     Resolved post weight loss    Kidney stone     Obesity     Prostate cancer (HCC)     Sleep apnea     resolved with weight loss    Wears partial dentures     partial upper and lower       Past Surgical History:   Procedure Laterality Date    ABDOMINAL ADHESION SURGERY N/A 11/28/2016    Procedure: EXTENSIVE LYSIS ADHESIONS;  Surgeon: Abilio Plummer MD;  Location[facility];  Service:     ANKLE FRACTURE SURGERY Left     CHOLECYSTECTOMY      COLON SURGERY      colon resection    COLONOSCOPY      COLOSTOMY      COLOSTOMY CLOSURE      DIAGNOSTIC LAPAROSCOPY       GASTRIC BYPASS      failed due to adhesions    HERNIA REPAIR      abdominal    WA CORRECTION HAMMERTOE Left 12/1/2023    Procedure: REPAIR HAMMERTOE / MALLET TOE / CLAW TOE;  Surgeon: David Daley DPM;  Location: [facility];  Service: Podiatry    WA CYSTO INSERTION TRANSPROSTATIC IMPLANT SINGLE N/A 3/8/2019    Procedure: CYSTOSCOPY WITH INSERTION UROLIFT;  Surgeon: Migel Sullivan MD;  [facility];  Service: Urology    WA CYSTO INSERTION TRANSPROSTATIC IMPLANT SINGLE N/A 5/8/2020    Procedure: CYSTOSCOPY WITH INSERTION UROLIFT;  Surgeon: Migel Sullivan MD;  [facility];  Service: Urology    WA CYSTOURETHROSCOPY W/INTERNAL URETHROTOMY N/A 5/8/2020    Procedure: DIRECT VISUAL INTERAL URETHROTOMY (DVIU), dilation of urethral stricture;  Surgeon: Migel Sullivan MD;  [facility];  Service: Urology    WA EDG US EXAM SURGICAL ALTER STOM DUODENUM/JEJUNUM N/A 10/25/2018    Procedure: LINEAR ENDOSCOPIC U/S;  Surgeon: Brody Isaacs MD;  Location: [facility];  Service: Gastroenterology    WA ESOPHAGOGASTRODUODENOSCOPY TRANSORAL DIAGNOSTIC N/A 7/6/2016    Procedure: EGD AND COLONOSCOPY;  Surgeon: Ana Wong MD;  [facility];  Service: Gastroenterology    WA LAPS GSTRC RSTRICTIV PX LONGITUDINAL GASTRECTOMY N/A 11/28/2016    Procedure: GASTRECTOMY SLEEVE LAPAROSCOPIC;  Surgeon: Abilio Plummer MD;  [facility];  Service: Bariatrics    WA PROSTATE NEEDLE BIOPSY ANY APPROACH N/A 5/8/2020    Procedure: TRANSRECTAL NEEDLE BIOPSY PROSTATE (TRNBP) WITH TRANSRECTAL ULTRASOUND GUIDANCE;  Surgeon: Migel Sullivan MD;  [facility];  Service: Urology    TONSILLECTOMY      US GUIDED PROSTATE BIOPSY  5/8/2020       Family History   Problem Relation Age of Onset    Heart disease Mother     Breast cancer Mother 84    Diabetes Father     Colon cancer Neg Hx     Liver disease Neg Hx      I have reviewed and agree with the history as documented.    E-Cigarette/Vaping     E-Cigarette Use Never User      E-Cigarette/Vaping Substances    Nicotine No     THC No     CBD No     Flavoring No     Other No     Unknown No      Social History     Tobacco Use    Smoking status: Former     Current packs/day: 0.00     Types: Cigarettes     Quit date: 11/10/2001     Years since quittin.4     Passive exposure: Past    Smokeless tobacco: Former   Vaping Use    Vaping status: Never Used   Substance Use Topics    Alcohol use: Not Currently    Drug use: Not Currently     Comment: RSO        Review of Systems   Constitutional:  Negative for chills and fever.   HENT:  Negative for ear pain and sore throat.    Eyes:  Negative for pain and visual disturbance.   Respiratory:  Negative for cough and shortness of breath.    Cardiovascular:  Negative for chest pain and palpitations.   Gastrointestinal:  Positive for nausea and vomiting. Negative for abdominal pain.   Genitourinary:  Positive for flank pain. Negative for dysuria and hematuria.   Musculoskeletal:  Negative for arthralgias and back pain.   Skin:  Negative for color change and rash.   Neurological:  Negative for seizures and syncope.   All other systems reviewed and are negative.      Physical Exam  ED Triage Vitals   Temperature Pulse Respirations Blood Pressure SpO2   24   98 °F (36.7 °C) 68 18 154/93 97 %      Temp Source Heart Rate Source Patient Position - Orthostatic VS BP Location FiO2 (%)   24 0040 24 --   Oral Monitor Lying Right arm       Pain Score       24 0040       10 - Worst Possible Pain             Orthostatic Vital Signs  Vitals:    24 004   BP: 154/93 156/72   Pulse: 68 61   Patient Position - Orthostatic VS:  Lying       Physical Exam  Vitals and nursing note reviewed.   Constitutional:       General: He is in acute distress.      Comments: Distress secondary to pain   HENT:       Head: Normocephalic and atraumatic.      Right Ear: External ear normal.      Left Ear: External ear normal.      Mouth/Throat:      Mouth: Mucous membranes are moist.      Pharynx: Oropharynx is clear.   Eyes:      General: No scleral icterus.     Conjunctiva/sclera: Conjunctivae normal.   Cardiovascular:      Rate and Rhythm: Normal rate and regular rhythm.      Pulses: Normal pulses.      Heart sounds: Normal heart sounds.   Pulmonary:      Effort: Pulmonary effort is normal. No respiratory distress.      Breath sounds: Normal breath sounds.   Abdominal:      General: Abdomen is flat. There is no distension.      Palpations: Abdomen is soft.      Tenderness: There is no abdominal tenderness. There is right CVA tenderness. There is no guarding or rebound.   Musculoskeletal:         General: No tenderness or signs of injury.      Cervical back: Neck supple. No rigidity.      Right lower leg: No edema.      Left lower leg: No edema.   Skin:     General: Skin is warm.      Coloration: Skin is not jaundiced.      Findings: No erythema or rash.   Neurological:      General: No focal deficit present.      Mental Status: He is alert. Mental status is at baseline.   Psychiatric:         Mood and Affect: Mood normal.         Behavior: Behavior normal.         ED Medications  Medications   HYDROmorphone (DILAUDID) injection 0.5 mg (has no administration in time range)   HYDROmorphone (DILAUDID) injection 0.5 mg (0.5 mg Intravenous Given 4/2/24 0041)   ondansetron (ZOFRAN) injection 4 mg (4 mg Intravenous Given 4/2/24 0043)   sodium chloride 0.9 % bolus 1,000 mL (1,000 mL Intravenous New Bag 4/2/24 0043)   ketorolac (TORADOL) injection 15 mg (15 mg Intravenous Given 4/2/24 0043)       Diagnostic Studies  Results Reviewed       Procedure Component Value Units Date/Time    Urine Microscopic [[id number]]  (Abnormal) Collected: 04/02/24 0128    Lab Status: Final result Specimen: Urine, Clean Catch Updated: 04/02/24 0141     RBC,  UA 20-30 /hpf      WBC, UA 1-2 /hpf      Epithelial Cells None Seen /hpf      Bacteria, UA None Seen /hpf     UA w Reflex to Microscopic w Reflex to Culture [[id number]]  (Abnormal) Collected: 04/02/24 0128    Lab Status: Final result Specimen: Urine, Clean Catch Updated: 04/02/24 0137     Color, UA Light Yellow     Clarity, UA Clear     Specific Gravity, UA 1.021     pH, UA 7.0     Leukocytes, UA Negative     Nitrite, UA Negative     Protein, UA 30 (1+) mg/dl      Glucose, UA Negative mg/dl      Ketones, UA Negative mg/dl      Urobilinogen, UA 2.0 mg/dl      Bilirubin, UA Negative     Occult Blood, UA Small    Lipase [[id number]]  (Normal) Collected: 04/01/24 2154    Lab Status: Final result Specimen: Blood from Arm, Right Updated: 04/01/24 2249     Lipase 23 u/L     Comprehensive metabolic panel [[id number]]  (Abnormal) Collected: 04/01/24 2154    Lab Status: Final result Specimen: Blood from Arm, Right Updated: 04/01/24 2249     Sodium 136 mmol/L      Potassium 4.8 mmol/L      Chloride 103 mmol/L      CO2 26 mmol/L      ANION GAP 7 mmol/L      BUN 22 mg/dL      Creatinine 0.95 mg/dL      Glucose 177 mg/dL      Calcium 9.1 mg/dL      AST 26 U/L      ALT 18 U/L      Alkaline Phosphatase 74 U/L      Total Protein 7.1 g/dL      Albumin 4.3 g/dL      Total Bilirubin 0.40 mg/dL      eGFR 79 ml/min/1.73sq m     Narrative:      National Kidney Disease Foundation guidelines for Chronic Kidney Disease (CKD):     Stage 1 with normal or high GFR (GFR > 90 mL/min/1.73 square meters)    Stage 2 Mild CKD (GFR = 60-89 mL/min/1.73 square meters)    Stage 3A Moderate CKD (GFR = 45-59 mL/min/1.73 square meters)    Stage 3B Moderate CKD (GFR = 30-44 mL/min/1.73 square meters)    Stage 4 Severe CKD (GFR = 15-29 mL/min/1.73 square meters)    Stage 5 End Stage CKD (GFR <15 mL/min/1.73 square meters)  Note: GFR calculation is accurate only with a steady state creatinine    CBC and differential [[id number]]  (Abnormal) Collected: 04/01/24  2154    Lab Status: Final result Specimen: Blood from Arm, Right Updated: 04/01/24 2214     WBC 9.47 Thousand/uL      RBC 4.39 Million/uL      Hemoglobin 13.0 g/dL      Hematocrit 40.1 %      MCV 91 fL      MCH 29.6 pg      MCHC 32.4 g/dL      RDW 13.9 %      MPV 13.3 fL      Platelets 165 Thousands/uL      nRBC 0 /100 WBCs      Neutrophils Relative 80 %      Immature Grans % 1 %      Lymphocytes Relative 8 %      Monocytes Relative 9 %      Eosinophils Relative 1 %      Basophils Relative 1 %      Neutrophils Absolute 7.61 Thousands/µL      Absolute Immature Grans 0.06 Thousand/uL      Absolute Lymphocytes 0.76 Thousands/µL      Absolute Monocytes 0.84 Thousand/µL      Eosinophils Absolute 0.13 Thousand/µL      Basophils Absolute 0.07 Thousands/µL                    No orders to display         Procedures  Procedures      ED Course  ED Course as of 04/02/24 0218   Tue Apr 02, 2024   0114 Patient still complaining of pain though is somewhat improved                                       Medical Decision Making  Mario is a 73 year old male with history of kidney stones, hypertension, hyperlipidemia, diabetes, anemia, presenting with worsening right flank pain in setting of recent diagnosis of obstructing right ureteral stone.  Patient has been taking Norco but has had worsening pain.  No passage of stone.    On initial exam, patient was uncomfortable in appearance, moaning on the bed and clutching at his right flank.  He had right flank tenderness, did not have any significant abdominal tenderness to palpation.    Patient was given IV pain medications including Toradol, Dilaudid, he continued to complain of pain.  No active vomiting here in the ED.  Patient's laboratory studies were unremarkable including renal function which was at baseline.  UA not concerning for infection.    Patient be admitted for further treatment given his intractable pain in setting of right ureteral stone.    Problems Addressed:  Nausea:  acute illness or injury  Right distal ureteral calculus: acute illness or injury  Right flank pain: acute illness or injury    Amount and/or Complexity of Data Reviewed  Labs: ordered.    Risk  Prescription drug management.  Decision regarding hospitalization.          Disposition  Final diagnoses:   Right distal ureteral calculus   Nausea   Right flank pain     Time reflects when diagnosis was documented in both MDM as applicable and the Disposition within this note       Time User Action Codes Description Comment    4/2/2024  1:18 AM Valentin Benavidez Add [N20.1] Right distal ureteral calculus     4/2/2024  1:18 AM Valentin Benavidez Add [R10.9] Flank pain     4/2/2024  1:18 AM Valentin Benavidez Add [R11.0] Nausea     4/2/2024  1:18 AM Valentin Benavidez Remove [R10.9] Flank pain     4/2/2024  1:18 AM Valentin Benavidez Add [R10.9] Right flank pain           ED Disposition       ED Disposition   Admit    Condition   Stable    Date/Time   Tue Apr 2, 2024  1:45 AM    Comment   Case was discussed with Elena Acosta and the patient's admission status was agreed to be Admission Status: observation status to the service of Dr. Quintana.               Follow-up Information    None         Patient's Medications   Discharge Prescriptions    No medications on file     No discharge procedures on file.    PDMP Review         Value Time User    PDMP Reviewed  Yes 3/30/2024  4:18 AM Josué Calderon MD             ED Provider  Attending physically available and evaluated Jimmy Reyes. I managed the patient along with the ED Attending.    Electronically Signed by           Valentin Benavidez DO  04/02/24 0223

## 2024-04-02 NOTE — OP NOTE
OPERATIVE REPORT  PATIENT NAME: Jimmy Reyes    :  1950  MRN: [id number]  Pt Location: [facility]    SURGERY DATE: 2024    Surgeons and Role:     * Julien Boyce DO - Primary    Preop Diagnosis:  Right distal ureteral calculus [N20.1]    Post-Op Diagnosis Codes:     * Right distal ureteral calculus [N20.1]    Procedure(s):  Right - CYSTOSCOPY URETEROSCOPY STONE BASKET EXTRACTION. RETROGRADE PYELOGRAM AND INSERTION STENT URETERAL    Specimen(s):  ID Type Source Tests Collected by Time Destination   1 : right ureteral stone Calculus Ureter, Right STONE ANALYSIS, TISSUE EXAM Julien Boyce DO 2024 1513        Estimated Blood Loss:   Minimal    Drains:  Ureteral Internal Stent Right ureter 6 Fr. (Active)   Site Assessment Clean;Intact 24 1511   Number of days: 0       Anesthesia Type:   General    Operative Indications:  Right distal ureteral calculus [N20.1]      73-year-old male known to [facility] with history of prostate cancer status post XRT and ADT, history of BPH status post UroLift procedure x 2, urethral stricture status post dilation, overactive bladder status post Botox injection     Presented to ED 3/30/2024 with right flank pain     CT abdomen pelvis without contrast 3/30/2024: Partially obstructing 2 to 3 mm distal right ureteral stone with mild upstream hydroureteronephrosis and slightly delayed right renal parenchymal enhancement     He was sent from home from the ED on 3/30/2024 with medical expulsive therapy     He went to the ED on 2024 with persistent pain     He was admitted for pain control     Labs 2024: White count 9.47, hemoglobin 13.0, creatinine 0.95     UA 2024: 20-30 RBC, 1-2 WBC, no bacteria     CT abdomen pelvis without contrast was repeated 2024.  Right distal ureteral stone still present.     Patient ultimately made decision to go to the operating room for definitive stone management.        He was consented for  cystoscopy, right retrograde pyelogram, right ureteroscopy, laser lithotripsy, stone basket extraction, right ureteral stent placement.  He understands that there is a chance that he only gets right ureteral stent.  He understands that there is risk of Bhardwaj catheter.  He understands that there is risk of nephrostomy tube should we not be able to get the wire past the stone and we believe there is a hole in the ureter.  He understands additional risks including but not limited to general anesthesia, infection, bleeding, damage nearby structures, need for more procedures.          Operative Findings:      Positive meatal stenosis requiring sequential urethral dilation from 12 Sammarinese to 28 Sammarinese  About 3 cm bulbar urethral narrowing about 12 [place] that was able to accommodate the scope with slow steady pressure  Prostate mildly enlarged, fibrotic, fixed high riding bladder neck  Bilateral ureteral orifices in orthotopic positions  No bladder lesions  No stones in bladder  Mild to moderate trabeculations  Few cellules present  Right retrograde pyelogram: Mild to moderate hydronephrosis, no filling defects  Right distal and mid ureteroscopy with semirigid ureteroscope demonstrated narrowing at the ureteral orifice and distal ureter that was able to be navigated using second wire and slow advancement.  Approximately 2 mm stone at the distal ureter, removed using Skylight basket.  Back out ureteroscopy: No stone fragments, moderate inflammation and narrowing at the distal ureter  Successfully placed 6 x 26 double-J ureteral stent with no string attached                Complications:   None    Procedure and Technique:           Patient identified in the preop holding area.  Consent was obtained.  Risks and benefits of the procedure were explained to the patient.  Patient was in agreement.  Patient was brought back to the OR and placed upon the table.  Bilateral lower extremity SCDs were placed and turned on.  Patient  received IV Ancef for antibiotics.  Patient underwent smooth induction of anesthesia.  Bilateral lower extremities were placed in stirrups.  Patient was in dorsal lithotomy position.  Area was prepped and draped in sterile fashion.  Timeout was performed by the OR team.     Case began with insertion of 21 [place] rigid cystoscope.  Pan cystourethroscopy was performed.  See the above findings for details.     Attention was turned toward the right ureteral orifice.    5 [place] open-ended catheter was advanced through the bridge of the scope toward the ureteral orifice.  Guidewire was advanced through the 5 [place] open-ended catheter, into the ureter, and coiled in renal pelvis under fluoroscopic guidance.      Semirigid ureteroscope was assembled and entered into the urethra and bladder.  The scope was navigated into the right ureteral orifice.  See above findings for details of mid and distal ureteroscopy using semirigid ureteroscope.  See details above for stone works.        Back out semirigid ureteroscopy was then performed.  See above findings for details.      The ureteroscope was then removed from the bladder and urethra.     Cystoscope was reentered into the bladder over the wire toward the right ureteral orifice.  6 x 26 double-J ureteral stent was advanced over the wire toward the renal pelvis under fluoroscopic guidance.  Wire was removed and stent was deployed.  Good proximal curl was seen on fluoroscopy.  Good distal curl was seen on direct cystoscopy.      There was no string left on the stent.      Bladder was emptied and scope was removed.     Patient was uneventfully awoken from anesthesia and extubated.  Patient was brought to PACU in good condition.             A qualified resident physician was not available.    Patient Disposition:  PACU         SIGNATURE: Julien Boyce DO  DATE: April 2, 2024  TIME: 3:30 PM        PLAN  -Recommend obtaining PVR.  If under 250 cc, okay for discharge from  urology perspective.  If over 250 cc, please reach out to urology for further instruction.  -Can start Flomax 0.4 mg daily and oxybutynin 5 mg extended release daily as needed for bladder spasms and stent discomfort while inpatient.  Should also send these medications for outpatient.  -He has bladder Botox procedure in the office on 4/23/2024.  He can have his right ureteral stent removed at that time.

## 2024-04-02 NOTE — ANESTHESIA PREPROCEDURE EVALUATION
Procedure:  CYSTOSCOPY URETEROSCOPY WITH LITHOTRIPSY HOLMIUM LASER, RETROGRADE PYELOGRAM AND INSERTION STENT URETERAL (Right: Bladder)    Relevant Problems   CARDIO   (+) Benign essential hypertension   (+) Hypercholesteremia   (+) Other chest pain      ENDO   (+) Type 2 diabetes mellitus with hyperglycemia, with long-term current use of insulin (HCC)      GI/HEPATIC   (+) GERD (gastroesophageal reflux disease)   (+) IPMN (intraductal papillary mucinous neoplasm)   (+) Pancreatic cyst      /RENAL   (+) Benign enlargement of prostate   (+) Prostate cancer (HCC)      MUSCULOSKELETAL   (+) Primary osteoarthritis of one hip, left      PULMONARY   (+) Mild asthma exacerbation        Physical Exam    Airway    Mallampati score: II         Dental   No notable dental hx     Cardiovascular      Pulmonary      Other Findings        Anesthesia Plan  ASA Score- 2     Anesthesia Type- general with ASA Monitors.         Additional Monitors:     Airway Plan: LMA.    Comment: I, Dr. Cornell, the attending physician, have personally seen and evaluated the patient prior to anesthetic care.  I have reviewed the pre-anesthetic record, and other medical records if appropriate to the anesthetic care.  If a CRNA is involved in the case, I have reviewed the CRNA assessment, if present, and agree.  The patient is in a suitable condition to proceed with my formulated anesthetic plan.  .       Plan Factors-    Chart reviewed.                      Induction- intravenous.    Postoperative Plan-     Informed Consent- Anesthetic plan and risks discussed with patient.  I personally reviewed this patient with the CRNA. Discussed and agreed on the Anesthesia Plan with the CRNA..

## 2024-04-02 NOTE — ASSESSMENT & PLAN NOTE
Originally diagnosed 3/30/2024, sent home with medical expulsive therapy.  Patient reports persistence of pain  Discussed with patient this morning.  He was not interested in any ureteroscopic interventions, and wanted to be discharged with medical expulsive therapy.  He did not like the idea of a stent being placed and did not want it in his body.  In the afternoon patient with persistent severe pain.  Family/friend discussed with patient, now patient agreeable to ureteroscopy  Urine culture from 3/30/2024 no growth  Patient had been on antibiotics as an outpatient-Keflex x 7 days  UA negative  No leukocytosis  Creatinine at baseline  Vital signs stable, afebrile  Discussed with patient cystoscopy, ureteroscopy, holmium laser lithotripsy, basket stone extraction, retrograde pyelogram, insertion of ureteral stent.  Discussed risk associated with procedure including risk with anesthesia, bleeding, infection, damage to kidneys, ureter, bladder, inability to treat stone, ureteral stricture, need for delayed ureteroscopy for any signs of infection.  Discussed stent colic including frequency, urgency, hematuria, flank pain.  Consent signed  Proceed to the OR

## 2024-04-02 NOTE — ASSESSMENT & PLAN NOTE
"Lab Results   Component Value Date    HGBA1C 7.1 (A) 11/17/2023       No results for input(s): \"POCGLU\" in the last 72 hours.    Blood Sugar Average: Last 72 hrs:    Hold metformin while inpatient.   Home insulin regimen is Novolog 70/30- 15 units BID.  Monitor accu-checks q6h while NPO.   SSI coverage; resume Novolog 70/30 once no longer NPO.   Hypoglycemia protocol.    "

## 2024-04-02 NOTE — ASSESSMENT & PLAN NOTE
"Presented with worsening right flank pain.   CT scan from 3/30/24: \"Partially obstructing 2-3 mm distal right ureteral calculus with mild upstream hydroureteronephrosis and slight delayed right renal parenchymal enhancement.\"  Pain control.   Urology consult.   Keep NPO pending urology evaluation.   IV fluids.   Strain urine.   Continue Flomax.  "

## 2024-04-02 NOTE — ED ATTENDING ATTESTATION
4/1/2024  I, Vahe Grider MD, saw and evaluated the patient. I have discussed the patient with the resident/non-physician practitioner and agree with the resident's/non-physician practitioner's findings, Plan of Care, and MDM as documented in the resident's/non-physician practitioner's note, except where noted. All available labs and Radiology studies were reviewed.  I was present for key portions of any procedure(s) performed by the resident/non-physician practitioner and I was immediately available to provide assistance.       At this point I agree with the current assessment done in the Emergency Department.  I have conducted an independent evaluation of this patient a history and physical is as follows: Patient with worsening of right flank pain.  Known kidney stone.  Not controlled with outpatient medications.    Upon arrival patient hemodynamically stable.  Right flank pain requiring multiple doses of pain medication.  Due to failed outpatient therapy, will admit to hospitalist service.    Results Reviewed       Procedure Component Value Units Date/Time    UA w Reflex to Microscopic w Reflex to Culture [[id number]]     Lab Status: No result Specimen: Urine     Lipase [[id number]]  (Normal) Collected: 04/01/24 2154    Lab Status: Final result Specimen: Blood from Arm, Right Updated: 04/01/24 2249     Lipase 23 u/L     Comprehensive metabolic panel [[id number]]  (Abnormal) Collected: 04/01/24 2154    Lab Status: Final result Specimen: Blood from Arm, Right Updated: 04/01/24 2249     Sodium 136 mmol/L      Potassium 4.8 mmol/L      Chloride 103 mmol/L      CO2 26 mmol/L      ANION GAP 7 mmol/L      BUN 22 mg/dL      Creatinine 0.95 mg/dL      Glucose 177 mg/dL      Calcium 9.1 mg/dL      AST 26 U/L      ALT 18 U/L      Alkaline Phosphatase 74 U/L      Total Protein 7.1 g/dL      Albumin 4.3 g/dL      Total Bilirubin 0.40 mg/dL      eGFR 79 ml/min/1.73sq m     Narrative:      National Kidney Disease  Foundation guidelines for Chronic Kidney Disease (CKD):     Stage 1 with normal or high GFR (GFR > 90 mL/min/1.73 square meters)    Stage 2 Mild CKD (GFR = 60-89 mL/min/1.73 square meters)    Stage 3A Moderate CKD (GFR = 45-59 mL/min/1.73 square meters)    Stage 3B Moderate CKD (GFR = 30-44 mL/min/1.73 square meters)    Stage 4 Severe CKD (GFR = 15-29 mL/min/1.73 square meters)    Stage 5 End Stage CKD (GFR <15 mL/min/1.73 square meters)  Note: GFR calculation is accurate only with a steady state creatinine    CBC and differential [[id number]]  (Abnormal) Collected: 04/01/24 2154    Lab Status: Final result Specimen: Blood from Arm, Right Updated: 04/01/24 2214     WBC 9.47 Thousand/uL      RBC 4.39 Million/uL      Hemoglobin 13.0 g/dL      Hematocrit 40.1 %      MCV 91 fL      MCH 29.6 pg      MCHC 32.4 g/dL      RDW 13.9 %      MPV 13.3 fL      Platelets 165 Thousands/uL      nRBC 0 /100 WBCs      Neutrophils Relative 80 %      Immature Grans % 1 %      Lymphocytes Relative 8 %      Monocytes Relative 9 %      Eosinophils Relative 1 %      Basophils Relative 1 %      Neutrophils Absolute 7.61 Thousands/µL      Absolute Immature Grans 0.06 Thousand/uL      Absolute Lymphocytes 0.76 Thousands/µL      Absolute Monocytes 0.84 Thousand/µL      Eosinophils Absolute 0.13 Thousand/µL      Basophils Absolute 0.07 Thousands/µL               ED Course         Critical Care Time  Procedures

## 2024-04-02 NOTE — UTILIZATION REVIEW
Initial Clinical Review  OBSERVATION ADMISSION 4/2/2024 0146 CONVERTED TO   INPATIENT ADMISSION 4/2/2024 1622   DUE TO FAILED OP MEDICAL EXPULSIVE THERAPY IN THE SETTING OF A PARTIALLY OBSTRUCTIVE STONE W HYDRONEPHROSIS REQUIRING SURGICAL INTERVENTION & MANAGEMENT OF PAIN CONTROL    Admission: Date/Time/Statement:   Admission Orders (From admission, onward)       Ordered        04/02/24 1622  INPATIENT ADMISSION  Once            04/02/24 0146  Place in Observation  Once                          Orders Placed This Encounter   Procedures    INPATIENT ADMISSION     Standing Status:   Standing     Number of Occurrences:   1     Order Specific Question:   Level of Care     Answer:   Med Surg [16]     Order Specific Question:   Estimated length of stay     Answer:   More than 2 Midnights     Order Specific Question:   Certification     Answer:   I certify that inpatient services are medically necessary for this patient for a duration of greater than two midnights. See H&P and MD Progress Notes for additional information about the patient's course of treatment.     ED Arrival Information       Expected   -    Arrival   4/1/2024 21:10    Acuity   Urgent              Means of arrival   Walk-In    Escorted by   Self    Service   Hospitalist    Admission type   Emergency              Arrival complaint   Flank Pain             Chief Complaint   Patient presents with    Flank Pain     Reports worsening right sided flank pain, reports seen here Sunday, diagnosed with kidney stones, sent home with oxycodone.        Initial Presentation: 73 y.o. male  PMH HTN, HLD, GERD, type 2 DM, prostate CA, asthma, gastrectomy presents with right flank pain.  ED on 3/30/24 for right flank pain, CT scan showed a partially obstructing 2-3 mm distal right ureteral calculus (UVJ) . DCd home with [place]. Reports that despite taking the Norco as prescribed his pain is worsening w/  nausea & vomiting ,  SOB when  pain was severe. Reports some right  sided lower abdominal pain as well as dysuria.   EXAM  acute distress due to pain; clutching R flank; Given IV Toradol, Dilaudid, he continued to complain of pain.   Observation admission due to R ureteral stone   Date: 4/2/2024   Day 2: changed to Inpatient  Pain management, IVF, NPO, Urology consult; strain urine, Flomax, hold OP metformin; SSI, cont statin  Urology  3/30 ED presentation right flank pain  CT abdomen pelvis without contrast 3/30/2024: Partially obstructing 2 to 3 mm distal right ureteral stone with mild upstream hydroureteronephrosis and slightly delayed right renal parenchymal enhancement  Sent home w medical expulsive therapy now w persistent severe pain;   CT abdomen pelvis without contrast was repeated 4/2/2024. Right distal ureteral stone still present. Consented for OR for definitive stone management    SURGERY DATE: 4/2/2024   Procedure(s):  Right - CYSTOSCOPY URETEROSCOPY STONE BASKET EXTRACTION. RETROGRADE PYELOGRAM AND INSERTION STENT URETERAL   Anesthesia Type:   General   Operative Findings:  Positive meatal stenosis requiring sequential urethral dilation from 12 Gambian to 28 Gambian  About 3 cm bulbar urethral narrowing about 12 [place]ian that was able to accommodate the scope with slow steady pressure  Prostate mildly enlarged, fibrotic, fixed high riding bladder neck  Bilateral ureteral orifices in orthotopic positions  No bladder lesions  No stones in bladder  Mild to moderate trabeculations  Few cellules present  Right retrograde pyelogram: Mild to moderate hydronephrosis, no filling defects  Right distal and mid ureteroscopy with semirigid ureteroscope demonstrated narrowing at the ureteral orifice and distal ureter that was able to be navigated using second wire and slow advancement.  Approximately 2 mm stone at the distal ureter, removed using Skylight basket.  Back out ureteroscopy: No stone fragments, moderate inflammation and narrowing at the distal ureter  Successfully placed 6  x 26 double-J ureteral stent with no string attached   Day 3:   Status post ureteral stenting 4/2.   Afebrile. Monitored overnight  for pain. Currently pain controlled. Urine clear w good UO, will be DC on Flomax, Ditropan, Norco for pain control. Requires Outpatient Urology follow up   ED Triage Vitals   Temperature Pulse Respirations Blood Pressure SpO2   04/01/24 2147 04/01/24 2147 04/01/24 2147 04/01/24 2147 04/01/24 2147   98 °F (36.7 °C) 68 18 154/93 97 %      Temp Source Heart Rate Source Patient Position - Orthostatic VS BP Location FiO2 (%)   04/01/24 2147 04/01/24 2147 04/02/24 0040 04/01/24 2147 --   Oral Monitor Lying Right arm       Pain Score       04/02/24 0040       10 - Worst Possible Pain          Wt Readings from Last 1 Encounters:   04/02/24 104 kg (230 lb)     Additional Vital Signs:   Date/Time Temp Pulse Resp BP MAP (mmHg) SpO2 O2 Flow Rate (L/min) O2 Device Cardiac (WDL) Patient Position - Orthostatic VS   04/03/24 0700 97.7 °F (36.5 °C) 72 18 150/69 99 94 % -- -- -- --   04/02/24 2300 98.4 °F (36.9 °C) 72 16 102/74 -- 95 % -- None (Room air) -- --   04/02/24 1646 97.7 °F (36.5 °C) 73 16 177/87 Abnormal  123 90 % -- None (Room air) -- --   04/02/24 1629 98.2 °F (36.8 °C) 64 17 143/68 -- 93 % -- None (Room air) X --   04/02/24 1615 -- 64 17 135/64 92 91 % -- None (Room air) -- --   04/02/24 1600 -- 74 16 141/69 99 96 % -- None (Room air) -- --   04/02/24 1559 -- 82 -- -- -- 99 %  -- None (Room air) -- --   SpO2: oral airway removed at 04/02/24 1559   04/02/24 1546 -- 74 18 136/63 90 99 %  6 L/min Simple mask -- --   SpO2: oral airway in place at 04/02/24 1546   04/02/24 1545 -- 74 -- 136/63 90 99 % -- -- -- --   04/02/24 1530 98.2 °F (36.8 °C) 75 17 114/56 80 99 %  6 L/min Simple mask X --   SpO2: oral airway in place at 04/02/24 1530   04/02/24 1407 -- 66 16 161/77 -- 94 % -- None (Room air) -- --   04/02/24 1242 -- -- -- -- -- -- -- None (Room air) --      Date/Time Temp Pulse Resp BP MAP  (mmHg) SpO2 O2 Device Patient Position - Orthostatic VS   04/02/24 0731 98.3 °F (36.8 °C) 61 16 146/63 90 93 % None (Room air) Lying   04/02/24 0400 -- 60 16 142/67 96 95 % None (Room air) Lying   04/02/24 0227 -- 62 20 127/60 87 96 % None (Room air) Lying   04/02/24 0040 -- 61 22 156/72 104 100 % None (Room air) Lying   04/01/24 2147 98 °F (36.7 °C) 68 18 154/93 119 97 % None (Room air) --     Weights (last 14 days)    None     Pertinent Labs/Diagnostic Test Results:   FL retrograde pyelogram   Final Result by Mathew Neville MD (04/03 0833)      Imaging guidance was provided during the urological procedure and deployment of a right ureteral stent.      Fluoroscopic guidance provided for right retrograde pyelogram.      Please see separate procedure report for additional details.         Workstation performed: [id number]         CT abdomen pelvis wo contrast   Final Result by Alec Francisco MD (04/02 1517)      Persistent right hydroureteronephrosis. There is worsening perinephric stranding seen as compared to the prior study. Persistent 2 mm calculus at the ureterovesicular junction.      Irregular 1.9 cm cystic lesion involves the medial left kidney.      2.7 cm cystic lesion in the uncinate/pancreatic head is similar to prior studies.      The study was marked in EPIC for immediate notification.      Workstation performed: [id number]               Results from last 7 days   Lab Units 04/01/24 2154 03/30/24  0437   WBC Thousand/uL 9.47 9.01   HEMOGLOBIN g/dL 13.0 13.5   HEMATOCRIT % 40.1 41.0   PLATELETS Thousands/uL 165 165   NEUTROS ABS Thousands/µL 7.61 6.18         Results from last 7 days   Lab Units 04/01/24  2154 03/30/24  0550   SODIUM mmol/L 136 139   POTASSIUM mmol/L 4.8 4.0   CHLORIDE mmol/L 103 106   CO2 mmol/L 26 24   ANION GAP mmol/L 7 9   BUN mg/dL 22 24   CREATININE mg/dL 0.95 0.98   EGFR ml/min/1.73sq m 79 76   CALCIUM mg/dL 9.1 9.1     Results from last 7 days   Lab Units 04/01/24  4415  "03/30/24  0550   AST U/L 26 15   ALT U/L 18 12   ALK PHOS U/L 74 73   TOTAL PROTEIN g/dL 7.1 6.7   ALBUMIN g/dL 4.3 3.9   TOTAL BILIRUBIN mg/dL 0.40 0.34     Results from last 7 days   Lab Units 04/03/24  0802 04/02/24  2140 04/02/24  1711 04/02/24  1535 04/02/24  1416 04/02/24  1205 04/02/24  0605   POC GLUCOSE mg/dl 186* 277* 202* 187* 164* 125 95     Results from last 7 days   Lab Units 04/01/24  2154 03/30/24  0550   GLUCOSE RANDOM mg/dL 177* 162*             No results found for: \"BETA-HYDROXYBUTYRATE\"               Results from last 7 days   Lab Units 03/30/24  0550   CK TOTAL U/L 121     Results from last 7 days   Lab Units 03/30/24  1050 03/30/24  0811   HS TNI 0HR ng/L  --  8   HS TNI 2HR ng/L 5  --    HSTNI D2 ng/L -3  --        Results from last 7 days   Lab Units 04/01/24  2154 03/30/24  0550   LIPASE u/L 23 30                 Results from last 7 days   Lab Units 04/02/24  0128 03/30/24  0930   CLARITY UA  Clear Clear   COLOR UA  Light Yellow Dark Orange   SPEC GRAV UA  1.021 >=1.050*   PH UA  7.0 5.5   GLUCOSE UA mg/dl Negative Trace*   KETONES UA mg/dl Negative Negative   BLOOD UA  Small* Small*   PROTEIN UA mg/dl 30 (1+)* 30 (1+)*   NITRITE UA  Negative Positive*   BILIRUBIN UA  Negative Small*   UROBILINOGEN UA (BE) mg/dl 2.0* 3.0*   LEUKOCYTES UA  Negative Negative   WBC UA /hpf 1-2 1-2   RBC UA /hpf 20-30* 10-20*   BACTERIA UA /hpf None Seen None Seen   EPITHELIAL CELLS WET PREP /hpf None Seen Occasional   MUCUS THREADS   --  Occasional*               Results from last 7 days   Lab Units 03/30/24  0930   URINE CULTURE  No Growth <1000 cfu/mL               3/30   EKG=Rate:     ECG rate:  54     ECG rate assessment: bradycardic    Rhythm:     Rhythm: sinus bradycardia    Ectopy:     Ectopy: none    QRS:     QRS axis:  Normal     QRS intervals:  Normal   Conduction:     Conduction: normal    ST segments:     ST segments:  Normal   T waves:     T waves: normal     ED Treatment:   Medication " Administration from 04/01/2024 2110 to 04/02/2024 0450         Date/Time Order Dose Route Action Action by Comments     04/02/2024 0041 EDT HYDROmorphone (DILAUDID) injection 0.5 mg 0.5 mg Intravenous Given Zoë DIEZ Espinal, RN --     04/02/2024 0043 EDT ondansetron (ZOFRAN) injection 4 mg 4 mg Intravenous Given Zoë DIEZ Teddy, RN --     04/02/2024 0428 EDT sodium chloride 0.9 % bolus 1,000 mL 0 mL Intravenous Stopped Katrina Iacovone, RN --     04/02/2024 0043 EDT sodium chloride 0.9 % bolus 1,000 mL 1,000 mL Intravenous New Bag Zoë DIEZ Teddy, RN --     04/02/2024 0043 EDT ketorolac (TORADOL) injection 15 mg 15 mg Intravenous Given Zoë DIEZ Teddy, RN --     04/02/2024 0227 EDT HYDROmorphone (DILAUDID) injection 0.5 mg 0.5 mg Intravenous Given Katrina Iacovone, RN --     04/02/2024 0429 EDT sodium chloride 0.9 % infusion 75 mL/hr Intravenous New Bag Katrina Casper, RN --          Past Medical History:   Diagnosis Date    Anemia     Arthritis     Black stool 07/24/2020    Cancer (HCC)     Colon polyp     Diabetes mellitus (HCC)     IDDM    Diverticulosis     Enlarged prostate     Erectile dysfunction     Hypercholesteremia     Resolved post weight loss    Hypertension     Resolved post weight loss    Kidney stone     Obesity     Prostate cancer (HCC)     Sleep apnea     resolved with weight loss    Wears partial dentures     partial upper and lower     Present on Admission:   Hypercholesteremia   Benign essential hypertension      Admitting Diagnosis: Nausea [R11.0]  Flank pain [R10.9]  Right flank pain [R10.9]  Right distal ureteral calculus [N20.1]  Age/Sex: 73 y.o. male  Admission Orders:  NPO  I/O  Strain urine  OOB    Scheduled Medications:  atorvastatin, 10 mg, Oral, Daily  heparin (porcine), 5,000 Units, Subcutaneous, Q8H PEGGY  insulin aspart protamine-insulin aspart, 15 Units, Subcutaneous, BID AC  insulin lispro, 1-5 Units, Subcutaneous, HS  insulin lispro, 1-6 Units, Subcutaneous, TID AC  losartan, 25 mg, Oral,  Daily  sertraline, 25 mg, Oral, Daily  tamsulosin, 0.4 mg, Oral, Daily With Dinner    Continuous IV Infusions:  lactated ringers, 100 mL/hr, Intravenous, Continuous  sodium chloride, 75 mL/hr, Intravenous, Continuous    PRN Meds:  acetaminophen, 650 mg, Oral, Q6H PRN  HYDROmorphone, 0.5 mg, Intravenous, Q4H PRN - 4/2 X1  HYDROmorphone, 0.2 mg, Intravenous, Q4H PRN- 4/2 X1  melatonin, 3 mg, Oral, HS PRN  oxyCODONE, 2.5 mg, Oral, Q4H PRN   Or  oxyCODONE, 5 mg, Oral, Q4H PRN 4/2 X2      IP CONSULT TO UROLOGY    Network Utilization Review Department  ATTENTION: Please call with any questions or concerns to [phone] and carefully listen to the prompts so that you are directed to the right person. All voicemails are confidential.   For Discharge needs, contact Care Management DC Support Team at [phone] opt. 2  Send all requests for admission clinical reviews, approved or denied determinations and any other requests to dedicated fax number below belonging to the campus where the patient is receiving treatment. List of dedicated fax numbers for the Facilities:  FACILITY NAME UR FAX NUMBER   ADMISSION DENIALS (Administrative/Medical Necessity) [phone]   DISCHARGE SUPPORT TEAM ([facility]) [phone]   PARENT CHILD HEALTH (Maternity/NICU/Pediatrics) [phone]   [facility] [phone]   [facility] [phone]   [facility] [phone]   [facility] [phone]   [facility] [phone]   [facility] [phone]   [facility] [phone]   [facility] [phone]   [facility] [phone]   [facility] [phone]   [facility] [phone]   [facility] [phone]

## 2024-04-02 NOTE — CONSULTS
[facility]  Consult  Name: Jimmy Reyes 73 y.o. male I MRN: [id number]  Unit/Bed#: -01 I Date of Admission: 4/1/2024   Date of Service: 4/2/2024 I Hospital Day: 0    Inpatient consult to Urology  Consult performed by: Leydi Sanchez PA-C  Consult ordered by: Vahe Grider MD          Assessment/Plan   * Right ureteral stone  Assessment & Plan  Originally diagnosed 3/30/2024, sent home with medical expulsive therapy.  Patient reports persistence of pain  Discussed with patient this morning.  He was not interested in any ureteroscopic interventions, and wanted to be discharged with medical expulsive therapy.  He did not like the idea of a stent being placed and did not want it in his body.  In the afternoon patient with persistent severe pain.  Family/friend discussed with patient, now patient agreeable to ureteroscopy  Urine culture from 3/30/2024 no growth  Patient had been on antibiotics as an outpatient-Keflex x 7 days  UA negative  No leukocytosis  Creatinine at baseline  Vital signs stable, afebrile  Discussed with patient cystoscopy, ureteroscopy, holmium laser lithotripsy, basket stone extraction, retrograde pyelogram, insertion of ureteral stent.  Discussed risk associated with procedure including risk with anesthesia, bleeding, infection, damage to kidneys, ureter, bladder, inability to treat stone, ureteral stricture, need for delayed ureteroscopy for any signs of infection.  Discussed stent colic including frequency, urgency, hematuria, flank pain.  Consent signed  Proceed to the OR             Subjective:   Mario is a 73-year-old possible history HTN, HLD, GERD, DM2, prostate cancer status post radiation and ADT.  He originally presented to the ED 3/30/2024 due to flank pain.  He was diagnosed with a distal right ureteral calculus.  He was sent home with medical expulsive therapy.  Patient failed medical expulsive therapy due to persistence of pain.  He  represented to the ED due to pain.  He had a repeat CT scan done today showing persistence of distal right ureteral stone at [facility].  After discussing with patient this morning he had mild pain, he reports his mother had terrible pain with stent in place.  He did not want a procedure or stent placed.  In the afternoon patient had recurrence of severe pain, not relieved with oral pain medications.  After reevaluation with patient, he discussed with family/friend at bedside and he is agreeable to procedure.  Urology was consulted for surgical intervention.    Review of Systems   Constitutional:  Negative for chills and fever.   Respiratory:  Negative for cough and shortness of breath.    Cardiovascular:  Negative for chest pain and palpitations.   Gastrointestinal:  Positive for abdominal pain. Negative for vomiting.   Genitourinary:  Positive for flank pain. Negative for dysuria and hematuria.   Musculoskeletal:  Negative for arthralgias and back pain.   Skin:  Negative for color change and rash.   Neurological:  Negative for seizures and syncope.   All other systems reviewed and are negative.      Objective:  Vitals: Blood pressure 146/63, pulse 61, temperature 98.3 °F (36.8 °C), temperature source Oral, resp. rate 16, SpO2 93%.,There is no height or weight on file to calculate BMI.    Physical Exam  Constitutional:       General: He is not in acute distress.     Appearance: Normal appearance. He is normal weight. He is not ill-appearing or toxic-appearing.   HENT:      Head: Normocephalic and atraumatic.      Right Ear: External ear normal.      Left Ear: External ear normal.      Nose: Nose normal.      Mouth/Throat:      Mouth: Mucous membranes are moist.   Eyes:      General: No scleral icterus.     Conjunctiva/sclera: Conjunctivae normal.   Cardiovascular:      Rate and Rhythm: Normal rate.      Pulses: Normal pulses.   Pulmonary:      Effort: Pulmonary effort is normal.   Abdominal:      General: Abdomen is flat.  There is no distension.      Palpations: Abdomen is soft.      Tenderness: There is abdominal tenderness. There is right CVA tenderness. There is no left CVA tenderness or guarding.   Musculoskeletal:         General: Normal range of motion.      Cervical back: Normal range of motion.   Skin:     General: Skin is warm.      Findings: No rash.   Neurological:      General: No focal deficit present.      Mental Status: He is alert and oriented to person, place, and time. Mental status is at baseline.   Psychiatric:         Mood and Affect: Mood normal.         Behavior: Behavior normal.         Thought Content: Thought content normal.         Judgment: Judgment normal.         Imaging:  Imaging reviewed    Labs:  Recent Labs     24   WBC 9.47     Recent Labs     24   HGB 13.0       Recent Labs     24   CREATININE 0.95           History:  Social History     Socioeconomic History    Marital status: Single     Spouse name: None    Number of children: None    Years of education: None    Highest education level: None   Occupational History    None   Tobacco Use    Smoking status: Former     Current packs/day: 0.00     Types: Cigarettes     Quit date: 11/10/2001     Years since quittin.4     Passive exposure: Past    Smokeless tobacco: Former   Vaping Use    Vaping status: Never Used   Substance and Sexual Activity    Alcohol use: Not Currently    Drug use: Not Currently     Comment: RSO    Sexual activity: Yes     Partners: Female   Other Topics Concern    None   Social History Narrative    None     Social Determinants of Health     Financial Resource Strain: High Risk (2022)    Overall Financial Resource Strain (CARDIA)     Difficulty of Paying Living Expenses: Hard   Food Insecurity: Food Insecurity Present (2022)    Hunger Vital Sign     Worried About Running Out of Food in the Last Year: Often true     Ran Out of Food in the Last Year: Often true   Transportation  Needs: No Transportation Needs (1/20/2023)    PRAPARE - Transportation     Lack of Transportation (Medical): No     Lack of Transportation (Non-Medical): No   Physical Activity: Not on file   Stress: Not on file   Social Connections: Not on file   Intimate Partner Violence: Not on file   Housing Stability: Not on file       Past Medical History:   Diagnosis Date    Anemia     Arthritis     Black stool 07/24/2020    Cancer (HCC)     Colon polyp     Diabetes mellitus (HCC)     IDDM    Diverticulosis     Enlarged prostate     Erectile dysfunction     Hypercholesteremia     Resolved post weight loss    Hypertension     Resolved post weight loss    Kidney stone     Obesity     Prostate cancer (HCC)     Sleep apnea     resolved with weight loss    Wears partial dentures     partial upper and lower     Past Surgical History:   Procedure Laterality Date    ABDOMINAL ADHESION SURGERY N/A 11/28/2016    Procedure: EXTENSIVE LYSIS ADHESIONS;  Surgeon: Abilio Plummer MD;  [facility];  Service:     ANKLE FRACTURE SURGERY Left     CHOLECYSTECTOMY      COLON SURGERY      colon resection    COLONOSCOPY      COLOSTOMY      COLOSTOMY CLOSURE      DIAGNOSTIC LAPAROSCOPY      GASTRIC BYPASS      failed due to adhesions    HERNIA REPAIR      abdominal    AL CORRECTION HAMMERTOE Left 12/1/2023    Procedure: REPAIR HAMMERTOE / MALLET TOE / CLAW TOE;  Surgeon: David Daley DPM;  Location:  MAIN OR;  Service: Podiatry    AL CYSTO INSERTION TRANSPROSTATIC IMPLANT SINGLE N/A 3/8/2019    Procedure: CYSTOSCOPY WITH INSERTION UROLIFT;  Surgeon: Migel Sullivan MD;  [facility];  Service: Urology    AL CYSTO INSERTION TRANSPROSTATIC IMPLANT SINGLE N/A 5/8/2020    Procedure: CYSTOSCOPY WITH INSERTION UROLIFT;  Surgeon: Migel Sullivan MD;  [facility];  Service: Urology    AL CYSTOURETHROSCOPY W/INTERNAL URETHROTOMY N/A 5/8/2020    Procedure: DIRECT VISUAL INTERAL URETHROTOMY (DVIU), dilation of  urethral stricture;  Surgeon: Migel Sullivan MD;  [facility];  Service: Urology    OK EDG US EXAM SURGICAL ALTER STOM DUODENUM/JEJUNUM N/A 10/25/2018    Procedure: LINEAR ENDOSCOPIC U/S;  Surgeon: Brody Isaacs MD;  [facility];  Service: Gastroenterology    OK ESOPHAGOGASTRODUODENOSCOPY TRANSORAL DIAGNOSTIC N/A 7/6/2016    Procedure: EGD AND COLONOSCOPY;  Surgeon: Ana Wong MD;  [facility];  Service: Gastroenterology    OK LAPS GSTRC RSTRICTIV PX LONGITUDINAL GASTRECTOMY N/A 11/28/2016    Procedure: GASTRECTOMY SLEEVE LAPAROSCOPIC;  Surgeon: Abilio Plummer MD;  [facility];  Service: Bariatrics    OK PROSTATE NEEDLE BIOPSY ANY APPROACH N/A 5/8/2020    Procedure: TRANSRECTAL NEEDLE BIOPSY PROSTATE (TRNBP) WITH TRANSRECTAL ULTRASOUND GUIDANCE;  Surgeon: Migel Sullivan MD;  [facility];  Service: Urology    TONSILLECTOMY      US GUIDED PROSTATE BIOPSY  5/8/2020     Family History   Problem Relation Age of Onset    Heart disease Mother     Breast cancer Mother 84    Diabetes Father     Colon cancer Neg Hx     Liver disease Neg Hx        Leydi Sanchez PA-C  Date: 4/2/2024 Time: 1:36 PM

## 2024-04-02 NOTE — H&P
"[facility]  H&P  Name: Jimmy Reyes 73 y.o. male I MRN: [id number]  Unit/Bed#: ED-09 I Date of Admission: 4/1/2024   Date of Service: 4/2/2024 I Hospital Day: 0      Assessment/Plan   * Right ureteral stone  Assessment & Plan  Presented with worsening right flank pain.   CT scan from 3/30/24: \"Partially obstructing 2-3 mm distal right ureteral calculus with mild upstream hydroureteronephrosis and slight delayed right renal parenchymal enhancement.\"  Pain control.   Urology consult.   Keep NPO pending urology evaluation.   IV fluids.   Strain urine.   Continue Flomax.    Benign essential hypertension  Assessment & Plan  Continue losartan.     Type 2 diabetes mellitus with hyperglycemia, with long-term current use of insulin (HCC)  Assessment & Plan  Lab Results   Component Value Date    HGBA1C 7.1 (A) 11/17/2023       No results for input(s): \"POCGLU\" in the last 72 hours.    Blood Sugar Average: Last 72 hrs:    Hold metformin while inpatient.   Home insulin regimen is Novolog 70/30- 15 units BID.  Monitor accu-checks q6h while NPO.   SSI coverage; resume Novolog 70/30 once no longer NPO.   Hypoglycemia protocol.      Hypercholesteremia  Assessment & Plan  Continue statin.          VTE Pharmacologic Prophylaxis: VTE Score: 3 Moderate Risk (Score 3-4) - Pharmacological DVT Prophylaxis Ordered: enoxaparin (Lovenox).  Code Status: level 1 - full code  Discussion with family:  not at this time.     Anticipated Length of Stay: Patient will be admitted on an observation basis with an anticipated length of stay of less than 2 midnights secondary to ureteral stone.    Total Time Spent on Date of Encounter in care of patient:  This time was spent on one or more of the following: performing physical exam; counseling and coordination of care; obtaining or reviewing history; documenting in the medical record; reviewing/ordering tests, medications or procedures; communicating with other healthcare " professionals and discussing with patient's family/caregivers.    Chief Complaint: right flank pain    History of Present Illness:  Jimmy Reyes is a 73 y.o. male with a PMH of HTN, HLD, GERD, type 2 DM, prostate CA, asthma, gastrectomy who presents with right flank pain. Patient was seen in the ED on 3/30/24 for right flank pain and CT scan obtained at that time showed a partially obstructing 2-3 mm distal right ureteral calculus. He was discharged home with Norco. He reports that despite taking the Norco as prescribed his pain has been worsening. He admits to nausea with vomiting as well as SOB when his pain was severe. He notes some right sided lower abdominal pain as well as dysuria. No noted fevers/ chills, hematuria or urinary frequency.      Review of Systems:  Review of Systems   Constitutional:  Negative for chills and fever.   Respiratory:  Positive for shortness of breath (due to severe pain; resolved now).    Cardiovascular:  Negative for chest pain.   Gastrointestinal:  Positive for abdominal pain (lower abdomen), nausea and vomiting. Negative for diarrhea.   Genitourinary:  Positive for dysuria and flank pain (right). Negative for decreased urine volume, frequency and hematuria.   All other systems reviewed and are negative.      Past Medical and Surgical History:   Past Medical History:   Diagnosis Date    Anemia     Arthritis     Black stool 07/24/2020    Cancer (HCC)     Colon polyp     Diabetes mellitus (HCC)     IDDM    Diverticulosis     Enlarged prostate     Erectile dysfunction     Hypercholesteremia     Resolved post weight loss    Hypertension     Resolved post weight loss    Kidney stone     Obesity     Prostate cancer (HCC)     Sleep apnea     resolved with weight loss    Wears partial dentures     partial upper and lower       Past Surgical History:   Procedure Laterality Date    ABDOMINAL ADHESION SURGERY N/A 11/28/2016    Procedure: EXTENSIVE LYSIS ADHESIONS;  Surgeon: Abilio Plummer MD;   [facility];  Service:     ANKLE FRACTURE SURGERY Left     CHOLECYSTECTOMY      COLON SURGERY      colon resection    COLONOSCOPY      COLOSTOMY      COLOSTOMY CLOSURE      DIAGNOSTIC LAPAROSCOPY      GASTRIC BYPASS      failed due to adhesions    HERNIA REPAIR      abdominal    MT CORRECTION HAMMERTOE Left 12/1/2023    Procedure: REPAIR HAMMERTOE / MALLET TOE / CLAW TOE;  Surgeon: David Daley DPM;  [facility];  Service: Podiatry    MT CYSTO INSERTION TRANSPROSTATIC IMPLANT SINGLE N/A 3/8/2019    Procedure: CYSTOSCOPY WITH INSERTION UROLIFT;  Surgeon: Migel Sullivan MD;  [facility];  Service: Urology    MT CYSTO INSERTION TRANSPROSTATIC IMPLANT SINGLE N/A 5/8/2020    Procedure: CYSTOSCOPY WITH INSERTION UROLIFT;  Surgeon: Migel Sullivan MD;  [facility];  Service: Urology    MT CYSTOURETHROSCOPY W/INTERNAL URETHROTOMY N/A 5/8/2020    Procedure: DIRECT VISUAL INTERAL URETHROTOMY (DVIU), dilation of urethral stricture;  Surgeon: Migel Sullivan MD;  [facility];  Service: Urology    MT EDG US EXAM SURGICAL ALTER STOM DUODENUM/JEJUNUM N/A 10/25/2018    Procedure: LINEAR ENDOSCOPIC U/S;  Surgeon: Brody Isaacs MD;  [facility];  Service: Gastroenterology    MT ESOPHAGOGASTRODUODENOSCOPY TRANSORAL DIAGNOSTIC N/A 7/6/2016    Procedure: EGD AND COLONOSCOPY;  Surgeon: Ana Wong MD;  [facility];  Service: Gastroenterology    MT LAPS GSTRC RSTRICTIV PX LONGITUDINAL GASTRECTOMY N/A 11/28/2016    Procedure: GASTRECTOMY SLEEVE LAPAROSCOPIC;  Surgeon: Abilio Plummer MD;  [facility];  Service: Bariatrics    MT PROSTATE NEEDLE BIOPSY ANY APPROACH N/A 5/8/2020    Procedure: TRANSRECTAL NEEDLE BIOPSY PROSTATE (TRNBP) WITH TRANSRECTAL ULTRASOUND GUIDANCE;  Surgeon: Migel Sullivan MD;  [facility];  Service: Urology    TONSILLECTOMY      US GUIDED PROSTATE BIOPSY  5/8/2020       Meds/Allergies:  Prior to Admission  "medications    Medication Sig Start Date End Date Taking? Authorizing Provider   acetaminophen-codeine (TYLENOL with CODEINE #3) 300-30 MG per tablet Take 1 tablet by mouth every 6 (six) hours as needed for moderate pain 12/6/23   Екатерина Mcintyre MD   albuterol (ProAir HFA) 90 mcg/act inhaler Inhale 2 puffs every 6 (six) hours as needed for wheezing or shortness of breath 11/28/23   Anna Marie Escobar MD   aluminum-magnesium hydroxide-simethicone (MYLANTA) 200-200-20 mg/5 mL suspension Take 30 mL by mouth every 6 (six) hours as needed for indigestion or heartburn 6/29/21   Tereso Cannon PA-C   atorvastatin (LIPITOR) 10 mg tablet TAKE ONE TABLET BY MOUTH EVERY DAY 9/8/23   Melida Duran MD   Biotin 1000 MCG tablet Take 1 tablet by mouth if needed Per pt takes it \"once in awhile\"    Historical Provider, MD   Blood Glucose Monitoring Suppl (GLUCOCARD VITAL MONITOR) w/Device KIT by Does not apply route 2 (two) times a day 5/14/19   Melida Duran MD   cephalexin (KEFLEX) 500 mg capsule Take 1 capsule (500 mg total) by mouth every 6 (six) hours for 7 days 3/30/24 4/6/24  Kerry Harden DO   Cholecalciferol (VITAMIN D3) 2000 units capsule Take 1,000 Units by mouth daily in the early morning     Historical Provider, MD   Continuous Blood Gluc  (FreeStyle Dunia 2 [place]) CHARITY Check blood sugars multiple times per day 3/31/23   Melida Duran MD   Continuous Blood Gluc Sensor (FreeStyle Dunia 2 Sensor) MISC Check blood sugars multiple times per day 3/31/23   Melida Duran MD   Cyanocobalamin (VITAMIN B-12) 5000 MCG TBDP Take 5,000 mcg by mouth once a week Takes on Mondays    Historical Provider, MD   Diclofenac Sodium (VOLTAREN) 1 % Apply 2 g topically 4 (four) times a day 11/28/23   Anna Marie Escobar MD   docusate sodium (COLACE) 100 mg capsule Take 100 mg by mouth daily as needed for constipation    Historical Provider, MD   fluticasone (Flovent HFA) 110 MCG/ACT inhaler Inhale 2 puffs 2 (two) times a day Rinse mouth " after use.  Patient taking differently: Inhale 2 puffs if needed Rinse mouth after use. 6/24/22 12/6/23  Melida Duran MD   glipiZIDE (GLUCOTROL XL) 5 mg 24 hr tablet TAKE ONE TABLET BY MOUTH EVERY DAY AFTER BREAKFAST 4/1/24   Melida Duran MD   HYDROcodone-acetaminophen (NORCO) 5-325 mg per tablet Take 1 tablet by mouth every 6 (six) hours as needed for pain for up to 10 days Max Daily Amount: 4 tablets 3/30/24 4/9/24  Kerry Harden DO   insulin aspart protamine-insulin aspart (NovoLOG 70/30) 100 units/mL injection Inject 10 Units under the skin 2 (two) times a day before meals 11/17/23 1/16/24  Екатерина Mcintyre MD   Lancets (LIFESCAN UNISTIK 2) MISC LabPixiescan One Touch Ultramini Meter; use as directed; 1; 0; 31-Oct-2016; 31-Oct-2016; Melida Duran; Active 10/31/16   Historical Provider, MD   losartan (COZAAR) 25 mg tablet TAKE ONE TABLET BY MOUTH EVERY DAY 3/8/24   Melida Duran MD   metFORMIN (GLUCOPHAGE) 1000 MG tablet TAKE ONE TABLET BY MOUTH TWICE A DAY WITH MEALS 1/2/24   Meldia Duran MD   Mirabegron ER 50 MG TB24 Take 1 tablet (50 mg total) by mouth daily at bedtime 10/16/23   Migel Sullivan MD   Multiple Vitamin (MULTIVITAMIN) capsule Take 1 capsule by mouth daily in the early morning     Historical Provider, MD   NovoLOG Mix 70/30 FlexPen (70-30) 100 units/mL injection pen INJECT 20 UNITS UNDER THE SKIN TWO TIMES A DAY WITH MEALS 11/1/23   Saurabh Veras MD   ondansetron (ZOFRAN) 4 mg tablet Take 1 tablet (4 mg total) by mouth every 8 (eight) hours as needed for nausea or vomiting 12/4/23   Sarahy Albarran DO   ondansetron (ZOFRAN) 4 mg tablet Take 1 tablet (4 mg total) by mouth every 8 (eight) hours as needed for nausea or vomiting 3/30/24   Kerry Harden DO   pantoprazole (PROTONIX) 40 mg tablet TAKE ONE TABLET BY MOUTH EVERY DAY 1/2/24   Melida Duran MD   sertraline (ZOLOFT) 25 mg tablet TAKE ONE TABLET BY MOUTH EVERY DAY 2/24/24   Melida Duran MD   tamsulosin (FLOMAX) 0.4 mg Take 1 capsule  (0.4 mg total) by mouth daily with dinner for 10 days 3/30/24 4/9/24  Kerry Harden,      I have reviewed home medications with a medical source (PCP, Pharmacy, other).    Allergies:   Allergies   Allergen Reactions    Enalapril Anaphylaxis, Throat Swelling and Hives       Social History:  Marital Status: Single   Occupation:   Patient Pre-hospital Living Situation: Home  Patient Pre-hospital Level of Mobility: walks  Patient Pre-hospital Diet Restrictions:   Substance Use History:   Social History     Substance and Sexual Activity   Alcohol Use Not Currently     Social History     Tobacco Use   Smoking Status Former    Current packs/day: 0.00    Types: Cigarettes    Quit date: 11/10/2001    Years since quittin.4    Passive exposure: Past   Smokeless Tobacco Former     Social History     Substance and Sexual Activity   Drug Use Not Currently    Comment: RSO       Family History:  Family History   Problem Relation Age of Onset    Heart disease Mother     Breast cancer Mother 84    Diabetes Father     Colon cancer Neg Hx     Liver disease Neg Hx        Physical Exam:     Vitals:   Blood Pressure: 127/60 (24)  Pulse: 62 (24)  Temperature: 98 °F (36.7 °C) (24)  Temp Source: Oral (24)  Respirations: 20 (24)  SpO2: 96 % (24)    Physical Exam  Vitals and nursing note reviewed.   Constitutional:       General: He is not in acute distress.     Appearance: He is not ill-appearing or diaphoretic.   HENT:      Head: Normocephalic and atraumatic.   Eyes:      Conjunctiva/sclera: Conjunctivae normal.   Cardiovascular:      Rate and Rhythm: Normal rate and regular rhythm.   Pulmonary:      Effort: Pulmonary effort is normal. No respiratory distress.      Breath sounds: Normal breath sounds.   Abdominal:      General: Bowel sounds are normal.      Palpations: Abdomen is soft.      Tenderness: There is abdominal tenderness (right-sided). There is right CVA  tenderness.   Musculoskeletal:      Right lower leg: No edema.      Left lower leg: No edema.   Skin:     General: Skin is warm and dry.      Coloration: Skin is not pale.   Neurological:      Mental Status: He is alert and oriented to person, place, and time.   Psychiatric:         Mood and Affect: Mood normal.         Additional Data:     Lab Results:  Results from last 7 days   Lab Units 04/01/24  2154   WBC Thousand/uL 9.47   HEMOGLOBIN g/dL 13.0   HEMATOCRIT % 40.1   PLATELETS Thousands/uL 165   NEUTROS PCT % 80*   LYMPHS PCT % 8*   MONOS PCT % 9   EOS PCT % 1     Results from last 7 days   Lab Units 04/01/24  2154   SODIUM mmol/L 136   POTASSIUM mmol/L 4.8   CHLORIDE mmol/L 103   CO2 mmol/L 26   BUN mg/dL 22   CREATININE mg/dL 0.95   ANION GAP mmol/L 7   CALCIUM mg/dL 9.1   ALBUMIN g/dL 4.3   TOTAL BILIRUBIN mg/dL 0.40   ALK PHOS U/L 74   ALT U/L 18   AST U/L 26   GLUCOSE RANDOM mg/dL 177*                       Lines/Drains:  Invasive Devices       Peripheral Intravenous Line  Duration             Peripheral IV 04/02/24 Dorsal (posterior);Right Hand <1 day                        Imaging: No pertinent imaging reviewed.  No orders to display       EKG and Other Studies Reviewed on Admission:   EKG: Sinus Bradycardia. HR 54.    ** Please Note: This note has been constructed using a voice recognition system. **

## 2024-04-03 ENCOUNTER — TELEPHONE (OUTPATIENT)
Dept: FAMILY MEDICINE CLINIC | Facility: CLINIC | Age: 74
End: 2024-04-03

## 2024-04-03 ENCOUNTER — TELEPHONE (OUTPATIENT)
Age: 74
End: 2024-04-03

## 2024-04-03 ENCOUNTER — TRANSITIONAL CARE MANAGEMENT (OUTPATIENT)
Dept: FAMILY MEDICINE CLINIC | Facility: CLINIC | Age: 74
End: 2024-04-03

## 2024-04-03 VITALS
TEMPERATURE: 97.7 F | HEIGHT: 67 IN | WEIGHT: 230 LBS | DIASTOLIC BLOOD PRESSURE: 69 MMHG | RESPIRATION RATE: 18 BRPM | BODY MASS INDEX: 36.1 KG/M2 | OXYGEN SATURATION: 94 % | SYSTOLIC BLOOD PRESSURE: 150 MMHG | HEART RATE: 72 BPM

## 2024-04-03 LAB — GLUCOSE SERPL-MCNC: 186 MG/DL (ref 65–140)

## 2024-04-03 PROCEDURE — 99239 HOSP IP/OBS DSCHRG MGMT >30: CPT | Performed by: PHYSICIAN ASSISTANT

## 2024-04-03 PROCEDURE — 82948 REAGENT STRIP/BLOOD GLUCOSE: CPT

## 2024-04-03 PROCEDURE — 88300 SURGICAL PATH GROSS: CPT | Performed by: PATHOLOGY

## 2024-04-03 RX ORDER — HYDROCODONE BITARTRATE AND ACETAMINOPHEN 5; 325 MG/1; MG/1
1 TABLET ORAL EVERY 6 HOURS PRN
Qty: 15 TABLET | Refills: 0 | Status: SHIPPED | OUTPATIENT
Start: 2024-04-03 | End: 2024-04-13

## 2024-04-03 RX ORDER — OXYBUTYNIN CHLORIDE 5 MG/1
5 TABLET, EXTENDED RELEASE ORAL DAILY PRN
Qty: 10 TABLET | Refills: 0 | Status: SHIPPED | OUTPATIENT
Start: 2024-04-03

## 2024-04-03 RX ADMIN — HEPARIN SODIUM 5000 UNITS: 5000 INJECTION INTRAVENOUS; SUBCUTANEOUS at 05:05

## 2024-04-03 RX ADMIN — SODIUM CHLORIDE, SODIUM LACTATE, POTASSIUM CHLORIDE, AND CALCIUM CHLORIDE 100 ML/HR: .6; .31; .03; .02 INJECTION, SOLUTION INTRAVENOUS at 07:16

## 2024-04-03 RX ADMIN — INSULIN LISPRO 1 UNITS: 100 INJECTION, SOLUTION INTRAVENOUS; SUBCUTANEOUS at 08:07

## 2024-04-03 RX ADMIN — LOSARTAN POTASSIUM 25 MG: 25 TABLET, FILM COATED ORAL at 08:07

## 2024-04-03 RX ADMIN — SERTRALINE HYDROCHLORIDE 25 MG: 25 TABLET ORAL at 08:07

## 2024-04-03 RX ADMIN — ATORVASTATIN CALCIUM 10 MG: 10 TABLET, FILM COATED ORAL at 08:07

## 2024-04-03 RX ADMIN — INSULIN ASPART 15 UNITS: 100 INJECTION, SUSPENSION SUBCUTANEOUS at 08:07

## 2024-04-03 NOTE — PLAN OF CARE
Problem: PAIN - ADULT  Goal: Verbalizes/displays adequate comfort level or baseline comfort level  Description: Interventions:  - Encourage patient to monitor pain and request assistance  - Assess pain using appropriate pain scale  - Administer analgesics based on type and severity of pain and evaluate response  - Implement non-pharmacological measures as appropriate and evaluate response  - Consider cultural and social influences on pain and pain management  - Notify physician/advanced practitioner if interventions unsuccessful or patient reports new pain  Outcome: Progressing     Problem: INFECTION - ADULT  Goal: Absence or prevention of progression during hospitalization  Description: INTERVENTIONS:  - Assess and monitor for signs and symptoms of infection  - Monitor lab/diagnostic results  - Monitor all insertion sites, i.e. indwelling lines, tubes, and drains  - Monitor endotracheal if appropriate and nasal secretions for changes in amount and color  - [place] appropriate cooling/warming therapies per order  - Administer medications as ordered  - Instruct and encourage patient and family to use good hand hygiene technique  - Identify and instruct in appropriate isolation precautions for identified infection/condition  Outcome: Progressing  Goal: Absence of fever/infection during neutropenic period  Description: INTERVENTIONS:  - Monitor WBC    Outcome: Progressing     Problem: SAFETY ADULT  Goal: Patient will remain free of falls  Description: INTERVENTIONS:  - Educate patient/family on patient safety including physical limitations  - Instruct patient to call for assistance with activity   - Consult OT/PT to assist with strengthening/mobility   - Keep Call bell within reach  - Keep bed low and locked with side rails adjusted as appropriate  - Keep care items and personal belongings within reach  - Initiate and maintain comfort rounds  - Make Fall Risk Sign visible to staff  - Apply yellow socks and bracelet  for high fall risk patients  - Consider moving patient to room near nurses station  Outcome: Progressing  Goal: Maintain or return to baseline ADL function  Description: INTERVENTIONS:  -  Assess patient's ability to carry out ADLs; assess patient's baseline for ADL function and identify physical deficits which impact ability to perform ADLs (bathing, care of mouth/teeth, toileting, grooming, dressing, etc.)  - Assess/evaluate cause of self-care deficits   - Assess range of motion  - Assess patient's mobility; develop plan if impaired  - Assess patient's need for assistive devices and provide as appropriate  - Encourage maximum independence but intervene and supervise when necessary  - Involve family in performance of ADLs  - Assess for home care needs following discharge   - Consider OT consult to assist with ADL evaluation and planning for discharge  - Provide patient education as appropriate  Outcome: Progressing  Goal: Maintains/Returns to pre admission functional level  Description: INTERVENTIONS:  - Perform AM-PAC 6 Click Basic Mobility/ Daily Activity assessment daily.  - Set and communicate daily mobility goal to care team and patient/family/caregiver.   - Collaborate with rehabilitation services on mobility goals if consulted  - Out of bed for toileting  - Record patient progress and toleration of activity level   Outcome: Progressing     Problem: DISCHARGE PLANNING  Goal: Discharge to home or other facility with appropriate resources  Description: INTERVENTIONS:  - Identify barriers to discharge w/patient and caregiver  - Arrange for needed discharge resources and transportation as appropriate  - Identify discharge learning needs (meds, wound care, etc.)  - Arrange for interpretive services to assist at discharge as needed  - Refer to Case Management Department for coordinating discharge planning if the patient needs post-hospital services based on physician/advanced practitioner order or complex needs  related to functional status, cognitive ability, or social support system  Outcome: Progressing     Problem: Knowledge Deficit  Goal: Patient/family/caregiver demonstrates understanding of disease process, treatment plan, medications, and discharge instructions  Description: Complete learning assessment and assess knowledge base.  Interventions:  - Provide teaching at level of understanding  - Provide teaching via preferred learning methods  Outcome: Progressing

## 2024-04-03 NOTE — TELEPHONE ENCOUNTER
Patient called stating he just had surgery today and he needs to have appointment for stent removal .  He also stated she is having burning when urinating and he wants to know if he can take AZO.      Patient can be reached at [phone]

## 2024-04-03 NOTE — DISCHARGE INSTR - AVS FIRST PAGE
Dear Jimmy Reyes,     It was our pleasure to care for you here at [facility].  It is our hope that we were always able to exceed the expected standards for your care during your stay.  You were hospitalized due to kidney stone.  You were cared for on the 1st floor by Brett Araiza PA-C under the service of Dario Arteaga MD with the [facility] who covers for your primary care physician (PCP), Melida Duran MD, while you were hospitalized.  If you have any questions or concerns related to this hospitalization, you may contact us at .  For follow up as well as any medication refills, we recommend that you follow up with your primary care physician.  A registered nurse will reach out to you by phone within a few days after your discharge to answer any additional questions that you may have after going home. Please review this entire after visit summary as additional general instructions including medication list, appointments, activity, diet, any pertinent wound care, and other additional recommendations from your care team that may be provided for you.      Sincerely,     Brett Araiza PA-C

## 2024-04-03 NOTE — TELEPHONE ENCOUNTER
"Azo is fine. Per op note \"He has bladder Botox procedure in the office on 4/23/2024. He can have his right ureteral stent removed at that time.\"  "

## 2024-04-03 NOTE — TELEPHONE ENCOUNTER
Patient called in again regarding his question about taking AZO, advised he just called 15 mins prior and would need to allow us some more time to get back to him. CYSTOSCOPY URETEROSCOPY STONE BASKET EXTRACTION, RETROGRADE PYELOGRAM AND INSERTION STENT URETERAL (Right: Bladder) yesterday. Patient is currently on Keflex which had been previously prescribed in ED on 3/30 for ureterolithiasis, states he will continue taking. Awaiting CB regarding AZO and stent removal date.     CB# [phone]

## 2024-04-03 NOTE — DISCHARGE SUMMARY
"[facility]  Discharge- Jimmy Reyes 1950, 73 y.o. male MRN: [id number]  Unit/Bed#: [facility] Encounter: [id number]  Primary Care Provider: Melida Duran MD   Date and time admitted to hospital: 4/1/2024 11:24 PM    * Right ureteral stone  Assessment & Plan  Presented with worsening right flank pain.   CT scan from 3/30/24: \"Partially obstructing 2-3 mm distal right ureteral calculus with mild upstream hydroureteronephrosis and slight delayed right renal parenchymal enhancement.\"  Pain control.   Start Flomax and Ditropan  Continue Keppra  Pain control  Status post ureteral stenting 4/2.  Outpatient urology follow-up    Benign essential hypertension  Assessment & Plan  Continue losartan.     Type 2 diabetes mellitus with hyperglycemia, with long-term current use of insulin (HCC)  Assessment & Plan  Lab Results   Component Value Date    HGBA1C 7.1 (A) 11/17/2023       Recent Labs     04/02/24  1535 04/02/24  1711 04/02/24  2140 04/03/24  0802   POCGLU 187* 202* 277* 186*       Blood Sugar Average: Last 72 hrs:  (P) 176.2761618875544311  Home insulin regimen is Novolog 70/30- 15 units BID.    Hypercholesteremia  Assessment & Plan  Continue statin.       Medical Problems       Resolved Problems  Date Reviewed: 4/3/2024   None       Discharging Physician / Practitioner: Brett Araiza PA-C  PCP: Melida Duran MD  Admission Date:   Admission Orders (From admission, onward)       Ordered        04/02/24 1622  INPATIENT ADMISSION  Once            04/02/24 0146  Place in Observation  Once                          Discharge Date: 04/03/24    Consultations During Hospital Stay:  Urology    Procedures Performed:   4/2 cystoscopy with ureteral stenting    Significant Findings / Test Results:   CT with 2 to 3 mm distal right ureteral calculus    Incidental Findings:   Irregular 1.9 cm cystic lesion involves the medial left kidney - appearing stable  2.7 cm cystic lesion in the " "uncinate/pancreatic head is similar to prior studies.     Test Results Pending at Discharge (will require follow up):        Outpatient Tests Requested:  none    Complications:  none    Reason for Admission: 10      Hospital Course:   Jimmy Reyes is a 73 y.o. male patient who originally presented to the hospital on 4/1/2024 due to abdominal pain.  Patient has known 2 to 3 mm calculus in the right distal ureter.  Initially was recommended for medical expulsive therapy however he presented to the ER with pain.  Underwent cystoscopy with ureteral stenting on 4/2.  Presently patient's pain is controlled, urine is clear and output.  Will be discharged with Flomax, Ditropan, Norco for pain control.  He will have outpatient follow-up with urology in a few weeks.    Please see above list of diagnoses and related plan for additional information.     Condition at Discharge: stable    Discharge Day Visit / Exam:   Subjective:    Pain is presently controlled.  He is voiding.      Vitals: Blood Pressure: 150/69 (04/03/24 0700)  Pulse: 72 (04/03/24 0700)  Temperature: 97.7 °F (36.5 °C) (04/03/24 0700)  Temp Source: Oral (04/03/24 0700)  Respirations: 18 (04/03/24 0700)  Height: 5' 7\" (170.2 cm) (04/02/24 1407)  Weight - Scale: 104 kg (230 lb) (04/02/24 1407)  SpO2: 94 % (04/03/24 0700)  Exam:   Physical Exam  Vitals and nursing note reviewed.   Constitutional:       General: He is not in acute distress.     Appearance: Normal appearance.   HENT:      Head: Normocephalic.      Mouth/Throat:      Mouth: Mucous membranes are moist.   Eyes:      Pupils: Pupils are equal, round, and reactive to light.   Cardiovascular:      Rate and Rhythm: Normal rate and regular rhythm.      Heart sounds: No murmur heard.  Pulmonary:      Effort: Pulmonary effort is normal. No respiratory distress.      Breath sounds: Normal breath sounds. No wheezing, rhonchi or rales.   Abdominal:      General: Bowel sounds are normal. There is no distension.    "   Palpations: Abdomen is soft.      Tenderness: There is no abdominal tenderness. There is no guarding.   Musculoskeletal:         General: No deformity.      Cervical back: Normal range of motion.      Right lower leg: No edema.      Left lower leg: No edema.   Skin:     Capillary Refill: Capillary refill takes less than 2 seconds.   Neurological:      General: No focal deficit present.      Mental Status: He is alert and oriented to person, place, and time. Mental status is at baseline.          Discussion with Family: Patient declined call to .     Discharge instructions/Information to patient and family:   See after visit summary for information provided to patient and family.      Provisions for Follow-Up Care:  See after visit summary for information related to follow-up care and any pertinent home health orders.       Disposition:   Home    Planned Readmission: no     Discharge Statement:  I spent 45 minutes discharging the patient. This time was spent on the day of discharge. I had direct contact with the patient on the day of discharge. Greater than 50% of the total time was spent examining patient, answering all patient questions, arranging and discussing plan of care with patient as well as directly providing post-discharge instructions.  Additional time then spent on discharge activities.    Discharge Medications:  See after visit summary for reconciled discharge medications provided to patient and/or family.      **Please Note: This note may have been constructed using a voice recognition system**

## 2024-04-03 NOTE — ASSESSMENT & PLAN NOTE
"Presented with worsening right flank pain.   CT scan from 3/30/24: \"Partially obstructing 2-3 mm distal right ureteral calculus with mild upstream hydroureteronephrosis and slight delayed right renal parenchymal enhancement.\"  Pain control.   Start Flomax and Ditropan  Continue Keppra  Pain control  Status post ureteral stenting 4/2.  Outpatient urology follow-up  "

## 2024-04-03 NOTE — ASSESSMENT & PLAN NOTE
Lab Results   Component Value Date    HGBA1C 7.1 (A) 11/17/2023       Recent Labs     04/02/24  1535 04/02/24  1711 04/02/24  2140 04/03/24  0802   POCGLU 187* 202* 277* 186*       Blood Sugar Average: Last 72 hrs:  (P) 176.8934805773643208  Home insulin regimen is Novolog 70/30- 15 units BID.

## 2024-04-05 ENCOUNTER — OFFICE VISIT (OUTPATIENT)
Dept: ENDOCRINOLOGY | Facility: CLINIC | Age: 74
End: 2024-04-05
Payer: COMMERCIAL

## 2024-04-05 VITALS
OXYGEN SATURATION: 96 % | HEIGHT: 67 IN | BODY MASS INDEX: 36.29 KG/M2 | DIASTOLIC BLOOD PRESSURE: 82 MMHG | SYSTOLIC BLOOD PRESSURE: 136 MMHG | WEIGHT: 231.2 LBS | HEART RATE: 71 BPM

## 2024-04-05 DIAGNOSIS — I10 PRIMARY HYPERTENSION: ICD-10-CM

## 2024-04-05 DIAGNOSIS — E11.65 TYPE 2 DIABETES MELLITUS WITH HYPERGLYCEMIA, WITH LONG-TERM CURRENT USE OF INSULIN (HCC): Primary | ICD-10-CM

## 2024-04-05 DIAGNOSIS — E66.01 CLASS 2 SEVERE OBESITY DUE TO EXCESS CALORIES WITH SERIOUS COMORBIDITY AND BODY MASS INDEX (BMI) OF 35.0 TO 35.9 IN ADULT (HCC): ICD-10-CM

## 2024-04-05 DIAGNOSIS — Z79.4 TYPE 2 DIABETES MELLITUS WITH HYPERGLYCEMIA, WITH LONG-TERM CURRENT USE OF INSULIN (HCC): Primary | ICD-10-CM

## 2024-04-05 DIAGNOSIS — E78.2 MIXED HYPERLIPIDEMIA: ICD-10-CM

## 2024-04-05 LAB — SL AMB POCT HEMOGLOBIN AIC: 6.9 (ref ?–6.5)

## 2024-04-05 PROCEDURE — 99214 OFFICE O/P EST MOD 30 MIN: CPT | Performed by: INTERNAL MEDICINE

## 2024-04-05 PROCEDURE — 95251 CONT GLUC MNTR ANALYSIS I&R: CPT | Performed by: INTERNAL MEDICINE

## 2024-04-05 PROCEDURE — 83036 HEMOGLOBIN GLYCOSYLATED A1C: CPT | Performed by: INTERNAL MEDICINE

## 2024-04-05 RX ORDER — OXYCODONE HYDROCHLORIDE 5 MG/1
TABLET ORAL
COMMUNITY
Start: 2024-03-30

## 2024-04-05 NOTE — UTILIZATION REVIEW
NOTIFICATION OF ADMISSION DISCHARGE   This is a Notification of Discharge from [facility]. Please be advised that this patient has been discharge from our facility. Below you will find the admission and discharge date and time including the patient’s disposition.   UTILIZATION REVIEW CONTACT:  Girma Gavin  Utilization   Network Utilization Review Department  Phone: [phone] x carefully listen to the prompts. All voicemails are confidential.  Email: [email or web address]     ADMISSION INFORMATION  PRESENTATION DATE: 4/1/2024 11:24 PM  OBERVATION ADMISSION DATE:   INPATIENT ADMISSION DATE: 4/2/24  4:22 PM   DISCHARGE DATE: 4/3/2024 10:19 AM   DISPOSITION:Home/Self Care    Network Utilization Review Department  ATTENTION: Please call with any questions or concerns to [phone] and carefully listen to the prompts so that you are directed to the right person. All voicemails are confidential.   For Discharge needs, contact Care Management DC Support Team at [phone] opt. 2  Send all requests for admission clinical reviews, approved or denied determinations and any other requests to dedicated fax number below belonging to the campus where the patient is receiving treatment. List of dedicated fax numbers for the Facilities:  FACILITY NAME UR FAX NUMBER   ADMISSION DENIALS (Administrative/Medical Necessity) [phone]   DISCHARGE SUPPORT TEAM ([facility]) [phone]   PARENT CHILD HEALTH (Maternity/NICU/Pediatrics) [phone]   [facility] [phone]   [facility] [phone]   [facility] [phone]   [facility] [phone]   [facility] [phone]   [facility] [phone]   [facility] [phone]   [facility] [phone]   [facility]  [facility] [phone]   Critical access hospital [phone]   [facility] [phone]   [facility] [phone]

## 2024-04-05 NOTE — PROGRESS NOTES
Jimmy Reyes 73 y.o. male MRN: [id number]    Encounter: [id number]      Assessment/Plan     Assessment:  This is a 73 y.o.-year-old male with diabetes with hyperglycemia.    Plan:  Diagnoses and all orders for this visit:    Type 2 diabetes mellitus with hyperglycemia, with long-term current use of insulin ([facility])  Lab Results   Component Value Date    HGBA1C 6.9 (A) 04/05/2024   Patient is having hypoglycemia symptoms around 3 PM to 6 PM  Will discontinue glipizide XL 5 mg daily  Discussed to take NovoLog 70/30 insulin 15 units with breakfast and 15 units with dinner  Take 10 units if eating small meal  Educated about hypoglycemia symptoms and treatment  Continue metformin 1000 mg twice a day    Discussed risks/complications associated with uncontrolled diabetes.    Advised to adhere to diabetic diet, and recommended staying active/exercising routinely.  Keep carbohydrates consistent to limit blood glucose fluctuations.  Advised to call if blood sugars less than 70 mg/dl or over 300 mg/dl.   Check blood glucose 4 times a day, with help of continuous glucose monitor sensor.  Patient  is using continuous glucose monitor sensor regularly and is being benefited from it as his treatment plan includes adjustment in insulin regimen based on blood sugars.  Discussed symptoms and treatment of hypoglycemia.   Recommended routine follow-up with podiatry and ophthalmology.   Ordered blood work to complete prior to next visit.        -     POCT hemoglobin A1c  -     Basic metabolic panel; Future  -     Albumin / creatinine urine ratio; Future  -     Hemoglobin A1C; Future    Mixed hyperlipidemia  Continue Lipitor 10 mg daily  Lab Results   Component Value Date    LDLCALC 71 07/30/2023   LDL is at goal  Primary hypertension  Blood pressure well-controlled, continue current management as per PCP  Class 2 severe obesity due to excess calories with serious comorbidity and body mass index (BMI) of 35.0 to 35.9 in adult  ([facility])  Reinforced lifestyle modifications  Other orders  -     oxyCODONE (ROXICODONE) 5 immediate release tablet         CC: Diabetes    History of Present Illness     HPI:    Jimmy Reyes  is 73-year-old male with medical history of type 2 diabetes, hyperlipidemia, hypertension, history of kidney stones is here for follow-up.  He was recently admitted in the hospital, for flank pain and underwent cystoscopy ureteroscopy, stone basket extraction, and insertion of stent      Current regimen includes glipizide XL 5 mg daily, metformin 1000 mg twice a day, NovoLog 70/30 insulin, 10-15 units at dinner and 10-15  units with breakfast.  Glipizide was discontinued on April 2, 2024 as patient was having low blood sugars    He uses continuous glucose monitor sensor by ashley 2, 2 weeks ago review from March 19, 2024 to April 1, 2024 reviewed.  More than 72 hours of data reviewed.  Average glucose is 151 mg per DL, glucose variability is 30.8%, target range is 74%    Component      Latest Ref Rng 7/30/2023   EXT Creatinine Urine      mg/dL 91.1    Albumin,U,Random      0.0 - 20.0 mg/L 47.8 (H)    Albumin Creat Ratio      0 - 30 mg/g creatinine 52 (H)         Lab Results   Component Value Date    PTH 41.7 04/09/2018    CALCIUM 9.1 04/01/2024    PHOS 3.9 12/14/2020       Lab Results   Component Value Date    HGBA1C 6.9 (A) 04/05/2024     Lab Results   Component Value Date    CREATININE 0.95 04/01/2024          Review of Systems   Constitutional:  Negative for activity change, diaphoresis, fatigue, fever and unexpected weight change.   HENT: Negative.     Eyes:  Negative for visual disturbance.   Respiratory:  Negative for cough, chest tightness and shortness of breath.    Cardiovascular:  Negative for chest pain, palpitations and leg swelling.   Gastrointestinal:  Negative for abdominal pain, blood in stool, constipation, diarrhea, nausea and vomiting.   Endocrine: Negative for cold intolerance, heat intolerance, polydipsia,  polyphagia and polyuria.   Genitourinary:  Negative for dysuria, enuresis, frequency and urgency.   Musculoskeletal:  Negative for arthralgias and myalgias.   Skin:  Negative for pallor, rash and wound.   Allergic/Immunologic: Negative.    Neurological:  Negative for dizziness, tremors, weakness and numbness.   Hematological: Negative.    Psychiatric/Behavioral: Negative.         Historical Information   Past Medical History:   Diagnosis Date    Anemia     Arthritis     Black stool 07/24/2020    Cancer (HCC)     Colon polyp     Diabetes mellitus (HCC)     IDDM    Diverticulosis     Enlarged prostate     Erectile dysfunction     Hypercholesteremia     Resolved post weight loss    Hypertension     Resolved post weight loss    Kidney stone     Obesity     Prostate cancer (HCC)     Sleep apnea     resolved with weight loss    Wears partial dentures     partial upper and lower     Past Surgical History:   Procedure Laterality Date    ABDOMINAL ADHESION SURGERY N/A 11/28/2016    Procedure: EXTENSIVE LYSIS ADHESIONS;  Surgeon: Abilio Plummer MD;  Location: [facility];  Service:     ANKLE FRACTURE SURGERY Left     CHOLECYSTECTOMY      COLON SURGERY      colon resection    COLONOSCOPY      COLOSTOMY      COLOSTOMY CLOSURE      DIAGNOSTIC LAPAROSCOPY      FL RETROGRADE PYELOGRAM  4/2/2024    GASTRIC BYPASS      failed due to adhesions    HERNIA REPAIR      abdominal    MN CORRECTION HAMMERTOE Left 12/1/2023    Procedure: REPAIR HAMMERTOE / MALLET TOE / CLAW TOE;  Surgeon: David Daley DPM;  Location:  MAIN OR;  Service: Podiatry    MN CYSTO INSERTION TRANSPROSTATIC IMPLANT SINGLE N/A 3/8/2019    Procedure: CYSTOSCOPY WITH INSERTION UROLIFT;  Surgeon: Migel Sullivan MD;  [facility];  Service: Urology    MN CYSTO INSERTION TRANSPROSTATIC IMPLANT SINGLE N/A 5/8/2020    Procedure: CYSTOSCOPY WITH INSERTION UROLIFT;  Surgeon: Migel Sullivan MD;  [facility];  Service: Urology    MN  CYSTO/URETERO W/LITHOTRIPSY &INDWELL STENT INSRT Right 2024    Procedure: CYSTOSCOPY URETEROSCOPY STONE BASKET EXTRACTION, RETROGRADE PYELOGRAM AND INSERTION STENT URETERAL;  Surgeon: Julien Boyce DO;  [facility];  Service: Urology    KS CYSTOURETHROSCOPY W/INTERNAL URETHROTOMY N/A 2020    Procedure: DIRECT VISUAL INTERAL URETHROTOMY (DVIU), dilation of urethral stricture;  Surgeon: Migel Sullivan MD;  [facility];  Service: Urology    KS EDG US EXAM SURGICAL ALTER STOM DUODENUM/JEJUNUM N/A 10/25/2018    Procedure: LINEAR ENDOSCOPIC U/S;  Surgeon: Brody Isaacs MD;  [facility]: [facility];  Service: Gastroenterology    KS ESOPHAGOGASTRODUODENOSCOPY TRANSORAL DIAGNOSTIC N/A 2016    Procedure: EGD AND COLONOSCOPY;  Surgeon: Ana Wong MD;  [facility];  Service: Gastroenterology    KS LAPS GSTRC RSTRICTIV PX LONGITUDINAL GASTRECTOMY N/A 2016    Procedure: GASTRECTOMY SLEEVE LAPAROSCOPIC;  Surgeon: Abilio Plummer MD;  [facility];  Service: Bariatrics    KS PROSTATE NEEDLE BIOPSY ANY APPROACH N/A 2020    Procedure: TRANSRECTAL NEEDLE BIOPSY PROSTATE (TRNBP) WITH TRANSRECTAL ULTRASOUND GUIDANCE;  Surgeon: Migel Sullivan MD;  [facility];  Service: Urology    TONSILLECTOMY      US GUIDED PROSTATE BIOPSY  2020     Social History   Social History     Substance and Sexual Activity   Alcohol Use Not Currently     Social History     Substance and Sexual Activity   Drug Use Not Currently    Comment: RSO     Social History     Tobacco Use   Smoking Status Former    Current packs/day: 0.00    Types: Cigarettes    Quit date: 11/10/2001    Years since quittin.4    Passive exposure: Past   Smokeless Tobacco Former     Family History:   Family History   Problem Relation Age of Onset    Heart disease Mother     Breast cancer Mother 84    Diabetes Father     Colon cancer Neg Hx     Liver disease Neg Hx        Meds/Allergies   Current  "Outpatient Medications   Medication Sig Dispense Refill    acetaminophen-codeine (TYLENOL with CODEINE #3) 300-30 MG per tablet Take 1 tablet by mouth every 6 (six) hours as needed for moderate pain 6 tablet 0    albuterol (ProAir HFA) 90 mcg/act inhaler Inhale 2 puffs every 6 (six) hours as needed for wheezing or shortness of breath 8.5 g 0    aluminum-magnesium hydroxide-simethicone (MYLANTA) 200-200-20 mg/5 mL suspension Take 30 mL by mouth every 6 (six) hours as needed for indigestion or heartburn 355 mL 0    atorvastatin (LIPITOR) 10 mg tablet TAKE ONE TABLET BY MOUTH EVERY  tablet 3    Biotin 1000 MCG tablet Take 1 tablet by mouth if needed Per pt takes it \"once in awhile\"      Blood Glucose Monitoring Suppl (GLUCOCARD VITAL MONITOR) w/Device KIT by Does not apply route 2 (two) times a day 1 kit 0    cephalexin (KEFLEX) 500 mg capsule Take 1 capsule (500 mg total) by mouth every 6 (six) hours for 7 days 28 capsule 0    Cholecalciferol (VITAMIN D3) 2000 units capsule Take 1,000 Units by mouth daily in the early morning       Continuous Blood Gluc  (FreeStyle Dunia 2 [place]) CHARITY Check blood sugars multiple times per day 1 each 0    Continuous Blood Gluc Sensor (FreeStyle Dunia 2 Sensor) MISC Check blood sugars multiple times per day 6 each 3    Cyanocobalamin (VITAMIN B-12) 5000 MCG TBDP Take 5,000 mcg by mouth once a week Takes on Mondays      Diclofenac Sodium (VOLTAREN) 1 % Apply 2 g topically 4 (four) times a day 100 g 1    docusate sodium (COLACE) 100 mg capsule Take 100 mg by mouth daily as needed for constipation      HYDROcodone-acetaminophen (NORCO) 5-325 mg per tablet Take 1 tablet by mouth every 6 (six) hours as needed for pain for up to 10 days Max Daily Amount: 4 tablets 15 tablet 0    Lancets (LIFESCAN UNISTIK 2) MISC [employer] One Touch Ultramini Meter; use as directed; 1; 0; 31-Oct-2016; 31-Oct-2016; Melida Duran; Active      losartan (COZAAR) 25 mg tablet TAKE ONE TABLET BY MOUTH " "EVERY  tablet 1    metFORMIN (GLUCOPHAGE) 1000 MG tablet TAKE ONE TABLET BY MOUTH TWICE A DAY WITH MEALS 180 tablet 1    Mirabegron ER 50 MG TB24 Take 1 tablet (50 mg total) by mouth daily at bedtime 90 tablet 3    Multiple Vitamin (MULTIVITAMIN) capsule Take 1 capsule by mouth daily in the early morning       NovoLOG Mix 70/30 FlexPen (70-30) 100 units/mL injection pen INJECT 20 UNITS UNDER THE SKIN TWO TIMES A DAY WITH MEALS 30 mL 0    ondansetron (ZOFRAN) 4 mg tablet Take 1 tablet (4 mg total) by mouth every 8 (eight) hours as needed for nausea or vomiting 12 tablet 0    oxybutynin (DITROPAN-XL) 5 mg 24 hr tablet Take 1 tablet (5 mg total) by mouth daily as needed (bladder spasm) 10 tablet 0    oxyCODONE (ROXICODONE) 5 immediate release tablet       pantoprazole (PROTONIX) 40 mg tablet TAKE ONE TABLET BY MOUTH EVERY DAY 90 tablet 1    sertraline (ZOLOFT) 25 mg tablet TAKE ONE TABLET BY MOUTH EVERY DAY 30 tablet 0    tamsulosin (FLOMAX) 0.4 mg Take 1 capsule (0.4 mg total) by mouth daily with dinner for 10 days 7 capsule 0    fluticasone (Flovent HFA) 110 MCG/ACT inhaler Inhale 2 puffs 2 (two) times a day Rinse mouth after use. (Patient taking differently: Inhale 2 puffs if needed Rinse mouth after use.) 12 g 0    insulin aspart protamine-insulin aspart (NovoLOG 70/30) 100 units/mL injection Inject 10 Units under the skin 2 (two) times a day before meals 6 mL 0     No current facility-administered medications for this visit.     Allergies   Allergen Reactions    Enalapril Anaphylaxis, Throat Swelling and Hives       Objective   Vitals: Blood pressure 136/82, pulse 71, height 5' 7\" (1.702 m), weight 105 kg (231 lb 3.2 oz), SpO2 96%.    Physical Exam  Vitals reviewed.   Constitutional:       General: He is not in acute distress.     Appearance: Normal appearance. He is not ill-appearing.   HENT:      Head: Normocephalic and atraumatic.      Nose: Nose normal.   Eyes:      Extraocular Movements: Extraocular " movements intact.      Conjunctiva/sclera: Conjunctivae normal.   Pulmonary:      Effort: No respiratory distress.   Musculoskeletal:      Cervical back: Normal range of motion.   Neurological:      General: No focal deficit present.      Mental Status: He is alert and oriented to person, place, and time.   Psychiatric:         Mood and Affect: Mood normal.         Behavior: Behavior normal.         The history was obtained from the review of the chart, patient.    Lab Results:   Lab Results   Component Value Date/Time    Hemoglobin A1C 6.9 (A) 04/05/2024 09:48 AM    Hemoglobin A1C 7.1 (A) 11/17/2023 02:07 PM    Hemoglobin A1C 6.9 (H) 07/30/2023 09:24 AM    Hemoglobin A1C 6.8 (H) 07/21/2023 11:23 PM    WBC 9.47 04/01/2024 09:54 PM    WBC 9.01 03/30/2024 04:37 AM    WBC 7.31 12/04/2023 05:13 AM    Hemoglobin 13.0 04/01/2024 09:54 PM    Hemoglobin 13.5 03/30/2024 04:37 AM    Hemoglobin 11.3 (L) 12/04/2023 05:13 AM    Hematocrit 40.1 04/01/2024 09:54 PM    Hematocrit 41.0 03/30/2024 04:37 AM    Hematocrit 34.5 (L) 12/04/2023 05:13 AM    MCV 91 04/01/2024 09:54 PM    MCV 91 03/30/2024 04:37 AM    MCV 89 12/04/2023 05:13 AM    Platelets 165 04/01/2024 09:54 PM    Platelets 165 03/30/2024 04:37 AM    Platelets 166 12/04/2023 05:13 AM    BUN 22 04/01/2024 09:54 PM    BUN 24 03/30/2024 05:50 AM    BUN 16 12/04/2023 05:13 AM    Potassium 4.8 04/01/2024 09:54 PM    Potassium 4.0 03/30/2024 05:50 AM    Potassium 4.2 12/04/2023 05:13 AM    Chloride 103 04/01/2024 09:54 PM    Chloride 106 03/30/2024 05:50 AM    Chloride 102 12/04/2023 05:13 AM    CO2 26 04/01/2024 09:54 PM    CO2 24 03/30/2024 05:50 AM    CO2 25 12/04/2023 05:13 AM    Creatinine 0.95 04/01/2024 09:54 PM    Creatinine 0.98 03/30/2024 05:50 AM    Creatinine 0.74 12/04/2023 05:13 AM    AST 26 04/01/2024 09:54 PM    AST 15 03/30/2024 05:50 AM    AST 12 (L) 12/03/2023 05:48 AM    ALT 18 04/01/2024 09:54 PM    ALT 12 03/30/2024 05:50 AM    ALT 8 12/03/2023 05:48 AM     "Total Protein 7.1 04/01/2024 09:54 PM    Total Protein 6.7 03/30/2024 05:50 AM    Total Protein 6.2 (L) 12/03/2023 05:48 AM    Albumin 4.3 04/01/2024 09:54 PM    Albumin 3.9 03/30/2024 05:50 AM    Albumin 3.5 12/03/2023 05:48 AM    HDL, Direct 39 (L) 07/30/2023 09:24 AM    Triglycerides 75 07/30/2023 09:24 AM           Imaging Studies: I have personally reviewed pertinent reports.      Portions of the record may have been created with voice recognition software. Occasional wrong word or \"sound a like\" substitutions may have occurred due to the inherent limitations of voice recognition software. Read the chart carefully and recognize, using context, where substitutions have occurred.    "

## 2024-04-05 NOTE — TELEPHONE ENCOUNTER
Patient came into office and was advised to take azo as needed (only for 3 days) and that stent would be removed when botox was preformed

## 2024-04-09 ENCOUNTER — OFFICE VISIT (OUTPATIENT)
Dept: DENTISTRY | Facility: CLINIC | Age: 74
End: 2024-04-09

## 2024-04-09 VITALS — SYSTOLIC BLOOD PRESSURE: 167 MMHG | HEART RATE: 76 BPM | DIASTOLIC BLOOD PRESSURE: 68 MMHG

## 2024-04-09 DIAGNOSIS — K08.531 FRACTURED DENTAL RESTORATION WITH LOSS OF MATERIAL: ICD-10-CM

## 2024-04-09 DIAGNOSIS — K08.50 DEFECTIVE DENTAL RESTORATION: Primary | ICD-10-CM

## 2024-04-09 LAB
COLOR STONE: NORMAL
COM MFR STONE: 100 %
COMMENT-STONE3: NORMAL
COMPOSITION: NORMAL
LABORATORY COMMENT REPORT: NORMAL
PHOTO: NORMAL
SIZE STONE: NORMAL MM
SPEC SOURCE SUBJ: NORMAL
STONE ANALYSIS-IMP: NORMAL
STONE ANALYSIS-IMP: NORMAL
WT STONE: 7 MG

## 2024-04-09 PROCEDURE — D0220 INTRAORAL - PERIAPICAL FIRST RADIOGRAPHIC IMAGE: HCPCS

## 2024-04-09 PROCEDURE — D0140 LIMITED ORAL EVALUATION - PROBLEM FOCUSED: HCPCS

## 2024-04-09 NOTE — DENTAL PROCEDURE DETAILS
"Jimmy Reyes is a 74 y/o M that presents to [facility] for emergency exam.     PMH reviewed, no changes: ASA II  Treatment consents signed: Yes  Pain scale: 0  Perio: Periodontitis 4B  Caries risk assessment: High   Radiographs: films are current, PA of #5 updated today   Oral hygiene instructions reviewed and given.   Hygiene recall visits recommended to patient.     CC: \"My filling fell out.\" Patient pointed to #5.     PA of #5 taken. Clinical and radiographic findings: missing large MODL restoration which was restored twice. Due to large portion of tooth structure missing, a composite restoration is not sufficient for a restoration. A full coverage restoration is necessary. Confirmed findings with Dr. Ramos who recommended including #5 in the planned fixed prosthesis. Recommended extraction of #5 and extending prosthesis to be from #4-12.     Explained findings to patient who was concerned about cost of treatment. Explained to patient that we will adjust treatment plan and sent it to insurance for pre-authorization. Talked to financial (Bridgette) to ensure correct codes are submitted to insurance. Patient is already approved for SFS. Explained to patient that we will call him or schedule him when we hear back from insurance regarding his treatment.     Patient asked what he should do if he cannot afford treatment. Informed him that we can schedule him and discuss an alternative treatment option if that is the case.     Patient left satisfied and ambulatory.   Attending: Dr. Ramos     Patient is waiting for pre-auth to come back   Schedule w/ Dr. Ortiz for ext #5 and bridge prep when pre-auth is back (3 hrs)     GEORGINA Brooks    "

## 2024-04-10 ENCOUNTER — TELEPHONE (OUTPATIENT)
Age: 74
End: 2024-04-10

## 2024-04-10 NOTE — TELEPHONE ENCOUNTER
Patient returned call to clarify that his recent hospitalization was for urology procedure and he does have fu OV scheduled for urologist for 4/23.    Patient is also changing PCP and establishing care at another  practice with OV scheduled for 4/15/24..

## 2024-04-15 ENCOUNTER — OFFICE VISIT (OUTPATIENT)
Dept: FAMILY MEDICINE CLINIC | Facility: CLINIC | Age: 74
End: 2024-04-15
Payer: COMMERCIAL

## 2024-04-15 VITALS
WEIGHT: 230.2 LBS | BODY MASS INDEX: 36.13 KG/M2 | RESPIRATION RATE: 18 BRPM | HEIGHT: 67 IN | HEART RATE: 74 BPM | DIASTOLIC BLOOD PRESSURE: 64 MMHG | OXYGEN SATURATION: 94 % | TEMPERATURE: 97.7 F | SYSTOLIC BLOOD PRESSURE: 136 MMHG

## 2024-04-15 DIAGNOSIS — I10 BENIGN ESSENTIAL HYPERTENSION: ICD-10-CM

## 2024-04-15 DIAGNOSIS — Z86.010 HISTORY OF COLON POLYPS: ICD-10-CM

## 2024-04-15 DIAGNOSIS — Z79.4 TYPE 2 DIABETES MELLITUS WITH HYPERGLYCEMIA, WITH LONG-TERM CURRENT USE OF INSULIN (HCC): Chronic | ICD-10-CM

## 2024-04-15 DIAGNOSIS — C61 PROSTATE CANCER (HCC): ICD-10-CM

## 2024-04-15 DIAGNOSIS — K59.00 CONSTIPATION, UNSPECIFIED CONSTIPATION TYPE: ICD-10-CM

## 2024-04-15 DIAGNOSIS — Z79.4 TYPE 2 DIABETES MELLITUS WITH HYPERGLYCEMIA, WITH LONG-TERM CURRENT USE OF INSULIN (HCC): ICD-10-CM

## 2024-04-15 DIAGNOSIS — D69.6 PLATELETS DECREASED (HCC): ICD-10-CM

## 2024-04-15 DIAGNOSIS — K91.2 POSTGASTRECTOMY MALABSORPTION: ICD-10-CM

## 2024-04-15 DIAGNOSIS — F41.9 ANXIETY: ICD-10-CM

## 2024-04-15 DIAGNOSIS — Z79.899 HIGH RISK MEDICATION USE: ICD-10-CM

## 2024-04-15 DIAGNOSIS — Z87.442 PERSONAL HISTORY OF KIDNEY STONES: ICD-10-CM

## 2024-04-15 DIAGNOSIS — E11.65 TYPE 2 DIABETES MELLITUS WITH HYPERGLYCEMIA, WITH LONG-TERM CURRENT USE OF INSULIN (HCC): ICD-10-CM

## 2024-04-15 DIAGNOSIS — J45.901 MILD ASTHMA EXACERBATION: ICD-10-CM

## 2024-04-15 DIAGNOSIS — Z90.3 POSTGASTRECTOMY MALABSORPTION: ICD-10-CM

## 2024-04-15 DIAGNOSIS — N20.0 RIGHT KIDNEY STONE: Primary | ICD-10-CM

## 2024-04-15 DIAGNOSIS — E11.65 TYPE 2 DIABETES MELLITUS WITH HYPERGLYCEMIA, WITH LONG-TERM CURRENT USE OF INSULIN (HCC): Chronic | ICD-10-CM

## 2024-04-15 DIAGNOSIS — R25.2 MUSCLE CRAMPS: ICD-10-CM

## 2024-04-15 DIAGNOSIS — N39.41 URGE INCONTINENCE OF URINE: ICD-10-CM

## 2024-04-15 DIAGNOSIS — Z96.0 URETERAL STENT PRESENT: ICD-10-CM

## 2024-04-15 PROCEDURE — 99495 TRANSJ CARE MGMT MOD F2F 14D: CPT | Performed by: FAMILY MEDICINE

## 2024-04-15 PROCEDURE — 99204 OFFICE O/P NEW MOD 45 MIN: CPT | Performed by: FAMILY MEDICINE

## 2024-04-15 RX ORDER — ACETAMINOPHEN AND CODEINE PHOSPHATE 300; 30 MG/1; MG/1
1 TABLET ORAL EVERY 6 HOURS PRN
Qty: 10 TABLET | Refills: 0 | Status: SHIPPED | OUTPATIENT
Start: 2024-04-15

## 2024-04-15 NOTE — PROGRESS NOTES
Controlled Substance Review    [place] PDMP or [place]  reviewed: No red flags were identified; safe to proceed with prescription.    eRx for PCP Dr. Rausch.

## 2024-04-15 NOTE — PROGRESS NOTES
Assessment/Plan:     1. Right kidney stone  Comments:  to follow up with urology for stent removal. reviewed notes - uro PA said he can take Pyridium. renew T #3 for as needed use.  Orders:  -     acetaminophen-codeine (TYLENOL with CODEINE #3) 300-30 MG per tablet; Take 1 tablet by mouth every 6 (six) hours as needed for moderate pain    2. Ureteral stent present  Comments:  see above  Orders:  -     acetaminophen-codeine (TYLENOL with CODEINE #3) 300-30 MG per tablet; Take 1 tablet by mouth every 6 (six) hours as needed for moderate pain    3. Anxiety  Assessment & Plan:  Not at goal. He never started zoloft. Reviewed this medication, risks and benefits and side effects. He will start and follow up here in 4 weeks or so.       4. Muscle cramps  Comments:  update labs  Orders:  -     Comprehensive metabolic panel; Future  -     Magnesium; Future  -     Vitamin B12; Future  -     Vitamin D 25 hydroxy; Future  -     Phosphorus; Future  -     TSH, 3rd generation; Future    5. High risk medication use  -     Comprehensive metabolic panel; Future  -     Magnesium; Future  -     Vitamin B12; Future  -     Vitamin D 25 hydroxy; Future  -     Phosphorus; Future  -     TSH, 3rd generation; Future    6. Constipation, unspecified constipation type  -     Comprehensive metabolic panel; Future  -     Magnesium; Future  -     Vitamin B12; Future  -     Vitamin D 25 hydroxy; Future  -     Phosphorus; Future  -     TSH, 3rd generation; Future    7. Platelets decreased (HCC)  Assessment & Plan:  Update labs       8. Prostate cancer (HCC)  Assessment & Plan:  Follows with urology.       9. History of colon polyps  Assessment & Plan:  Colonoscopy due 12/19/24 Dr. Wong       10. Postgastrectomy malabsorption  Assessment & Plan:  Dr. Powell in the past. Update labs       11. Type 2 diabetes mellitus with hyperglycemia, with long-term current use of insulin (HCC)  Assessment & Plan:  Follows with endo   Lab Results   Component Value  Date    HGBA1C 6.9 (A) 04/05/2024       12. Benign essential hypertension  Assessment & Plan:  Not at goal but pt in pain. Adjust meds if remains high at follow up. Allergy to ACE. (Anaphylaxis)       13. Mild asthma exacerbation  -     fluticasone (Flovent HFA) 110 MCG/ACT inhaler; Inhale 2 puffs if needed (asthma flare) Rinse mouth after use.    14. Personal history of kidney stones       Return in about 6 weeks (around 5/27/2024) for Medicare Wellness Visit.    Subjective:   Mario is a 73 y.o. male here today for a hospital follow-up.  Patient Active Problem List   Diagnosis   • GERD (gastroesophageal reflux disease)   • Class 2 severe obesity due to excess calories with serious comorbidity and body mass index (BMI) of 35.0 to 35.9 in adult ([facility])   • Hypercholesteremia   • Postgastrectomy malabsorption   • Type 2 diabetes mellitus with hyperglycemia, with long-term current use of insulin ([facility])   • Benign essential hypertension   • Benign enlargement of prostate   • Pancreatic cyst   • History of colon polyps   • IPMN (intraductal papillary mucinous neoplasm)   • ED (erectile dysfunction) of organic origin   • Bruxism   • Prostate cancer ([facility])   • Greater trochanteric bursitis of left hip   • Primary osteoarthritis of one hip, left   • Colitis   • Mild intermittent asthma without complication   • Platelets decreased ([facility])   • Near syncope   • Right ureteral stone   • Diverticulosis   • Anxiety   • Personal history of kidney stones        Current medications:  Current Outpatient Medications   Medication Sig Dispense Refill   • acetaminophen-codeine (TYLENOL with CODEINE #3) 300-30 MG per tablet Take 1 tablet by mouth every 6 (six) hours as needed for moderate pain 10 tablet 0   • albuterol (ProAir HFA) 90 mcg/act inhaler Inhale 2 puffs every 6 (six) hours as needed for wheezing or shortness of breath 8.5 g 0   • aluminum-magnesium hydroxide-simethicone (MYLANTA) 200-200-20 mg/5 mL suspension Take 30 mL by mouth  "every 6 (six) hours as needed for indigestion or heartburn 355 mL 0   • atorvastatin (LIPITOR) 10 mg tablet TAKE ONE TABLET BY MOUTH EVERY  tablet 3   • Biotin 1000 MCG tablet Take 1 tablet by mouth if needed Per pt takes it \"once in awhile\"     • Blood Glucose Monitoring Suppl (GLUCOCARD VITAL MONITOR) w/Device KIT by Does not apply route 2 (two) times a day 1 kit 0   • Cholecalciferol (VITAMIN D3) 2000 units capsule Take 1,000 Units by mouth daily in the early morning      • Continuous Blood Gluc  (FreeStyle Dunia 2 Perry) CHARITY Check blood sugars multiple times per day 1 each 0   • Continuous Blood Gluc Sensor (FreeStyle Dunia 2 Sensor) MISC Check blood sugars multiple times per day 6 each 3   • Cyanocobalamin (VITAMIN B-12) 5000 MCG TBDP Take 5,000 mcg by mouth once a week Takes on Mondays     • Diclofenac Sodium (VOLTAREN) 1 % Apply 2 g topically 4 (four) times a day 100 g 1   • docusate sodium (COLACE) 100 mg capsule Take 100 mg by mouth daily as needed for constipation     • fluticasone (Flovent HFA) 110 MCG/ACT inhaler Inhale 2 puffs if needed (asthma flare) Rinse mouth after use.     • Lancets (LIFESCAN UNISTIK 2) MISC Indeedcan One Touch Ultramini Meter; use as directed; 1; 0; 31-Oct-2016; 31-Oct-2016; Melida Duran; Active     • losartan (COZAAR) 25 mg tablet TAKE ONE TABLET BY MOUTH EVERY  tablet 1   • Multiple Vitamin (MULTIVITAMIN) capsule Take 1 capsule by mouth daily in the early morning      • ondansetron (ZOFRAN) 4 mg tablet Take 1 tablet (4 mg total) by mouth every 8 (eight) hours as needed for nausea or vomiting 12 tablet 0   • pantoprazole (PROTONIX) 40 mg tablet TAKE ONE TABLET BY MOUTH EVERY DAY 90 tablet 1   • sertraline (ZOLOFT) 25 mg tablet TAKE ONE TABLET BY MOUTH EVERY DAY 30 tablet 0   • insulin aspart protamine-insulin aspart (NovoLOG 70/30) 100 units/mL injection Inject 10 Units under the skin 2 (two) times a day before meals 6 mL 0   • insulin aspart " protamine-insulin aspart (NovoLOG Mix 70/30 FlexPen) 100 Units/mL injection pen INJECT 15 UNITS UNDER THE SKIN TWO TIMES A DAY WITH MEALS 30 mL 1   • metFORMIN (GLUCOPHAGE) 1000 MG tablet Take 1 tablet (1,000 mg total) by mouth 2 (two) times a day with meals 180 tablet 1   • Mirabegron ER 50 MG TB24 Take 1 tablet (50 mg total) by mouth daily at bedtime 90 tablet 1     No current facility-administered medications for this visit.       HPI:   Chief Complaint   Patient presents with   • Flank Pain     Kidney stones a week and a half ago. Patient had surgery and had a stent put in. Patient is currently in 8.  5/10 pain. Patient states that he is also uncomfortable. He is getting the stent taken out here soon.   • Establish Care   • Medication Management     Patient has some concerns about starting zoloft.      -- Above per clinical staff and reviewed. --    TCM Call     Date and time call was made  4/3/2024  1:44 PM    Patient was hospitialized at  [facility]    Date of Admission  04/01/24    Date of discharge  04/03/24    Diagnosis  Right ureteral stone    Disposition  Home    Were the patients medications reviewed and updated  No      TCM Call     Should patient be enrolled in anticoag monitoring?  No    Scheduled for follow up?  Patient refused  Pt switching PCP on 4/15/24    Did you obtain your prescribed medications  Yes    Do you need help managing your prescriptions or medications  No    Is transportation to your appointment needed  No    I have advised the patient to call PCP with any new or worsening symptoms  Sarahy Cano, MR    Living Arrangements  Family members    Are you recieving any outpatient services  No    Are you recieving home care services  No    Are you using any community resources  No    Current waiver services  No    Have you fallen in the last 12 months  No    Interperter language line needed  No    Comments  PT WAS GIVING 4 DIFFERENT NEW MEDICATIONS          Hospital  Course:        4/1/2024 - 4/3/2024 (2 days)  [place] [facility] Course:   Jimmy Reyes is a 73 y.o. male patient who originally presented to the hospital on 4/1/2024 due to abdominal pain.  Patient has known 2 to 3 mm calculus in the right distal ureter.  Initially was recommended for medical expulsive therapy however he presented to the ER with pain.  Underwent cystoscopy with ureteral stenting on 4/2.  Presently patient's pain is controlled, urine is clear and output.  Will be discharged with Flomax, Ditropan, Norco for pain control.  He will have outpatient follow-up with urology in a few weeks.     Today:  New patient here today for hospital follow up   Old records reviewed, along with hospital records, testing, notes, diagnostics   Admitted with stones, pain, s/p stents   To see urologist Dr. Sullivan   Was not called back, so he went to the office  Plans to do botox - helps with incontience   He was wondering about antibiotics and pyridium   Also wondering about if he can have stent out with botox   Burning with urination, pyridium helped   Right sided kidney pain this am   T#3 took one (left over from the past)   Feels hydrocodone and oxycodone make him jittery     Follows with endo     Anxiety, edgy feeling   Just started feeling his age  Little lower   Tripping little more - over 6 months   No neuropathy,  walking slower     Prostate cancer   S/p radiation   Cramps in legs last days    Vit D daily daily b12 TSH   To see vascular for carotids     Wife Lisa is a patient here       Mario presents today for hospital follow-up visit.      Patient was contacted within 2 business days.   Medications were reconciled.  Hospital discharge summary was reviewed.    As part of this post-hospital visit, records from the hospital, including history and physical examination, discharge summary, all laboratory tests and radiographic studies was reviewed with this patient.    The following  "portions of the patient's history were reviewed and updated as appropriate: allergies, current medications, past family history, past medical history, past social history, past surgical history and problem list.    Objective:  Vitals:  /64 (BP Location: Left arm, Patient Position: Sitting, Cuff Size: Standard)   Pulse 74   Temp 97.7 °F (36.5 °C) (Temporal)   Resp 18   Ht 5' 7\" (1.702 m)   Wt 104 kg (230 lb 3.2 oz)   SpO2 94%   BMI 36.05 kg/m²    Wt Readings from Last 3 Encounters:   04/15/24 104 kg (230 lb 3.2 oz)   04/05/24 105 kg (231 lb 3.2 oz)   04/02/24 104 kg (230 lb)      BP Readings from Last 3 Encounters:   04/15/24 136/64   04/09/24 167/68   04/05/24 136/82      PHQ-2/9 Depression Screening    Little interest or pleasure in doing things: 0 - not at all  Feeling down, depressed, or hopeless: 0 - not at all  PHQ-2 Score: 0  PHQ-2 Interpretation: Negative depression screen       RELL-7 Flowsheet Screening    Flowsheet Row Most Recent Value   Over the last 2 weeks, how often have you been bothered by any of the following problems?    Feeling nervous, anxious, or on edge 2   Not being able to stop or control worrying 0   Worrying too much about different things 0   Trouble relaxing 0   Being so restless that it is hard to sit still 0   Becoming easily annoyed or irritable 2   Feeling afraid as if something awful might happen 0   RELL-7 Total Score 4            Review of Systems   He has no other concerns. No unexpected weight changes. No chest pain, SOB, or palpitations. No GERD. + changes in  bladder. Sleeping well.+ mood changes. + dysuria  No fevers/chills.     Physical Exam   Constitutional:  he appears well-developed and well-nourished.  HENT: Head: Normocephalic.   Neck: Neck supple.   Cardiovascular: Normal rate, regular rhythm and normal heart sounds.   Pulmonary/Chest: Effort normal and breath sounds normal. No wheezes, rales, or rhonchi.   Abdominal: Soft. Bowel sounds are normal. There is " mild mid lower abdominal  tenderness. No hepatosplenomegaly.   Musculoskeletal: he exhibits no edema.   Lymphadenopathy: he has no cervical adenopathy.   Neurological: he is alert and oriented to person, place, and time.   Skin: Skin is warm and dry.   Psychiatric: he has a normal mood and anxious affect. his behavior is normal. Thought content normal.       Depression Screening and Follow-up Plan: Patient was screened for depression during today's encounter. They screened negative with a PHQ-2 score of 0.

## 2024-04-15 NOTE — PATIENT INSTRUCTIONS
Muscle strength elderly magnesium oxide 300 mg daily, you can increase dose to twice a day if you wish. (If not getting diarrhea)   Zoloft 25 mg x 8 days, then 50 mg daily. (Two tablets of what you have, then pickup 50 mg and take one per day)     Vaccines you are due for: Tdap    As of January 2023 most vaccines are being covered by Medicare Part D.* This means you may now be able to get Shingrix or Tdap at no charge to you.  Please ask at the pharmacy to see if this is covered. If you are the pharmacist can administer these vaccines for you. Tdap is due every 10 years as a preventive vaccine. Shingrix is given as two parts. The second dose is given 2 - 6 months after the first dose.   *The only exceptions are flu, pneumonia, hepatitis B, and COVID-19 vaccinations, which are covered by Part B - this means they can be given at the pharmacy or the doctor's office.    Immunization History   Administered Date(s) Administered    COVID-19 PFIZER VACCINE 0.3 ML IM 02/23/2021, 03/14/2021, 10/07/2021    INFLUENZA 10/31/2015, 10/05/2016, 11/10/2017, 09/20/2018, 10/03/2023    Influenza Split High Dose Preservative Free IM 10/05/2016, 11/10/2017    Influenza, high dose seasonal 0.7 mL 09/20/2018, 09/10/2019, 11/12/2020, 09/23/2022, 10/03/2023    Influenza, injectable, quadrivalent, preservative free 0.5 mL 10/21/2021    Influenza, seasonal, injectable 10/31/2015    Pneumococcal Conjugate 13-Valent 01/05/2016    Pneumococcal Polysaccharide PPV23 09/05/2013, 04/07/2017

## 2024-04-15 NOTE — TELEPHONE ENCOUNTER
Reason for call: Also request for insulin 70/30 sent to office in separate encounter, patient taking differently.    [x] Refill   [] Prior Auth  [] Other:     Office:   [] PCP/Provider -   [x] Specialty/Provider - Nancy Veras     Medication:     Metformin 1000 mg BID   Myrbetriq 50 mg daily at bedtime     Dose/Frequency: see above    Quantity: 90D supply     Pharmacy: [facility] on file     Does the patient have enough for 3 days?   [x] Yes   [] No - Send as HP to POD

## 2024-04-16 RX ORDER — INSULIN ASPART 100 [IU]/ML
INJECTION, SUSPENSION SUBCUTANEOUS
Qty: 30 ML | Refills: 1 | Status: SHIPPED | OUTPATIENT
Start: 2024-04-16

## 2024-04-17 ENCOUNTER — LAB (OUTPATIENT)
Dept: LAB | Facility: AMBULARY SURGERY CENTER | Age: 74
End: 2024-04-17
Payer: COMMERCIAL

## 2024-04-17 DIAGNOSIS — Z79.899 HIGH RISK MEDICATION USE: ICD-10-CM

## 2024-04-17 DIAGNOSIS — R25.2 MUSCLE CRAMPS: ICD-10-CM

## 2024-04-17 DIAGNOSIS — K59.00 CONSTIPATION, UNSPECIFIED CONSTIPATION TYPE: ICD-10-CM

## 2024-04-17 LAB
25(OH)D3 SERPL-MCNC: 64 NG/ML (ref 30–100)
ALBUMIN SERPL BCP-MCNC: 3.9 G/DL (ref 3.5–5)
ALP SERPL-CCNC: 73 U/L (ref 34–104)
ALT SERPL W P-5'-P-CCNC: 12 U/L (ref 7–52)
ANION GAP SERPL CALCULATED.3IONS-SCNC: 8 MMOL/L (ref 4–13)
AST SERPL W P-5'-P-CCNC: 22 U/L (ref 13–39)
BILIRUB SERPL-MCNC: 0.42 MG/DL (ref 0.2–1)
BUN SERPL-MCNC: 20 MG/DL (ref 5–25)
CALCIUM SERPL-MCNC: 8.7 MG/DL (ref 8.4–10.2)
CHLORIDE SERPL-SCNC: 105 MMOL/L (ref 96–108)
CO2 SERPL-SCNC: 24 MMOL/L (ref 21–32)
CREAT SERPL-MCNC: 0.76 MG/DL (ref 0.6–1.3)
GFR SERPL CREATININE-BSD FRML MDRD: 90 ML/MIN/1.73SQ M
GLUCOSE P FAST SERPL-MCNC: 253 MG/DL (ref 65–99)
MAGNESIUM SERPL-MCNC: 2.1 MG/DL (ref 1.9–2.7)
PHOSPHATE SERPL-MCNC: 2.6 MG/DL (ref 2.3–4.1)
POTASSIUM SERPL-SCNC: 4.7 MMOL/L (ref 3.5–5.3)
PROT SERPL-MCNC: 6.8 G/DL (ref 6.4–8.4)
PSA SERPL-MCNC: 0.15 NG/ML (ref 0–4)
SODIUM SERPL-SCNC: 137 MMOL/L (ref 135–147)
TSH SERPL DL<=0.05 MIU/L-ACNC: 2.83 UIU/ML (ref 0.45–4.5)
VIT B12 SERPL-MCNC: 375 PG/ML (ref 180–914)

## 2024-04-17 PROCEDURE — 82607 VITAMIN B-12: CPT

## 2024-04-17 PROCEDURE — 80053 COMPREHEN METABOLIC PANEL: CPT

## 2024-04-17 PROCEDURE — 82306 VITAMIN D 25 HYDROXY: CPT

## 2024-04-17 PROCEDURE — 83735 ASSAY OF MAGNESIUM: CPT

## 2024-04-17 PROCEDURE — 84100 ASSAY OF PHOSPHORUS: CPT

## 2024-04-17 PROCEDURE — 84443 ASSAY THYROID STIM HORMONE: CPT

## 2024-04-17 NOTE — RESULT ENCOUNTER NOTE
Looks like his metformin was sent in yesterday in Dr. Duran's name but he transferred to me and it should be coming from endo. His mirabegranon was also sent in Dr. Duran's name and this should have come from his urologist. Please investigate.

## 2024-04-19 PROBLEM — R11.2 INTRACTABLE NAUSEA AND VOMITING: Status: RESOLVED | Noted: 2023-12-02 | Resolved: 2024-04-19

## 2024-04-19 PROBLEM — R35.0 BENIGN PROSTATIC HYPERPLASIA WITH URINARY FREQUENCY: Status: RESOLVED | Noted: 2019-02-27 | Resolved: 2024-04-19

## 2024-04-19 PROBLEM — N40.1 BENIGN PROSTATIC HYPERPLASIA WITH URINARY FREQUENCY: Status: RESOLVED | Noted: 2019-02-27 | Resolved: 2024-04-19

## 2024-04-19 PROBLEM — F41.9 ANXIETY: Status: ACTIVE | Noted: 2024-04-19

## 2024-04-19 PROBLEM — M70.31 BURSITIS OF RIGHT ELBOW: Status: RESOLVED | Noted: 2023-11-28 | Resolved: 2024-04-19

## 2024-04-19 PROBLEM — Z87.442 PERSONAL HISTORY OF KIDNEY STONES: Status: ACTIVE | Noted: 2024-04-19

## 2024-04-19 PROBLEM — K57.90 DIVERTICULOSIS: Status: ACTIVE | Noted: 2024-04-19

## 2024-04-19 PROBLEM — J45.20 MILD INTERMITTENT ASTHMA WITHOUT COMPLICATION: Status: ACTIVE | Noted: 2022-03-14

## 2024-04-19 PROBLEM — S90.211A CONTUSION OF RIGHT GREAT TOE WITH DAMAGE TO NAIL: Status: RESOLVED | Noted: 2023-05-17 | Resolved: 2024-04-19

## 2024-04-19 PROBLEM — R07.89 OTHER CHEST PAIN: Status: RESOLVED | Noted: 2023-03-26 | Resolved: 2024-04-19

## 2024-04-19 PROBLEM — Z98.890 H/O TOE SURGERY: Status: RESOLVED | Noted: 2023-12-02 | Resolved: 2024-04-19

## 2024-04-19 RX ORDER — FLUTICASONE PROPIONATE 110 UG/1
2 AEROSOL, METERED RESPIRATORY (INHALATION) AS NEEDED
Start: 2024-04-19 | End: 2024-05-19

## 2024-04-19 NOTE — ASSESSMENT & PLAN NOTE
Not at goal. He never started zoloft. Reviewed this medication, risks and benefits and side effects. He will start and follow up here in 4 weeks or so.

## 2024-04-19 NOTE — ASSESSMENT & PLAN NOTE
Not at goal but pt in pain. Adjust meds if remains high at follow up. Allergy to ACE. (Anaphylaxis)

## 2024-04-23 ENCOUNTER — OFFICE VISIT (OUTPATIENT)
Dept: FAMILY MEDICINE CLINIC | Facility: CLINIC | Age: 74
End: 2024-04-23
Payer: COMMERCIAL

## 2024-04-23 ENCOUNTER — HOSPITAL ENCOUNTER (OUTPATIENT)
Dept: VASCULAR ULTRASOUND | Facility: HOSPITAL | Age: 74
Discharge: HOME | End: 2024-04-23
Payer: COMMERCIAL

## 2024-04-23 ENCOUNTER — HOSPITAL ENCOUNTER (OUTPATIENT)
Dept: RADIOLOGY | Facility: HOSPITAL | Age: 74
Discharge: HOME | End: 2024-04-23
Payer: COMMERCIAL

## 2024-04-23 ENCOUNTER — PROCEDURE VISIT (OUTPATIENT)
Dept: UROLOGY | Facility: CLINIC | Age: 74
End: 2024-04-23
Payer: COMMERCIAL

## 2024-04-23 VITALS
HEIGHT: 67 IN | DIASTOLIC BLOOD PRESSURE: 78 MMHG | WEIGHT: 223.4 LBS | HEART RATE: 62 BPM | BODY MASS INDEX: 35.06 KG/M2 | SYSTOLIC BLOOD PRESSURE: 140 MMHG | OXYGEN SATURATION: 98 %

## 2024-04-23 VITALS
BODY MASS INDEX: 35.31 KG/M2 | HEART RATE: 76 BPM | DIASTOLIC BLOOD PRESSURE: 58 MMHG | WEIGHT: 225 LBS | RESPIRATION RATE: 16 BRPM | HEIGHT: 67 IN | TEMPERATURE: 98 F | OXYGEN SATURATION: 96 % | SYSTOLIC BLOOD PRESSURE: 120 MMHG

## 2024-04-23 DIAGNOSIS — R26.2 AMBULATORY DYSFUNCTION: ICD-10-CM

## 2024-04-23 DIAGNOSIS — E11.65 TYPE 2 DIABETES MELLITUS WITH HYPERGLYCEMIA, WITH LONG-TERM CURRENT USE OF INSULIN (HCC): Chronic | ICD-10-CM

## 2024-04-23 DIAGNOSIS — I10 BENIGN ESSENTIAL HYPERTENSION: ICD-10-CM

## 2024-04-23 DIAGNOSIS — M16.12 PRIMARY OSTEOARTHRITIS OF ONE HIP, LEFT: ICD-10-CM

## 2024-04-23 DIAGNOSIS — M70.62 GREATER TROCHANTERIC BURSITIS OF LEFT HIP: ICD-10-CM

## 2024-04-23 DIAGNOSIS — N39.41 URGE INCONTINENCE OF URINE: ICD-10-CM
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
## 2024-04-23 DIAGNOSIS — E66.09 CLASS 1 OBESITY DUE TO EXCESS CALORIES WITH SERIOUS COMORBIDITY AND BODY MASS INDEX (BMI) OF 34.0 TO 34.9 IN ADULT: ICD-10-CM

## 2024-04-23 DIAGNOSIS — Z79.4 TYPE 2 DIABETES MELLITUS WITH HYPERGLYCEMIA, WITH LONG-TERM CURRENT USE OF INSULIN (HCC): Chronic | ICD-10-CM

## 2024-04-23 DIAGNOSIS — N20.0 NEPHROLITHIASIS: Primary | ICD-10-CM

## 2024-04-23 DIAGNOSIS — K21.9 GASTROESOPHAGEAL REFLUX DISEASE WITHOUT ESOPHAGITIS: ICD-10-CM

## 2024-04-23 DIAGNOSIS — C61 PROSTATE CANCER (HCC): ICD-10-CM

## 2024-04-23 DIAGNOSIS — M25.552 ACUTE HIP PAIN, LEFT: ICD-10-CM

## 2024-04-23 DIAGNOSIS — M25.552 ACUTE HIP PAIN, LEFT: Primary | ICD-10-CM

## 2024-04-23 LAB — POST-VOID RESIDUAL VOLUME, ML POC: 0 ML

## 2024-04-23 PROCEDURE — 99214 OFFICE O/P EST MOD 30 MIN: CPT | Performed by: FAMILY MEDICINE

## 2024-04-23 PROCEDURE — 96372 THER/PROPH/DIAG INJ SC/IM: CPT

## 2024-04-23 PROCEDURE — 93880 EXTRACRANIAL BILAT STUDY: CPT | Performed by: SURGERY

## 2024-04-23 PROCEDURE — G2211 COMPLEX E/M VISIT ADD ON: HCPCS | Performed by: FAMILY MEDICINE

## 2024-04-23 PROCEDURE — 51798 US URINE CAPACITY MEASURE: CPT | Performed by: UROLOGY

## 2024-04-23 PROCEDURE — 52287 CYSTOSCOPY CHEMODENERVATION: CPT | Performed by: UROLOGY

## 2024-04-23 PROCEDURE — 73502 X-RAY EXAM HIP UNI 2-3 VIEWS: CPT

## 2024-04-23 PROCEDURE — 93880 EXTRACRANIAL BILAT STUDY: CPT

## 2024-04-23 RX ORDER — CEFTRIAXONE 1 G/1
1000 INJECTION, POWDER, FOR SOLUTION INTRAMUSCULAR; INTRAVENOUS ONCE
Status: COMPLETED | OUTPATIENT
Start: 2024-04-23 | End: 2024-04-23

## 2024-04-23 RX ADMIN — CEFTRIAXONE 1000 MG: 1 INJECTION, POWDER, FOR SOLUTION INTRAMUSCULAR; INTRAVENOUS at 10:09

## 2024-04-23 NOTE — PROGRESS NOTES
Assessment/Plan:  Problem List Items Addressed This Visit          Cardiovascular and Mediastinum    Benign essential hypertension     Stable.  Check blood pressure outside of office.  Recommend lifestyle modifications.              Digestive    GERD (gastroesophageal reflux disease)     Caution c NSAIDs.  Watch GERD diet.              Endocrine    Type 2 diabetes mellitus with hyperglycemia, with long-term current use of insulin (HCC) (Chronic)       Lab Results   Component Value Date    HGBA1C 6.9 (A) 04/05/2024     Recommend lifestyle modifications.            Musculoskeletal and Integument    Greater trochanteric bursitis of left hip     Pending Left Hip Xray to R/O Fracture vs. OA.  To Ortho if fracture / severe OA.  Otherwise, to PT, then Sports Med.  Advise Motrin / Tylenol PRN.           Relevant Orders    XR hip/pelv 2-3 vws left if performed    Ambulatory Referral to Orthopedic Surgery    Ambulatory Referral to Physical Therapy      Primary osteoarthritis of one hip, left     Pending Left Hip Xray to R/O Fracture vs. OA.  To Ortho if fracture / severe OA.  Otherwise, to PT, then Sports Med.  Advise Motrin / Tylenol PRN.           Relevant Orders    XR hip/pelv 2-3 vws left if performed    Ambulatory Referral to Orthopedic Surgery    Ambulatory Referral to Physical Therapy            Other    Class 1 obesity due to excess calories with serious comorbidity and body mass index (BMI) of 34.0 to 34.9 in adult     Improved.  Recommend lifestyle modifications.            Other Visit Diagnoses       Acute hip pain, left    -  Primary    Relevant Orders    XR hip/pelv 2-3 vws left if performed    Ambulatory Referral to Orthopedic Surgery    Ambulatory Referral to Physical Therapy    Pending Left Hip Xray to R/O Fracture vs. OA.  To Ortho if fracture / severe OA.  Otherwise, to PT, then Sports Med.  Advise Motrin / Tylenol PRN.        Fall, initial encounter        Relevant Orders    XR hip/pelv 2-3 vws left if  performed    Ambulatory Referral to Orthopedic Surgery    Ambulatory Referral to Physical Therapy    Pending Left Hip Xray to R/O Fracture vs. OA.  To Ortho if fracture / severe OA.  Otherwise, to PT, then Sports Med.  Advise Motrin / Tylenol PRN.        Ambulatory dysfunction        Relevant Orders    XR hip/pelv 2-3 vws left if performed    Ambulatory Referral to Orthopedic Surgery    Ambulatory Referral to Physical Therapy    Pending Left Hip Xray to R/O Fracture vs. OA.  To Ortho if fracture / severe OA.  Otherwise, to PT, then Sports Med.  Advise Motrin / Tylenol PRN.                 Return if symptoms worsen or fail to improve.      Future Appointments   Date Time Provider Department Center   6/7/2024 10:20 AM Kiah Rausch DO  [facility]   7/25/2024 10:00 AM YVONNE Arriaga URO [facility]   8/14/2024  2:20 PM Saurabh Veras MD DIAB [facility]   10/21/2024 10:30 AM Carlin Palacios PA-C [facility]        Subjective:     Mario is a 73 y.o. male who presents today for a follow-up on his acute medical conditions.        HPI:  Chief Complaint   Patient presents with   • Fall     Pt tripped and fell yesterday in his garage. Fell onto his L hip/buttock. Denies numbness and tingling in the L leg. Took a tylenol #3 this morning that was prev rx by , effective for pain management.      -- Above per clinical staff and reviewed. --    Fall  Pertinent negatives include no abdominal pain, fever, nausea or vomiting.         Today:      PTO c wife Kelsi    Left Hip Pain - Symptoms x 1 day after tripping and falling in his garage on 4/22/24 onto his left lateral hip.  He denies head trauma or LOC.  He denies ambulatory dysfunction.  Left lateral hip pain.  Sharp pain c palpation.  Currently 8/10, Max 10/10.  Does not have pain at rest.  Only hurts with palpation.  Worse c laying left lateral recumbent.  Denies LE numbness, weakness, loss of bowel or bladder  "function. Took Tylenol #3 this AM (has 9 pills remaining).  Not using other OTC meds.  No Ortho previously.      Had kidney stone stent removed today.      The following portions of the patient's history were reviewed and updated as appropriate: allergies, current medications, past family history, past medical history, past social history, past surgical history and problem list.      Review of Systems   Constitutional:  Negative for appetite change, chills, diaphoresis, fatigue and fever.   Respiratory:  Negative for chest tightness and shortness of breath.    Cardiovascular:  Negative for chest pain.   Gastrointestinal:  Negative for abdominal pain, blood in stool, diarrhea, nausea and vomiting.   Genitourinary:  Negative for dysuria.   Musculoskeletal:  Positive for arthralgias.        Current Outpatient Medications   Medication Sig Dispense Refill   • acetaminophen-codeine (TYLENOL with CODEINE #3) 300-30 MG per tablet Take 1 tablet by mouth every 6 (six) hours as needed for moderate pain 10 tablet 0   • albuterol (ProAir HFA) 90 mcg/act inhaler Inhale 2 puffs every 6 (six) hours as needed for wheezing or shortness of breath 8.5 g 0   • atorvastatin (LIPITOR) 10 mg tablet TAKE ONE TABLET BY MOUTH EVERY  tablet 3   • Biotin 1000 MCG tablet Take 1 tablet by mouth if needed Per pt takes it \"once in awhile\"     • Blood Glucose Monitoring Suppl (GLUCOCARD VITAL MONITOR) w/Device KIT by Does not apply route 2 (two) times a day 1 kit 0   • Cholecalciferol (VITAMIN D3) 2000 units capsule Take 1,000 Units by mouth daily in the early morning      • Continuous Blood Gluc  (FreeStyle Dunia 2 [place]) CHARITY Check blood sugars multiple times per day 1 each 0   • Continuous Blood Gluc Sensor (FreeStyle Dunia 2 Sensor) MISC Check blood sugars multiple times per day 6 each 3   • Cyanocobalamin (VITAMIN B-12) 5000 MCG TBDP Take 5,000 mcg by mouth once a week Takes on Mondays     • Diclofenac Sodium (VOLTAREN) 1 % " "Apply 2 g topically 4 (four) times a day 100 g 1   • docusate sodium (COLACE) 100 mg capsule Take 100 mg by mouth daily as needed for constipation     • fluticasone (Flovent HFA) 110 MCG/ACT inhaler Inhale 2 puffs if needed (asthma flare) Rinse mouth after use.     • insulin aspart protamine-insulin aspart (NovoLOG Mix 70/30 FlexPen) 100 Units/mL injection pen INJECT 15 UNITS UNDER THE SKIN TWO TIMES A DAY WITH MEALS 30 mL 1   • Lancets (LIFESCAN UNISTIK 2) MISC Tagoodiescan One Touch Ultramini Meter; use as directed; 1; 0; 31-Oct-2016; 31-Oct-2016; Melida Duran; Active     • losartan (COZAAR) 25 mg tablet TAKE ONE TABLET BY MOUTH EVERY  tablet 1   • metFORMIN (GLUCOPHAGE) 1000 MG tablet Take 1 tablet (1,000 mg total) by mouth 2 (two) times a day with meals 180 tablet 1   • Mirabegron ER 50 MG TB24 Take 1 tablet (50 mg total) by mouth daily at bedtime 90 tablet 1   • Multiple Vitamin (MULTIVITAMIN) capsule Take 1 capsule by mouth daily in the early morning      • ondansetron (ZOFRAN) 4 mg tablet Take 1 tablet (4 mg total) by mouth every 8 (eight) hours as needed for nausea or vomiting 12 tablet 0   • pantoprazole (PROTONIX) 40 mg tablet TAKE ONE TABLET BY MOUTH EVERY DAY 90 tablet 1   • sertraline (ZOLOFT) 25 mg tablet TAKE ONE TABLET BY MOUTH EVERY DAY 30 tablet 0   • aluminum-magnesium hydroxide-simethicone (MYLANTA) 200-200-20 mg/5 mL suspension Take 30 mL by mouth every 6 (six) hours as needed for indigestion or heartburn 355 mL 0     No current facility-administered medications for this visit.       Objective:  /58   Pulse 76   Temp 98 °F (36.7 °C)   Resp 16   Ht 5' 7.25\" (1.708 m)   Wt 102 kg (225 lb)   SpO2 96%   BMI 34.98 kg/m²    Wt Readings from Last 3 Encounters:   04/23/24 102 kg (225 lb)   04/23/24 101 kg (223 lb 6.4 oz)   04/15/24 104 kg (230 lb 3.2 oz)      BP Readings from Last 3 Encounters:   04/23/24 120/58   04/23/24 140/78   04/15/24 136/64          Physical Exam  Vitals and " "nursing note reviewed.   Constitutional:       Appearance: Normal appearance. He is well-developed. He is obese.   HENT:      Head: Normocephalic and atraumatic.   Eyes:      Conjunctiva/sclera: Conjunctivae normal.   Neck:      Thyroid: No thyromegaly.   Cardiovascular:      Rate and Rhythm: Normal rate and regular rhythm.      Pulses: Normal pulses.      Heart sounds: Normal heart sounds.   Pulmonary:      Effort: Pulmonary effort is normal.      Breath sounds: Normal breath sounds.   Musculoskeletal:         General: Tenderness (Left greater trochanter) present. No swelling.      Cervical back: Neck supple.      Right lower leg: No edema.      Left lower leg: No edema.      Comments: B/L Hips stable c decreased AROM in all planes.     Lymphadenopathy:      Cervical: No cervical adenopathy.   Neurological:      General: No focal deficit present.      Mental Status: He is alert and oriented to person, place, and time.   Psychiatric:         Mood and Affect: Mood normal.         Lab Results:      Lab Results   Component Value Date    WBC 9.47 04/01/2024    HGB 13.0 04/01/2024    HCT 40.1 04/01/2024     04/01/2024    CHOL 127 01/07/2016    TRIG 75 07/30/2023    HDL 39 (L) 07/30/2023    ALT 12 04/17/2024    AST 22 04/17/2024     01/07/2016    K 4.7 04/17/2024     04/17/2024    CREATININE 0.76 04/17/2024    BUN 20 04/17/2024    CO2 24 04/17/2024    PSA 0.15 04/17/2024    INR 0.99 07/21/2023    GLUF 253 (H) 04/17/2024    HGBA1C 6.9 (A) 04/05/2024     No results found for: \"URICACID\"  Invalid input(s): \"BASENAME\" Vitamin D    FL retrograde pyelogram    Result Date: 4/3/2024  Narrative: RIGHT RETROGRADE PYELOGRAM INDICATION: Right distal ureteral calculus. COMPARISON: CT same date IMAGES: 4 FLUOROSCOPY TIME: 27.7 sec CONTRAST: 9 mL of iohexol (OMNIPAQUE) Imaging guidance was provided during the urological procedure and deployment of a right ureteral stent. Osseous and soft tissue detail limited by " technique.     Impression: Imaging guidance was provided during the urological procedure and deployment of a right ureteral stent. Fluoroscopic guidance provided for right retrograde pyelogram. Please see separate procedure report for additional details. Workstation performed: [id number]     CT abdomen pelvis wo contrast    Result Date: 4/2/2024  Narrative: CT ABDOMEN AND PELVIS WITHOUT IV CONTRAST INDICATION: flank pain. COMPARISON: 3/30/2024; 3/26/2023; 10/5/2022; MRI from 12/1/2020 TECHNIQUE: CT examination of the abdomen and pelvis was performed without intravenous contrast. Multiplanar 2D reformatted images were created from the source data. This examination, like all CT scans performed in the [facility], was performed utilizing techniques to minimize radiation dose exposure, including the use of iterative reconstruction and automated exposure control. Radiation dose length product (DLP) for this visit: 1065 mGy-cm Enteric Contrast: Not administered. FINDINGS: ABDOMEN LOWER CHEST: Atelectatic changes are noted at the bases. Mild esophageal thickening or hiatus hernia is seen distally. This is similar to prior studies. LIVER/BILIARY TREE: Unremarkable. GALLBLADDER: Post cholecystectomy. SPLEEN: Unremarkable. PANCREAS: There is a 2.7 x 2.4 cm low-density cyst in the pancreatic head similar to the study of March 30 with contrast.. This measures approximately 2.5 cm in 2022 and measured at 2.6 cm on CT from 12/14/2020 ADRENAL GLANDS: Unremarkable. KIDNEYS/URETERS: Perinephric stranding is identified increased since the prior study and there is persistent moderate to severe hydronephrosis. There is a 2 mm calculus at the ureterovesicular junction on the right. This is slightly distal to the position as noted on March 30. There is a irregular lesion protruding from the medial aspect of the left kidney which appeared cystic on the prior contrast study. This measures approximately 1.9 cm in its greatest  dimension. STOMACH AND BOWEL: Small hiatal hernia is seen. The patient is status post gastric sleeve surgery with sutures present. No small bowel dilatation. Left colon diverticulosis without CT evidence of diverticulitis. APPENDIX: No findings to suggest appendicitis. ABDOMINOPELVIC CAVITY: No ascites. No pneumoperitoneum. No lymphadenopathy. VESSELS: Unremarkable for patient's age. PELVIS REPRODUCTIVE ORGANS: Fiduciary markers are prostate seeds are present. URINARY BLADDER: Unremarkable. ABDOMINAL WALL/INGUINAL REGIONS: Mesh material seen along the anterior abdominal wall. BONES: No acute fracture or suspicious osseous lesion. Degenerative changes of the spine.     Impression: Persistent right hydroureteronephrosis. There is worsening perinephric stranding seen as compared to the prior study. Persistent 2 mm calculus at the ureterovesicular junction. Irregular 1.9 cm cystic lesion involves the medial left kidney. 2.7 cm cystic lesion in the uncinate/pancreatic head is similar to prior studies. The study was marked in EPIC for immediate notification. Workstation performed: [id number]     XR chest 1 view portable    Result Date: 3/30/2024  Narrative: XR CHEST PORTABLE INDICATION: abd pain. COMPARISON: CXR and chest CT 12/02/2023, abdomen CT 3/30/2024. FINDINGS: Low lung volumes producing vascular crowding. No pneumothorax or pleural effusion. Normal cardiomediastinal silhouette. Bones are unremarkable for age. Upper abdomen normal. Cholecystectomy.     Impression: No acute cardiopulmonary disease. Workstation performed: [id number]     CT abdomen pelvis with contrast    Result Date: 3/30/2024  Narrative: CT ABDOMEN AND PELVIS WITH IV CONTRAST INDICATION: flank pain, abd pain, vomiting. COMPARISON: CT chest abdomen pelvis 12/2/2023 TECHNIQUE: CT examination of the abdomen and pelvis was performed. Multiplanar 2D reformatted images were created from the source data. This examination, like all CT scans performed in  the [facility], was performed utilizing techniques to minimize radiation dose exposure, including the use of iterative reconstruction and automated exposure control. Radiation dose length product (DLP) for this visit: 2244 mGy-cm IV Contrast: 100 mL of iohexol (OMNIPAQUE) Enteric Contrast: Not administered. FINDINGS: ABDOMEN LOWER CHEST: Mild dependent subsegmental atelectasis both lung bases. Small hiatal hernia with mild circumferential esophageal wall thickening. LIVER/BILIARY TREE: Unremarkable. GALLBLADDER: Post cholecystectomy. SPLEEN: Unremarkable. PANCREAS: Again identified is 2.2 x 1.9 cm uncinate cyst unchanged from the prior study as well as studies dating back to at least 8/9/2017. ADRENAL GLANDS: Probable mild stable nodular hyperplasia. KIDNEYS/URETERS: Obstructing 2-3 mm distal right ureteral calculus (previously noted in the lower pole right renal collecting system) just proximal to the ureterovesical junction with mild upstream hydroureteronephrosis and slightly delayed renal parenchymal enhancement. No left-sided urolithiasis identified. Stable 1.4 x 1.8 cm cystic mass anteromedial lower pole left kidney with punctate calcification. This appears stable when compared to the study of 8/9/2017. STOMACH AND BOWEL: Small hiatal hernia. Stable postoperative change reflecting sleeve gastrectomy. Diverticulosis. No bowel wall thickening or intestinal obstruction. Mild constipation. APPENDIX: Normal. ABDOMINOPELVIC CAVITY: No ascites. No pneumoperitoneum. No lymphadenopathy. VESSELS: Unremarkable for patient's age. PELVIS REPRODUCTIVE ORGANS: Mild prostatomegaly. Prostatic brachytherapy radiation implant seeds again noted. URINARY BLADDER: Unremarkable. ABDOMINAL WALL/INGUINAL REGIONS: Stable postoperative change reflecting ventral abdominal wall hernia repair with mesh. BONES: No acute fracture or suspicious osseous lesion. Spinal degenerative changes.     Impression: 1. Partially  obstructing 2-3 mm distal right ureteral calculus with mild upstream hydroureteronephrosis and slight delayed right renal parenchymal enhancement. 2. Stable findings as noted. The study was marked in EPIC for immediate notification. Workstation performed: [id number]        POCT Labs

## 2024-04-23 NOTE — PATIENT INSTRUCTIONS
You may use Tylenol (Acetaminophen) up to 3,000mg daily (in 24 hours) as needed for pain or fever..    Caution with GERD -     You may use Motrin (Ibuprofen) up to 800mg 3 times daily with food (in 24 hours) as needed for pain or fever.

## 2024-04-23 NOTE — PROGRESS NOTES
Cystoscopy     Date/Time  4/23/2024 9:00 AM     Performed by  Migel Sullivan MD   Authorized by  Migel Sullivan MD         Procedure Details:  Procedure type: cystoscopy, injection for chemodenervation       Office Cystoscopy Procedure Note    Indication:    Medically refractory overactive bladder    Informed consent   The risks, benefits, complications, treatment options, and expected outcomes were discussed with the patient. The patient concurred with the proposed plan and provided informed consent.    Anesthesia  Lidocaine jelly 2%    Procedure  The patient was placed in the supineposition, was prepped and draped in the usual manner using sterile technique, and 2% lidocaine jelly instilled into the urethra.  A 17 F flexible cystoscope was then inserted into the urethra and the urethra and bladder carefully examined.  The following findings were noted:    The preplaced ureteral stent was visualized and removed with a grasper.  The endoscope was then reintroduced.      Findings:  Urethra:  No evidence of recurrent urethral stricture disease  Prostate:  Normal  Bladder:  Normal  Ureteral orifices:  Normal  Other findings:  None       Using the preplaced Bhardwaj catheter, 20 cc 2% plain lidocaine was instilled in the bladder for 15 minutes.  Bladder was then drained and the Bhardwaj removed.  Cystoscope reintroduced.    100 units of bladder Botox was first admixed in injectable saline according to  instructions and delivered using a supratrigonal template throughout the bladder.    I elected to place a Bhardwaj catheter for 24 hours postoperatively given the dilation of the mild stricture    Specimens: None                 Complications:    None; patient tolerated the procedure well           Disposition: To home after 30 minute observation.           Condition: Stable    Plan:   -IM Rocephin x 1 administered today  - Follow-up 3 months with advanced practitioner team, KUB, ultrasound, repeat  PSA

## 2024-04-24 NOTE — ASSESSMENT & PLAN NOTE
Pending Left Hip Xray to R/O Fracture vs. OA.  To Ortho if fracture / severe OA.  Otherwise, to PT, then Sports Med.  Advise Motrin / Tylenol PRN.

## 2024-04-24 NOTE — ASSESSMENT & PLAN NOTE
Lab Results   Component Value Date    HGBA1C 6.9 (A) 04/05/2024     Recommend lifestyle modifications.

## 2024-05-01 ENCOUNTER — HOSPITAL ENCOUNTER (OUTPATIENT)
Dept: ULTRASOUND IMAGING | Facility: HOSPITAL | Age: 74
Discharge: HOME | End: 2024-05-01
Payer: COMMERCIAL

## 2024-05-01 DIAGNOSIS — N20.0 NEPHROLITHIASIS: ICD-10-CM

## 2024-05-01 PROCEDURE — 76770 US EXAM ABDO BACK WALL COMP: CPT

## 2024-05-03 ENCOUNTER — PROCEDURE VISIT (OUTPATIENT)
Dept: UROLOGY | Facility: CLINIC | Age: 74
End: 2024-05-03
Payer: COMMERCIAL

## 2024-05-03 ENCOUNTER — PATIENT MESSAGE (OUTPATIENT)
Dept: UROLOGY | Facility: CLINIC | Age: 74
End: 2024-05-03

## 2024-05-03 DIAGNOSIS — R39.9 LOWER URINARY TRACT SYMPTOMS (LUTS): Primary | ICD-10-CM

## 2024-05-03 DIAGNOSIS — R39.9 UTI SYMPTOMS: Primary | ICD-10-CM

## 2024-05-03 DIAGNOSIS — C61 PROSTATE CANCER (HCC): ICD-10-CM

## 2024-05-03 DIAGNOSIS — R39.9 LOWER URINARY TRACT SYMPTOMS (LUTS): ICD-10-CM

## 2024-05-03 DIAGNOSIS — N39.41 URGE INCONTINENCE OF URINE: Primary | ICD-10-CM

## 2024-05-03 LAB — POST-VOID RESIDUAL VOLUME, ML POC: 117 ML

## 2024-05-03 PROCEDURE — 51798 US URINE CAPACITY MEASURE: CPT

## 2024-05-03 RX ORDER — TAMSULOSIN HYDROCHLORIDE 0.4 MG/1
0.4 CAPSULE ORAL
Qty: 30 CAPSULE | Refills: 3 | Status: SHIPPED | OUTPATIENT
Start: 2024-05-03

## 2024-05-03 NOTE — RESULT ENCOUNTER NOTE
Left Hip and Pelvis Xray shows no fracture or new bony abnormality.  Moderate to severe arthritis of the left hip joint present.  Continue plan of care - Ortho consult.    Message sent to patient via [employer] patient portal.

## 2024-05-03 NOTE — PATIENT COMMUNICATION
Spoke with patient and reports having to strain to urinate. He is emptying his bladder. Also states that he is having constipation. Advised to increase water intake. Patient would like to know if he should start flomax to help with this. Please advise.

## 2024-05-03 NOTE — PROGRESS NOTES
5/3/2024  Jimmy Reyes is a 73 y.o. male  [id number]    Diagnosis:      Patient presents for follow up post void residual s/p botox managed by Dr. Sullivan    Plan:  Patient will  flomax to start tonight. ER precautions and will call office with any worsening symptoms        Assessment:    Patient reports increased difficulty urinating. Takes approximately 5 minutes to empty his bladder and there is a lot of pressure involved. No pain or burning.     There were no vitals filed for this visit.        Patient voided 0 mL in the office.  Post void residual measured via bladder scan to be 117 mL      Spoke with Miguelito amaya will just have patient  flomax and start that and call office with worsening symptoms   Recent Results (from the past 6 hour(s))   POCT Measure PVR    Collection Time: 05/03/24  1:44 PM   Result Value Ref Range    POST-VOID RESIDUAL VOLUME, ML  mL         Madison Bains RN

## 2024-05-05 DIAGNOSIS — Z79.4 TYPE 2 DIABETES MELLITUS WITH HYPERGLYCEMIA, WITH LONG-TERM CURRENT USE OF INSULIN (HCC): Chronic | ICD-10-CM

## 2024-05-05 DIAGNOSIS — E11.65 TYPE 2 DIABETES MELLITUS WITH HYPERGLYCEMIA, WITH LONG-TERM CURRENT USE OF INSULIN (HCC): Chronic | ICD-10-CM

## 2024-05-31 DIAGNOSIS — F41.9 ANXIETY: ICD-10-CM

## 2024-05-31 RX ORDER — SERTRALINE HYDROCHLORIDE 25 MG/1
25 TABLET, FILM COATED ORAL DAILY
Qty: 30 TABLET | Refills: 5 | Status: SHIPPED | OUTPATIENT
Start: 2024-05-31

## 2024-06-13 ENCOUNTER — PATIENT MESSAGE (OUTPATIENT)
Dept: FAMILY MEDICINE CLINIC | Facility: CLINIC | Age: 74
End: 2024-06-13

## 2024-06-13 DIAGNOSIS — F41.9 ANXIETY: ICD-10-CM

## 2024-06-19 ENCOUNTER — OFFICE VISIT (OUTPATIENT)
Dept: DENTISTRY | Facility: CLINIC | Age: 74
End: 2024-06-19

## 2024-06-19 DIAGNOSIS — K08.89 TOOTH PAIN: Primary | ICD-10-CM

## 2024-06-19 PROCEDURE — D7140 EXTRACTION, ERUPTED TOOTH OR EXPOSED ROOT (ELEVATION AND/OR FORCEPS REMOVAL): HCPCS

## 2024-06-19 NOTE — PROGRESS NOTES
Extraction # 5    Jimmy Reyes 74 y.o. male presents with self to [facility] for Extraction # 5  PMH reviewed, no changes, ASA II. Significant medical history: DM2.  Pain level 0/10    Diagnosis:  Teeth #5 indicated for extraction due to Periodontally hopeless prognosis  and Irreversible pulpitis and patient elects for extraction instead of RCT    Prognosis:  guarded    Consent:  Diagnosis and tx plan reviewed with patient.  Risks of specific procedure: pain, bleeding, swelling, infection, tooth fracturing to point of requiring surgical removal, damage to adjacent teeth and/or restorations on them.  Risks of any dental procedure: post procedural pain or sensitivity, local anesthetic side effects, allergic reaction to dental materials and medications, breakage of local anesthetic needle, aspiration of small dental tools, injury to nearby hard and soft tissues and anatomical structures.  Benefits: relieve pain or underlying infection, prevent future or further progression of infection.  Alternatives: no tx.  Oral surgery consent form reviewed and signed.  Patient understands and consents.    [facility] Protocol  Other Assisting Provider: Yes, Ivania (assistant)  Verbal consent obtained? YES  Written consent obtained?  YES  Risks, benefits and alternatives discussed?: YES  Consent given by: the patient  Time Out  Immediately prior to the procedure a time out was called: YES  Time Out:  Time Out performed at:  3:30PM 6/19/2024  A time out verifies correct patient, procedure, equipment, support staff and site/side marked as required.  Patient states understanding of procedure being performed: YES  Patient's understanding of procedure matches consent: YES  Procedure consent matches procedure scheduled: YES  Test results available and properly labeled: N/A  Site  Verified with the patient  YES  Radiology Images displayed and confirmed.  If images not available, report reviewed:  YES  Required items - Required blood products,  implants, devices and special equipment available: YES  Patient identity confirmed:  YES    Anesthesia:  Topical 20% benzocaine.  2 carps 2% Lidocaine 1:100k epi via buccal and palatal/lingual infiltration and greater palatine block.  2 carps 4% Septocaine 1:100k epi via buccal and palatal/lingual infiltration and greater palatine block.    Procedure details:  Reflected gingiva with periosteal elevator  Elevated, and extracted #5 with forceps  Socket curetted and irrigated with sterile saline  Manual alveolar compression with gauze 30 seconds. Hemostasis achieved    Post-op instructions given verbally and on paper.  Patient given ice and gauze    Patient dismissed ambulatory and alert.    NV: Tx planning for maxillary bridge after healing

## 2024-07-01 NOTE — PROGRESS NOTES
1. Encounter for Medicare annual wellness exam  2. Type 2 diabetes mellitus with hyperglycemia, with long-term current use of insulin (HCC)  Assessment & Plan:  Follows with endo. Sugars well controlled. Eye and foot exam up to date. (Eye exam requested today from [facility]). Blood work to be completed in August for endo.   Lab Results   Component Value Date    HGBA1C 6.9 (A) 04/05/2024     3. B12 deficiency  Assessment & Plan:  Feeling more energy with supplementation. Update blood work with endo labs in August.   Orders:  -     Vitamin B12; Future  4. Counseling regarding advanced directives  Assessment & Plan:  Confirmed today DNR. Wife Lisa campa POA. Advanced directive at home.   5. Traumatic petechiae       No follow-ups on file.    Subjective:   Mario is a 74 y.o. male here today for a follow-up on his current medical conditions:    Patient Active Problem List   Diagnosis   • GERD (gastroesophageal reflux disease)   • Class 1 obesity due to excess calories with serious comorbidity and body mass index (BMI) of 34.0 to 34.9 in adult   • Hypercholesteremia   • Postgastrectomy malabsorption   • Type 2 diabetes mellitus with hyperglycemia, with long-term current use of insulin (HCC)   • Benign essential hypertension   • Benign enlargement of prostate   • Pancreatic cyst   • History of colon polyps   • IPMN (intraductal papillary mucinous neoplasm)   • ED (erectile dysfunction) of organic origin   • Bruxism   • Prostate cancer (HCC)   • Greater trochanteric bursitis of left hip   • Primary osteoarthritis of one hip, left   • Colitis   • Mild intermittent asthma without complication   • Platelets decreased (HCC)   • Near syncope   • Right ureteral stone   • Diverticulosis   • Anxiety   • Personal history of kidney stones   • Counseling regarding advanced directives   • B12 deficiency       Patient Care Team:  Kiah Rausch DO as PCP - General (Family Medicine)  Duc Johnson MD  "(Orthopedic Surgery)  Adrienne Sesay MD (Cardiology)  MAYELA Garcia MD (General Surgery)  Migel Sullivan MD (Urology)  MD Ana Kim MD (Gastroenterology)  Ana Wong MD as Endoscopist (Gastroenterology)  Sandy Medina MD (Radiation Oncology)  Saurabh Veras MD (Endocrinology)  David Daley DPM (Podiatry)  Phill Borden MD (Sleep Medicine)    Current Medications:  Current Outpatient Medications   Medication Sig Dispense Refill   • acetaminophen-codeine (TYLENOL with CODEINE #3) 300-30 MG per tablet Take 1 tablet by mouth every 6 (six) hours as needed for moderate pain 10 tablet 0   • albuterol (ProAir HFA) 90 mcg/act inhaler Inhale 2 puffs every 6 (six) hours as needed for wheezing or shortness of breath 8.5 g 0   • aluminum-magnesium hydroxide-simethicone (MYLANTA) 200-200-20 mg/5 mL suspension Take 30 mL by mouth every 6 (six) hours as needed for indigestion or heartburn 355 mL 0   • atorvastatin (LIPITOR) 10 mg tablet TAKE ONE TABLET BY MOUTH EVERY  tablet 3   • Biotin 1000 MCG tablet Take 1 tablet by mouth if needed Per pt takes it \"once in awhile\"     • Blood Glucose Monitoring Suppl (GLUCOCARD VITAL MONITOR) w/Device KIT by Does not apply route 2 (two) times a day 1 kit 0   • Cholecalciferol (VITAMIN D3) 2000 units capsule Take 1,000 Units by mouth daily in the early morning      • Continuous Blood Gluc  (FreeStyle [place] 2 [place]) CHARITY Check blood sugars multiple times per day 1 each 0   • Continuous Blood Gluc Sensor (FreeStyle Dunia 2 Sensor) MISC Check blood sugars multiple times per day 6 each 3   • Cyanocobalamin (VITAMIN B-12) 5000 MCG TBDP Take 5,000 mcg by mouth once a week Takes on Mondays     • Diclofenac Sodium (VOLTAREN) 1 % Apply 2 g topically 4 (four) times a day 100 g 1   • docusate sodium (COLACE) 100 mg capsule Take 100 mg by mouth daily as needed for constipation     • insulin aspart protamine-insulin aspart " (NovoLOG Mix 70/30 FlexPen) 100 Units/mL injection pen INJECT 15 UNITS UNDER THE SKIN TWO TIMES A DAY WITH MEALS 30 mL 1   • Lancets (LIFESCAN UNISTIK 2) [employer] One Touch Ultramini Meter; use as directed; 1; 0; 31-Oct-2016; 31-Oct-2016; Melida Duran; Active     • losartan (COZAAR) 25 mg tablet TAKE ONE TABLET BY MOUTH EVERY  tablet 1   • metFORMIN (GLUCOPHAGE) 1000 MG tablet TAKE ONE TABLET BY MOUTH TWICE A DAY WITH MEALS 180 tablet 1   • Multiple Vitamin (MULTIVITAMIN) capsule Take 1 capsule by mouth daily in the early morning      • ondansetron (ZOFRAN) 4 mg tablet Take 1 tablet (4 mg total) by mouth every 8 (eight) hours as needed for nausea or vomiting 12 tablet 0   • pantoprazole (PROTONIX) 40 mg tablet TAKE ONE TABLET BY MOUTH EVERY DAY 90 tablet 1   • sertraline (ZOLOFT) 50 mg tablet Take 1 tablet (50 mg total) by mouth daily 90 tablet 0   • tamsulosin (FLOMAX) 0.4 mg Take 1 capsule (0.4 mg total) by mouth daily with dinner 30 capsule 3   • fluticasone (Flovent HFA) 110 MCG/ACT inhaler Inhale 2 puffs if needed (asthma flare) Rinse mouth after use.       No current facility-administered medications for this visit.       HPI:  Chief Complaint   Patient presents with   • Medicare Wellness Visit     MAW -      -- Above per clinical staff and reviewed. --    PHQ-2/9 Depression Screening    Little interest or pleasure in doing things: 0 - not at all  Feeling down, depressed, or hopeless: 0 - not at all  PHQ-2 Score: 0  PHQ-2 Interpretation: Negative depression screen       RELL-7 Flowsheet Screening    Flowsheet Row Most Recent Value   Over the last 2 weeks, how often have you been bothered by any of the following problems?    Feeling nervous, anxious, or on edge 0   Not being able to stop or control worrying 0   Worrying too much about different things 0   Trouble relaxing 0   Being so restless that it is hard to sit still 0   Becoming easily annoyed or irritable 0   Feeling afraid as if something  "awful might happen 0   RELL-7 Total Score 0             Follows with endo, cardio, surgery, uro, GI, ortho, podiatry, sleep medicine   Today:  Taking b12 and it seems to help his energy   Sugars at home good - 130s in am   Dinner 175 -180   To see   The following portions of the patient's history were reviewed and updated as appropriate: allergies, current medications, past family history, past medical history, past social history, past surgical history and problem list.    Labs:   Latest Reference Range & Units 04/17/24 09:58   Sodium 135 - 147 mmol/L 137   Potassium 3.5 - 5.3 mmol/L 4.7   Chloride 96 - 108 mmol/L 105   Carbon Dioxide 21 - 32 mmol/L 24   ANION GAP 4 - 13 mmol/L 8   BUN 5 - 25 mg/dL 20   Creatinine 0.60 - 1.30 mg/dL 0.76   GLUCOSE, FASTING 65 - 99 mg/dL 253 (H)   Calcium 8.4 - 10.2 mg/dL 8.7   AST 13 - 39 U/L 22   ALT 7 - 52 U/L 12   ALK PHOS 34 - 104 U/L 73   Total Protein 6.4 - 8.4 g/dL 6.8   Albumin 3.5 - 5.0 g/dL 3.9   Total Bilirubin 0.20 - 1.00 mg/dL 0.42   GFR, Calculated ml/min/1.73sq m 90   Phosphorus 2.3 - 4.1 mg/dL 2.6   MAGNESIUM 1.9 - 2.7 mg/dL 2.1   VITAMIN D, 25-HYDROXY 30.0 - 100.0 ng/mL 64.0   Vitamin B-12 180 - 914 pg/mL 375   PSA 0.00 - 4.00 ng/mL 0.15   TSH 3RD GENERATON 0.450 - 4.500 uIU/mL 2.825   (H): Data is abnormally high  Objective:  Vitals:  /54 (BP Location: Left arm, Patient Position: Sitting, Cuff Size: Standard)   Pulse 65   Temp 98.4 °F (36.9 °C) (Temporal)   Resp 18   Ht 5' 7.25\" (1.708 m)   Wt 101 kg (222 lb 9.6 oz)   SpO2 95%   BMI 34.61 kg/m²    Wt Readings from Last 3 Encounters:   07/02/24 101 kg (222 lb 9.6 oz)   04/23/24 102 kg (225 lb)   04/23/24 101 kg (223 lb 6.4 oz)      BP Readings from Last 3 Encounters:   07/02/24 116/54   04/23/24 120/58   04/23/24 140/78        Review of Systems   He has no other concerns. No unexpected weight changes. No chest pain, SOB, or palpitations. No GERD. No changes in bowels or bladder. Sleeping well. No mood " changes.     Physical Exam   Constitutional:  he appears well-developed and well-nourished.  HENT: Head: Normocephalic.   Neck: Neck supple.   Cardiovascular: Normal rate, regular rhythm and normal heart sounds.   Pulmonary/Chest: Effort normal and breath sounds normal. No wheezes, rales, or rhonchi.   Abdominal: Soft. Bowel sounds are normal. There is no tenderness. No hepatosplenomegaly.   Musculoskeletal: he exhibits no edema.   Lymphadenopathy: he has no cervical adenopathy.   Neurological: he is alert and oriented to person, place, and time.   Skin: Skin is warm and dry. +non-palpable purpura arms, legs. (<10 lesions)  Psychiatric: he has a normal mood and affect. his behavior is normal. Thought content normal.

## 2024-07-02 ENCOUNTER — OFFICE VISIT (OUTPATIENT)
Dept: FAMILY MEDICINE CLINIC | Facility: CLINIC | Age: 74
End: 2024-07-02
Payer: COMMERCIAL

## 2024-07-02 VITALS
SYSTOLIC BLOOD PRESSURE: 116 MMHG | WEIGHT: 222.6 LBS | DIASTOLIC BLOOD PRESSURE: 54 MMHG | RESPIRATION RATE: 18 BRPM | BODY MASS INDEX: 34.94 KG/M2 | HEIGHT: 67 IN | TEMPERATURE: 98.4 F | OXYGEN SATURATION: 95 % | HEART RATE: 65 BPM

## 2024-07-02 DIAGNOSIS — E53.8 B12 DEFICIENCY: ICD-10-CM

## 2024-07-02 DIAGNOSIS — Z79.4 TYPE 2 DIABETES MELLITUS WITH HYPERGLYCEMIA, WITH LONG-TERM CURRENT USE OF INSULIN (HCC): ICD-10-CM

## 2024-07-02 DIAGNOSIS — E11.65 TYPE 2 DIABETES MELLITUS WITH HYPERGLYCEMIA, WITH LONG-TERM CURRENT USE OF INSULIN (HCC): ICD-10-CM

## 2024-07-02 DIAGNOSIS — Z71.89 COUNSELING REGARDING ADVANCED DIRECTIVES: ICD-10-CM

## 2024-07-02 DIAGNOSIS — Z00.00 ENCOUNTER FOR MEDICARE ANNUAL WELLNESS EXAM: Primary | ICD-10-CM

## 2024-07-02 DIAGNOSIS — R23.3 TRAUMATIC PETECHIAE: ICD-10-CM

## 2024-07-02 PROCEDURE — G0439 PPPS, SUBSEQ VISIT: HCPCS | Performed by: FAMILY MEDICINE

## 2024-07-02 NOTE — PROGRESS NOTES
Ambulatory Visit  Name: Jimmy Reyes      : 1950      MRN: [id number]  Encounter Provider: Kiah Rausch DO  Encounter Date: 2024   Encounter department: [facility]    Assessment & Plan   1. Encounter for Medicare annual wellness exam  2. Type 2 diabetes mellitus with hyperglycemia, with long-term current use of insulin (HCC)  Assessment & Plan:  Follows with endo. Sugars well controlled. Eye and foot exam up to date. (Eye exam requested today from [facility]). Blood work to be completed in August for endo.   Lab Results   Component Value Date    HGBA1C 6.9 (A) 2024     3. B12 deficiency  Assessment & Plan:  Feeling more energy with supplementation. Update blood work with endo labs in August.   Orders:  -     Vitamin B12; Future  4. Counseling regarding advanced directives  Assessment & Plan:  Confirmed today DNR. Wife Lisa SHIELDS. Advanced directive at home.   5. Traumatic petechiae      Depression Screening and Follow-up Plan: Patient was screened for depression during today's encounter. They screened negative with a PHQ-2 score of 0.      Preventive health issues were discussed with patient, and age appropriate screening tests were ordered as noted in patient's After Visit Summary. Personalized health advice and appropriate referrals for health education or preventive services given if needed, as noted in patient's After Visit Summary.    History of Present Illness     HPI   Patient Care Team:  Kiah Rausch DO as PCP - General (Family Medicine)  Duc Johnson MD (Orthopedic Surgery)  Adrienne Sesay MD (Cardiology)  MAYELA Garcia MD (General Surgery)  Migel Sullivan MD (Urology)  MD Ana Kim MD (Gastroenterology)  Ana Wong MD as Endoscopist (Gastroenterology)  Sandy Medina MD (Radiation Oncology)  Saurabh Veras MD (Endocrinology)  David Daley DPM (Podiatry)  Phill  Elidia Borden MD (Sleep Medicine)    Review of Systems  Medical History Reviewed by provider this encounter:  Tobacco  Allergies  Meds  Problems  Med Hx  Surg Hx  Fam Hx       Annual Wellness Visit Questionnaire   Mario is here for his Subsequent Wellness visit.     Health Risk Assessment:   Patient rates overall health as good. Patient feels that their physical health rating is same. Patient is very satisfied with their life. Eyesight was rated as same. Hearing was rated as same. Patient feels that their emotional and mental health rating is slightly better. Patients states they are never, rarely angry. Patient states they are sometimes unusually tired/fatigued. Pain experienced in the last 7 days has been none. Patient states that he has experienced no weight loss or gain in last 6 months.     Depression Screening:   PHQ-2 Score: 0      Fall Risk Screening:   In the past year, patient has experienced: history of falling in past year    Number of falls: 1  Injured during fall?: No    Feels unsteady when standing or walking?: No    Worried about falling?: No      Home Safety:  Patient does not have trouble with stairs inside or outside of their home. Patient has working smoke alarms and has working carbon monoxide detector. Home safety hazards include: none.     Nutrition:   Current diet is Regular and Limited junk food.     Medications:   Patient is currently taking over-the-counter supplements. OTC medications include: see medication list. Patient is able to manage medications.     Activities of Daily Living (ADLs)/Instrumental Activities of Daily Living (IADLs):   Walk and transfer into and out of bed and chair?: Yes  Dress and groom yourself?: Yes    Bathe or shower yourself?: Yes    Feed yourself? Yes  Do your laundry/housekeeping?: Yes  Manage your money, pay your bills and track your expenses?: Yes  Make your own meals?: Yes    Do your own shopping?: Yes    Previous Hospitalizations:   Any  hospitalizations or ED visits within the last 12 months?: Yes    How many hospitalizations have you had in the last year?: 1-2    Hospitalization Comments: 4/1/24    Advance Care Planning:   Living will: Yes    Durable POA for healthcare: Yes    Advanced directive: Yes    Advanced directive counseling given: Yes    ACP document given: Yes    End of Life Decisions reviewed with patient: Yes      Comments: DNR.  Wife Lisa campa POA.  Advanced directive at home.     Cognitive Screening:   Provider or family/friend/caregiver concerned regarding cognition?: No    PREVENTIVE SCREENINGS      Cardiovascular Screening:    General: Screening Not Indicated and History Lipid Disorder      Diabetes Screening:     General: Screening Not Indicated and History Diabetes      Colorectal Cancer Screening:     General: Screening Current      Prostate Cancer Screening:    General: History Prostate Cancer      Abdominal Aortic Aneurysm (AAA) Screening:    Risk factors include: age between 65-74 yo and tobacco use        Lung Cancer Screening:     General: Screening Not Indicated      Hepatitis C Screening:    General: Screening Current    Screening, Brief Intervention, and Referral to Treatment (SBIRT)    Screening  Typical number of drinks in a day: 0  Typical number of drinks in a week: 0  Interpretation: Low risk drinking behavior.    Single Item Drug Screening:  How often have you used an illegal drug (including marijuana) or a prescription medication for non-medical reasons in the past year? never    Single Item Drug Screen Score: 0  Interpretation: Negative screen for possible drug use disorder    Social Determinants of Health     Financial Resource Strain: High Risk (6/29/2022)    Overall Financial Resource Strain (CARDIA)    • Difficulty of Paying Living Expenses: Hard   Food Insecurity: No Food Insecurity (7/2/2024)    Hunger Vital Sign    • Worried About Running Out of Food in the Last Year: Never true    • Ran Out  "of Food in the Last Year: Never true   Transportation Needs: No Transportation Needs (7/2/2024)    PRAPARE - Transportation    • Lack of Transportation (Medical): No    • Lack of Transportation (Non-Medical): No   Housing Stability: Low Risk  (7/2/2024)    Housing Stability Vital Sign    • Unable to Pay for Housing in the Last Year: No    • Number of Times Moved in the Last Year: 1    • Homeless in the Last Year: No   Utilities: Not At Risk (7/2/2024)    [facility] Utilities    • Threatened with loss of utilities: No     No results found.    Objective     /54 (BP Location: Left arm, Patient Position: Sitting, Cuff Size: Standard)   Pulse 65   Temp 98.4 °F (36.9 °C) (Temporal)   Resp 18   Ht 5' 7.25\" (1.708 m)   Wt 101 kg (222 lb 9.6 oz)   SpO2 95%   BMI 34.61 kg/m²     Physical Exam  Constitutional:       Appearance: He is well-developed.   HENT:      Head: Normocephalic and atraumatic.      Right Ear: Tympanic membrane and ear canal normal.      Left Ear: Tympanic membrane and ear canal normal.      Nose: Nose normal.   Eyes:      General: Lids are normal.      Conjunctiva/sclera: Conjunctivae normal.      Pupils: Pupils are equal, round, and reactive to light.   Cardiovascular:      Rate and Rhythm: Normal rate and regular rhythm.      Heart sounds: Normal heart sounds.   Pulmonary:      Effort: Pulmonary effort is normal.      Breath sounds: Normal breath sounds.   Abdominal:      General: Bowel sounds are normal.      Palpations: Abdomen is soft.   Musculoskeletal:         General: Normal range of motion.      Cervical back: Normal range of motion and neck supple.   Lymphadenopathy:      Cervical: No cervical adenopathy.   Skin:     General: Skin is warm and dry.   Neurological:      Mental Status: He is alert and oriented to person, place, and time.   Psychiatric:         Behavior: Behavior normal.         Thought Content: Thought content normal.         Judgment: Judgment normal.       Administrative " Statements

## 2024-07-02 NOTE — ASSESSMENT & PLAN NOTE
Follows with endo. Sugars well controlled. Eye and foot exam up to date. (Eye exam requested today from [facility]). Blood work to be completed in August for endo.   Lab Results   Component Value Date    HGBA1C 6.9 (A) 04/05/2024

## 2024-07-02 NOTE — PATIENT INSTRUCTIONS
Vaccines you are due for: Tdap    As of January 2023 most vaccines are being covered by Medicare Part D.* This means you may now be able to get Shingrix or Tdap at no charge to you.  Please ask at the pharmacy to see if this is covered. If you are the pharmacist can administer these vaccines for you. Tdap is due every 10 years as a preventive vaccine. Shingrix is given as two parts. The second dose is given 2 - 6 months after the first dose.   *The only exceptions are flu, pneumonia, hepatitis B, and COVID-19 vaccinations, which are covered by Part B - this means they can be given at the pharmacy or the doctor's office.    Immunization History   Administered Date(s) Administered    COVID-19 PFIZER VACCINE 0.3 ML IM 02/23/2021, 03/14/2021, 10/07/2021    INFLUENZA 10/31/2015, 10/05/2016, 11/10/2017, 09/20/2018, 10/03/2023    Influenza Split High Dose Preservative Free IM 10/05/2016, 11/10/2017    Influenza, high dose seasonal 0.7 mL 09/20/2018, 09/10/2019, 11/12/2020, 09/23/2022, 10/03/2023    Influenza, injectable, quadrivalent, preservative free 0.5 mL 10/21/2021    Influenza, seasonal, injectable 10/31/2015    Pneumococcal Conjugate 13-Valent 01/05/2016    Pneumococcal Polysaccharide PPV23 09/05/2013, 04/07/2017       Medicare Preventive Visit Patient Instructions  Thank you for completing your Welcome to Medicare Visit or Medicare Annual Wellness Visit today. Your next wellness visit will be due in one year (7/3/2025).  The screening/preventive services that you may require over the next 5-10 years are detailed below. Some tests may not apply to you based off risk factors and/or age. Screening tests ordered at today's visit but not completed yet may show as past due. Also, please note that scanned in results may not display below.  Preventive Screenings:  Service Recommendations Previous Testing/Comments   Colorectal Cancer Screening  Colonoscopy    Fecal Occult Blood Test (FOBT)/Fecal Immunochemical Test  (FIT)  Fecal DNA/Cologuard Test  Flexible Sigmoidoscopy Age: 45-75 years old   Colonoscopy: every 10 years (May be performed more frequently if at higher risk)  OR  FOBT/FIT: every 1 year  OR  Cologuard: every 3 years  OR  Sigmoidoscopy: every 5 years  Screening may be recommended earlier than age 45 if at higher risk for colorectal cancer. Also, an individualized decision between you and your healthcare provider will decide whether screening between the ages of 76-85 would be appropriate. Colonoscopy: 12/19/2022  FOBT/FIT: Not on file  Cologuard: Not on file  Sigmoidoscopy: Not on file    Screening Current     Prostate Cancer Screening Individualized decision between patient and health care provider in men between ages of 55-69   Medicare will cover every 12 months beginning on the day after your 50th birthday PSA: 0.15 ng/mL     History Prostate Cancer     Hepatitis C Screening Once for adults born between 1945 and 1965  More frequently in patients at high risk for Hepatitis C Hep C Antibody: 07/17/2018    Screening Current   Diabetes Screening 1-2 times per year if you're at risk for diabetes or have pre-diabetes Fasting glucose: 253 mg/dL (4/17/2024)  A1C: 6.9 (4/5/2024)  Screening Not Indicated  History Diabetes   Cholesterol Screening Once every 5 years if you don't have a lipid disorder. May order more often based on risk factors. Lipid panel: 07/30/2023  Screening Not Indicated  History Lipid Disorder      Other Preventive Screenings Covered by Medicare:  Abdominal Aortic Aneurysm (AAA) Screening: covered once if your at risk. You're considered to be at risk if you have a family history of AAA or a male between the age of 65-75 who smoking at least 100 cigarettes in your lifetime.  Lung Cancer Screening: covers low dose CT scan once per year if you meet all of the following conditions: (1) Age 55-77; (2) No signs or symptoms of lung cancer; (3) Current smoker or have quit smoking within the last 15 years;  (4) You have a tobacco smoking history of at least 20 pack years (packs per day x number of years you smoked); (5) You get a written order from a healthcare provider.  Glaucoma Screening: covered annually if you're considered high risk: (1) You have diabetes OR (2) Family history of glaucoma OR (3)  aged 50 and older OR (4)  American aged 65 and older  Osteoporosis Screening: covered every 2 years if you meet one of the following conditions: (1) Have a vertebral abnormality; (2) On glucocorticoid therapy for more than 3 months; (3) Have primary hyperparathyroidism; (4) On osteoporosis medications and need to assess response to drug therapy.  HIV Screening: covered annually if you're between the age of 15-65. Also covered annually if you are younger than 15 and older than 65 with risk factors for HIV infection. For pregnant patients, it is covered up to 3 times per pregnancy.    Immunizations:  Immunization Recommendations   Influenza Vaccine Annual influenza vaccination during flu season is recommended for all persons aged >= 6 months who do not have contraindications   Pneumococcal Vaccine   * Pneumococcal conjugate vaccine = PCV13 (Prevnar 13), PCV15 (Vaxneuvance), PCV20 (Prevnar 20)  * Pneumococcal polysaccharide vaccine = PPSV23 (Pneumovax) Adults 19-65 yo with certain risk factors or if 65+ yo  If never received any pneumonia vaccine: recommend Prevnar 20 (PCV20)  Give PCV20 if previously received 1 dose of PCV13 or PPSV23   Hepatitis B Vaccine 3 dose series if at intermediate or high risk (ex: diabetes, end stage renal disease, liver disease)   Respiratory syncytial virus (RSV) Vaccine - COVERED BY MEDICARE PART D  * RSVPreF3 (Arexvy) CDC recommends that adults 60 years of age and older may receive a single dose of RSV vaccine using shared clinical decision-making (SCDM)   Tetanus (Td) Vaccine - COST NOT COVERED BY MEDICARE PART B Following completion of primary series, a booster dose  should be given every 10 years to maintain immunity against tetanus. Td may also be given as tetanus wound prophylaxis.   Tdap Vaccine - COST NOT COVERED BY MEDICARE PART B Recommended at least once for all adults. For pregnant patients, recommended with each pregnancy.   Shingles Vaccine (Shingrix) - COST NOT COVERED BY MEDICARE PART B  2 shot series recommended in those 19 years and older who have or will have weakened immune systems or those 50 years and older     Health Maintenance Due:      Topic Date Due    Colorectal Cancer Screening  12/18/2024    Hepatitis C Screening  Completed     Immunizations Due:      Topic Date Due    Influenza Vaccine (1) 09/01/2024     Advance Directives   What are advance directives?  Advance directives are legal documents that state your wishes and plans for medical care. These plans are made ahead of time in case you lose your ability to make decisions for yourself. Advance directives can apply to any medical decision, such as the treatments you want, and if you want to donate organs.   What are the types of advance directives?  There are many types of advance directives, and each state has rules about how to use them. You may choose a combination of any of the following:  Living will:  This is a written record of the treatment you want. You can also choose which treatments you do not want, which to limit, and which to stop at a certain time. This includes surgery, medicine, IV fluid, and tube feedings.   Durable power of  for healthcare (DPAHC):  This is a written record that states who you want to make healthcare choices for you when you are unable to make them for yourself. This person, called a proxy, is usually a family member or a friend. You may choose more than 1 proxy.  Do not resuscitate (DNR) order:  A DNR order is used in case your heart stops beating or you stop breathing. It is a request not to have certain forms of treatment, such as CPR. A DNR order may be  included in other types of advance directives.  Medical directive:  This covers the care that you want if you are in a coma, near death, or unable to make decisions for yourself. You can list the treatments you want for each condition. Treatment may include pain medicine, surgery, blood transfusions, dialysis, IV or tube feedings, and a ventilator (breathing machine).  Values history:  This document has questions about your views, beliefs, and how you feel and think about life. This information can help others choose the care that you would choose.  Why are advance directives important?  An advance directive helps you control your care. Although spoken wishes may be used, it is better to have your wishes written down. Spoken wishes can be misunderstood, or not followed. Treatments may be given even if you do not want them. An advance directive may make it easier for your family to make difficult choices about your care.   Fall Prevention    Fall prevention  includes ways to make your home and other areas safer. It also includes ways you can move more carefully to prevent a fall. Health conditions that cause changes in your blood pressure, vision, or muscle strength and coordination may increase your risk for falls. Medicines may also increase your risk for falls if they make you dizzy, weak, or sleepy.   Fall prevention tips:   Stand or sit up slowly.    Use assistive devices as directed.    Wear shoes that fit well and have soles that .    Wear a personal alarm.    Stay active.    Manage your medical conditions.    Home Safety Tips:  Add items to prevent falls in the bathroom.    Keep paths clear.    Install bright lights in your home.    Keep items you use often on shelves within reach.    Paint or place reflective tape on the edges of your stairs.    Weight Management   Why it is important to manage your weight:  Being overweight increases your risk of health conditions such as heart disease, high blood  pressure, type 2 diabetes, and certain types of cancer. It can also increase your risk for osteoarthritis, sleep apnea, and other respiratory problems. Aim for a slow, steady weight loss. Even a small amount of weight loss can lower your risk of health problems.  How to lose weight safely:  A safe and healthy way to lose weight is to eat fewer calories and get regular exercise. You can lose up about 1 pound a week by decreasing the number of calories you eat by 500 calories each day.   Healthy meal plan for weight management:  A healthy meal plan includes a variety of foods, contains fewer calories, and helps you stay healthy. A healthy meal plan includes the following:  Eat whole-grain foods more often.  A healthy meal plan should contain fiber. Fiber is the part of grains, fruits, and vegetables that is not broken down by your body. Whole-grain foods are healthy and provide extra fiber in your diet. Some examples of whole-grain foods are whole-wheat breads and pastas, oatmeal, brown rice, and bulgur.  Eat a variety of vegetables every day.  Include dark, leafy greens such as spinach, kale, franny greens, and mustard greens. Eat yellow and orange vegetables such as carrots, sweet potatoes, and winter squash.   Eat a variety of fruits every day.  Choose fresh or canned fruit (canned in its own juice or light syrup) instead of juice. Fruit juice has very little or no fiber.  Eat low-fat dairy foods.  Drink fat-free (skim) milk or 1% milk. Eat fat-free yogurt and low-fat cottage cheese. Try low-fat cheeses such as mozzarella and other reduced-fat cheeses.  Choose meat and other protein foods that are low in fat.  Choose beans or other legumes such as split peas or lentils. Choose fish, skinless poultry (chicken or turkey), or lean cuts of red meat (beef or pork). Before you cook meat or poultry, cut off any visible fat.   Use less fat and oil.  Try baking foods instead of frying them. Add less fat, such as margarine,  sour cream, regular salad dressing and mayonnaise to foods. Eat fewer high-fat foods. Some examples of high-fat foods include french fries, doughnuts, ice cream, and cakes.  Eat fewer sweets.  Limit foods and drinks that are high in sugar. This includes candy, cookies, regular soda, and sweetened drinks.  Exercise:  Exercise at least 30 minutes per day on most days of the week. Some examples of exercise include walking, biking, dancing, and swimming. You can also fit in more physical activity by taking the stairs instead of the elevator or parking farther away from stores. Ask your healthcare provider about the best exercise plan for you.      © Copyright [employer] 2018 Information is for End User's use only and may not be sold, redistributed or otherwise used for commercial purposes. All illustrations and images included in CareNotes® are the copyrighted property of [employer].D.[employer]M., Inc. or [employer]

## 2024-07-15 ENCOUNTER — TELEPHONE (OUTPATIENT)
Dept: DENTISTRY | Facility: CLINIC | Age: 74
End: 2024-07-15

## 2024-08-08 ENCOUNTER — OFFICE VISIT (OUTPATIENT)
Dept: DENTISTRY | Facility: CLINIC | Age: 74
End: 2024-08-08

## 2024-08-08 DIAGNOSIS — Z01.21 ENCOUNTER FOR DENTAL EXAMINATION AND CLEANING WITH ABNORMAL FINDINGS: Primary | ICD-10-CM

## 2024-08-08 PROCEDURE — D1330 ORAL HYGIENE INSTRUCTIONS: HCPCS

## 2024-08-08 PROCEDURE — D0272 BITEWINGS - 2 RADIOGRAPHIC IMAGES: HCPCS

## 2024-08-08 PROCEDURE — D4910 PERIODONTAL MAINTENANCE: HCPCS

## 2024-08-08 PROCEDURE — D0120 PERIODIC ORAL EVALUATION - ESTABLISHED PATIENT: HCPCS

## 2024-08-08 NOTE — DENTAL PROCEDURE DETAILS
Periodic Exam,3 PM     REVIEWED MED HX: meds, allergies, health changes reviewed in EPIC  CHIEF CONCERN:  none   PAIN SCALE: 0  ASA CLASS:  ASA 2 - Patient with mild systemic disease with no functional limitations  PLAQUE:  moderate  CALCULUS:  Moderate  BLEEDING:  moderate  STAIN: Moderate  PERIO: 4B    Hygiene Procedures: Scaled, Polished, Flossed and Used Cavitron    Oral Hygiene Instruction: Brushing minimum 2x daily for 2 minutes, daily flossing and Interproximal brush    Visual and Tactile Intraoral/ Extraoral evaluation: Oral and Oropharyngeal cancer evaluation. No findings     REFERRALS: None    EXAM: Dr. Gibson  Patient is returning to discuss Max ant options.  Treatment planning session incld. Impressions,new Pas,P.C.,Endo test #7,8,9,10.    Next Recall: 3 month perio maintenance

## 2024-08-19 ENCOUNTER — TELEPHONE (OUTPATIENT)
Age: 74
End: 2024-08-19

## 2024-08-19 ENCOUNTER — ESTABLISHED COMPREHENSIVE EXAM (OUTPATIENT)
Dept: URBAN - METROPOLITAN AREA CLINIC 6 | Facility: CLINIC | Age: 74
End: 2024-08-19

## 2024-08-19 DIAGNOSIS — H35.413: ICD-10-CM

## 2024-08-19 DIAGNOSIS — Z96.1: ICD-10-CM

## 2024-08-19 DIAGNOSIS — H26.493: ICD-10-CM

## 2024-08-19 PROCEDURE — 92014 COMPRE OPH EXAM EST PT 1/>: CPT

## 2024-08-19 ASSESSMENT — VISUAL ACUITY
OD_PH: 20/30-2
OS_CC: 20/25-1
OD_CC: 20/40+1
OU_CC: J1+

## 2024-08-19 ASSESSMENT — KERATOMETRY
OD_AXISANGLE_DEGREES: 055
OS_K2POWER_DIOPTERS: 45.75
OS_K1POWER_DIOPTERS: 44.50
OS_AXISANGLE_DEGREES: 025
OD_K1POWER_DIOPTERS: 43.75
OD_K2POWER_DIOPTERS: 46.00
OD_AXISANGLE2_DEGREES: 145
OS_AXISANGLE2_DEGREES: 115

## 2024-08-19 ASSESSMENT — TONOMETRY
OS_IOP_MMHG: 16
OD_IOP_MMHG: 14

## 2024-08-19 NOTE — TELEPHONE ENCOUNTER
If he know how to draw up insulin from a vial using a syringe and feels comfortable injecting himself like that, it can be switched to the vials. However, if he is not comfortable doing that, we can try sending Humalog 75/25 pen with some dose / instructions. Or he can try to get the Novolog mix relion brand pens at [facility].

## 2024-08-19 NOTE — TELEPHONE ENCOUNTER
Pharmacy called- Novolog mix 70/30 Flexpens are on backorder right now.    They do have Novolog mix 70/30 10 mL bottles if that is acceptable as well. Please advise.

## 2024-08-20 ENCOUNTER — TELEPHONE (OUTPATIENT)
Age: 74
End: 2024-08-20

## 2024-08-20 ENCOUNTER — OFFICE VISIT (OUTPATIENT)
Dept: ENDOCRINOLOGY | Facility: CLINIC | Age: 74
End: 2024-08-20
Payer: COMMERCIAL

## 2024-08-20 ENCOUNTER — APPOINTMENT (EMERGENCY)
Dept: RADIOLOGY | Facility: HOSPITAL | Age: 74
End: 2024-08-20
Payer: COMMERCIAL

## 2024-08-20 ENCOUNTER — HOSPITAL ENCOUNTER (EMERGENCY)
Facility: HOSPITAL | Age: 74
Discharge: HOME | End: 2024-08-20
Attending: EMERGENCY MEDICINE
Payer: COMMERCIAL

## 2024-08-20 ENCOUNTER — NURSE TRIAGE (OUTPATIENT)
Age: 74
End: 2024-08-20

## 2024-08-20 VITALS
SYSTOLIC BLOOD PRESSURE: 100 MMHG | WEIGHT: 221 LBS | BODY MASS INDEX: 34.69 KG/M2 | HEART RATE: 60 BPM | DIASTOLIC BLOOD PRESSURE: 50 MMHG | OXYGEN SATURATION: 95 % | HEIGHT: 67 IN

## 2024-08-20 VITALS
SYSTOLIC BLOOD PRESSURE: 131 MMHG | RESPIRATION RATE: 20 BRPM | OXYGEN SATURATION: 96 % | DIASTOLIC BLOOD PRESSURE: 60 MMHG | HEART RATE: 48 BPM | TEMPERATURE: 97.6 F

## 2024-08-20 DIAGNOSIS — E78.00 HYPERCHOLESTEREMIA: ICD-10-CM

## 2024-08-20 DIAGNOSIS — E11.65 TYPE 2 DIABETES MELLITUS WITH HYPERGLYCEMIA, WITH LONG-TERM CURRENT USE OF INSULIN (HCC): Primary | Chronic | ICD-10-CM

## 2024-08-20 DIAGNOSIS — R00.1 BRADYCARDIA: ICD-10-CM

## 2024-08-20 DIAGNOSIS — R07.89 ATYPICAL CHEST PAIN: Primary | ICD-10-CM

## 2024-08-20 DIAGNOSIS — Z79.4 TYPE 2 DIABETES MELLITUS WITH HYPERGLYCEMIA, WITH LONG-TERM CURRENT USE OF INSULIN (HCC): Primary | Chronic | ICD-10-CM

## 2024-08-20 DIAGNOSIS — I10 BENIGN ESSENTIAL HYPERTENSION: ICD-10-CM

## 2024-08-20 LAB
2HR DELTA HS TROPONIN: 1 NG/L
ANION GAP SERPL CALCULATED.3IONS-SCNC: 7 MMOL/L (ref 4–13)
BASOPHILS # BLD AUTO: 0.04 THOUSANDS/ÂΜL (ref 0–0.1)
BASOPHILS NFR BLD AUTO: 1 % (ref 0–1)
BUN SERPL-MCNC: 23 MG/DL (ref 5–25)
CALCIUM SERPL-MCNC: 9.3 MG/DL (ref 8.4–10.2)
CARDIAC TROPONIN I PNL SERPL HS: 3 NG/L
CARDIAC TROPONIN I PNL SERPL HS: 4 NG/L
CHLORIDE SERPL-SCNC: 104 MMOL/L (ref 96–108)
CO2 SERPL-SCNC: 27 MMOL/L (ref 21–32)
CREAT SERPL-MCNC: 0.87 MG/DL (ref 0.6–1.3)
EOSINOPHIL # BLD AUTO: 0.13 THOUSAND/ÂΜL (ref 0–0.61)
EOSINOPHIL NFR BLD AUTO: 2 % (ref 0–6)
ERYTHROCYTE [DISTWIDTH] IN BLOOD BY AUTOMATED COUNT: 14 % (ref 11.6–15.1)
FLUAV RNA RESP QL NAA+PROBE: NEGATIVE
FLUBV RNA RESP QL NAA+PROBE: NEGATIVE
GFR SERPL CREATININE-BSD FRML MDRD: 85 ML/MIN/1.73SQ M
GLUCOSE SERPL-MCNC: 123 MG/DL (ref 65–140)
HCT VFR BLD AUTO: 36.7 % (ref 36.5–49.3)
HGB BLD-MCNC: 12 G/DL (ref 12–17)
IMM GRANULOCYTES # BLD AUTO: 0.04 THOUSAND/UL (ref 0–0.2)
IMM GRANULOCYTES NFR BLD AUTO: 1 % (ref 0–2)
LYMPHOCYTES # BLD AUTO: 0.8 THOUSANDS/ÂΜL (ref 0.6–4.47)
LYMPHOCYTES NFR BLD AUTO: 10 % (ref 14–44)
MCH RBC QN AUTO: 29.3 PG (ref 26.8–34.3)
MCHC RBC AUTO-ENTMCNC: 32.7 G/DL (ref 31.4–37.4)
MCV RBC AUTO: 90 FL (ref 82–98)
MONOCYTES # BLD AUTO: 0.75 THOUSAND/ÂΜL (ref 0.17–1.22)
MONOCYTES NFR BLD AUTO: 9 % (ref 4–12)
NEUTROPHILS # BLD AUTO: 6.52 THOUSANDS/ÂΜL (ref 1.85–7.62)
NEUTS SEG NFR BLD AUTO: 77 % (ref 43–75)
NRBC BLD AUTO-RTO: 0 /100 WBCS
PLATELET # BLD AUTO: 147 THOUSANDS/UL (ref 149–390)
PMV BLD AUTO: 12.9 FL (ref 8.9–12.7)
POTASSIUM SERPL-SCNC: 5.3 MMOL/L (ref 3.5–5.3)
RBC # BLD AUTO: 4.09 MILLION/UL (ref 3.88–5.62)
RSV RNA RESP QL NAA+PROBE: NEGATIVE
SARS-COV-2 RNA RESP QL NAA+PROBE: NEGATIVE
SODIUM SERPL-SCNC: 138 MMOL/L (ref 135–147)
TSH SERPL DL<=0.05 MIU/L-ACNC: 3.44 UIU/ML (ref 0.45–4.5)
WBC # BLD AUTO: 8.28 THOUSAND/UL (ref 4.31–10.16)

## 2024-08-20 PROCEDURE — 0241U HB NFCT DS VIR RESP RNA 4 TRGT: CPT

## 2024-08-20 PROCEDURE — 86618 LYME DISEASE ANTIBODY: CPT | Performed by: EMERGENCY MEDICINE

## 2024-08-20 PROCEDURE — 84484 ASSAY OF TROPONIN QUANT: CPT

## 2024-08-20 PROCEDURE — 36415 COLL VENOUS BLD VENIPUNCTURE: CPT

## 2024-08-20 PROCEDURE — 85025 COMPLETE CBC W/AUTO DIFF WBC: CPT

## 2024-08-20 PROCEDURE — 93005 ELECTROCARDIOGRAM TRACING: CPT

## 2024-08-20 PROCEDURE — 71045 X-RAY EXAM CHEST 1 VIEW: CPT

## 2024-08-20 PROCEDURE — 99214 OFFICE O/P EST MOD 30 MIN: CPT | Performed by: PHYSICIAN ASSISTANT

## 2024-08-20 PROCEDURE — 84443 ASSAY THYROID STIM HORMONE: CPT | Performed by: EMERGENCY MEDICINE

## 2024-08-20 PROCEDURE — 99285 EMERGENCY DEPT VISIT HI MDM: CPT

## 2024-08-20 PROCEDURE — 80048 BASIC METABOLIC PNL TOTAL CA: CPT

## 2024-08-20 RX ORDER — IBUPROFEN 200 MG
TABLET ORAL
Qty: 200 EACH | Refills: 1 | Status: SHIPPED | OUTPATIENT
Start: 2024-08-20

## 2024-08-20 RX ORDER — SODIUM CHLORIDE 9 MG/ML
3 INJECTION INTRAVENOUS
Status: DISCONTINUED | OUTPATIENT
Start: 2024-08-20 | End: 2024-08-20 | Stop reason: HOSPADM

## 2024-08-20 RX ORDER — INSULIN ASPART 100 [IU]/ML
INJECTION, SUSPENSION SUBCUTANEOUS
Qty: 30 ML | Refills: 1 | Status: SHIPPED | OUTPATIENT
Start: 2024-08-20

## 2024-08-20 NOTE — TELEPHONE ENCOUNTER
Patient called in asking if he needed to have any testing completed prior to his appointment with [facility] on 9/25/2024. I advised the patient to have his PSA and Xray of his abdomen be completed. I provided the phone number for central scheduling. Patient verbalized understanding.

## 2024-08-20 NOTE — TELEPHONE ENCOUNTER
"Patient was seen in endocrine office today and had lower blood pressure noted at visit.  Patient got home and started having chest tightness.  Blood pressure 100/50.  Patient also endorses dizziness.  Wife states that patient's color is not \"good\".  RN advised that patient go to the ED for further evaluation.  Verbalized understanding and will seek evaluation.     Reason for Disposition   Chest pain lasting longer than 5 minutes and ANY of the following:* Over 44 years old* Over 30 years old and at least one cardiac risk factor (e.g., diabetes mellitus, high blood pressure, high cholesterol, smoker, or strong family history of heart disease)* History of heart disease (i.e., angina, heart attack, heart failure, bypass surgery, takes nitroglycerin)* Pain is crushing, pressure-like, or heavy    Answer Assessment - Initial Assessment Questions  1. LOCATION: \"Where does it hurt?\"        Chest tightness with low pressure.   2. RADIATION: \"Does the pain go anywhere else?\" (e.g., into neck, jaw, arms, back)      Denies  3. ONSET: \"When did the chest pain begin?\" (Minutes, hours or days)       An hour ago.   4. PATTERN \"Does the pain come and go, or has it been constant since it started?\"  \"Does it get worse with exertion?\"       Constant  5. DURATION: \"How long does it last\" (e.g., seconds, minutes, hours)      Constant  6. SEVERITY: \"How bad is the pain?\"  (e.g., Scale 1-10; mild, moderate, or severe)     - MILD (1-3): doesn't interfere with normal activities      - MODERATE (4-7): interferes with normal activities or awakens from sleep     - SEVERE (8-10): excruciating pain, unable to do any normal activities        Moderate  7. CARDIAC RISK FACTORS: \"Do you have any history of heart problems or risk factors for heart disease?\" (e.g., angina, prior heart attack; diabetes, high blood pressure, high cholesterol, smoker, or strong family history of heart disease)      HTN  8. PULMONARY RISK FACTORS: \"Do you have any history " "of lung disease?\"  (e.g., blood clots in lung, asthma, emphysema, birth control pills)      Asthma  9. CAUSE: \"What do you think is causing the chest pain?\"      Unknown  10. OTHER SYMPTOMS: \"Do you have any other symptoms?\" (e.g., dizziness, nausea, vomiting, sweating, fever, difficulty breathing, cough)        Dizziness, wife states that his color is not good.   11. PREGNANCY: \"Is there any chance you are pregnant?\" \"When was your last menstrual period?\"        N/A    Protocols used: Chest Pain-ADULT-[place]    "

## 2024-08-20 NOTE — PATIENT INSTRUCTIONS
Hypoglycemia instructions   Jimmy Reyes  8/20/2024  [id number]    Low Blood Sugar    Steps to treat low blood sugar.    1. Test blood sugar if you have symptoms of low blood sugar:   Low Blood Sugar Symptoms:  o Sweaty  o Dizzy  o Rapid heartbeat  o Shaky  o Bad mood  o Hungry      2. Treat blood sugar less than 70 with 15 grams of fast-acting carbohydrate:   Examples of 15 grams Fast-Acting Carbohydrate:  o 4 oz juice  o 4 oz regular soda  o 3-4 glucose tablets (chew)  o 3-4 hard candies (chew)          3.  Wait 15 minutes and test your blood sugar again     4. If blood sugar is less than 100, repeat steps 2-3.    5. When your blood sugar is 100 or more, eat a snack if it will be longer than one hour until your next meal. The snack should be 15 grams of carbohydrate and a protein:   Examples of snacks:  o ½ sandwich  o 6 crackers with cheese  o Piece of fruit with cheese or peanut butter  o 6 crackers with peanut butter

## 2024-08-20 NOTE — TELEPHONE ENCOUNTER
Regarding: Low BP  ----- Message from Hawa PORTER sent at 8/20/2024  4:50 PM EDT -----  Low Blood pressure 128/62 pulse 58 at doctors office indocronologist it was 100/50 pulse 60.aldair been feeling weak and no energy over a Month.

## 2024-08-20 NOTE — ED ATTENDING ATTESTATION
8/20/2024  I, Ezequiel Cox MD, saw and evaluated the patient. I have discussed the patient with the resident/non-physician practitioner and agree with the resident's/non-physician practitioner's findings, Plan of Care, and MDM as documented in the resident's/non-physician practitioner's note, except where noted. All available labs and Radiology studies were reviewed.  I was present for key portions of any procedure(s) performed by the resident/non-physician practitioner and I was immediately available to provide assistance.       At this point I agree with the current assessment done in the Emergency Department.  I have conducted an independent evaluation of this patient a history and physical is as follows:    ED Course         Critical Care Time  Procedures

## 2024-08-20 NOTE — TELEPHONE ENCOUNTER
Patient called back, he said he has an appointment at 4 pm but he has been out of insulin for 2 days.  He said he feels comfortable with the vials and would like the vials sent to [facility]. Please advise.

## 2024-08-20 NOTE — PROGRESS NOTES
Patient Progress Note      CC: DM      Referring Provider  No referring provider defined for this encounter.     History of Present Illness:   Jimmy Reyes is a 74 y.o. male with a history of type 2 diabetes with long term use of insulin. Diabetes course has been stable. No recent illness or hospitalization. Denies recent severe hypoglycemic or severe hyperglycemic episodes. Denies any issues with his current regimen. Home glucose monitoring: are performed regularly, using the Dunia     No log or meter today   He did not bring his Dunia  so we are unable to download a report today  Home blood glucose readings: he reports mornings are around 130s and evenings before dinner around 150s mg/dl      Current regimen: Novolog 70/30 15 units with breakfast and 10 units with dinner, metformin 1000 mg BID   compliant most of the time, denies any side effects from medications.  Injects in: abdomen. Rotates sites: Yes  Hypoglycemic episodes: No, rare  H/o of hypoglycemia causing hospitalization or Intervention such as glucagon injection or ambulance call : No  Hypoglycemia symptoms: sweating  Treatment of hypoglycemia: discussed treatment      Medic alert tag: recommended: Yes     Diabetes education: Yes  Diet: 3 meals per day, 1 snack per day. Timing of meals is predictable.   Diabetic diet compliance: compliant most of the time  Activity: Daily activity is predictable: Yes. No routine exercise but tries to stay active with yard work.     Ophthamology: August 2022. States he went yesterday to [facility]  Podiatry: Oct 2023     Has hypertension: on ACE inhibitor/ARB, compliant most of the time  Has hyperlipidemia: on statin - tolerating well, no myalgias. compliant most of the time, denies any side effects from medications.  Thyroid disorders: No  History of pancreatitis: No      Patient Active Problem List   Diagnosis    GERD (gastroesophageal reflux disease)    Class 1 obesity due to excess calories  with serious comorbidity and body mass index (BMI) of 34.0 to 34.9 in adult    Hypercholesteremia    Postgastrectomy malabsorption    Type 2 diabetes mellitus with hyperglycemia, with long-term current use of insulin (HCC)    Benign essential hypertension    Benign enlargement of prostate    Pancreatic cyst    History of colon polyps    IPMN (intraductal papillary mucinous neoplasm)    ED (erectile dysfunction) of organic origin    Bruxism    Prostate cancer (HCC)    Greater trochanteric bursitis of left hip    Primary osteoarthritis of one hip, left    Colitis    Mild intermittent asthma without complication    Platelets decreased (HCC)    Near syncope    Right ureteral stone    Diverticulosis    Anxiety    Personal history of kidney stones    Counseling regarding advanced directives    B12 deficiency      Past Medical History:   Diagnosis Date    Anemia     Arthritis     Black stool 07/24/2020    Bursitis of right elbow 11/28/2023    Cancer (HCC)     Colon polyp     Diabetes mellitus (HCC)     IDDM    Diverticulitis of colon     Diverticulosis     Enlarged prostate     Erectile dysfunction     H/O toe surgery 12/02/2023    HL (hearing loss)     Hypercholesteremia     Resolved post weight loss    Hypertension     Resolved post weight loss    Kidney stone     04/2024    Obesity     Prostate cancer (HCC)     Sleep apnea     resolved with weight loss    Urinary tract infection     Wears partial dentures     partial upper and lower      Past Surgical History:   Procedure Laterality Date    ABDOMINAL ADHESION SURGERY N/A 11/28/2016    Procedure: EXTENSIVE LYSIS ADHESIONS;  Surgeon: Abilio Plummer MD;  Location: [facility];  Service:     ANKLE FRACTURE SURGERY Left     CHOLECYSTECTOMY      COLON SURGERY      colon resection    COLONOSCOPY      COLOSTOMY      COLOSTOMY CLOSURE      DIAGNOSTIC LAPAROSCOPY      FL RETROGRADE PYELOGRAM  4/2/2024    GASTRIC BYPASS      failed due to adhesions    HERNIA REPAIR      abdominal     MD CORRECTION HAMMERTOE Left 12/1/2023    Procedure: REPAIR HAMMERTOE / MALLET TOE / CLAW TOE;  Surgeon: David Daley DPM;  Location: [facility];  Service: Podiatry    MD CYSTO INSERTION TRANSPROSTATIC IMPLANT SINGLE N/A 3/8/2019    Procedure: CYSTOSCOPY WITH INSERTION UROLIFT;  Surgeon: Migel Sullivan MD;  [facility];  Service: Urology    MD CYSTO INSERTION TRANSPROSTATIC IMPLANT SINGLE N/A 5/8/2020    Procedure: CYSTOSCOPY WITH INSERTION UROLIFT;  Surgeon: Migel Sullivan MD;  [facility];  Service: Urology    MD CYSTO/URETERO W/LITHOTRIPSY &INDWELL STENT INSRT Right 4/2/2024    Procedure: CYSTOSCOPY URETEROSCOPY STONE BASKET EXTRACTION, RETROGRADE PYELOGRAM AND INSERTION STENT URETERAL;  Surgeon: Julien Boyce DO;  [facility];  Service: Urology    MD CYSTOURETHROSCOPY W/INTERNAL URETHROTOMY N/A 5/8/2020    Procedure: DIRECT VISUAL INTERAL URETHROTOMY (DVIU), dilation of urethral stricture;  Surgeon: Migel Sullivan MD;  [facility];  Service: Urology    MD EDG US EXAM SURGICAL ALTER STOM DUODENUM/JEJUNUM N/A 10/25/2018    Procedure: LINEAR ENDOSCOPIC U/S;  Surgeon: Brody Isaacs MD;  Location: [facility];  Service: Gastroenterology    MD ESOPHAGOGASTRODUODENOSCOPY TRANSORAL DIAGNOSTIC N/A 7/6/2016    Procedure: EGD AND COLONOSCOPY;  Surgeon: Ana Wong MD;  [facility];  Service: Gastroenterology    MD LAPS GSTRC RSTRICTIV PX LONGITUDINAL GASTRECTOMY N/A 11/28/2016    Procedure: GASTRECTOMY SLEEVE LAPAROSCOPIC;  Surgeon: Abilio Plummer MD;  [facility];  Service: Bariatrics    MD PROSTATE NEEDLE BIOPSY ANY APPROACH N/A 5/8/2020    Procedure: TRANSRECTAL NEEDLE BIOPSY PROSTATE (TRNBP) WITH TRANSRECTAL ULTRASOUND GUIDANCE;  Surgeon: Migel Sullivan MD;  [facility];  Service: Urology    TONSILLECTOMY      US GUIDED PROSTATE BIOPSY  5/8/2020      Family History   Problem Relation Age of Onset    Heart disease  "Mother     Breast cancer Mother 84    Diabetes Father             Colon cancer Neg Hx     Liver disease Neg Hx      Social History     Tobacco Use    Smoking status: Former     Current packs/day: 0.25     Average packs/day: 0.3 packs/day for 19.8 years (4.9 ttl pk-yrs)     Types: Cigarettes     Start date: 11/10/2004     Quit date: 11/10/2001     Passive exposure: Past    Smokeless tobacco: Former   Substance Use Topics    Alcohol use: Not Currently     Allergies   Allergen Reactions    Enalapril Anaphylaxis, Throat Swelling and Hives         Current Outpatient Medications:     acetaminophen-codeine (TYLENOL with CODEINE #3) 300-30 MG per tablet, Take 1 tablet by mouth every 6 (six) hours as needed for moderate pain, Disp: 10 tablet, Rfl: 0    albuterol (ProAir HFA) 90 mcg/act inhaler, Inhale 2 puffs every 6 (six) hours as needed for wheezing or shortness of breath, Disp: 8.5 g, Rfl: 0    aluminum-magnesium hydroxide-simethicone (MYLANTA) 200-200-20 mg/5 mL suspension, Take 30 mL by mouth every 6 (six) hours as needed for indigestion or heartburn, Disp: 355 mL, Rfl: 0    atorvastatin (LIPITOR) 10 mg tablet, TAKE ONE TABLET BY MOUTH EVERY DAY, Disp: 100 tablet, Rfl: 3    Biotin 1000 MCG tablet, Take 1 tablet by mouth if needed Per pt takes it \"once in awhile\", Disp: , Rfl:     Blood Glucose Monitoring Suppl (GLUCOCARD VITAL MONITOR) w/Device KIT, by Does not apply route 2 (two) times a day, Disp: 1 kit, Rfl: 0    Cholecalciferol (VITAMIN D3) 2000 units capsule, Take 1,000 Units by mouth daily in the early morning , Disp: , Rfl:     Continuous Blood Gluc  (FreeStyle Dunia 2 Esparto) CHARITY, Check blood sugars multiple times per day, Disp: 1 each, Rfl: 0    Continuous Blood Gluc Sensor (FreeStyle Dunia 2 Sensor) MISC, Check blood sugars multiple times per day, Disp: 6 each, Rfl: 3    Cyanocobalamin (VITAMIN B-12) 5000 MCG TBDP, Take 5,000 mcg by mouth once a week Takes on , Disp: , Rfl:     " "Diclofenac Sodium (VOLTAREN) 1 %, Apply 2 g topically 4 (four) times a day, Disp: 100 g, Rfl: 1    docusate sodium (COLACE) 100 mg capsule, Take 100 mg by mouth daily as needed for constipation, Disp: , Rfl:     fluticasone (Flovent HFA) 110 MCG/ACT inhaler, Inhale 2 puffs if needed (asthma flare) Rinse mouth after use., Disp: , Rfl:     insulin aspart protamine-insulin aspart (NovoLOG Mix 70/30 FlexPen) 100 Units/mL injection pen, INJECT 15 UNITS UNDER THE SKIN TWO TIMES A DAY WITH MEALS (Patient taking differently: INJECT 15 UNITS UNDER THE SKIN TWO TIMES A DAY WITH MEALS (patient taking 10 units in the evening)), Disp: 30 mL, Rfl: 1    insulin aspart protamine-insulin aspart (NovoLOG Mix 70/30) 100 units/mL injection, Take 15 units with breakfast and 10 units with dinner, Disp: 30 mL, Rfl: 1    INSULIN SYRINGE 1CC/29G (B-D INSULIN SYRINGE) 29G X 1/2\" 1 ML MISC, Use twice a day, Disp: 200 each, Rfl: 1    Lancets (LIFESCAN UNISTIK 2) MISC, Lifescan One Touch Ultramini Meter; use as directed; 1; 0; 31-Oct-2016; 31-Oct-2016; Melida Duran; Active, Disp: , Rfl:     losartan (COZAAR) 25 mg tablet, TAKE ONE TABLET BY MOUTH EVERY DAY, Disp: 100 tablet, Rfl: 1    metFORMIN (GLUCOPHAGE) 1000 MG tablet, TAKE ONE TABLET BY MOUTH TWICE A DAY WITH MEALS, Disp: 180 tablet, Rfl: 1    Multiple Vitamin (MULTIVITAMIN) capsule, Take 1 capsule by mouth daily in the early morning , Disp: , Rfl:     ondansetron (ZOFRAN) 4 mg tablet, Take 1 tablet (4 mg total) by mouth every 8 (eight) hours as needed for nausea or vomiting, Disp: 12 tablet, Rfl: 0    pantoprazole (PROTONIX) 40 mg tablet, TAKE ONE TABLET BY MOUTH EVERY DAY, Disp: 90 tablet, Rfl: 1    sertraline (ZOLOFT) 50 mg tablet, Take 1 tablet (50 mg total) by mouth daily, Disp: 90 tablet, Rfl: 0    tamsulosin (FLOMAX) 0.4 mg, Take 1 capsule (0.4 mg total) by mouth daily with dinner, Disp: 30 capsule, Rfl: 3  Review of Systems   Constitutional:  Positive for fatigue. Negative for " "activity change, appetite change and unexpected weight change.   HENT:  Negative for trouble swallowing.    Eyes:  Negative for visual disturbance.   Respiratory:  Negative for shortness of breath.    Cardiovascular:  Negative for chest pain and palpitations.   Gastrointestinal:  Negative for constipation and diarrhea.   Endocrine: Negative for polydipsia and polyuria.   Musculoskeletal: Negative.    Skin:  Negative for wound.   Neurological:  Negative for numbness.   Psychiatric/Behavioral: Negative.         Physical Exam:  Body mass index is 34.36 kg/m².  /50 (BP Location: Left arm, Patient Position: Sitting, Cuff Size: Large)   Pulse 60   Ht 5' 7.25\" (1.708 m)   Wt 100 kg (221 lb)   SpO2 95%   BMI 34.36 kg/m²    Wt Readings from Last 3 Encounters:   08/20/24 100 kg (221 lb)   07/02/24 101 kg (222 lb 9.6 oz)   04/23/24 102 kg (225 lb)       Physical Exam  Vitals and nursing note reviewed.   Constitutional:       Appearance: He is well-developed.   HENT:      Head: Normocephalic.   Eyes:      General: No scleral icterus.     Extraocular Movements: EOM normal.   Neck:      Thyroid: No thyromegaly.   Cardiovascular:      Rate and Rhythm: Normal rate and regular rhythm.      Pulses:           Radial pulses are 2+ on the right side and 2+ on the left side.      Heart sounds: No murmur heard.  Pulmonary:      Effort: Pulmonary effort is normal. No respiratory distress.      Breath sounds: Normal breath sounds. No wheezing.   Musculoskeletal:      Cervical back: Neck supple.   Skin:     General: Skin is warm and dry.   Neurological:      Mental Status: He is alert.   Psychiatric:         Mood and Affect: Mood and affect normal.       Patient's shoes and socks were not removed.          Labs:   Component      Latest Ref Rng 11/24/2023 4/5/2024 4/17/2024   Sodium      135 - 147 mmol/L 140   137    Potassium      3.5 - 5.3 mmol/L 4.6   4.7    Chloride      96 - 108 mmol/L 104   105    Carbon Dioxide      21 - 32 " mmol/L 25   24    ANION GAP      4 - 13 mmol/L 11   8    BUN      5 - 25 mg/dL 19   20    Creatinine      0.60 - 1.30 mg/dL 0.80   0.76    GLUCOSE, FASTING      65 - 99 mg/dL   253 (H)    Calcium      8.4 - 10.2 mg/dL 9.0   8.7    AST      13 - 39 U/L   22    ALT      7 - 52 U/L   12    ALK PHOS      34 - 104 U/L   73    Total Protein      6.4 - 8.4 g/dL   6.8    Albumin      3.5 - 5.0 g/dL   3.9    Total Bilirubin      0.20 - 1.00 mg/dL   0.42    GFR, Calculated      ml/min/1.73sq m 88   90    GLUCOSE      65 - 140 mg/dL 115      Hemoglobin A1C      6.5   6.9 !     TSH 3RD GENERATON      0.450 - 4.500 uIU/mL   2.825       Legend:  ! Abnormal  (H) High        Plan:    Diagnoses and all orders for this visit:    Type 2 diabetes mellitus with hyperglycemia, with long-term current use of insulin ([facility])  HGA1C 6.9%. Due now.  Treatment regimen: continue current treatment for now. Switched to vial from pens as pharmacy is out of pens.   Discussed intensive insulin regimen does increase risk for hypoglycemia. Episodes of hypoglycemia can lead to permanent disability and death.  Discussed risks/complications associated with uncontrolled diabetes.    Advised to adhere to diabetic diet, and recommended staying active/exercising routinely as tolerated.  Keep carbohydrates consistent to limit blood glucose fluctuations.  Advised to call if blood sugars less than 70 mg/dl or over 300 mg/dl.   Check blood glucose 2 times a day  Discussed symptoms and treatment of hypoglycemia.   Discussed use of CGM to collect additional blood glucose data to reveal trends and patterns that can be used to optimize treatment plan.   Recommended routine follow-up with podiatry and ophthalmology.   Schedule nurse visit to download Dunia  Ordered blood work to complete now and prior to next visit.  -     Hemoglobin A1C; Future  -     Albumin / creatinine urine ratio; Future  -     Basic metabolic panel; Future  -     insulin aspart protamine-insulin  "aspart (NovoLOG Mix 70/30) 100 units/mL injection; Take 15 units with breakfast and 10 units with dinner  -     INSULIN SYRINGE 1CC/29G (B-D INSULIN SYRINGE) 29G X 1/2\" 1 ML MISC; Use twice a day    Benign essential hypertension  Blood pressure adequately controlled, continue current treatment    Hypercholesteremia  On statin therapy  Managed by PCP        Discussed with the patient diagnosis and treatment and all questions fully answered. He will call me if any problems arise.    Counseled patient on diagnostic results, prognosis, risk and benefit of treatment options, instruction for management, importance of treatment compliance, risk factor reduction and impressions.      Kimber Singh PA-C  "

## 2024-08-21 ENCOUNTER — TELEPHONE (OUTPATIENT)
Dept: ENDOCRINOLOGY | Facility: CLINIC | Age: 74
End: 2024-08-21

## 2024-08-21 DIAGNOSIS — R39.9 LOWER URINARY TRACT SYMPTOMS (LUTS): ICD-10-CM

## 2024-08-21 LAB
ATRIAL RATE: 52 BPM
B BURGDOR IGG+IGM SER QL IA: NEGATIVE
P AXIS: 67 DEGREES
PR INTERVAL: 154 MS
QRS AXIS: 57 DEGREES
QRSD INTERVAL: 86 MS
QT INTERVAL: 446 MS
QTC INTERVAL: 414 MS
T WAVE AXIS: 49 DEGREES
VENTRICULAR RATE: 52 BPM

## 2024-08-21 PROCEDURE — 93010 ELECTROCARDIOGRAM REPORT: CPT | Performed by: INTERNAL MEDICINE

## 2024-08-21 RX ORDER — TAMSULOSIN HYDROCHLORIDE 0.4 MG/1
0.4 CAPSULE ORAL
Qty: 30 CAPSULE | Refills: 0 | Status: SHIPPED | OUTPATIENT
Start: 2024-08-21

## 2024-08-21 NOTE — ED PROVIDER NOTES
"History  Chief Complaint   Patient presents with    Chest Pain     Fatigue ongoing for one month. Today started chest tightness, sob, cough, sore throat. No nausea, vomiting, diarrhea, fevers     74-year-old male with history of diabetes, hypercholesterolemia, prostate cancer presenting for chest tightness.  Reports chest tightness, shortness of breath on exertion, relieved by rest about 1 hour ago.  Symptoms resolved on presentation to the ED.  Also notes 1 month of increasing fatigue with cough, sore throat.  Patient states he was briefly hypotensive when checked in the office prior to presentation.  Blood pressure normal ED.        Chest Pain  Associated symptoms: shortness of breath    Associated symptoms: no abdominal pain, no back pain, no cough, no fever, no palpitations and not vomiting        Prior to Admission Medications   Prescriptions Last Dose Informant Patient Reported? Taking?   Biotin 1000 MCG tablet  Self Yes No   Sig: Take 1 tablet by mouth if needed Per pt takes it \"once in awhile\"   Blood Glucose Monitoring Suppl (GLUCOCARD VITAL MONITOR) w/Device KIT  Self No No   Sig: by Does not apply route 2 (two) times a day   Cholecalciferol (VITAMIN D3) 2000 units capsule  Self Yes No   Sig: Take 1,000 Units by mouth daily in the early morning    Continuous Blood Gluc  (FreeStyle Dunia 2 [place]) CHARITY  Self No No   Sig: Check blood sugars multiple times per day   Continuous Blood Gluc Sensor (FreeStyle Dunia 2 Sensor) MISC  Self No No   Sig: Check blood sugars multiple times per day   Cyanocobalamin (VITAMIN B-12) 5000 MCG TBDP  Self Yes No   Sig: Take 5,000 mcg by mouth once a week Takes on Mondays   Diclofenac Sodium (VOLTAREN) 1 %  Self No No   Sig: Apply 2 g topically 4 (four) times a day   INSULIN SYRINGE 1CC/29G (B-D INSULIN SYRINGE) 29G X 1/2\" 1 ML MISC   No No   Sig: Use twice a day   Lancets (LIFESCAN UNISTIK 2) MISC  Self Yes No   Sig: Lifescan One Touch Ultramini Meter; use as directed; " 1; 0; 31-Oct-2016; 31-Oct-2016; Melida Duran; Active   Multiple Vitamin (MULTIVITAMIN) capsule  Self Yes No   Sig: Take 1 capsule by mouth daily in the early morning    acetaminophen-codeine (TYLENOL with CODEINE #3) 300-30 MG per tablet  Self No No   Sig: Take 1 tablet by mouth every 6 (six) hours as needed for moderate pain   albuterol (ProAir HFA) 90 mcg/act inhaler  Self No No   Sig: Inhale 2 puffs every 6 (six) hours as needed for wheezing or shortness of breath   aluminum-magnesium hydroxide-simethicone (MYLANTA) 200-200-20 mg/5 mL suspension  Self No No   Sig: Take 30 mL by mouth every 6 (six) hours as needed for indigestion or heartburn   atorvastatin (LIPITOR) 10 mg tablet  Self No No   Sig: TAKE ONE TABLET BY MOUTH EVERY DAY   docusate sodium (COLACE) 100 mg capsule  Self Yes No   Sig: Take 100 mg by mouth daily as needed for constipation   fluticasone (Flovent HFA) 110 MCG/ACT inhaler  Self No No   Sig: Inhale 2 puffs if needed (asthma flare) Rinse mouth after use.   insulin aspart protamine-insulin aspart (NovoLOG Mix 70/30 FlexPen) 100 Units/mL injection pen  Self No No   Sig: INJECT 15 UNITS UNDER THE SKIN TWO TIMES A DAY WITH MEALS   Patient taking differently: INJECT 15 UNITS UNDER THE SKIN TWO TIMES A DAY WITH MEALS (patient taking 10 units in the evening)   insulin aspart protamine-insulin aspart (NovoLOG Mix 70/30) 100 units/mL injection   No No   Sig: Take 15 units with breakfast and 10 units with dinner   losartan (COZAAR) 25 mg tablet  Self No No   Sig: TAKE ONE TABLET BY MOUTH EVERY DAY   metFORMIN (GLUCOPHAGE) 1000 MG tablet   No No   Sig: TAKE ONE TABLET BY MOUTH TWICE A DAY WITH MEALS   ondansetron (ZOFRAN) 4 mg tablet  Self No No   Sig: Take 1 tablet (4 mg total) by mouth every 8 (eight) hours as needed for nausea or vomiting   pantoprazole (PROTONIX) 40 mg tablet  Self No No   Sig: TAKE ONE TABLET BY MOUTH EVERY DAY   sertraline (ZOLOFT) 50 mg tablet   No No   Sig: Take 1 tablet (50 mg  total) by mouth daily   tamsulosin (FLOMAX) 0.4 mg   No No   Sig: Take 1 capsule (0.4 mg total) by mouth daily with dinner      Facility-Administered Medications: None       Past Medical History:   Diagnosis Date    Anemia     Arthritis     Black stool 07/24/2020    Bursitis of right elbow 11/28/2023    Cancer (HCC)     Colon polyp     Diabetes mellitus (HCC)     IDDM    Diverticulitis of colon     Diverticulosis     Enlarged prostate     Erectile dysfunction     H/O toe surgery 12/02/2023    HL (hearing loss)     Hypercholesteremia     Resolved post weight loss    Hypertension     Resolved post weight loss    Kidney stone     04/2024    Obesity     Prostate cancer (HCC)     Sleep apnea     resolved with weight loss    Urinary tract infection     Wears partial dentures     partial upper and lower       Past Surgical History:   Procedure Laterality Date    ABDOMINAL ADHESION SURGERY N/A 11/28/2016    Procedure: EXTENSIVE LYSIS ADHESIONS;  Surgeon: Abilio Plummer MD;  Location: [facility];  Service:     ANKLE FRACTURE SURGERY Left     CHOLECYSTECTOMY      COLON SURGERY      colon resection    COLONOSCOPY      COLOSTOMY      COLOSTOMY CLOSURE      DIAGNOSTIC LAPAROSCOPY      FL RETROGRADE PYELOGRAM  4/2/2024    GASTRIC BYPASS      failed due to adhesions    HERNIA REPAIR      abdominal    IN CORRECTION HAMMERTOE Left 12/1/2023    Procedure: REPAIR HAMMERTOE / MALLET TOE / CLAW TOE;  Surgeon: David Daley DPM;  Location: [facility];  Service: Podiatry    IN CYSTO INSERTION TRANSPROSTATIC IMPLANT SINGLE N/A 3/8/2019    Procedure: CYSTOSCOPY WITH INSERTION UROLIFT;  Surgeon: Migel Sullivan MD;  [facility];  Service: Urology    IN CYSTO INSERTION TRANSPROSTATIC IMPLANT SINGLE N/A 5/8/2020    Procedure: CYSTOSCOPY WITH INSERTION UROLIFT;  Surgeon: Migel Sullivan MD;  [facility];  Service: Urology    IN CYSTO/URETERO W/LITHOTRIPSY &INDWELL STENT INSRT Right 4/2/2024    Procedure:  CYSTOSCOPY URETEROSCOPY STONE BASKET EXTRACTION, RETROGRADE PYELOGRAM AND INSERTION STENT URETERAL;  Surgeon: Julien Boyce DO;  [facility];  Service: Urology    RI CYSTOURETHROSCOPY W/INTERNAL URETHROTOMY N/A 2020    Procedure: DIRECT VISUAL INTERAL URETHROTOMY (DVIU), dilation of urethral stricture;  Surgeon: Migel Sullivan MD;  [facility];  Service: Urology    RI EDG US EXAM SURGICAL ALTER STOM DUODENUM/JEJUNUM N/A 10/25/2018    Procedure: LINEAR ENDOSCOPIC U/S;  Surgeon: Brody Isaacs MD;  Location: [facility];  Service: Gastroenterology    RI ESOPHAGOGASTRODUODENOSCOPY TRANSORAL DIAGNOSTIC N/A 2016    Procedure: EGD AND COLONOSCOPY;  Surgeon: Ana Wong MD;  [facility];  Service: Gastroenterology    RI LAPS GSTRC RSTRICTIV PX LONGITUDINAL GASTRECTOMY N/A 2016    Procedure: GASTRECTOMY SLEEVE LAPAROSCOPIC;  Surgeon: Abilio Plummer MD;  [facility];  Service: Bariatrics    RI PROSTATE NEEDLE BIOPSY ANY APPROACH N/A 2020    Procedure: TRANSRECTAL NEEDLE BIOPSY PROSTATE (TRNBP) WITH TRANSRECTAL ULTRASOUND GUIDANCE;  Surgeon: Migel Sullivan MD;  [facility];  Service: Urology    TONSILLECTOMY      US GUIDED PROSTATE BIOPSY  2020       Family History   Problem Relation Age of Onset    Heart disease Mother     Breast cancer Mother 84    Diabetes Father             Colon cancer Neg Hx     Liver disease Neg Hx      I have reviewed and agree with the history as documented.    E-Cigarette/Vaping    E-Cigarette Use Never User      E-Cigarette/Vaping Substances    Nicotine No     THC No     CBD No     Flavoring No     Other No     Unknown No      Social History     Tobacco Use    Smoking status: Former     Current packs/day: 0.25     Average packs/day: 0.3 packs/day for 19.8 years (4.9 ttl pk-yrs)     Types: Cigarettes     Start date: 11/10/2004     Quit date: 11/10/2001     Passive exposure: Past    Smokeless tobacco: Former    Vaping Use    Vaping status: Never Used   Substance Use Topics    Alcohol use: Not Currently    Drug use: Not Currently     Comment: RSO        Review of Systems   Constitutional:  Negative for chills and fever.   HENT:  Negative for ear pain and sore throat.    Eyes:  Negative for pain and visual disturbance.   Respiratory:  Positive for shortness of breath. Negative for cough.    Cardiovascular:  Positive for chest pain. Negative for palpitations.   Gastrointestinal:  Negative for abdominal pain and vomiting.   Genitourinary:  Negative for dysuria and hematuria.   Musculoskeletal:  Negative for arthralgias and back pain.   Skin:  Negative for color change and rash.   Neurological:  Negative for seizures and syncope.   All other systems reviewed and are negative.      Physical Exam  ED Triage Vitals [08/20/24 1722]   Temperature Pulse Respirations Blood Pressure SpO2   97.6 °F (36.4 °C) (!) 52 20 141/64 96 %      Temp Source Heart Rate Source Patient Position - Orthostatic VS BP Location FiO2 (%)   Oral Monitor Lying Right arm --      Pain Score       4             Orthostatic Vital Signs  Vitals:    08/20/24 1830 08/20/24 2024 08/20/24 2030 08/20/24 2100   BP: 129/62  134/61 131/60   Pulse: (!) 50 (!) 45 (!) 47 (!) 48   Patient Position - Orthostatic VS:           Physical Exam  Vitals and nursing note reviewed.   Constitutional:       General: He is not in acute distress.     Appearance: He is well-developed.   HENT:      Head: Normocephalic and atraumatic.   Eyes:      Conjunctiva/sclera: Conjunctivae normal.   Cardiovascular:      Rate and Rhythm: Normal rate and regular rhythm.      Heart sounds: No murmur heard.  Pulmonary:      Effort: Pulmonary effort is normal. No respiratory distress.      Breath sounds: Normal breath sounds.   Abdominal:      Palpations: Abdomen is soft.      Tenderness: There is no abdominal tenderness.   Musculoskeletal:         General: No swelling.      Cervical back: Neck supple.    Skin:     General: Skin is warm and dry.      Capillary Refill: Capillary refill takes less than 2 seconds.   Neurological:      Mental Status: He is alert.   Psychiatric:         Mood and Affect: Mood normal.         ED Medications  Medications   sodium chloride (PF) 0.9 % injection 3 mL (has no administration in time range)       Diagnostic Studies  Results Reviewed       Procedure Component Value Units Date/Time    HS Troponin I 2hr [[id number]]  (Normal) Collected: 08/20/24 2016    Lab Status: Final result Specimen: Blood from Arm, Right Updated: 08/20/24 2107     hs TnI 2hr 4 ng/L      Delta 2hr hsTnI 1 ng/L     [place] Total AB W Reflex to IGM/IGG [[id number]] Collected: 08/20/24 2016    Lab Status: In process Specimen: Blood from Arm, Right Updated: 08/20/24 2022    Narrative:      The following orders were created for panel order Lyme Total AB W Reflex to IGM/IGG.  Procedure                               Abnormality         Status                     ---------                               -----------         ------                     [place]yme Total AB W Reflex t...[[id number]]                      In process                   Please view results for these tests on the individual orders.    Lyme Total AB W Reflex to IGM/IGG [[id number]] Collected: 08/20/24 2016    Lab Status: In process Specimen: Blood from Arm, Right Updated: 08/20/24 2022    TSH, 3rd generation with Free T4 reflex [[id number]]  (Normal) Collected: 08/20/24 1750    Lab Status: Final result Specimen: Blood from Arm, Right Updated: 08/20/24 1951     TSH 3RD GENERATON 3.435 uIU/mL     HS Troponin I 4hr [[id number]]     Lab Status: No result Specimen: Blood     FLU/RSV/COVID - if FLU/RSV clinically relevant [[id number]]  (Normal) Collected: 08/20/24 1750    Lab Status: Final result Specimen: Nares from Nose Updated: 08/20/24 1836     SARS-CoV-2 Negative     INFLUENZA A PCR Negative     INFLUENZA B PCR Negative     RSV PCR Negative    Narrative:      This  test has been performed using the CoV-2/Flu/RSV plus assay on the [employer] GeneXpert platform. This test has been validated by the  and verified by the performing laboratory.     This test is designed to amplify and detect the following: nucleocapsid (N), envelope (E), and RNA-dependent RNA polymerase (RdRP) genes of the SARS-CoV-2 genome; matrix (M), basic polymerase (PB2), and acidic protein (PA) segments of the influenza A genome; matrix (M) and non-structural protein (NS) segments of the influenza B genome, and the nucleocapsid genes of RSV A and RSV B.     Positive results are indicative of the presence of Flu A, Flu B, RSV, and/or SARS-CoV-2 RNA. Positive results for SARS-CoV-2 or suspected novel influenza should be reported to state, local, or federal health departments according to local reporting requirements.      All results should be assessed in conjunction with clinical presentation and other laboratory markers for clinical management.     FOR PEDIATRIC PATIENTS - copy/paste COVID Guidelines URL to browser: [email or web address]       CBC and differential [[id number]]  (Abnormal) Collected: 08/20/24 1814    Lab Status: Final result Specimen: Blood from Arm, Right Updated: 08/20/24 1827     WBC 8.28 Thousand/uL      RBC 4.09 Million/uL      Hemoglobin 12.0 g/dL      Hematocrit 36.7 %      MCV 90 fL      MCH 29.3 pg      MCHC 32.7 g/dL      RDW 14.0 %      MPV 12.9 fL      Platelets 147 Thousands/uL      nRBC 0 /100 WBCs      Segmented % 77 %      Immature Grans % 1 %      Lymphocytes % 10 %      Monocytes % 9 %      Eosinophils Relative 2 %      Basophils Relative 1 %      Absolute Neutrophils 6.52 Thousands/µL      Absolute Immature Grans 0.04 Thousand/uL      Absolute Lymphocytes 0.80 Thousands/µL      Absolute Monocytes 0.75 Thousand/µL      Eosinophils Absolute 0.13 Thousand/µL      Basophils Absolute 0.04 Thousands/µL     HS Troponin 0hr  (reflex protocol) [[id number]]  (Normal) Collected: 08/20/24 1750    Lab Status: Final result Specimen: Blood from Arm, Right Updated: 08/20/24 1819     hs TnI 0hr 3 ng/L     Basic metabolic panel [[id number]] Collected: 08/20/24 1750    Lab Status: Final result Specimen: Blood from Arm, Right Updated: 08/20/24 1818     Sodium 138 mmol/L      Potassium 5.3 mmol/L      Chloride 104 mmol/L      CO2 27 mmol/L      ANION GAP 7 mmol/L      BUN 23 mg/dL      Creatinine 0.87 mg/dL      Glucose 123 mg/dL      Calcium 9.3 mg/dL      eGFR 85 ml/min/1.73sq m     Narrative:      National Kidney Disease Foundation guidelines for Chronic Kidney Disease (CKD):     Stage 1 with normal or high GFR (GFR > 90 mL/min/1.73 square meters)    Stage 2 Mild CKD (GFR = 60-89 mL/min/1.73 square meters)    Stage 3A Moderate CKD (GFR = 45-59 mL/min/1.73 square meters)    Stage 3B Moderate CKD (GFR = 30-44 mL/min/1.73 square meters)    Stage 4 Severe CKD (GFR = 15-29 mL/min/1.73 square meters)    Stage 5 End Stage CKD (GFR <15 mL/min/1.73 square meters)  Note: GFR calculation is accurate only with a steady state creatinine                   X-ray chest 1 view portable   ED Interpretation by Garland Azul MD (08/20 1825)   No acute cardiopulmonary findings.            Procedures  ECG 12 Lead Documentation Only    Date/Time: 8/20/2024 11:22 PM    Performed by: Garland Azul MD  Authorized by: Garland Azul MD    ECG reviewed by me, the ED Provider: yes    Patient location:  ED  Previous ECG:     Previous ECG:  Compared to current    Similarity:  No change  Interpretation:     Interpretation: abnormal    Rate:     ECG rate assessment: bradycardic    Rhythm:     Rhythm: sinus rhythm    Ectopy:     Ectopy: none    QRS:     QRS axis:  Normal  Conduction:     Conduction: normal    ST segments:     ST segments:  Normal  T waves:     T waves: normal          ED Course  ED Course as of 08/20/24 2323   Tue Aug 20, 2024   1746 74-year-old male with history of  diabetes, hypercholesterolemia, prostate cancer presenting for chest tightness.  Reports chest tightness, shortness of breath on exertion, relieved by rest about 1 hour ago.  Symptoms resolved on presentation to the ED.  Also notes 1 month of increasing fatigue with cough, sore throat.  Patient states he was briefly hypotensive when checked in the office prior to presentation.  Blood pressure normal ED.   1748 Differential at this time includes ACS spectrum, COVID, flu, vasovagal, orthostatic, idiopathic etiology.  Low suspicion for dissection, PE given benign presentation.   1750 EKG obtained notable for sinus bradycardia present in prior EKGs, otherwise unremarkable.   2125 Delta troponin 1.  TSH within normal limits.  CBC, CMP grossly normal.  Plan to discharge home in stable condition.  Provided referral to cardiology for increasing bradycardia.  Follow-up with PCP as needed.                                       Medical Decision Making  See ED course.    Amount and/or Complexity of Data Reviewed  Labs: ordered.  Radiology: ordered and independent interpretation performed.    Risk  Prescription drug management.          Disposition  Final diagnoses:   Atypical chest pain   Bradycardia     Time reflects when diagnosis was documented in both MDM as applicable and the Disposition within this note       Time User Action Codes Description Comment    8/20/2024  9:25 PM Garland Azul Add [R07.89] Atypical chest pain     8/20/2024  9:25 PM Garland Azul Add [R00.1] Bradycardia           ED Disposition       ED Disposition   Discharge    Condition   Stable    Date/Time   Tue Aug 20, 2024  9:24 PM    Comment   Jimmy Reyes discharge to home/self care.                   Follow-up Information    None         Discharge Medication List as of 8/20/2024  9:25 PM        CONTINUE these medications which have NOT CHANGED    Details   acetaminophen-codeine (TYLENOL with CODEINE #3) 300-30 MG per tablet Take 1 tablet by mouth every 6  "(six) hours as needed for moderate pain, Starting Mon 4/15/2024, Normal      albuterol (ProAir HFA) 90 mcg/act inhaler Inhale 2 puffs every 6 (six) hours as needed for wheezing or shortness of breath, Starting Tue 11/28/2023, Normal      aluminum-magnesium hydroxide-simethicone (MYLANTA) 200-200-20 mg/5 mL suspension Take 30 mL by mouth every 6 (six) hours as needed for indigestion or heartburn, Starting Tue 6/29/2021, Normal      atorvastatin (LIPITOR) 10 mg tablet TAKE ONE TABLET BY MOUTH EVERY DAY, Starting Fri 9/8/2023, Normal      Biotin 1000 MCG tablet Take 1 tablet by mouth if needed Per pt takes it \"once in awhile\", Historical Med      Blood Glucose Monitoring Suppl (GLUCOCARD VITAL MONITOR) w/Device KIT by Does not apply route 2 (two) times a day, Starting Tue 5/14/2019, Normal      Cholecalciferol (VITAMIN D3) 2000 units capsule Take 1,000 Units by mouth daily in the early morning , Historical Med      Continuous Blood Gluc  (FreeStyle Dunia 2 [place]) CHARITY Check blood sugars multiple times per day, Normal      Continuous Blood Gluc Sensor (FreeStyle Dunia 2 Sensor) MISC Check blood sugars multiple times per day, Normal      Cyanocobalamin (VITAMIN B-12) 5000 MCG TBDP Take 5,000 mcg by mouth once a week Takes on Mondays, Historical Med      Diclofenac Sodium (VOLTAREN) 1 % Apply 2 g topically 4 (four) times a day, Starting Tue 11/28/2023, Normal      docusate sodium (COLACE) 100 mg capsule Take 100 mg by mouth daily as needed for constipation, Historical Med      fluticasone (Flovent HFA) 110 MCG/ACT inhaler Inhale 2 puffs if needed (asthma flare) Rinse mouth after use., Starting Fri 4/19/2024, Until Tue 8/20/2024 at 2359, No Print      insulin aspart protamine-insulin aspart (NovoLOG Mix 70/30 FlexPen) 100 Units/mL injection pen INJECT 15 UNITS UNDER THE SKIN TWO TIMES A DAY WITH MEALS, Normal      insulin aspart protamine-insulin aspart (NovoLOG Mix 70/30) 100 units/mL injection Take 15 units with " "breakfast and 10 units with dinner, Normal      INSULIN SYRINGE 1CC/29G (B-D INSULIN SYRINGE) 29G X 1/2\" 1 ML MISC Use twice a day, Normal      Lancets (LIFESCAN UNISTIK 2) MISC Lifescan One Touch Ultramini Meter; use as directed; 1; 0; 31-Oct-2016; 31-Oct-2016; Melida Duran; Active, Historical Med      losartan (COZAAR) 25 mg tablet TAKE ONE TABLET BY MOUTH EVERY DAY, Starting Fri 3/8/2024, Normal      metFORMIN (GLUCOPHAGE) 1000 MG tablet TAKE ONE TABLET BY MOUTH TWICE A DAY WITH MEALS, Starting Sun 5/5/2024, Normal      Multiple Vitamin (MULTIVITAMIN) capsule Take 1 capsule by mouth daily in the early morning , Historical Med      ondansetron (ZOFRAN) 4 mg tablet Take 1 tablet (4 mg total) by mouth every 8 (eight) hours as needed for nausea or vomiting, Starting Sat 3/30/2024, Normal      pantoprazole (PROTONIX) 40 mg tablet TAKE ONE TABLET BY MOUTH EVERY DAY, Starting Tue 1/2/2024, Normal      sertraline (ZOLOFT) 50 mg tablet Take 1 tablet (50 mg total) by mouth daily, Starting Fri 6/14/2024, Normal      tamsulosin (FLOMAX) 0.4 mg Take 1 capsule (0.4 mg total) by mouth daily with dinner, Starting Fri 5/3/2024, Normal               PDMP Review         Value Time User    PDMP Reviewed  Yes 4/19/2024 11:02 AM Selene Ayers DO             ED Provider  Attending physically available and evaluated Jimmy Reyes. I managed the patient along with the ED Attending.    Electronically Signed by           Garland Azul MD  08/20/24 6039    "

## 2024-08-21 NOTE — ED PROCEDURE NOTE
Procedure  POC Cardiac US    Date/Time: 8/20/2024 8:12 PM    Performed by: Chadwick Correa MD  Authorized by: Chadwick Correa MD    Patient location:  ED  Procedure details:     Exam Type:  Diagnostic    Indications: chest pain and dyspnea      Assessment / Evaluation for: cardiac function and pericardial effusion      Exam Type: initial exam      Image quality: diagnostic    Patient Details:     Cardiac Rhythm:  Regular  Cardiac findings:     Echo technique: limited 2D      Views obtained: parasternal long axis, parasternal short axis and apical      Views obtained comment:  Limited subcostal    Pericardial effusion: absent      Tamponade physiology: absent      Wall motion: normal      LV systolic function: normal                     Chadwick Correa MD  08/20/24 2013

## 2024-08-26 ENCOUNTER — LAB (OUTPATIENT)
Dept: LAB | Facility: AMBULARY SURGERY CENTER | Age: 74
End: 2024-08-26
Payer: COMMERCIAL

## 2024-08-26 DIAGNOSIS — E11.65 TYPE 2 DIABETES MELLITUS WITH HYPERGLYCEMIA, WITH LONG-TERM CURRENT USE OF INSULIN (HCC): ICD-10-CM

## 2024-08-26 DIAGNOSIS — Z79.4 TYPE 2 DIABETES MELLITUS WITH HYPERGLYCEMIA, WITH LONG-TERM CURRENT USE OF INSULIN (HCC): ICD-10-CM

## 2024-08-26 DIAGNOSIS — C61 PROSTATE CANCER (HCC): ICD-10-CM

## 2024-08-26 LAB
ANION GAP SERPL CALCULATED.3IONS-SCNC: 9 MMOL/L (ref 4–13)
BUN SERPL-MCNC: 25 MG/DL (ref 5–25)
CALCIUM SERPL-MCNC: 9 MG/DL (ref 8.4–10.2)
CHLORIDE SERPL-SCNC: 105 MMOL/L (ref 96–108)
CO2 SERPL-SCNC: 27 MMOL/L (ref 21–32)
CREAT SERPL-MCNC: 0.81 MG/DL (ref 0.6–1.3)
CREAT UR-MCNC: 126.5 MG/DL
EST. AVERAGE GLUCOSE BLD GHB EST-MCNC: 166 MG/DL
GFR SERPL CREATININE-BSD FRML MDRD: 87 ML/MIN/1.73SQ M
GLUCOSE P FAST SERPL-MCNC: 114 MG/DL (ref 65–99)
HBA1C MFR BLD: 7.4 %
MICROALBUMIN UR-MCNC: 93 MG/L
MICROALBUMIN/CREAT 24H UR: 74 MG/G CREATININE (ref 0–30)
POTASSIUM SERPL-SCNC: 4.2 MMOL/L (ref 3.5–5.3)
PSA SERPL-MCNC: 0.12 NG/ML (ref 0–4)
SODIUM SERPL-SCNC: 141 MMOL/L (ref 135–147)

## 2024-08-26 PROCEDURE — 84153 ASSAY OF PSA TOTAL: CPT

## 2024-08-26 PROCEDURE — 83036 HEMOGLOBIN GLYCOSYLATED A1C: CPT

## 2024-08-26 PROCEDURE — 80048 BASIC METABOLIC PNL TOTAL CA: CPT

## 2024-08-26 PROCEDURE — 36415 COLL VENOUS BLD VENIPUNCTURE: CPT

## 2024-08-26 PROCEDURE — 82570 ASSAY OF URINE CREATININE: CPT

## 2024-08-26 PROCEDURE — 82043 UR ALBUMIN QUANTITATIVE: CPT

## 2024-08-27 NOTE — RESULT ENCOUNTER NOTE
Hi, Your Hba1c went up to 7.4 % from 6.9% ( still at goal)   Rest of the blood work is normal except minimal proteins in urine, which is stable   Continue current management and carb consistent diet , walking for 10 minutes after dinner may help to get down Hba1c     Saurabh Veras

## 2024-09-16 DIAGNOSIS — R39.9 LOWER URINARY TRACT SYMPTOMS (LUTS): ICD-10-CM

## 2024-09-16 DIAGNOSIS — F41.9 ANXIETY: ICD-10-CM

## 2024-09-16 RX ORDER — TAMSULOSIN HYDROCHLORIDE 0.4 MG/1
0.4 CAPSULE ORAL
Qty: 30 CAPSULE | Refills: 5 | Status: SHIPPED | OUTPATIENT
Start: 2024-09-16

## 2024-10-14 ENCOUNTER — OFFICE VISIT (OUTPATIENT)
Dept: DENTISTRY | Facility: CLINIC | Age: 74
End: 2024-10-14

## 2024-10-14 ENCOUNTER — OFFICE VISIT (OUTPATIENT)
Dept: OBGYN CLINIC | Facility: CLINIC | Age: 74
End: 2024-10-14
Payer: COMMERCIAL

## 2024-10-14 VITALS
DIASTOLIC BLOOD PRESSURE: 70 MMHG | SYSTOLIC BLOOD PRESSURE: 135 MMHG | HEART RATE: 58 BPM | WEIGHT: 218 LBS | HEIGHT: 67 IN | BODY MASS INDEX: 34.21 KG/M2

## 2024-10-14 VITALS — HEART RATE: 63 BPM | SYSTOLIC BLOOD PRESSURE: 146 MMHG | DIASTOLIC BLOOD PRESSURE: 74 MMHG

## 2024-10-14 DIAGNOSIS — M70.62 GREATER TROCHANTERIC BURSITIS OF LEFT HIP: Primary | ICD-10-CM

## 2024-10-14 DIAGNOSIS — M16.12 PRIMARY OSTEOARTHRITIS OF LEFT HIP: ICD-10-CM

## 2024-10-14 DIAGNOSIS — K08.89 NONRESTORABLE TOOTH: Primary | ICD-10-CM

## 2024-10-14 PROCEDURE — D9450: HCPCS

## 2024-10-14 PROCEDURE — 20610 DRAIN/INJ JOINT/BURSA W/O US: CPT | Performed by: ORTHOPAEDIC SURGERY

## 2024-10-14 PROCEDURE — 99214 OFFICE O/P EST MOD 30 MIN: CPT | Performed by: ORTHOPAEDIC SURGERY

## 2024-10-14 PROCEDURE — D0140 LIMITED ORAL EVALUATION - PROBLEM FOCUSED: HCPCS

## 2024-10-14 RX ORDER — TRIAMCINOLONE ACETONIDE 40 MG/ML
80 INJECTION, SUSPENSION INTRA-ARTICULAR; INTRAMUSCULAR
Status: COMPLETED | OUTPATIENT
Start: 2024-10-14 | End: 2024-10-14

## 2024-10-14 RX ORDER — BUPIVACAINE HYDROCHLORIDE 2.5 MG/ML
2 INJECTION, SOLUTION INFILTRATION; PERINEURAL
Status: COMPLETED | OUTPATIENT
Start: 2024-10-14 | End: 2024-10-14

## 2024-10-14 RX ADMIN — TRIAMCINOLONE ACETONIDE 80 MG: 40 INJECTION, SUSPENSION INTRA-ARTICULAR; INTRAMUSCULAR at 09:15

## 2024-10-14 RX ADMIN — BUPIVACAINE HYDROCHLORIDE 2 ML: 2.5 INJECTION, SOLUTION INFILTRATION; PERINEURAL at 09:15

## 2024-10-14 NOTE — PROGRESS NOTES
Assessment:  1. Greater trochanteric bursitis of left hip          Patient Active Problem List   Diagnosis    GERD (gastroesophageal reflux disease)    Class 1 obesity due to excess calories with serious comorbidity and body mass index (BMI) of 34.0 to 34.9 in adult    Hypercholesteremia    Postgastrectomy malabsorption    Type 2 diabetes mellitus with hyperglycemia, with long-term current use of insulin (HCC)    Benign essential hypertension    Benign enlargement of prostate    Pancreatic cyst    History of colon polyps    IPMN (intraductal papillary mucinous neoplasm)    ED (erectile dysfunction) of organic origin    Bruxism    Prostate cancer (HCC)    Greater trochanteric bursitis of left hip    Primary osteoarthritis of one hip, left    Colitis    Mild intermittent asthma without complication    Platelets decreased (HCC)    Near syncope    Right ureteral stone    Diverticulosis    Anxiety    Personal history of kidney stones    Counseling regarding advanced directives    B12 deficiency       Plan:    74 y.o. male with left greater trochanter hip bursitis and left hip osteoarthritis.  Diagnosis and treatment options discussed at length.  Injection therapy of the left hip greater trochanter bursa discussed. Risks and benefits discussed at length. Patient agreeable to injection.  Patient aware that bursitis likely due to compensation due to severe hip arthritis.  Osteoarthritis of the hip discussed as well.  Patient will be referred for physical therapy for home exercise program.  Patient aware of potential total hip arthroplasty should symptoms increase or not improve if he ever wishes to proceed surgically.    The patient was seen and examined by Dr. Rod and myself. The assessment and plan were formulated by Dr. Rod and I assisted in carrying it out.      Subjective:   Patient ID: Jimmy Reyes is a 74 y.o. male .    HPI    Patient comes in today with regards to left lateral hip pain.  Patient states that he  has been having lateral hip pain on and off for the past 2 years.  He states that the pain began after he fell on his hip.  He states that the pain comes and goes and increases with activity and pressure over the area. Patient denies groin pain, numbness, tingling, weakness..  Patient is referred to us by Kiah Rausch DO for further evaluation.     The following portions of the patient's history were reviewed and updated as appropriate: allergies, current medications, past family history, past social history, past surgical history and problem list.    Social History     Socioeconomic History    Marital status: Single     Spouse name: Not on file    Number of children: Not on file    Years of education: Not on file    Highest education level: Not on file   Occupational History    Not on file   Tobacco Use    Smoking status: Former     Current packs/day: 0.25     Average packs/day: 0.3 packs/day for 19.9 years (5.0 ttl pk-yrs)     Types: Cigarettes     Start date: 11/10/2004     Quit date: 11/10/2001     Passive exposure: Past    Smokeless tobacco: Former   Vaping Use    Vaping status: Never Used   Substance and Sexual Activity    Alcohol use: Not Currently    Drug use: Not Currently     Comment: RSO    Sexual activity: Not Currently     Partners: Female     Birth control/protection: Abstinence   Other Topics Concern    Not on file   Social History Narrative    Not on file     Social Determinants of Health     Financial Resource Strain: High Risk (6/29/2022)    Overall Financial Resource Strain (CARDIA)     Difficulty of Paying Living Expenses: Hard   Food Insecurity: No Food Insecurity (7/2/2024)    Hunger Vital Sign     Worried About Running Out of Food in the Last Year: Never true     Ran Out of Food in the Last Year: Never true   Transportation Needs: No Transportation Needs (7/2/2024)    PRAPARE - Transportation     Lack of Transportation (Medical): No     Lack of Transportation (Non-Medical): No   Physical  Activity: Not on file   Stress: Not on file   Social Connections: Not on file   Intimate Partner Violence: Not on file   Housing Stability: Low Risk  (7/2/2024)    Housing Stability Vital Sign     Unable to Pay for Housing in the Last Year: No     Number of Times Moved in the Last Year: 1     Homeless in the Last Year: No     Past Medical History:   Diagnosis Date    Anemia     Arthritis     Black stool 07/24/2020    Bursitis of right elbow 11/28/2023    Cancer (HCC)     Colon polyp     Diabetes mellitus (HCC)     IDDM    Diverticulitis of colon     Diverticulosis     Enlarged prostate     Erectile dysfunction     H/O toe surgery 12/02/2023    HL (hearing loss)     Hypercholesteremia     Resolved post weight loss    Hypertension     Resolved post weight loss    Kidney stone     04/2024    Obesity     Prostate cancer (HCC)     Sleep apnea     resolved with weight loss    Urinary tract infection     Wears partial dentures     partial upper and lower     Past Surgical History:   Procedure Laterality Date    ABDOMINAL ADHESION SURGERY N/A 11/28/2016    Procedure: EXTENSIVE LYSIS ADHESIONS;  Surgeon: Abilio Plummer MD;  Location: [facility];  Service:     ANKLE FRACTURE SURGERY Left     CHOLECYSTECTOMY      COLON SURGERY      colon resection    COLONOSCOPY      COLOSTOMY      COLOSTOMY CLOSURE      DIAGNOSTIC LAPAROSCOPY      FL RETROGRADE PYELOGRAM  4/2/2024    GASTRIC BYPASS      failed due to adhesions    HERNIA REPAIR      abdominal    CA CORRECTION HAMMERTOE Left 12/1/2023    Procedure: REPAIR HAMMERTOE / MALLET TOE / CLAW TOE;  Surgeon: David Daley DPM;  Location:  MAIN OR;  Service: Podiatry    CA CYSTO INSERTION TRANSPROSTATIC IMPLANT SINGLE N/A 3/8/2019    Procedure: CYSTOSCOPY WITH INSERTION UROLIFT;  Surgeon: Migel Sullivan MD;  [facility];  Service: Urology    CA CYSTO INSERTION TRANSPROSTATIC IMPLANT SINGLE N/A 5/8/2020    Procedure: CYSTOSCOPY WITH INSERTION UROLIFT;  Surgeon:  Migel Sullivan MD;  [facility];  Service: Urology    OR CYSTO/URETERO W/LITHOTRIPSY &INDWELL STENT INSRT Right 4/2/2024    Procedure: CYSTOSCOPY URETEROSCOPY STONE BASKET EXTRACTION, RETROGRADE PYELOGRAM AND INSERTION STENT URETERAL;  Surgeon: Julien Boyce DO;  [facility];  Service: Urology    OR CYSTOURETHROSCOPY W/INTERNAL URETHROTOMY N/A 5/8/2020    Procedure: DIRECT VISUAL INTERAL URETHROTOMY (DVIU), dilation of urethral stricture;  Surgeon: Migel Sullivan MD;  [facility];  Service: Urology    OR EDG US EXAM SURGICAL ALTER STOM DUODENUM/JEJUNUM N/A 10/25/2018    Procedure: LINEAR ENDOSCOPIC U/S;  Surgeon: Brody Isaacs MD;  [facility]: [facility];  Service: Gastroenterology    OR ESOPHAGOGASTRODUODENOSCOPY TRANSORAL DIAGNOSTIC N/A 7/6/2016    Procedure: EGD AND COLONOSCOPY;  Surgeon: Ana Wong MD;  [facility];  Service: Gastroenterology    OR LAPS GSTRC RSTRICTIV PX LONGITUDINAL GASTRECTOMY N/A 11/28/2016    Procedure: GASTRECTOMY SLEEVE LAPAROSCOPIC;  Surgeon: Abilio Plummer MD;  [facility];  Service: Bariatrics    OR PROSTATE NEEDLE BIOPSY ANY APPROACH N/A 5/8/2020    Procedure: TRANSRECTAL NEEDLE BIOPSY PROSTATE (TRNBP) WITH TRANSRECTAL ULTRASOUND GUIDANCE;  Surgeon: Migel Sullivan MD;  [facility];  Service: Urology    TONSILLECTOMY      US GUIDED PROSTATE BIOPSY  5/8/2020     Allergies   Allergen Reactions    Enalapril Anaphylaxis, Throat Swelling and Hives     Current Outpatient Medications on File Prior to Visit   Medication Sig Dispense Refill    acetaminophen-codeine (TYLENOL with CODEINE #3) 300-30 MG per tablet Take 1 tablet by mouth every 6 (six) hours as needed for moderate pain 10 tablet 0    albuterol (ProAir HFA) 90 mcg/act inhaler Inhale 2 puffs every 6 (six) hours as needed for wheezing or shortness of breath 8.5 g 0    aluminum-magnesium hydroxide-simethicone (MYLANTA) 200-200-20 mg/5 mL suspension Take 30  "mL by mouth every 6 (six) hours as needed for indigestion or heartburn 355 mL 0    atorvastatin (LIPITOR) 10 mg tablet TAKE ONE TABLET BY MOUTH EVERY  tablet 3    Biotin 1000 MCG tablet Take 1 tablet by mouth if needed Per pt takes it \"once in awhile\"      Blood Glucose Monitoring Suppl (GLUCOCARD VITAL MONITOR) w/Device KIT by Does not apply route 2 (two) times a day 1 kit 0    Cholecalciferol (VITAMIN D3) 2000 units capsule Take 1,000 Units by mouth daily in the early morning       Continuous Blood Gluc  (FreeStyle Dunia 2 [place]) CHARITY Check blood sugars multiple times per day 1 each 0    Continuous Blood Gluc Sensor (FreeStyle Dunia 2 Sensor) MISC Check blood sugars multiple times per day 6 each 3    Cyanocobalamin (VITAMIN B-12) 5000 MCG TBDP Take 5,000 mcg by mouth once a week Takes on Mondays      Diclofenac Sodium (VOLTAREN) 1 % Apply 2 g topically 4 (four) times a day 100 g 1    docusate sodium (COLACE) 100 mg capsule Take 100 mg by mouth daily as needed for constipation      fluticasone (Flovent HFA) 110 MCG/ACT inhaler Inhale 2 puffs if needed (asthma flare) Rinse mouth after use.      insulin aspart protamine-insulin aspart (NovoLOG Mix 70/30 FlexPen) 100 Units/mL injection pen INJECT 15 UNITS UNDER THE SKIN TWO TIMES A DAY WITH MEALS (Patient taking differently: INJECT 15 UNITS UNDER THE SKIN TWO TIMES A DAY WITH MEALS (patient taking 10 units in the evening)) 30 mL 1    insulin aspart protamine-insulin aspart (NovoLOG Mix 70/30) 100 units/mL injection Take 15 units with breakfast and 10 units with dinner 30 mL 1    INSULIN SYRINGE 1CC/29G (B-D INSULIN SYRINGE) 29G X 1/2\" 1 ML MISC Use twice a day 200 each 1    Lancets (LIFESCAN UNISTIK 2) MISC Lifescan One Touch Ultramini Meter; use as directed; 1; 0; 31-Oct-2016; 31-Oct-2016; Melida Duran; Active      losartan (COZAAR) 25 mg tablet TAKE ONE TABLET BY MOUTH EVERY  tablet 1    metFORMIN (GLUCOPHAGE) 1000 MG tablet TAKE ONE TABLET " BY MOUTH TWICE A DAY WITH MEALS 180 tablet 1    Multiple Vitamin (MULTIVITAMIN) capsule Take 1 capsule by mouth daily in the early morning       ondansetron (ZOFRAN) 4 mg tablet Take 1 tablet (4 mg total) by mouth every 8 (eight) hours as needed for nausea or vomiting 12 tablet 0    pantoprazole (PROTONIX) 40 mg tablet TAKE ONE TABLET BY MOUTH EVERY DAY 90 tablet 1    sertraline (ZOLOFT) 50 mg tablet TAKE ONE TABLET BY MOUTH EVERY DAY 90 tablet 1    tamsulosin (FLOMAX) 0.4 mg TAKE ONE CAPSULE BY MOUTH EVERY DAY WITH DINNER 30 capsule 5     No current facility-administered medications on file prior to visit.       Review of Systems   Constitutional:  Negative for chills and fever.   HENT:  Negative for ear pain and sore throat.    Eyes:  Negative for pain and visual disturbance.   Respiratory:  Negative for cough and shortness of breath.    Cardiovascular:  Negative for chest pain and palpitations.   Gastrointestinal:  Negative for abdominal pain and vomiting.   Genitourinary:  Negative for dysuria and hematuria.   Musculoskeletal:  Positive for arthralgias. Negative for back pain.   Skin:  Negative for color change and rash.   Neurological:  Negative for seizures and syncope.   All other systems reviewed and are negative.        Objective:    Vitals:    10/14/24 0909   BP: 135/70   Pulse: 58       Physical Exam  Vitals and nursing note reviewed.   Constitutional:       General: He is not in acute distress.     Appearance: He is well-developed.   HENT:      Head: Normocephalic and atraumatic.   Eyes:      Conjunctiva/sclera: Conjunctivae normal.   Cardiovascular:      Rate and Rhythm: Normal rate and regular rhythm.      Heart sounds: No murmur heard.  Pulmonary:      Effort: Pulmonary effort is normal. No respiratory distress.      Breath sounds: Normal breath sounds.   Abdominal:      Palpations: Abdomen is soft.      Tenderness: There is no abdominal tenderness.   Musculoskeletal:         General: No swelling.      " Cervical back: Neck supple.   Skin:     General: Skin is warm and dry.      Capillary Refill: Capillary refill takes less than 2 seconds.   Neurological:      Mental Status: He is alert.   Psychiatric:         Mood and Affect: Mood normal.         Left Hip Exam     Tenderness   The patient is experiencing tenderness in the greater trochanter.    Range of Motion   Extension:  normal   Flexion:  normal   External rotation:  30   Internal rotation: 20     Other   Sensation: normal  Pulse: present            I have personally reviewed pertinent films in PACS and my interpretation is severe left hip arthritis.    Large joint arthrocentesis: L greater trochanteric bursa  Universal Protocol:  Consent: Verbal consent obtained.  Risks and benefits: risks, benefits and alternatives were discussed  Consent given by: patient  Patient understanding: patient states understanding of the procedure being performed  Patient identity confirmed: verbally with patient  Supporting Documentation  Indications: pain   Procedure Details  Location: hip - L greater trochanteric bursa  Needle size: 22 G  Approach: lateral  Medications administered: 2 mL bupivacaine 0.25 %; 80 mg triamcinolone acetonide 40 mg/mL    Patient tolerance: patient tolerated the procedure well with no immediate complications  Dressing:  Sterile dressing applied             Scribe Attestation      I,:   am acting as a scribe while in the presence of the attending physician.:       I,:   personally performed the services described in this documentation    as scribed in my presence.:               Portions of the record may have been created with voice recognition software.  Occasional wrong word or \"sound a like\" substitutions may have occurred due to the inherent limitations of voice recognition software.  Read the chart carefully and recognize, using context, where substitutions have occurred.    "

## 2024-10-14 NOTE — DENTAL PROCEDURE DETAILS
"Treatment planning    Jimmy Reyes 74 y.o. male presents with self to [facility] for Limited exam  PMH reviewed, no changes, ASA III. Significant medical history: GERD, DM type II, HTN, prostate cancer, asthma, anxiety. Significant allergies: Enalapril. Significant medications: Albuterol, insulin aspart, losartan, metformin, sertraline, tamsulosin.    Chief complaint:  \"I want to replace my old bridge with new bridge\".    *Dr. Ortiz initially planned for 9-unit bridge from #4-12, but current periodontal status of abutment teeth (specifically #7,10) are unfavorable. Pt was here today for a new txt plan.    Consent:  Discussed that limited exam focuses on problem area, and same day tx is not guaranteed.  Patient explained to if they wish to have anything else evaluated, they need to return to the practice at which they are a patient of record or schedule a comprehensive exam afterwards.  Patient understands and consent was given by self via verbal consent.    Subjective history:    Onset: 3 months ago.   Provocation: Biting, Chewing, Touching it.   Quality:  No pain . Just severe movement upon mastication.   Region: Patient points to tooth #7 and 10 .   Severity: 6/10.   Timing:  When provoked with eating and chewing foods .    Objective clinical findings:   Oral cancer screening: normal.   Extraoral exam: no remarkable findings.  Intraoral exam:  Mobility of grade 3 on PFM #7-10 bridge abutment teeth #7 and 10 . Generalized severe bone loss around #7 and 10, recession of 5 mm on all surfaces of #7 and 10, and generalized probing depths ranging 4-5 mm on UR/UL quadrants.  When resident asked pt about existing bridge, pt stated he had it done 5+ years ago by outside dentist.  Informed pt that previously planned 9-unit bridge has poor prognosis and would not be stable in long-term. Suggested sectioning current bridge, removing hopeless #7 and 10, and re-making new partial. Pt stated he would like to follow with new treatment " plan.     Radiographs: Single BW - #12 with 21 .     Pulp testing:  #7 and 10 Cold: Normal response; Percussion: Moderate tenderness; Palpation: Mild pain.    Assessment:  Non-restorable teeth #7 and 10 due to generalized chronic periodontitis.    Plan:   Section existing #7-10 bridge, EXT hopeless abutment teeth #7 and 10, and deliver immediate/interim partial denture for upper arch. After 6 months of healing, start upper resin-based partial.    Referral(s): None needed.  Rx: None.  Comprehensive care disposition:  Pt is already pt of [facility] .    Patient dismissed ambulatory and alert.    NV:  will call as soon as pre-auth comes back for interim RPD as well as resin based RPD.    Attending: Dr. Ramos was present in clinic.

## 2024-10-18 DIAGNOSIS — E11.65 TYPE 2 DIABETES MELLITUS WITH HYPERGLYCEMIA, WITH LONG-TERM CURRENT USE OF INSULIN (HCC): Chronic | ICD-10-CM

## 2024-10-18 DIAGNOSIS — Z79.4 TYPE 2 DIABETES MELLITUS WITH HYPERGLYCEMIA, WITH LONG-TERM CURRENT USE OF INSULIN (HCC): Chronic | ICD-10-CM

## 2024-10-21 ENCOUNTER — OFFICE VISIT (OUTPATIENT)
Dept: UROLOGY | Facility: CLINIC | Age: 74
End: 2024-10-21
Payer: COMMERCIAL

## 2024-10-21 VITALS
HEIGHT: 67 IN | BODY MASS INDEX: 33.43 KG/M2 | DIASTOLIC BLOOD PRESSURE: 70 MMHG | SYSTOLIC BLOOD PRESSURE: 120 MMHG | HEART RATE: 54 BPM | OXYGEN SATURATION: 97 % | WEIGHT: 213 LBS

## 2024-10-21 DIAGNOSIS — C61 PROSTATE CANCER (HCC): Primary | ICD-10-CM

## 2024-10-21 PROCEDURE — 99213 OFFICE O/P EST LOW 20 MIN: CPT | Performed by: PHYSICIAN ASSISTANT

## 2024-10-21 NOTE — PROGRESS NOTES
"  UROLOGY PROGRESS NOTE   Patient Identifiers: Jimmy Reyes (MRN [id number])  Date of Service: 10/21/2024    Subjective:   74-year-old man history of [place] 9 prostate cancer.  He was treated with radiation and 2 years of ADT.  His PSA is 0.118. He had a UroLift plus a redo procedure in the past and has also had 100 units of Botox with good results.  He has normal urgency with leak.  He does use tamsulosin and Myrbetriq's.    Reason for visit: Prostate cancer and OAB follow-up    Objective:     VITALS:    Vitals:    10/21/24 1017   BP: 120/70   Pulse: (!) 54   SpO2: 97%           LABS:  Lab Results   Component Value Date    HGB 12.0 08/20/2024    HCT 36.7 08/20/2024    WBC 8.28 08/20/2024     (L) 08/20/2024   ]    Lab Results   Component Value Date     01/07/2016    K 4.2 08/26/2024     08/26/2024    CO2 27 08/26/2024    BUN 25 08/26/2024    CREATININE 0.81 08/26/2024    CALCIUM 9.0 08/26/2024    GLUCOSE 198 (H) 12/14/2020   ]        INPATIENT MEDS:    Current Outpatient Medications:     acetaminophen-codeine (TYLENOL with CODEINE #3) 300-30 MG per tablet, Take 1 tablet by mouth every 6 (six) hours as needed for moderate pain, Disp: 10 tablet, Rfl: 0    albuterol (ProAir HFA) 90 mcg/act inhaler, Inhale 2 puffs every 6 (six) hours as needed for wheezing or shortness of breath, Disp: 8.5 g, Rfl: 0    aluminum-magnesium hydroxide-simethicone (MYLANTA) 200-200-20 mg/5 mL suspension, Take 30 mL by mouth every 6 (six) hours as needed for indigestion or heartburn, Disp: 355 mL, Rfl: 0    atorvastatin (LIPITOR) 10 mg tablet, TAKE ONE TABLET BY MOUTH EVERY DAY, Disp: 100 tablet, Rfl: 3    Biotin 1000 MCG tablet, Take 1 tablet by mouth if needed Per pt takes it \"once in awhile\", Disp: , Rfl:     Blood Glucose Monitoring Suppl (GLUCOCARD VITAL MONITOR) w/Device KIT, by Does not apply route 2 (two) times a day, Disp: 1 kit, Rfl: 0    Cholecalciferol (VITAMIN D3) 2000 units capsule, Take 1,000 Units by mouth " "daily in the early morning , Disp: , Rfl:     Continuous Blood Gluc  (FreeStyle Dunia 2 [place]) CHARITY, Check blood sugars multiple times per day, Disp: 1 each, Rfl: 0    Continuous Blood Gluc Sensor (FreeStyle Dunia 2 Sensor) MISC, Check blood sugars multiple times per day, Disp: 6 each, Rfl: 3    Cyanocobalamin (VITAMIN B-12) 5000 MCG TBDP, Take 5,000 mcg by mouth once a week Takes on Mondays, Disp: , Rfl:     Diclofenac Sodium (VOLTAREN) 1 %, Apply 2 g topically 4 (four) times a day, Disp: 100 g, Rfl: 1    docusate sodium (COLACE) 100 mg capsule, Take 100 mg by mouth daily as needed for constipation, Disp: , Rfl:     insulin aspart protamine-insulin aspart (NovoLOG Mix 70/30 FlexPen) 100 Units/mL injection pen, INJECT 15 UNITS UNDER THE SKIN TWO TIMES A DAY WITH MEALS (Patient taking differently: INJECT 15 UNITS UNDER THE SKIN TWO TIMES A DAY WITH MEALS (patient taking 10 units in the evening)), Disp: 30 mL, Rfl: 1    insulin aspart protamine-insulin aspart (NovoLOG Mix 70/30) 100 units/mL injection, Take 15 units with breakfast and 10 units with dinner, Disp: 30 mL, Rfl: 1    INSULIN SYRINGE 1CC/29G (B-D INSULIN SYRINGE) 29G X 1/2\" 1 ML MISC, Use twice a day, Disp: 200 each, Rfl: 1    Lancets (LIFESCAN UNISTIK 2) MISC, Lifescan One Touch Ultramini Meter; use as directed; 1; 0; 31-Oct-2016; 31-Oct-2016; Melida Duran; Active, Disp: , Rfl:     losartan (COZAAR) 25 mg tablet, TAKE ONE TABLET BY MOUTH EVERY DAY, Disp: 100 tablet, Rfl: 1    metFORMIN (GLUCOPHAGE) 1000 MG tablet, TAKE ONE TABLET BY MOUTH TWICE A DAY WITH MEALS, Disp: 180 tablet, Rfl: 1    Multiple Vitamin (MULTIVITAMIN) capsule, Take 1 capsule by mouth daily in the early morning , Disp: , Rfl:     ondansetron (ZOFRAN) 4 mg tablet, Take 1 tablet (4 mg total) by mouth every 8 (eight) hours as needed for nausea or vomiting, Disp: 12 tablet, Rfl: 0    pantoprazole (PROTONIX) 40 mg tablet, TAKE ONE TABLET BY MOUTH EVERY DAY, Disp: 90 tablet, Rfl: 1   " " sertraline (ZOLOFT) 50 mg tablet, TAKE ONE TABLET BY MOUTH EVERY DAY, Disp: 90 tablet, Rfl: 1    tamsulosin (FLOMAX) 0.4 mg, TAKE ONE CAPSULE BY MOUTH EVERY DAY WITH DINNER, Disp: 30 capsule, Rfl: 5    fluticasone (Flovent HFA) 110 MCG/ACT inhaler, Inhale 2 puffs if needed (asthma flare) Rinse mouth after use., Disp: , Rfl:       Physical Exam:   /70 (BP Location: Left arm, Patient Position: Sitting, Cuff Size: Adult)   Pulse (!) 54   Ht 5' 7\" (1.702 m)   Wt 96.6 kg (213 lb)   SpO2 97%   BMI 33.36 kg/m²   GEN: no acute distress    RESP: breathing comfortably with no accessory muscle use    ABD: soft, non-tender, non-distended   INCISION:    EXT: no significant peripheral edema       RADIOLOGY:   none     Assessment:   1.  Prostate cancer  2.  Overactive bladder    Plan:   -Follow-up in 6 months with PSA prior to visit  -  -  -          "

## 2024-10-29 ENCOUNTER — CONSULT (OUTPATIENT)
Dept: CARDIOLOGY CLINIC | Facility: CLINIC | Age: 74
End: 2024-10-29
Payer: COMMERCIAL

## 2024-10-29 VITALS
HEIGHT: 67 IN | DIASTOLIC BLOOD PRESSURE: 70 MMHG | HEART RATE: 52 BPM | SYSTOLIC BLOOD PRESSURE: 126 MMHG | WEIGHT: 214.6 LBS | BODY MASS INDEX: 33.68 KG/M2 | OXYGEN SATURATION: 98 %

## 2024-10-29 DIAGNOSIS — E11.65 TYPE 2 DIABETES MELLITUS WITH HYPERGLYCEMIA, WITH LONG-TERM CURRENT USE OF INSULIN (HCC): ICD-10-CM

## 2024-10-29 DIAGNOSIS — I73.9 CLAUDICATION (HCC): ICD-10-CM

## 2024-10-29 DIAGNOSIS — R00.1 BRADYCARDIA: Primary | ICD-10-CM

## 2024-10-29 DIAGNOSIS — Z79.4 TYPE 2 DIABETES MELLITUS WITH HYPERGLYCEMIA, WITH LONG-TERM CURRENT USE OF INSULIN (HCC): ICD-10-CM

## 2024-10-29 DIAGNOSIS — E78.5 DYSLIPIDEMIA: ICD-10-CM

## 2024-10-29 DIAGNOSIS — I65.29 STENOSIS OF CAROTID ARTERY, UNSPECIFIED LATERALITY: ICD-10-CM

## 2024-10-29 DIAGNOSIS — I10 BENIGN ESSENTIAL HYPERTENSION: ICD-10-CM

## 2024-10-29 PROCEDURE — 99204 OFFICE O/P NEW MOD 45 MIN: CPT | Performed by: INTERNAL MEDICINE

## 2024-10-29 PROCEDURE — 93000 ELECTROCARDIOGRAM COMPLETE: CPT | Performed by: INTERNAL MEDICINE

## 2024-10-29 NOTE — PROGRESS NOTES
"Cardiology   Jimmy Reyes 74 y.o. male MRN: [id number]   Encounter: [id number]        Reason for Consult / Principal Problem: Bradycardia    Physician Requesting Consult: Kiah Rausch DO    PCP: Kiah Rausch DO        Assessment:    Assessment & Plan  Bradycardia    Benign essential hypertension    Type 2 diabetes mellitus with hyperglycemia, with long-term current use of insulin (HCC)    Lab Results   Component Value Date    HGBA1C 7.4 (H) 08/26/2024     Dyslipidemia    Claudication (HCC)    Stenosis of carotid artery, unspecified laterality           Plan:    -Check 48-hour Holter monitor to rule out high degree heart block  -Check echocardiogram for hypertensive heart disease  -Check ankle-brachial index to evaluate leg pain and possible claudication  -Check lipid panel prior to next visit  -Return office in 3 months  -Continue losartan, atorvastatin 10 mg for now      CC: Bradycardia    HPI  74 y.o. male presents with the above.  He is asymptomatic.  Was noted to be bradycardic by his primary care physician and was asked to follow-up here.  His main complaint is bilateral leg pain that occurs mainly at night, he has to stand up and walk around for the pain to resolve, it is quite severe, present in both calves.  Patient has no history of CAD or cerebrovascular disease.  Smoked half a pack per day for 20 years.  He is a diabetic on insulin.      The patient denies chest pain, shortness of breath, palpitations, orthopnea, PND, pedal edema, syncope, presyncope, diaphoresis, nausea/vomiting       The ASCVD Risk score ([place] DK, et al., 2019) failed to calculate for the following reasons:    The valid total cholesterol range is 130 to 320 mg/dL            Physical exam  Objective   Vitals: Blood pressure 126/70, pulse (!) 52, height 5' 7\" (1.702 m), weight 97.3 kg (214 lb 9.6 oz), SpO2 98%.    General:  AO x3, no acute distress  Cardiac:  S1-S2 normal,  No murmurs. No rubs or gallops, JVP: normal  Lungs:  " Clear to auscultation bilaterally, no wheezing or crackles.  Abdomen:  Soft, nontender, nondistended.  Extremities:  Warm, well perfused,  no ulcers or rashes. Edema:absent  Neuro: Grossly nonfocal        ======================================================  TREADMILL STRESS  No results found for this or any previous visit.     ----------------------------------------------------------------------------------------------  NUCLEAR STRESS TEST: No results found for this or any previous visit.    No results found for this or any previous visit.      --------------------------------------------------------------------------------  CATH:  No results found for this or any previous visit.    --------------------------------------------------------------------------------  ECHO:   No results found for this or any previous visit.    No results found for this or any previous visit.    --------------------------------------------------------------------------------  HOLTER  No results found for this or any previous visit.    --------------------------------------------------------------------------------  CAROTIDS  Results for orders placed during the hospital encounter of 04/23/24    VAS carotid complete study    Narrative  [facility] REPORT  CLINICAL:  Indications:  Patient presents with a cervical bruit and multiple cardiovascular risk  factors.  Patient is asymptomatic from a cerebral vascular standpoint.  Risk Factors  The patient has history of HTN, Diabetes (NIDDM (oral meds)), Hyperlipidemia  and previous smoking (quit >10yrs ago).  Clinical  Right Pressure:  139/79 mm Hg, Left Pressure:  130/80 mm Hg.    FINDINGS:    Right        Impression  PSV  EDV (cm/s)  Direction of Flow  Dist. ICA                 99          24  Mid. ICA                  80          18  Prox. ICA    Normal      114          19  Dist CCA                  87          14  Mid CCA                   83          14  Prox CCA                   87          14  Ext Carotid              117           7  Prox Vert                 61          11  Antegrade  Subclavian               129           0  Innominate               148           5    Left         Impression  PSV  EDV (cm/s)  Direction of Flow  Dist. ICA                103          22  Mid. ICA                  90          19  Prox. ICA    1 - 49%      84          21  Dist CCA                  95          21  Mid CCA                  104          23  Prox CCA                 134          27  Ext Carotid              137          14  Prox Vert                 58          11  Antegrade  Subclavian               168           0        CONCLUSION:    Impression  RIGHT:  There is no evidence of arterial disease throughout the extracranial carotid  system.  Vertebral artery flow is antegrade.  There is no significant subclavian artery disease.    LEFT:  There is <50% stenosis noted in the internal carotid artery. Plaque is  homogenous and smooth.  Vertebral artery flow is antegrade.  There is no significant subclavian artery disease.    SIGNATURE:  Electronically Signed by: ARCELIA HOLLEY on 2024-04-23 05:59:57 PM       Diagnoses and all orders for this visit:    Bradycardia  -     Ambulatory Referral to Cardiology  -     POCT ECG  -     Echo complete w/ contrast if indicated; Future  -     Holter monitor; Future    Benign essential hypertension  -     Echo complete w/ contrast if indicated; Future    Type 2 diabetes mellitus with hyperglycemia, with long-term current use of insulin (HCC)    Dyslipidemia  -     Lipid Panel With Direct LDL; Future    Claudication (HCC)  -     VAS CHANI and waveform analysis, multiple levels; Future       ======================================================          Review of Systems  ROS as noted above, otherwise 12 point review of systems was performed and is negative.     Historical Information   Past Medical History:   Diagnosis Date    Anemia     Arthritis     Black  stool 07/24/2020    Bursitis of right elbow 11/28/2023    Cancer (HCC)     Colon polyp     Diabetes mellitus (HCC)     IDDM    Diverticulitis of colon     Diverticulosis     Enlarged prostate     Erectile dysfunction     H/O toe surgery 12/02/2023    HL (hearing loss)     Hypercholesteremia     Resolved post weight loss    Hypertension     Resolved post weight loss    Kidney stone     04/2024    Obesity     Prostate cancer (HCC)     Sleep apnea     resolved with weight loss    Urinary tract infection     Wears partial dentures     partial upper and lower     Past Surgical History:   Procedure Laterality Date    ABDOMINAL ADHESION SURGERY N/A 11/28/2016    Procedure: EXTENSIVE LYSIS ADHESIONS;  Surgeon: Abilio Plummer MD;  Location[facility];  Service:     ANKLE FRACTURE SURGERY Left     CHOLECYSTECTOMY      COLON SURGERY      colon resection    COLONOSCOPY      COLOSTOMY      COLOSTOMY CLOSURE      DIAGNOSTIC LAPAROSCOPY      FL RETROGRADE PYELOGRAM  4/2/2024    GASTRIC BYPASS      failed due to adhesions    HERNIA REPAIR      abdominal    RI CORRECTION HAMMERTOE Left 12/1/2023    Procedure: REPAIR HAMMERTOE / MALLET TOE / CLAW TOE;  Surgeon: David Daley DPM;  Location: [facility];  Service: Podiatry    RI CYSTO INSERTION TRANSPROSTATIC IMPLANT SINGLE N/A 3/8/2019    Procedure: CYSTOSCOPY WITH INSERTION UROLIFT;  Surgeon: Migel Sullivan MD;  [facility];  Service: Urology    RI CYSTO INSERTION TRANSPROSTATIC IMPLANT SINGLE N/A 5/8/2020    Procedure: CYSTOSCOPY WITH INSERTION UROLIFT;  Surgeon: Migel Sullivan MD;  [facility];  Service: Urology    RI CYSTO/URETERO W/LITHOTRIPSY &INDWELL STENT INSRT Right 4/2/2024    Procedure: CYSTOSCOPY URETEROSCOPY STONE BASKET EXTRACTION, RETROGRADE PYELOGRAM AND INSERTION STENT URETERAL;  Surgeon: Julien Boyce DO;  [facility];  Service: Urology    RI CYSTOURETHROSCOPY W/INTERNAL URETHROTOMY N/A 5/8/2020    Procedure:  DIRECT VISUAL INTERAL URETHROTOMY (DVIU), dilation of urethral stricture;  Surgeon: Migel Sullivan MD;  [facility];  Service: Urology    FL EDG US EXAM SURGICAL ALTER STOM DUODENUM/JEJUNUM N/A 10/25/2018    Procedure: LINEAR ENDOSCOPIC U/S;  Surgeon: Brody Isaacs MD;  Location: [facility];  Service: Gastroenterology    FL ESOPHAGOGASTRODUODENOSCOPY TRANSORAL DIAGNOSTIC N/A 2016    Procedure: EGD AND COLONOSCOPY;  Surgeon: Ana Wong MD;  [facility];  Service: Gastroenterology    FL LAPS [facility] RSTRICTIV PX LONGITUDINAL GASTRECTOMY N/A 2016    Procedure: GASTRECTOMY SLEEVE LAPAROSCOPIC;  Surgeon: Abilio Plummer MD;  [facility];  Service: Bariatrics    FL PROSTATE NEEDLE BIOPSY ANY APPROACH N/A 2020    Procedure: TRANSRECTAL NEEDLE BIOPSY PROSTATE (TRNBP) WITH TRANSRECTAL ULTRASOUND GUIDANCE;  Surgeon: Migel Sullivan MD;  [facility];  Service: Urology    TONSILLECTOMY      US GUIDED PROSTATE BIOPSY  2020     Social History     Substance and Sexual Activity   Alcohol Use Not Currently     Social History     Substance and Sexual Activity   Drug Use Not Currently    Comment: RSO     Social History     Tobacco Use   Smoking Status Former    Current packs/day: 0.25    Average packs/day: 0.3 packs/day for 20.0 years (5.0 ttl pk-yrs)    Types: Cigarettes    Start date: 11/10/2004    Quit date: 11/10/2001    Passive exposure: Past   Smokeless Tobacco Former     Family History   Problem Relation Age of Onset    Heart disease Mother     Breast cancer Mother 84    Diabetes Father             Colon cancer Neg Hx     Liver disease Neg Hx        Meds/Allergies   Hospital Medications: No current facility-administered medications for this visit.  Home Medications: Not in a hospital admission.    Allergies   Allergen Reactions    Enalapril Anaphylaxis, Throat Swelling and Hives         Portions of the record may have been created with voice recognition  "software.  Occasional wrong words or \"sound a like\" substitutions may have occurred due to the inherent limitations of voice recognition software.  Read the chart carefully and recognize, using context, where substitutions have occurred.        I have spent a total of 50 min in reviewing and/or ordering tests, medications, or procedures, performing an examination or evaluation, reviewing pertinent history, counseling and educating the patient, referring and/or communicating with other health care professionals, documenting in the EMR and general coordination of care of the patient today.              "

## 2024-11-06 ENCOUNTER — HOSPITAL ENCOUNTER (OUTPATIENT)
Dept: NON INVASIVE DIAGNOSTICS | Facility: CLINIC | Age: 74
Discharge: HOME | End: 2024-11-06
Payer: COMMERCIAL

## 2024-11-06 DIAGNOSIS — I73.9 CLAUDICATION (HCC): ICD-10-CM

## 2024-11-06 PROCEDURE — 93923 UPR/LXTR ART STDY 3+ LVLS: CPT | Performed by: STUDENT IN AN ORGANIZED HEALTH CARE EDUCATION/TRAINING PROGRAM

## 2024-11-06 PROCEDURE — 93923 UPR/LXTR ART STDY 3+ LVLS: CPT

## 2024-11-11 ENCOUNTER — TELEPHONE (OUTPATIENT)
Dept: CARDIOLOGY CLINIC | Facility: CLINIC | Age: 74
End: 2024-11-11

## 2024-11-15 DIAGNOSIS — K21.9 GASTROESOPHAGEAL REFLUX DISEASE WITHOUT ESOPHAGITIS: ICD-10-CM

## 2024-11-15 RX ORDER — PANTOPRAZOLE SODIUM 40 MG/1
40 TABLET, DELAYED RELEASE ORAL DAILY
Qty: 90 TABLET | Refills: 1 | Status: SHIPPED | OUTPATIENT
Start: 2024-11-15

## 2024-11-20 ENCOUNTER — TELEPHONE (OUTPATIENT)
Dept: UROLOGY | Facility: CLINIC | Age: 74
End: 2024-11-20

## 2024-11-20 NOTE — TELEPHONE ENCOUNTER
Called and spoke to patient regarding 11/22 appt. Patient states he is not having any new issues and does not feel like he needs to be seen sooner. Agreeable to cancelling 11/22 appt and keeping April appt. No further action needed.

## 2024-11-20 NOTE — TELEPHONE ENCOUNTER
----- Message from YVONNE Gonzalez sent at 11/19/2024  3:35 PM EST -----  Regarding: RE: Appointment Necessary?- 11/22  Not necessary, ok to just follow-up in 6 months as Miguelito recommended. Thank you:)  ----- Message -----  From: July Grey MA  Sent: 11/19/2024   2:51 PM EST  To: YVONNE Arriaga  Subject: Appointment Necessary?- 11/22                    Healma delia Wallace! Just wondering if this appt is necessary coming up on 11/22. Patient had botox injections with CHELSE[facility] 4/23 then was supposed to follow up with you in July, but did not do so. He followed up with Miguelito 10/21 regarding his prostate and OAB for which Miguelito recommended a f/u in 6 months.

## 2024-11-26 ENCOUNTER — HOSPITAL ENCOUNTER (OUTPATIENT)
Dept: NON INVASIVE DIAGNOSTICS | Facility: CLINIC | Age: 74
Discharge: HOME | End: 2024-11-26
Payer: COMMERCIAL

## 2024-11-26 VITALS
DIASTOLIC BLOOD PRESSURE: 70 MMHG | BODY MASS INDEX: 33.67 KG/M2 | SYSTOLIC BLOOD PRESSURE: 126 MMHG | WEIGHT: 214.51 LBS | HEIGHT: 67 IN | HEART RATE: 53 BPM

## 2024-11-26 DIAGNOSIS — R00.1 BRADYCARDIA: ICD-10-CM

## 2024-11-26 DIAGNOSIS — I10 BENIGN ESSENTIAL HYPERTENSION: ICD-10-CM

## 2024-11-26 LAB
AORTIC ROOT: 2.3 CM
APICAL FOUR CHAMBER EJECTION FRACTION: 61 %
ASCENDING AORTA: 2.6 CM
BSA FOR ECHO PROCEDURE: 2.08 M2
E WAVE DECELERATION TIME: 142 MS
E/A RATIO: 1.26
FRACTIONAL SHORTENING: 35 (ref 28–44)
INTERVENTRICULAR SEPTUM IN DIASTOLE (PARASTERNAL SHORT AXIS VIEW): 0.9 CM
INTERVENTRICULAR SEPTUM: 0.9 CM (ref 0.6–1.1)
LAAS-AP2: 18.5 CM2
LAAS-AP4: 17.4 CM2
LEFT ATRIUM AREA SYSTOLE SINGLE PLANE A4C: 17.8 CM2
LEFT ATRIUM SIZE: 3.6 CM
LEFT ATRIUM VOLUME (MOD BIPLANE): 57 ML
LEFT ATRIUM VOLUME INDEX (MOD BIPLANE): 27.4 ML/M2
LEFT INTERNAL DIMENSION IN SYSTOLE: 3.1 CM (ref 2.1–4)
LEFT VENTRICULAR INTERNAL DIMENSION IN DIASTOLE: 4.8 CM (ref 3.5–6)
LEFT VENTRICULAR POSTERIOR WALL IN END DIASTOLE: 0.9 CM
LEFT VENTRICULAR STROKE VOLUME: 68 ML
LVSV (TEICH): 68 ML
MV E'TISSUE VEL-SEP: 10 CM/S
MV PEAK A VEL: 0.7 M/S
MV PEAK E VEL: 88 CM/S
MV STENOSIS PRESSURE HALF TIME: 41 MS
MV VALVE AREA P 1/2 METHOD: 5.37
RA PRESSURE ESTIMATED: 10 MMHG
RIGHT ATRIUM AREA SYSTOLE A4C: 12 CM2
RIGHT VENTRICLE ID DIMENSION: 3 CM
RV PSP: 36 MMHG
SL CV LEFT ATRIUM LENGTH A2C: 5.2 CM
SL CV LV EF: 60
SL CV PED ECHO LEFT VENTRICLE DIASTOLIC VOLUME (MOD BIPLANE) 2D: 105 ML
SL CV PED ECHO LEFT VENTRICLE SYSTOLIC VOLUME (MOD BIPLANE) 2D: 37 ML
TR MAX PG: 26 MMHG
TR PEAK VELOCITY: 2.5 M/S
TRICUSPID ANNULAR PLANE SYSTOLIC EXCURSION: 2.8 CM
TRICUSPID VALVE PEAK REGURGITATION VELOCITY: 2.54 M/S

## 2024-11-26 PROCEDURE — 93225 XTRNL ECG REC<48 HRS REC: CPT

## 2024-11-26 PROCEDURE — 93306 TTE W/DOPPLER COMPLETE: CPT

## 2024-11-26 PROCEDURE — 93306 TTE W/DOPPLER COMPLETE: CPT | Performed by: INTERNAL MEDICINE

## 2024-11-26 PROCEDURE — 93226 XTRNL ECG REC<48 HR SCAN A/R: CPT

## 2024-11-27 ENCOUNTER — OFFICE VISIT (OUTPATIENT)
Dept: DENTISTRY | Facility: CLINIC | Age: 74
End: 2024-11-27

## 2024-11-27 DIAGNOSIS — Z01.20 ENCOUNTER FOR DENTAL EXAMINATION AND CLEANING WITHOUT ABNORMAL FINDINGS: ICD-10-CM

## 2024-11-27 DIAGNOSIS — Z79.4 TYPE 2 DIABETES MELLITUS WITH INSULIN THERAPY (HCC): ICD-10-CM

## 2024-11-27 DIAGNOSIS — Z01.21 ENCOUNTER FOR DENTAL EXAMINATION AND CLEANING WITH ABNORMAL FINDINGS: Primary | ICD-10-CM

## 2024-11-27 DIAGNOSIS — E11.9 TYPE 2 DIABETES MELLITUS WITH INSULIN THERAPY (HCC): ICD-10-CM

## 2024-11-27 PROCEDURE — D4910 PERIODONTAL MAINTENANCE: HCPCS

## 2024-11-27 NOTE — DENTAL PROCEDURE DETAILS
3 mo perio maintenance      REVIEWED MED HX: meds, allergies, health changes reviewed in EPIC  CHIEF CONCERN:  none   PAIN SCALE: 0  ASA CLASS:  ASA 3 - Patient with moderate systemic disease with functional limitations  PLAQUE:  moderate  CALCULUS:  None  BLEEDING:  light  STAIN: None  PERIO:     Hygiene Procedures: Scaled, Polished, Flossed and Used Cavitron    Oral Hygiene Instruction: Brushing minimum 2x daily for 2 minutes, daily flossing    Visual and Tactile Intraoral/ Extraoral evaluation: Oral and Oropharyngeal cancer evaluation. No findings     REFERRALS: None    EXAM: no exam due    CARIES FINDINGS: none    Next Recall: 3 month perio maintenance/periodic exam    Last BWX: 8/8/2024  Last Panorex/FMX :0

## 2024-11-27 NOTE — PROGRESS NOTES
Procedure Details   - PERIODONTAL MAINTENANCE       3 mo perio maintenance      REVIEWED MED HX: meds, allergies, health changes reviewed in EPIC  CHIEF CONCERN:  none   PAIN SCALE: 0  ASA CLASS:  ASA 3 - Patient with moderate systemic disease with functional limitations  PLAQUE:  moderate  CALCULUS:  None  BLEEDING:  light  STAIN: None  PERIO:     Hygiene Procedures: Scaled, Polished, Flossed and Used Cavitron    Oral Hygiene Instruction: Brushing minimum 2x daily for 2 minutes, daily flossing    Visual and Tactile Intraoral/ Extraoral evaluation: Oral and Oropharyngeal cancer evaluation. No findings     REFERRALS: None    EXAM: no exam due    CARIES FINDINGS: none    Next Recall: 3 month perio maintenance/periodic exam    Last BWX: 8/8/2024  Last Panorex/FMX :0

## 2024-11-29 RX ORDER — ATORVASTATIN CALCIUM 10 MG/1
10 TABLET, FILM COATED ORAL DAILY
Qty: 30 TABLET | Refills: 0 | Status: SHIPPED | OUTPATIENT
Start: 2024-11-29

## 2024-12-02 PROCEDURE — 93227 XTRNL ECG REC<48 HR R&I: CPT | Performed by: INTERNAL MEDICINE

## 2024-12-13 DIAGNOSIS — E78.00 HYPERCHOLESTEREMIA: Primary | ICD-10-CM

## 2024-12-15 DIAGNOSIS — I10 BENIGN ESSENTIAL HYPERTENSION: ICD-10-CM

## 2024-12-17 RX ORDER — LOSARTAN POTASSIUM 25 MG/1
25 TABLET ORAL DAILY
Qty: 100 TABLET | Refills: 1 | OUTPATIENT
Start: 2024-12-17

## 2024-12-17 NOTE — TELEPHONE ENCOUNTER
This was sent 3 months ago in Dr. Duran's name who is no longer his PCP.  He follows with cardio who should be prescribing this. His other meds were also sent in Dr. Duran's name, and should be coming from endo.   I will deny this prescription - send to cardio.  I will also send to manager to let them know.

## 2024-12-23 ENCOUNTER — APPOINTMENT (OUTPATIENT)
Dept: LAB | Facility: AMBULARY SURGERY CENTER | Age: 74
End: 2024-12-23
Payer: COMMERCIAL

## 2024-12-23 DIAGNOSIS — E78.00 HYPERCHOLESTEREMIA: ICD-10-CM

## 2024-12-23 DIAGNOSIS — E53.8 B12 DEFICIENCY: ICD-10-CM

## 2024-12-23 DIAGNOSIS — E11.65 TYPE 2 DIABETES MELLITUS WITH HYPERGLYCEMIA, WITH LONG-TERM CURRENT USE OF INSULIN (HCC): Chronic | ICD-10-CM

## 2024-12-23 DIAGNOSIS — Z79.4 TYPE 2 DIABETES MELLITUS WITH HYPERGLYCEMIA, WITH LONG-TERM CURRENT USE OF INSULIN (HCC): Chronic | ICD-10-CM

## 2024-12-23 LAB
ANION GAP SERPL CALCULATED.3IONS-SCNC: 9 MMOL/L (ref 4–13)
BUN SERPL-MCNC: 19 MG/DL (ref 5–25)
CALCIUM SERPL-MCNC: 9.4 MG/DL (ref 8.4–10.2)
CHLORIDE SERPL-SCNC: 104 MMOL/L (ref 96–108)
CHOLEST SERPL-MCNC: 129 MG/DL (ref ?–200)
CO2 SERPL-SCNC: 29 MMOL/L (ref 21–32)
CREAT SERPL-MCNC: 0.76 MG/DL (ref 0.6–1.3)
CREAT UR-MCNC: 122.8 MG/DL
EST. AVERAGE GLUCOSE BLD GHB EST-MCNC: 169 MG/DL
GFR SERPL CREATININE-BSD FRML MDRD: 89 ML/MIN/1.73SQ M
GLUCOSE P FAST SERPL-MCNC: 109 MG/DL (ref 65–99)
HBA1C MFR BLD: 7.5 %
HDLC SERPL-MCNC: 46 MG/DL
LDLC SERPL CALC-MCNC: 64 MG/DL (ref 0–100)
MICROALBUMIN UR-MCNC: 123.5 MG/L
MICROALBUMIN/CREAT 24H UR: 101 MG/G CREATININE (ref 0–30)
NONHDLC SERPL-MCNC: 83 MG/DL
POTASSIUM SERPL-SCNC: 4.4 MMOL/L (ref 3.5–5.3)
SODIUM SERPL-SCNC: 142 MMOL/L (ref 135–147)
TRIGL SERPL-MCNC: 97 MG/DL (ref ?–150)
VIT B12 SERPL-MCNC: 967 PG/ML (ref 180–914)

## 2024-12-23 PROCEDURE — 80061 LIPID PANEL: CPT

## 2024-12-23 PROCEDURE — 80048 BASIC METABOLIC PNL TOTAL CA: CPT

## 2024-12-23 PROCEDURE — 82570 ASSAY OF URINE CREATININE: CPT

## 2024-12-23 PROCEDURE — 36415 COLL VENOUS BLD VENIPUNCTURE: CPT

## 2024-12-23 PROCEDURE — 82043 UR ALBUMIN QUANTITATIVE: CPT

## 2024-12-23 PROCEDURE — 82607 VITAMIN B-12: CPT

## 2024-12-25 ENCOUNTER — RESULTS FOLLOW-UP (OUTPATIENT)
Dept: FAMILY MEDICINE CLINIC | Facility: CLINIC | Age: 74
End: 2024-12-25

## 2024-12-29 DIAGNOSIS — I10 BENIGN ESSENTIAL HYPERTENSION: ICD-10-CM

## 2024-12-30 DIAGNOSIS — Z79.4 TYPE 2 DIABETES MELLITUS WITH INSULIN THERAPY (HCC): ICD-10-CM

## 2024-12-30 DIAGNOSIS — E11.9 TYPE 2 DIABETES MELLITUS WITH INSULIN THERAPY (HCC): ICD-10-CM

## 2024-12-30 RX ORDER — LOSARTAN POTASSIUM 25 MG/1
25 TABLET ORAL DAILY
Qty: 100 TABLET | Refills: 1 | Status: SHIPPED | OUTPATIENT
Start: 2024-12-30

## 2024-12-31 ENCOUNTER — TELEPHONE (OUTPATIENT)
Dept: DENTISTRY | Facility: CLINIC | Age: 74
End: 2024-12-31

## 2024-12-31 RX ORDER — ATORVASTATIN CALCIUM 10 MG/1
10 TABLET, FILM COATED ORAL DAILY
Qty: 30 TABLET | Refills: 5 | Status: SHIPPED | OUTPATIENT
Start: 2024-12-31

## 2025-01-13 ENCOUNTER — TELEPHONE (OUTPATIENT)
Dept: GASTROENTEROLOGY | Facility: CLINIC | Age: 75
End: 2025-01-13

## 2025-01-13 DIAGNOSIS — Z86.0101 HX OF ADENOMATOUS COLONIC POLYPS: ICD-10-CM

## 2025-01-13 DIAGNOSIS — Z53.8 PROCEDURE NOT CARRIED OUT FOR OTHER REASONS: Primary | ICD-10-CM

## 2025-01-13 NOTE — TELEPHONE ENCOUNTER
Pt is due for a colonoscopy for hx of poor prep Z53.8, hx of ta polyps.  Pt did have Dr Wong in 2022, however, wanted a change in care and did see Dr Mandujano for EGD in 2023.  Will call pt to inform due and see whom he would like to schedule with.   Called and spoke to pt whom informed he would like to stay with Dr Wong.  Pt scheduled.       01/13/25  Screened by: Stefania Acosta MA    Referring Provider Dr Wong    Pre- Screening:     There is no height or weight on file to calculate BMI.  Has patient been referred for a routine screening Colonoscopy? yes  Is the patient between 45-75 years old? yes      Previous Colonoscopy yes   If yes:    Date: recall     Facility:     Reason:       SCHEDULING STAFF: If the patient is between 45yrs-49yrs, please advise patient to confirm benefits/coverage with their insurance company for a routine screening colonoscopy, some insurance carriers will only cover at 50yrs or older. If the patient is over 75years old, please schedule an office visit.     Does the patient want to see a Gastroenterologist prior to their procedure OR are they having any GI symptoms? no    Has the patient been hospitalized or had abdominal surgery in the past 6 months? no    Does the patient use supplemental oxygen? no    Does the patient take Coumadin, Lovenox, Plavix, Elliquis, Xarelto, or other blood thinning medication? no    Has the patient had a stroke, cardiac event, or stent placed in the past year? no    SCHEDULING STAFF: If patient answers NO to above questions, then schedule procedure. If patient answers YES to above questions, then schedule office appointment.     If patient is between 45yrs - 49yrs, please advise patient that we will have to confirm benefits & coverage with their insurance company for a routine screening colonoscopy.

## 2025-01-13 NOTE — TELEPHONE ENCOUNTER
Scheduled date of colonoscopy (as of today): 3/52/5  Physician performing colonoscopy: Dr Wong  Location of colonoscopy: [facility]  Bowel prep reviewed with patient: gOLYTELY mailed   Instructions reviewed with patient by: ls  Clearances: n/a  Diabetic - Novolog, Metformin

## 2025-01-14 ENCOUNTER — OFFICE VISIT (OUTPATIENT)
Dept: DENTISTRY | Facility: CLINIC | Age: 75
End: 2025-01-14

## 2025-01-14 VITALS — HEART RATE: 73 BPM | SYSTOLIC BLOOD PRESSURE: 145 MMHG | DIASTOLIC BLOOD PRESSURE: 69 MMHG

## 2025-01-14 DIAGNOSIS — K08.109 MISSING TEETH, ACQUIRED: Primary | ICD-10-CM

## 2025-01-14 PROCEDURE — WIS5001 FINAL IMPRESSIONS DENTURE

## 2025-01-15 NOTE — PROGRESS NOTES
Final Impression for Upper Flipper    Jimmy Reyes 74 y.o. male presents with self to [facility] for denture final impression.  PMH reviewed, no changes, ASA III.    Diagnosis: Upper flipper (made from [facility] lab) did not have tooth #5 due to error of sending old stone casts (taken before EXT of #5).    - Informed to patient that tooth #5 is missing from upper flipper and need to re-take current impression so lab could add #5. Patient stated that he understood.    Procedure details:  Upper final impression taken with  Alginate  in stock tray. Lower impression was also taken with alginate and stock tray as well.  Poured stone casts (upper and lower) with dental stone.   Wrote up lab script to add tooth #5 onto flipper.    Patient dismissed ambulatory and alert.    NV: 1/21/25 for re-delivery of re-made upper flipper.    Attending: Dr. Ramos was present in clinic.

## 2025-01-21 ENCOUNTER — OFFICE VISIT (OUTPATIENT)
Dept: DENTISTRY | Facility: CLINIC | Age: 75
End: 2025-01-21

## 2025-01-21 DIAGNOSIS — K08.89 NONRESTORABLE TOOTH: ICD-10-CM

## 2025-01-21 DIAGNOSIS — K08.109 TEETH MISSING: Primary | ICD-10-CM

## 2025-01-21 PROCEDURE — D7140 EXTRACTION, ERUPTED TOOTH OR EXPOSED ROOT (ELEVATION AND/OR FORCEPS REMOVAL): HCPCS

## 2025-01-21 PROCEDURE — D5820 INTERIM PARTIAL DENTURE (MAXILLARY): HCPCS

## 2025-01-21 RX ORDER — IBUPROFEN 600 MG/1
600 TABLET, FILM COATED ORAL EVERY 6 HOURS PRN
Qty: 30 TABLET | Refills: 0 | Status: CANCELLED | OUTPATIENT
Start: 2025-01-21

## 2025-01-21 RX ORDER — AMOXICILLIN 500 MG/1
500 CAPSULE ORAL EVERY 8 HOURS SCHEDULED
Qty: 21 CAPSULE | Refills: 0 | Status: SHIPPED | OUTPATIENT
Start: 2025-01-21 | End: 2025-01-28

## 2025-01-21 RX ORDER — IBUPROFEN 600 MG/1
600 TABLET, FILM COATED ORAL EVERY 6 HOURS PRN
Qty: 30 TABLET | Refills: 0 | Status: SHIPPED | OUTPATIENT
Start: 2025-01-21

## 2025-01-21 NOTE — PROGRESS NOTES
Extractions #7 and 10    Jimmy Reyes 74 y.o. male presents with self to [facility] for extraction #7 and 10 and interim partial delivery  PMH reviewed, no changes, ASA III. Significant medical history: GERD, DM type II, HTN, prostate cancer, asthma, anxiety.  Significant allergies: Enalapril. Significant medications: Albuterol, insulin aspart, losartan, metformin, sertraline, tamsulosin.   .  Pain level 0/10    Diagnosis:  Teeth #7 and 10 indicated for extraction due to Periodontally hopeless prognosis  and Necessary for restorative treatment plan.    Consent:  Risks of specific procedure: pain, bleeding, swelling, infection, tooth fracturing to point of requiring surgical removal, damage to adjacent teeth and/or restorations on them.  Risks of any dental procedure: post procedural pain or sensitivity, local anesthetic side effects, allergic reaction to dental materials and medications, breakage of local anesthetic needle, aspiration of small dental tools, injury to nearby hard and soft tissues and anatomical structures.  Benefits: Relieve pain or underlying infection, prevent future or further progression of infection, and allow restoration of new upper prosthesis.  Alternatives: No tx.  Tx plan for extraction #7 and 10 reviewed, pt given opportunity to ask questions, all questions answered to degree of medical and dental certainty.  Patient understands and consent given by self via verbal consent.    Universal Protocol  Other Assisting Provider: Yes, Debby (assistant)  Verbal consent obtained? YES  Written consent obtained?  YES  Risks, benefits and alternatives discussed?: YES  Consent given by: self  Time Out  Immediately prior to the procedure a time out was called: YES  Time Out:  Time Out performed at:  9:00 AM  A time out verifies correct patient, procedure, equipment, support staff and site/side marked as required.  Patient states understanding of procedure being performed: YES  Patient's understanding of  procedure matches consent: YES  Procedure consent matches procedure scheduled: YES  Test results available and properly labeled: N/A  Site  Verified with the patient  YES  Radiology Images displayed and confirmed.  If images not available, report reviewed:  YES  Required items - Required blood products, implants, devices and special equipment available: YES  Patient identity confirmed:  YES    Anesthesia:  Topical 20% benzocaine.  2 carps 2% Lidocaine 1:100k epi via buccal infiltration and palatal/lingual infiltration.    Procedure details:  Reflected gingiva with periosteal elevator.  Elevated, and extracted #7 and 10 (existing PFM bridge #7-10 came out attached to #7 and 10) with straight elevator and upper universal forceps. #7 and 10 had mobility of 3 and presented with enough recession for EXT without need of sectioning bridge.  Socket curetted and irrigated with sterile saline.  Manual alveolar compression with gauze 30 seconds. Hemostasis achieved.  No  suture were placed due to clean socket.    Post-op instructions given verbally and on paper.  Patient given ice and gauze.    Rx: Amoxicillin and Ibuprofen.   - Abx was prescribed due to DM type II (A1c% = 7.4 in Aug 2024)    Patient dismissed ambulatory and alert.    NV: 4/21/25 for re-evaluation and see if #4-12 bridge could be performed.     Attending: Dr. Ramos was present in clinic.

## 2025-01-24 DIAGNOSIS — E11.65 TYPE 2 DIABETES MELLITUS WITH HYPERGLYCEMIA, WITH LONG-TERM CURRENT USE OF INSULIN (HCC): ICD-10-CM

## 2025-01-24 DIAGNOSIS — Z79.4 TYPE 2 DIABETES MELLITUS WITH HYPERGLYCEMIA, WITH LONG-TERM CURRENT USE OF INSULIN (HCC): ICD-10-CM

## 2025-01-24 RX ORDER — INSULIN ASPART 100 [IU]/ML
INJECTION, SUSPENSION SUBCUTANEOUS
Qty: 30 ML | Refills: 1 | Status: SHIPPED | OUTPATIENT
Start: 2025-01-24

## 2025-02-18 ENCOUNTER — OFFICE VISIT (OUTPATIENT)
Dept: DENTISTRY | Facility: CLINIC | Age: 75
End: 2025-02-18

## 2025-02-18 DIAGNOSIS — K08.109 TEETH MISSING: Primary | ICD-10-CM

## 2025-02-18 PROCEDURE — WIS5009 POST-OP DENTURE ADJUSTMENT

## 2025-02-18 NOTE — PROGRESS NOTES
"Upper Flipper Adjustment    Jimmy Reyes 74 y.o. male presents with self to [facility] for Denture Adjustment.  PMH reviewed, no changes, ASA III.  Pain level 0/10.    Diagnosis:  History of denture delivery 4 weeks ago.    Subjective report:  Patient notes \"denture looseness upon wearing and eating\".    Procedure details:  Evaluated edentulous areas for redness or sore spots.  On lingual aspect of #6, the acrylic does not adapt/cover the lingual contour of the tooth. Spacing between tooth and acrylic portion of flipper is seen on lingual of #6.  Examined fit of wrought wire clasps on #6 and 11. Tightened fit of wrought wire clasps with three prong pliers.    Informed patient that discomfort may persist while the soft tissue in the adjusted areas heal. Informed patient that wrought wire clasps are not durable long-term and can bend/break with frequent usage.   Dr. Del Cid recommended patient to wear provisional bridge (fixed) and wait for 6-8 weeks until full alveolar ridge recovery/fully healed. And then bridge procedures work can continue. Patient stated that he understood.    Reviewed expectations regarding removable prosthesis and made patient aware of wearing provisional bridge #6-11 until maxillary alveolar ridge completely heals.     Patient dismissed ambulatory and alert.    NV: Needs reschedule on a Tuesday with Dr. Del Cid present for crown prepping #6 and 11 for provisional bridge.   Attending: Dr. Del Cid  demonstrated usage of three prong pliers to adjust wrought wire clasps to resident .   "

## 2025-02-19 ENCOUNTER — ANESTHESIA EVENT (OUTPATIENT)
Dept: ANESTHESIOLOGY | Facility: HOSPITAL | Age: 75
End: 2025-02-19

## 2025-02-19 ENCOUNTER — ANESTHESIA (OUTPATIENT)
Dept: ANESTHESIOLOGY | Facility: HOSPITAL | Age: 75
End: 2025-02-19

## 2025-02-20 ENCOUNTER — TELEPHONE (OUTPATIENT)
Age: 75
End: 2025-02-20

## 2025-02-20 DIAGNOSIS — E11.65 TYPE 2 DIABETES MELLITUS WITH HYPERGLYCEMIA, WITH LONG-TERM CURRENT USE OF INSULIN (HCC): ICD-10-CM

## 2025-02-20 DIAGNOSIS — Z79.4 TYPE 2 DIABETES MELLITUS WITH HYPERGLYCEMIA, WITH LONG-TERM CURRENT USE OF INSULIN (HCC): ICD-10-CM

## 2025-02-20 DIAGNOSIS — I10 BENIGN ESSENTIAL HYPERTENSION: Primary | ICD-10-CM

## 2025-02-23 RX ORDER — ACYCLOVIR 800 MG/1
TABLET ORAL
Qty: 2 EACH | Refills: 3 | Status: SHIPPED | OUTPATIENT
Start: 2025-02-23

## 2025-02-28 ENCOUNTER — TELEPHONE (OUTPATIENT)
Dept: GASTROENTEROLOGY | Facility: CLINIC | Age: 75
End: 2025-02-28

## 2025-02-28 NOTE — TELEPHONE ENCOUNTER
Spoke to pt confirming pt's colonoscopy scheduled on 3/5/25 at [facility] with Dr Wong.  Informed [facility] would be calling the day prior with the arrival time. Pt has instructions and did not have any questions.  Pt is aware that he needs a .

## 2025-03-05 ENCOUNTER — HOSPITAL ENCOUNTER (OUTPATIENT)
Dept: GASTROENTEROLOGY | Facility: AMBULARY SURGERY CENTER | Age: 75
Setting detail: OUTPATIENT SURGERY
Discharge: HOME | End: 2025-03-05
Attending: INTERNAL MEDICINE
Payer: COMMERCIAL

## 2025-03-05 ENCOUNTER — ANESTHESIA (OUTPATIENT)
Dept: GASTROENTEROLOGY | Facility: AMBULARY SURGERY CENTER | Age: 75
End: 2025-03-05
Payer: COMMERCIAL

## 2025-03-05 VITALS
OXYGEN SATURATION: 97 % | SYSTOLIC BLOOD PRESSURE: 113 MMHG | TEMPERATURE: 98.3 F | RESPIRATION RATE: 18 BRPM | HEIGHT: 67 IN | DIASTOLIC BLOOD PRESSURE: 64 MMHG | HEART RATE: 56 BPM | WEIGHT: 214 LBS | BODY MASS INDEX: 33.59 KG/M2

## 2025-03-05 DIAGNOSIS — Z86.0101 HX OF ADENOMATOUS COLONIC POLYPS: ICD-10-CM

## 2025-03-05 DIAGNOSIS — Z53.8 PROCEDURE NOT CARRIED OUT FOR OTHER REASONS: ICD-10-CM

## 2025-03-05 LAB — GLUCOSE SERPL-MCNC: 98 MG/DL (ref 65–140)

## 2025-03-05 PROCEDURE — 82948 REAGENT STRIP/BLOOD GLUCOSE: CPT

## 2025-03-05 PROCEDURE — G0105 COLORECTAL SCRN; HI RISK IND: HCPCS | Performed by: INTERNAL MEDICINE

## 2025-03-05 RX ORDER — SODIUM CHLORIDE 9 MG/ML
INJECTION, SOLUTION INTRAVENOUS CONTINUOUS PRN
Status: DISCONTINUED | OUTPATIENT
Start: 2025-03-05 | End: 2025-03-05

## 2025-03-05 RX ORDER — PROPOFOL 10 MG/ML
INJECTION, EMULSION INTRAVENOUS CONTINUOUS PRN
Status: DISCONTINUED | OUTPATIENT
Start: 2025-03-05 | End: 2025-03-05

## 2025-03-05 RX ORDER — PROPOFOL 10 MG/ML
INJECTION, EMULSION INTRAVENOUS AS NEEDED
Status: DISCONTINUED | OUTPATIENT
Start: 2025-03-05 | End: 2025-03-05

## 2025-03-05 RX ADMIN — PROPOFOL 120 MCG/KG/MIN: 10 INJECTION, EMULSION INTRAVENOUS at 08:00

## 2025-03-05 RX ADMIN — PROPOFOL 70 MG: 10 INJECTION, EMULSION INTRAVENOUS at 08:00

## 2025-03-05 RX ADMIN — SODIUM CHLORIDE: 9 INJECTION, SOLUTION INTRAVENOUS at 07:54

## 2025-03-05 NOTE — ANESTHESIA POSTPROCEDURE EVALUATION
Post-Op Assessment Note    CV Status:  Stable    Pain management: adequate    Multimodal analgesia used between 6 hours prior to anesthesia start to PACU discharge    Mental Status:  Sleepy and arousable   Hydration Status:  Stable   PONV Controlled:  None   Airway Patency:  Patent  Two or more mitigation strategies used for obstructive sleep apnea   Post Op Vitals Reviewed: Yes    No anethesia notable event occurred.    Staff: CRNA       Last Filed PACU Vitals:  Vitals Value Taken Time   Temp 98.3 °F (36.8 °C) 03/05/25 0815   Pulse 59 03/05/25 0815   /57 03/05/25 0815   Resp 19 03/05/25 0815   SpO2 99 % 03/05/25 0815       Modified Chris:     Vitals Value Taken Time   Activity 2 03/05/25 0815   Respiration 2 03/05/25 0815   Circulation 2 03/05/25 0815   Consciousness 1 03/05/25 0815   Oxygen Saturation 2 03/05/25 0815     Modified Chris Score: 9

## 2025-03-05 NOTE — H&P
History and Physical - [facility]  Jimmy Reyes 74 y.o. male MRN: [id number]        HPI: 74-year-old male with history of colon polyps.  Regular bowel movements.    Historical Information   Past Medical History:   Diagnosis Date    Anemia     Arthritis     Black stool 07/24/2020    Bursitis of right elbow 11/28/2023    Cancer (HCC)     Colon polyp     Diabetes mellitus (HCC)     IDDM    Diverticulitis of colon     Diverticulosis     Enlarged prostate     Erectile dysfunction     H/O toe surgery 12/02/2023    HL (hearing loss)     Hypercholesteremia     Resolved post weight loss    Hypertension     Resolved post weight loss    Kidney stone     04/2024    Obesity     Prostate cancer (HCC)     Sleep apnea     resolved with weight loss    Urinary tract infection     Wears partial dentures     partial upper and lower     Past Surgical History:   Procedure Laterality Date    ABDOMINAL ADHESION SURGERY N/A 11/28/2016    Procedure: EXTENSIVE LYSIS ADHESIONS;  Surgeon: Abilio Plummer MD;  Location: [facility];  Service:     ANKLE FRACTURE SURGERY Left     CHOLECYSTECTOMY      COLON SURGERY      colon resection    COLONOSCOPY      COLOSTOMY      COLOSTOMY CLOSURE      DIAGNOSTIC LAPAROSCOPY      FL RETROGRADE PYELOGRAM  4/2/2024    GASTRIC BYPASS      failed due to adhesions    HERNIA REPAIR      abdominal    CO CORRECTION HAMMERTOE Left 12/1/2023    Procedure: REPAIR HAMMERTOE / MALLET TOE / CLAW TOE;  Surgeon: David Daley DPM;  Location:  MAIN OR;  Service: Podiatry    CO CYSTO INSERTION TRANSPROSTATIC IMPLANT SINGLE N/A 3/8/2019    Procedure: CYSTOSCOPY WITH INSERTION UROLIFT;  Surgeon: Migel Sullivan MD;  [facility]: [facility];  Service: Urology    CO CYSTO INSERTION TRANSPROSTATIC IMPLANT SINGLE N/A 5/8/2020    Procedure: CYSTOSCOPY WITH INSERTION UROLIFT;  Surgeon: Migel Sullivan MD;  [facility];  Service: Urology    CO CYSTO/URETERO W/LITHOTRIPSY &INDWELL STENT INSRT  Right 2024    Procedure: CYSTOSCOPY URETEROSCOPY STONE BASKET EXTRACTION, RETROGRADE PYELOGRAM AND INSERTION STENT URETERAL;  Surgeon: Julien Boyce DO;  [facility];  Service: Urology    KY CYSTOURETHROSCOPY W/INTERNAL URETHROTOMY N/A 2020    Procedure: DIRECT VISUAL INTERAL URETHROTOMY (DVIU), dilation of urethral stricture;  Surgeon: Migel Sullivan MD;  [facility];  Service: Urology    KY EDG US EXAM SURGICAL ALTER STOM DUODENUM/JEJUNUM N/A 10/25/2018    Procedure: LINEAR ENDOSCOPIC U/S;  Surgeon: Brody Isaacs MD;  Location: [facility];  Service: Gastroenterology    KY ESOPHAGOGASTRODUODENOSCOPY TRANSORAL DIAGNOSTIC N/A 2016    Procedure: EGD AND COLONOSCOPY;  Surgeon: Ana Wong MD;  [facility];  Service: Gastroenterology    KY LAPS GSTRC RSTRICTIV PX LONGITUDINAL GASTRECTOMY N/A 2016    Procedure: GASTRECTOMY SLEEVE LAPAROSCOPIC;  Surgeon: Abilio Plummer MD;  [facility];  Service: Bariatrics    KY PROSTATE NEEDLE BIOPSY ANY APPROACH N/A 2020    Procedure: TRANSRECTAL NEEDLE BIOPSY PROSTATE (TRNBP) WITH TRANSRECTAL ULTRASOUND GUIDANCE;  Surgeon: Migel Sullivan MD;  [facility];  Service: Urology    TONSILLECTOMY      US GUIDED PROSTATE BIOPSY  2020     Social History   Social History     Substance and Sexual Activity   Alcohol Use Not Currently     Social History     Substance and Sexual Activity   Drug Use Not Currently    Comment: RSO     Social History     Tobacco Use   Smoking Status Former    Current packs/day: 0.25    Average packs/day: 0.3 packs/day for 20.3 years (5.1 ttl pk-yrs)    Types: Cigarettes    Start date: 11/10/2004    Quit date: 11/10/2001    Passive exposure: Past   Smokeless Tobacco Former     Family History   Problem Relation Age of Onset    Heart disease Mother     Breast cancer Mother 84    Diabetes Father             Colon cancer Neg Hx     Liver disease Neg Hx        Meds/Allergies  "    Not in a hospital admission.    Allergies   Allergen Reactions    Enalapril Anaphylaxis, Throat Swelling and Hives       Objective     Blood pressure 150/68, pulse 63, temperature (!) 96.7 °F (35.9 °C), temperature source Temporal, resp. rate 19, height 5' 7\" (1.702 m), weight 97.1 kg (214 lb), SpO2 96%.    Physical Exam:    Chest- CTA  Heart- RRR  Abdomen- NT/ND  Extremities- No edema    ASSESSMENT:     History of colon polyps    PLAN:    Colonoscopy              "

## 2025-03-05 NOTE — ANESTHESIA PREPROCEDURE EVALUATION
Procedure:  COLONOSCOPY    Relevant Problems   ANESTHESIA (within normal limits)      CARDIO   (+) Benign essential hypertension   (+) Hypercholesteremia      ENDO   (+) Type 2 diabetes mellitus with hyperglycemia, with long-term current use of insulin (HCC)      GI/HEPATIC   (+) GERD (gastroesophageal reflux disease)   (+) IPMN (intraductal papillary mucinous neoplasm)   (+) Pancreatic cyst      /RENAL   (+) Benign enlargement of prostate   (+) Prostate cancer (HCC)      GYN (within normal limits)      HEMATOLOGY (within normal limits)      MUSCULOSKELETAL   (+) Primary osteoarthritis of one hip, left      NEURO/PSYCH   (+) Anxiety   (+) Claudication (HCC)      PULMONARY   (+) Mild intermittent asthma without complication      Other   (+) Bradycardia   HR 50s at baseline     Physical Exam    Airway    Mallampati score: II  TM Distance: >3 FB  Neck ROM: full     Dental        Cardiovascular  Rhythm: regular, Rate: normal, Cardiovascular exam normal    Pulmonary   Breath sounds clear to auscultation    Other Findings        Anesthesia Plan  ASA Score- 3     Anesthesia Type- IV sedation with anesthesia with ASA Monitors.         Additional Monitors:     Airway Plan:            Plan Factors-Exercise tolerance (METS): >4 METS.    Chart reviewed.   Existing labs reviewed. Patient summary reviewed.          Obstructive sleep apnea risk education given perioperatively.        Induction- intravenous.    Postoperative Plan-     Perioperative Resuscitation Plan - Level 1 - Full Code.       Informed Consent- Anesthetic plan and risks discussed with patient.  I personally reviewed this patient with the CRNA. Discussed and agreed on the Anesthesia Plan with the CRNA..      NPO Status:  Vitals Value Taken Time   Date of last liquid 03/05/25 03/05/25 0720   Time of last liquid 0430 03/05/25 0720   Date of last solid 03/03/25 03/05/25 0720   Time of last solid 2100 03/05/25 0720

## 2025-03-20 ENCOUNTER — TELEPHONE (OUTPATIENT)
Age: 75
End: 2025-03-20

## 2025-03-20 DIAGNOSIS — R39.9 LOWER URINARY TRACT SYMPTOMS (LUTS): ICD-10-CM

## 2025-03-20 RX ORDER — TAMSULOSIN HYDROCHLORIDE 0.4 MG/1
0.4 CAPSULE ORAL
Qty: 30 CAPSULE | Refills: 5 | Status: SHIPPED | OUTPATIENT
Start: 2025-03-20

## 2025-03-20 NOTE — TELEPHONE ENCOUNTER
Patient called in stating that Coretta has tried to reach our office since Monday for the approval of the prescription and then again on Weds and has not been able to get a hold of us.    He then said that he thinks they are waiting on the Doctor's prescription.       Coretta   Tel: [phone]    FreeStyle Dunia 3 Sensor    Patient is completely out at this time    He also has a follow up scheduled on 03/25 with Kimber and is wondering if he needs labs.     He would like someone to call him back asa so that he knows if he needs to get them done before Tuesday.

## 2025-03-20 NOTE — TELEPHONE ENCOUNTER
We never received anything just spoke with Coretta they just needed chart notes they did not have the correct fax on file I did provide them with the correct fax # what labs should we order?

## 2025-03-24 ENCOUNTER — APPOINTMENT (OUTPATIENT)
Dept: LAB | Facility: AMBULARY SURGERY CENTER | Age: 75
End: 2025-03-24
Payer: COMMERCIAL

## 2025-03-24 DIAGNOSIS — R39.9 UTI SYMPTOMS: ICD-10-CM

## 2025-03-24 DIAGNOSIS — C61 PROSTATE CANCER (HCC): ICD-10-CM

## 2025-03-24 DIAGNOSIS — E78.5 DYSLIPIDEMIA: ICD-10-CM

## 2025-03-24 LAB
AMORPH URATE CRY URNS QL MICRO: ABNORMAL
BACTERIA UR QL AUTO: ABNORMAL /HPF
BILIRUB UR QL STRIP: NEGATIVE
CHOLEST SERPL-MCNC: 128 MG/DL (ref ?–200)
CLARITY UR: ABNORMAL
COLOR UR: ABNORMAL
GLUCOSE UR STRIP-MCNC: NEGATIVE MG/DL
HDLC SERPL-MCNC: 41 MG/DL
HGB UR QL STRIP.AUTO: NEGATIVE
KETONES UR STRIP-MCNC: NEGATIVE MG/DL
LDLC SERPL CALC-MCNC: 68 MG/DL (ref 0–100)
LEUKOCYTE ESTERASE UR QL STRIP: NEGATIVE
NITRITE UR QL STRIP: NEGATIVE
NON-SQ EPI CELLS URNS QL MICRO: ABNORMAL /HPF
PH UR STRIP.AUTO: 7.5 [PH]
PROT UR STRIP-MCNC: ABNORMAL MG/DL
PSA SERPL-MCNC: 0.16 NG/ML (ref 0–4)
RBC #/AREA URNS AUTO: ABNORMAL /HPF
SP GR UR STRIP.AUTO: 1.02 (ref 1–1.03)
TRIGL SERPL-MCNC: 93 MG/DL (ref ?–150)
UROBILINOGEN UR STRIP-ACNC: <2 MG/DL
WBC #/AREA URNS AUTO: ABNORMAL /HPF

## 2025-03-24 PROCEDURE — 87086 URINE CULTURE/COLONY COUNT: CPT

## 2025-03-24 PROCEDURE — 84153 ASSAY OF PSA TOTAL: CPT

## 2025-03-24 PROCEDURE — 80061 LIPID PANEL: CPT

## 2025-03-24 PROCEDURE — 36415 COLL VENOUS BLD VENIPUNCTURE: CPT

## 2025-03-24 PROCEDURE — 81001 URINALYSIS AUTO W/SCOPE: CPT

## 2025-03-25 ENCOUNTER — OFFICE VISIT (OUTPATIENT)
Dept: ENDOCRINOLOGY | Facility: CLINIC | Age: 75
End: 2025-03-25
Payer: COMMERCIAL

## 2025-03-25 VITALS
SYSTOLIC BLOOD PRESSURE: 122 MMHG | HEIGHT: 67 IN | DIASTOLIC BLOOD PRESSURE: 62 MMHG | BODY MASS INDEX: 34.84 KG/M2 | OXYGEN SATURATION: 95 % | WEIGHT: 222 LBS | HEART RATE: 57 BPM

## 2025-03-25 DIAGNOSIS — Z79.4 TYPE 2 DIABETES MELLITUS WITH HYPERGLYCEMIA, WITH LONG-TERM CURRENT USE OF INSULIN (HCC): Primary | ICD-10-CM

## 2025-03-25 DIAGNOSIS — I10 BENIGN ESSENTIAL HYPERTENSION: ICD-10-CM

## 2025-03-25 DIAGNOSIS — E78.00 HYPERCHOLESTEREMIA: ICD-10-CM

## 2025-03-25 DIAGNOSIS — E11.65 TYPE 2 DIABETES MELLITUS WITH HYPERGLYCEMIA, WITH LONG-TERM CURRENT USE OF INSULIN (HCC): Primary | ICD-10-CM

## 2025-03-25 LAB
BACTERIA UR CULT: NORMAL
SL AMB POCT HEMOGLOBIN AIC: 7.6 (ref ?–6.5)

## 2025-03-25 PROCEDURE — 95251 CONT GLUC MNTR ANALYSIS I&R: CPT | Performed by: PHYSICIAN ASSISTANT

## 2025-03-25 PROCEDURE — 99214 OFFICE O/P EST MOD 30 MIN: CPT | Performed by: PHYSICIAN ASSISTANT

## 2025-03-25 PROCEDURE — 83036 HEMOGLOBIN GLYCOSYLATED A1C: CPT | Performed by: PHYSICIAN ASSISTANT

## 2025-03-25 RX ORDER — INSULIN ASPART 100 [IU]/ML
INJECTION, SUSPENSION SUBCUTANEOUS
Qty: 30 ML | Refills: 1 | Status: SHIPPED | OUTPATIENT
Start: 2025-03-25

## 2025-03-25 NOTE — PROGRESS NOTES
Name: Jimmy Reyes      : 1950      MRN: [id number]  Encounter Provider: Kimber Singh PA-C  Encounter Date: 3/25/2025   Encounter department: [facility] [place]    CC: DM    Assessment & Plan  Type 2 diabetes mellitus with hyperglycemia, with long-term current use of insulin ([facility])  HGA1C 7.6%. Worsened.  Treatment regimen: CGM shows improved readings. Decrease Novolog 70/30 at dinner to 13 units as sometimes BG levels drop to low / low normal levels overnight into the morning.   Discussed intensive insulin regimen does increase risk for hypoglycemia. Episodes of hypoglycemia can lead to permanent disability and death.  Discussed risks/complications associated with uncontrolled diabetes.    Advised to adhere to diabetic diet, and recommended staying active/exercising routinely as tolerated.  Keep carbohydrates consistent to limit blood glucose fluctuations.  Advised to call if blood sugars less than 70 mg/dl or over 300 mg/dl.   Check blood glucose 3+ times a day  Discussed symptoms and treatment of hypoglycemia.   Discussed use of CGM to collect additional blood glucose data to reveal trends and patterns that can be used to optimize treatment plan.   Recommended routine follow-up with podiatry and ophthalmology.   Call / schedule CGM download in 2 weeks.  Ordered blood work to complete prior to next visit.    Lab Results   Component Value Date    HGBA1C 7.5 (H) 2024       Orders:  •  POCT hemoglobin A1c  •  insulin aspart protamine-insulin aspart (NovoLOG Mix 70/30 FlexPen) 100 Units/mL injection pen; INJECT 15 UNITS WITH BREAKFAST AND 13 UNITS WITH DINNER    Benign essential hypertension  Blood pressure well controlled, continue current treatment        Hypercholesteremia  LDL 68  On statin therapy  Managed by PCP           History of Present Illness     Jimmy Reyes is a 74 y.o. male with a history of type 2 diabetes with long term use of insulin. Diabetes  course has been stable. No recent illness or hospitalization. Denies recent severe hypoglycemic or severe hyperglycemic episodes. Denies any issues with his current regimen. Home glucose monitoring: are performed regularly, using the Libre Jimmy Reyes   Device used  Home use     Indication   Type 2 Diabetes    More than 72 hours of data was reviewed. Report to be scanned to chart.     Date Range: 3/12 - 3/25    Analysis of data:   Average Glucose: 135 mg/dL  Coefficient of Variation: 32.2%  Time in Target Range: 81%  Time Above Range: 16%  Time Below Range: 3%    Interpretation of data: There were a couple days in the past 2 weeks where blood glucose levels were low/low normal in the mornings.     Current regimen: Novolog 70/30 15 units with breakfast and 15 units with dinner, metformin 1000 mg BID   compliant most of the time, denies any side effects from medications.  Injects in: abdomen. Rotates sites: Yes  Hypoglycemic episodes: Yes, rare  H/o of hypoglycemia causing hospitalization or Intervention such as glucagon injection or ambulance call : No  Hypoglycemia symptoms: sweating  Treatment of hypoglycemia: discussed treatment      Medic alert tag: recommended: Yes     Diabetes education: Yes  Diet: 3 meals per day, 0-1 snack per day. Timing of meals is predictable.   Diabetic diet compliance: compliant most of the time  Activity: Daily activity is predictable: Yes. No routine exercise right but will be more active during the spring.      Ophthamology: States he went to [facility]. Has upcoming appointment in June 2025.   Podiatry: Oct 2023. Dr. Daley.     Has hypertension: on ACE inhibitor/ARB, compliant most of the time  Has hyperlipidemia: on statin - tolerating well, no myalgias. compliant most of the time, denies any side effects from medications.  Thyroid disorders: No  History of pancreatitis: No      Review of Systems   Constitutional:  Positive for fatigue (mild). Negative for  "activity change, appetite change and unexpected weight change.   HENT:  Negative for trouble swallowing.    Eyes:  Negative for visual disturbance.   Respiratory:  Negative for shortness of breath.    Cardiovascular:  Negative for chest pain and palpitations.   Gastrointestinal:  Negative for constipation and diarrhea.   Endocrine: Negative for polydipsia and polyuria.   Genitourinary:  Positive for frequency.   Musculoskeletal: Negative.    Skin:  Negative for wound.   Neurological:  Negative for numbness.   Psychiatric/Behavioral: Negative.      as per HPI  Current Outpatient Medications on File Prior to Visit   Medication Sig Dispense Refill   • acetaminophen-codeine (TYLENOL with CODEINE #3) 300-30 MG per tablet Take 1 tablet by mouth every 6 (six) hours as needed for moderate pain 10 tablet 0   • albuterol (ProAir HFA) 90 mcg/act inhaler Inhale 2 puffs every 6 (six) hours as needed for wheezing or shortness of breath 8.5 g 0   • aluminum-magnesium hydroxide-simethicone (MYLANTA) 200-200-20 mg/5 mL suspension Take 30 mL by mouth every 6 (six) hours as needed for indigestion or heartburn 355 mL 0   • atorvastatin (LIPITOR) 10 mg tablet TAKE ONE TABLET BY MOUTH EVERY DAY 30 tablet 5   • Biotin 1000 MCG tablet Take 1 tablet by mouth if needed Per pt takes it \"once in awhile\"     • Blood Glucose Monitoring Suppl (GLUCOCARD VITAL MONITOR) w/Device KIT by Does not apply route 2 (two) times a day 1 kit 0   • Cholecalciferol (VITAMIN D3) 2000 units capsule Take 1,000 Units by mouth daily in the early morning      • Continuous Blood Gluc  (FreeStyle Dunia 2 [place]) CHARITY Check blood sugars multiple times per day 1 each 0   • Continuous Blood Gluc Sensor (FreeStyle Dunia 2 Sensor) MISC Check blood sugars multiple times per day 6 each 3   • Continuous Glucose Sensor (FreeStyle Dunia 3 Sensor) MISC Change sensor every 14 days 2 each 3   • Cyanocobalamin (VITAMIN B-12) 5000 MCG TBDP Take 5,000 mcg by mouth once a week " "Takes on Mondays     • Diclofenac Sodium (VOLTAREN) 1 % Apply 2 g topically 4 (four) times a day 100 g 1   • docusate sodium (COLACE) 100 mg capsule Take 100 mg by mouth daily as needed for constipation     • ibuprofen (MOTRIN) 600 mg tablet Take 1 tablet (600 mg total) by mouth every 6 (six) hours as needed for mild pain 30 tablet 0   • insulin aspart protamine-insulin aspart (NovoLOG Mix 70/30) 100 units/mL injection Take 15 units with breakfast and 10 units with dinner 30 mL 1   • INSULIN SYRINGE 1CC/29G (B-D INSULIN SYRINGE) 29G X 1/2\" 1 ML MISC Use twice a day 200 each 1   • Lancets (LIFESCAN UNISTIK 2) MISC Lifescan One Touch Ultramini Meter; use as directed; 1; 0; 31-Oct-2016; 31-Oct-2016; Melida Duran; Active     • losartan (COZAAR) 25 mg tablet TAKE ONE TABLET BY MOUTH EVERY  tablet 1   • metFORMIN (GLUCOPHAGE) 1000 MG tablet TAKE ONE TABLET BY MOUTH TWICE A DAY WITH MEALS 180 tablet 1   • Multiple Vitamin (MULTIVITAMIN) capsule Take 1 capsule by mouth daily in the early morning      • NovoLOG Mix 70/30 FlexPen (70-30) 100 units/mL injection pen INJECT 15 UNITS UNDER THE SKIN TWO TIMES A DAY WITH MEALS 30 mL 1   • ondansetron (ZOFRAN) 4 mg tablet Take 1 tablet (4 mg total) by mouth every 8 (eight) hours as needed for nausea or vomiting 12 tablet 0   • pantoprazole (PROTONIX) 40 mg tablet TAKE ONE TABLET BY MOUTH EVERY DAY 90 tablet 1   • sertraline (ZOLOFT) 50 mg tablet TAKE ONE TABLET BY MOUTH EVERY DAY 90 tablet 1   • tamsulosin (FLOMAX) 0.4 mg TAKE ONE CAPSULE BY MOUTH EVERY DAY WITH DINNER 30 capsule 5   • fluticasone (Flovent HFA) 110 MCG/ACT inhaler Inhale 2 puffs if needed (asthma flare) Rinse mouth after use.     • polyethylene glycol (GOLYTELY) 4000 mL solution Take 4,000 mL by mouth once for 1 dose 4000 mL 0     No current facility-administered medications on file prior to visit.         Medical History Reviewed by provider this encounter:  Tobacco  Allergies  Meds  Problems  Med Hx  " "Surg Hx  Fam Hx     .    Objective   /62   Pulse 57   Ht 5' 7\" (1.702 m)   Wt 101 kg (222 lb)   SpO2 95%   BMI 34.77 kg/m²      Body mass index is 34.77 kg/m².  Wt Readings from Last 3 Encounters:   03/25/25 101 kg (222 lb)   03/05/25 97.1 kg (214 lb)   11/26/24 97.3 kg (214 lb 8.1 oz)     Physical Exam  Vitals and nursing note reviewed.   Constitutional:       Appearance: He is well-developed.   HENT:      Head: Normocephalic.   Eyes:      General: No scleral icterus.  Neck:      Thyroid: No thyromegaly.   Cardiovascular:      Rate and Rhythm: Normal rate and regular rhythm.      Pulses:           Radial pulses are 2+ on the right side and 2+ on the left side.      Heart sounds: No murmur heard.  Pulmonary:      Effort: Pulmonary effort is normal. No respiratory distress.      Breath sounds: Normal breath sounds. No wheezing.   Musculoskeletal:      Cervical back: Neck supple.   Skin:     General: Skin is warm and dry.   Neurological:      Mental Status: He is alert.       Labs:   Lab Results   Component Value Date    HGBA1C 7.5 (H) 12/23/2024    HGBA1C 7.4 (H) 08/26/2024    HGBA1C 6.9 (A) 04/05/2024     Component      Latest Ref Rng 8/26/2024 12/23/2024 3/24/2025   Sodium      135 - 147 mmol/L 141  142     Potassium      3.5 - 5.3 mmol/L 4.2  4.4     Chloride      96 - 108 mmol/L 105  104     Carbon Dioxide      21 - 32 mmol/L 27  29     ANION GAP      4 - 13 mmol/L 9  9     BUN      5 - 25 mg/dL 25  19     Creatinine      0.60 - 1.30 mg/dL 0.81  0.76     GLUCOSE, FASTING      65 - 99 mg/dL 114 (H)  109 (H)     Calcium      8.4 - 10.2 mg/dL 9.0  9.4     GFR, Calculated      ml/min/1.73sq m 87  89     Cholesterol      See Comment mg/dL  129  128    Triglycerides      See Comment mg/dL  97  93    HDL      >=40 mg/dL  46  41    LDL Calculated      0 - 100 mg/dL  64  68    Non-HDL Cholesterol      mg/dl  83     EXT Creatinine Urine      Reference range not established. mg/dL 126.5  122.8   "   Albumin,U,Random      <20.0 mg/L 93.0 (H)  123.5 (H)     Albumin Creat Ratio      0 - 30 mg/g creatinine 74 (H)  101 (H)     Hemoglobin A1C      Normal 4.0-5.6%; PreDiabetic 5.7-6.4%; Diabetic >=6.5%; Glycemic control for adults with diabetes <7.0% % 7.4 (H)  7.5 (H)     eAG, EST AVG Glucose      mg/dl 166  169     Vitamin B-12      180 - 914 pg/mL  967 (H)        Legend:  (H) High    Patient Instructions     Hypoglycemia instructions   Jimmy Reyes  3/25/2025  [id number]    Low Blood Sugar    Steps to treat low blood sugar.    1. Test blood sugar if you have symptoms of low blood sugar:   Low Blood Sugar Symptoms:  o Sweaty  o Dizzy  o Rapid heartbeat  o Shaky  o Bad mood  o Hungry      2. Treat blood sugar less than 70 with 15 grams of fast-acting carbohydrate:   Examples of 15 grams Fast-Acting Carbohydrate:  o 4 oz juice  o 4 oz regular soda  o 3-4 glucose tablets (chew)  o 3-4 hard candies (chew)          3.  Wait 15 minutes and test your blood sugar again     4. If blood sugar is less than 100, repeat steps 2-3.    5. When your blood sugar is 100 or more, eat a snack if it will be longer than one hour until your next meal. The snack should be 15 grams of carbohydrate and a protein:   Examples of snacks:  o ½ sandwich  o 6 crackers with cheese  o Piece of fruit with cheese or peanut butter  o 6 crackers with peanut butter      Discussed with the patient and all questioned fully answered. He will call me if any problems arise.    Administrative Statements   I have spent a total time of 30 minutes in caring for this patient on the day of the visit/encounter including Importance of tx compliance, Documenting in the medical record, Reviewing/placing orders in the medical record (including tests, medications, and/or procedures), and Obtaining or reviewing history  .

## 2025-03-25 NOTE — PATIENT INSTRUCTIONS
Hypoglycemia instructions   Jimmy Reyes  3/25/2025  [id number]    Low Blood Sugar    Steps to treat low blood sugar.    1. Test blood sugar if you have symptoms of low blood sugar:   Low Blood Sugar Symptoms:  o Sweaty  o Dizzy  o Rapid heartbeat  o Shaky  o Bad mood  o Hungry      2. Treat blood sugar less than 70 with 15 grams of fast-acting carbohydrate:   Examples of 15 grams Fast-Acting Carbohydrate:  o 4 oz juice  o 4 oz regular soda  o 3-4 glucose tablets (chew)  o 3-4 hard candies (chew)          3.  Wait 15 minutes and test your blood sugar again     4. If blood sugar is less than 100, repeat steps 2-3.    5. When your blood sugar is 100 or more, eat a snack if it will be longer than one hour until your next meal. The snack should be 15 grams of carbohydrate and a protein:   Examples of snacks:  o ½ sandwich  o 6 crackers with cheese  o Piece of fruit with cheese or peanut butter  o 6 crackers with peanut butter

## 2025-03-25 NOTE — ASSESSMENT & PLAN NOTE
HGA1C 7.6%. Worsened.  Treatment regimen: CGM shows improved readings. Decrease Novolog 70/30 at dinner to 13 units as sometimes BG levels drop to low / low normal levels overnight into the morning.   Discussed intensive insulin regimen does increase risk for hypoglycemia. Episodes of hypoglycemia can lead to permanent disability and death.  Discussed risks/complications associated with uncontrolled diabetes.    Advised to adhere to diabetic diet, and recommended staying active/exercising routinely as tolerated.  Keep carbohydrates consistent to limit blood glucose fluctuations.  Advised to call if blood sugars less than 70 mg/dl or over 300 mg/dl.   Check blood glucose 3+ times a day  Discussed symptoms and treatment of hypoglycemia.   Discussed use of CGM to collect additional blood glucose data to reveal trends and patterns that can be used to optimize treatment plan.   Recommended routine follow-up with podiatry and ophthalmology.   Call / schedule CGM download in 2 weeks.  Ordered blood work to complete prior to next visit.    Lab Results   Component Value Date    HGBA1C 7.5 (H) 12/23/2024       Orders:    POCT hemoglobin A1c    insulin aspart protamine-insulin aspart (NovoLOG Mix 70/30 FlexPen) 100 Units/mL injection pen; INJECT 15 UNITS WITH BREAKFAST AND 13 UNITS WITH DINNER

## 2025-03-28 DIAGNOSIS — F41.9 ANXIETY: ICD-10-CM

## 2025-03-31 NOTE — ASSESSMENT & PLAN NOTE
Lab Results   Component Value Date    HGBA1C 8 3 (H) 01/13/2023       Recent Labs     03/26/23  2048 03/27/23  0749 03/27/23  1047 03/27/23  1639   POCGLU 106 163* 182* 173*       Blood Sugar Average: Last 72 hrs:  (P) 985 [id number]   · Patient is on glipizide metformin and NovoLog 70/30 at home continue at discharge  · Continue NovoLOG 10 units BID and insulin sliding scale  · Oral antidiabetics were held while inpatient March 31, 2025     Patient: Paul Quevedo   YOB: 2018   Date of Visit: 3/31/2025       To Whom it May Concern:    Paul Quevedo was seen in my clinic on 3/31/2025. He may return to school on 04/01/25 .    If you have any questions or concerns, please don't hesitate to call.         Sincerely,          Zulay Nixon PA-C        CC: No Recipients

## 2025-04-10 ENCOUNTER — TELEPHONE (OUTPATIENT)
Dept: ENDOCRINOLOGY | Facility: CLINIC | Age: 75
End: 2025-04-10

## 2025-04-14 DIAGNOSIS — Z79.4 TYPE 2 DIABETES MELLITUS WITH HYPERGLYCEMIA, WITH LONG-TERM CURRENT USE OF INSULIN (HCC): Chronic | ICD-10-CM

## 2025-04-14 DIAGNOSIS — E11.65 TYPE 2 DIABETES MELLITUS WITH HYPERGLYCEMIA, WITH LONG-TERM CURRENT USE OF INSULIN (HCC): Chronic | ICD-10-CM

## 2025-04-23 ENCOUNTER — TELEPHONE (OUTPATIENT)
Age: 75
End: 2025-04-23

## 2025-04-23 NOTE — TELEPHONE ENCOUNTER
Patient called to see if he needs any labwork prior to upcoming appt. Advised him he already did the PSA in March. Patient appreciative.

## 2025-04-29 ENCOUNTER — TELEPHONE (OUTPATIENT)
Dept: ENDOCRINOLOGY | Facility: CLINIC | Age: 75
End: 2025-04-29

## 2025-04-29 NOTE — TELEPHONE ENCOUNTER
cover my meds/[employer].  Freestyle Dunia 3 Sensor  Kimber Singh  Rasm[place]:[id number]  See media  Thank you

## 2025-04-30 ENCOUNTER — OFFICE VISIT (OUTPATIENT)
Dept: UROLOGY | Facility: CLINIC | Age: 75
End: 2025-04-30
Payer: COMMERCIAL

## 2025-04-30 VITALS
HEART RATE: 62 BPM | HEIGHT: 67 IN | OXYGEN SATURATION: 96 % | DIASTOLIC BLOOD PRESSURE: 60 MMHG | SYSTOLIC BLOOD PRESSURE: 144 MMHG | WEIGHT: 215 LBS | BODY MASS INDEX: 33.74 KG/M2

## 2025-04-30 DIAGNOSIS — C61 PROSTATE CANCER (HCC): ICD-10-CM

## 2025-04-30 DIAGNOSIS — N32.81 OAB (OVERACTIVE BLADDER): ICD-10-CM

## 2025-04-30 DIAGNOSIS — R39.9 UTI SYMPTOMS: Primary | ICD-10-CM

## 2025-04-30 PROCEDURE — 99213 OFFICE O/P EST LOW 20 MIN: CPT | Performed by: PHYSICIAN ASSISTANT

## 2025-04-30 NOTE — TELEPHONE ENCOUNTER
PA for FreeStyle Dunia 3 Sensor SUBMITTED to [employer]    via    [x]CMM-KEY: [id number]   []Surescripts-Case ID #   []Availity-Auth ID # NDC #   []Faxed to plan   []Other website   []Phone call Case ID #     [x]PA sent as URGENT    All office notes, labs and other pertaining documents and studies sent. Clinical questions answered. Awaiting determination from insurance company.     Turnaround time for your insurance to make a decision on your Prior Authorization can take 7-21 business days.

## 2025-04-30 NOTE — PROGRESS NOTES
"Name: Jimmy Reyes      : 1950      MRN: [id number]  Encounter Provider: Carlin Palacios PA-C  Encounter Date: 2025   Encounter department: [facility]OLOGY [facility]  :  Assessment & Plan  UTI symptoms    Orders:    Urine culture; Future    Prostate cancer (HCC)  Stable PSA follow-up in 6 months with PSA prior to visit       OAB (overactive bladder)  Schedule cystoscopy and 100 units of Botox           History of Present Illness   Jimmy Reyes is a 74 y.o. male who presents history of Rafael 9 prostate cancer.  He was treated with radiation and 2 years of ADT.  PSA stable.  0.163.  He also had cystoscopy and Botox about a year ago and is becoming symptomatic again would like to proceed with a second treatment.  Urine culture prior to the appointment    Review of Systems       Objective   /60 (BP Location: Left arm, Patient Position: Sitting, Cuff Size: Standard)   Pulse 62   Ht 5' 7\" (1.702 m)   Wt 97.5 kg (215 lb)   SpO2 96%   BMI 33.67 kg/m²     Physical Exam GEN: no acute distress    RESP: breathing comfortably with no accessory muscle use    ABD: soft, non-tender, non-distended   INCISION:    EXT: no significant peripheral edema     Results   Lab Results   Component Value Date    PSA 0.163 2025    PSA 0.118 2024    PSA 0.15 2024     Lab Results   Component Value Date    GLUCOSE 198 (H) 2020    CALCIUM 9.4 2024     2016    K 4.4 2024    CO2 29 2024     2024    BUN 19 2024    CREATININE 0.76 2024     Lab Results   Component Value Date    WBC 8.28 2024    HGB 12.0 2024    HCT 36.7 2024    MCV 90 2024     (L) 2024       Office Urine Dip  No results found for this or any previous visit (from the past hour).      "

## 2025-05-06 NOTE — TELEPHONE ENCOUNTER
PA for [employer] Dunia 3 Sensor APPROVED     Date(s) approved 02/28/2025-04/29/2026      Patient advised by    Picked up from pharmacy      []MyChart Message  []Phone call   []LMOM  []L/M to call office as no active Communication consent on file  []Unable to leave detailed message as VM not approved on Communication consent       Pharmacy advised by    [x]Fax  []Phone call  []Secure Chat    Specialty Pharmacy    []     Approval letter scanned into Media Yes

## 2025-05-12 ENCOUNTER — TELEPHONE (OUTPATIENT)
Age: 75
End: 2025-05-12

## 2025-05-12 NOTE — TELEPHONE ENCOUNTER
Phone call from Nicolás @ [email or web address] - clinicals letters received, however are not signed by physician.     Please have physician sign, and fax to [phone].     Nicolás can be reached at [phone] ext.[phone]

## 2025-05-15 ENCOUNTER — TELEPHONE (OUTPATIENT)
Dept: ENDOCRINOLOGY | Facility: CLINIC | Age: 75
End: 2025-05-15

## 2025-05-15 NOTE — TELEPHONE ENCOUNTER
Patient stopped at the office and said Coretta has been trying to get a hold of our office for 3 months and he said they need the Doctors signature on the form and it keeps coming back to Coretta for 3 months. It is for his Dunia 3 Sensors   Patient would like a call tomorrow Friday May 16th please  [phone]  Thank you

## 2025-05-15 NOTE — TELEPHONE ENCOUNTER
We have faxed chart notes and forms to juan multiple times, its in pts chart. Most recent fax was sent yesterday. I called pt and left him a VM

## 2025-06-10 ENCOUNTER — OFFICE VISIT (OUTPATIENT)
Dept: DENTISTRY | Facility: CLINIC | Age: 75
End: 2025-06-10

## 2025-06-10 VITALS — DIASTOLIC BLOOD PRESSURE: 70 MMHG | HEART RATE: 63 BPM | SYSTOLIC BLOOD PRESSURE: 129 MMHG

## 2025-06-10 DIAGNOSIS — K04.4 ACUTE APICAL PERIODONTITIS OF PULPAL ORIGIN: Primary | ICD-10-CM

## 2025-06-10 PROCEDURE — D4910 PERIODONTAL MAINTENANCE: HCPCS

## 2025-06-10 PROCEDURE — D0120 PERIODIC ORAL EVALUATION - ESTABLISHED PATIENT: HCPCS

## 2025-06-10 NOTE — PROGRESS NOTES
"     PERIODIC EXAM, PERIO MAINT   REVIEWED MED HX: meds, allergies, health changes reviewed in [facility]. All consents signed.  CHIEF CONCERN: \" I will be in next week with Dr.M Rader to have my bridge prepped.\"  PAIN SCALE:  0  ASA CLASS:  II  PLAQUE:  moderate  CALCULUS:  heavy  BLEEDING:   moderate  STAIN :   moderate      PERIO: Reinforced 3 mth perio maint and good OH    Hygiene Procedures:  Scaled, Polished, Flossed and Used Cavitron. Put Max and Bety RPD in ultrasonic cleaner.    Oral Hygiene Instruction: Brushing Minimum 2x daily for 2 minutes, daily flossing, Interproximal Brush, and Electric toothbrush    Dispensed: Toothbrush, Toothpaste, Floss    Visual and Tactile Intraoral/ Extraoral evaluation: Oral and Oropharyngeal cancer evaluation. No findings     Dr. Smith   Reviewed with patient clinical and radiographic findings and patient verbalized understanding. All questions and concerns addressed.     REFERRALS: none    CARIES FINDINGS: no decay noted       TREATMENT  PLAN :   NV: prep bridge #4-12       Next Recall: 3 mth perio maint.       "

## 2025-06-17 ENCOUNTER — OFFICE VISIT (OUTPATIENT)
Dept: DENTISTRY | Facility: CLINIC | Age: 75
End: 2025-06-17

## 2025-06-17 VITALS — DIASTOLIC BLOOD PRESSURE: 64 MMHG | HEART RATE: 56 BPM | SYSTOLIC BLOOD PRESSURE: 145 MMHG

## 2025-06-17 DIAGNOSIS — K08.109 TEETH MISSING: Primary | ICD-10-CM

## 2025-06-17 PROCEDURE — WIS2000 CROWN PREP

## 2025-06-17 NOTE — PROGRESS NOTES
PFM Bridge Prep and Temp #4-12    Jimmy Reyes 75 y.o. male presents with self to [facility] for Prep and Temp.  PMH reviewed, no changes, ASA III. Significant medical history: GERD, DM type II, HTN, prostate cancer, asthma, anxiety. Significant allergies: Enalapril. Significant medications: Albuterol, insulin aspart, losartan, metformin, sertraline, tamsulosin.     Diagnosis:   Use of bridge to replace missing tooth #5,7-10.    Prognosis:  fair    Consent:  Risks of specific procedure: need for RCT if pulp exposure occurs or in future if pulp is inflamed, damage to adjacent tooth and/or restoration.  Risks of any dental procedure: post procedural pain or sensitivity, local anesthetic side effects, allergic reaction to dental materials and medications, breakage of local anesthetic needle, aspiration of small dental tools, injury to nearby hard and soft tissues and anatomical structures.  Benefits: Prevent further breakdown of tooth and its sequelae; Restore facial esthetics by restoring missing max anteriors.   Alternatives: Upper RPD, Implants #5,7-10, or no tx.  Tx plan for PFM bridge #4-12 reviewed. Opportunity to ask questions given, all questions answered to degree of medical and dental certainty.  Patient understands and consent given by self via verbal consent.    Anesthesia:  Topical 20% benzocaine.  2 carps 2% Lidocaine 1:100k epi via palatal/lingual infiltration.  2 carps 4% Septocaine 1:100k epi via buccal infiltration.    Procedure details:  Preliminary stent for provisional bridge created with provisional matrix putty and stone cast.  Teeth #4,6,11-12 were prepped with reduction for PFM.  Made provisional crowns #4,5,11-12 (splinted #11-12) with provisa and matrix, refined with deepak on high speed.  Confirmed provisional covers margins with no overhangs. Also confirmed fit of upper flipper with provisional crowns #4,6,11-12 in-place.  Cemented provisional crowns using Provisa temporary cement. Removed  excess cement, occlusion and contacts verified.     Patient dismissed ambulatory and alert.    NV: 7/14/25 for final impression for PFM bridge metal framework #4-12.     Attending: Dr. Del Cid checked preps and helped refine bridge preps.

## 2025-06-20 ENCOUNTER — OFFICE VISIT (OUTPATIENT)
Dept: DENTISTRY | Facility: CLINIC | Age: 75
End: 2025-06-20

## 2025-06-20 VITALS — DIASTOLIC BLOOD PRESSURE: 71 MMHG | HEART RATE: 55 BPM | SYSTOLIC BLOOD PRESSURE: 148 MMHG

## 2025-06-20 DIAGNOSIS — K08.109 TEETH MISSING: Primary | ICD-10-CM

## 2025-06-20 PROCEDURE — WIS2001 FINAL IMPRESSION - CROWN OR IMPLANT

## 2025-06-20 NOTE — PROGRESS NOTES
PFM bridge Final impression #4-12    Jimmy Reyes 75 y.o. male presents with self to [facility] for Temp and Final impression.  PMH reviewed, no changes, ASA III. Significant medical history: Reviewed; No changes. Significant allergies: Enalapril. Significant medications: Reviewed; No changes.    Diagnosis:   Use of bridge to replace missing tooth #5,7-10    Prognosis:  fair    Consent:  Risks of specific procedure: need for RCT if pulp exposure occurs or in future if pulp is inflamed, damage to adjacent tooth and/or restoration.  Risks of any dental procedure: post procedural pain or sensitivity, local anesthetic side effects, allergic reaction to dental materials and medications, breakage of local anesthetic needle, aspiration of small dental tools, injury to nearby hard and soft tissues and anatomical structures.  Benefits: Prevent further breakdown of tooth and its sequelae; Restore facial esthetics.  Alternatives: Upper RPD, Implants #5,7-10, or no tx.  Tx plan for PFM bridge #4-12 reviewed. Opportunity to ask questions given, all questions answered to degree of medical and dental certainty.  Patient understands and consent given by self via verbal consent.    Anesthesia:  Topical 20% benzocaine.  4 carps 4% Septocaine 1:100k epi via buccal infiltration and palatal/lingual infiltration.    Procedure details:  New provisional bridge returned from  dental lab.  Fit provisional bridge onto maxillary teeth and relined with Jet acrylic. Refined with deepak on high speed. Open provisional margins occurred on lingual of #11 and disto-lingual of #12. Covered open margins with flowable resin and cured for 20 seconds each.     Margin isolation: Packed two cords (size 00 and 0) soaked in hemodent. Removed 1 cord after 5 min for final impression, air dried. Removed other cord after successful final impression..  Final Impression made with heavy body PVS, light body PVS, and full arch tray(s). Opposing lower arch was also  captured using Silgimix impression material and full tray.  Impression quality examined. Re-took impression with light body PVS due to voids on crown margins of #4 and 12 mesial.  Cemented provisional bridge using Provisa temporary cement. Removed excess cement, occlusion and contacts verified.     Patient dismissed ambulatory and alert.    NV: 7/14/25 for bridge metal framework #4-12 try-in.    Attending: Dr. Ramos and Dr. Swain was present in clinic.

## 2025-06-21 ENCOUNTER — NURSE TRIAGE (OUTPATIENT)
Dept: OTHER | Facility: OTHER | Age: 75
End: 2025-06-21

## 2025-06-21 DIAGNOSIS — N32.81 OAB (OVERACTIVE BLADDER): Primary | ICD-10-CM

## 2025-06-21 RX ORDER — MIRABEGRON 50 MG/1
50 TABLET, FILM COATED, EXTENDED RELEASE ORAL
Qty: 90 TABLET | Refills: 3 | Status: SHIPPED | OUTPATIENT
Start: 2025-06-21

## 2025-06-21 NOTE — TELEPHONE ENCOUNTER
"REASON FOR CONVERSATION: Medication Problem    SYMPTOMS: Asking for refill of Myrbetriq, which is not on patient's active medication list    OTHER HEALTH INFORMATION: None    PROTOCOL DISPOSITION: Call PCP Now    CARE ADVICE PROVIDED: Per on-call provider, Myrbetriq will be sent to the pharmacy at this time.    PRACTICE FOLLOW-UP: None      Reason for Disposition   [1] Caller has URGENT medicine question about med that PCP or specialist prescribed AND [2] triager unable to answer question    Answer Assessment - Initial Assessment Questions  1. NAME of MEDICINE: \"What medicine(s) are you calling about?\"        Myrbetriq    2. QUESTION: \"What is your question?\" (e.g., double dose of medicine, side effect)        Patient is out of medication and is asking for refill; not on current    3. PRESCRIBER: \"Who prescribed the medicine?\" Reason: if prescribed by specialist, call should be referred to that group.        Dr. Sullivan    Protocols used: Medication Question Call-Adult-    "

## 2025-06-21 NOTE — TELEPHONE ENCOUNTER
"Regarding: myritex  ----- Message from Mariana OCONNELL sent at 6/21/2025  1:43 PM EDT -----  \"I need a refill on my myritex. I am completely out\"    "

## 2025-07-03 ENCOUNTER — PROCEDURE ONLY (OUTPATIENT)
Dept: URBAN - METROPOLITAN AREA CLINIC 6 | Facility: CLINIC | Age: 75
End: 2025-07-03

## 2025-07-03 ENCOUNTER — APPOINTMENT (OUTPATIENT)
Dept: LAB | Facility: AMBULARY SURGERY CENTER | Age: 75
End: 2025-07-03
Payer: COMMERCIAL

## 2025-07-03 ENCOUNTER — OFFICE VISIT (OUTPATIENT)
Dept: FAMILY MEDICINE CLINIC | Facility: CLINIC | Age: 75
End: 2025-07-03
Payer: COMMERCIAL

## 2025-07-03 VITALS
TEMPERATURE: 97.2 F | BODY MASS INDEX: 33.27 KG/M2 | SYSTOLIC BLOOD PRESSURE: 114 MMHG | RESPIRATION RATE: 16 BRPM | OXYGEN SATURATION: 96 % | HEART RATE: 52 BPM | HEIGHT: 67 IN | DIASTOLIC BLOOD PRESSURE: 50 MMHG | WEIGHT: 212 LBS

## 2025-07-03 DIAGNOSIS — E11.9 TYPE 2 DIABETES MELLITUS WITH INSULIN THERAPY (HCC): ICD-10-CM

## 2025-07-03 DIAGNOSIS — Z79.4 TYPE 2 DIABETES MELLITUS WITH HYPERGLYCEMIA, WITH LONG-TERM CURRENT USE OF INSULIN (HCC): ICD-10-CM

## 2025-07-03 DIAGNOSIS — Z79.4 TYPE 2 DIABETES MELLITUS WITH INSULIN THERAPY (HCC): ICD-10-CM

## 2025-07-03 DIAGNOSIS — H26.493: ICD-10-CM

## 2025-07-03 DIAGNOSIS — R19.7 DIARRHEA, UNSPECIFIED TYPE: ICD-10-CM

## 2025-07-03 DIAGNOSIS — Z00.00 ENCOUNTER FOR MEDICARE ANNUAL WELLNESS EXAM: Primary | ICD-10-CM

## 2025-07-03 DIAGNOSIS — E53.8 B12 DEFICIENCY: ICD-10-CM

## 2025-07-03 DIAGNOSIS — R39.9 UTI SYMPTOMS: ICD-10-CM

## 2025-07-03 DIAGNOSIS — Z71.89 COUNSELING REGARDING ADVANCED DIRECTIVES: ICD-10-CM

## 2025-07-03 DIAGNOSIS — R53.83 OTHER FATIGUE: ICD-10-CM

## 2025-07-03 DIAGNOSIS — E11.65 TYPE 2 DIABETES MELLITUS WITH HYPERGLYCEMIA, WITH LONG-TERM CURRENT USE OF INSULIN (HCC): ICD-10-CM

## 2025-07-03 DIAGNOSIS — Z79.899 LONG-TERM USE OF HIGH-RISK MEDICATION: ICD-10-CM

## 2025-07-03 DIAGNOSIS — I10 BENIGN ESSENTIAL HYPERTENSION: ICD-10-CM

## 2025-07-03 LAB
ALBUMIN SERPL BCG-MCNC: 4 G/DL (ref 3.5–5)
ALP SERPL-CCNC: 71 U/L (ref 34–104)
ALT SERPL W P-5'-P-CCNC: 13 U/L (ref 7–52)
ANION GAP SERPL CALCULATED.3IONS-SCNC: 9 MMOL/L (ref 4–13)
AST SERPL W P-5'-P-CCNC: 17 U/L (ref 13–39)
BASOPHILS # BLD AUTO: 0.05 THOUSANDS/ÂΜL (ref 0–0.1)
BASOPHILS NFR BLD AUTO: 1 % (ref 0–1)
BILIRUB SERPL-MCNC: 0.35 MG/DL (ref 0.2–1)
BUN SERPL-MCNC: 18 MG/DL (ref 5–25)
CALCIUM SERPL-MCNC: 9 MG/DL (ref 8.4–10.2)
CHLORIDE SERPL-SCNC: 103 MMOL/L (ref 96–108)
CO2 SERPL-SCNC: 26 MMOL/L (ref 21–32)
CREAT SERPL-MCNC: 0.74 MG/DL (ref 0.6–1.3)
CREAT UR-MCNC: 115.1 MG/DL
EOSINOPHIL # BLD AUTO: 0.12 THOUSAND/ÂΜL (ref 0–0.61)
EOSINOPHIL NFR BLD AUTO: 2 % (ref 0–6)
ERYTHROCYTE [DISTWIDTH] IN BLOOD BY AUTOMATED COUNT: 13.8 % (ref 11.6–15.1)
EST. AVERAGE GLUCOSE BLD GHB EST-MCNC: 148 MG/DL
FERRITIN SERPL-MCNC: 22 NG/ML (ref 30–336)
GFR SERPL CREATININE-BSD FRML MDRD: 90 ML/MIN/1.73SQ M
GLUCOSE SERPL-MCNC: 114 MG/DL (ref 65–140)
HBA1C MFR BLD: 6.8 %
HCT VFR BLD AUTO: 38.2 % (ref 36.5–49.3)
HGB BLD-MCNC: 12.5 G/DL (ref 12–17)
IMM GRANULOCYTES # BLD AUTO: 0.04 THOUSAND/UL (ref 0–0.2)
IMM GRANULOCYTES NFR BLD AUTO: 1 % (ref 0–2)
LYMPHOCYTES # BLD AUTO: 0.74 THOUSANDS/ÂΜL (ref 0.6–4.47)
LYMPHOCYTES NFR BLD AUTO: 11 % (ref 14–44)
MCH RBC QN AUTO: 30.1 PG (ref 26.8–34.3)
MCHC RBC AUTO-ENTMCNC: 32.7 G/DL (ref 31.4–37.4)
MCV RBC AUTO: 92 FL (ref 82–98)
MICROALBUMIN UR-MCNC: 72.6 MG/L
MICROALBUMIN/CREAT 24H UR: 63 MG/G CREATININE (ref 0–30)
MONOCYTES # BLD AUTO: 0.74 THOUSAND/ÂΜL (ref 0.17–1.22)
MONOCYTES NFR BLD AUTO: 11 % (ref 4–12)
NEUTROPHILS # BLD AUTO: 5.33 THOUSANDS/ÂΜL (ref 1.85–7.62)
NEUTS SEG NFR BLD AUTO: 74 % (ref 43–75)
NRBC BLD AUTO-RTO: 0 /100 WBCS
PLATELET # BLD AUTO: 150 THOUSANDS/UL (ref 149–390)
PMV BLD AUTO: 14 FL (ref 8.9–12.7)
POTASSIUM SERPL-SCNC: 4.1 MMOL/L (ref 3.5–5.3)
PROT SERPL-MCNC: 6.6 G/DL (ref 6.4–8.4)
RBC # BLD AUTO: 4.15 MILLION/UL (ref 3.88–5.62)
SODIUM SERPL-SCNC: 138 MMOL/L (ref 135–147)
TSH SERPL DL<=0.05 MIU/L-ACNC: 1.99 UIU/ML (ref 0.45–4.5)
VIT B12 SERPL-MCNC: 374 PG/ML (ref 180–914)
WBC # BLD AUTO: 7.02 THOUSAND/UL (ref 4.31–10.16)

## 2025-07-03 PROCEDURE — 87186 SC STD MICRODIL/AGAR DIL: CPT

## 2025-07-03 PROCEDURE — 36415 COLL VENOUS BLD VENIPUNCTURE: CPT

## 2025-07-03 PROCEDURE — 82043 UR ALBUMIN QUANTITATIVE: CPT

## 2025-07-03 PROCEDURE — 83036 HEMOGLOBIN GLYCOSYLATED A1C: CPT

## 2025-07-03 PROCEDURE — 82570 ASSAY OF URINE CREATININE: CPT

## 2025-07-03 PROCEDURE — 87086 URINE CULTURE/COLONY COUNT: CPT

## 2025-07-03 PROCEDURE — 80053 COMPREHEN METABOLIC PANEL: CPT

## 2025-07-03 PROCEDURE — G2211 COMPLEX E/M VISIT ADD ON: HCPCS | Performed by: FAMILY MEDICINE

## 2025-07-03 PROCEDURE — 84443 ASSAY THYROID STIM HORMONE: CPT

## 2025-07-03 PROCEDURE — 82728 ASSAY OF FERRITIN: CPT

## 2025-07-03 PROCEDURE — 99214 OFFICE O/P EST MOD 30 MIN: CPT | Performed by: FAMILY MEDICINE

## 2025-07-03 PROCEDURE — 66821 AFTER CATARACT LASER SURGERY: CPT

## 2025-07-03 PROCEDURE — 85025 COMPLETE CBC W/AUTO DIFF WBC: CPT | Performed by: FAMILY MEDICINE

## 2025-07-03 PROCEDURE — 87077 CULTURE AEROBIC IDENTIFY: CPT

## 2025-07-03 PROCEDURE — 82607 VITAMIN B-12: CPT

## 2025-07-03 PROCEDURE — G0439 PPPS, SUBSEQ VISIT: HCPCS | Performed by: FAMILY MEDICINE

## 2025-07-03 ASSESSMENT — KERATOMETRY
OS_AXISANGLE2_DEGREES: 115
OS_K2POWER_DIOPTERS: 45.75
OS_K1POWER_DIOPTERS: 44.50
OD_AXISANGLE2_DEGREES: 145
OD_AXISANGLE_DEGREES: 055
OD_K1POWER_DIOPTERS: 43.75
OS_AXISANGLE_DEGREES: 025
OD_K2POWER_DIOPTERS: 46.00

## 2025-07-03 ASSESSMENT — TONOMETRY
OS_IOP_MMHG: 10
OD_IOP_MMHG: 9

## 2025-07-03 ASSESSMENT — VISUAL ACUITY
OD_CC: 20/60-1
OS_CC: 20/20-1

## 2025-07-03 NOTE — ASSESSMENT & PLAN NOTE
Update labs   Orders:    CBC and differential    TSH, 3rd generation; Future    Comprehensive metabolic panel; Future    Vitamin B12; Future    Ferritin; Future

## 2025-07-03 NOTE — PROGRESS NOTES
Diabetic Foot Exam    Patient's shoes and socks removed.    Right Foot/Ankle   Right Foot Inspection  Skin Exam: skin normal and skin intact. No dry skin, no warmth, no callus, no erythema, no maceration, no abnormal color, no pre-ulcer, no ulcer and no callus.     Toe Exam: ROM and strength within normal limits.     Sensory   Vibration: intact  Proprioception: intact  Monofilament testing: intact    Vascular  The right DP pulse is 2+. The right PT pulse is 2+.     Left Foot/Ankle  Left Foot Inspection  Skin Exam: skin normal and skin intact. No dry skin, no warmth, no erythema, no maceration, normal color, no pre-ulcer, no ulcer and no callus.     Toe Exam: ROM and strength within normal limits.     Sensory   Vibration: intact  Proprioception: intact  Monofilament testing: intact    Vascular  The left DP pulse is 2+. The left PT pulse is 2+.     Assign Risk Category  No deformity present  No loss of protective sensation  No weak pulses  Risk: 0  Name: Jimmy Reyes      : 1950      MRN: [id number]  Encounter Provider: Kiah Rausch DO  Encounter Date: 7/3/2025   Encounter department: [facility]  :  Assessment & Plan       Preventive health issues were discussed with patient, and age appropriate screening tests were ordered as noted in patient's After Visit Summary. Personalized health advice and appropriate referrals for health education or preventive services given if needed, as noted in patient's After Visit Summary.    History of Present Illness     HPI   Patient Care Team:  Kiah Rausch DO as PCP - General (Family Medicine)  Duc Johnson MD (Orthopedic Surgery)  Adrienne Sesay MD (Cardiology)  MAYELA Garcia MD (General Surgery)  Migel Sullivan MD (Urology)  MD Ana Kim MD (Gastroenterology)  Ana Wong MD as Endoscopist (Gastroenterology)  Sandy Medina MD (Radiation Oncology)  Saurabh Veras MD  (Endocrinology)  David Daley DPM (Podiatry)  Phill Borden MD (Sleep Medicine)    Review of Systems  Medical History Reviewed by provider this encounter:       Annual Wellness Visit Questionnaire   Mario is here for his Subsequent Wellness visit.     Health Risk Assessment:   Patient rates overall health as very good. Patient feels that their physical health rating is same. Patient is satisfied with their life. Eyesight was rated as same. Hearing was rated as same. Patient feels that their emotional and mental health rating is same. Patients states they are never, rarely angry. Patient states they are often unusually tired/fatigued. Pain experienced in the last 7 days has been none. Patient states that he has experienced no weight loss or gain in last 6 months.     Depression Screening:   PHQ-2 Score: 0      Fall Risk Screening:   In the past year, patient has experienced: no history of falling in past year      Home Safety:  Patient does not have trouble with stairs inside or outside of their home. Patient has working smoke alarms and has working carbon monoxide detector. Home safety hazards include: none.     Nutrition:   Current diet is Regular.     Medications:   Patient is currently taking over-the-counter supplements. OTC medications include: see medication list. Patient is able to manage medications.     Activities of Daily Living (ADLs)/Instrumental Activities of Daily Living (IADLs):   Walk and transfer into and out of bed and chair?: Yes  Dress and groom yourself?: Yes    Bathe or shower yourself?: Yes    Feed yourself? Yes  Do your laundry/housekeeping?: Yes  Manage your money, pay your bills and track your expenses?: Yes  Make your own meals?: Yes    Do your own shopping?: Yes    Previous Hospitalizations:   Any hospitalizations or ED visits within the last 12 months?: No      Advance Care Planning:   Living will: No    Advanced directive counseling given: Yes    End of Life Decisions  reviewed with patient: Yes      Comments: See problem list     Cognitive Screening:   Provider or family/friend/caregiver concerned regarding cognition?: No    Preventive Screenings      Cardiovascular Screening:    General: Screening Not Indicated and History Lipid Disorder      Diabetes Screening:     General: Screening Not Indicated and History Diabetes      Colorectal Cancer Screening:     General: Screening Current      Prostate Cancer Screening:    General: History Prostate Cancer and Screening Not Indicated      Osteoporosis Screening:    General: Screening Not Indicated      Abdominal Aortic Aneurysm (AAA) Screening:    Risk factors include: age between 65-76 yo and tobacco use        Lung Cancer Screening:     General: Screening Not Indicated      Hepatitis C Screening:    General: Screening Current    Immunizations:  - Immunizations due: Zoster (Shingrix)    Screening, Brief Intervention, and Referral to Treatment (SBIRT)     Screening  Typical number of drinks in a day: 0  Typical number of drinks in a week: 0  Interpretation: Low risk drinking behavior.    Single Item Drug Screening:  How often have you used an illegal drug (including marijuana) or a prescription medication for non-medical reasons in the past year? never    Single Item Drug Screen Score: 0  Interpretation: Negative screen for possible drug use disorder    Review of Current Opioid Use    Opioid Risk Tool (ORT) Interpretation: Complete Opioid Risk Tool (ORT)    Social Drivers of Health     Financial Resource Strain: High Risk (6/29/2022)    Overall Financial Resource Strain (CARDIA)     Difficulty of Paying Living Expenses: Hard   Food Insecurity: No Food Insecurity (7/2/2024)    Nursing - Inadequate Food Risk Classification     Worried About Running Out of Food in the Last Year: Never true     Ran Out of Food in the Last Year: Never true   Transportation Needs: No Transportation Needs (7/2/2024)    PRAPARE - Transportation     Lack of  "Transportation (Medical): No     Lack of Transportation (Non-Medical): No   Housing Stability: Low Risk  (7/2/2024)    Housing Stability Vital Sign     Unable to Pay for Housing in the Last Year: No     Number of Times Moved in the Last Year: 1     Homeless in the Last Year: No   Utilities: Not At Risk (7/2/2024)    [facility] Utilities     Threatened with loss of utilities: No     No results found.    Objective   /50 (BP Location: Left arm, Patient Position: Sitting, Cuff Size: Standard)   Pulse (!) 52   Temp (!) 97.2 °F (36.2 °C) (Temporal)   Resp 16   Ht 5' 7\" (1.702 m)   Wt 96.2 kg (212 lb)   SpO2 96%   BMI 33.20 kg/m²     Physical Exam    Cardiovascular:      Pulses: no weak pulses.           Dorsalis pedis pulses are 2+ on the right side and 2+ on the left side.        Posterior tibial pulses are 2+ on the right side and 2+ on the left side.   Feet:      Right foot:      Skin integrity: No ulcer, skin breakdown, erythema, warmth, callus or dry skin.      Left foot:      Skin integrity: No ulcer, skin breakdown, erythema, warmth, callus or dry skin.         "

## 2025-07-03 NOTE — PATIENT INSTRUCTIONS
Vaccines you are due for: Tdap    As of January 2023 most vaccines are being covered by Medicare Part D.* This means you may now be able to get Shingrix or Tdap at no charge to you.  Please ask at the pharmacy to see if this is covered. If you are the pharmacist can administer these vaccines for you. Tdap is due every 10 years as a preventive vaccine. Shingrix is given as two parts. The second dose is given 2 - 6 months after the first dose.   *The only exceptions are flu, pneumonia, hepatitis B, and COVID-19 vaccinations, which are covered by Part B - this means they can be given at the pharmacy or the doctor's office.    Immunization History   Administered Date(s) Administered   • COVID-19 PFIZER VACCINE 0.3 ML IM 02/23/2021, 03/14/2021, 10/07/2021   • INFLUENZA 10/31/2015, 10/05/2016, 11/10/2017, 09/20/2018, 10/03/2023   • Influenza Split High Dose Preservative Free IM 10/05/2016, 11/10/2017   • Influenza, high dose seasonal 0.7 mL 09/20/2018, 09/10/2019, 11/12/2020, 09/23/2022, 10/03/2023   • Influenza, injectable, quadrivalent, preservative free 0.5 mL 10/21/2021   • Influenza, seasonal, injectable 10/31/2015   • Pneumococcal Conjugate 13-Valent 01/05/2016   • Pneumococcal Polysaccharide PPV23 09/05/2013, 04/07/2017        Medicare Preventive Visit Patient Instructions  Thank you for completing your Welcome to Medicare Visit or Medicare Annual Wellness Visit today. Your next wellness visit will be due in one year (7/4/2026).  The screening/preventive services that you may require over the next 5-10 years are detailed below. Some tests may not apply to you based off risk factors and/or age. Screening tests ordered at today's visit but not completed yet may show as past due. Also, please note that scanned in results may not display below.  Preventive Screenings:  Service Recommendations Previous Testing/Comments   Colorectal Cancer Screening  Colonoscopy    Fecal Occult Blood Test (FOBT)/Fecal Immunochemical Test  (FIT)  Fecal DNA/Cologuard Test  Flexible Sigmoidoscopy Age: 45-75 years old   Colonoscopy: every 10 years (May be performed more frequently if at higher risk)  OR  FOBT/FIT: every 1 year  OR  Cologuard: every 3 years  OR  Sigmoidoscopy: every 5 years  Screening may be recommended earlier than age 45 if at higher risk for colorectal cancer. Also, an individualized decision between you and your healthcare provider will decide whether screening between the ages of 76-85 would be appropriate. Colonoscopy: 03/05/2025  FOBT/FIT: Not on file  Cologuard: Not on file  Sigmoidoscopy: Not on file    Screening Current     Prostate Cancer Screening Individualized decision between patient and health care provider in men between ages of 55-69   Medicare will cover every 12 months beginning on the day after your 50th birthday PSA: 0.163 ng/mL     History Prostate Cancer  Screening Not Indicated     Hepatitis C Screening Once for adults born between 1945 and 1965  More frequently in patients at high risk for Hepatitis C Hep C Antibody: 07/17/2018    Screening Current   Diabetes Screening 1-2 times per year if you're at risk for diabetes or have pre-diabetes Fasting glucose: 109 mg/dL (12/23/2024)  A1C: 7.6 (3/25/2025)  Screening Not Indicated  History Diabetes   Cholesterol Screening Once every 5 years if you don't have a lipid disorder. May order more often based on risk factors. Lipid panel: 03/24/2025  Screening Not Indicated  History Lipid Disorder      Other Preventive Screenings Covered by Medicare:  Abdominal Aortic Aneurysm (AAA) Screening: covered once if your at risk. You're considered to be at risk if you have a family history of AAA or a male between the age of 65-75 who smoking at least 100 cigarettes in your lifetime.  Lung Cancer Screening: covers low dose CT scan once per year if you meet all of the following conditions: (1) Age 55-77; (2) No signs or symptoms of lung cancer; (3) Current smoker or have quit smoking  within the last 15 years; (4) You have a tobacco smoking history of at least 20 pack years (packs per day x number of years you smoked); (5) You get a written order from a healthcare provider.  Glaucoma Screening: covered annually if you're considered high risk: (1) You have diabetes OR (2) Family history of glaucoma OR (3)  aged 50 and older OR (4)  American aged 65 and older  Osteoporosis Screening: covered every 2 years if you meet one of the following conditions: (1) Have a vertebral abnormality; (2) On glucocorticoid therapy for more than 3 months; (3) Have primary hyperparathyroidism; (4) On osteoporosis medications and need to assess response to drug therapy.  HIV Screening: covered annually if you're between the age of 15-65. Also covered annually if you are younger than 15 and older than 65 with risk factors for HIV infection. For pregnant patients, it is covered up to 3 times per pregnancy.    Immunizations:  Immunization Recommendations   Influenza Vaccine Annual influenza vaccination during flu season is recommended for all persons aged >= 6 months who do not have contraindications   Pneumococcal Vaccine   * Pneumococcal conjugate vaccine = PCV13 (Prevnar 13), PCV15 (Vaxneuvance), PCV20 (Prevnar 20)  * Pneumococcal polysaccharide vaccine = PPSV23 (Pneumovax) Adults 19-63 yo with certain risk factors or if 65+ yo  If never received any pneumonia vaccine: recommend Prevnar 20 (PCV20)  Give PCV20 if previously received 1 dose of PCV13 or PPSV23   Hepatitis B Vaccine 3 dose series if at intermediate or high risk (ex: diabetes, end stage renal disease, liver disease)   Respiratory syncytial virus (RSV) Vaccine - COVERED BY MEDICARE PART D  * RSVPreF3 (Arexvy) CDC recommends that adults 60 years of age and older may receive a single dose of RSV vaccine using shared clinical decision-making (SCDM)   Tetanus (Td) Vaccine - COST NOT COVERED BY MEDICARE PART B Following completion of  primary series, a booster dose should be given every 10 years to maintain immunity against tetanus. Td may also be given as tetanus wound prophylaxis.   Tdap Vaccine - COST NOT COVERED BY MEDICARE PART B Recommended at least once for all adults. For pregnant patients, recommended with each pregnancy.   Shingles Vaccine (Shingrix) - COST NOT COVERED BY MEDICARE PART B  2 shot series recommended in those 19 years and older who have or will have weakened immune systems or those 50 years and older     Health Maintenance Due:      Topic Date Due   • Hepatitis C Screening  Completed   • Colorectal Cancer Screening  Discontinued     Immunizations Due:      Topic Date Due   • DTaP,Tdap,and Td Vaccines (1 - Tdap) Never done   • Influenza Vaccine (1) 09/01/2025     Advance Directives   What are advance directives?  Advance directives are legal documents that state your wishes and plans for medical care. These plans are made ahead of time in case you lose your ability to make decisions for yourself. Advance directives can apply to any medical decision, such as the treatments you want, and if you want to donate organs.   What are the types of advance directives?  There are many types of advance directives, and each state has rules about how to use them. You may choose a combination of any of the following:  Living will:  This is a written record of the treatment you want. You can also choose which treatments you do not want, which to limit, and which to stop at a certain time. This includes surgery, medicine, IV fluid, and tube feedings.   Durable power of  for healthcare (DPAHC):  This is a written record that states who you want to make healthcare choices for you when you are unable to make them for yourself. This person, called a proxy, is usually a family member or a friend. You may choose more than 1 proxy.  Do not resuscitate (DNR) order:  A DNR order is used in case your heart stops beating or you stop breathing.  It is a request not to have certain forms of treatment, such as CPR. A DNR order may be included in other types of advance directives.  Medical directive:  This covers the care that you want if you are in a coma, near death, or unable to make decisions for yourself. You can list the treatments you want for each condition. Treatment may include pain medicine, surgery, blood transfusions, dialysis, IV or tube feedings, and a ventilator (breathing machine).  Values history:  This document has questions about your views, beliefs, and how you feel and think about life. This information can help others choose the care that you would choose.  Why are advance directives important?  An advance directive helps you control your care. Although spoken wishes may be used, it is better to have your wishes written down. Spoken wishes can be misunderstood, or not followed. Treatments may be given even if you do not want them. An advance directive may make it easier for your family to make difficult choices about your care.   Weight Management   Why it is important to manage your weight:  Being overweight increases your risk of health conditions such as heart disease, high blood pressure, type 2 diabetes, and certain types of cancer. It can also increase your risk for osteoarthritis, sleep apnea, and other respiratory problems. Aim for a slow, steady weight loss. Even a small amount of weight loss can lower your risk of health problems.  How to lose weight safely:  A safe and healthy way to lose weight is to eat fewer calories and get regular exercise. You can lose up about 1 pound a week by decreasing the number of calories you eat by 500 calories each day.   Healthy meal plan for weight management:  A healthy meal plan includes a variety of foods, contains fewer calories, and helps you stay healthy. A healthy meal plan includes the following:  Eat whole-grain foods more often.  A healthy meal plan should contain fiber. Fiber is the  part of grains, fruits, and vegetables that is not broken down by your body. Whole-grain foods are healthy and provide extra fiber in your diet. Some examples of whole-grain foods are whole-wheat breads and pastas, oatmeal, brown rice, and bulgur.  Eat a variety of vegetables every day.  Include dark, leafy greens such as spinach, kale, franny greens, and mustard greens. Eat yellow and orange vegetables such as carrots, sweet potatoes, and winter squash.   Eat a variety of fruits every day.  Choose fresh or canned fruit (canned in its own juice or light syrup) instead of juice. Fruit juice has very little or no fiber.  Eat low-fat dairy foods.  Drink fat-free (skim) milk or 1% milk. Eat fat-free yogurt and low-fat cottage cheese. Try low-fat cheeses such as mozzarella and other reduced-fat cheeses.  Choose meat and other protein foods that are low in fat.  Choose beans or other legumes such as split peas or lentils. Choose fish, skinless poultry (chicken or turkey), or lean cuts of red meat (beef or pork). Before you cook meat or poultry, cut off any visible fat.   Use less fat and oil.  Try baking foods instead of frying them. Add less fat, such as margarine, sour cream, regular salad dressing and mayonnaise to foods. Eat fewer high-fat foods. Some examples of high-fat foods include french fries, doughnuts, ice cream, and cakes.  Eat fewer sweets.  Limit foods and drinks that are high in sugar. This includes candy, cookies, regular soda, and sweetened drinks.  Exercise:  Exercise at least 30 minutes per day on most days of the week. Some examples of exercise include walking, biking, dancing, and swimming. You can also fit in more physical activity by taking the stairs instead of the elevator or parking farther away from stores. Ask your healthcare provider about the best exercise plan for you.   Narcotic (Opioid) Safety    Use narcotics safely:  Take prescribed narcotics exactly as directed  Do not give narcotics  to others or take narcotics that belong to someone else  Do not mix narcotics without medicines or alcohol  Do not drive or operate heavy machinery after you take the narcotic  Monitor for side effects and notify your healthcare provider if you experienced side effects such as nausea, sleepiness, itching, or trouble thinking clearly.    Manage constipation:    Constipation is the most common side effect of narcotic medicine. Constipation is when you have hard, dry bowel movements, or you go longer than usual between bowel movements. Tell your healthcare provider about all changes in your bowel movements while you are taking narcotics. He or she may recommend laxative medicine to help you have a bowel movement. He or she may also change the kind of narcotic you are taking, or change when you take it. The following are more ways you can prevent or relieve constipation:    Drink liquids as directed.  You may need to drink extra liquids to help soften and move your bowels. Ask how much liquid to drink each day and which liquids are best for you.  Eat high-fiber foods.  This may help decrease constipation by adding bulk to your bowel movements. High-fiber foods include fruits, vegetables, whole-grain breads and cereals, and beans. Your healthcare provider or dietitian can help you create a high-fiber meal plan. Your provider may also recommend a fiber supplement if you cannot get enough fiber from food.  Exercise regularly.  Regular physical activity can help stimulate your intestines. Walking is a good exercise to prevent or relieve constipation. Ask which exercises are best for you.  Schedule a time each day to have a bowel movement.  This may help train your body to have regular bowel movements. Bend forward while you are on the toilet to help move the bowel movement out. Sit on the toilet for at least 10 minutes, even if you do not have a bowel movement.    Store narcotics safely:   Store narcotics where others  cannot easily get them.  Keep them in a locked cabinet or secure area. Do not  keep them in a purse or other bag you carry with you. A person may be looking for something else and find the narcotics.  Make sure narcotics are stored out of the reach of children.  A child can easily overdose on narcotics. Narcotics may look like candy to a small child.    The best way to dispose of narcotics:      The laws vary by country and area. In the [place], the best way is to return the narcotics through a take-back program. This program is offered by the [place] Drug Enforcement Agency ([employer]). The following are options for using the program:  Take the narcotics to a [place] collection site.  The site is often a law enforcement center. Call your local law enforcement center for scheduled take-back days in your area. You will be given information on where to go if the collection site is in a different location.  Take the narcotics to an approved pharmacy or hospital.  A pharmacy or hospital may be set up as a collection site. You will need to ask if it is a [place] collection site if you were not directed there. A pharmacy or doctor's office may not be able to take back narcotics unless it is a [place] site.  Use a mail-back system.  This means you are given containers to put the narcotics into. You will then mail them in the containers.  Use a take-back drop box.  This is a place to leave the narcotics at any time. People and animals will not be able to get into the box. Your local law enforcement agency can tell you where to find a drop box in your area.    Other ways to manage pain:   Ask your healthcare provider about non-narcotic medicines to control pain.  Nonprescription medicines include NSAIDs (such as ibuprofen) and acetaminophen. Prescription medicines include muscle relaxers, antidepressants, and steroids.  Pain may be managed without any medicines.  Some ways to relieve pain include massage, aromatherapy, or meditation.  Physical or occupational therapy may also help.    For more information:   Drug Enforcement Administration  [address] [place] [address]  Phone: 0- 372 - 033-6957  Web Address: https://www.deadiversion.[facility].gov/drug_disposal/     Food and Drug Administration  [address] [place] [address]  Phone: 5- 249 - 195-2036  Web Address: http://www.fda.gov   © Copyright [employer] 2018 Information is for End User's use only and may not be sold, redistributed or otherwise used for commercial purposes. All illustrations and images included in CareNotes® are the copyrighted property of [employer].A[employer]M., Inc. or [employer]

## 2025-07-03 NOTE — PROGRESS NOTES
Follow up visit   Name: Jimmy Reyes      : 1950      MRN: [id number]  Encounter Provider: Kiah Rausch DO  Encounter Date: 7/3/2025   Encounter department: [facility]    :  Assessment & Plan  Encounter for Medicare annual wellness exam  See other note        Type 2 diabetes mellitus with insulin therapy (HCC)  Stable   Following with arnulfo   Pt has DM eye exam today - gave him a paper to have faxed to us   On ARB and lipids   Lab Results   Component Value Date    HGBA1C 6.8 (H) 2025       Orders:    CBC and differential    TSH, 3rd generation; Future    Comprehensive metabolic panel; Future    Vitamin B12; Future    Ferritin; Future    Diarrhea, unspecified type         Long-term use of high-risk medication  Update labs   Orders:    CBC and differential    TSH, 3rd generation; Future    Comprehensive metabolic panel; Future    Vitamin B12; Future    Ferritin; Future    Other fatigue  Update labs   Orders:    CBC and differential    TSH, 3rd generation; Future    Comprehensive metabolic panel; Future    Vitamin B12; Future    Ferritin; Future    Benign essential hypertension  Well controlled   Orders:    CBC and differential    TSH, 3rd generation; Future    Comprehensive metabolic panel; Future    Vitamin B12; Future    Ferritin; Future    B12 deficiency  Update labs   Orders:    CBC and differential    TSH, 3rd generation; Future    Comprehensive metabolic panel; Future    Vitamin B12; Future    Ferritin; Future    Counseling regarding advanced directives  See problem list          Assessment & Plan          Follow up:   Return in about 1 year (around 7/3/2026) for Medicare Wellness Visit.        Mario is a 75 y.o. male who presents today for follow up on chronic conditions.     Chief Complaint   Patient presents with    Medicare Wellness Visit     History of Present Illness     Concerns today:  Checking sugar all day and good in am 99, 120  Currently 148   Seeing arnulfo  "  Eating well   Losing weight and does not know why   Feels well   Some fatigue   Average 131 last 7 days   Recent colonoscopy   No heartburn - takes omeprazole every other day   Moving bowels daily - no pushing or strainign   Urinating normal   No change in stream   Blood work ordered by endo   PSA up to date           Review of Systems  ROS:  all others negative - no chest pain, SOB, normal urine and bowels. no GERD. sleeping well. mood good.       PHQ-2/9 Depression Screening    Little interest or pleasure in doing things: 0 - not at all  Feeling down, depressed, or hopeless: 0 - not at all  PHQ-2 Score: 0  PHQ-2 Interpretation: Negative depression screen            Objective   /50 (BP Location: Left arm, Patient Position: Sitting, Cuff Size: Standard)   Pulse (!) 52   Temp (!) 97.2 °F (36.2 °C) (Temporal)   Resp 16   Ht 5' 7\" (1.702 m)   Wt 96.2 kg (212 lb)   SpO2 96%   BMI 33.20 kg/m²     /50 (BP Location: Left arm, Patient Position: Sitting, Cuff Size: Standard)   Pulse (!) 52   Temp (!) 97.2 °F (36.2 °C) (Temporal)   Resp 16   Ht 5' 7\" (1.702 m)   Wt 96.2 kg (212 lb)   SpO2 96%   BMI 33.20 kg/m²     BP Readings from Last 3 Encounters:   07/03/25 114/50   06/20/25 148/71   06/17/25 145/64     Wt Readings from Last 3 Encounters:   07/03/25 96.2 kg (212 lb)   04/30/25 97.5 kg (215 lb)   03/25/25 101 kg (222 lb)       Physical Exam  Constitutional: he appears well-developed and well-nourished.   HENT: Head: Normocephalic.   Right Ear: External ear normal. Tympanic membrane normal.   Left Ear: External ear normal. Tympanic membrane normal.   Nose: Nose normal. No mucosal edema, No rhinorrhea.   Right sinus exhibits no maxillary sinus tenderness.   Left sinus exhibits no maxillary sinus tenderness.   Mouth/Throat: Oropharynx is clear and moist.   Eyes: Normal conjunctiva.  No erythema. No discharge.  Neck: No pain on exam. Neck supple.   Cardiovascular: Normal rate, regular rhythm and " "normal heart sounds.    Pulmonary/Chest: Effort normal and breath sounds normal. No wheezes. No rales. No rhonchi.   Abdominal: Soft. Bowel sounds are normal. There is no tenderness.   Musculoskeletal: he exhibits no edema.   Lymphadenopathy: he has no cervical adenopathy.   Neurological: he  is alert and oriented to person, place, and time.   Skin: Skin is warm and dry. No rashes.  Psychiatric: he  has a normal mood and affect. his behavior is normal. Thought content normal.   Vitals reviewed.      Current Medications:  Current Medications[1]         [1]   Current Outpatient Medications   Medication Sig Dispense Refill    acetaminophen-codeine (TYLENOL with CODEINE #3) 300-30 MG per tablet Take 1 tablet by mouth every 6 (six) hours as needed for moderate pain 10 tablet 0    albuterol (ProAir HFA) 90 mcg/act inhaler Inhale 2 puffs every 6 (six) hours as needed for wheezing or shortness of breath 8.5 g 0    aluminum-magnesium hydroxide-simethicone (MYLANTA) 200-200-20 mg/5 mL suspension Take 30 mL by mouth every 6 (six) hours as needed for indigestion or heartburn 355 mL 0    Biotin 1000 MCG tablet Take 1 tablet by mouth if needed Per pt takes it \"once in awhile\"      Blood Glucose Monitoring Suppl (GLUCOCARD VITAL MONITOR) w/Device KIT by Does not apply route 2 (two) times a day 1 kit 0    Cholecalciferol (VITAMIN D3) 2000 units capsule Take 1,000 Units by mouth daily in the early morning      Continuous Glucose Sensor (FreeStyle Dunia 3 Sensor) MISC Change sensor every 14 days 2 each 3    Cyanocobalamin (VITAMIN B-12) 5000 MCG TBDP Take 5,000 mcg by mouth once a week Takes on Mondays      Diclofenac Sodium (VOLTAREN) 1 % Apply 2 g topically 4 (four) times a day 100 g 1    docusate sodium (COLACE) 100 mg capsule Take 100 mg by mouth daily as needed for constipation      fluticasone (Flovent HFA) 110 MCG/ACT inhaler Inhale 2 puffs if needed (asthma flare) Rinse mouth after use.      ibuprofen (MOTRIN) 600 mg tablet " "Take 1 tablet (600 mg total) by mouth every 6 (six) hours as needed for mild pain 30 tablet 0    insulin aspart protamine-insulin aspart (NovoLOG Mix 70/30 FlexPen) 100 Units/mL injection pen INJECT 15 UNITS WITH BREAKFAST AND 13 UNITS WITH DINNER 30 mL 1    INSULIN SYRINGE 1CC/29G (B-D INSULIN SYRINGE) 29G X 1/2\" 1 ML MISC Use twice a day 200 each 1    Lancets (LIFESCAN UNISTIK 2) MISC       losartan (COZAAR) 25 mg tablet TAKE ONE TABLET BY MOUTH EVERY  tablet 1    metFORMIN (GLUCOPHAGE) 1000 MG tablet TAKE ONE TABLET BY MOUTH TWICE A DAY WITH MEALS 180 tablet 1    Mirabegron ER 50 MG TB24 Take 1 tablet (50 mg total) by mouth daily at bedtime 90 tablet 3    Multiple Vitamin (MULTIVITAMIN) capsule Take 1 capsule by mouth daily in the early morning      ondansetron (ZOFRAN) 4 mg tablet Take 1 tablet (4 mg total) by mouth every 8 (eight) hours as needed for nausea or vomiting 12 tablet 0    pantoprazole (PROTONIX) 40 mg tablet TAKE ONE TABLET BY MOUTH EVERY DAY 90 tablet 1    tamsulosin (FLOMAX) 0.4 mg TAKE ONE CAPSULE BY MOUTH EVERY DAY WITH DINNER 30 capsule 5    atorvastatin (LIPITOR) 10 mg tablet TAKE ONE TABLET BY MOUTH EVERY DAY 90 tablet 0    sertraline (ZOLOFT) 50 mg tablet TAKE ONE TABLET BY MOUTH EVERY DAY 90 tablet 0     No current facility-administered medications for this visit.     "

## 2025-07-03 NOTE — ASSESSMENT & PLAN NOTE
Stable   Following with endo   Pt has DM eye exam today - gave him a paper to have faxed to us   On ARB and lipids   Lab Results   Component Value Date    HGBA1C 6.8 (H) 07/03/2025       Orders:    CBC and differential    TSH, 3rd generation; Future    Comprehensive metabolic panel; Future    Vitamin B12; Future    Ferritin; Future

## 2025-07-03 NOTE — ASSESSMENT & PLAN NOTE
Well controlled   Orders:    CBC and differential    TSH, 3rd generation; Future    Comprehensive metabolic panel; Future    Vitamin B12; Future    Ferritin; Future

## 2025-07-05 LAB
BACTERIA UR CULT: ABNORMAL
BACTERIA UR CULT: ABNORMAL

## 2025-07-11 DIAGNOSIS — R39.9 UTI SYMPTOMS: Primary | ICD-10-CM

## 2025-07-11 RX ORDER — CEPHALEXIN 500 MG/1
500 CAPSULE ORAL EVERY 6 HOURS SCHEDULED
Qty: 28 CAPSULE | Refills: 0 | Status: SHIPPED | OUTPATIENT
Start: 2025-07-11 | End: 2025-07-18

## 2025-07-13 DIAGNOSIS — I10 BENIGN ESSENTIAL HYPERTENSION: Primary | ICD-10-CM

## 2025-07-14 ENCOUNTER — OFFICE VISIT (OUTPATIENT)
Dept: DENTISTRY | Facility: CLINIC | Age: 75
End: 2025-07-14

## 2025-07-14 VITALS — SYSTOLIC BLOOD PRESSURE: 146 MMHG | DIASTOLIC BLOOD PRESSURE: 71 MMHG

## 2025-07-14 DIAGNOSIS — K08.109 MISSING TEETH, ACQUIRED: Primary | ICD-10-CM

## 2025-07-14 PROCEDURE — WIS5002 BITE RIMS

## 2025-07-14 NOTE — PROGRESS NOTES
Bite Rims    Jimmy Reyes 75 y.o. male presents with self to [facility] for denture bite rims.  PMH reviewed, no changes, ASA II.    Pt stated his #4-12 bridge provisional came off yesterday. Pt stated his teeth have been sensitive since the provisional has come off.     Patient forgot to bring mx and mn partials with him to the appointment. Informed pt that we are not able to get a bite record without his mandibular partial. Informed pt that he should be wearing his partials all the time (except when he is asleep), to prevent his teeth from shifting and losing alveolar bone in the edentulous areas. Also informed pt that he should bring his partials with him to every appointment. Pt understands.     Patient went home to get his maxillary and mandibular partials for the appointment.     Procedure details:  Removed temporary cement from provisional.     Maxillary bite rim and record base tried intra-orally with pt's mandibular partial.   Confirmed with patient that the MMR position obtained was comfortable.  Dr. FELIZ obtained bite registration with [facility] bite registration and verified clearance of #4, 6, 11, and 12 preps. -- case sent to [facility] to fabricate #4-12 metal bridge framework  Dr. FELIZ recemented #4-12 provisional bridge with IRM and eugenol.   Removed excess temporary cement and flossed.   Patient dismissed ambulatory and alert.    NV: metal bridge #4-12 framework try-in (4 weeks with Dr. Steve, 90 mins).    Attending: Dr. Ramos was present throughout the appointment.

## 2025-07-15 RX ORDER — LOSARTAN POTASSIUM 25 MG/1
25 TABLET ORAL DAILY
Qty: 100 TABLET | Refills: 3 | Status: SHIPPED | OUTPATIENT
Start: 2025-07-15

## 2025-07-17 ENCOUNTER — TELEPHONE (OUTPATIENT)
Dept: UROLOGY | Facility: CLINIC | Age: 75
End: 2025-07-17

## 2025-07-20 DIAGNOSIS — Z79.4 TYPE 2 DIABETES MELLITUS WITH INSULIN THERAPY (HCC): ICD-10-CM

## 2025-07-20 DIAGNOSIS — E11.9 TYPE 2 DIABETES MELLITUS WITH INSULIN THERAPY (HCC): ICD-10-CM

## 2025-07-21 RX ORDER — ATORVASTATIN CALCIUM 10 MG/1
10 TABLET, FILM COATED ORAL DAILY
Qty: 90 TABLET | Refills: 1 | Status: SHIPPED | OUTPATIENT
Start: 2025-07-21

## (undated) DEVICE — POV-IOD SOLUTION 4OZ BT

## (undated) DEVICE — SUT ETHILON 4-0 PS-2 18 IN 1667H

## (undated) DEVICE — STERILE POLYISOPRENE POWDER-FREE SURGICAL GLOVES: Brand: PROTEXIS

## (undated) DEVICE — SYRINGE 10ML LL

## (undated) DEVICE — BASKET SPECIMEN RETRIVAL 1.9FR 120CM

## (undated) DEVICE — INVIEW CLEAR LEGGINGS: Brand: CONVERTORS

## (undated) DEVICE — CHLORHEXIDINE 4PCT 4 OZ

## (undated) DEVICE — MAX-CORE® DISPOSABLE CORE BIOPSY INSTRUMENT, 18G X 20CM: Brand: MAX-CORE

## (undated) DEVICE — GLOVE PI ULTRA TOUCH SZ.8.0

## (undated) DEVICE — UROCATCH BAG

## (undated) DEVICE — SINGLE PORT MANIFOLD: Brand: NEPTUNE 2

## (undated) DEVICE — TELFA NON-ADHERENT ABSORBENT DRESSING: Brand: TELFA

## (undated) DEVICE — CHLORAPREP HI-LITE 26ML ORANGE

## (undated) DEVICE — PREMIUM DRY TRAY LF: Brand: MEDLINE INDUSTRIES, INC.

## (undated) DEVICE — SCD SEQUENTIAL COMPRESSION COMFORT SLEEVE MEDIUM KNEE LENGTH: Brand: KENDALL SCD

## (undated) DEVICE — NEEDLE SPINAL 22G X 7IN QUINCKE

## (undated) DEVICE — GLOVE INDICATOR PI UNDERGLOVE SZ 7 BLUE

## (undated) DEVICE — SYRINGE 20ML LL

## (undated) DEVICE — CUFF TOURNIQUET 18 X 4 IN QUICK CONNECT DISP 1 BLADDER

## (undated) DEVICE — BAG URINE DRAINAGE 2000ML ANTI RFLX LF

## (undated) DEVICE — STRETCH BANDAGE: Brand: CURITY

## (undated) DEVICE — NEEDLE 25G X 1 1/2

## (undated) DEVICE — SYRINGE 5ML LL

## (undated) DEVICE — STERILE SURGICAL LUBRICANT,  TUBE: Brand: SURGILUBE

## (undated) DEVICE — PACK TUR

## (undated) DEVICE — CATH FOLEY COUNCIL 18FR 5ML 2 WAY LUBRICATH

## (undated) DEVICE — GLOVE INDICATOR PI UNDERGLOVE SZ 7.5 BLUE

## (undated) DEVICE — STOCKINETTE 2P PREROLLD 6X60

## (undated) DEVICE — ENDOSCOPIC ULTRASOUND ASPIRATION NEEDLE: Brand: EXPECT SLIMLINE SL

## (undated) DEVICE — INTENDED FOR TISSUE SEPARATION, AND OTHER PROCEDURES THAT REQUIRE A SHARP SURGICAL BLADE TO PUNCTURE OR CUT.: Brand: BARD-PARKER ® SAFETYLOCK CARBON RIB-BACK BLADES

## (undated) DEVICE — GLOVE SRG BIOGEL 7

## (undated) DEVICE — BETHLEHEM UNIVERSAL  MIONR EXT: Brand: CARDINAL HEALTH

## (undated) DEVICE — GAUZE SPONGES,16 PLY: Brand: CURITY

## (undated) DEVICE — GUIDEWIRE STRGHT TIP 0.035 IN  SOLO PLUS

## (undated) DEVICE — DISPOSABLE OR TOWEL: Brand: CARDINAL HEALTH

## (undated) DEVICE — LUBRICANT JELLY SURGILUBE TUBE 4OZ FLIP TOP

## (undated) DEVICE — GLOVE PI ULTRA TOUCH SZ.7.0

## (undated) DEVICE — CURITY NON-ADHERENT STRIPS: Brand: CURITY

## (undated) DEVICE — 18.0 MM X 4.1 MM X 0.53 MM SAGITTAL BLADE

## (undated) DEVICE — SKIN MARKER DUAL TIP WITH RULER CAP, FLEXIBLE RULER AND LABELS: Brand: DEVON

## (undated) DEVICE — UROLOGIC DRAIN BAG: Brand: UNBRANDED

## (undated) DEVICE — CATH FOLEY 20FR 5ML 2 WAY UNCOATED SILICONE

## (undated) DEVICE — PENCIL ELECTROSURG E-Z CLEAN -0035H

## (undated) DEVICE — CATH FOLEY 20FR 5ML 2 WAY SILICONE ELASTIMER

## (undated) DEVICE — GLOVE INDICATOR PI UNDERGLOVE SZ 8 BLUE

## (undated) DEVICE — NEEDLE BLUNT 18 G X 1 1/2IN

## (undated) DEVICE — CATH URETERAL 5FR X 70 CM FLEX TIP POLYUR BARD